# Patient Record
Sex: MALE | Race: BLACK OR AFRICAN AMERICAN | NOT HISPANIC OR LATINO | Employment: OTHER | URBAN - METROPOLITAN AREA
[De-identification: names, ages, dates, MRNs, and addresses within clinical notes are randomized per-mention and may not be internally consistent; named-entity substitution may affect disease eponyms.]

---

## 2017-03-31 ENCOUNTER — HOSPITAL ENCOUNTER (EMERGENCY)
Facility: HOSPITAL | Age: 32
Discharge: HOME | End: 2017-03-31
Payer: MEDICARE

## 2017-03-31 VITALS
RESPIRATION RATE: 18 BRPM | TEMPERATURE: 98 F | SYSTOLIC BLOOD PRESSURE: 126 MMHG | BODY MASS INDEX: 23.74 KG/M2 | DIASTOLIC BLOOD PRESSURE: 71 MMHG | OXYGEN SATURATION: 99 % | WEIGHT: 185 LBS | HEIGHT: 74 IN | HEART RATE: 82 BPM

## 2017-03-31 DIAGNOSIS — R07.89 CHEST WALL PAIN: Primary | ICD-10-CM

## 2017-03-31 DIAGNOSIS — R07.9 CHEST PAIN: ICD-10-CM

## 2017-03-31 PROCEDURE — 96372 THER/PROPH/DIAG INJ SC/IM: CPT

## 2017-03-31 PROCEDURE — 99284 EMERGENCY DEPT VISIT MOD MDM: CPT | Mod: 25

## 2017-03-31 PROCEDURE — 63600175 PHARM REV CODE 636 W HCPCS: Performed by: PHYSICIAN ASSISTANT

## 2017-03-31 PROCEDURE — 93005 ELECTROCARDIOGRAM TRACING: CPT

## 2017-03-31 RX ORDER — IBUPROFEN 800 MG/1
800 TABLET ORAL EVERY 6 HOURS PRN
Qty: 20 TABLET | Refills: 0 | Status: ON HOLD | OUTPATIENT
Start: 2017-03-31 | End: 2021-03-02 | Stop reason: HOSPADM

## 2017-03-31 RX ORDER — KETOROLAC TROMETHAMINE 30 MG/ML
10 INJECTION, SOLUTION INTRAMUSCULAR; INTRAVENOUS
Status: COMPLETED | OUTPATIENT
Start: 2017-03-31 | End: 2017-03-31

## 2017-03-31 RX ADMIN — KETOROLAC TROMETHAMINE 10 MG: 30 INJECTION, SOLUTION INTRAMUSCULAR at 02:03

## 2017-03-31 NOTE — ED PROVIDER NOTES
Encounter Date: 3/31/2017       History     Chief Complaint   Patient presents with    Pectoral Muscle Pain     Pt reports hurting left pec muscle about 1 month ago, tried tylenol, not helping pain worse now     Review of patient's allergies indicates:  No Known Allergies  HPI Comments: Pt is a 31 yr old male with hx of type 1 diabetes who presents to the Ed with chest pain.  Pt states about 3 weeks ago, he was lifting weights when he felt some pain on the left side of his chest.  Pt states the pain lasted for a day or so and then improved.  Pt states since then, he feels like every time he lifts something heavy at work or while working out, the pain begins.  He states the pain is currently 5/10.  He states the pain is worse with movement and lifting, but he is painfree while resting.  He denies any associated shortness of breath.  He denies lower extremity edema, recent travel, recent surgery, or previous blood clot.  He denies smoking history.  He states he has been taking tylenol with no relief of his symptoms.    The history is provided by the patient.     Past Medical History:   Diagnosis Date    Diabetes mellitus      History reviewed. No pertinent surgical history.  History reviewed. No pertinent family history.  Social History   Substance Use Topics    Smoking status: Current Some Day Smoker    Smokeless tobacco: None    Alcohol use No     Review of Systems   Constitutional: Negative for activity change, appetite change, chills, fatigue and fever.   HENT: Negative for congestion, ear discharge, ear pain, hearing loss, mouth sores, postnasal drip, rhinorrhea, sore throat and trouble swallowing.    Eyes: Negative for photophobia and visual disturbance.   Respiratory: Negative for cough and shortness of breath.    Cardiovascular: Positive for chest pain (chest wall pain). Negative for palpitations and leg swelling.   Gastrointestinal: Negative for abdominal pain, blood in stool, constipation, diarrhea,  nausea and vomiting.   Genitourinary: Negative for dysuria, flank pain and hematuria.   Musculoskeletal: Negative for back pain, neck pain and neck stiffness.   Skin: Negative for rash and wound.   Neurological: Negative for dizziness, syncope, speech difficulty, weakness, light-headedness, numbness and headaches.       Physical Exam   Initial Vitals   BP Pulse Resp Temp SpO2   03/31/17 1327 03/31/17 1327 03/31/17 1327 03/31/17 1327 03/31/17 1327   128/69 86 16 98.1 °F (36.7 °C) 100 %     Physical Exam    Nursing note and vitals reviewed.  Constitutional: He appears well-developed and well-nourished. He is not diaphoretic.  Non-toxic appearance. No distress.   HENT:   Head: Normocephalic.   Right Ear: Hearing and external ear normal.   Left Ear: Hearing and external ear normal.   Nose: Nose normal.   Mouth/Throat: Uvula is midline and oropharynx is clear and moist. No trismus in the jaw. No uvula swelling. No oropharyngeal exudate.   Eyes: Conjunctivae are normal. Pupils are equal, round, and reactive to light.   Neck: Normal range of motion.   Cardiovascular: Normal rate, regular rhythm and normal heart sounds. Exam reveals no gallop and no friction rub.    No murmur heard.  No lower extremity edema.   Pulmonary/Chest: Breath sounds normal. No respiratory distress. He has no wheezes. He has no rhonchi. He has no rales. He exhibits tenderness (over the left pectoral muscle).   Abdominal: Soft. Bowel sounds are normal. He exhibits no distension and no mass. There is no tenderness. There is no rebound and no guarding.   Lymphadenopathy:     He has no cervical adenopathy.   Neurological: He is alert and oriented to person, place, and time.   Skin: Skin is warm and dry.   Psychiatric: He has a normal mood and affect.         ED Course   Procedures  Labs Reviewed - No data to display  EKG Readings: (Independently Interpreted)   Initial Reading: No STEMI. Rhythm: Normal Sinus Rhythm. Heart Rate: 76.     Imaging Results          X-Ray Chest PA And Lateral (Final result) Result time:  03/31/17 15:42:57    Final result by Alfa Lynn MD (03/31/17 15:42:57)    Impression:     As above.      Electronically signed by: ALFA LYNN MD  Date:     03/31/17  Time:    15:42     Narrative:    Chest PA lateral.    Findings: 2 views.  The lungs are clear.  There is no pneumothorax or pleural fluid.  The cardiac silhouette is not enlarged.  The osseous structures are unremarkable.                 Medical Decision Making:   Initial Assessment:   Urgent evaluation of a 31 yr old male with chest wall pain.  Pt is afebrile, nontoxic appearing, and hemodynamically stable.  On exam, there is tenderness over the left pectoral muscle.  Lungs are clear to auscultation.  No lower extremity edema.  Pt is PERC negative.  I doubt ACS.  EKG and chest xray will be obtained.  I suspect muscle strain.  Pt will be given toradol.  ED Management:  3:48 PM   EKG shows no acute ischemia.  Chest xray shows no acute cardiopulmonary process.  I suspect muscular strain.  He is requesting work note.  He is advised to follow up with PCP in 48 hours for reevaluation or return to ED with any worsening symptoms or concerns.  Other:   I have discussed this case with another health care provider.       <> Summary of the Discussion: Dr. Montes                   ED Course     Clinical Impression:   The primary encounter diagnosis was Chest wall pain. A diagnosis of Chest pain was also pertinent to this visit.          Neida Arriaza PA-C  03/31/17 1667

## 2017-03-31 NOTE — ED AVS SNAPSHOT
[facility]  [address] [place] [address]               Andrzej Sinclair   3/31/2017  2:46 PM   ED    Description:  Male : 1985   Department:  [facility]           Your Care was Coordinated By:     Provider Role From To    Neida Arriaza PA-C Physician Assistant [phone] --      Reason for Visit     Pectoral Muscle Pain           Diagnoses this Visit        Comments    Chest wall pain    -  Primary     Chest pain           ED Disposition     None           To Do List           Follow-up Information     Follow up with Minda Zaragoza MD In 2 days.    Specialty:  Family Medicine    Contact information:    [address]  [place]  [facility] [address]  [phone]         These Medications        Disp Refills Start End    ibuprofen (ADVIL,MOTRIN) 800 MG tablet 20 tablet 0 3/31/2017     Take 1 tablet (800 mg total) by mouth every 6 (six) hours as needed for Pain. - Oral      [facility]     [facility] On Call Nurse Care Line -  Assistance  Unless otherwise directed by your provider, please contact [facility], our nurse care line that is available for  assistance.     Registered nurses in the [facility] provide: appointment scheduling, clinical advisement, health education, and other advisory services.  Call: [phone] (toll free)               Medications           Message regarding Medications     Verify the changes and/or additions to your medication regime listed below are the same as discussed with your clinician today.  If any of these changes or additions are incorrect, please notify your healthcare provider.        START taking these NEW medications        Refills    ibuprofen (ADVIL,MOTRIN) 800 MG tablet 0    Sig: Take 1 tablet (800 mg total) by mouth every 6 (six) hours as needed for Pain.    Class: Print    Route: Oral      These medications were administered today        Dose  "Freq    ketorolac injection 10 mg 10 mg ED 1 Time    Sig: Inject 10 mg into the muscle ED 1 Time.    Class: Normal    Route: Intramuscular           Verify that the below list of medications is an accurate representation of the medications you are currently taking.  If none reported, the list may be blank. If incorrect, please contact your healthcare provider. Carry this list with you in case of emergency.           Current Medications     insulin aspart (NOVOLOG) 100 unit/mL injection Inject into the skin 3 (three) times daily before meals.    ondansetron (ZOFRAN) 4 MG tablet Take 1 tablet (4 mg total) by mouth every 6 (six) hours.    ibuprofen (ADVIL,MOTRIN) 800 MG tablet Take 1 tablet (800 mg total) by mouth every 6 (six) hours as needed for Pain.           Clinical Reference Information           Your Vitals Were     BP Pulse Temp Resp Height Weight    128/69 (BP Location: Left arm, Patient Position: Sitting) 86 98.1 °F (36.7 °C) (Oral) 16 6' 2" (1.88 m) 83.9 kg (185 lb)    SpO2 BMI             100% 23.75 kg/m2         Allergies as of 3/31/2017     No Known Allergies      Immunizations Administered on Date of Encounter - 3/31/2017     None      ED Micro, Lab, POCT     None      ED Imaging Orders     Start Ordered       Status Ordering Provider    03/31/17 1449 03/31/17 1449  X-Ray Chest PA And Lateral  1 time imaging      Final result       Discharge References/Attachments     CHEST WALL STRAIN (ENGLISH)      [facility] Sign-Up     Activating your [facility] account is as easy as 1-2-3!     1) Visit [email or web address], select Sign Up Now, enter this activation code and your date of birth, then select Next.  [email or web address]  Expires: 5/15/2017  3:50 PM      2) Create a username and password to use when you visit [facility] in the future and select a security question in case you lose your password and select Next.    3) Enter your e-mail address and click Sign Up!    Additional Information  If you have questions, " please e-mail [email or web address] or call [phone] to talk to our [facility] staff. Remember, [facility] is NOT to be used for urgent needs. For medical emergencies, dial 911.         Smoking Cessation     If you would like to quit smoking:   You may be eligible for free services if you are a [place] resident and started smoking cigarettes before September 1, 1988.  Call the Smoking Cessation Trust ([facility]) toll free at [phone] [phone] or ([phone] [phone].   Call 9-444-QUIT-NOW if you do not meet the above criteria.   Contact us via email: [email or web address]   View our website for more information: [email or web address]         [facility] complies with applicable Federal civil rights laws and does not discriminate on the basis of race, color, national origin, age, disability, or sex.        Language Assistance Services     ATTENTION: Language assistance services are available, free of charge. Please call [phone].      ATENCIÓN: Si habla español, tiene a boucher disposición servicios gratuitos de asistencia lingüística. Llame al [phone].     CHÚ Ý: N?u b?n nói Ti?ng Vi?t, có các d?ch v? h? tr? ngôn ng? mi?n phí dành cho b?n. G?i s? [phone].

## 2017-03-31 NOTE — ED TRIAGE NOTES
Pt arrives to ED via personal transportation with c/o left pectoral muscle pain that worsens upon movement and palpation x one month. Reports heavy lifting at work. Denies recent injury or any other symptoms at this time.

## 2020-04-21 DIAGNOSIS — Z01.84 ANTIBODY RESPONSE EXAMINATION: ICD-10-CM

## 2020-05-21 DIAGNOSIS — Z01.84 ANTIBODY RESPONSE EXAMINATION: ICD-10-CM

## 2020-06-20 DIAGNOSIS — Z01.84 ANTIBODY RESPONSE EXAMINATION: ICD-10-CM

## 2020-07-20 DIAGNOSIS — Z01.84 ANTIBODY RESPONSE EXAMINATION: ICD-10-CM

## 2020-08-19 DIAGNOSIS — Z01.84 ANTIBODY RESPONSE EXAMINATION: ICD-10-CM

## 2020-09-18 DIAGNOSIS — Z01.84 ANTIBODY RESPONSE EXAMINATION: ICD-10-CM

## 2020-10-18 DIAGNOSIS — Z01.84 ANTIBODY RESPONSE EXAMINATION: ICD-10-CM

## 2020-11-17 DIAGNOSIS — Z01.84 ANTIBODY RESPONSE EXAMINATION: ICD-10-CM

## 2020-11-18 ENCOUNTER — CLINICAL SUPPORT (OUTPATIENT)
Dept: URGENT CARE | Facility: CLINIC | Age: 35
End: 2020-11-18
Payer: MEDICARE

## 2020-11-18 ENCOUNTER — TELEPHONE (OUTPATIENT)
Dept: PRIMARY CARE CLINIC | Facility: CLINIC | Age: 35
End: 2020-11-18

## 2020-11-18 DIAGNOSIS — R53.83 FATIGUE, UNSPECIFIED TYPE: ICD-10-CM

## 2020-11-18 DIAGNOSIS — R11.10 VOMITING, INTRACTABILITY OF VOMITING NOT SPECIFIED, PRESENCE OF NAUSEA NOT SPECIFIED, UNSPECIFIED VOMITING TYPE: Primary | ICD-10-CM

## 2020-11-18 DIAGNOSIS — R19.7 DIARRHEA, UNSPECIFIED TYPE: ICD-10-CM

## 2020-11-18 DIAGNOSIS — R09.81 NASAL CONGESTION: ICD-10-CM

## 2020-11-18 DIAGNOSIS — R51.9 INTRACTABLE HEADACHE, UNSPECIFIED CHRONICITY PATTERN, UNSPECIFIED HEADACHE TYPE: ICD-10-CM

## 2020-11-18 DIAGNOSIS — R11.10 VOMITING, INTRACTABILITY OF VOMITING NOT SPECIFIED, PRESENCE OF NAUSEA NOT SPECIFIED, UNSPECIFIED VOMITING TYPE: ICD-10-CM

## 2020-11-18 LAB
CTP QC/QA: YES
SARS-COV-2 RDRP RESP QL NAA+PROBE: NEGATIVE

## 2020-11-18 PROCEDURE — U0002 COVID-19 LAB TEST NON-CDC: HCPCS | Mod: QW,S$GLB,, | Performed by: INTERNAL MEDICINE

## 2020-11-18 PROCEDURE — U0002: ICD-10-PCS | Mod: QW,S$GLB,, | Performed by: INTERNAL MEDICINE

## 2020-11-18 NOTE — TELEPHONE ENCOUNTER
Patient has been notified of COVID test result on behalf of Employee Health and provided clinical guidance on such. Cleared to RTW 11/19.

## 2020-11-18 NOTE — LETTER
[address], [address] ? [place] [address] ? Phone [phone] ? Fax [phone]           Return to Work/School    Patient: Andrzej Sinclair  YOB: 1985   Date: 11/18/2020      To Whom It May Concern:     Andrzej Sinclair was in contact with/seen in my office on 11/18/2020. COVID-19 is present in our communities across the [facility]. Not all patients are eligible or appropriate to be tested. In this situation, your employee meets the following criteria:     Andrzej Sinclair has met the criteria for COVID-19 testing and has a NEGATIVE result. The employee can return to work once they are asymptomatic for 24 hours without the use of fever reducing medications (Tylenol, Motrin, etc).     If you have any questions or concerns, or if I can be of further assistance, please do not hesitate to contact me.     Sincerely,    NURSE URGENT CARE, [facility]

## 2020-11-30 ENCOUNTER — HOSPITAL ENCOUNTER (EMERGENCY)
Facility: HOSPITAL | Age: 35
Discharge: LEFT BEFORE DISCHARGE | End: 2020-11-30
Attending: EMERGENCY MEDICINE
Payer: MEDICARE

## 2020-11-30 VITALS
DIASTOLIC BLOOD PRESSURE: 78 MMHG | OXYGEN SATURATION: 99 % | HEART RATE: 79 BPM | WEIGHT: 185 LBS | RESPIRATION RATE: 14 BRPM | BODY MASS INDEX: 23.74 KG/M2 | HEIGHT: 74 IN | SYSTOLIC BLOOD PRESSURE: 143 MMHG | TEMPERATURE: 98 F

## 2020-11-30 DIAGNOSIS — R73.9 HYPERGLYCEMIA: ICD-10-CM

## 2020-11-30 DIAGNOSIS — R06.02 SHORTNESS OF BREATH: ICD-10-CM

## 2020-11-30 DIAGNOSIS — R07.9 CHEST PAIN, UNSPECIFIED TYPE: Primary | ICD-10-CM

## 2020-11-30 LAB
ALBUMIN SERPL BCP-MCNC: 4.5 G/DL (ref 3.5–5.2)
ALLENS TEST: ABNORMAL
ALP SERPL-CCNC: 121 U/L (ref 55–135)
ALT SERPL W/O P-5'-P-CCNC: 25 U/L (ref 10–44)
ANION GAP SERPL CALC-SCNC: 13 MMOL/L (ref 8–16)
AST SERPL-CCNC: 22 U/L (ref 10–40)
B-OH-BUTYR BLD STRIP-SCNC: 0.9 MMOL/L (ref 0–0.5)
BACTERIA #/AREA URNS HPF: NORMAL /HPF
BASOPHILS # BLD AUTO: 0.02 K/UL (ref 0–0.2)
BASOPHILS NFR BLD: 0.6 % (ref 0–1.9)
BILIRUB SERPL-MCNC: 0.6 MG/DL (ref 0.1–1)
BILIRUB UR QL STRIP: NEGATIVE
BNP SERPL-MCNC: <10 PG/ML (ref 0–99)
BUN SERPL-MCNC: 30 MG/DL (ref 6–20)
CALCIUM SERPL-MCNC: 10.5 MG/DL (ref 8.7–10.5)
CHLORIDE SERPL-SCNC: 94 MMOL/L (ref 95–110)
CLARITY UR: CLEAR
CO2 SERPL-SCNC: 25 MMOL/L (ref 23–29)
COLOR UR: COLORLESS
CREAT SERPL-MCNC: 1.9 MG/DL (ref 0.5–1.4)
CTP QC/QA: YES
D DIMER PPP IA.FEU-MCNC: <0.19 MG/L FEU
DIFFERENTIAL METHOD: ABNORMAL
EOSINOPHIL # BLD AUTO: 0 K/UL (ref 0–0.5)
EOSINOPHIL NFR BLD: 0.3 % (ref 0–8)
ERYTHROCYTE [DISTWIDTH] IN BLOOD BY AUTOMATED COUNT: 11.7 % (ref 11.5–14.5)
EST. GFR  (AFRICAN AMERICAN): 52 ML/MIN/1.73 M^2
EST. GFR  (NON AFRICAN AMERICAN): 45 ML/MIN/1.73 M^2
GLUCOSE SERPL-MCNC: 677 MG/DL (ref 70–110)
GLUCOSE UR QL STRIP: ABNORMAL
HCO3 UR-SCNC: 9.7 MMOL/L (ref 24–28)
HCT VFR BLD AUTO: 39.4 % (ref 40–54)
HGB BLD-MCNC: 14.5 G/DL (ref 14–18)
HGB UR QL STRIP: ABNORMAL
IMM GRANULOCYTES # BLD AUTO: 0 K/UL (ref 0–0.04)
IMM GRANULOCYTES NFR BLD AUTO: 0 % (ref 0–0.5)
KETONES UR QL STRIP: ABNORMAL
LEUKOCYTE ESTERASE UR QL STRIP: NEGATIVE
LIPASE SERPL-CCNC: 5 U/L (ref 4–60)
LYMPHOCYTES # BLD AUTO: 0.7 K/UL (ref 1–4.8)
LYMPHOCYTES NFR BLD: 17.9 % (ref 18–48)
MCH RBC QN AUTO: 31.1 PG (ref 27–31)
MCHC RBC AUTO-ENTMCNC: 36.8 G/DL (ref 32–36)
MCV RBC AUTO: 85 FL (ref 82–98)
MICROSCOPIC COMMENT: NORMAL
MONOCYTES # BLD AUTO: 0.3 K/UL (ref 0.3–1)
MONOCYTES NFR BLD: 7.4 % (ref 4–15)
NEUTROPHILS # BLD AUTO: 2.7 K/UL (ref 1.8–7.7)
NEUTROPHILS NFR BLD: 73.8 % (ref 38–73)
NITRITE UR QL STRIP: NEGATIVE
NRBC BLD-RTO: 0 /100 WBC
PCO2 BLDA: 19.6 MMHG (ref 35–45)
PH SMN: 7.3 [PH] (ref 7.35–7.45)
PH UR STRIP: 5 [PH] (ref 5–8)
PLATELET # BLD AUTO: 191 K/UL (ref 150–350)
PMV BLD AUTO: 11.3 FL (ref 9.2–12.9)
PO2 BLDA: 40 MMHG (ref 40–60)
POC BE: -15 MMOL/L
POC SATURATED O2: 72 % (ref 95–100)
POC TCO2: 10 MMOL/L (ref 24–29)
POCT GLUCOSE: 360 MG/DL (ref 70–110)
POCT GLUCOSE: 379 MG/DL (ref 70–110)
POCT GLUCOSE: >500 MG/DL (ref 70–110)
POTASSIUM SERPL-SCNC: 5 MMOL/L (ref 3.5–5.1)
PROT SERPL-MCNC: 8.4 G/DL (ref 6–8.4)
PROT UR QL STRIP: NEGATIVE
RBC # BLD AUTO: 4.66 M/UL (ref 4.6–6.2)
RBC #/AREA URNS HPF: 3 /HPF (ref 0–4)
SAMPLE: ABNORMAL
SARS-COV-2 RDRP RESP QL NAA+PROBE: NEGATIVE
SITE: ABNORMAL
SODIUM SERPL-SCNC: 132 MMOL/L (ref 136–145)
SP GR UR STRIP: 1.02 (ref 1–1.03)
SQUAMOUS #/AREA URNS HPF: 1 /HPF
TROPONIN I SERPL DL<=0.01 NG/ML-MCNC: <0.006 NG/ML (ref 0–0.03)
URN SPEC COLLECT METH UR: ABNORMAL
UROBILINOGEN UR STRIP-ACNC: NEGATIVE EU/DL
WBC # BLD AUTO: 3.63 K/UL (ref 3.9–12.7)
WBC #/AREA URNS HPF: 3 /HPF (ref 0–5)
YEAST URNS QL MICRO: NORMAL

## 2020-11-30 PROCEDURE — 63600175 PHARM REV CODE 636 W HCPCS: Performed by: PHYSICIAN ASSISTANT

## 2020-11-30 PROCEDURE — 93010 ELECTROCARDIOGRAM REPORT: CPT | Mod: ,,, | Performed by: INTERNAL MEDICINE

## 2020-11-30 PROCEDURE — 83690 ASSAY OF LIPASE: CPT

## 2020-11-30 PROCEDURE — 93005 ELECTROCARDIOGRAM TRACING: CPT

## 2020-11-30 PROCEDURE — 82803 BLOOD GASES ANY COMBINATION: CPT

## 2020-11-30 PROCEDURE — 83880 ASSAY OF NATRIURETIC PEPTIDE: CPT

## 2020-11-30 PROCEDURE — 99900035 HC TECH TIME PER 15 MIN (STAT)

## 2020-11-30 PROCEDURE — 85379 FIBRIN DEGRADATION QUANT: CPT

## 2020-11-30 PROCEDURE — 84484 ASSAY OF TROPONIN QUANT: CPT

## 2020-11-30 PROCEDURE — 93010 EKG 12-LEAD: ICD-10-PCS | Mod: ,,, | Performed by: INTERNAL MEDICINE

## 2020-11-30 PROCEDURE — 96375 TX/PRO/DX INJ NEW DRUG ADDON: CPT

## 2020-11-30 PROCEDURE — 82962 GLUCOSE BLOOD TEST: CPT | Mod: 91

## 2020-11-30 PROCEDURE — 96374 THER/PROPH/DIAG INJ IV PUSH: CPT

## 2020-11-30 PROCEDURE — 96361 HYDRATE IV INFUSION ADD-ON: CPT

## 2020-11-30 PROCEDURE — 25000003 PHARM REV CODE 250: Performed by: PHYSICIAN ASSISTANT

## 2020-11-30 PROCEDURE — 99285 EMERGENCY DEPT VISIT HI MDM: CPT | Mod: 25

## 2020-11-30 PROCEDURE — 85025 COMPLETE CBC W/AUTO DIFF WBC: CPT

## 2020-11-30 PROCEDURE — 81000 URINALYSIS NONAUTO W/SCOPE: CPT

## 2020-11-30 PROCEDURE — 63600175 PHARM REV CODE 636 W HCPCS: Performed by: EMERGENCY MEDICINE

## 2020-11-30 PROCEDURE — 82010 KETONE BODYS QUAN: CPT

## 2020-11-30 PROCEDURE — 80053 COMPREHEN METABOLIC PANEL: CPT

## 2020-11-30 PROCEDURE — U0002 COVID-19 LAB TEST NON-CDC: HCPCS | Performed by: PHYSICIAN ASSISTANT

## 2020-11-30 RX ORDER — MORPHINE SULFATE 4 MG/ML
4 INJECTION, SOLUTION INTRAMUSCULAR; INTRAVENOUS
Status: COMPLETED | OUTPATIENT
Start: 2020-11-30 | End: 2020-11-30

## 2020-11-30 RX ORDER — ASPIRIN 325 MG
325 TABLET, DELAYED RELEASE (ENTERIC COATED) ORAL
Status: COMPLETED | OUTPATIENT
Start: 2020-11-30 | End: 2020-11-30

## 2020-11-30 RX ORDER — ONDANSETRON 4 MG/1
4 TABLET, ORALLY DISINTEGRATING ORAL EVERY 6 HOURS PRN
Qty: 20 TABLET | Refills: 0 | Status: SHIPPED | OUTPATIENT
Start: 2020-11-30 | End: 2020-12-05

## 2020-11-30 RX ORDER — BENZONATATE 100 MG/1
100 CAPSULE ORAL 3 TIMES DAILY PRN
Qty: 20 CAPSULE | Refills: 0 | Status: SHIPPED | OUTPATIENT
Start: 2020-11-30 | End: 2020-12-07

## 2020-11-30 RX ADMIN — MORPHINE SULFATE 4 MG: 4 INJECTION, SOLUTION INTRAMUSCULAR; INTRAVENOUS at 12:11

## 2020-11-30 RX ADMIN — ASPIRIN 325 MG: 325 TABLET, COATED ORAL at 12:11

## 2020-11-30 RX ADMIN — INSULIN HUMAN 6 UNITS: 100 INJECTION, SOLUTION PARENTERAL at 02:11

## 2020-11-30 RX ADMIN — SODIUM CHLORIDE 1000 ML: 0.9 INJECTION, SOLUTION INTRAVENOUS at 01:11

## 2020-11-30 RX ADMIN — SODIUM CHLORIDE 1000 ML: 0.9 INJECTION, SOLUTION INTRAVENOUS at 12:11

## 2020-11-30 NOTE — DISCHARGE INSTRUCTIONS
Take medications as prescribed. You left against medical advice today. Please return for worsening symptoms or as needed

## 2020-11-30 NOTE — FIRST PROVIDER EVALUATION
Emergency Department TeleTriage Encounter Note      CHIEF COMPLAINT    Chief Complaint   Patient presents with    Shortness of Breath     woke up this am with sob.  states he is struggling to breathe. + nausea.  denies vomiting, diarrhea or fever.  states feels like an asthma attack (never had asthma).   also complaints of midsternal chest pains.  non-radiating.       VITAL SIGNS   Initial Vitals [11/30/20 1104]   BP Pulse Resp Temp SpO2   139/88 106 18 99.2 °F (37.3 °C) 97 %      MAP       --            ALLERGIES    Review of patient's allergies indicates:  No Known Allergies    PROVIDER TRIAGE NOTE    34 year old male presents to the ED for evaluation of shortness of breath.  Patient reports symptoms started this morning.  Also complains of chest tightness and nausea.  No prior cardiac history.  No history of asthma.    Initial orders will be placed and care will be transferred to an alternate provider when patient is roomed for a full evaluation. Any additional orders and the final disposition will be determined by that provider.      ORDERS  Labs Reviewed   CBC W/ AUTO DIFFERENTIAL   COMPREHENSIVE METABOLIC PANEL   TROPONIN I   SARS-COV-2 RDRP GENE       ED Orders (720h ago, onward)    Start Ordered     Status Ordering Provider    11/30/20 1119 11/30/20 1119  Airborne and Contact and Droplet Isolation Status  Continuous      Ordered ANAUTTY, ABIDA    11/30/20 1119 11/30/20 1119  X-Ray Chest AP Portable  1 time imaging      Ordered BRADYTY, ABIDA    11/30/20 1119 11/30/20 1119  EKG 12-lead  Once      Ordered BRADYTY, ABIDA    11/30/20 1119 11/30/20 1119  CBC auto differential  STAT  Collect    Ordered BRADYTY ABIDA    11/30/20 1119 11/30/20 1119  Comprehensive metabolic panel  STAT  Collect    Ordered JOHNYKUTTY ABIDA    11/30/20 1119 11/30/20 1119  Troponin I  STAT  Collect    Ordered MADI ABIDA    11/30/20 1119 11/30/20 1119  Insert Saline lock IV  Once      Ordered ABIDA BARRAZA     11/30/20 1119 11/30/20 1119  Cardiac Monitoring - Adult  Continuous     Comments: Notify Physician If:    Ordered ABIDA BARRAZA    11/30/20 1118 11/30/20 1119  POCT COVID-19 Rapid Screening  Once      Ordered ABIDA BARRAZA            Virtual Visit Note: The provider triage portion of this emergency department evaluation and documentation was performed via [employer], a HIPAA-compliant telemedicine application, in concert with a tele-presenter in the room. A face to face patient evaluation with one of my colleagues will occur once the patient is placed in an emergency department room.      DISCLAIMER: This note was prepared with [employer] voice recognition transcription software. Garbled syntax, mangled pronouns, and other bizarre constructions may be attributed to that software system.

## 2020-11-30 NOTE — Clinical Note
"Andrzej Zambranory" Yahir was seen and treated in our emergency department on 11/30/2020.  He may return to work on 12/02/2020.       If you have any questions or concerns, please don't hesitate to call.      ADALGISA Lopez RN    "

## 2020-11-30 NOTE — ED PROVIDER NOTES
"Encounter Date: 11/30/2020       History     Chief Complaint   Patient presents with    Shortness of Breath     woke up this am with sob.  states he is struggling to breathe. + nausea.  denies vomiting, diarrhea or fever.  states feels like an asthma attack (never had asthma).   also complaints of midsternal chest pains.  non-radiating.     CC: SOB    HPI:   33 y/o M with history of T1DM presenting for evaluation of SOB constantly since 0645.  He reports he is having constant squeezing 10/10 chest pain to both sides of chest radiating into substernal region. The pain is worse with standing up. Improved with laying down. Has had similar pain with DKA in the past; he has been admitted 4 times previously for this. He has had  1 day history of myalgias. States he developed dry cough and nausea this morning.   Pt c/o urinary frequency, polyuria, polydipsia since thanksgiving. States he had "half a beer" 4 days ago but denies alcohol use otherwise. He states he is compliant with diabetic diet and meds: novolog 7 units/ sliding scale 4-5 times per day with meals and Toujeo. Sees Dr. Marcial Tijerina PCP, 1.5 months ago. No recent med changes.  Denies fever, chills, vomiting, diarrhea dizziness, lightheadedness, dysuria, hematuria, urinary urgency or frequency        Review of patient's allergies indicates:  No Known Allergies  Past Medical History:   Diagnosis Date    Diabetes mellitus      History reviewed. No pertinent surgical history.  History reviewed. No pertinent family history.  Social History     Tobacco Use    Smoking status: Current Some Day Smoker   Substance Use Topics    Alcohol use: No    Drug use: No     Review of Systems   Constitutional: Negative for chills and fever.   HENT: Negative for congestion, ear pain, rhinorrhea, sore throat and trouble swallowing.    Eyes: Negative for redness.   Respiratory: Positive for cough and shortness of breath.    Cardiovascular: Positive for chest pain. Negative for leg " swelling.   Gastrointestinal: Positive for nausea. Negative for abdominal pain, constipation, diarrhea and vomiting.   Endocrine: Positive for polydipsia and polyuria.   Genitourinary: Positive for frequency. Negative for dysuria, hematuria and urgency.   Musculoskeletal: Positive for myalgias. Negative for back pain, gait problem and neck pain.   Neurological: Negative for dizziness, speech difficulty and light-headedness.   Psychiatric/Behavioral: Negative for confusion.       Physical Exam     Initial Vitals [11/30/20 1104]   BP Pulse Resp Temp SpO2   139/88 106 18 99.2 °F (37.3 °C) 97 %      MAP       --         Physical Exam    Nursing note and vitals reviewed.  Constitutional: He appears well-developed and well-nourished. No distress.   HENT:   Head: Normocephalic.   Right Ear: Hearing, tympanic membrane, external ear and ear canal normal.   Left Ear: Hearing, tympanic membrane, external ear and ear canal normal.   Nose: Nose normal.   Mouth/Throat: No posterior oropharyngeal edema or posterior oropharyngeal erythema.   Eyes: Conjunctivae are normal.   Neck: Neck supple.   Cardiovascular: Regular rhythm. Tachycardia present.  Exam reveals no gallop and no friction rub.    No murmur heard.  Pulses:       Radial pulses are 2+ on the right side and 2+ on the left side.        Dorsalis pedis pulses are 2+ on the right side and 2+ on the left side.   Pulmonary/Chest: Breath sounds normal. No respiratory distress. He has no wheezes. He has no rhonchi. He has no rales.   Abdominal: Soft. Bowel sounds are normal. He exhibits no distension. There is abdominal tenderness in the epigastric area. There is no rigidity, no rebound, no guarding, no CVA tenderness, no tenderness at McBurney's point and negative Menendez's sign.   Lymphadenopathy:     He has no cervical adenopathy.   Neurological: He is alert.   Skin: Skin is warm and dry. No rash noted.   Psychiatric: He has a normal mood and affect.         ED Course    Procedures  Labs Reviewed   CBC W/ AUTO DIFFERENTIAL - Abnormal; Notable for the following components:       Result Value    WBC 3.63 (*)     Hematocrit 39.4 (*)     MCH 31.1 (*)     MCHC 36.8 (*)     Lymph # 0.7 (*)     Gran % 73.8 (*)     Lymph % 17.9 (*)     All other components within normal limits   COMPREHENSIVE METABOLIC PANEL - Abnormal; Notable for the following components:    Sodium 132 (*)     Chloride 94 (*)     Glucose 677 (*)     BUN 30 (*)     Creatinine 1.9 (*)     eGFR if  52 (*)     eGFR if non  45 (*)     All other components within normal limits    Narrative:     Glucose critical result(s) called and verbal readback obtained from   Juanita Lopez by BG 11/30/2020 13:19   URINALYSIS, REFLEX TO URINE CULTURE - Abnormal; Notable for the following components:    Color, UA Colorless (*)     Glucose, UA 3+ (*)     Ketones, UA 1+ (*)     Occult Blood UA 2+ (*)     All other components within normal limits    Narrative:     Specimen Source->Urine   BETA - HYDROXYBUTYRATE, SERUM - Abnormal; Notable for the following components:    Beta-Hydroxybutyrate 0.9 (*)     All other components within normal limits   POCT GLUCOSE - Abnormal; Notable for the following components:    POCT Glucose >500 (*)     All other components within normal limits   ISTAT PROCEDURE - Abnormal; Notable for the following components:    POC PH 7.300 (*)     POC PCO2 19.6 (*)     POC HCO3 9.7 (*)     POC SATURATED O2 72 (*)     POC TCO2 10 (*)     All other components within normal limits   POCT GLUCOSE - Abnormal; Notable for the following components:    POCT Glucose 379 (*)     All other components within normal limits   POCT GLUCOSE - Abnormal; Notable for the following components:    POCT Glucose 360 (*)     All other components within normal limits   TROPONIN I   D DIMER, QUANTITATIVE   B-TYPE NATRIURETIC PEPTIDE   LIPASE   URINALYSIS MICROSCOPIC    Narrative:     Specimen Source->Urine    SARS-COV-2 RDRP GENE     EKG Readings: (Independently Interpreted)   This patient is in a sinus tachycardia with a heart rate of 112.  There are no significant ST segment or T-wave changes.  There is no evidence of acute myocardial infarction or malignant arrhythmia.  This patient may have an incomplete right bundle branch block.     ECG Results          EKG 12-lead (Final result)  Result time 11/30/20 19:25:37    Final result by Interface, Lab In [facility] (11/30/20 19:25:37)                 Narrative:    Test Reason : R06.02,    Vent. Rate : 112 BPM     Atrial Rate : 112 BPM     P-R Int : 132 ms          QRS Dur : 098 ms      QT Int : 314 ms       P-R-T Axes : 084 083 076 degrees     QTc Int : 428 ms    Sinus tachycardia  Biatrial enlargement  Incomplete right bundle branch block  Abnormal ECG  When compared with ECG of 31-MAR-2017 14:55,  No significant change was found  Confirmed by James Dugan MD ([id number]) on 11/30/2020 7:25:27 PM    Referred By: [email or web address]   SELF           Confirmed By:James Dugan MD                            Imaging Results          X-Ray Chest AP Portable (Final result)  Result time 11/30/20 12:23:58    Final result by Alec Hunt MD (11/30/20 12:23:58)                 Impression:      1. No acute cardiopulmonary process.  Clinical correlation is needed to exclude viral process.      Electronically signed by: Alec Hunt MD  Date:    11/30/2020  Time:    12:23             Narrative:    EXAMINATION:  XR CHEST AP PORTABLE    CLINICAL HISTORY:  Suspected Covid-19 Virus Infection;    TECHNIQUE:  Single frontal view of the chest was performed.    COMPARISON:  03/31/2017    FINDINGS:  The cardiomediastinal silhouette is not enlarged.  There is no pleural effusion.  The trachea is midline.  The lungs are symmetrically expanded bilaterally without evidence of acute parenchymal process. No large focal consolidation seen.  There is no pneumothorax.  The osseous structures are  unremarkable.                                 Medical Decision Making:   Initial Assessment:   34-year-old male with history of diabetes presenting for evaluation of chest pain and shortness breath that began prior to arrival.  He reports this feels similar to when he has had DKA previously.  Glucose greater than 500 in triage.  Patient is afebrile, mildly tachycardic, not hypoxic in no distress.  Exam above.  He isCOVID negative. CXR negative.  Initial troponin is negative.  EKG without STEMI or malignant arrhythmia. Lipase normal. Glucose improved after insulin and fluids in ED. He is tolerating PO. Discussed pt with Dr. Mcdonough as recommended by Dr. Clemens. She states the pt is not in DKA. Still feel that patient warrants admission for ACS rule out due to persisting chest pain despite morphine and hyperglycemia with BETH. Pt not agreeable to admission. Left AMA and agreed to return to ER for worsening symptoms or as needed. Discussed with Dr. Clemens who agrees with assessment and plan                              Clinical Impression:       ICD-10-CM ICD-9-CM   1. Chest pain, unspecified type  R07.9 786.50   2. Shortness of breath  R06.02 786.05   3. Hyperglycemia  R73.9 790.29                      Disposition:   Disposition: AMA  Condition: Fair     ED Disposition Condition    AMA                             Carri Ng PA-C  11/30/20 2057       Carri Ng PA-C  11/30/20 2057

## 2020-11-30 NOTE — ED NOTES
Provider explained risks and complications of leaving AMA, patient verbalized understanding. AMA forms signed by patient.

## 2020-11-30 NOTE — ED NOTES
Patient placed on continuous cardiac monitor, automatic blood pressure cuff and continuous pulse oximeter. Call light within reach.

## 2021-02-28 ENCOUNTER — HOSPITAL ENCOUNTER (INPATIENT)
Facility: HOSPITAL | Age: 36
LOS: 2 days | Discharge: HOME | DRG: 639 | End: 2021-03-02
Attending: EMERGENCY MEDICINE | Admitting: INTERNAL MEDICINE
Payer: MEDICARE

## 2021-02-28 DIAGNOSIS — Z76.89 ENCOUNTER TO ESTABLISH CARE: ICD-10-CM

## 2021-02-28 DIAGNOSIS — R07.9 CHEST PAIN: ICD-10-CM

## 2021-02-28 DIAGNOSIS — E10.10 DIABETIC KETOACIDOSIS WITHOUT COMA ASSOCIATED WITH TYPE 1 DIABETES MELLITUS: Primary | ICD-10-CM

## 2021-02-28 LAB
ALBUMIN SERPL BCP-MCNC: 4.4 G/DL (ref 3.5–5.2)
ALLENS TEST: ABNORMAL
ALP SERPL-CCNC: 83 U/L (ref 55–135)
ALT SERPL W/O P-5'-P-CCNC: 34 U/L (ref 10–44)
ANION GAP SERPL CALC-SCNC: 18 MMOL/L (ref 8–16)
AST SERPL-CCNC: 34 U/L (ref 10–40)
B-OH-BUTYR BLD STRIP-SCNC: 4.7 MMOL/L (ref 0–0.5)
BACTERIA #/AREA URNS AUTO: NORMAL /HPF
BASOPHILS # BLD AUTO: 0.02 K/UL (ref 0–0.2)
BASOPHILS NFR BLD: 0.3 % (ref 0–1.9)
BILIRUB SERPL-MCNC: 0.7 MG/DL (ref 0.1–1)
BILIRUB UR QL STRIP: NEGATIVE
BUN SERPL-MCNC: 27 MG/DL (ref 6–20)
CALCIUM SERPL-MCNC: 9.7 MG/DL (ref 8.7–10.5)
CHLORIDE SERPL-SCNC: 97 MMOL/L (ref 95–110)
CLARITY UR REFRACT.AUTO: CLEAR
CO2 SERPL-SCNC: 24 MMOL/L (ref 23–29)
COLOR UR AUTO: ABNORMAL
CREAT SERPL-MCNC: 1.8 MG/DL (ref 0.5–1.4)
CTP QC/QA: YES
DIFFERENTIAL METHOD: ABNORMAL
EOSINOPHIL # BLD AUTO: 0 K/UL (ref 0–0.5)
EOSINOPHIL NFR BLD: 0 % (ref 0–8)
ERYTHROCYTE [DISTWIDTH] IN BLOOD BY AUTOMATED COUNT: 12.9 % (ref 11.5–14.5)
EST. GFR  (AFRICAN AMERICAN): 55.5 ML/MIN/1.73 M^2
EST. GFR  (NON AFRICAN AMERICAN): 48 ML/MIN/1.73 M^2
GLUCOSE SERPL-MCNC: 398 MG/DL (ref 70–110)
GLUCOSE SERPL-MCNC: 431 MG/DL (ref 70–110)
GLUCOSE UR QL STRIP: ABNORMAL
HCO3 UR-SCNC: 26.7 MMOL/L (ref 24–28)
HCT VFR BLD AUTO: 46.4 % (ref 40–54)
HGB BLD-MCNC: 15.9 G/DL (ref 14–18)
HGB UR QL STRIP: ABNORMAL
HYALINE CASTS UR QL AUTO: 0 /LPF
IMM GRANULOCYTES # BLD AUTO: 0.03 K/UL (ref 0–0.04)
IMM GRANULOCYTES NFR BLD AUTO: 0.4 % (ref 0–0.5)
INR PPP: 1 (ref 0.8–1.2)
KETONES UR QL STRIP: ABNORMAL
LEUKOCYTE ESTERASE UR QL STRIP: NEGATIVE
LIPASE SERPL-CCNC: 4 U/L (ref 4–60)
LYMPHOCYTES # BLD AUTO: 0.7 K/UL (ref 1–4.8)
LYMPHOCYTES NFR BLD: 9.7 % (ref 18–48)
MCH RBC QN AUTO: 31.3 PG (ref 27–31)
MCHC RBC AUTO-ENTMCNC: 34.3 G/DL (ref 32–36)
MCV RBC AUTO: 91 FL (ref 82–98)
MICROSCOPIC COMMENT: NORMAL
MONOCYTES # BLD AUTO: 0.7 K/UL (ref 0.3–1)
MONOCYTES NFR BLD: 9.8 % (ref 4–15)
NEUTROPHILS # BLD AUTO: 5.8 K/UL (ref 1.8–7.7)
NEUTROPHILS NFR BLD: 79.8 % (ref 38–73)
NITRITE UR QL STRIP: NEGATIVE
NRBC BLD-RTO: 0 /100 WBC
PCO2 BLDA: 51.5 MMHG (ref 35–45)
PH SMN: 7.32 [PH] (ref 7.35–7.45)
PH UR STRIP: 5 [PH] (ref 5–8)
PLATELET # BLD AUTO: 168 K/UL (ref 150–350)
PMV BLD AUTO: 11.5 FL (ref 9.2–12.9)
PO2 BLDA: 28 MMHG (ref 40–60)
POC BE: 1 MMOL/L
POC SATURATED O2: 48 % (ref 95–100)
POC TCO2: 28 MMOL/L (ref 24–29)
POCT GLUCOSE: 417 MG/DL (ref 70–110)
POTASSIUM SERPL-SCNC: 5.2 MMOL/L (ref 3.5–5.1)
PROT SERPL-MCNC: 8.3 G/DL (ref 6–8.4)
PROT UR QL STRIP: ABNORMAL
PROTHROMBIN TIME: 10.9 SEC (ref 9–12.5)
RBC # BLD AUTO: 5.08 M/UL (ref 4.6–6.2)
RBC #/AREA URNS AUTO: 1 /HPF (ref 0–4)
SAMPLE: ABNORMAL
SARS-COV-2 RDRP RESP QL NAA+PROBE: NEGATIVE
SITE: ABNORMAL
SODIUM SERPL-SCNC: 139 MMOL/L (ref 136–145)
SP GR UR STRIP: 1.02 (ref 1–1.03)
SQUAMOUS #/AREA URNS AUTO: 0 /HPF
URN SPEC COLLECT METH UR: ABNORMAL
WBC # BLD AUTO: 7.23 K/UL (ref 3.9–12.7)
WBC #/AREA URNS AUTO: 1 /HPF (ref 0–5)
YEAST UR QL AUTO: NORMAL

## 2021-02-28 PROCEDURE — 25000003 PHARM REV CODE 250: Performed by: PHYSICIAN ASSISTANT

## 2021-02-28 PROCEDURE — 83690 ASSAY OF LIPASE: CPT

## 2021-02-28 PROCEDURE — 80053 COMPREHEN METABOLIC PANEL: CPT

## 2021-02-28 PROCEDURE — 93010 ELECTROCARDIOGRAM REPORT: CPT | Mod: ,,, | Performed by: INTERNAL MEDICINE

## 2021-02-28 PROCEDURE — 96361 HYDRATE IV INFUSION ADD-ON: CPT

## 2021-02-28 PROCEDURE — 63600175 PHARM REV CODE 636 W HCPCS: Performed by: PHYSICIAN ASSISTANT

## 2021-02-28 PROCEDURE — 86803 HEPATITIS C AB TEST: CPT

## 2021-02-28 PROCEDURE — 99900035 HC TECH TIME PER 15 MIN (STAT)

## 2021-02-28 PROCEDURE — U0002 COVID-19 LAB TEST NON-CDC: HCPCS | Performed by: PHYSICIAN ASSISTANT

## 2021-02-28 PROCEDURE — 96374 THER/PROPH/DIAG INJ IV PUSH: CPT

## 2021-02-28 PROCEDURE — 12000002 HC ACUTE/MED SURGE SEMI-PRIVATE ROOM

## 2021-02-28 PROCEDURE — 99285 PR EMERGENCY DEPT VISIT,LEVEL V: ICD-10-PCS | Mod: ,,, | Performed by: PHYSICIAN ASSISTANT

## 2021-02-28 PROCEDURE — 81001 URINALYSIS AUTO W/SCOPE: CPT

## 2021-02-28 PROCEDURE — 82803 BLOOD GASES ANY COMBINATION: CPT

## 2021-02-28 PROCEDURE — 99285 EMERGENCY DEPT VISIT HI MDM: CPT | Mod: 25

## 2021-02-28 PROCEDURE — C9113 INJ PANTOPRAZOLE SODIUM, VIA: HCPCS | Performed by: PHYSICIAN ASSISTANT

## 2021-02-28 PROCEDURE — 86703 HIV-1/HIV-2 1 RESULT ANTBDY: CPT

## 2021-02-28 PROCEDURE — 85025 COMPLETE CBC W/AUTO DIFF WBC: CPT

## 2021-02-28 PROCEDURE — 93005 ELECTROCARDIOGRAM TRACING: CPT

## 2021-02-28 PROCEDURE — 96375 TX/PRO/DX INJ NEW DRUG ADDON: CPT

## 2021-02-28 PROCEDURE — 93010 EKG 12-LEAD: ICD-10-PCS | Mod: ,,, | Performed by: INTERNAL MEDICINE

## 2021-02-28 PROCEDURE — 99285 EMERGENCY DEPT VISIT HI MDM: CPT | Mod: ,,, | Performed by: PHYSICIAN ASSISTANT

## 2021-02-28 PROCEDURE — 85610 PROTHROMBIN TIME: CPT

## 2021-02-28 PROCEDURE — 82010 KETONE BODYS QUAN: CPT

## 2021-02-28 RX ORDER — ONDANSETRON 2 MG/ML
4 INJECTION INTRAMUSCULAR; INTRAVENOUS
Status: COMPLETED | OUTPATIENT
Start: 2021-02-28 | End: 2021-02-28

## 2021-02-28 RX ORDER — PANTOPRAZOLE SODIUM 40 MG/10ML
80 INJECTION, POWDER, LYOPHILIZED, FOR SOLUTION INTRAVENOUS
Status: COMPLETED | OUTPATIENT
Start: 2021-02-28 | End: 2021-02-28

## 2021-02-28 RX ORDER — DEXTROSE MONOHYDRATE 100 MG/ML
INJECTION, SOLUTION INTRAVENOUS
Status: DISCONTINUED | OUTPATIENT
Start: 2021-02-28 | End: 2021-03-01

## 2021-02-28 RX ORDER — SODIUM CHLORIDE 0.9 % (FLUSH) 0.9 %
10 SYRINGE (ML) INJECTION
Status: DISCONTINUED | OUTPATIENT
Start: 2021-02-28 | End: 2021-03-01

## 2021-02-28 RX ADMIN — PANTOPRAZOLE SODIUM 80 MG: 40 INJECTION, POWDER, FOR SOLUTION INTRAVENOUS at 10:02

## 2021-02-28 RX ADMIN — ONDANSETRON 4 MG: 2 INJECTION INTRAMUSCULAR; INTRAVENOUS at 10:02

## 2021-02-28 RX ADMIN — SODIUM CHLORIDE 1000 ML: 0.9 INJECTION, SOLUTION INTRAVENOUS at 10:02

## 2021-03-01 PROBLEM — N18.30 CKD (CHRONIC KIDNEY DISEASE) STAGE 3, GFR 30-59 ML/MIN: Status: ACTIVE | Noted: 2021-03-01

## 2021-03-01 PROBLEM — E86.0 DEHYDRATION: Status: ACTIVE | Noted: 2021-03-01

## 2021-03-01 LAB
ALBUMIN SERPL BCP-MCNC: 3.5 G/DL (ref 3.5–5.2)
ALP SERPL-CCNC: 66 U/L (ref 55–135)
ALT SERPL W/O P-5'-P-CCNC: 25 U/L (ref 10–44)
ANION GAP SERPL CALC-SCNC: 13 MMOL/L (ref 8–16)
ANION GAP SERPL CALC-SCNC: 13 MMOL/L (ref 8–16)
ANION GAP SERPL CALC-SCNC: 5 MMOL/L (ref 8–16)
ANION GAP SERPL CALC-SCNC: 8 MMOL/L (ref 8–16)
ANION GAP SERPL CALC-SCNC: 8 MMOL/L (ref 8–16)
ANION GAP SERPL CALC-SCNC: 9 MMOL/L (ref 8–16)
AST SERPL-CCNC: 22 U/L (ref 10–40)
BASOPHILS # BLD AUTO: 0.02 K/UL (ref 0–0.2)
BASOPHILS NFR BLD: 0.3 % (ref 0–1.9)
BILIRUB SERPL-MCNC: 0.6 MG/DL (ref 0.1–1)
BUN SERPL-MCNC: 16 MG/DL (ref 6–20)
BUN SERPL-MCNC: 17 MG/DL (ref 6–20)
BUN SERPL-MCNC: 20 MG/DL (ref 6–20)
BUN SERPL-MCNC: 23 MG/DL (ref 6–20)
BUN SERPL-MCNC: 26 MG/DL (ref 6–20)
BUN SERPL-MCNC: 26 MG/DL (ref 6–20)
CALCIUM SERPL-MCNC: 8.3 MG/DL (ref 8.7–10.5)
CALCIUM SERPL-MCNC: 8.3 MG/DL (ref 8.7–10.5)
CALCIUM SERPL-MCNC: 8.7 MG/DL (ref 8.7–10.5)
CALCIUM SERPL-MCNC: 8.7 MG/DL (ref 8.7–10.5)
CALCIUM SERPL-MCNC: 8.8 MG/DL (ref 8.7–10.5)
CALCIUM SERPL-MCNC: 9 MG/DL (ref 8.7–10.5)
CHLORIDE SERPL-SCNC: 105 MMOL/L (ref 95–110)
CHLORIDE SERPL-SCNC: 105 MMOL/L (ref 95–110)
CHLORIDE SERPL-SCNC: 106 MMOL/L (ref 95–110)
CHLORIDE SERPL-SCNC: 107 MMOL/L (ref 95–110)
CHLORIDE SERPL-SCNC: 108 MMOL/L (ref 95–110)
CHLORIDE SERPL-SCNC: 108 MMOL/L (ref 95–110)
CO2 SERPL-SCNC: 21 MMOL/L (ref 23–29)
CO2 SERPL-SCNC: 21 MMOL/L (ref 23–29)
CO2 SERPL-SCNC: 26 MMOL/L (ref 23–29)
CO2 SERPL-SCNC: 27 MMOL/L (ref 23–29)
CO2 SERPL-SCNC: 29 MMOL/L (ref 23–29)
CO2 SERPL-SCNC: 30 MMOL/L (ref 23–29)
CREAT SERPL-MCNC: 1.3 MG/DL (ref 0.5–1.4)
CREAT SERPL-MCNC: 1.4 MG/DL (ref 0.5–1.4)
CREAT SERPL-MCNC: 1.5 MG/DL (ref 0.5–1.4)
CREAT SERPL-MCNC: 1.5 MG/DL (ref 0.5–1.4)
DIFFERENTIAL METHOD: ABNORMAL
EOSINOPHIL # BLD AUTO: 0 K/UL (ref 0–0.5)
EOSINOPHIL NFR BLD: 0 % (ref 0–8)
ERYTHROCYTE [DISTWIDTH] IN BLOOD BY AUTOMATED COUNT: 13 % (ref 11.5–14.5)
EST. GFR  (AFRICAN AMERICAN): >60 ML/MIN/1.73 M^2
EST. GFR  (NON AFRICAN AMERICAN): 59.9 ML/MIN/1.73 M^2
EST. GFR  (NON AFRICAN AMERICAN): 59.9 ML/MIN/1.73 M^2
EST. GFR  (NON AFRICAN AMERICAN): >60 ML/MIN/1.73 M^2
ESTIMATED AVG GLUCOSE: 229 MG/DL (ref 68–131)
GLUCOSE SERPL-MCNC: 209 MG/DL (ref 70–110)
GLUCOSE SERPL-MCNC: 216 MG/DL (ref 70–110)
GLUCOSE SERPL-MCNC: 225 MG/DL (ref 70–110)
GLUCOSE SERPL-MCNC: 227 MG/DL (ref 70–110)
GLUCOSE SERPL-MCNC: 285 MG/DL (ref 70–110)
GLUCOSE SERPL-MCNC: 285 MG/DL (ref 70–110)
HBA1C MFR BLD: 9.6 % (ref 4–5.6)
HCT VFR BLD AUTO: 38.5 % (ref 40–54)
HCV AB SERPL QL IA: NEGATIVE
HGB BLD-MCNC: 13.8 G/DL (ref 14–18)
IMM GRANULOCYTES # BLD AUTO: 0.04 K/UL (ref 0–0.04)
IMM GRANULOCYTES NFR BLD AUTO: 0.5 % (ref 0–0.5)
LIPASE SERPL-CCNC: 6 U/L (ref 4–60)
LYMPHOCYTES # BLD AUTO: 1.1 K/UL (ref 1–4.8)
LYMPHOCYTES NFR BLD: 14.1 % (ref 18–48)
MAGNESIUM SERPL-MCNC: 2.4 MG/DL (ref 1.6–2.6)
MCH RBC QN AUTO: 31.2 PG (ref 27–31)
MCHC RBC AUTO-ENTMCNC: 35.8 G/DL (ref 32–36)
MCV RBC AUTO: 87 FL (ref 82–98)
MONOCYTES # BLD AUTO: 1.1 K/UL (ref 0.3–1)
MONOCYTES NFR BLD: 14.7 % (ref 4–15)
NEUTROPHILS # BLD AUTO: 5.4 K/UL (ref 1.8–7.7)
NEUTROPHILS NFR BLD: 70.4 % (ref 38–73)
NRBC BLD-RTO: 0 /100 WBC
PHOSPHATE SERPL-MCNC: 2.1 MG/DL (ref 2.7–4.5)
PLATELET # BLD AUTO: 152 K/UL (ref 150–350)
PMV BLD AUTO: 12 FL (ref 9.2–12.9)
POCT GLUCOSE: 146 MG/DL (ref 70–110)
POCT GLUCOSE: 164 MG/DL (ref 70–110)
POCT GLUCOSE: 173 MG/DL (ref 70–110)
POCT GLUCOSE: 192 MG/DL (ref 70–110)
POCT GLUCOSE: 198 MG/DL (ref 70–110)
POCT GLUCOSE: 201 MG/DL (ref 70–110)
POCT GLUCOSE: 210 MG/DL (ref 70–110)
POCT GLUCOSE: 212 MG/DL (ref 70–110)
POCT GLUCOSE: 219 MG/DL (ref 70–110)
POCT GLUCOSE: 223 MG/DL (ref 70–110)
POCT GLUCOSE: 235 MG/DL (ref 70–110)
POCT GLUCOSE: 259 MG/DL (ref 70–110)
POCT GLUCOSE: 269 MG/DL (ref 70–110)
POCT GLUCOSE: 272 MG/DL (ref 70–110)
POCT GLUCOSE: 275 MG/DL (ref 70–110)
POCT GLUCOSE: 278 MG/DL (ref 70–110)
POCT GLUCOSE: 279 MG/DL (ref 70–110)
POCT GLUCOSE: 308 MG/DL (ref 70–110)
POCT GLUCOSE: 385 MG/DL (ref 70–110)
POTASSIUM SERPL-SCNC: 3.9 MMOL/L (ref 3.5–5.1)
POTASSIUM SERPL-SCNC: 4.4 MMOL/L (ref 3.5–5.1)
POTASSIUM SERPL-SCNC: 4.5 MMOL/L (ref 3.5–5.1)
POTASSIUM SERPL-SCNC: 4.6 MMOL/L (ref 3.5–5.1)
PROT SERPL-MCNC: 6.6 G/DL (ref 6–8.4)
RBC # BLD AUTO: 4.42 M/UL (ref 4.6–6.2)
SODIUM SERPL-SCNC: 139 MMOL/L (ref 136–145)
SODIUM SERPL-SCNC: 139 MMOL/L (ref 136–145)
SODIUM SERPL-SCNC: 142 MMOL/L (ref 136–145)
SODIUM SERPL-SCNC: 143 MMOL/L (ref 136–145)
WBC # BLD AUTO: 7.6 K/UL (ref 3.9–12.7)

## 2021-03-01 PROCEDURE — 80048 BASIC METABOLIC PNL TOTAL CA: CPT

## 2021-03-01 PROCEDURE — 63600175 PHARM REV CODE 636 W HCPCS: Performed by: STUDENT IN AN ORGANIZED HEALTH CARE EDUCATION/TRAINING PROGRAM

## 2021-03-01 PROCEDURE — 25000003 PHARM REV CODE 250: Performed by: STUDENT IN AN ORGANIZED HEALTH CARE EDUCATION/TRAINING PROGRAM

## 2021-03-01 PROCEDURE — 83036 HEMOGLOBIN GLYCOSYLATED A1C: CPT

## 2021-03-01 PROCEDURE — 84100 ASSAY OF PHOSPHORUS: CPT

## 2021-03-01 PROCEDURE — 20600001 HC STEP DOWN PRIVATE ROOM

## 2021-03-01 PROCEDURE — 83735 ASSAY OF MAGNESIUM: CPT

## 2021-03-01 PROCEDURE — 63600175 PHARM REV CODE 636 W HCPCS: Performed by: PHYSICIAN ASSISTANT

## 2021-03-01 PROCEDURE — 93010 ELECTROCARDIOGRAM REPORT: CPT | Mod: ,,, | Performed by: INTERNAL MEDICINE

## 2021-03-01 PROCEDURE — 25000003 PHARM REV CODE 250: Performed by: GENERAL ACUTE CARE HOSPITAL

## 2021-03-01 PROCEDURE — 80053 COMPREHEN METABOLIC PANEL: CPT

## 2021-03-01 PROCEDURE — 93005 ELECTROCARDIOGRAM TRACING: CPT

## 2021-03-01 PROCEDURE — 99223 PR INITIAL HOSPITAL CARE,LEVL III: ICD-10-PCS | Mod: ,,, | Performed by: INTERNAL MEDICINE

## 2021-03-01 PROCEDURE — 99223 PR INITIAL HOSPITAL CARE,LEVL III: ICD-10-PCS | Mod: GC,,, | Performed by: INTERNAL MEDICINE

## 2021-03-01 PROCEDURE — 93010 EKG 12-LEAD: ICD-10-PCS | Mod: ,,, | Performed by: INTERNAL MEDICINE

## 2021-03-01 PROCEDURE — 99223 1ST HOSP IP/OBS HIGH 75: CPT | Mod: ,,, | Performed by: INTERNAL MEDICINE

## 2021-03-01 PROCEDURE — 83690 ASSAY OF LIPASE: CPT

## 2021-03-01 PROCEDURE — 25000003 PHARM REV CODE 250: Performed by: PHYSICIAN ASSISTANT

## 2021-03-01 PROCEDURE — 36415 COLL VENOUS BLD VENIPUNCTURE: CPT

## 2021-03-01 PROCEDURE — 63600175 PHARM REV CODE 636 W HCPCS: Performed by: GENERAL ACUTE CARE HOSPITAL

## 2021-03-01 PROCEDURE — 85025 COMPLETE CBC W/AUTO DIFF WBC: CPT

## 2021-03-01 PROCEDURE — 99223 1ST HOSP IP/OBS HIGH 75: CPT | Mod: GC,,, | Performed by: INTERNAL MEDICINE

## 2021-03-01 RX ORDER — MORPHINE SULFATE 2 MG/ML
1 INJECTION, SOLUTION INTRAMUSCULAR; INTRAVENOUS ONCE
Status: COMPLETED | OUTPATIENT
Start: 2021-03-01 | End: 2021-03-01

## 2021-03-01 RX ORDER — SODIUM CHLORIDE AND POTASSIUM CHLORIDE 150; 450 MG/100ML; MG/100ML
INJECTION, SOLUTION INTRAVENOUS CONTINUOUS
Status: DISCONTINUED | OUTPATIENT
Start: 2021-03-01 | End: 2021-03-01

## 2021-03-01 RX ORDER — IBUPROFEN 200 MG
16 TABLET ORAL
Status: DISCONTINUED | OUTPATIENT
Start: 2021-03-01 | End: 2021-03-02 | Stop reason: HOSPADM

## 2021-03-01 RX ORDER — ACETAMINOPHEN 325 MG/1
650 TABLET ORAL EVERY 4 HOURS PRN
Status: DISCONTINUED | OUTPATIENT
Start: 2021-03-01 | End: 2021-03-02 | Stop reason: HOSPADM

## 2021-03-01 RX ORDER — HYDROMORPHONE HYDROCHLORIDE 1 MG/ML
0.5 INJECTION, SOLUTION INTRAMUSCULAR; INTRAVENOUS; SUBCUTANEOUS EVERY 6 HOURS PRN
Status: DISCONTINUED | OUTPATIENT
Start: 2021-03-01 | End: 2021-03-01

## 2021-03-01 RX ORDER — HYDROMORPHONE HYDROCHLORIDE 1 MG/ML
0.5 INJECTION, SOLUTION INTRAMUSCULAR; INTRAVENOUS; SUBCUTANEOUS
Status: DISCONTINUED | OUTPATIENT
Start: 2021-03-01 | End: 2021-03-01

## 2021-03-01 RX ORDER — INSULIN ASPART 100 [IU]/ML
8 INJECTION, SOLUTION INTRAVENOUS; SUBCUTANEOUS
Status: DISCONTINUED | OUTPATIENT
Start: 2021-03-01 | End: 2021-03-02

## 2021-03-01 RX ORDER — INSULIN ASPART 100 [IU]/ML
8 INJECTION, SOLUTION INTRAVENOUS; SUBCUTANEOUS
Status: DISCONTINUED | OUTPATIENT
Start: 2021-03-01 | End: 2021-03-01

## 2021-03-01 RX ORDER — GLUCAGON 1 MG
1 KIT INJECTION
Status: DISCONTINUED | OUTPATIENT
Start: 2021-03-01 | End: 2021-03-02 | Stop reason: HOSPADM

## 2021-03-01 RX ORDER — INSULIN ASPART 100 [IU]/ML
0-4 INJECTION, SOLUTION INTRAVENOUS; SUBCUTANEOUS
Status: DISCONTINUED | OUTPATIENT
Start: 2021-03-01 | End: 2021-03-02 | Stop reason: HOSPADM

## 2021-03-01 RX ORDER — DEXTROSE MONOHYDRATE, SODIUM CHLORIDE, AND POTASSIUM CHLORIDE 50; 1.49; 4.5 G/1000ML; G/1000ML; G/1000ML
INJECTION, SOLUTION INTRAVENOUS CONTINUOUS
Status: DISCONTINUED | OUTPATIENT
Start: 2021-03-01 | End: 2021-03-01

## 2021-03-01 RX ORDER — IBUPROFEN 200 MG
24 TABLET ORAL
Status: DISCONTINUED | OUTPATIENT
Start: 2021-03-01 | End: 2021-03-02 | Stop reason: HOSPADM

## 2021-03-01 RX ORDER — HYDROMORPHONE HYDROCHLORIDE 1 MG/ML
1 INJECTION, SOLUTION INTRAMUSCULAR; INTRAVENOUS; SUBCUTANEOUS EVERY 4 HOURS PRN
Status: DISCONTINUED | OUTPATIENT
Start: 2021-03-01 | End: 2021-03-02

## 2021-03-01 RX ORDER — ONDANSETRON 2 MG/ML
4 INJECTION INTRAMUSCULAR; INTRAVENOUS EVERY 8 HOURS PRN
Status: DISCONTINUED | OUTPATIENT
Start: 2021-03-01 | End: 2021-03-02 | Stop reason: HOSPADM

## 2021-03-01 RX ORDER — PROMETHAZINE HYDROCHLORIDE 12.5 MG/1
12.5 TABLET ORAL EVERY 6 HOURS PRN
Status: DISCONTINUED | OUTPATIENT
Start: 2021-03-01 | End: 2021-03-01

## 2021-03-01 RX ADMIN — POTASSIUM CHLORIDE AND SODIUM CHLORIDE: 450; 150 INJECTION, SOLUTION INTRAVENOUS at 07:03

## 2021-03-01 RX ADMIN — HYDROMORPHONE HYDROCHLORIDE 0.5 MG: 1 INJECTION, SOLUTION INTRAMUSCULAR; INTRAVENOUS; SUBCUTANEOUS at 11:03

## 2021-03-01 RX ADMIN — SODIUM CHLORIDE 1000 ML: 0.9 INJECTION, SOLUTION INTRAVENOUS at 12:03

## 2021-03-01 RX ADMIN — PROMETHAZINE HYDROCHLORIDE 12.5 MG: 25 INJECTION INTRAMUSCULAR; INTRAVENOUS at 02:03

## 2021-03-01 RX ADMIN — ONDANSETRON 4 MG: 2 INJECTION INTRAMUSCULAR; INTRAVENOUS at 06:03

## 2021-03-01 RX ADMIN — ACETAMINOPHEN 650 MG: 325 TABLET ORAL at 08:03

## 2021-03-01 RX ADMIN — PROMETHAZINE HYDROCHLORIDE 12.5 MG: 25 INJECTION INTRAMUSCULAR; INTRAVENOUS at 08:03

## 2021-03-01 RX ADMIN — DEXTROSE, SODIUM CHLORIDE, AND POTASSIUM CHLORIDE: 5; .45; .15 INJECTION INTRAVENOUS at 10:03

## 2021-03-01 RX ADMIN — ONDANSETRON 4 MG: 2 INJECTION INTRAMUSCULAR; INTRAVENOUS at 08:03

## 2021-03-01 RX ADMIN — HYDROMORPHONE HYDROCHLORIDE 1 MG: 1 INJECTION, SOLUTION INTRAMUSCULAR; INTRAVENOUS; SUBCUTANEOUS at 11:03

## 2021-03-01 RX ADMIN — MORPHINE SULFATE 1 MG: 2 INJECTION, SOLUTION INTRAMUSCULAR; INTRAVENOUS at 10:03

## 2021-03-01 RX ADMIN — SODIUM CHLORIDE 1.2 UNITS/HR: 9 INJECTION, SOLUTION INTRAVENOUS at 04:03

## 2021-03-01 RX ADMIN — INSULIN ASPART 1 UNITS: 100 INJECTION, SOLUTION INTRAVENOUS; SUBCUTANEOUS at 08:03

## 2021-03-01 RX ADMIN — HYDROMORPHONE HYDROCHLORIDE 1 MG: 1 INJECTION, SOLUTION INTRAMUSCULAR; INTRAVENOUS; SUBCUTANEOUS at 12:03

## 2021-03-01 RX ADMIN — HYDROMORPHONE HYDROCHLORIDE 1 MG: 1 INJECTION, SOLUTION INTRAMUSCULAR; INTRAVENOUS; SUBCUTANEOUS at 06:03

## 2021-03-01 RX ADMIN — POTASSIUM CHLORIDE AND SODIUM CHLORIDE 200 ML/HR: 450; 150 INJECTION, SOLUTION INTRAVENOUS at 02:03

## 2021-03-01 RX ADMIN — SODIUM CHLORIDE 4 UNITS/HR: 9 INJECTION, SOLUTION INTRAVENOUS at 12:03

## 2021-03-02 VITALS
DIASTOLIC BLOOD PRESSURE: 85 MMHG | SYSTOLIC BLOOD PRESSURE: 146 MMHG | HEIGHT: 72 IN | HEART RATE: 70 BPM | WEIGHT: 214 LBS | BODY MASS INDEX: 28.99 KG/M2 | TEMPERATURE: 98 F | RESPIRATION RATE: 20 BRPM | OXYGEN SATURATION: 99 %

## 2021-03-02 LAB
ALBUMIN SERPL BCP-MCNC: 3.5 G/DL (ref 3.5–5.2)
ALP SERPL-CCNC: 61 U/L (ref 55–135)
ALT SERPL W/O P-5'-P-CCNC: 24 U/L (ref 10–44)
ANION GAP SERPL CALC-SCNC: 11 MMOL/L (ref 8–16)
ANION GAP SERPL CALC-SCNC: 11 MMOL/L (ref 8–16)
ANION GAP SERPL CALC-SCNC: 8 MMOL/L (ref 8–16)
ANION GAP SERPL CALC-SCNC: 9 MMOL/L (ref 8–16)
AST SERPL-CCNC: 26 U/L (ref 10–40)
BASOPHILS # BLD AUTO: 0.01 K/UL (ref 0–0.2)
BASOPHILS NFR BLD: 0.2 % (ref 0–1.9)
BILIRUB SERPL-MCNC: 0.7 MG/DL (ref 0.1–1)
BUN SERPL-MCNC: 13 MG/DL (ref 6–20)
BUN SERPL-MCNC: 14 MG/DL (ref 6–20)
CALCIUM SERPL-MCNC: 8.5 MG/DL (ref 8.7–10.5)
CALCIUM SERPL-MCNC: 8.5 MG/DL (ref 8.7–10.5)
CALCIUM SERPL-MCNC: 8.9 MG/DL (ref 8.7–10.5)
CALCIUM SERPL-MCNC: 9.1 MG/DL (ref 8.7–10.5)
CHLORIDE SERPL-SCNC: 108 MMOL/L (ref 95–110)
CHLORIDE SERPL-SCNC: 108 MMOL/L (ref 95–110)
CHLORIDE SERPL-SCNC: 109 MMOL/L (ref 95–110)
CHLORIDE SERPL-SCNC: 109 MMOL/L (ref 95–110)
CO2 SERPL-SCNC: 26 MMOL/L (ref 23–29)
CO2 SERPL-SCNC: 26 MMOL/L (ref 23–29)
CO2 SERPL-SCNC: 28 MMOL/L (ref 23–29)
CO2 SERPL-SCNC: 29 MMOL/L (ref 23–29)
CREAT SERPL-MCNC: 1.2 MG/DL (ref 0.5–1.4)
CREAT SERPL-MCNC: 1.3 MG/DL (ref 0.5–1.4)
DIFFERENTIAL METHOD: ABNORMAL
EOSINOPHIL # BLD AUTO: 0 K/UL (ref 0–0.5)
EOSINOPHIL NFR BLD: 0 % (ref 0–8)
ERYTHROCYTE [DISTWIDTH] IN BLOOD BY AUTOMATED COUNT: 12.7 % (ref 11.5–14.5)
EST. GFR  (AFRICAN AMERICAN): >60 ML/MIN/1.73 M^2
EST. GFR  (NON AFRICAN AMERICAN): >60 ML/MIN/1.73 M^2
GLUCOSE SERPL-MCNC: 114 MG/DL (ref 70–110)
GLUCOSE SERPL-MCNC: 116 MG/DL (ref 70–110)
GLUCOSE SERPL-MCNC: 116 MG/DL (ref 70–110)
GLUCOSE SERPL-MCNC: 117 MG/DL (ref 70–110)
HCT VFR BLD AUTO: 39.2 % (ref 40–54)
HGB BLD-MCNC: 13.5 G/DL (ref 14–18)
IMM GRANULOCYTES # BLD AUTO: 0.01 K/UL (ref 0–0.04)
IMM GRANULOCYTES NFR BLD AUTO: 0.2 % (ref 0–0.5)
LYMPHOCYTES # BLD AUTO: 1 K/UL (ref 1–4.8)
LYMPHOCYTES NFR BLD: 20 % (ref 18–48)
MAGNESIUM SERPL-MCNC: 2 MG/DL (ref 1.6–2.6)
MCH RBC QN AUTO: 31.2 PG (ref 27–31)
MCHC RBC AUTO-ENTMCNC: 34.4 G/DL (ref 32–36)
MCV RBC AUTO: 91 FL (ref 82–98)
MONOCYTES # BLD AUTO: 0.6 K/UL (ref 0.3–1)
MONOCYTES NFR BLD: 11.6 % (ref 4–15)
NEUTROPHILS # BLD AUTO: 3.4 K/UL (ref 1.8–7.7)
NEUTROPHILS NFR BLD: 68 % (ref 38–73)
NRBC BLD-RTO: 0 /100 WBC
PHOSPHATE SERPL-MCNC: 2.4 MG/DL (ref 2.7–4.5)
PLATELET # BLD AUTO: 150 K/UL (ref 150–350)
PMV BLD AUTO: 11.3 FL (ref 9.2–12.9)
POCT GLUCOSE: 112 MG/DL (ref 70–110)
POCT GLUCOSE: 137 MG/DL (ref 70–110)
POCT GLUCOSE: 99 MG/DL (ref 70–110)
POTASSIUM SERPL-SCNC: 4.1 MMOL/L (ref 3.5–5.1)
POTASSIUM SERPL-SCNC: 4.3 MMOL/L (ref 3.5–5.1)
PROT SERPL-MCNC: 6.5 G/DL (ref 6–8.4)
RBC # BLD AUTO: 4.33 M/UL (ref 4.6–6.2)
SODIUM SERPL-SCNC: 145 MMOL/L (ref 136–145)
SODIUM SERPL-SCNC: 145 MMOL/L (ref 136–145)
SODIUM SERPL-SCNC: 146 MMOL/L (ref 136–145)
SODIUM SERPL-SCNC: 146 MMOL/L (ref 136–145)
WBC # BLD AUTO: 4.99 K/UL (ref 3.9–12.7)

## 2021-03-02 PROCEDURE — C9399 UNCLASSIFIED DRUGS OR BIOLOG: HCPCS | Performed by: STUDENT IN AN ORGANIZED HEALTH CARE EDUCATION/TRAINING PROGRAM

## 2021-03-02 PROCEDURE — 25000003 PHARM REV CODE 250: Performed by: STUDENT IN AN ORGANIZED HEALTH CARE EDUCATION/TRAINING PROGRAM

## 2021-03-02 PROCEDURE — 80048 BASIC METABOLIC PNL TOTAL CA: CPT

## 2021-03-02 PROCEDURE — 80048 BASIC METABOLIC PNL TOTAL CA: CPT | Mod: 91

## 2021-03-02 PROCEDURE — 83735 ASSAY OF MAGNESIUM: CPT

## 2021-03-02 PROCEDURE — 99232 PR SUBSEQUENT HOSPITAL CARE,LEVL II: ICD-10-PCS | Mod: GC,,, | Performed by: INTERNAL MEDICINE

## 2021-03-02 PROCEDURE — 80053 COMPREHEN METABOLIC PANEL: CPT

## 2021-03-02 PROCEDURE — 99238 HOSP IP/OBS DSCHRG MGMT 30/<: CPT | Mod: ,,, | Performed by: INTERNAL MEDICINE

## 2021-03-02 PROCEDURE — 85025 COMPLETE CBC W/AUTO DIFF WBC: CPT

## 2021-03-02 PROCEDURE — 63600175 PHARM REV CODE 636 W HCPCS: Performed by: GENERAL ACUTE CARE HOSPITAL

## 2021-03-02 PROCEDURE — 36415 COLL VENOUS BLD VENIPUNCTURE: CPT

## 2021-03-02 PROCEDURE — 99238 PR HOSPITAL DISCHARGE DAY,<30 MIN: ICD-10-PCS | Mod: ,,, | Performed by: INTERNAL MEDICINE

## 2021-03-02 PROCEDURE — 84100 ASSAY OF PHOSPHORUS: CPT

## 2021-03-02 PROCEDURE — 99232 SBSQ HOSP IP/OBS MODERATE 35: CPT | Mod: GC,,, | Performed by: INTERNAL MEDICINE

## 2021-03-02 RX ORDER — AMOXICILLIN 250 MG
1 CAPSULE ORAL ONCE
Status: COMPLETED | OUTPATIENT
Start: 2021-03-02 | End: 2021-03-02

## 2021-03-02 RX ORDER — PEN NEEDLE, DIABETIC 30 GX3/16"
1 NEEDLE, DISPOSABLE MISCELLANEOUS
Qty: 100 EACH | Refills: 11 | Status: ON HOLD | OUTPATIENT
Start: 2021-03-02 | End: 2021-03-05 | Stop reason: SDUPTHER

## 2021-03-02 RX ORDER — INSULIN ASPART 100 [IU]/ML
0-4 INJECTION, SOLUTION INTRAVENOUS; SUBCUTANEOUS
Qty: 1 BOX | Refills: 0 | Status: SHIPPED | OUTPATIENT
Start: 2021-03-02 | End: 2021-03-02 | Stop reason: HOSPADM

## 2021-03-02 RX ORDER — LANCING DEVICE
1 EACH MISCELLANEOUS 2 TIMES DAILY WITH MEALS
Qty: 1 EACH | Refills: 0 | Status: SHIPPED | OUTPATIENT
Start: 2021-03-02 | End: 2021-03-02 | Stop reason: SDUPTHER

## 2021-03-02 RX ORDER — DEXTROSE 4 G
TABLET,CHEWABLE ORAL
Qty: 1 EACH | Refills: 0 | Status: SHIPPED | OUTPATIENT
Start: 2021-03-02 | End: 2021-04-07 | Stop reason: SDUPTHER

## 2021-03-02 RX ORDER — LANCING DEVICE
1 EACH MISCELLANEOUS 2 TIMES DAILY WITH MEALS
Qty: 1 EACH | Refills: 0 | Status: SHIPPED | OUTPATIENT
Start: 2021-03-02 | End: 2023-01-09 | Stop reason: SDUPTHER

## 2021-03-02 RX ORDER — PROMETHAZINE HYDROCHLORIDE 12.5 MG/1
12.5 TABLET ORAL EVERY 6 HOURS PRN
Status: DISCONTINUED | OUTPATIENT
Start: 2021-03-02 | End: 2021-03-02 | Stop reason: HOSPADM

## 2021-03-02 RX ORDER — DEXTROSE 4 G
TABLET,CHEWABLE ORAL
Qty: 1 EACH | Refills: 0 | Status: ON HOLD | OUTPATIENT
Start: 2021-03-02 | End: 2021-03-05 | Stop reason: SDUPTHER

## 2021-03-02 RX ORDER — HYDROMORPHONE HYDROCHLORIDE 1 MG/ML
0.5 INJECTION, SOLUTION INTRAMUSCULAR; INTRAVENOUS; SUBCUTANEOUS EVERY 6 HOURS PRN
Status: DISCONTINUED | OUTPATIENT
Start: 2021-03-02 | End: 2021-03-02 | Stop reason: HOSPADM

## 2021-03-02 RX ORDER — PEN NEEDLE, DIABETIC 30 GX3/16"
1 NEEDLE, DISPOSABLE MISCELLANEOUS 2 TIMES DAILY WITH MEALS
Qty: 100 EACH | Refills: 11 | Status: SHIPPED | OUTPATIENT
Start: 2021-03-02 | End: 2021-09-03 | Stop reason: SDUPTHER

## 2021-03-02 RX ORDER — INSULIN LISPRO 100 [IU]/ML
7 INJECTION, SOLUTION INTRAVENOUS; SUBCUTANEOUS
Qty: 15 ML | Refills: 11 | Status: SHIPPED | OUTPATIENT
Start: 2021-03-02 | End: 2021-06-14

## 2021-03-02 RX ORDER — LANCETS
1 EACH MISCELLANEOUS 2 TIMES DAILY WITH MEALS
Qty: 100 EACH | Refills: 11 | Status: SHIPPED | OUTPATIENT
Start: 2021-03-02 | End: 2021-11-22 | Stop reason: SDUPTHER

## 2021-03-02 RX ORDER — POLYETHYLENE GLYCOL 3350 17 G/17G
17 POWDER, FOR SOLUTION ORAL ONCE
Status: COMPLETED | OUTPATIENT
Start: 2021-03-02 | End: 2021-03-02

## 2021-03-02 RX ORDER — LANCETS
1 EACH MISCELLANEOUS
Qty: 100 EACH | Refills: 11 | Status: ON HOLD | OUTPATIENT
Start: 2021-03-02 | End: 2021-03-05 | Stop reason: SDUPTHER

## 2021-03-02 RX ORDER — INSULIN ASPART 100 [IU]/ML
5-7 INJECTION, SOLUTION INTRAVENOUS; SUBCUTANEOUS
Status: DISCONTINUED | OUTPATIENT
Start: 2021-03-02 | End: 2021-03-02 | Stop reason: HOSPADM

## 2021-03-02 RX ORDER — INSULIN ASPART 100 [IU]/ML
0-4 INJECTION, SOLUTION INTRAVENOUS; SUBCUTANEOUS
Qty: 1 BOX | Refills: 11 | Status: CANCELLED | OUTPATIENT
Start: 2021-03-02 | End: 2022-03-02

## 2021-03-02 RX ORDER — ONDANSETRON 4 MG/1
4 TABLET, FILM COATED ORAL EVERY 6 HOURS PRN
Qty: 20 TABLET | Refills: 0 | Status: ON HOLD | OUTPATIENT
Start: 2021-03-02 | End: 2021-03-05 | Stop reason: SDUPTHER

## 2021-03-02 RX ADMIN — INSULIN DETEMIR 15 UNITS: 100 INJECTION, SOLUTION SUBCUTANEOUS at 10:03

## 2021-03-02 RX ADMIN — DOCUSATE SODIUM 50MG AND SENNOSIDES 8.6MG 1 TABLET: 8.6; 5 TABLET, FILM COATED ORAL at 02:03

## 2021-03-02 RX ADMIN — INSULIN ASPART 5 UNITS: 100 INJECTION, SOLUTION INTRAVENOUS; SUBCUTANEOUS at 01:03

## 2021-03-02 RX ADMIN — POLYETHYLENE GLYCOL 3350 17 G: 17 POWDER, FOR SOLUTION ORAL at 02:03

## 2021-03-02 RX ADMIN — PROMETHAZINE HYDROCHLORIDE 12.5 MG: 12.5 TABLET ORAL at 10:03

## 2021-03-03 ENCOUNTER — NURSE TRIAGE (OUTPATIENT)
Dept: ADMINISTRATIVE | Facility: CLINIC | Age: 36
End: 2021-03-03

## 2021-03-03 ENCOUNTER — TELEPHONE (OUTPATIENT)
Dept: HEPATOLOGY | Facility: HOSPITAL | Age: 36
End: 2021-03-03

## 2021-03-03 ENCOUNTER — HOSPITAL ENCOUNTER (OUTPATIENT)
Facility: HOSPITAL | Age: 36
Discharge: HOME | End: 2021-03-05
Attending: EMERGENCY MEDICINE | Admitting: INTERNAL MEDICINE
Payer: MEDICARE

## 2021-03-03 DIAGNOSIS — R11.2 INTRACTABLE VOMITING WITH NAUSEA: Primary | ICD-10-CM

## 2021-03-03 DIAGNOSIS — E10.10 DIABETIC KETOACIDOSIS WITHOUT COMA ASSOCIATED WITH TYPE 1 DIABETES MELLITUS: ICD-10-CM

## 2021-03-03 DIAGNOSIS — R73.9 HYPERGLYCEMIA: ICD-10-CM

## 2021-03-03 DIAGNOSIS — R11.2 INTRACTABLE VOMITING WITH NAUSEA, UNSPECIFIED VOMITING TYPE: ICD-10-CM

## 2021-03-03 DIAGNOSIS — R07.9 CHEST PAIN: ICD-10-CM

## 2021-03-03 PROBLEM — E88.89 KETOSIS: Status: ACTIVE | Noted: 2021-03-03

## 2021-03-03 LAB
ALBUMIN SERPL BCP-MCNC: 4 G/DL (ref 3.5–5.2)
ALLENS TEST: ABNORMAL
ALP SERPL-CCNC: 66 U/L (ref 55–135)
ALT SERPL W/O P-5'-P-CCNC: 29 U/L (ref 10–44)
ANION GAP SERPL CALC-SCNC: 15 MMOL/L (ref 8–16)
AST SERPL-CCNC: 30 U/L (ref 10–40)
B-OH-BUTYR BLD STRIP-SCNC: 2.6 MMOL/L (ref 0–0.5)
B-OH-BUTYR BLD STRIP-SCNC: 3.6 MMOL/L (ref 0–0.5)
BACTERIA #/AREA URNS AUTO: NORMAL /HPF
BASOPHILS # BLD AUTO: 0.02 K/UL (ref 0–0.2)
BASOPHILS NFR BLD: 0.5 % (ref 0–1.9)
BILIRUB SERPL-MCNC: 1.2 MG/DL (ref 0.1–1)
BILIRUB UR QL STRIP: NEGATIVE
BNP SERPL-MCNC: <10 PG/ML (ref 0–99)
BUN SERPL-MCNC: 16 MG/DL (ref 6–20)
CALCIUM SERPL-MCNC: 9.4 MG/DL (ref 8.7–10.5)
CHLORIDE SERPL-SCNC: 104 MMOL/L (ref 95–110)
CLARITY UR REFRACT.AUTO: ABNORMAL
CO2 SERPL-SCNC: 24 MMOL/L (ref 23–29)
COLOR UR AUTO: YELLOW
CREAT SERPL-MCNC: 1.4 MG/DL (ref 0.5–1.4)
CTP QC/QA: YES
DIFFERENTIAL METHOD: ABNORMAL
EOSINOPHIL # BLD AUTO: 0 K/UL (ref 0–0.5)
EOSINOPHIL NFR BLD: 0.2 % (ref 0–8)
ERYTHROCYTE [DISTWIDTH] IN BLOOD BY AUTOMATED COUNT: 12.7 % (ref 11.5–14.5)
EST. GFR  (AFRICAN AMERICAN): >60 ML/MIN/1.73 M^2
EST. GFR  (NON AFRICAN AMERICAN): >60 ML/MIN/1.73 M^2
GLUCOSE SERPL-MCNC: 192 MG/DL (ref 70–110)
GLUCOSE SERPL-MCNC: 272 MG/DL (ref 70–110)
GLUCOSE UR QL STRIP: ABNORMAL
HCO3 UR-SCNC: 13.6 MMOL/L (ref 24–28)
HCT VFR BLD AUTO: 43.9 % (ref 40–54)
HGB BLD-MCNC: 14.9 G/DL (ref 14–18)
HGB UR QL STRIP: ABNORMAL
HIV 1+2 AB+HIV1 P24 AG SERPL QL IA: NEGATIVE
HYALINE CASTS UR QL AUTO: 0 /LPF
IMM GRANULOCYTES # BLD AUTO: 0.01 K/UL (ref 0–0.04)
IMM GRANULOCYTES NFR BLD AUTO: 0.2 % (ref 0–0.5)
KETONES UR QL STRIP: ABNORMAL
LACTATE SERPL-SCNC: 1.5 MMOL/L (ref 0.5–2.2)
LACTATE SERPL-SCNC: 3.1 MMOL/L (ref 0.5–2.2)
LEUKOCYTE ESTERASE UR QL STRIP: NEGATIVE
LYMPHOCYTES # BLD AUTO: 0.7 K/UL (ref 1–4.8)
LYMPHOCYTES NFR BLD: 16.1 % (ref 18–48)
MCH RBC QN AUTO: 31.4 PG (ref 27–31)
MCHC RBC AUTO-ENTMCNC: 33.9 G/DL (ref 32–36)
MCV RBC AUTO: 92 FL (ref 82–98)
MICROSCOPIC COMMENT: NORMAL
MONOCYTES # BLD AUTO: 0.4 K/UL (ref 0.3–1)
MONOCYTES NFR BLD: 8.1 % (ref 4–15)
NEUTROPHILS # BLD AUTO: 3.3 K/UL (ref 1.8–7.7)
NEUTROPHILS NFR BLD: 74.9 % (ref 38–73)
NITRITE UR QL STRIP: NEGATIVE
NRBC BLD-RTO: 0 /100 WBC
PCO2 BLDA: 27.3 MMHG (ref 35–45)
PH SMN: 7.3 [PH] (ref 7.35–7.45)
PH UR STRIP: 5 [PH] (ref 5–8)
PLATELET # BLD AUTO: 147 K/UL (ref 150–350)
PMV BLD AUTO: 11 FL (ref 9.2–12.9)
PO2 BLDA: 28 MMHG (ref 40–60)
POC BE: -13 MMOL/L
POC SATURATED O2: 48 % (ref 95–100)
POC TCO2: 14 MMOL/L (ref 24–29)
POCT GLUCOSE: 276 MG/DL (ref 70–110)
POTASSIUM SERPL-SCNC: 4 MMOL/L (ref 3.5–5.1)
PROT SERPL-MCNC: 7.3 G/DL (ref 6–8.4)
PROT UR QL STRIP: ABNORMAL
RBC # BLD AUTO: 4.75 M/UL (ref 4.6–6.2)
RBC #/AREA URNS AUTO: 1 /HPF (ref 0–4)
SAMPLE: ABNORMAL
SARS-COV-2 RDRP RESP QL NAA+PROBE: NEGATIVE
SITE: ABNORMAL
SODIUM SERPL-SCNC: 143 MMOL/L (ref 136–145)
SP GR UR STRIP: 1.02 (ref 1–1.03)
SQUAMOUS #/AREA URNS AUTO: 0 /HPF
TROPONIN I SERPL DL<=0.01 NG/ML-MCNC: 0.03 NG/ML (ref 0–0.03)
URN SPEC COLLECT METH UR: ABNORMAL
WBC # BLD AUTO: 4.42 K/UL (ref 3.9–12.7)
WBC #/AREA URNS AUTO: 2 /HPF (ref 0–5)

## 2021-03-03 PROCEDURE — 93010 EKG 12-LEAD: ICD-10-PCS | Mod: ,,, | Performed by: INTERNAL MEDICINE

## 2021-03-03 PROCEDURE — 63600175 PHARM REV CODE 636 W HCPCS: Performed by: PHYSICIAN ASSISTANT

## 2021-03-03 PROCEDURE — 83880 ASSAY OF NATRIURETIC PEPTIDE: CPT | Performed by: PHYSICIAN ASSISTANT

## 2021-03-03 PROCEDURE — 99285 EMERGENCY DEPT VISIT HI MDM: CPT | Mod: CS,,, | Performed by: PHYSICIAN ASSISTANT

## 2021-03-03 PROCEDURE — 80053 COMPREHEN METABOLIC PANEL: CPT | Performed by: PHYSICIAN ASSISTANT

## 2021-03-03 PROCEDURE — 99285 EMERGENCY DEPT VISIT HI MDM: CPT | Mod: 25

## 2021-03-03 PROCEDURE — 25000003 PHARM REV CODE 250: Performed by: PHYSICIAN ASSISTANT

## 2021-03-03 PROCEDURE — G0378 HOSPITAL OBSERVATION PER HR: HCPCS

## 2021-03-03 PROCEDURE — 96361 HYDRATE IV INFUSION ADD-ON: CPT

## 2021-03-03 PROCEDURE — 99285 PR EMERGENCY DEPT VISIT,LEVEL V: ICD-10-PCS | Mod: CS,,, | Performed by: PHYSICIAN ASSISTANT

## 2021-03-03 PROCEDURE — 93010 ELECTROCARDIOGRAM REPORT: CPT | Mod: ,,, | Performed by: INTERNAL MEDICINE

## 2021-03-03 PROCEDURE — 96365 THER/PROPH/DIAG IV INF INIT: CPT | Mod: 59 | Performed by: EMERGENCY MEDICINE

## 2021-03-03 PROCEDURE — 96375 TX/PRO/DX INJ NEW DRUG ADDON: CPT

## 2021-03-03 PROCEDURE — 82010 KETONE BODYS QUAN: CPT | Performed by: PHYSICIAN ASSISTANT

## 2021-03-03 PROCEDURE — 93005 ELECTROCARDIOGRAM TRACING: CPT

## 2021-03-03 PROCEDURE — U0002 COVID-19 LAB TEST NON-CDC: HCPCS | Performed by: PHYSICIAN ASSISTANT

## 2021-03-03 PROCEDURE — 84484 ASSAY OF TROPONIN QUANT: CPT | Performed by: PHYSICIAN ASSISTANT

## 2021-03-03 PROCEDURE — 82803 BLOOD GASES ANY COMBINATION: CPT

## 2021-03-03 PROCEDURE — 96361 HYDRATE IV INFUSION ADD-ON: CPT | Performed by: EMERGENCY MEDICINE

## 2021-03-03 PROCEDURE — 99900035 HC TECH TIME PER 15 MIN (STAT)

## 2021-03-03 PROCEDURE — 85025 COMPLETE CBC W/AUTO DIFF WBC: CPT | Performed by: PHYSICIAN ASSISTANT

## 2021-03-03 PROCEDURE — 25500020 PHARM REV CODE 255: Performed by: EMERGENCY MEDICINE

## 2021-03-03 PROCEDURE — 83605 ASSAY OF LACTIC ACID: CPT | Performed by: PHYSICIAN ASSISTANT

## 2021-03-03 PROCEDURE — 63600175 PHARM REV CODE 636 W HCPCS: Performed by: STUDENT IN AN ORGANIZED HEALTH CARE EDUCATION/TRAINING PROGRAM

## 2021-03-03 PROCEDURE — 81001 URINALYSIS AUTO W/SCOPE: CPT | Performed by: PHYSICIAN ASSISTANT

## 2021-03-03 PROCEDURE — 96374 THER/PROPH/DIAG INJ IV PUSH: CPT

## 2021-03-03 RX ORDER — GLUCAGON 1 MG
1 KIT INJECTION
Status: DISCONTINUED | OUTPATIENT
Start: 2021-03-03 | End: 2021-03-05 | Stop reason: HOSPADM

## 2021-03-03 RX ORDER — HYDROMORPHONE HYDROCHLORIDE 1 MG/ML
1 INJECTION, SOLUTION INTRAMUSCULAR; INTRAVENOUS; SUBCUTANEOUS
Status: COMPLETED | OUTPATIENT
Start: 2021-03-03 | End: 2021-03-03

## 2021-03-03 RX ORDER — IBUPROFEN 200 MG
24 TABLET ORAL
Status: DISCONTINUED | OUTPATIENT
Start: 2021-03-03 | End: 2021-03-05 | Stop reason: HOSPADM

## 2021-03-03 RX ORDER — SODIUM CHLORIDE 0.9 % (FLUSH) 0.9 %
10 SYRINGE (ML) INJECTION
Status: DISCONTINUED | OUTPATIENT
Start: 2021-03-03 | End: 2021-03-05 | Stop reason: HOSPADM

## 2021-03-03 RX ORDER — HEPARIN SODIUM 5000 [USP'U]/ML
5000 INJECTION, SOLUTION INTRAVENOUS; SUBCUTANEOUS EVERY 8 HOURS
Status: DISCONTINUED | OUTPATIENT
Start: 2021-03-03 | End: 2021-03-05 | Stop reason: HOSPADM

## 2021-03-03 RX ORDER — PROCHLORPERAZINE EDISYLATE 5 MG/ML
5 INJECTION INTRAMUSCULAR; INTRAVENOUS EVERY 6 HOURS PRN
Status: DISCONTINUED | OUTPATIENT
Start: 2021-03-03 | End: 2021-03-04

## 2021-03-03 RX ORDER — INSULIN ASPART 100 [IU]/ML
0-5 INJECTION, SOLUTION INTRAVENOUS; SUBCUTANEOUS EVERY 6 HOURS PRN
Status: DISCONTINUED | OUTPATIENT
Start: 2021-03-04 | End: 2021-03-05 | Stop reason: HOSPADM

## 2021-03-03 RX ORDER — HALOPERIDOL 5 MG/ML
5 INJECTION INTRAMUSCULAR
Status: DISCONTINUED | OUTPATIENT
Start: 2021-03-03 | End: 2021-03-03

## 2021-03-03 RX ORDER — HALOPERIDOL 5 MG/ML
2 INJECTION INTRAMUSCULAR
Status: COMPLETED | OUTPATIENT
Start: 2021-03-03 | End: 2021-03-03

## 2021-03-03 RX ORDER — IBUPROFEN 200 MG
16 TABLET ORAL
Status: DISCONTINUED | OUTPATIENT
Start: 2021-03-03 | End: 2021-03-05 | Stop reason: HOSPADM

## 2021-03-03 RX ORDER — SODIUM CHLORIDE, SODIUM LACTATE, POTASSIUM CHLORIDE, CALCIUM CHLORIDE 600; 310; 30; 20 MG/100ML; MG/100ML; MG/100ML; MG/100ML
INJECTION, SOLUTION INTRAVENOUS CONTINUOUS
Status: DISCONTINUED | OUTPATIENT
Start: 2021-03-03 | End: 2021-03-05

## 2021-03-03 RX ADMIN — HALOPERIDOL LACTATE 2 MG: 5 INJECTION, SOLUTION INTRAMUSCULAR at 01:03

## 2021-03-03 RX ADMIN — HYDROMORPHONE HYDROCHLORIDE 1 MG: 1 INJECTION, SOLUTION INTRAMUSCULAR; INTRAVENOUS; SUBCUTANEOUS at 05:03

## 2021-03-03 RX ADMIN — SODIUM CHLORIDE, SODIUM LACTATE, POTASSIUM CHLORIDE, AND CALCIUM CHLORIDE: .6; .31; .03; .02 INJECTION, SOLUTION INTRAVENOUS at 11:03

## 2021-03-03 RX ADMIN — SODIUM CHLORIDE 1000 ML: 0.9 INJECTION, SOLUTION INTRAVENOUS at 01:03

## 2021-03-03 RX ADMIN — SODIUM CHLORIDE, SODIUM LACTATE, POTASSIUM CHLORIDE, AND CALCIUM CHLORIDE 1000 ML: .6; .31; .03; .02 INJECTION, SOLUTION INTRAVENOUS at 02:03

## 2021-03-03 RX ADMIN — PROMETHAZINE HYDROCHLORIDE 25 MG: 25 INJECTION INTRAMUSCULAR; INTRAVENOUS at 06:03

## 2021-03-03 RX ADMIN — IOHEXOL 100 ML: 350 INJECTION, SOLUTION INTRAVENOUS at 03:03

## 2021-03-04 PROBLEM — E86.0 DEHYDRATION: Status: RESOLVED | Noted: 2021-03-01 | Resolved: 2021-03-04

## 2021-03-04 PROBLEM — R03.0 ELEVATED BLOOD PRESSURE READING: Status: ACTIVE | Noted: 2021-03-04

## 2021-03-04 PROBLEM — K76.0 HEPATIC STEATOSIS: Status: ACTIVE | Noted: 2021-03-04

## 2021-03-04 LAB
ALBUMIN SERPL BCP-MCNC: 3.5 G/DL (ref 3.5–5.2)
ALP SERPL-CCNC: 60 U/L (ref 55–135)
ALT SERPL W/O P-5'-P-CCNC: 21 U/L (ref 10–44)
ANION GAP SERPL CALC-SCNC: 12 MMOL/L (ref 8–16)
AST SERPL-CCNC: 21 U/L (ref 10–40)
BASOPHILS # BLD AUTO: 0.02 K/UL (ref 0–0.2)
BASOPHILS NFR BLD: 0.5 % (ref 0–1.9)
BILIRUB SERPL-MCNC: 1.1 MG/DL (ref 0.1–1)
BUN SERPL-MCNC: 17 MG/DL (ref 6–20)
CALCIUM SERPL-MCNC: 8.7 MG/DL (ref 8.7–10.5)
CHLORIDE SERPL-SCNC: 106 MMOL/L (ref 95–110)
CO2 SERPL-SCNC: 26 MMOL/L (ref 23–29)
CREAT SERPL-MCNC: 1.4 MG/DL (ref 0.5–1.4)
DIFFERENTIAL METHOD: ABNORMAL
EOSINOPHIL # BLD AUTO: 0 K/UL (ref 0–0.5)
EOSINOPHIL NFR BLD: 0.5 % (ref 0–8)
ERYTHROCYTE [DISTWIDTH] IN BLOOD BY AUTOMATED COUNT: 12.6 % (ref 11.5–14.5)
EST. GFR  (AFRICAN AMERICAN): >60 ML/MIN/1.73 M^2
EST. GFR  (NON AFRICAN AMERICAN): >60 ML/MIN/1.73 M^2
GLUCOSE SERPL-MCNC: 272 MG/DL (ref 70–110)
HCT VFR BLD AUTO: 38.7 % (ref 40–54)
HGB BLD-MCNC: 13.1 G/DL (ref 14–18)
IMM GRANULOCYTES # BLD AUTO: 0.01 K/UL (ref 0–0.04)
IMM GRANULOCYTES NFR BLD AUTO: 0.3 % (ref 0–0.5)
LYMPHOCYTES # BLD AUTO: 1.3 K/UL (ref 1–4.8)
LYMPHOCYTES NFR BLD: 31.9 % (ref 18–48)
MCH RBC QN AUTO: 30.4 PG (ref 27–31)
MCHC RBC AUTO-ENTMCNC: 33.9 G/DL (ref 32–36)
MCV RBC AUTO: 90 FL (ref 82–98)
MONOCYTES # BLD AUTO: 0.6 K/UL (ref 0.3–1)
MONOCYTES NFR BLD: 15.8 % (ref 4–15)
NEUTROPHILS # BLD AUTO: 2 K/UL (ref 1.8–7.7)
NEUTROPHILS NFR BLD: 51 % (ref 38–73)
NRBC BLD-RTO: 0 /100 WBC
PLATELET # BLD AUTO: 142 K/UL (ref 150–350)
PMV BLD AUTO: 11.9 FL (ref 9.2–12.9)
POCT GLUCOSE: 229 MG/DL (ref 70–110)
POCT GLUCOSE: 250 MG/DL (ref 70–110)
POCT GLUCOSE: 256 MG/DL (ref 70–110)
POCT GLUCOSE: 263 MG/DL (ref 70–110)
POCT GLUCOSE: 269 MG/DL (ref 70–110)
POCT GLUCOSE: 371 MG/DL (ref 70–110)
POTASSIUM SERPL-SCNC: 4.3 MMOL/L (ref 3.5–5.1)
PROT SERPL-MCNC: 6.4 G/DL (ref 6–8.4)
RBC # BLD AUTO: 4.31 M/UL (ref 4.6–6.2)
SODIUM SERPL-SCNC: 144 MMOL/L (ref 136–145)
WBC # BLD AUTO: 3.98 K/UL (ref 3.9–12.7)

## 2021-03-04 PROCEDURE — 63600175 PHARM REV CODE 636 W HCPCS: Performed by: STUDENT IN AN ORGANIZED HEALTH CARE EDUCATION/TRAINING PROGRAM

## 2021-03-04 PROCEDURE — 82962 GLUCOSE BLOOD TEST: CPT

## 2021-03-04 PROCEDURE — G0378 HOSPITAL OBSERVATION PER HR: HCPCS

## 2021-03-04 PROCEDURE — C9399 UNCLASSIFIED DRUGS OR BIOLOG: HCPCS | Performed by: STUDENT IN AN ORGANIZED HEALTH CARE EDUCATION/TRAINING PROGRAM

## 2021-03-04 PROCEDURE — 63600175 PHARM REV CODE 636 W HCPCS: Performed by: INTERNAL MEDICINE

## 2021-03-04 PROCEDURE — 80053 COMPREHEN METABOLIC PANEL: CPT | Performed by: STUDENT IN AN ORGANIZED HEALTH CARE EDUCATION/TRAINING PROGRAM

## 2021-03-04 PROCEDURE — 25000003 PHARM REV CODE 250: Performed by: STUDENT IN AN ORGANIZED HEALTH CARE EDUCATION/TRAINING PROGRAM

## 2021-03-04 PROCEDURE — 99220 PR INITIAL OBSERVATION CARE,LEVL III: CPT | Mod: GC,,, | Performed by: HOSPITALIST

## 2021-03-04 PROCEDURE — 96375 TX/PRO/DX INJ NEW DRUG ADDON: CPT | Mod: 59 | Performed by: EMERGENCY MEDICINE

## 2021-03-04 PROCEDURE — 99220 PR INITIAL OBSERVATION CARE,LEVL III: ICD-10-PCS | Mod: GC,,, | Performed by: HOSPITALIST

## 2021-03-04 PROCEDURE — 85025 COMPLETE CBC W/AUTO DIFF WBC: CPT | Performed by: STUDENT IN AN ORGANIZED HEALTH CARE EDUCATION/TRAINING PROGRAM

## 2021-03-04 PROCEDURE — 96361 HYDRATE IV INFUSION ADD-ON: CPT | Performed by: EMERGENCY MEDICINE

## 2021-03-04 PROCEDURE — 36415 COLL VENOUS BLD VENIPUNCTURE: CPT | Performed by: STUDENT IN AN ORGANIZED HEALTH CARE EDUCATION/TRAINING PROGRAM

## 2021-03-04 PROCEDURE — 96372 THER/PROPH/DIAG INJ SC/IM: CPT | Mod: 59 | Performed by: EMERGENCY MEDICINE

## 2021-03-04 RX ORDER — INSULIN ASPART 100 [IU]/ML
8 INJECTION, SOLUTION INTRAVENOUS; SUBCUTANEOUS
Status: DISCONTINUED | OUTPATIENT
Start: 2021-03-04 | End: 2021-03-04

## 2021-03-04 RX ORDER — ONDANSETRON 2 MG/ML
4 INJECTION INTRAMUSCULAR; INTRAVENOUS EVERY 6 HOURS PRN
Status: DISCONTINUED | OUTPATIENT
Start: 2021-03-04 | End: 2021-03-05 | Stop reason: HOSPADM

## 2021-03-04 RX ORDER — INSULIN ASPART 100 [IU]/ML
7 INJECTION, SOLUTION INTRAVENOUS; SUBCUTANEOUS
Status: DISCONTINUED | OUTPATIENT
Start: 2021-03-04 | End: 2021-03-05 | Stop reason: HOSPADM

## 2021-03-04 RX ORDER — ONDANSETRON 4 MG/1
4 TABLET, FILM COATED ORAL EVERY 6 HOURS PRN
Status: DISCONTINUED | OUTPATIENT
Start: 2021-03-04 | End: 2021-03-05 | Stop reason: HOSPADM

## 2021-03-04 RX ORDER — DICYCLOMINE HYDROCHLORIDE 20 MG/1
20 TABLET ORAL 4 TIMES DAILY PRN
Status: DISCONTINUED | OUTPATIENT
Start: 2021-03-04 | End: 2021-03-05 | Stop reason: HOSPADM

## 2021-03-04 RX ADMIN — HEPARIN SODIUM 5000 UNITS: 5000 INJECTION INTRAVENOUS; SUBCUTANEOUS at 06:03

## 2021-03-04 RX ADMIN — SODIUM CHLORIDE, SODIUM LACTATE, POTASSIUM CHLORIDE, AND CALCIUM CHLORIDE: .6; .31; .03; .02 INJECTION, SOLUTION INTRAVENOUS at 09:03

## 2021-03-04 RX ADMIN — INSULIN DETEMIR 6 UNITS: 100 INJECTION, SOLUTION SUBCUTANEOUS at 12:03

## 2021-03-04 RX ADMIN — INSULIN DETEMIR 10 UNITS: 100 INJECTION, SOLUTION SUBCUTANEOUS at 09:03

## 2021-03-04 RX ADMIN — INSULIN ASPART 3 UNITS: 100 INJECTION, SOLUTION INTRAVENOUS; SUBCUTANEOUS at 11:03

## 2021-03-04 RX ADMIN — HEPARIN SODIUM 5000 UNITS: 5000 INJECTION INTRAVENOUS; SUBCUTANEOUS at 09:03

## 2021-03-04 RX ADMIN — INSULIN ASPART 1 UNITS: 100 INJECTION, SOLUTION INTRAVENOUS; SUBCUTANEOUS at 12:03

## 2021-03-04 RX ADMIN — INSULIN ASPART 7 UNITS: 100 INJECTION, SOLUTION INTRAVENOUS; SUBCUTANEOUS at 04:03

## 2021-03-04 RX ADMIN — INSULIN ASPART 3 UNITS: 100 INJECTION, SOLUTION INTRAVENOUS; SUBCUTANEOUS at 06:03

## 2021-03-04 RX ADMIN — DICYCLOMINE HYDROCHLORIDE 20 MG: 20 TABLET ORAL at 10:03

## 2021-03-04 RX ADMIN — INSULIN DETEMIR 6 UNITS: 100 INJECTION, SOLUTION SUBCUTANEOUS at 11:03

## 2021-03-04 RX ADMIN — HEPARIN SODIUM 5000 UNITS: 5000 INJECTION INTRAVENOUS; SUBCUTANEOUS at 12:03

## 2021-03-04 RX ADMIN — INSULIN ASPART 7 UNITS: 100 INJECTION, SOLUTION INTRAVENOUS; SUBCUTANEOUS at 01:03

## 2021-03-04 RX ADMIN — PROCHLORPERAZINE EDISYLATE 5 MG: 5 INJECTION INTRAMUSCULAR; INTRAVENOUS at 11:03

## 2021-03-04 RX ADMIN — INSULIN ASPART 5 UNITS: 100 INJECTION, SOLUTION INTRAVENOUS; SUBCUTANEOUS at 05:03

## 2021-03-05 VITALS
HEART RATE: 66 BPM | RESPIRATION RATE: 18 BRPM | WEIGHT: 207.25 LBS | OXYGEN SATURATION: 100 % | DIASTOLIC BLOOD PRESSURE: 88 MMHG | HEIGHT: 72 IN | TEMPERATURE: 99 F | SYSTOLIC BLOOD PRESSURE: 166 MMHG | BODY MASS INDEX: 28.07 KG/M2

## 2021-03-05 PROBLEM — N40.0 ENLARGED PROSTATE: Status: ACTIVE | Noted: 2021-03-05

## 2021-03-05 LAB
ALBUMIN SERPL BCP-MCNC: 3.4 G/DL (ref 3.5–5.2)
ALP SERPL-CCNC: 56 U/L (ref 55–135)
ALT SERPL W/O P-5'-P-CCNC: 20 U/L (ref 10–44)
ANION GAP SERPL CALC-SCNC: 10 MMOL/L (ref 8–16)
AST SERPL-CCNC: 21 U/L (ref 10–40)
BASOPHILS # BLD AUTO: 0.03 K/UL (ref 0–0.2)
BASOPHILS NFR BLD: 0.9 % (ref 0–1.9)
BILIRUB SERPL-MCNC: 0.9 MG/DL (ref 0.1–1)
BUN SERPL-MCNC: 12 MG/DL (ref 6–20)
CALCIUM SERPL-MCNC: 8.6 MG/DL (ref 8.7–10.5)
CHLORIDE SERPL-SCNC: 107 MMOL/L (ref 95–110)
CO2 SERPL-SCNC: 26 MMOL/L (ref 23–29)
COMPLEXED PSA SERPL-MCNC: 0.15 NG/ML (ref 0–4)
CREAT SERPL-MCNC: 1.2 MG/DL (ref 0.5–1.4)
DIFFERENTIAL METHOD: ABNORMAL
EOSINOPHIL # BLD AUTO: 0.1 K/UL (ref 0–0.5)
EOSINOPHIL NFR BLD: 1.7 % (ref 0–8)
ERYTHROCYTE [DISTWIDTH] IN BLOOD BY AUTOMATED COUNT: 11.9 % (ref 11.5–14.5)
EST. GFR  (AFRICAN AMERICAN): >60 ML/MIN/1.73 M^2
EST. GFR  (NON AFRICAN AMERICAN): >60 ML/MIN/1.73 M^2
GLUCOSE SERPL-MCNC: 139 MG/DL (ref 70–110)
HCT VFR BLD AUTO: 37.3 % (ref 40–54)
HGB BLD-MCNC: 12.5 G/DL (ref 14–18)
IMM GRANULOCYTES # BLD AUTO: 0 K/UL (ref 0–0.04)
IMM GRANULOCYTES NFR BLD AUTO: 0 % (ref 0–0.5)
LYMPHOCYTES # BLD AUTO: 1.5 K/UL (ref 1–4.8)
LYMPHOCYTES NFR BLD: 43 % (ref 18–48)
MCH RBC QN AUTO: 30.8 PG (ref 27–31)
MCHC RBC AUTO-ENTMCNC: 33.5 G/DL (ref 32–36)
MCV RBC AUTO: 92 FL (ref 82–98)
MONOCYTES # BLD AUTO: 0.5 K/UL (ref 0.3–1)
MONOCYTES NFR BLD: 15.2 % (ref 4–15)
NEUTROPHILS # BLD AUTO: 1.4 K/UL (ref 1.8–7.7)
NEUTROPHILS NFR BLD: 39.2 % (ref 38–73)
NRBC BLD-RTO: 0 /100 WBC
PLATELET # BLD AUTO: 132 K/UL (ref 150–350)
PMV BLD AUTO: 11.7 FL (ref 9.2–12.9)
POCT GLUCOSE: 157 MG/DL (ref 70–110)
POCT GLUCOSE: 311 MG/DL (ref 70–110)
POTASSIUM SERPL-SCNC: 3.6 MMOL/L (ref 3.5–5.1)
PROT SERPL-MCNC: 6 G/DL (ref 6–8.4)
RBC # BLD AUTO: 4.06 M/UL (ref 4.6–6.2)
SODIUM SERPL-SCNC: 143 MMOL/L (ref 136–145)
WBC # BLD AUTO: 3.49 K/UL (ref 3.9–12.7)

## 2021-03-05 PROCEDURE — 84153 ASSAY OF PSA TOTAL: CPT | Performed by: INTERNAL MEDICINE

## 2021-03-05 PROCEDURE — 99217 PR OBSERVATION CARE DISCHARGE: CPT | Mod: GC,,, | Performed by: INTERNAL MEDICINE

## 2021-03-05 PROCEDURE — 36415 COLL VENOUS BLD VENIPUNCTURE: CPT | Performed by: INTERNAL MEDICINE

## 2021-03-05 PROCEDURE — 36415 COLL VENOUS BLD VENIPUNCTURE: CPT | Performed by: STUDENT IN AN ORGANIZED HEALTH CARE EDUCATION/TRAINING PROGRAM

## 2021-03-05 PROCEDURE — G0378 HOSPITAL OBSERVATION PER HR: HCPCS

## 2021-03-05 PROCEDURE — 85025 COMPLETE CBC W/AUTO DIFF WBC: CPT | Performed by: STUDENT IN AN ORGANIZED HEALTH CARE EDUCATION/TRAINING PROGRAM

## 2021-03-05 PROCEDURE — 80321 ALCOHOLS BIOMARKERS 1OR 2: CPT | Performed by: STUDENT IN AN ORGANIZED HEALTH CARE EDUCATION/TRAINING PROGRAM

## 2021-03-05 PROCEDURE — 99217 PR OBSERVATION CARE DISCHARGE: ICD-10-PCS | Mod: GC,,, | Performed by: INTERNAL MEDICINE

## 2021-03-05 PROCEDURE — 96372 THER/PROPH/DIAG INJ SC/IM: CPT | Mod: 59 | Performed by: EMERGENCY MEDICINE

## 2021-03-05 PROCEDURE — 63600175 PHARM REV CODE 636 W HCPCS: Performed by: STUDENT IN AN ORGANIZED HEALTH CARE EDUCATION/TRAINING PROGRAM

## 2021-03-05 PROCEDURE — 80053 COMPREHEN METABOLIC PANEL: CPT | Performed by: STUDENT IN AN ORGANIZED HEALTH CARE EDUCATION/TRAINING PROGRAM

## 2021-03-05 RX ORDER — ONDANSETRON 4 MG/1
4 TABLET, FILM COATED ORAL EVERY 6 HOURS PRN
Qty: 20 TABLET | Refills: 1 | Status: SHIPPED | OUTPATIENT
Start: 2021-03-05 | End: 2021-03-19

## 2021-03-05 RX ADMIN — INSULIN ASPART 7 UNITS: 100 INJECTION, SOLUTION INTRAVENOUS; SUBCUTANEOUS at 07:03

## 2021-03-05 RX ADMIN — HEPARIN SODIUM 5000 UNITS: 5000 INJECTION INTRAVENOUS; SUBCUTANEOUS at 05:03

## 2021-03-05 RX ADMIN — INSULIN ASPART 7 UNITS: 100 INJECTION, SOLUTION INTRAVENOUS; SUBCUTANEOUS at 11:03

## 2021-03-05 RX ADMIN — INSULIN ASPART 4 UNITS: 100 INJECTION, SOLUTION INTRAVENOUS; SUBCUTANEOUS at 12:03

## 2021-03-10 ENCOUNTER — OFFICE VISIT (OUTPATIENT)
Dept: ENDOCRINOLOGY | Facility: CLINIC | Age: 36
End: 2021-03-10
Payer: MEDICARE

## 2021-03-10 VITALS
HEIGHT: 72 IN | WEIGHT: 214.5 LBS | BODY MASS INDEX: 29.05 KG/M2 | DIASTOLIC BLOOD PRESSURE: 91 MMHG | SYSTOLIC BLOOD PRESSURE: 145 MMHG

## 2021-03-10 DIAGNOSIS — E10.36 TYPE 1 DIABETES MELLITUS WITH DIABETIC CATARACT: Primary | ICD-10-CM

## 2021-03-10 DIAGNOSIS — E10.10 DIABETIC KETOACIDOSIS WITHOUT COMA ASSOCIATED WITH TYPE 1 DIABETES MELLITUS: ICD-10-CM

## 2021-03-10 PROCEDURE — 3008F BODY MASS INDEX DOCD: CPT | Mod: CPTII,S$GLB,, | Performed by: NURSE PRACTITIONER

## 2021-03-10 PROCEDURE — 99214 OFFICE O/P EST MOD 30 MIN: CPT | Mod: S$GLB,,, | Performed by: NURSE PRACTITIONER

## 2021-03-10 PROCEDURE — 99999 PR PBB SHADOW E&M-EST. PATIENT-LVL IV: ICD-10-PCS | Mod: PBBFAC,,, | Performed by: NURSE PRACTITIONER

## 2021-03-10 PROCEDURE — 1126F AMNT PAIN NOTED NONE PRSNT: CPT | Mod: S$GLB,,, | Performed by: NURSE PRACTITIONER

## 2021-03-10 PROCEDURE — 99999 PR PBB SHADOW E&M-EST. PATIENT-LVL IV: CPT | Mod: PBBFAC,,, | Performed by: NURSE PRACTITIONER

## 2021-03-10 PROCEDURE — 3046F HEMOGLOBIN A1C LEVEL >9.0%: CPT | Mod: CPTII,S$GLB,, | Performed by: NURSE PRACTITIONER

## 2021-03-10 PROCEDURE — 99214 PR OFFICE/OUTPT VISIT, EST, LEVL IV, 30-39 MIN: ICD-10-PCS | Mod: S$GLB,,, | Performed by: NURSE PRACTITIONER

## 2021-03-10 PROCEDURE — 3008F PR BODY MASS INDEX (BMI) DOCUMENTED: ICD-10-PCS | Mod: CPTII,S$GLB,, | Performed by: NURSE PRACTITIONER

## 2021-03-10 PROCEDURE — 1126F PR PAIN SEVERITY QUANTIFIED, NO PAIN PRESENT: ICD-10-PCS | Mod: S$GLB,,, | Performed by: NURSE PRACTITIONER

## 2021-03-10 PROCEDURE — 3046F PR MOST RECENT HEMOGLOBIN A1C LEVEL > 9.0%: ICD-10-PCS | Mod: CPTII,S$GLB,, | Performed by: NURSE PRACTITIONER

## 2021-03-10 RX ORDER — GLUCAGON INJECTION, SOLUTION 1 MG/.2ML
1 INJECTION, SOLUTION SUBCUTANEOUS
Qty: 2 SYRINGE | Refills: 0 | Status: SHIPPED | OUTPATIENT
Start: 2021-03-10 | End: 2023-01-09 | Stop reason: SDUPTHER

## 2021-03-10 RX ORDER — INSULIN DEGLUDEC 100 U/ML
15 INJECTION, SOLUTION SUBCUTANEOUS DAILY
Qty: 5 SYRINGE | Refills: 3 | Status: SHIPPED | OUTPATIENT
Start: 2021-03-10 | End: 2021-04-09

## 2021-03-10 RX ORDER — BLOOD-GLUCOSE SENSOR
3 EACH MISCELLANEOUS CONTINUOUS
Qty: 3 EACH | Status: SHIPPED | OUTPATIENT
Start: 2021-03-10 | End: 2021-04-07 | Stop reason: SDUPTHER

## 2021-03-10 RX ORDER — BLOOD-GLUCOSE TRANSMITTER
1 EACH MISCELLANEOUS CONTINUOUS
Qty: 1 EACH | Status: SHIPPED | OUTPATIENT
Start: 2021-03-10 | End: 2021-04-07 | Stop reason: SDUPTHER

## 2021-03-10 RX ORDER — CHLORPHENIR/PHENYLEPH/ASPIRIN 2-7.8-325
30 TABLET, EFFERVESCENT ORAL
COMMUNITY
Start: 2021-03-10 | End: 2023-01-09 | Stop reason: SDUPTHER

## 2021-03-11 LAB — PHOSPHATIDYLETHANOL (PETH): NEGATIVE NG/ML

## 2021-03-14 ENCOUNTER — PATIENT MESSAGE (OUTPATIENT)
Dept: ENDOCRINOLOGY | Facility: CLINIC | Age: 36
End: 2021-03-14

## 2021-03-17 ENCOUNTER — CLINICAL SUPPORT (OUTPATIENT)
Dept: DIABETES | Facility: CLINIC | Age: 36
End: 2021-03-17
Payer: MEDICARE

## 2021-03-17 ENCOUNTER — PATIENT MESSAGE (OUTPATIENT)
Dept: ENDOCRINOLOGY | Facility: CLINIC | Age: 36
End: 2021-03-17

## 2021-03-17 DIAGNOSIS — E10.36 TYPE 1 DIABETES MELLITUS WITH DIABETIC CATARACT: ICD-10-CM

## 2021-03-17 PROCEDURE — 99999 PR PBB SHADOW E&M-EST. PATIENT-LVL I: CPT | Mod: PBBFAC,,,

## 2021-03-17 PROCEDURE — G0108 DIAB MANAGE TRN  PER INDIV: HCPCS | Mod: S$GLB,,, | Performed by: INTERNAL MEDICINE

## 2021-03-17 PROCEDURE — G0108 PR DIAB MANAGE TRN  PER INDIV: ICD-10-PCS | Mod: S$GLB,,, | Performed by: INTERNAL MEDICINE

## 2021-03-17 PROCEDURE — 99999 PR PBB SHADOW E&M-EST. PATIENT-LVL I: ICD-10-PCS | Mod: PBBFAC,,,

## 2021-03-19 DIAGNOSIS — E10.36 TYPE 1 DIABETES MELLITUS WITH DIABETIC CATARACT: Primary | ICD-10-CM

## 2021-03-22 DIAGNOSIS — E10.36 TYPE 1 DIABETES MELLITUS WITH DIABETIC CATARACT: Primary | ICD-10-CM

## 2021-03-23 ENCOUNTER — CLINICAL SUPPORT (OUTPATIENT)
Dept: DIABETES | Facility: CLINIC | Age: 36
End: 2021-03-23
Payer: MEDICARE

## 2021-03-23 ENCOUNTER — PATIENT MESSAGE (OUTPATIENT)
Dept: DIABETES | Facility: CLINIC | Age: 36
End: 2021-03-23

## 2021-03-23 ENCOUNTER — PATIENT MESSAGE (OUTPATIENT)
Dept: ENDOCRINOLOGY | Facility: CLINIC | Age: 36
End: 2021-03-23

## 2021-03-23 DIAGNOSIS — E10.36 TYPE 1 DIABETES MELLITUS WITH DIABETIC CATARACT: ICD-10-CM

## 2021-03-28 ENCOUNTER — HOSPITAL ENCOUNTER (EMERGENCY)
Facility: HOSPITAL | Age: 36
Discharge: HOME | End: 2021-03-28
Attending: EMERGENCY MEDICINE
Payer: MEDICARE

## 2021-03-28 ENCOUNTER — PATIENT MESSAGE (OUTPATIENT)
Dept: ENDOCRINOLOGY | Facility: CLINIC | Age: 36
End: 2021-03-28

## 2021-03-28 VITALS
RESPIRATION RATE: 16 BRPM | SYSTOLIC BLOOD PRESSURE: 139 MMHG | WEIGHT: 214 LBS | TEMPERATURE: 98 F | BODY MASS INDEX: 28.36 KG/M2 | HEIGHT: 73 IN | OXYGEN SATURATION: 100 % | HEART RATE: 74 BPM | DIASTOLIC BLOOD PRESSURE: 82 MMHG

## 2021-03-28 DIAGNOSIS — R10.9 ABDOMINAL PAIN, UNSPECIFIED ABDOMINAL LOCATION: Primary | ICD-10-CM

## 2021-03-28 LAB
ALBUMIN SERPL BCP-MCNC: 4.1 G/DL (ref 3.5–5.2)
ALLENS TEST: NORMAL
ALP SERPL-CCNC: 71 U/L (ref 55–135)
ALT SERPL W/O P-5'-P-CCNC: 30 U/L (ref 10–44)
ANION GAP SERPL CALC-SCNC: 10 MMOL/L (ref 8–16)
AST SERPL-CCNC: 41 U/L (ref 10–40)
B-OH-BUTYR BLD STRIP-SCNC: 0.3 MMOL/L (ref 0–0.5)
BACTERIA #/AREA URNS AUTO: NORMAL /HPF
BASOPHILS # BLD AUTO: 0.01 K/UL (ref 0–0.2)
BASOPHILS NFR BLD: 0.4 % (ref 0–1.9)
BILIRUB SERPL-MCNC: 0.5 MG/DL (ref 0.1–1)
BILIRUB UR QL STRIP: NEGATIVE
BUN SERPL-MCNC: 16 MG/DL (ref 6–20)
CALCIUM SERPL-MCNC: 9.4 MG/DL (ref 8.7–10.5)
CHLORIDE SERPL-SCNC: 101 MMOL/L (ref 95–110)
CLARITY UR REFRACT.AUTO: CLEAR
CO2 SERPL-SCNC: 29 MMOL/L (ref 23–29)
COLOR UR AUTO: ABNORMAL
CREAT SERPL-MCNC: 1.1 MG/DL (ref 0.5–1.4)
DIFFERENTIAL METHOD: ABNORMAL
EOSINOPHIL # BLD AUTO: 0 K/UL (ref 0–0.5)
EOSINOPHIL NFR BLD: 0.4 % (ref 0–8)
ERYTHROCYTE [DISTWIDTH] IN BLOOD BY AUTOMATED COUNT: 12.1 % (ref 11.5–14.5)
EST. GFR  (AFRICAN AMERICAN): >60 ML/MIN/1.73 M^2
EST. GFR  (NON AFRICAN AMERICAN): >60 ML/MIN/1.73 M^2
GLUCOSE SERPL-MCNC: 149 MG/DL (ref 70–110)
GLUCOSE UR QL STRIP: NEGATIVE
HCT VFR BLD AUTO: 39.4 % (ref 40–54)
HGB BLD-MCNC: 13.9 G/DL (ref 14–18)
HGB UR QL STRIP: ABNORMAL
HYALINE CASTS UR QL AUTO: 0 /LPF
IMM GRANULOCYTES # BLD AUTO: 0.01 K/UL (ref 0–0.04)
IMM GRANULOCYTES NFR BLD AUTO: 0.4 % (ref 0–0.5)
KETONES UR QL STRIP: NEGATIVE
LDH SERPL L TO P-CCNC: 0.67 MMOL/L (ref 0.5–2.2)
LEUKOCYTE ESTERASE UR QL STRIP: NEGATIVE
LIPASE SERPL-CCNC: 26 U/L (ref 4–60)
LYMPHOCYTES # BLD AUTO: 1 K/UL (ref 1–4.8)
LYMPHOCYTES NFR BLD: 33.8 % (ref 18–48)
MCH RBC QN AUTO: 31.7 PG (ref 27–31)
MCHC RBC AUTO-ENTMCNC: 35.3 G/DL (ref 32–36)
MCV RBC AUTO: 90 FL (ref 82–98)
MICROSCOPIC COMMENT: NORMAL
MONOCYTES # BLD AUTO: 0.4 K/UL (ref 0.3–1)
MONOCYTES NFR BLD: 14.4 % (ref 4–15)
NEUTROPHILS # BLD AUTO: 1.4 K/UL (ref 1.8–7.7)
NEUTROPHILS NFR BLD: 50.6 % (ref 38–73)
NITRITE UR QL STRIP: NEGATIVE
NRBC BLD-RTO: 0 /100 WBC
PH UR STRIP: 7 [PH] (ref 5–8)
PLATELET # BLD AUTO: 132 K/UL (ref 150–350)
PMV BLD AUTO: 11.8 FL (ref 9.2–12.9)
POTASSIUM SERPL-SCNC: 3.8 MMOL/L (ref 3.5–5.1)
PROT SERPL-MCNC: 7.4 G/DL (ref 6–8.4)
PROT UR QL STRIP: ABNORMAL
RBC # BLD AUTO: 4.39 M/UL (ref 4.6–6.2)
RBC #/AREA URNS AUTO: 1 /HPF (ref 0–4)
SAMPLE: NORMAL
SITE: NORMAL
SODIUM SERPL-SCNC: 140 MMOL/L (ref 136–145)
SP GR UR STRIP: 1.01 (ref 1–1.03)
URN SPEC COLLECT METH UR: ABNORMAL
WBC # BLD AUTO: 2.84 K/UL (ref 3.9–12.7)
WBC #/AREA URNS AUTO: 0 /HPF (ref 0–5)

## 2021-03-28 PROCEDURE — 81001 URINALYSIS AUTO W/SCOPE: CPT | Performed by: EMERGENCY MEDICINE

## 2021-03-28 PROCEDURE — 63600175 PHARM REV CODE 636 W HCPCS: Performed by: EMERGENCY MEDICINE

## 2021-03-28 PROCEDURE — 99284 EMERGENCY DEPT VISIT MOD MDM: CPT | Mod: 25

## 2021-03-28 PROCEDURE — 83690 ASSAY OF LIPASE: CPT | Performed by: EMERGENCY MEDICINE

## 2021-03-28 PROCEDURE — 99284 PR EMERGENCY DEPT VISIT,LEVEL IV: ICD-10-PCS | Mod: ,,, | Performed by: EMERGENCY MEDICINE

## 2021-03-28 PROCEDURE — 80053 COMPREHEN METABOLIC PANEL: CPT | Performed by: EMERGENCY MEDICINE

## 2021-03-28 PROCEDURE — 85025 COMPLETE CBC W/AUTO DIFF WBC: CPT | Performed by: EMERGENCY MEDICINE

## 2021-03-28 PROCEDURE — 96374 THER/PROPH/DIAG INJ IV PUSH: CPT

## 2021-03-28 PROCEDURE — 86803 HEPATITIS C AB TEST: CPT | Performed by: EMERGENCY MEDICINE

## 2021-03-28 PROCEDURE — 86703 HIV-1/HIV-2 1 RESULT ANTBDY: CPT | Performed by: EMERGENCY MEDICINE

## 2021-03-28 PROCEDURE — 83605 ASSAY OF LACTIC ACID: CPT

## 2021-03-28 PROCEDURE — 99284 EMERGENCY DEPT VISIT MOD MDM: CPT | Mod: ,,, | Performed by: EMERGENCY MEDICINE

## 2021-03-28 PROCEDURE — 82010 KETONE BODYS QUAN: CPT | Performed by: EMERGENCY MEDICINE

## 2021-03-28 PROCEDURE — 99900035 HC TECH TIME PER 15 MIN (STAT)

## 2021-03-28 RX ORDER — METOCLOPRAMIDE 10 MG/1
10 TABLET ORAL EVERY 6 HOURS
Qty: 10 TABLET | Refills: 0 | Status: SHIPPED | OUTPATIENT
Start: 2021-03-28 | End: 2023-03-02

## 2021-03-28 RX ORDER — DROPERIDOL 2.5 MG/ML
1.25 INJECTION, SOLUTION INTRAMUSCULAR; INTRAVENOUS
Status: COMPLETED | OUTPATIENT
Start: 2021-03-28 | End: 2021-03-28

## 2021-03-28 RX ADMIN — DROPERIDOL 1.25 MG: 2.5 INJECTION, SOLUTION INTRAMUSCULAR; INTRAVENOUS at 06:03

## 2021-03-29 ENCOUNTER — TELEPHONE (OUTPATIENT)
Dept: EMERGENCY MEDICINE | Facility: HOSPITAL | Age: 36
End: 2021-03-29

## 2021-03-29 LAB
HCV AB SERPL QL IA: NEGATIVE
HIV 1+2 AB+HIV1 P24 AG SERPL QL IA: NEGATIVE

## 2021-03-31 ENCOUNTER — PATIENT MESSAGE (OUTPATIENT)
Dept: ENDOCRINOLOGY | Facility: CLINIC | Age: 36
End: 2021-03-31

## 2021-03-31 DIAGNOSIS — E10.36 TYPE 1 DIABETES MELLITUS WITH DIABETIC CATARACT: Primary | ICD-10-CM

## 2021-03-31 DIAGNOSIS — R10.9 ABDOMINAL PAIN, UNSPECIFIED ABDOMINAL LOCATION: ICD-10-CM

## 2021-04-02 ENCOUNTER — PATIENT MESSAGE (OUTPATIENT)
Dept: ENDOCRINOLOGY | Facility: CLINIC | Age: 36
End: 2021-04-02

## 2021-04-07 ENCOUNTER — TELEPHONE (OUTPATIENT)
Dept: ENDOCRINOLOGY | Facility: CLINIC | Age: 36
End: 2021-04-07

## 2021-04-07 DIAGNOSIS — E10.36 TYPE 1 DIABETES MELLITUS WITH DIABETIC CATARACT: ICD-10-CM

## 2021-04-07 RX ORDER — BLOOD-GLUCOSE SENSOR
3 EACH MISCELLANEOUS CONTINUOUS
Qty: 3 EACH | Status: SHIPPED | OUTPATIENT
Start: 2021-04-07 | End: 2022-04-07 | Stop reason: SDUPTHER

## 2021-04-07 RX ORDER — DEXTROSE 4 G
TABLET,CHEWABLE ORAL
Qty: 1 EACH | Refills: 0 | Status: SHIPPED | OUTPATIENT
Start: 2021-04-07 | End: 2023-04-08 | Stop reason: SDUPTHER

## 2021-04-07 RX ORDER — BLOOD-GLUCOSE TRANSMITTER
1 EACH MISCELLANEOUS CONTINUOUS
Qty: 1 EACH | Status: SHIPPED | OUTPATIENT
Start: 2021-04-07 | End: 2022-04-07 | Stop reason: SDUPTHER

## 2021-04-12 ENCOUNTER — PATIENT MESSAGE (OUTPATIENT)
Dept: ENDOCRINOLOGY | Facility: CLINIC | Age: 36
End: 2021-04-12

## 2021-04-15 ENCOUNTER — PATIENT MESSAGE (OUTPATIENT)
Dept: ENDOCRINOLOGY | Facility: CLINIC | Age: 36
End: 2021-04-15

## 2021-04-15 ENCOUNTER — PATIENT MESSAGE (OUTPATIENT)
Dept: RESEARCH | Facility: HOSPITAL | Age: 36
End: 2021-04-15

## 2021-04-22 ENCOUNTER — PATIENT MESSAGE (OUTPATIENT)
Dept: ENDOCRINOLOGY | Facility: CLINIC | Age: 36
End: 2021-04-22

## 2021-04-22 ENCOUNTER — HOSPITAL ENCOUNTER (OUTPATIENT)
Facility: HOSPITAL | Age: 36
Discharge: HOME | End: 2021-04-25
Attending: EMERGENCY MEDICINE | Admitting: EMERGENCY MEDICINE
Payer: MEDICARE

## 2021-04-22 ENCOUNTER — PATIENT OUTREACH (OUTPATIENT)
Dept: EMERGENCY MEDICINE | Facility: HOSPITAL | Age: 36
End: 2021-04-22

## 2021-04-22 DIAGNOSIS — R07.9 CHEST PAIN: ICD-10-CM

## 2021-04-22 DIAGNOSIS — R11.2 NAUSEA AND VOMITING, INTRACTABILITY OF VOMITING NOT SPECIFIED, UNSPECIFIED VOMITING TYPE: Primary | ICD-10-CM

## 2021-04-22 LAB
ALBUMIN SERPL BCP-MCNC: 4.4 G/DL (ref 3.5–5.2)
ALP SERPL-CCNC: 80 U/L (ref 55–135)
ALT SERPL W/O P-5'-P-CCNC: 27 U/L (ref 10–44)
ANION GAP SERPL CALC-SCNC: 9 MMOL/L (ref 8–16)
AST SERPL-CCNC: 36 U/L (ref 10–40)
B-OH-BUTYR BLD STRIP-SCNC: 0.1 MMOL/L (ref 0–0.5)
BACTERIA #/AREA URNS AUTO: ABNORMAL /HPF
BASOPHILS # BLD AUTO: 0.02 K/UL (ref 0–0.2)
BASOPHILS NFR BLD: 0.6 % (ref 0–1.9)
BILIRUB SERPL-MCNC: 0.4 MG/DL (ref 0.1–1)
BILIRUB UR QL STRIP: NEGATIVE
BUN SERPL-MCNC: 11 MG/DL (ref 6–20)
CALCIUM SERPL-MCNC: 9.5 MG/DL (ref 8.7–10.5)
CHLORIDE SERPL-SCNC: 104 MMOL/L (ref 95–110)
CLARITY UR REFRACT.AUTO: CLEAR
CO2 SERPL-SCNC: 26 MMOL/L (ref 23–29)
COLOR UR AUTO: YELLOW
CREAT SERPL-MCNC: 1.3 MG/DL (ref 0.5–1.4)
CTP QC/QA: YES
DIFFERENTIAL METHOD: ABNORMAL
EOSINOPHIL # BLD AUTO: 0 K/UL (ref 0–0.5)
EOSINOPHIL NFR BLD: 0.6 % (ref 0–8)
ERYTHROCYTE [DISTWIDTH] IN BLOOD BY AUTOMATED COUNT: 12 % (ref 11.5–14.5)
EST. GFR  (AFRICAN AMERICAN): >60 ML/MIN/1.73 M^2
EST. GFR  (NON AFRICAN AMERICAN): >60 ML/MIN/1.73 M^2
GLUCOSE SERPL-MCNC: 93 MG/DL (ref 70–110)
GLUCOSE UR QL STRIP: NEGATIVE
HCT VFR BLD AUTO: 44.7 % (ref 40–54)
HGB BLD-MCNC: 15.1 G/DL (ref 14–18)
HGB UR QL STRIP: ABNORMAL
HYALINE CASTS UR QL AUTO: 0 /LPF
IMM GRANULOCYTES # BLD AUTO: 0 K/UL (ref 0–0.04)
IMM GRANULOCYTES NFR BLD AUTO: 0 % (ref 0–0.5)
KETONES UR QL STRIP: ABNORMAL
LEUKOCYTE ESTERASE UR QL STRIP: NEGATIVE
LIPASE SERPL-CCNC: 12 U/L (ref 4–60)
LYMPHOCYTES # BLD AUTO: 1 K/UL (ref 1–4.8)
LYMPHOCYTES NFR BLD: 29.2 % (ref 18–48)
MAGNESIUM SERPL-MCNC: 1.6 MG/DL (ref 1.6–2.6)
MCH RBC QN AUTO: 30.7 PG (ref 27–31)
MCHC RBC AUTO-ENTMCNC: 33.8 G/DL (ref 32–36)
MCV RBC AUTO: 91 FL (ref 82–98)
MICROSCOPIC COMMENT: ABNORMAL
MONOCYTES # BLD AUTO: 0.4 K/UL (ref 0.3–1)
MONOCYTES NFR BLD: 10.5 % (ref 4–15)
NEUTROPHILS # BLD AUTO: 2 K/UL (ref 1.8–7.7)
NEUTROPHILS NFR BLD: 59.1 % (ref 38–73)
NITRITE UR QL STRIP: NEGATIVE
NRBC BLD-RTO: 0 /100 WBC
PH UR STRIP: 8 [PH] (ref 5–8)
PLATELET # BLD AUTO: 170 K/UL (ref 150–450)
PMV BLD AUTO: 11.6 FL (ref 9.2–12.9)
POCT GLUCOSE: 187 MG/DL (ref 70–110)
POCT GLUCOSE: 307 MG/DL (ref 70–110)
POCT GLUCOSE: 82 MG/DL (ref 70–110)
POTASSIUM SERPL-SCNC: 4.2 MMOL/L (ref 3.5–5.1)
PROT SERPL-MCNC: 8.1 G/DL (ref 6–8.4)
PROT UR QL STRIP: ABNORMAL
RBC # BLD AUTO: 4.92 M/UL (ref 4.6–6.2)
RBC #/AREA URNS AUTO: 7 /HPF (ref 0–4)
SARS-COV-2 RDRP RESP QL NAA+PROBE: NEGATIVE
SODIUM SERPL-SCNC: 139 MMOL/L (ref 136–145)
SP GR UR STRIP: 1.01 (ref 1–1.03)
SQUAMOUS #/AREA URNS AUTO: 0 /HPF
URN SPEC COLLECT METH UR: ABNORMAL
WBC # BLD AUTO: 3.32 K/UL (ref 3.9–12.7)
WBC #/AREA URNS AUTO: 1 /HPF (ref 0–5)

## 2021-04-22 PROCEDURE — 85025 COMPLETE CBC W/AUTO DIFF WBC: CPT | Performed by: EMERGENCY MEDICINE

## 2021-04-22 PROCEDURE — 63600175 PHARM REV CODE 636 W HCPCS: Performed by: PHYSICIAN ASSISTANT

## 2021-04-22 PROCEDURE — 80053 COMPREHEN METABOLIC PANEL: CPT | Performed by: EMERGENCY MEDICINE

## 2021-04-22 PROCEDURE — 96376 TX/PRO/DX INJ SAME DRUG ADON: CPT

## 2021-04-22 PROCEDURE — 82010 KETONE BODYS QUAN: CPT | Performed by: EMERGENCY MEDICINE

## 2021-04-22 PROCEDURE — 63600175 PHARM REV CODE 636 W HCPCS: Performed by: EMERGENCY MEDICINE

## 2021-04-22 PROCEDURE — 96375 TX/PRO/DX INJ NEW DRUG ADDON: CPT

## 2021-04-22 PROCEDURE — 93010 ELECTROCARDIOGRAM REPORT: CPT | Mod: ,,, | Performed by: INTERNAL MEDICINE

## 2021-04-22 PROCEDURE — 81001 URINALYSIS AUTO W/SCOPE: CPT | Performed by: EMERGENCY MEDICINE

## 2021-04-22 PROCEDURE — 99220 PR INITIAL OBSERVATION CARE,LEVL III: ICD-10-PCS | Mod: ,,, | Performed by: PHYSICIAN ASSISTANT

## 2021-04-22 PROCEDURE — 82962 GLUCOSE BLOOD TEST: CPT

## 2021-04-22 PROCEDURE — U0002 COVID-19 LAB TEST NON-CDC: HCPCS | Performed by: EMERGENCY MEDICINE

## 2021-04-22 PROCEDURE — 96374 THER/PROPH/DIAG INJ IV PUSH: CPT

## 2021-04-22 PROCEDURE — 83735 ASSAY OF MAGNESIUM: CPT | Performed by: EMERGENCY MEDICINE

## 2021-04-22 PROCEDURE — 99220 PR INITIAL OBSERVATION CARE,LEVL III: CPT | Mod: ,,, | Performed by: PHYSICIAN ASSISTANT

## 2021-04-22 PROCEDURE — 99285 PR EMERGENCY DEPT VISIT,LEVEL V: ICD-10-PCS | Mod: CS,,, | Performed by: EMERGENCY MEDICINE

## 2021-04-22 PROCEDURE — 93010 EKG 12-LEAD: ICD-10-PCS | Mod: ,,, | Performed by: INTERNAL MEDICINE

## 2021-04-22 PROCEDURE — 96361 HYDRATE IV INFUSION ADD-ON: CPT

## 2021-04-22 PROCEDURE — 99285 EMERGENCY DEPT VISIT HI MDM: CPT | Mod: 25

## 2021-04-22 PROCEDURE — 96372 THER/PROPH/DIAG INJ SC/IM: CPT | Mod: 59

## 2021-04-22 PROCEDURE — 83690 ASSAY OF LIPASE: CPT | Performed by: EMERGENCY MEDICINE

## 2021-04-22 PROCEDURE — 93005 ELECTROCARDIOGRAM TRACING: CPT

## 2021-04-22 PROCEDURE — G0378 HOSPITAL OBSERVATION PER HR: HCPCS

## 2021-04-22 PROCEDURE — 99285 EMERGENCY DEPT VISIT HI MDM: CPT | Mod: CS,,, | Performed by: EMERGENCY MEDICINE

## 2021-04-22 RX ORDER — DROPERIDOL 2.5 MG/ML
1.25 INJECTION, SOLUTION INTRAMUSCULAR; INTRAVENOUS
Status: COMPLETED | OUTPATIENT
Start: 2021-04-22 | End: 2021-04-22

## 2021-04-22 RX ORDER — IBUPROFEN 200 MG
16 TABLET ORAL
Status: DISCONTINUED | OUTPATIENT
Start: 2021-04-22 | End: 2021-04-25 | Stop reason: HOSPADM

## 2021-04-22 RX ORDER — POLYETHYLENE GLYCOL 3350 17 G/17G
17 POWDER, FOR SOLUTION ORAL 2 TIMES DAILY PRN
Status: DISCONTINUED | OUTPATIENT
Start: 2021-04-22 | End: 2021-04-25 | Stop reason: HOSPADM

## 2021-04-22 RX ORDER — TALC
6 POWDER (GRAM) TOPICAL NIGHTLY PRN
Status: DISCONTINUED | OUTPATIENT
Start: 2021-04-22 | End: 2021-04-25 | Stop reason: HOSPADM

## 2021-04-22 RX ORDER — INSULIN ASPART 100 [IU]/ML
0-5 INJECTION, SOLUTION INTRAVENOUS; SUBCUTANEOUS
Status: DISCONTINUED | OUTPATIENT
Start: 2021-04-22 | End: 2021-04-23

## 2021-04-22 RX ORDER — ONDANSETRON 2 MG/ML
4 INJECTION INTRAMUSCULAR; INTRAVENOUS
Status: COMPLETED | OUTPATIENT
Start: 2021-04-22 | End: 2021-04-22

## 2021-04-22 RX ORDER — ACETAMINOPHEN 325 MG/1
650 TABLET ORAL EVERY 4 HOURS PRN
Status: DISCONTINUED | OUTPATIENT
Start: 2021-04-22 | End: 2021-04-25 | Stop reason: HOSPADM

## 2021-04-22 RX ORDER — IPRATROPIUM BROMIDE AND ALBUTEROL SULFATE 2.5; .5 MG/3ML; MG/3ML
3 SOLUTION RESPIRATORY (INHALATION) EVERY 6 HOURS PRN
Status: DISCONTINUED | OUTPATIENT
Start: 2021-04-22 | End: 2021-04-25 | Stop reason: HOSPADM

## 2021-04-22 RX ORDER — IBUPROFEN 200 MG
24 TABLET ORAL
Status: DISCONTINUED | OUTPATIENT
Start: 2021-04-22 | End: 2021-04-25 | Stop reason: HOSPADM

## 2021-04-22 RX ORDER — SODIUM CHLORIDE 0.9 % (FLUSH) 0.9 %
10 SYRINGE (ML) INJECTION
Status: DISCONTINUED | OUTPATIENT
Start: 2021-04-22 | End: 2021-04-25 | Stop reason: HOSPADM

## 2021-04-22 RX ORDER — METOCLOPRAMIDE HYDROCHLORIDE 5 MG/ML
10 INJECTION INTRAMUSCULAR; INTRAVENOUS
Status: COMPLETED | OUTPATIENT
Start: 2021-04-22 | End: 2021-04-22

## 2021-04-22 RX ORDER — ONDANSETRON 8 MG/1
8 TABLET, ORALLY DISINTEGRATING ORAL EVERY 8 HOURS PRN
Status: DISCONTINUED | OUTPATIENT
Start: 2021-04-22 | End: 2021-04-24

## 2021-04-22 RX ORDER — GLUCAGON 1 MG
1 KIT INJECTION
Status: DISCONTINUED | OUTPATIENT
Start: 2021-04-22 | End: 2021-04-25 | Stop reason: HOSPADM

## 2021-04-22 RX ADMIN — ONDANSETRON 4 MG: 2 INJECTION INTRAMUSCULAR; INTRAVENOUS at 01:04

## 2021-04-22 RX ADMIN — DROPERIDOL 1.25 MG: 2.5 INJECTION, SOLUTION INTRAMUSCULAR; INTRAVENOUS at 12:04

## 2021-04-22 RX ADMIN — INSULIN ASPART 2 UNITS: 100 INJECTION, SOLUTION INTRAVENOUS; SUBCUTANEOUS at 11:04

## 2021-04-22 RX ADMIN — SODIUM CHLORIDE, SODIUM LACTATE, POTASSIUM CHLORIDE, AND CALCIUM CHLORIDE 1000 ML: .6; .31; .03; .02 INJECTION, SOLUTION INTRAVENOUS at 03:04

## 2021-04-22 RX ADMIN — DROPERIDOL 1.25 MG: 2.5 INJECTION, SOLUTION INTRAMUSCULAR; INTRAVENOUS at 04:04

## 2021-04-22 RX ADMIN — METOCLOPRAMIDE 10 MG: 5 INJECTION, SOLUTION INTRAMUSCULAR; INTRAVENOUS at 04:04

## 2021-04-22 RX ADMIN — PROMETHAZINE HYDROCHLORIDE 12.5 MG: 25 INJECTION INTRAMUSCULAR; INTRAVENOUS at 08:04

## 2021-04-23 LAB
ALBUMIN SERPL BCP-MCNC: 4 G/DL (ref 3.5–5.2)
ALP SERPL-CCNC: 67 U/L (ref 55–135)
ALT SERPL W/O P-5'-P-CCNC: 23 U/L (ref 10–44)
ANION GAP SERPL CALC-SCNC: 18 MMOL/L (ref 8–16)
ANION GAP SERPL CALC-SCNC: 9 MMOL/L (ref 8–16)
AST SERPL-CCNC: 22 U/L (ref 10–40)
BASOPHILS # BLD AUTO: 0 K/UL (ref 0–0.2)
BASOPHILS NFR BLD: 0 % (ref 0–1.9)
BILIRUB SERPL-MCNC: 0.6 MG/DL (ref 0.1–1)
BUN SERPL-MCNC: 15 MG/DL (ref 6–20)
BUN SERPL-MCNC: 18 MG/DL (ref 6–20)
CALCIUM SERPL-MCNC: 9.5 MG/DL (ref 8.7–10.5)
CALCIUM SERPL-MCNC: 9.7 MG/DL (ref 8.7–10.5)
CHLORIDE SERPL-SCNC: 101 MMOL/L (ref 95–110)
CHLORIDE SERPL-SCNC: 105 MMOL/L (ref 95–110)
CO2 SERPL-SCNC: 20 MMOL/L (ref 23–29)
CO2 SERPL-SCNC: 29 MMOL/L (ref 23–29)
CREAT SERPL-MCNC: 1.3 MG/DL (ref 0.5–1.4)
CREAT SERPL-MCNC: 1.4 MG/DL (ref 0.5–1.4)
DIFFERENTIAL METHOD: ABNORMAL
EOSINOPHIL # BLD AUTO: 0 K/UL (ref 0–0.5)
EOSINOPHIL NFR BLD: 0 % (ref 0–8)
ERYTHROCYTE [DISTWIDTH] IN BLOOD BY AUTOMATED COUNT: 12.2 % (ref 11.5–14.5)
EST. GFR  (AFRICAN AMERICAN): >60 ML/MIN/1.73 M^2
EST. GFR  (AFRICAN AMERICAN): >60 ML/MIN/1.73 M^2
EST. GFR  (NON AFRICAN AMERICAN): >60 ML/MIN/1.73 M^2
EST. GFR  (NON AFRICAN AMERICAN): >60 ML/MIN/1.73 M^2
GLUCOSE SERPL-MCNC: 214 MG/DL (ref 70–110)
GLUCOSE SERPL-MCNC: 367 MG/DL (ref 70–110)
HCT VFR BLD AUTO: 43.1 % (ref 40–54)
HGB BLD-MCNC: 15 G/DL (ref 14–18)
IMM GRANULOCYTES # BLD AUTO: 0.02 K/UL (ref 0–0.04)
IMM GRANULOCYTES NFR BLD AUTO: 0.3 % (ref 0–0.5)
LYMPHOCYTES # BLD AUTO: 0.7 K/UL (ref 1–4.8)
LYMPHOCYTES NFR BLD: 9.7 % (ref 18–48)
MAGNESIUM SERPL-MCNC: 1.6 MG/DL (ref 1.6–2.6)
MCH RBC QN AUTO: 31.2 PG (ref 27–31)
MCHC RBC AUTO-ENTMCNC: 34.8 G/DL (ref 32–36)
MCV RBC AUTO: 90 FL (ref 82–98)
MONOCYTES # BLD AUTO: 0.3 K/UL (ref 0.3–1)
MONOCYTES NFR BLD: 4.7 % (ref 4–15)
NEUTROPHILS # BLD AUTO: 6 K/UL (ref 1.8–7.7)
NEUTROPHILS NFR BLD: 85.3 % (ref 38–73)
NRBC BLD-RTO: 0 /100 WBC
PHOSPHATE SERPL-MCNC: 4.3 MG/DL (ref 2.7–4.5)
PLATELET # BLD AUTO: 141 K/UL (ref 150–450)
PMV BLD AUTO: 12.7 FL (ref 9.2–12.9)
POCT GLUCOSE: 199 MG/DL (ref 70–110)
POCT GLUCOSE: 236 MG/DL (ref 70–110)
POCT GLUCOSE: 357 MG/DL (ref 70–110)
POCT GLUCOSE: 395 MG/DL (ref 70–110)
POTASSIUM SERPL-SCNC: 4.2 MMOL/L (ref 3.5–5.1)
POTASSIUM SERPL-SCNC: 4.9 MMOL/L (ref 3.5–5.1)
PROT SERPL-MCNC: 7.1 G/DL (ref 6–8.4)
RBC # BLD AUTO: 4.81 M/UL (ref 4.6–6.2)
SODIUM SERPL-SCNC: 139 MMOL/L (ref 136–145)
SODIUM SERPL-SCNC: 143 MMOL/L (ref 136–145)
WBC # BLD AUTO: 7.04 K/UL (ref 3.9–12.7)

## 2021-04-23 PROCEDURE — 25000003 PHARM REV CODE 250: Performed by: PHYSICIAN ASSISTANT

## 2021-04-23 PROCEDURE — G0378 HOSPITAL OBSERVATION PER HR: HCPCS

## 2021-04-23 PROCEDURE — 36415 COLL VENOUS BLD VENIPUNCTURE: CPT | Performed by: PHYSICIAN ASSISTANT

## 2021-04-23 PROCEDURE — 99226 PR SUBSEQUENT OBSERVATION CARE,LEVEL III: CPT | Mod: ,,, | Performed by: PHYSICIAN ASSISTANT

## 2021-04-23 PROCEDURE — 96372 THER/PROPH/DIAG INJ SC/IM: CPT | Mod: 59

## 2021-04-23 PROCEDURE — 84100 ASSAY OF PHOSPHORUS: CPT | Performed by: PHYSICIAN ASSISTANT

## 2021-04-23 PROCEDURE — C9399 UNCLASSIFIED DRUGS OR BIOLOG: HCPCS | Performed by: PHYSICIAN ASSISTANT

## 2021-04-23 PROCEDURE — 83735 ASSAY OF MAGNESIUM: CPT | Performed by: PHYSICIAN ASSISTANT

## 2021-04-23 PROCEDURE — 63600175 PHARM REV CODE 636 W HCPCS: Performed by: PHYSICIAN ASSISTANT

## 2021-04-23 PROCEDURE — 85025 COMPLETE CBC W/AUTO DIFF WBC: CPT | Performed by: PHYSICIAN ASSISTANT

## 2021-04-23 PROCEDURE — 80048 BASIC METABOLIC PNL TOTAL CA: CPT | Performed by: PHYSICIAN ASSISTANT

## 2021-04-23 PROCEDURE — 80053 COMPREHEN METABOLIC PANEL: CPT | Performed by: PHYSICIAN ASSISTANT

## 2021-04-23 PROCEDURE — 25500020 PHARM REV CODE 255: Performed by: HOSPITALIST

## 2021-04-23 PROCEDURE — 99226 PR SUBSEQUENT OBSERVATION CARE,LEVEL III: ICD-10-PCS | Mod: ,,, | Performed by: PHYSICIAN ASSISTANT

## 2021-04-23 PROCEDURE — 25500020 PHARM REV CODE 255: Performed by: SURGERY

## 2021-04-23 RX ORDER — DICYCLOMINE HYDROCHLORIDE 10 MG/1
10 CAPSULE ORAL 4 TIMES DAILY
Status: DISCONTINUED | OUTPATIENT
Start: 2021-04-23 | End: 2021-04-25 | Stop reason: HOSPADM

## 2021-04-23 RX ORDER — INSULIN ASPART 100 [IU]/ML
7 INJECTION, SOLUTION INTRAVENOUS; SUBCUTANEOUS
Status: DISCONTINUED | OUTPATIENT
Start: 2021-04-23 | End: 2021-04-25

## 2021-04-23 RX ORDER — INSULIN ASPART 100 [IU]/ML
1-10 INJECTION, SOLUTION INTRAVENOUS; SUBCUTANEOUS
Status: DISCONTINUED | OUTPATIENT
Start: 2021-04-23 | End: 2021-04-25 | Stop reason: HOSPADM

## 2021-04-23 RX ORDER — INSULIN ASPART 100 [IU]/ML
1-10 INJECTION, SOLUTION INTRAVENOUS; SUBCUTANEOUS
Status: DISCONTINUED | OUTPATIENT
Start: 2021-04-23 | End: 2021-04-23

## 2021-04-23 RX ORDER — KETOROLAC TROMETHAMINE 30 MG/ML
15 INJECTION, SOLUTION INTRAMUSCULAR; INTRAVENOUS EVERY 6 HOURS PRN
Status: DISCONTINUED | OUTPATIENT
Start: 2021-04-23 | End: 2021-04-24

## 2021-04-23 RX ADMIN — INSULIN DETEMIR 6 UNITS: 100 INJECTION, SOLUTION SUBCUTANEOUS at 09:04

## 2021-04-23 RX ADMIN — INSULIN ASPART 5 UNITS: 100 INJECTION, SOLUTION INTRAVENOUS; SUBCUTANEOUS at 12:04

## 2021-04-23 RX ADMIN — DICYCLOMINE HYDROCHLORIDE 10 MG: 10 CAPSULE ORAL at 12:04

## 2021-04-23 RX ADMIN — INSULIN ASPART 5 UNITS: 100 INJECTION, SOLUTION INTRAVENOUS; SUBCUTANEOUS at 08:04

## 2021-04-23 RX ADMIN — IOHEXOL 15 ML: 350 INJECTION, SOLUTION INTRAVENOUS at 12:04

## 2021-04-23 RX ADMIN — IOHEXOL 100 ML: 350 INJECTION, SOLUTION INTRAVENOUS at 01:04

## 2021-04-23 RX ADMIN — DICYCLOMINE HYDROCHLORIDE 10 MG: 10 CAPSULE ORAL at 09:04

## 2021-04-23 RX ADMIN — ACETAMINOPHEN 650 MG: 325 TABLET ORAL at 12:04

## 2021-04-23 RX ADMIN — INSULIN ASPART 7 UNITS: 100 INJECTION, SOLUTION INTRAVENOUS; SUBCUTANEOUS at 12:04

## 2021-04-23 RX ADMIN — ONDANSETRON 8 MG: 8 TABLET, ORALLY DISINTEGRATING ORAL at 12:04

## 2021-04-23 RX ADMIN — DICYCLOMINE HYDROCHLORIDE 10 MG: 10 CAPSULE ORAL at 05:04

## 2021-04-24 PROBLEM — N17.9 AKI (ACUTE KIDNEY INJURY): Status: ACTIVE | Noted: 2021-04-24

## 2021-04-24 LAB
ALBUMIN SERPL BCP-MCNC: 4.3 G/DL (ref 3.5–5.2)
ALBUMIN SERPL BCP-MCNC: 4.4 G/DL (ref 3.5–5.2)
ALP SERPL-CCNC: 69 U/L (ref 55–135)
ALP SERPL-CCNC: 73 U/L (ref 55–135)
ALT SERPL W/O P-5'-P-CCNC: 24 U/L (ref 10–44)
ALT SERPL W/O P-5'-P-CCNC: 29 U/L (ref 10–44)
ANION GAP SERPL CALC-SCNC: 12 MMOL/L (ref 8–16)
ANION GAP SERPL CALC-SCNC: 14 MMOL/L (ref 8–16)
ANION GAP SERPL CALC-SCNC: 14 MMOL/L (ref 8–16)
AST SERPL-CCNC: 24 U/L (ref 10–40)
AST SERPL-CCNC: 24 U/L (ref 10–40)
BACTERIA #/AREA URNS AUTO: NORMAL /HPF
BASOPHILS # BLD AUTO: 0.01 K/UL (ref 0–0.2)
BASOPHILS NFR BLD: 0.2 % (ref 0–1.9)
BILIRUB SERPL-MCNC: 0.8 MG/DL (ref 0.1–1)
BILIRUB SERPL-MCNC: 0.8 MG/DL (ref 0.1–1)
BILIRUB UR QL STRIP: NEGATIVE
BUN SERPL-MCNC: 18 MG/DL (ref 6–20)
BUN SERPL-MCNC: 19 MG/DL (ref 6–20)
BUN SERPL-MCNC: 21 MG/DL (ref 6–20)
CALCIUM SERPL-MCNC: 9.4 MG/DL (ref 8.7–10.5)
CALCIUM SERPL-MCNC: 9.6 MG/DL (ref 8.7–10.5)
CALCIUM SERPL-MCNC: 9.6 MG/DL (ref 8.7–10.5)
CHLORIDE SERPL-SCNC: 102 MMOL/L (ref 95–110)
CHLORIDE SERPL-SCNC: 103 MMOL/L (ref 95–110)
CHLORIDE SERPL-SCNC: 106 MMOL/L (ref 95–110)
CLARITY UR REFRACT.AUTO: CLEAR
CO2 SERPL-SCNC: 24 MMOL/L (ref 23–29)
CO2 SERPL-SCNC: 24 MMOL/L (ref 23–29)
CO2 SERPL-SCNC: 26 MMOL/L (ref 23–29)
COLOR UR AUTO: YELLOW
CREAT SERPL-MCNC: 1.4 MG/DL (ref 0.5–1.4)
CREAT SERPL-MCNC: 1.5 MG/DL (ref 0.5–1.4)
CREAT SERPL-MCNC: 1.5 MG/DL (ref 0.5–1.4)
DIFFERENTIAL METHOD: ABNORMAL
EOSINOPHIL # BLD AUTO: 0 K/UL (ref 0–0.5)
EOSINOPHIL NFR BLD: 0 % (ref 0–8)
ERYTHROCYTE [DISTWIDTH] IN BLOOD BY AUTOMATED COUNT: 12.4 % (ref 11.5–14.5)
EST. GFR  (AFRICAN AMERICAN): >60 ML/MIN/1.73 M^2
EST. GFR  (NON AFRICAN AMERICAN): 59.9 ML/MIN/1.73 M^2
EST. GFR  (NON AFRICAN AMERICAN): 59.9 ML/MIN/1.73 M^2
EST. GFR  (NON AFRICAN AMERICAN): >60 ML/MIN/1.73 M^2
GLUCOSE SERPL-MCNC: 308 MG/DL (ref 70–110)
GLUCOSE SERPL-MCNC: 369 MG/DL (ref 70–110)
GLUCOSE SERPL-MCNC: 384 MG/DL (ref 70–110)
GLUCOSE UR QL STRIP: ABNORMAL
HCT VFR BLD AUTO: 44.8 % (ref 40–54)
HGB BLD-MCNC: 15.8 G/DL (ref 14–18)
HGB UR QL STRIP: ABNORMAL
HYALINE CASTS UR QL AUTO: 0 /LPF
IMM GRANULOCYTES # BLD AUTO: 0.02 K/UL (ref 0–0.04)
IMM GRANULOCYTES NFR BLD AUTO: 0.3 % (ref 0–0.5)
KETONES UR QL STRIP: ABNORMAL
LACTATE SERPL-SCNC: 2.3 MMOL/L (ref 0.5–2.2)
LEUKOCYTE ESTERASE UR QL STRIP: NEGATIVE
LIPASE SERPL-CCNC: 6 U/L (ref 4–60)
LYMPHOCYTES # BLD AUTO: 0.4 K/UL (ref 1–4.8)
LYMPHOCYTES NFR BLD: 7 % (ref 18–48)
MAGNESIUM SERPL-MCNC: 2 MG/DL (ref 1.6–2.6)
MCH RBC QN AUTO: 31.2 PG (ref 27–31)
MCHC RBC AUTO-ENTMCNC: 35.3 G/DL (ref 32–36)
MCV RBC AUTO: 89 FL (ref 82–98)
MICROSCOPIC COMMENT: NORMAL
MONOCYTES # BLD AUTO: 0.2 K/UL (ref 0.3–1)
MONOCYTES NFR BLD: 3.5 % (ref 4–15)
NEUTROPHILS # BLD AUTO: 5.6 K/UL (ref 1.8–7.7)
NEUTROPHILS NFR BLD: 89 % (ref 38–73)
NITRITE UR QL STRIP: NEGATIVE
NRBC BLD-RTO: 0 /100 WBC
PH UR STRIP: 5 [PH] (ref 5–8)
PHOSPHATE SERPL-MCNC: 3.6 MG/DL (ref 2.7–4.5)
PLATELET # BLD AUTO: 158 K/UL (ref 150–450)
PMV BLD AUTO: 11.8 FL (ref 9.2–12.9)
POCT GLUCOSE: 155 MG/DL (ref 70–110)
POCT GLUCOSE: 205 MG/DL (ref 70–110)
POCT GLUCOSE: 285 MG/DL (ref 70–110)
POCT GLUCOSE: 350 MG/DL (ref 70–110)
POTASSIUM SERPL-SCNC: 4.5 MMOL/L (ref 3.5–5.1)
POTASSIUM SERPL-SCNC: 4.6 MMOL/L (ref 3.5–5.1)
POTASSIUM SERPL-SCNC: 4.6 MMOL/L (ref 3.5–5.1)
PROT SERPL-MCNC: 7.8 G/DL (ref 6–8.4)
PROT SERPL-MCNC: 7.9 G/DL (ref 6–8.4)
PROT UR QL STRIP: ABNORMAL
RBC # BLD AUTO: 5.06 M/UL (ref 4.6–6.2)
RBC #/AREA URNS AUTO: 1 /HPF (ref 0–4)
SODIUM SERPL-SCNC: 140 MMOL/L (ref 136–145)
SODIUM SERPL-SCNC: 141 MMOL/L (ref 136–145)
SODIUM SERPL-SCNC: 144 MMOL/L (ref 136–145)
SP GR UR STRIP: 1.02 (ref 1–1.03)
SQUAMOUS #/AREA URNS AUTO: 0 /HPF
URN SPEC COLLECT METH UR: ABNORMAL
WBC # BLD AUTO: 6.3 K/UL (ref 3.9–12.7)
WBC #/AREA URNS AUTO: 1 /HPF (ref 0–5)
YEAST UR QL AUTO: NORMAL

## 2021-04-24 PROCEDURE — 25000003 PHARM REV CODE 250: Performed by: PHYSICIAN ASSISTANT

## 2021-04-24 PROCEDURE — C9113 INJ PANTOPRAZOLE SODIUM, VIA: HCPCS | Performed by: PHYSICIAN ASSISTANT

## 2021-04-24 PROCEDURE — 84100 ASSAY OF PHOSPHORUS: CPT | Performed by: PHYSICIAN ASSISTANT

## 2021-04-24 PROCEDURE — 81001 URINALYSIS AUTO W/SCOPE: CPT | Performed by: HOSPITALIST

## 2021-04-24 PROCEDURE — 99226 PR SUBSEQUENT OBSERVATION CARE,LEVEL III: CPT | Mod: ,,, | Performed by: PHYSICIAN ASSISTANT

## 2021-04-24 PROCEDURE — 80048 BASIC METABOLIC PNL TOTAL CA: CPT | Performed by: PHYSICIAN ASSISTANT

## 2021-04-24 PROCEDURE — 99226 PR SUBSEQUENT OBSERVATION CARE,LEVEL III: ICD-10-PCS | Mod: ,,, | Performed by: PHYSICIAN ASSISTANT

## 2021-04-24 PROCEDURE — G0378 HOSPITAL OBSERVATION PER HR: HCPCS

## 2021-04-24 PROCEDURE — 96376 TX/PRO/DX INJ SAME DRUG ADON: CPT

## 2021-04-24 PROCEDURE — 96372 THER/PROPH/DIAG INJ SC/IM: CPT | Mod: 59

## 2021-04-24 PROCEDURE — 99204 OFFICE O/P NEW MOD 45 MIN: CPT | Mod: ,,, | Performed by: INTERNAL MEDICINE

## 2021-04-24 PROCEDURE — 80053 COMPREHEN METABOLIC PANEL: CPT | Performed by: PHYSICIAN ASSISTANT

## 2021-04-24 PROCEDURE — 85025 COMPLETE CBC W/AUTO DIFF WBC: CPT | Performed by: PHYSICIAN ASSISTANT

## 2021-04-24 PROCEDURE — 96361 HYDRATE IV INFUSION ADD-ON: CPT

## 2021-04-24 PROCEDURE — 86677 HELICOBACTER PYLORI ANTIBODY: CPT | Performed by: PHYSICIAN ASSISTANT

## 2021-04-24 PROCEDURE — 63600175 PHARM REV CODE 636 W HCPCS: Performed by: PHYSICIAN ASSISTANT

## 2021-04-24 PROCEDURE — 36415 COLL VENOUS BLD VENIPUNCTURE: CPT | Performed by: PHYSICIAN ASSISTANT

## 2021-04-24 PROCEDURE — 83605 ASSAY OF LACTIC ACID: CPT | Performed by: PHYSICIAN ASSISTANT

## 2021-04-24 PROCEDURE — 99204 PR OFFICE/OUTPT VISIT, NEW, LEVL IV, 45-59 MIN: ICD-10-PCS | Mod: ,,, | Performed by: INTERNAL MEDICINE

## 2021-04-24 PROCEDURE — 83735 ASSAY OF MAGNESIUM: CPT | Performed by: PHYSICIAN ASSISTANT

## 2021-04-24 PROCEDURE — 83690 ASSAY OF LIPASE: CPT | Performed by: PHYSICIAN ASSISTANT

## 2021-04-24 PROCEDURE — 96375 TX/PRO/DX INJ NEW DRUG ADDON: CPT

## 2021-04-24 PROCEDURE — 80053 COMPREHEN METABOLIC PANEL: CPT | Mod: 91 | Performed by: PHYSICIAN ASSISTANT

## 2021-04-24 RX ORDER — DIPHENHYDRAMINE HYDROCHLORIDE 50 MG/ML
12.5 INJECTION INTRAMUSCULAR; INTRAVENOUS ONCE
Status: COMPLETED | OUTPATIENT
Start: 2021-04-24 | End: 2021-04-24

## 2021-04-24 RX ORDER — SUCRALFATE 1 G/1
1 TABLET ORAL
Status: DISCONTINUED | OUTPATIENT
Start: 2021-04-24 | End: 2021-04-25 | Stop reason: HOSPADM

## 2021-04-24 RX ORDER — PANTOPRAZOLE SODIUM 40 MG/10ML
40 INJECTION, POWDER, LYOPHILIZED, FOR SOLUTION INTRAVENOUS 2 TIMES DAILY
Status: DISCONTINUED | OUTPATIENT
Start: 2021-04-24 | End: 2021-04-25 | Stop reason: HOSPADM

## 2021-04-24 RX ORDER — SODIUM CHLORIDE 9 MG/ML
INJECTION, SOLUTION INTRAVENOUS CONTINUOUS
Status: DISCONTINUED | OUTPATIENT
Start: 2021-04-24 | End: 2021-04-25 | Stop reason: HOSPADM

## 2021-04-24 RX ORDER — PANTOPRAZOLE SODIUM 40 MG/10ML
80 INJECTION, POWDER, LYOPHILIZED, FOR SOLUTION INTRAVENOUS ONCE
Status: COMPLETED | OUTPATIENT
Start: 2021-04-24 | End: 2021-04-24

## 2021-04-24 RX ORDER — PROCHLORPERAZINE EDISYLATE 5 MG/ML
5 INJECTION INTRAMUSCULAR; INTRAVENOUS ONCE
Status: COMPLETED | OUTPATIENT
Start: 2021-04-24 | End: 2021-04-24

## 2021-04-24 RX ORDER — ONDANSETRON 8 MG/1
8 TABLET, ORALLY DISINTEGRATING ORAL EVERY 6 HOURS
Status: DISCONTINUED | OUTPATIENT
Start: 2021-04-24 | End: 2021-04-25 | Stop reason: HOSPADM

## 2021-04-24 RX ORDER — OXYCODONE HYDROCHLORIDE 5 MG/1
5 TABLET ORAL ONCE AS NEEDED
Status: DISCONTINUED | OUTPATIENT
Start: 2021-04-24 | End: 2021-04-24

## 2021-04-24 RX ORDER — ALUMINUM HYDROXIDE, MAGNESIUM HYDROXIDE, AND SIMETHICONE 2400; 240; 2400 MG/30ML; MG/30ML; MG/30ML
30 SUSPENSION ORAL EVERY 6 HOURS PRN
Status: DISCONTINUED | OUTPATIENT
Start: 2021-04-24 | End: 2021-04-25 | Stop reason: HOSPADM

## 2021-04-24 RX ORDER — OXYCODONE HYDROCHLORIDE 5 MG/1
5 TABLET ORAL EVERY 4 HOURS PRN
Status: DISCONTINUED | OUTPATIENT
Start: 2021-04-24 | End: 2021-04-25 | Stop reason: HOSPADM

## 2021-04-24 RX ORDER — MORPHINE SULFATE 2 MG/ML
2 INJECTION, SOLUTION INTRAMUSCULAR; INTRAVENOUS ONCE AS NEEDED
Status: COMPLETED | OUTPATIENT
Start: 2021-04-24 | End: 2021-04-24

## 2021-04-24 RX ADMIN — MORPHINE SULFATE 2 MG: 2 INJECTION, SOLUTION INTRAMUSCULAR; INTRAVENOUS at 02:04

## 2021-04-24 RX ADMIN — PANTOPRAZOLE SODIUM 40 MG: 40 INJECTION, POWDER, FOR SOLUTION INTRAVENOUS at 08:04

## 2021-04-24 RX ADMIN — OXYCODONE HYDROCHLORIDE 5 MG: 5 TABLET ORAL at 06:04

## 2021-04-24 RX ADMIN — PROMETHAZINE HYDROCHLORIDE 12.5 MG: 25 INJECTION INTRAMUSCULAR; INTRAVENOUS at 01:04

## 2021-04-24 RX ADMIN — PROMETHAZINE HYDROCHLORIDE 12.5 MG: 25 INJECTION INTRAMUSCULAR; INTRAVENOUS at 10:04

## 2021-04-24 RX ADMIN — INSULIN ASPART 6 UNITS: 100 INJECTION, SOLUTION INTRAVENOUS; SUBCUTANEOUS at 12:04

## 2021-04-24 RX ADMIN — SUCRALFATE 1 G: 1 TABLET ORAL at 10:04

## 2021-04-24 RX ADMIN — INSULIN ASPART 8 UNITS: 100 INJECTION, SOLUTION INTRAVENOUS; SUBCUTANEOUS at 08:04

## 2021-04-24 RX ADMIN — ONDANSETRON 8 MG: 8 TABLET, ORALLY DISINTEGRATING ORAL at 12:04

## 2021-04-24 RX ADMIN — PANTOPRAZOLE SODIUM 80 MG: 40 INJECTION, POWDER, FOR SOLUTION INTRAVENOUS at 02:04

## 2021-04-24 RX ADMIN — PANTOPRAZOLE SODIUM 40 MG: 40 INJECTION, POWDER, FOR SOLUTION INTRAVENOUS at 01:04

## 2021-04-24 RX ADMIN — SODIUM CHLORIDE 500 ML: 0.9 INJECTION, SOLUTION INTRAVENOUS at 08:04

## 2021-04-24 RX ADMIN — DICYCLOMINE HYDROCHLORIDE 10 MG: 10 CAPSULE ORAL at 08:04

## 2021-04-24 RX ADMIN — INSULIN ASPART 7 UNITS: 100 INJECTION, SOLUTION INTRAVENOUS; SUBCUTANEOUS at 08:04

## 2021-04-24 RX ADMIN — ALUMINUM HYDROXIDE, MAGNESIUM HYDROXIDE, AND DIMETHICONE 30 ML: 400; 400; 40 SUSPENSION ORAL at 01:04

## 2021-04-24 RX ADMIN — DICYCLOMINE HYDROCHLORIDE 10 MG: 10 CAPSULE ORAL at 12:04

## 2021-04-24 RX ADMIN — PROCHLORPERAZINE EDISYLATE 5 MG: 5 INJECTION INTRAMUSCULAR; INTRAVENOUS at 01:04

## 2021-04-24 RX ADMIN — DIPHENHYDRAMINE HYDROCHLORIDE 12.5 MG: 50 INJECTION, SOLUTION INTRAMUSCULAR; INTRAVENOUS at 01:04

## 2021-04-25 VITALS
SYSTOLIC BLOOD PRESSURE: 136 MMHG | RESPIRATION RATE: 20 BRPM | HEIGHT: 73 IN | TEMPERATURE: 98 F | BODY MASS INDEX: 28.9 KG/M2 | WEIGHT: 218.06 LBS | OXYGEN SATURATION: 99 % | DIASTOLIC BLOOD PRESSURE: 85 MMHG | HEART RATE: 73 BPM

## 2021-04-25 LAB
ALBUMIN SERPL BCP-MCNC: 3.6 G/DL (ref 3.5–5.2)
ALP SERPL-CCNC: 65 U/L (ref 55–135)
ALT SERPL W/O P-5'-P-CCNC: 30 U/L (ref 10–44)
ANION GAP SERPL CALC-SCNC: 10 MMOL/L (ref 8–16)
AST SERPL-CCNC: 35 U/L (ref 10–40)
BASOPHILS # BLD AUTO: 0.02 K/UL (ref 0–0.2)
BASOPHILS NFR BLD: 0.3 % (ref 0–1.9)
BILIRUB SERPL-MCNC: 1 MG/DL (ref 0.1–1)
BUN SERPL-MCNC: 20 MG/DL (ref 6–20)
CALCIUM SERPL-MCNC: 9.3 MG/DL (ref 8.7–10.5)
CHLORIDE SERPL-SCNC: 107 MMOL/L (ref 95–110)
CO2 SERPL-SCNC: 28 MMOL/L (ref 23–29)
CREAT SERPL-MCNC: 1.4 MG/DL (ref 0.5–1.4)
CRP SERPL-MCNC: 1.5 MG/L (ref 0–8.2)
DIFFERENTIAL METHOD: NORMAL
EOSINOPHIL # BLD AUTO: 0 K/UL (ref 0–0.5)
EOSINOPHIL NFR BLD: 0.3 % (ref 0–8)
ERYTHROCYTE [DISTWIDTH] IN BLOOD BY AUTOMATED COUNT: 12.5 % (ref 11.5–14.5)
ERYTHROCYTE [SEDIMENTATION RATE] IN BLOOD BY WESTERGREN METHOD: 17 MM/HR (ref 0–23)
EST. GFR  (AFRICAN AMERICAN): >60 ML/MIN/1.73 M^2
EST. GFR  (NON AFRICAN AMERICAN): >60 ML/MIN/1.73 M^2
GLUCOSE SERPL-MCNC: 334 MG/DL (ref 70–110)
HCT VFR BLD AUTO: 43.5 % (ref 40–54)
HGB BLD-MCNC: 14.8 G/DL (ref 14–18)
IMM GRANULOCYTES # BLD AUTO: 0.02 K/UL (ref 0–0.04)
IMM GRANULOCYTES NFR BLD AUTO: 0.3 % (ref 0–0.5)
LYMPHOCYTES # BLD AUTO: 1.2 K/UL (ref 1–4.8)
LYMPHOCYTES NFR BLD: 20.3 % (ref 18–48)
MAGNESIUM SERPL-MCNC: 2 MG/DL (ref 1.6–2.6)
MCH RBC QN AUTO: 30 PG (ref 27–31)
MCHC RBC AUTO-ENTMCNC: 34 G/DL (ref 32–36)
MCV RBC AUTO: 88 FL (ref 82–98)
MONOCYTES # BLD AUTO: 0.7 K/UL (ref 0.3–1)
MONOCYTES NFR BLD: 12 % (ref 4–15)
NEUTROPHILS # BLD AUTO: 3.9 K/UL (ref 1.8–7.7)
NEUTROPHILS NFR BLD: 66.8 % (ref 38–73)
NRBC BLD-RTO: 0 /100 WBC
PHOSPHATE SERPL-MCNC: 3.4 MG/DL (ref 2.7–4.5)
PLATELET # BLD AUTO: 155 K/UL (ref 150–450)
PMV BLD AUTO: 12.4 FL (ref 9.2–12.9)
POCT GLUCOSE: 289 MG/DL (ref 70–110)
POCT GLUCOSE: 340 MG/DL (ref 70–110)
POTASSIUM SERPL-SCNC: 4.1 MMOL/L (ref 3.5–5.1)
PROT SERPL-MCNC: 6.7 G/DL (ref 6–8.4)
RBC # BLD AUTO: 4.93 M/UL (ref 4.6–6.2)
SODIUM SERPL-SCNC: 145 MMOL/L (ref 136–145)
WBC # BLD AUTO: 5.85 K/UL (ref 3.9–12.7)

## 2021-04-25 PROCEDURE — 99217 PR OBSERVATION CARE DISCHARGE: CPT | Mod: ,,, | Performed by: PHYSICIAN ASSISTANT

## 2021-04-25 PROCEDURE — 36415 COLL VENOUS BLD VENIPUNCTURE: CPT | Performed by: PHYSICIAN ASSISTANT

## 2021-04-25 PROCEDURE — 80053 COMPREHEN METABOLIC PANEL: CPT | Performed by: PHYSICIAN ASSISTANT

## 2021-04-25 PROCEDURE — 86140 C-REACTIVE PROTEIN: CPT | Performed by: PHYSICIAN ASSISTANT

## 2021-04-25 PROCEDURE — 25000003 PHARM REV CODE 250: Performed by: PHYSICIAN ASSISTANT

## 2021-04-25 PROCEDURE — 84100 ASSAY OF PHOSPHORUS: CPT | Performed by: PHYSICIAN ASSISTANT

## 2021-04-25 PROCEDURE — 99217 PR OBSERVATION CARE DISCHARGE: ICD-10-PCS | Mod: ,,, | Performed by: PHYSICIAN ASSISTANT

## 2021-04-25 PROCEDURE — 96372 THER/PROPH/DIAG INJ SC/IM: CPT | Mod: 59

## 2021-04-25 PROCEDURE — 85025 COMPLETE CBC W/AUTO DIFF WBC: CPT | Performed by: PHYSICIAN ASSISTANT

## 2021-04-25 PROCEDURE — G0378 HOSPITAL OBSERVATION PER HR: HCPCS

## 2021-04-25 PROCEDURE — C9113 INJ PANTOPRAZOLE SODIUM, VIA: HCPCS | Performed by: PHYSICIAN ASSISTANT

## 2021-04-25 PROCEDURE — 96376 TX/PRO/DX INJ SAME DRUG ADON: CPT

## 2021-04-25 PROCEDURE — 96361 HYDRATE IV INFUSION ADD-ON: CPT

## 2021-04-25 PROCEDURE — 63600175 PHARM REV CODE 636 W HCPCS: Performed by: PHYSICIAN ASSISTANT

## 2021-04-25 PROCEDURE — 85652 RBC SED RATE AUTOMATED: CPT | Performed by: PHYSICIAN ASSISTANT

## 2021-04-25 PROCEDURE — 83735 ASSAY OF MAGNESIUM: CPT | Performed by: PHYSICIAN ASSISTANT

## 2021-04-25 RX ORDER — PANTOPRAZOLE SODIUM 40 MG/1
40 TABLET, DELAYED RELEASE ORAL DAILY
Qty: 30 TABLET | Refills: 3 | Status: SHIPPED | OUTPATIENT
Start: 2021-04-25 | End: 2023-03-02

## 2021-04-25 RX ORDER — INSULIN ASPART 100 [IU]/ML
10 INJECTION, SOLUTION INTRAVENOUS; SUBCUTANEOUS
Status: DISCONTINUED | OUTPATIENT
Start: 2021-04-25 | End: 2021-04-25 | Stop reason: HOSPADM

## 2021-04-25 RX ORDER — DICYCLOMINE HYDROCHLORIDE 10 MG/1
10 CAPSULE ORAL 3 TIMES DAILY PRN
Qty: 30 CAPSULE | Refills: 2 | Status: SHIPPED | OUTPATIENT
Start: 2021-04-25 | End: 2021-11-22 | Stop reason: SDUPTHER

## 2021-04-25 RX ORDER — ONDANSETRON 8 MG/1
8 TABLET, ORALLY DISINTEGRATING ORAL EVERY 6 HOURS PRN
Qty: 30 TABLET | Refills: 3 | Status: SHIPPED | OUTPATIENT
Start: 2021-04-25 | End: 2023-02-22 | Stop reason: SDUPTHER

## 2021-04-25 RX ADMIN — INSULIN ASPART 6 UNITS: 100 INJECTION, SOLUTION INTRAVENOUS; SUBCUTANEOUS at 11:04

## 2021-04-25 RX ADMIN — INSULIN ASPART 8 UNITS: 100 INJECTION, SOLUTION INTRAVENOUS; SUBCUTANEOUS at 08:04

## 2021-04-25 RX ADMIN — ONDANSETRON 8 MG: 8 TABLET, ORALLY DISINTEGRATING ORAL at 12:04

## 2021-04-25 RX ADMIN — SUCRALFATE 1 G: 1 TABLET ORAL at 11:04

## 2021-04-25 RX ADMIN — ONDANSETRON 8 MG: 8 TABLET, ORALLY DISINTEGRATING ORAL at 11:04

## 2021-04-25 RX ADMIN — SODIUM CHLORIDE 75 ML/HR: 0.9 INJECTION, SOLUTION INTRAVENOUS at 04:04

## 2021-04-25 RX ADMIN — DICYCLOMINE HYDROCHLORIDE 10 MG: 10 CAPSULE ORAL at 08:04

## 2021-04-25 RX ADMIN — PANTOPRAZOLE SODIUM 40 MG: 40 INJECTION, POWDER, FOR SOLUTION INTRAVENOUS at 08:04

## 2021-04-25 RX ADMIN — INSULIN ASPART 10 UNITS: 100 INJECTION, SOLUTION INTRAVENOUS; SUBCUTANEOUS at 11:04

## 2021-04-25 RX ADMIN — ONDANSETRON 8 MG: 8 TABLET, ORALLY DISINTEGRATING ORAL at 06:04

## 2021-04-26 LAB — H PYLORI IGG SERPL QL IA: NEGATIVE

## 2021-05-04 ENCOUNTER — NURSE TRIAGE (OUTPATIENT)
Dept: ADMINISTRATIVE | Facility: CLINIC | Age: 36
End: 2021-05-04

## 2021-05-04 ENCOUNTER — PATIENT MESSAGE (OUTPATIENT)
Dept: ENDOCRINOLOGY | Facility: CLINIC | Age: 36
End: 2021-05-04

## 2021-05-04 ENCOUNTER — TELEPHONE (OUTPATIENT)
Dept: GASTROENTEROLOGY | Facility: CLINIC | Age: 36
End: 2021-05-04

## 2021-06-14 DIAGNOSIS — E10.36 TYPE 1 DIABETES MELLITUS WITH DIABETIC CATARACT: Primary | ICD-10-CM

## 2021-06-14 RX ORDER — INSULIN LISPRO-AABC 200 [IU]/ML
7 INJECTION, SOLUTION SUBCUTANEOUS
Qty: 4 ML | Refills: 3 | Status: SHIPPED | OUTPATIENT
Start: 2021-06-14 | End: 2021-07-14

## 2021-06-17 ENCOUNTER — HOSPITAL ENCOUNTER (INPATIENT)
Facility: HOSPITAL | Age: 36
LOS: 2 days | Discharge: HOME | DRG: 639 | End: 2021-06-19
Attending: EMERGENCY MEDICINE | Admitting: INTERNAL MEDICINE
Payer: MEDICARE

## 2021-06-17 ENCOUNTER — PATIENT OUTREACH (OUTPATIENT)
Dept: EMERGENCY MEDICINE | Facility: HOSPITAL | Age: 36
End: 2021-06-17

## 2021-06-17 ENCOUNTER — PATIENT MESSAGE (OUTPATIENT)
Dept: ENDOCRINOLOGY | Facility: CLINIC | Age: 36
End: 2021-06-17

## 2021-06-17 DIAGNOSIS — R10.9 ABDOMINAL PAIN: ICD-10-CM

## 2021-06-17 DIAGNOSIS — E87.5 HYPERKALEMIA: ICD-10-CM

## 2021-06-17 DIAGNOSIS — E10.8 TYPE 1 DIABETES MELLITUS WITH COMPLICATIONS: ICD-10-CM

## 2021-06-17 DIAGNOSIS — E10.10 DIABETIC KETOACIDOSIS WITHOUT COMA ASSOCIATED WITH TYPE 1 DIABETES MELLITUS: Primary | ICD-10-CM

## 2021-06-17 DIAGNOSIS — R11.2 INTRACTABLE VOMITING WITH NAUSEA: ICD-10-CM

## 2021-06-17 DIAGNOSIS — R11.0 NAUSEA: ICD-10-CM

## 2021-06-17 DIAGNOSIS — R11.10 VOMITING: ICD-10-CM

## 2021-06-17 LAB
ALBUMIN SERPL BCP-MCNC: 4.3 G/DL (ref 3.5–5.2)
ALLENS TEST: ABNORMAL
ALP SERPL-CCNC: 76 U/L (ref 55–135)
ALT SERPL W/O P-5'-P-CCNC: 30 U/L (ref 10–44)
ANION GAP SERPL CALC-SCNC: 15 MMOL/L (ref 8–16)
ANION GAP SERPL CALC-SCNC: 16 MMOL/L (ref 8–16)
ANION GAP SERPL CALC-SCNC: 16 MMOL/L (ref 8–16)
AST SERPL-CCNC: 36 U/L (ref 10–40)
B-OH-BUTYR BLD STRIP-SCNC: 2.9 MMOL/L (ref 0–0.5)
BACTERIA #/AREA URNS AUTO: NORMAL /HPF
BASOPHILS # BLD AUTO: 0.02 K/UL (ref 0–0.2)
BASOPHILS NFR BLD: 0.3 % (ref 0–1.9)
BILIRUB SERPL-MCNC: 0.7 MG/DL (ref 0.1–1)
BILIRUB UR QL STRIP: NEGATIVE
BUN SERPL-MCNC: 17 MG/DL (ref 6–20)
BUN SERPL-MCNC: 18 MG/DL (ref 6–20)
BUN SERPL-MCNC: 18 MG/DL (ref 6–20)
BUN SERPL-MCNC: 19 MG/DL (ref 6–30)
CALCIUM SERPL-MCNC: 9.5 MG/DL (ref 8.7–10.5)
CALCIUM SERPL-MCNC: 9.6 MG/DL (ref 8.7–10.5)
CALCIUM SERPL-MCNC: 9.9 MG/DL (ref 8.7–10.5)
CHLORIDE SERPL-SCNC: 100 MMOL/L (ref 95–110)
CHLORIDE SERPL-SCNC: 101 MMOL/L (ref 95–110)
CHLORIDE SERPL-SCNC: 104 MMOL/L (ref 95–110)
CHLORIDE SERPL-SCNC: 109 MMOL/L (ref 95–110)
CLARITY UR REFRACT.AUTO: CLEAR
CO2 SERPL-SCNC: 17 MMOL/L (ref 23–29)
CO2 SERPL-SCNC: 19 MMOL/L (ref 23–29)
CO2 SERPL-SCNC: 19 MMOL/L (ref 23–29)
COLOR UR AUTO: YELLOW
CREAT SERPL-MCNC: 1.2 MG/DL (ref 0.5–1.4)
CREAT SERPL-MCNC: 1.3 MG/DL (ref 0.5–1.4)
CREAT SERPL-MCNC: 1.4 MG/DL (ref 0.5–1.4)
CREAT SERPL-MCNC: 1.5 MG/DL (ref 0.5–1.4)
CTP QC/QA: YES
DIFFERENTIAL METHOD: ABNORMAL
EOSINOPHIL # BLD AUTO: 0 K/UL (ref 0–0.5)
EOSINOPHIL NFR BLD: 0 % (ref 0–8)
ERYTHROCYTE [DISTWIDTH] IN BLOOD BY AUTOMATED COUNT: 12.3 % (ref 11.5–14.5)
EST. GFR  (AFRICAN AMERICAN): >60 ML/MIN/1.73 M^2
EST. GFR  (NON AFRICAN AMERICAN): 59.9 ML/MIN/1.73 M^2
EST. GFR  (NON AFRICAN AMERICAN): >60 ML/MIN/1.73 M^2
EST. GFR  (NON AFRICAN AMERICAN): >60 ML/MIN/1.73 M^2
GLUCOSE SERPL-MCNC: 289 MG/DL (ref 70–110)
GLUCOSE SERPL-MCNC: 370 MG/DL (ref 70–110)
GLUCOSE SERPL-MCNC: 423 MG/DL (ref 70–110)
GLUCOSE SERPL-MCNC: 445 MG/DL (ref 70–110)
GLUCOSE UR QL STRIP: ABNORMAL
HCO3 UR-SCNC: 19.6 MMOL/L (ref 24–28)
HCT VFR BLD AUTO: 41.3 % (ref 40–54)
HCT VFR BLD CALC: 46 %PCV (ref 36–54)
HGB BLD-MCNC: 14.4 G/DL (ref 14–18)
HGB UR QL STRIP: ABNORMAL
HYALINE CASTS UR QL AUTO: 0 /LPF
IMM GRANULOCYTES # BLD AUTO: 0.03 K/UL (ref 0–0.04)
IMM GRANULOCYTES NFR BLD AUTO: 0.4 % (ref 0–0.5)
KETONES UR QL STRIP: ABNORMAL
LACTATE SERPL-SCNC: 1.7 MMOL/L (ref 0.5–2.2)
LEUKOCYTE ESTERASE UR QL STRIP: NEGATIVE
LIPASE SERPL-CCNC: 7 U/L (ref 4–60)
LYMPHOCYTES # BLD AUTO: 0.5 K/UL (ref 1–4.8)
LYMPHOCYTES NFR BLD: 7.1 % (ref 18–48)
MCH RBC QN AUTO: 31 PG (ref 27–31)
MCHC RBC AUTO-ENTMCNC: 34.9 G/DL (ref 32–36)
MCV RBC AUTO: 89 FL (ref 82–98)
MICROSCOPIC COMMENT: NORMAL
MONOCYTES # BLD AUTO: 0.2 K/UL (ref 0.3–1)
MONOCYTES NFR BLD: 2.1 % (ref 4–15)
NEUTROPHILS # BLD AUTO: 6.3 K/UL (ref 1.8–7.7)
NEUTROPHILS NFR BLD: 90.1 % (ref 38–73)
NITRITE UR QL STRIP: NEGATIVE
NRBC BLD-RTO: 0 /100 WBC
PCO2 BLDA: 37.1 MMHG (ref 35–45)
PH SMN: 7.33 [PH] (ref 7.35–7.45)
PH UR STRIP: 5 [PH] (ref 5–8)
PLATELET # BLD AUTO: 160 K/UL (ref 150–450)
PMV BLD AUTO: 11.4 FL (ref 9.2–12.9)
PO2 BLDA: 47 MMHG (ref 40–60)
POC BE: -6 MMOL/L
POC IONIZED CALCIUM: 1.14 MMOL/L (ref 1.06–1.42)
POC SATURATED O2: 80 % (ref 95–100)
POC TCO2 (MEASURED): 22 MMOL/L (ref 23–29)
POC TCO2: 21 MMOL/L (ref 24–29)
POCT GLUCOSE: 283 MG/DL (ref 70–110)
POCT GLUCOSE: 284 MG/DL (ref 70–110)
POCT GLUCOSE: 310 MG/DL (ref 70–110)
POCT GLUCOSE: 353 MG/DL (ref 70–110)
POCT GLUCOSE: 422 MG/DL (ref 70–110)
POCT GLUCOSE: 424 MG/DL (ref 70–110)
POTASSIUM BLD-SCNC: 6.4 MMOL/L (ref 3.5–5.1)
POTASSIUM SERPL-SCNC: 4 MMOL/L (ref 3.5–5.1)
POTASSIUM SERPL-SCNC: 5.1 MMOL/L (ref 3.5–5.1)
POTASSIUM SERPL-SCNC: 6.3 MMOL/L (ref 3.5–5.1)
PROT SERPL-MCNC: 7.7 G/DL (ref 6–8.4)
PROT UR QL STRIP: ABNORMAL
RBC # BLD AUTO: 4.64 M/UL (ref 4.6–6.2)
RBC #/AREA URNS AUTO: 1 /HPF (ref 0–4)
SAMPLE: ABNORMAL
SAMPLE: ABNORMAL
SARS-COV-2 RDRP RESP QL NAA+PROBE: NEGATIVE
SITE: ABNORMAL
SODIUM BLD-SCNC: 135 MMOL/L (ref 136–145)
SODIUM SERPL-SCNC: 134 MMOL/L (ref 136–145)
SODIUM SERPL-SCNC: 139 MMOL/L (ref 136–145)
SODIUM SERPL-SCNC: 142 MMOL/L (ref 136–145)
SP GR UR STRIP: 1.02 (ref 1–1.03)
SQUAMOUS #/AREA URNS AUTO: 0 /HPF
TSH SERPL DL<=0.005 MIU/L-ACNC: 0.48 UIU/ML (ref 0.4–4)
URN SPEC COLLECT METH UR: ABNORMAL
WBC # BLD AUTO: 7.01 K/UL (ref 3.9–12.7)
WBC #/AREA URNS AUTO: 0 /HPF (ref 0–5)
YEAST UR QL AUTO: NORMAL

## 2021-06-17 PROCEDURE — 82962 GLUCOSE BLOOD TEST: CPT

## 2021-06-17 PROCEDURE — 82803 BLOOD GASES ANY COMBINATION: CPT

## 2021-06-17 PROCEDURE — 63600175 PHARM REV CODE 636 W HCPCS: Performed by: EMERGENCY MEDICINE

## 2021-06-17 PROCEDURE — 81001 URINALYSIS AUTO W/SCOPE: CPT | Performed by: STUDENT IN AN ORGANIZED HEALTH CARE EDUCATION/TRAINING PROGRAM

## 2021-06-17 PROCEDURE — 25000003 PHARM REV CODE 250: Performed by: EMERGENCY MEDICINE

## 2021-06-17 PROCEDURE — 99292 CRITICAL CARE ADDL 30 MIN: CPT | Mod: GC,CS,, | Performed by: EMERGENCY MEDICINE

## 2021-06-17 PROCEDURE — 99291 PR CRITICAL CARE, E/M 30-74 MINUTES: ICD-10-PCS | Mod: GC,CS,, | Performed by: EMERGENCY MEDICINE

## 2021-06-17 PROCEDURE — 83690 ASSAY OF LIPASE: CPT | Performed by: STUDENT IN AN ORGANIZED HEALTH CARE EDUCATION/TRAINING PROGRAM

## 2021-06-17 PROCEDURE — 80048 BASIC METABOLIC PNL TOTAL CA: CPT | Performed by: INTERNAL MEDICINE

## 2021-06-17 PROCEDURE — 99292 PR CRITICAL CARE, ADDL 30 MIN: ICD-10-PCS | Mod: GC,CS,, | Performed by: EMERGENCY MEDICINE

## 2021-06-17 PROCEDURE — 80053 COMPREHEN METABOLIC PANEL: CPT | Performed by: STUDENT IN AN ORGANIZED HEALTH CARE EDUCATION/TRAINING PROGRAM

## 2021-06-17 PROCEDURE — 25000003 PHARM REV CODE 250: Performed by: STUDENT IN AN ORGANIZED HEALTH CARE EDUCATION/TRAINING PROGRAM

## 2021-06-17 PROCEDURE — C9113 INJ PANTOPRAZOLE SODIUM, VIA: HCPCS | Performed by: INTERNAL MEDICINE

## 2021-06-17 PROCEDURE — 85025 COMPLETE CBC W/AUTO DIFF WBC: CPT | Performed by: STUDENT IN AN ORGANIZED HEALTH CARE EDUCATION/TRAINING PROGRAM

## 2021-06-17 PROCEDURE — 83605 ASSAY OF LACTIC ACID: CPT | Performed by: EMERGENCY MEDICINE

## 2021-06-17 PROCEDURE — 25000003 PHARM REV CODE 250: Performed by: INTERNAL MEDICINE

## 2021-06-17 PROCEDURE — 99223 PR INITIAL HOSPITAL CARE,LEVL III: ICD-10-PCS | Mod: ,,, | Performed by: INTERNAL MEDICINE

## 2021-06-17 PROCEDURE — 36415 COLL VENOUS BLD VENIPUNCTURE: CPT | Performed by: INTERNAL MEDICINE

## 2021-06-17 PROCEDURE — 99291 CRITICAL CARE FIRST HOUR: CPT | Mod: GC,CS,, | Performed by: EMERGENCY MEDICINE

## 2021-06-17 PROCEDURE — 99291 CRITICAL CARE FIRST HOUR: CPT | Mod: 25

## 2021-06-17 PROCEDURE — U0002 COVID-19 LAB TEST NON-CDC: HCPCS | Performed by: EMERGENCY MEDICINE

## 2021-06-17 PROCEDURE — 84443 ASSAY THYROID STIM HORMONE: CPT | Performed by: STUDENT IN AN ORGANIZED HEALTH CARE EDUCATION/TRAINING PROGRAM

## 2021-06-17 PROCEDURE — 99292 CRITICAL CARE ADDL 30 MIN: CPT | Mod: 25

## 2021-06-17 PROCEDURE — 20600001 HC STEP DOWN PRIVATE ROOM

## 2021-06-17 PROCEDURE — 99900035 HC TECH TIME PER 15 MIN (STAT)

## 2021-06-17 PROCEDURE — 93005 ELECTROCARDIOGRAM TRACING: CPT

## 2021-06-17 PROCEDURE — 82010 KETONE BODYS QUAN: CPT | Performed by: EMERGENCY MEDICINE

## 2021-06-17 PROCEDURE — 99223 1ST HOSP IP/OBS HIGH 75: CPT | Mod: ,,, | Performed by: INTERNAL MEDICINE

## 2021-06-17 PROCEDURE — 96374 THER/PROPH/DIAG INJ IV PUSH: CPT

## 2021-06-17 PROCEDURE — 93010 EKG 12-LEAD: ICD-10-PCS | Mod: ,,, | Performed by: INTERNAL MEDICINE

## 2021-06-17 PROCEDURE — 63600175 PHARM REV CODE 636 W HCPCS: Performed by: INTERNAL MEDICINE

## 2021-06-17 PROCEDURE — 93010 ELECTROCARDIOGRAM REPORT: CPT | Mod: ,,, | Performed by: INTERNAL MEDICINE

## 2021-06-17 PROCEDURE — 80047 BASIC METABLC PNL IONIZED CA: CPT

## 2021-06-17 RX ORDER — POTASSIUM CHLORIDE 7.45 MG/ML
10 INJECTION INTRAVENOUS
Status: DISPENSED | OUTPATIENT
Start: 2021-06-17 | End: 2021-06-18

## 2021-06-17 RX ORDER — GLUCAGON 1 MG
1 KIT INJECTION
Status: DISCONTINUED | OUTPATIENT
Start: 2021-06-17 | End: 2021-06-19 | Stop reason: HOSPADM

## 2021-06-17 RX ORDER — METOCLOPRAMIDE HYDROCHLORIDE 5 MG/ML
10 INJECTION INTRAMUSCULAR; INTRAVENOUS EVERY 6 HOURS
Status: DISCONTINUED | OUTPATIENT
Start: 2021-06-17 | End: 2021-06-19 | Stop reason: HOSPADM

## 2021-06-17 RX ORDER — POLYETHYLENE GLYCOL 3350 17 G/17G
17 POWDER, FOR SOLUTION ORAL 2 TIMES DAILY PRN
Status: DISCONTINUED | OUTPATIENT
Start: 2021-06-17 | End: 2021-06-19 | Stop reason: HOSPADM

## 2021-06-17 RX ORDER — METOCLOPRAMIDE 10 MG/1
10 TABLET ORAL EVERY 6 HOURS
Status: DISCONTINUED | OUTPATIENT
Start: 2021-06-17 | End: 2021-06-17

## 2021-06-17 RX ORDER — HYDROMORPHONE HYDROCHLORIDE 1 MG/ML
1.5 INJECTION, SOLUTION INTRAMUSCULAR; INTRAVENOUS; SUBCUTANEOUS
Status: DISCONTINUED | OUTPATIENT
Start: 2021-06-17 | End: 2021-06-19 | Stop reason: HOSPADM

## 2021-06-17 RX ORDER — DROPERIDOL 2.5 MG/ML
1.25 INJECTION, SOLUTION INTRAMUSCULAR; INTRAVENOUS
Status: COMPLETED | OUTPATIENT
Start: 2021-06-17 | End: 2021-06-17

## 2021-06-17 RX ORDER — ONDANSETRON 2 MG/ML
4 INJECTION INTRAMUSCULAR; INTRAVENOUS EVERY 6 HOURS PRN
Status: DISCONTINUED | OUTPATIENT
Start: 2021-06-17 | End: 2021-06-19 | Stop reason: HOSPADM

## 2021-06-17 RX ORDER — IBUPROFEN 200 MG
24 TABLET ORAL
Status: DISCONTINUED | OUTPATIENT
Start: 2021-06-17 | End: 2021-06-19 | Stop reason: HOSPADM

## 2021-06-17 RX ORDER — SODIUM CHLORIDE 0.9 % (FLUSH) 0.9 %
10 SYRINGE (ML) INJECTION
Status: DISCONTINUED | OUTPATIENT
Start: 2021-06-17 | End: 2021-06-19 | Stop reason: HOSPADM

## 2021-06-17 RX ORDER — SODIUM CHLORIDE 0.9 % (FLUSH) 0.9 %
10 SYRINGE (ML) INJECTION
Status: CANCELLED | OUTPATIENT
Start: 2021-06-17

## 2021-06-17 RX ORDER — TALC
6 POWDER (GRAM) TOPICAL NIGHTLY PRN
Status: CANCELLED | OUTPATIENT
Start: 2021-06-17

## 2021-06-17 RX ORDER — IBUPROFEN 200 MG
16 TABLET ORAL
Status: DISCONTINUED | OUTPATIENT
Start: 2021-06-17 | End: 2021-06-19 | Stop reason: HOSPADM

## 2021-06-17 RX ORDER — DEXTROSE MONOHYDRATE 100 MG/ML
INJECTION, SOLUTION INTRAVENOUS
Status: DISCONTINUED | OUTPATIENT
Start: 2021-06-17 | End: 2021-06-19 | Stop reason: HOSPADM

## 2021-06-17 RX ORDER — SODIUM CHLORIDE, SODIUM LACTATE, POTASSIUM CHLORIDE, CALCIUM CHLORIDE 600; 310; 30; 20 MG/100ML; MG/100ML; MG/100ML; MG/100ML
INJECTION, SOLUTION INTRAVENOUS CONTINUOUS
Status: DISCONTINUED | OUTPATIENT
Start: 2021-06-17 | End: 2021-06-19

## 2021-06-17 RX ORDER — PROCHLORPERAZINE EDISYLATE 5 MG/ML
10 INJECTION INTRAMUSCULAR; INTRAVENOUS EVERY 6 HOURS PRN
Status: DISCONTINUED | OUTPATIENT
Start: 2021-06-17 | End: 2021-06-19 | Stop reason: HOSPADM

## 2021-06-17 RX ORDER — PANTOPRAZOLE SODIUM 40 MG/10ML
40 INJECTION, POWDER, LYOPHILIZED, FOR SOLUTION INTRAVENOUS EVERY 12 HOURS
Status: DISCONTINUED | OUTPATIENT
Start: 2021-06-17 | End: 2021-06-19 | Stop reason: HOSPADM

## 2021-06-17 RX ORDER — DICYCLOMINE HYDROCHLORIDE 10 MG/1
10 CAPSULE ORAL 3 TIMES DAILY PRN
Status: DISCONTINUED | OUTPATIENT
Start: 2021-06-17 | End: 2021-06-19 | Stop reason: HOSPADM

## 2021-06-17 RX ORDER — PANTOPRAZOLE SODIUM 40 MG/1
40 TABLET, DELAYED RELEASE ORAL DAILY
Status: DISCONTINUED | OUTPATIENT
Start: 2021-06-18 | End: 2021-06-17

## 2021-06-17 RX ORDER — OXYCODONE HYDROCHLORIDE 5 MG/1
5 TABLET ORAL EVERY 4 HOURS PRN
Status: DISCONTINUED | OUTPATIENT
Start: 2021-06-17 | End: 2021-06-19 | Stop reason: HOSPADM

## 2021-06-17 RX ORDER — ACETAMINOPHEN 325 MG/1
650 TABLET ORAL EVERY 4 HOURS PRN
Status: DISCONTINUED | OUTPATIENT
Start: 2021-06-17 | End: 2021-06-19 | Stop reason: HOSPADM

## 2021-06-17 RX ADMIN — ONDANSETRON 4 MG: 2 INJECTION INTRAMUSCULAR; INTRAVENOUS at 11:06

## 2021-06-17 RX ADMIN — SODIUM CHLORIDE 2.5 UNITS/HR: 9 INJECTION, SOLUTION INTRAVENOUS at 05:06

## 2021-06-17 RX ADMIN — SODIUM CHLORIDE, SODIUM LACTATE, POTASSIUM CHLORIDE, AND CALCIUM CHLORIDE: .6; .31; .03; .02 INJECTION, SOLUTION INTRAVENOUS at 10:06

## 2021-06-17 RX ADMIN — METOCLOPRAMIDE HYDROCHLORIDE 10 MG: 10 INJECTION, SOLUTION INTRAMUSCULAR; INTRAVENOUS at 10:06

## 2021-06-17 RX ADMIN — ACETAMINOPHEN 650 MG: 325 TABLET ORAL at 09:06

## 2021-06-17 RX ADMIN — DROPERIDOL 1.25 MG: 2.5 INJECTION, SOLUTION INTRAMUSCULAR; INTRAVENOUS at 02:06

## 2021-06-17 RX ADMIN — PANTOPRAZOLE SODIUM 40 MG: 40 INJECTION, POWDER, FOR SOLUTION INTRAVENOUS at 10:06

## 2021-06-17 RX ADMIN — POTASSIUM CHLORIDE 10 MEQ: 7.46 INJECTION, SOLUTION INTRAVENOUS at 08:06

## 2021-06-17 RX ADMIN — POTASSIUM CHLORIDE 10 MEQ: 7.46 INJECTION, SOLUTION INTRAVENOUS at 10:06

## 2021-06-17 RX ADMIN — HYDROMORPHONE HYDROCHLORIDE 1.5 MG: 1 INJECTION, SOLUTION INTRAMUSCULAR; INTRAVENOUS; SUBCUTANEOUS at 09:06

## 2021-06-17 RX ADMIN — ONDANSETRON 4 MG: 2 INJECTION INTRAMUSCULAR; INTRAVENOUS at 07:06

## 2021-06-17 RX ADMIN — SODIUM CHLORIDE 0.85 UNITS/HR: 9 INJECTION, SOLUTION INTRAVENOUS at 10:06

## 2021-06-17 RX ADMIN — POTASSIUM CHLORIDE 10 MEQ: 7.46 INJECTION, SOLUTION INTRAVENOUS at 09:06

## 2021-06-17 RX ADMIN — SODIUM CHLORIDE, SODIUM LACTATE, POTASSIUM CHLORIDE, AND CALCIUM CHLORIDE 2000 ML: .6; .31; .03; .02 INJECTION, SOLUTION INTRAVENOUS at 08:06

## 2021-06-17 RX ADMIN — SODIUM CHLORIDE 1000 ML: 0.9 INJECTION, SOLUTION INTRAVENOUS at 04:06

## 2021-06-18 LAB
ANION GAP SERPL CALC-SCNC: 12 MMOL/L (ref 8–16)
ANION GAP SERPL CALC-SCNC: 12 MMOL/L (ref 8–16)
ANION GAP SERPL CALC-SCNC: 15 MMOL/L (ref 8–16)
ANION GAP SERPL CALC-SCNC: 16 MMOL/L (ref 8–16)
ANION GAP SERPL CALC-SCNC: 16 MMOL/L (ref 8–16)
BASOPHILS # BLD AUTO: 0.02 K/UL (ref 0–0.2)
BASOPHILS NFR BLD: 0.2 % (ref 0–1.9)
BUN SERPL-MCNC: 16 MG/DL (ref 6–20)
BUN SERPL-MCNC: 16 MG/DL (ref 6–20)
BUN SERPL-MCNC: 18 MG/DL (ref 6–20)
BUN SERPL-MCNC: 19 MG/DL (ref 6–20)
BUN SERPL-MCNC: 19 MG/DL (ref 6–20)
CALCIUM SERPL-MCNC: 9.5 MG/DL (ref 8.7–10.5)
CALCIUM SERPL-MCNC: 9.9 MG/DL (ref 8.7–10.5)
CHLORIDE SERPL-SCNC: 103 MMOL/L (ref 95–110)
CHLORIDE SERPL-SCNC: 106 MMOL/L (ref 95–110)
CHLORIDE SERPL-SCNC: 107 MMOL/L (ref 95–110)
CHLORIDE SERPL-SCNC: 107 MMOL/L (ref 95–110)
CHLORIDE SERPL-SCNC: 108 MMOL/L (ref 95–110)
CO2 SERPL-SCNC: 20 MMOL/L (ref 23–29)
CO2 SERPL-SCNC: 20 MMOL/L (ref 23–29)
CO2 SERPL-SCNC: 22 MMOL/L (ref 23–29)
CO2 SERPL-SCNC: 24 MMOL/L (ref 23–29)
CO2 SERPL-SCNC: 26 MMOL/L (ref 23–29)
CREAT SERPL-MCNC: 1.2 MG/DL (ref 0.5–1.4)
CREAT SERPL-MCNC: 1.3 MG/DL (ref 0.5–1.4)
CREAT SERPL-MCNC: 1.4 MG/DL (ref 0.5–1.4)
DIFFERENTIAL METHOD: ABNORMAL
EOSINOPHIL # BLD AUTO: 0 K/UL (ref 0–0.5)
EOSINOPHIL NFR BLD: 0 % (ref 0–8)
ERYTHROCYTE [DISTWIDTH] IN BLOOD BY AUTOMATED COUNT: 12.3 % (ref 11.5–14.5)
EST. GFR  (AFRICAN AMERICAN): >60 ML/MIN/1.73 M^2
EST. GFR  (NON AFRICAN AMERICAN): >60 ML/MIN/1.73 M^2
GLUCOSE SERPL-MCNC: 206 MG/DL (ref 70–110)
GLUCOSE SERPL-MCNC: 275 MG/DL (ref 70–110)
GLUCOSE SERPL-MCNC: 336 MG/DL (ref 70–110)
GLUCOSE SERPL-MCNC: 359 MG/DL (ref 70–110)
GLUCOSE SERPL-MCNC: 359 MG/DL (ref 70–110)
HCT VFR BLD AUTO: 37.9 % (ref 40–54)
HGB BLD-MCNC: 13.3 G/DL (ref 14–18)
IMM GRANULOCYTES # BLD AUTO: 0.05 K/UL (ref 0–0.04)
IMM GRANULOCYTES NFR BLD AUTO: 0.6 % (ref 0–0.5)
LYMPHOCYTES # BLD AUTO: 0.6 K/UL (ref 1–4.8)
LYMPHOCYTES NFR BLD: 7.2 % (ref 18–48)
MAGNESIUM SERPL-MCNC: 1.7 MG/DL (ref 1.6–2.6)
MCH RBC QN AUTO: 30.2 PG (ref 27–31)
MCHC RBC AUTO-ENTMCNC: 35.1 G/DL (ref 32–36)
MCV RBC AUTO: 86 FL (ref 82–98)
MONOCYTES # BLD AUTO: 0.7 K/UL (ref 0.3–1)
MONOCYTES NFR BLD: 8.1 % (ref 4–15)
NEUTROPHILS # BLD AUTO: 7.5 K/UL (ref 1.8–7.7)
NEUTROPHILS NFR BLD: 83.9 % (ref 38–73)
NRBC BLD-RTO: 0 /100 WBC
PHOSPHATE SERPL-MCNC: 3.3 MG/DL (ref 2.7–4.5)
PLATELET # BLD AUTO: 151 K/UL (ref 150–450)
PMV BLD AUTO: 11.6 FL (ref 9.2–12.9)
POCT GLUCOSE: 183 MG/DL (ref 70–110)
POCT GLUCOSE: 185 MG/DL (ref 70–110)
POCT GLUCOSE: 189 MG/DL (ref 70–110)
POCT GLUCOSE: 192 MG/DL (ref 70–110)
POCT GLUCOSE: 196 MG/DL (ref 70–110)
POCT GLUCOSE: 210 MG/DL (ref 70–110)
POCT GLUCOSE: 223 MG/DL (ref 70–110)
POCT GLUCOSE: 224 MG/DL (ref 70–110)
POCT GLUCOSE: 225 MG/DL (ref 70–110)
POCT GLUCOSE: 275 MG/DL (ref 70–110)
POCT GLUCOSE: 310 MG/DL (ref 70–110)
POCT GLUCOSE: 311 MG/DL (ref 70–110)
POCT GLUCOSE: 320 MG/DL (ref 70–110)
POCT GLUCOSE: 322 MG/DL (ref 70–110)
POCT GLUCOSE: 329 MG/DL (ref 70–110)
POCT GLUCOSE: 332 MG/DL (ref 70–110)
POCT GLUCOSE: 339 MG/DL (ref 70–110)
POCT GLUCOSE: 359 MG/DL (ref 70–110)
POCT GLUCOSE: 367 MG/DL (ref 70–110)
POCT GLUCOSE: 388 MG/DL (ref 70–110)
POTASSIUM SERPL-SCNC: 4.3 MMOL/L (ref 3.5–5.1)
POTASSIUM SERPL-SCNC: 4.3 MMOL/L (ref 3.5–5.1)
POTASSIUM SERPL-SCNC: 4.6 MMOL/L (ref 3.5–5.1)
POTASSIUM SERPL-SCNC: 4.6 MMOL/L (ref 3.5–5.1)
POTASSIUM SERPL-SCNC: 5.2 MMOL/L (ref 3.5–5.1)
RBC # BLD AUTO: 4.41 M/UL (ref 4.6–6.2)
SODIUM SERPL-SCNC: 140 MMOL/L (ref 136–145)
SODIUM SERPL-SCNC: 142 MMOL/L (ref 136–145)
SODIUM SERPL-SCNC: 143 MMOL/L (ref 136–145)
SODIUM SERPL-SCNC: 143 MMOL/L (ref 136–145)
SODIUM SERPL-SCNC: 146 MMOL/L (ref 136–145)
WBC # BLD AUTO: 8.9 K/UL (ref 3.9–12.7)

## 2021-06-18 PROCEDURE — C9113 INJ PANTOPRAZOLE SODIUM, VIA: HCPCS | Performed by: INTERNAL MEDICINE

## 2021-06-18 PROCEDURE — 83735 ASSAY OF MAGNESIUM: CPT | Performed by: INTERNAL MEDICINE

## 2021-06-18 PROCEDURE — C9399 UNCLASSIFIED DRUGS OR BIOLOG: HCPCS | Performed by: HOSPITALIST

## 2021-06-18 PROCEDURE — 20600001 HC STEP DOWN PRIVATE ROOM

## 2021-06-18 PROCEDURE — C1751 CATH, INF, PER/CENT/MIDLINE: HCPCS

## 2021-06-18 PROCEDURE — 25000003 PHARM REV CODE 250: Performed by: INTERNAL MEDICINE

## 2021-06-18 PROCEDURE — 25000003 PHARM REV CODE 250: Performed by: HOSPITALIST

## 2021-06-18 PROCEDURE — 84100 ASSAY OF PHOSPHORUS: CPT | Performed by: INTERNAL MEDICINE

## 2021-06-18 PROCEDURE — 63600175 PHARM REV CODE 636 W HCPCS: Performed by: INTERNAL MEDICINE

## 2021-06-18 PROCEDURE — 99233 PR SUBSEQUENT HOSPITAL CARE,LEVL III: ICD-10-PCS | Mod: ,,, | Performed by: INTERNAL MEDICINE

## 2021-06-18 PROCEDURE — 25000003 PHARM REV CODE 250: Performed by: EMERGENCY MEDICINE

## 2021-06-18 PROCEDURE — 99233 SBSQ HOSP IP/OBS HIGH 50: CPT | Mod: ,,, | Performed by: INTERNAL MEDICINE

## 2021-06-18 PROCEDURE — 80048 BASIC METABOLIC PNL TOTAL CA: CPT | Mod: 91 | Performed by: INTERNAL MEDICINE

## 2021-06-18 PROCEDURE — 36415 COLL VENOUS BLD VENIPUNCTURE: CPT | Performed by: INTERNAL MEDICINE

## 2021-06-18 PROCEDURE — 85025 COMPLETE CBC W/AUTO DIFF WBC: CPT | Performed by: INTERNAL MEDICINE

## 2021-06-18 PROCEDURE — 63600175 PHARM REV CODE 636 W HCPCS: Performed by: EMERGENCY MEDICINE

## 2021-06-18 PROCEDURE — 36410 VNPNXR 3YR/> PHY/QHP DX/THER: CPT

## 2021-06-18 PROCEDURE — 76937 US GUIDE VASCULAR ACCESS: CPT

## 2021-06-18 RX ORDER — GLUCAGON 1 MG
1 KIT INJECTION
Status: DISCONTINUED | OUTPATIENT
Start: 2021-06-18 | End: 2021-06-19 | Stop reason: HOSPADM

## 2021-06-18 RX ORDER — INSULIN ASPART 100 [IU]/ML
1-10 INJECTION, SOLUTION INTRAVENOUS; SUBCUTANEOUS
Status: DISCONTINUED | OUTPATIENT
Start: 2021-06-18 | End: 2021-06-19

## 2021-06-18 RX ORDER — IBUPROFEN 200 MG
24 TABLET ORAL
Status: DISCONTINUED | OUTPATIENT
Start: 2021-06-18 | End: 2021-06-19 | Stop reason: HOSPADM

## 2021-06-18 RX ORDER — IBUPROFEN 200 MG
16 TABLET ORAL
Status: DISCONTINUED | OUTPATIENT
Start: 2021-06-18 | End: 2021-06-19 | Stop reason: HOSPADM

## 2021-06-18 RX ADMIN — SODIUM CHLORIDE 1.3 UNITS/HR: 9 INJECTION, SOLUTION INTRAVENOUS at 01:06

## 2021-06-18 RX ADMIN — POTASSIUM CHLORIDE 10 MEQ: 7.46 INJECTION, SOLUTION INTRAVENOUS at 12:06

## 2021-06-18 RX ADMIN — HYDROMORPHONE HYDROCHLORIDE 1.5 MG: 1 INJECTION, SOLUTION INTRAMUSCULAR; INTRAVENOUS; SUBCUTANEOUS at 11:06

## 2021-06-18 RX ADMIN — TRAZODONE HYDROCHLORIDE 25 MG: 50 TABLET ORAL at 08:06

## 2021-06-18 RX ADMIN — ACETAMINOPHEN 650 MG: 325 TABLET ORAL at 02:06

## 2021-06-18 RX ADMIN — METOCLOPRAMIDE HYDROCHLORIDE 10 MG: 10 INJECTION, SOLUTION INTRAMUSCULAR; INTRAVENOUS at 11:06

## 2021-06-18 RX ADMIN — PANTOPRAZOLE SODIUM 40 MG: 40 INJECTION, POWDER, FOR SOLUTION INTRAVENOUS at 08:06

## 2021-06-18 RX ADMIN — SODIUM CHLORIDE, SODIUM LACTATE, POTASSIUM CHLORIDE, AND CALCIUM CHLORIDE: .6; .31; .03; .02 INJECTION, SOLUTION INTRAVENOUS at 11:06

## 2021-06-18 RX ADMIN — INSULIN DETEMIR 12 UNITS: 100 INJECTION, SOLUTION SUBCUTANEOUS at 09:06

## 2021-06-18 RX ADMIN — ONDANSETRON 4 MG: 2 INJECTION INTRAMUSCULAR; INTRAVENOUS at 08:06

## 2021-06-18 RX ADMIN — METOCLOPRAMIDE HYDROCHLORIDE 10 MG: 10 INJECTION, SOLUTION INTRAMUSCULAR; INTRAVENOUS at 05:06

## 2021-06-18 RX ADMIN — POTASSIUM CHLORIDE 10 MEQ: 7.46 INJECTION, SOLUTION INTRAVENOUS at 02:06

## 2021-06-18 RX ADMIN — METOCLOPRAMIDE HYDROCHLORIDE 10 MG: 10 INJECTION, SOLUTION INTRAMUSCULAR; INTRAVENOUS at 06:06

## 2021-06-18 RX ADMIN — METOCLOPRAMIDE HYDROCHLORIDE 10 MG: 10 INJECTION, SOLUTION INTRAMUSCULAR; INTRAVENOUS at 12:06

## 2021-06-19 ENCOUNTER — TELEPHONE (OUTPATIENT)
Dept: ENDOCRINOLOGY | Facility: HOSPITAL | Age: 36
End: 2021-06-19

## 2021-06-19 VITALS
HEART RATE: 81 BPM | TEMPERATURE: 99 F | BODY MASS INDEX: 27.17 KG/M2 | DIASTOLIC BLOOD PRESSURE: 85 MMHG | RESPIRATION RATE: 18 BRPM | WEIGHT: 205 LBS | HEIGHT: 73 IN | OXYGEN SATURATION: 98 % | SYSTOLIC BLOOD PRESSURE: 134 MMHG

## 2021-06-19 PROBLEM — E10.8 TYPE 1 DIABETES MELLITUS WITH COMPLICATIONS: Status: ACTIVE | Noted: 2021-03-10

## 2021-06-19 PROBLEM — E10.9 TYPE 1 DIABETES MELLITUS WITHOUT COMPLICATIONS: Status: ACTIVE | Noted: 2021-03-10

## 2021-06-19 LAB
ANION GAP SERPL CALC-SCNC: 9 MMOL/L (ref 8–16)
BASOPHILS # BLD AUTO: 0.03 K/UL (ref 0–0.2)
BASOPHILS NFR BLD: 0.5 % (ref 0–1.9)
BUN SERPL-MCNC: 15 MG/DL (ref 6–20)
CALCIUM SERPL-MCNC: 9.2 MG/DL (ref 8.7–10.5)
CHLORIDE SERPL-SCNC: 107 MMOL/L (ref 95–110)
CO2 SERPL-SCNC: 26 MMOL/L (ref 23–29)
CREAT SERPL-MCNC: 1.3 MG/DL (ref 0.5–1.4)
DIFFERENTIAL METHOD: ABNORMAL
EOSINOPHIL # BLD AUTO: 0 K/UL (ref 0–0.5)
EOSINOPHIL NFR BLD: 0.7 % (ref 0–8)
ERYTHROCYTE [DISTWIDTH] IN BLOOD BY AUTOMATED COUNT: 12.4 % (ref 11.5–14.5)
EST. GFR  (AFRICAN AMERICAN): >60 ML/MIN/1.73 M^2
EST. GFR  (NON AFRICAN AMERICAN): >60 ML/MIN/1.73 M^2
GLUCOSE SERPL-MCNC: 161 MG/DL (ref 70–110)
HCT VFR BLD AUTO: 37.9 % (ref 40–54)
HGB BLD-MCNC: 12.9 G/DL (ref 14–18)
IMM GRANULOCYTES # BLD AUTO: 0.02 K/UL (ref 0–0.04)
IMM GRANULOCYTES NFR BLD AUTO: 0.3 % (ref 0–0.5)
LYMPHOCYTES # BLD AUTO: 1.4 K/UL (ref 1–4.8)
LYMPHOCYTES NFR BLD: 24.1 % (ref 18–48)
MAGNESIUM SERPL-MCNC: 1.7 MG/DL (ref 1.6–2.6)
MCH RBC QN AUTO: 30.1 PG (ref 27–31)
MCHC RBC AUTO-ENTMCNC: 34 G/DL (ref 32–36)
MCV RBC AUTO: 89 FL (ref 82–98)
MONOCYTES # BLD AUTO: 0.7 K/UL (ref 0.3–1)
MONOCYTES NFR BLD: 12.5 % (ref 4–15)
NEUTROPHILS # BLD AUTO: 3.6 K/UL (ref 1.8–7.7)
NEUTROPHILS NFR BLD: 61.9 % (ref 38–73)
NRBC BLD-RTO: 0 /100 WBC
PHOSPHATE SERPL-MCNC: 2.1 MG/DL (ref 2.7–4.5)
PLATELET # BLD AUTO: 156 K/UL (ref 150–450)
PMV BLD AUTO: 10.7 FL (ref 9.2–12.9)
POCT GLUCOSE: 148 MG/DL (ref 70–110)
POCT GLUCOSE: 172 MG/DL (ref 70–110)
POCT GLUCOSE: 226 MG/DL (ref 70–110)
POCT GLUCOSE: 262 MG/DL (ref 70–110)
POCT GLUCOSE: 279 MG/DL (ref 70–110)
POTASSIUM SERPL-SCNC: 4 MMOL/L (ref 3.5–5.1)
RBC # BLD AUTO: 4.28 M/UL (ref 4.6–6.2)
SODIUM SERPL-SCNC: 142 MMOL/L (ref 136–145)
WBC # BLD AUTO: 5.76 K/UL (ref 3.9–12.7)

## 2021-06-19 PROCEDURE — 63600175 PHARM REV CODE 636 W HCPCS: Performed by: NURSE PRACTITIONER

## 2021-06-19 PROCEDURE — 99222 PR INITIAL HOSPITAL CARE,LEVL II: ICD-10-PCS | Mod: ,,, | Performed by: NURSE PRACTITIONER

## 2021-06-19 PROCEDURE — 99238 PR HOSPITAL DISCHARGE DAY,<30 MIN: ICD-10-PCS | Mod: ,,, | Performed by: INTERNAL MEDICINE

## 2021-06-19 PROCEDURE — 99238 HOSP IP/OBS DSCHRG MGMT 30/<: CPT | Mod: ,,, | Performed by: INTERNAL MEDICINE

## 2021-06-19 PROCEDURE — 84100 ASSAY OF PHOSPHORUS: CPT | Performed by: INTERNAL MEDICINE

## 2021-06-19 PROCEDURE — 63600175 PHARM REV CODE 636 W HCPCS: Performed by: INTERNAL MEDICINE

## 2021-06-19 PROCEDURE — 80048 BASIC METABOLIC PNL TOTAL CA: CPT | Performed by: INTERNAL MEDICINE

## 2021-06-19 PROCEDURE — 63600175 PHARM REV CODE 636 W HCPCS: Performed by: HOSPITALIST

## 2021-06-19 PROCEDURE — 99222 1ST HOSP IP/OBS MODERATE 55: CPT | Mod: ,,, | Performed by: NURSE PRACTITIONER

## 2021-06-19 PROCEDURE — 83735 ASSAY OF MAGNESIUM: CPT | Performed by: INTERNAL MEDICINE

## 2021-06-19 PROCEDURE — 36415 COLL VENOUS BLD VENIPUNCTURE: CPT | Performed by: INTERNAL MEDICINE

## 2021-06-19 PROCEDURE — 85025 COMPLETE CBC W/AUTO DIFF WBC: CPT | Performed by: INTERNAL MEDICINE

## 2021-06-19 PROCEDURE — C9113 INJ PANTOPRAZOLE SODIUM, VIA: HCPCS | Performed by: INTERNAL MEDICINE

## 2021-06-19 PROCEDURE — 25000003 PHARM REV CODE 250: Performed by: INTERNAL MEDICINE

## 2021-06-19 RX ORDER — INSULIN ASPART 100 [IU]/ML
0-5 INJECTION, SOLUTION INTRAVENOUS; SUBCUTANEOUS
Status: DISCONTINUED | OUTPATIENT
Start: 2021-06-19 | End: 2021-06-19 | Stop reason: HOSPADM

## 2021-06-19 RX ORDER — INSULIN ASPART 100 [IU]/ML
5 INJECTION, SOLUTION INTRAVENOUS; SUBCUTANEOUS
Status: DISCONTINUED | OUTPATIENT
Start: 2021-06-19 | End: 2021-06-19 | Stop reason: HOSPADM

## 2021-06-19 RX ORDER — ONDANSETRON 4 MG/1
4 TABLET, FILM COATED ORAL EVERY 6 HOURS PRN
Status: DISCONTINUED | OUTPATIENT
Start: 2021-06-19 | End: 2021-06-19 | Stop reason: HOSPADM

## 2021-06-19 RX ADMIN — INSULIN ASPART 5 UNITS: 100 INJECTION, SOLUTION INTRAVENOUS; SUBCUTANEOUS at 11:06

## 2021-06-19 RX ADMIN — INSULIN ASPART 3 UNITS: 100 INJECTION, SOLUTION INTRAVENOUS; SUBCUTANEOUS at 11:06

## 2021-06-19 RX ADMIN — METOCLOPRAMIDE HYDROCHLORIDE 10 MG: 10 INJECTION, SOLUTION INTRAMUSCULAR; INTRAVENOUS at 06:06

## 2021-06-19 RX ADMIN — INSULIN ASPART 5 UNITS: 100 INJECTION, SOLUTION INTRAVENOUS; SUBCUTANEOUS at 05:06

## 2021-06-19 RX ADMIN — INSULIN ASPART 2 UNITS: 100 INJECTION, SOLUTION INTRAVENOUS; SUBCUTANEOUS at 12:06

## 2021-06-19 RX ADMIN — PANTOPRAZOLE SODIUM 40 MG: 40 INJECTION, POWDER, FOR SOLUTION INTRAVENOUS at 08:06

## 2021-06-19 RX ADMIN — ACETAMINOPHEN 650 MG: 325 TABLET ORAL at 03:06

## 2021-06-19 RX ADMIN — METOCLOPRAMIDE HYDROCHLORIDE 10 MG: 10 INJECTION, SOLUTION INTRAMUSCULAR; INTRAVENOUS at 11:06

## 2021-06-19 RX ADMIN — ONDANSETRON 4 MG: 2 INJECTION INTRAMUSCULAR; INTRAVENOUS at 08:06

## 2021-06-21 ENCOUNTER — PATIENT MESSAGE (OUTPATIENT)
Dept: ENDOCRINOLOGY | Facility: CLINIC | Age: 36
End: 2021-06-21

## 2021-07-12 ENCOUNTER — TELEPHONE (OUTPATIENT)
Dept: GASTROENTEROLOGY | Facility: CLINIC | Age: 36
End: 2021-07-12

## 2021-08-04 ENCOUNTER — HOSPITAL ENCOUNTER (EMERGENCY)
Facility: HOSPITAL | Age: 36
Discharge: HOME | End: 2021-08-05
Attending: EMERGENCY MEDICINE
Payer: MEDICARE

## 2021-08-04 DIAGNOSIS — R07.9 CHEST PAIN: ICD-10-CM

## 2021-08-04 DIAGNOSIS — R10.9 ABDOMINAL PAIN, UNSPECIFIED ABDOMINAL LOCATION: ICD-10-CM

## 2021-08-04 DIAGNOSIS — R11.2 NAUSEA AND VOMITING, INTRACTABILITY OF VOMITING NOT SPECIFIED, UNSPECIFIED VOMITING TYPE: Primary | ICD-10-CM

## 2021-08-04 DIAGNOSIS — R07.9 CHEST PAIN, UNSPECIFIED TYPE: ICD-10-CM

## 2021-08-04 LAB
ALBUMIN SERPL BCP-MCNC: 3.9 G/DL (ref 3.5–5.2)
ALP SERPL-CCNC: 89 U/L (ref 55–135)
ALT SERPL W/O P-5'-P-CCNC: 27 U/L (ref 10–44)
ANION GAP SERPL CALC-SCNC: 9 MMOL/L (ref 8–16)
AST SERPL-CCNC: 34 U/L (ref 10–40)
B-OH-BUTYR BLD STRIP-SCNC: 0.2 MMOL/L (ref 0–0.5)
BACTERIA #/AREA URNS AUTO: NORMAL /HPF
BASOPHILS # BLD AUTO: 0.01 K/UL (ref 0–0.2)
BASOPHILS NFR BLD: 0.2 % (ref 0–1.9)
BILIRUB SERPL-MCNC: 0.5 MG/DL (ref 0.1–1)
BILIRUB UR QL STRIP: NEGATIVE
BUN SERPL-MCNC: 13 MG/DL (ref 6–20)
CALCIUM SERPL-MCNC: 9.1 MG/DL (ref 8.7–10.5)
CHLORIDE SERPL-SCNC: 103 MMOL/L (ref 95–110)
CLARITY UR REFRACT.AUTO: CLEAR
CO2 SERPL-SCNC: 25 MMOL/L (ref 23–29)
COLOR UR AUTO: ABNORMAL
CREAT SERPL-MCNC: 1.3 MG/DL (ref 0.5–1.4)
DIFFERENTIAL METHOD: ABNORMAL
EOSINOPHIL # BLD AUTO: 0 K/UL (ref 0–0.5)
EOSINOPHIL NFR BLD: 0 % (ref 0–8)
ERYTHROCYTE [DISTWIDTH] IN BLOOD BY AUTOMATED COUNT: 12.4 % (ref 11.5–14.5)
EST. GFR  (AFRICAN AMERICAN): >60 ML/MIN/1.73 M^2
EST. GFR  (NON AFRICAN AMERICAN): >60 ML/MIN/1.73 M^2
GLUCOSE SERPL-MCNC: 296 MG/DL (ref 70–110)
GLUCOSE UR QL STRIP: ABNORMAL
HCT VFR BLD AUTO: 37.2 % (ref 40–54)
HGB BLD-MCNC: 12.7 G/DL (ref 14–18)
HGB UR QL STRIP: ABNORMAL
HYALINE CASTS UR QL AUTO: 0 /LPF
IMM GRANULOCYTES # BLD AUTO: 0.01 K/UL (ref 0–0.04)
IMM GRANULOCYTES NFR BLD AUTO: 0.2 % (ref 0–0.5)
KETONES UR QL STRIP: ABNORMAL
LEUKOCYTE ESTERASE UR QL STRIP: NEGATIVE
LYMPHOCYTES # BLD AUTO: 0.4 K/UL (ref 1–4.8)
LYMPHOCYTES NFR BLD: 5.8 % (ref 18–48)
MCH RBC QN AUTO: 30.5 PG (ref 27–31)
MCHC RBC AUTO-ENTMCNC: 34.1 G/DL (ref 32–36)
MCV RBC AUTO: 89 FL (ref 82–98)
MICROSCOPIC COMMENT: NORMAL
MONOCYTES # BLD AUTO: 0.2 K/UL (ref 0.3–1)
MONOCYTES NFR BLD: 3.8 % (ref 4–15)
NEUTROPHILS # BLD AUTO: 5.7 K/UL (ref 1.8–7.7)
NEUTROPHILS NFR BLD: 90 % (ref 38–73)
NITRITE UR QL STRIP: NEGATIVE
NRBC BLD-RTO: 0 /100 WBC
PH UR STRIP: 6 [PH] (ref 5–8)
PLATELET # BLD AUTO: 162 K/UL (ref 150–450)
PMV BLD AUTO: 11.3 FL (ref 9.2–12.9)
POTASSIUM SERPL-SCNC: 4 MMOL/L (ref 3.5–5.1)
PROT SERPL-MCNC: 7 G/DL (ref 6–8.4)
PROT UR QL STRIP: ABNORMAL
RBC # BLD AUTO: 4.16 M/UL (ref 4.6–6.2)
RBC #/AREA URNS AUTO: 2 /HPF (ref 0–4)
SODIUM SERPL-SCNC: 137 MMOL/L (ref 136–145)
SP GR UR STRIP: 1.02 (ref 1–1.03)
SQUAMOUS #/AREA URNS AUTO: 0 /HPF
TROPONIN I SERPL DL<=0.01 NG/ML-MCNC: <0.006 NG/ML (ref 0–0.03)
URN SPEC COLLECT METH UR: ABNORMAL
WBC # BLD AUTO: 6.36 K/UL (ref 3.9–12.7)
WBC #/AREA URNS AUTO: 0 /HPF (ref 0–5)
YEAST UR QL AUTO: NORMAL

## 2021-08-04 PROCEDURE — 99284 EMERGENCY DEPT VISIT MOD MDM: CPT | Mod: ,,, | Performed by: EMERGENCY MEDICINE

## 2021-08-04 PROCEDURE — 99284 PR EMERGENCY DEPT VISIT,LEVEL IV: ICD-10-PCS | Mod: ,,, | Performed by: EMERGENCY MEDICINE

## 2021-08-04 PROCEDURE — 96375 TX/PRO/DX INJ NEW DRUG ADDON: CPT

## 2021-08-04 PROCEDURE — 63600175 PHARM REV CODE 636 W HCPCS

## 2021-08-04 PROCEDURE — 93010 ELECTROCARDIOGRAM REPORT: CPT | Mod: ,,, | Performed by: INTERNAL MEDICINE

## 2021-08-04 PROCEDURE — 82010 KETONE BODYS QUAN: CPT

## 2021-08-04 PROCEDURE — 99285 EMERGENCY DEPT VISIT HI MDM: CPT | Mod: 25

## 2021-08-04 PROCEDURE — 85025 COMPLETE CBC W/AUTO DIFF WBC: CPT

## 2021-08-04 PROCEDURE — 84484 ASSAY OF TROPONIN QUANT: CPT

## 2021-08-04 PROCEDURE — 96374 THER/PROPH/DIAG INJ IV PUSH: CPT

## 2021-08-04 PROCEDURE — 93010 EKG 12-LEAD: ICD-10-PCS | Mod: ,,, | Performed by: INTERNAL MEDICINE

## 2021-08-04 PROCEDURE — 93005 ELECTROCARDIOGRAM TRACING: CPT

## 2021-08-04 PROCEDURE — 96361 HYDRATE IV INFUSION ADD-ON: CPT

## 2021-08-04 PROCEDURE — 25000003 PHARM REV CODE 250

## 2021-08-04 PROCEDURE — 81001 URINALYSIS AUTO W/SCOPE: CPT

## 2021-08-04 PROCEDURE — 80053 COMPREHEN METABOLIC PANEL: CPT

## 2021-08-04 RX ORDER — DROPERIDOL 2.5 MG/ML
0.62 INJECTION, SOLUTION INTRAMUSCULAR; INTRAVENOUS ONCE
Status: COMPLETED | OUTPATIENT
Start: 2021-08-04 | End: 2021-08-04

## 2021-08-04 RX ORDER — LORAZEPAM 2 MG/ML
1 INJECTION INTRAMUSCULAR
Status: COMPLETED | OUTPATIENT
Start: 2021-08-05 | End: 2021-08-05

## 2021-08-04 RX ORDER — DROPERIDOL 2.5 MG/ML
1.25 INJECTION, SOLUTION INTRAMUSCULAR; INTRAVENOUS ONCE
Status: COMPLETED | OUTPATIENT
Start: 2021-08-05 | End: 2021-08-05

## 2021-08-04 RX ORDER — METOCLOPRAMIDE HYDROCHLORIDE 5 MG/ML
10 INJECTION INTRAMUSCULAR; INTRAVENOUS
Status: COMPLETED | OUTPATIENT
Start: 2021-08-04 | End: 2021-08-04

## 2021-08-04 RX ADMIN — DROPERIDOL 0.62 MG: 2.5 INJECTION, SOLUTION INTRAMUSCULAR; INTRAVENOUS at 10:08

## 2021-08-04 RX ADMIN — METOCLOPRAMIDE 10 MG: 5 INJECTION, SOLUTION INTRAMUSCULAR; INTRAVENOUS at 11:08

## 2021-08-04 RX ADMIN — SODIUM CHLORIDE 1000 ML: 0.9 INJECTION, SOLUTION INTRAVENOUS at 10:08

## 2021-08-05 VITALS
RESPIRATION RATE: 20 BRPM | SYSTOLIC BLOOD PRESSURE: 175 MMHG | OXYGEN SATURATION: 98 % | HEART RATE: 75 BPM | DIASTOLIC BLOOD PRESSURE: 87 MMHG | TEMPERATURE: 99 F

## 2021-08-05 PROCEDURE — 25000003 PHARM REV CODE 250

## 2021-08-05 PROCEDURE — 63600175 PHARM REV CODE 636 W HCPCS

## 2021-08-05 RX ORDER — METOCLOPRAMIDE 10 MG/1
10 TABLET ORAL EVERY 6 HOURS
Qty: 30 TABLET | Refills: 0 | Status: SHIPPED | OUTPATIENT
Start: 2021-08-05 | End: 2023-03-02

## 2021-08-05 RX ORDER — ONDANSETRON 4 MG/1
8 TABLET, FILM COATED ORAL EVERY 6 HOURS PRN
Qty: 12 TABLET | Refills: 0 | Status: SHIPPED | OUTPATIENT
Start: 2021-08-05 | End: 2023-02-22

## 2021-08-05 RX ORDER — PROCHLORPERAZINE EDISYLATE 5 MG/ML
5 INJECTION INTRAMUSCULAR; INTRAVENOUS
Status: COMPLETED | OUTPATIENT
Start: 2021-08-05 | End: 2021-08-05

## 2021-08-05 RX ORDER — DIPHENHYDRAMINE HYDROCHLORIDE 50 MG/ML
25 INJECTION INTRAMUSCULAR; INTRAVENOUS
Status: COMPLETED | OUTPATIENT
Start: 2021-08-05 | End: 2021-08-05

## 2021-08-05 RX ADMIN — PROCHLORPERAZINE EDISYLATE 5 MG: 5 INJECTION INTRAMUSCULAR; INTRAVENOUS at 02:08

## 2021-08-05 RX ADMIN — LORAZEPAM 1 MG: 2 INJECTION INTRAMUSCULAR; INTRAVENOUS at 12:08

## 2021-08-05 RX ADMIN — SODIUM CHLORIDE 1000 ML: 0.9 INJECTION, SOLUTION INTRAVENOUS at 12:08

## 2021-08-05 RX ADMIN — DROPERIDOL 1.25 MG: 2.5 INJECTION, SOLUTION INTRAMUSCULAR; INTRAVENOUS at 12:08

## 2021-08-05 RX ADMIN — DIPHENHYDRAMINE HYDROCHLORIDE 25 MG: 50 INJECTION INTRAMUSCULAR; INTRAVENOUS at 02:08

## 2021-09-01 ENCOUNTER — PATIENT MESSAGE (OUTPATIENT)
Dept: ENDOCRINOLOGY | Facility: CLINIC | Age: 36
End: 2021-09-01

## 2021-09-02 ENCOUNTER — PATIENT MESSAGE (OUTPATIENT)
Dept: ENDOCRINOLOGY | Facility: CLINIC | Age: 36
End: 2021-09-02

## 2021-09-02 RX ORDER — INSULIN LISPRO-AABC 100 [IU]/ML
7 INJECTION, SOLUTION SUBCUTANEOUS
Qty: 5 PEN | Refills: 4 | Status: SHIPPED | OUTPATIENT
Start: 2021-09-02 | End: 2021-10-13 | Stop reason: SDUPTHER

## 2021-09-03 DIAGNOSIS — E10.36 TYPE 1 DIABETES MELLITUS WITH DIABETIC CATARACT: Primary | ICD-10-CM

## 2021-09-03 RX ORDER — PEN NEEDLE, DIABETIC 30 GX3/16"
1 NEEDLE, DISPOSABLE MISCELLANEOUS 2 TIMES DAILY WITH MEALS
Qty: 100 EACH | Refills: 11 | Status: SHIPPED | OUTPATIENT
Start: 2021-09-03 | End: 2021-11-22 | Stop reason: SDUPTHER

## 2021-09-14 ENCOUNTER — PATIENT MESSAGE (OUTPATIENT)
Dept: ENDOCRINOLOGY | Facility: CLINIC | Age: 36
End: 2021-09-14

## 2021-09-15 ENCOUNTER — IMMUNIZATION (OUTPATIENT)
Dept: INTERNAL MEDICINE | Facility: CLINIC | Age: 36
End: 2021-09-15
Payer: MEDICARE

## 2021-09-15 DIAGNOSIS — Z23 NEED FOR VACCINATION: Primary | ICD-10-CM

## 2021-09-15 PROCEDURE — 0001A COVID-19, MRNA, LNP-S, PF, 30 MCG/0.3 ML DOSE VACCINE: CPT | Mod: CV19,,, | Performed by: INTERNAL MEDICINE

## 2021-09-15 PROCEDURE — 0001A COVID-19, MRNA, LNP-S, PF, 30 MCG/0.3 ML DOSE VACCINE: ICD-10-PCS | Mod: CV19,,, | Performed by: INTERNAL MEDICINE

## 2021-09-15 PROCEDURE — 91300 COVID-19, MRNA, LNP-S, PF, 30 MCG/0.3 ML DOSE VACCINE: ICD-10-PCS | Mod: ,,, | Performed by: INTERNAL MEDICINE

## 2021-09-15 PROCEDURE — 91300 COVID-19, MRNA, LNP-S, PF, 30 MCG/0.3 ML DOSE VACCINE: CPT | Mod: ,,, | Performed by: INTERNAL MEDICINE

## 2021-10-13 DIAGNOSIS — E10.36 TYPE 1 DIABETES MELLITUS WITH DIABETIC CATARACT: Primary | ICD-10-CM

## 2021-10-13 RX ORDER — INSULIN LISPRO-AABC 100 [IU]/ML
7 INJECTION, SOLUTION SUBCUTANEOUS
Qty: 5 PEN | Refills: 0 | Status: SHIPPED | OUTPATIENT
Start: 2021-10-13 | End: 2021-11-17

## 2021-10-18 ENCOUNTER — IMMUNIZATION (OUTPATIENT)
Dept: INTERNAL MEDICINE | Facility: CLINIC | Age: 36
End: 2021-10-18
Payer: MEDICARE

## 2021-10-18 DIAGNOSIS — Z23 NEED FOR VACCINATION: Primary | ICD-10-CM

## 2021-10-18 PROCEDURE — 0002A COVID-19, MRNA, LNP-S, PF, 30 MCG/0.3 ML DOSE VACCINE: CPT | Mod: CV19,PBBFAC | Performed by: INTERNAL MEDICINE

## 2021-10-18 PROCEDURE — 91300 COVID-19, MRNA, LNP-S, PF, 30 MCG/0.3 ML DOSE VACCINE: CPT | Mod: PBBFAC | Performed by: INTERNAL MEDICINE

## 2021-11-13 ENCOUNTER — NURSE TRIAGE (OUTPATIENT)
Dept: ADMINISTRATIVE | Facility: CLINIC | Age: 36
End: 2021-11-13
Payer: MEDICARE

## 2021-11-15 ENCOUNTER — HOSPITAL ENCOUNTER (INPATIENT)
Facility: HOSPITAL | Age: 36
LOS: 1 days | Discharge: LEFT BEFORE DISCHARGE | DRG: 638 | End: 2021-11-16
Attending: EMERGENCY MEDICINE | Admitting: EMERGENCY MEDICINE
Payer: MEDICARE

## 2021-11-15 ENCOUNTER — PATIENT MESSAGE (OUTPATIENT)
Dept: ENDOCRINOLOGY | Facility: CLINIC | Age: 36
End: 2021-11-15
Payer: MEDICARE

## 2021-11-15 DIAGNOSIS — E13.10 DIABETIC KETOACIDOSIS WITHOUT COMA ASSOCIATED WITH OTHER SPECIFIED DIABETES MELLITUS: Primary | ICD-10-CM

## 2021-11-15 DIAGNOSIS — R00.0 TACHYCARDIA: ICD-10-CM

## 2021-11-15 LAB
ALBUMIN SERPL BCP-MCNC: 3.7 G/DL (ref 3.5–5.2)
ALLENS TEST: ABNORMAL
ALP SERPL-CCNC: 106 U/L (ref 55–135)
ALT SERPL W/O P-5'-P-CCNC: 27 U/L (ref 10–44)
ANION GAP SERPL CALC-SCNC: 23 MMOL/L (ref 8–16)
ANISOCYTOSIS BLD QL SMEAR: SLIGHT
AST SERPL-CCNC: 32 U/L (ref 10–40)
B-OH-BUTYR BLD STRIP-SCNC: 6.2 MMOL/L (ref 0–0.5)
BACTERIA #/AREA URNS AUTO: ABNORMAL /HPF
BASOPHILS # BLD AUTO: 0.01 K/UL (ref 0–0.2)
BASOPHILS NFR BLD: 0.1 % (ref 0–1.9)
BILIRUB SERPL-MCNC: 1.2 MG/DL (ref 0.1–1)
BILIRUB UR QL STRIP: NEGATIVE
BUN SERPL-MCNC: 25 MG/DL (ref 6–30)
BUN SERPL-MCNC: 26 MG/DL (ref 6–20)
BUN SERPL-MCNC: 39 MG/DL (ref 6–30)
CALCIUM SERPL-MCNC: 9.6 MG/DL (ref 8.7–10.5)
CHLORIDE SERPL-SCNC: 94 MMOL/L (ref 95–110)
CHLORIDE SERPL-SCNC: 94 MMOL/L (ref 95–110)
CHLORIDE SERPL-SCNC: 97 MMOL/L (ref 95–110)
CLARITY UR REFRACT.AUTO: CLEAR
CO2 SERPL-SCNC: 15 MMOL/L (ref 23–29)
COLOR UR AUTO: YELLOW
CREAT SERPL-MCNC: 1.2 MG/DL (ref 0.5–1.4)
CREAT SERPL-MCNC: 1.3 MG/DL (ref 0.5–1.4)
CREAT SERPL-MCNC: 1.8 MG/DL (ref 0.5–1.4)
CTP QC/QA: YES
DACRYOCYTES BLD QL SMEAR: ABNORMAL
DELSYS: ABNORMAL
DIFFERENTIAL METHOD: ABNORMAL
EOSINOPHIL # BLD AUTO: 0 K/UL (ref 0–0.5)
EOSINOPHIL NFR BLD: 0.1 % (ref 0–8)
ERYTHROCYTE [DISTWIDTH] IN BLOOD BY AUTOMATED COUNT: 11.9 % (ref 11.5–14.5)
ERYTHROCYTE [SEDIMENTATION RATE] IN BLOOD BY WESTERGREN METHOD: 13 MM/H
EST. GFR  (AFRICAN AMERICAN): 55.5 ML/MIN/1.73 M^2
EST. GFR  (NON AFRICAN AMERICAN): 48 ML/MIN/1.73 M^2
ESTIMATED AVG GLUCOSE: 252 MG/DL (ref 68–131)
GLUCOSE SERPL-MCNC: 454 MG/DL (ref 70–110)
GLUCOSE SERPL-MCNC: 455 MG/DL (ref 70–110)
GLUCOSE SERPL-MCNC: 459 MG/DL (ref 70–110)
GLUCOSE SERPL-MCNC: 468 MG/DL (ref 70–110)
GLUCOSE UR QL STRIP: ABNORMAL
HBA1C MFR BLD: 10.4 % (ref 4–5.6)
HCO3 UR-SCNC: 22.5 MMOL/L (ref 24–28)
HCT VFR BLD AUTO: 43.4 % (ref 40–54)
HCT VFR BLD CALC: 39 %PCV (ref 36–54)
HCT VFR BLD CALC: 41 %PCV (ref 36–54)
HGB BLD-MCNC: 14.9 G/DL (ref 14–18)
HGB UR QL STRIP: ABNORMAL
HYALINE CASTS UR QL AUTO: 3 /LPF
HYPOCHROMIA BLD QL SMEAR: ABNORMAL
IMM GRANULOCYTES # BLD AUTO: 0.04 K/UL (ref 0–0.04)
IMM GRANULOCYTES NFR BLD AUTO: 0.6 % (ref 0–0.5)
KETONES UR QL STRIP: ABNORMAL
LEUKOCYTE ESTERASE UR QL STRIP: NEGATIVE
LYMPHOCYTES # BLD AUTO: 0.7 K/UL (ref 1–4.8)
LYMPHOCYTES NFR BLD: 9.5 % (ref 18–48)
MAGNESIUM SERPL-MCNC: 2 MG/DL (ref 1.6–2.6)
MCH RBC QN AUTO: 32.2 PG (ref 27–31)
MCHC RBC AUTO-ENTMCNC: 34.3 G/DL (ref 32–36)
MCV RBC AUTO: 94 FL (ref 82–98)
MICROSCOPIC COMMENT: ABNORMAL
MODE: ABNORMAL
MONOCYTES # BLD AUTO: 0.7 K/UL (ref 0.3–1)
MONOCYTES NFR BLD: 9.8 % (ref 4–15)
NEUTROPHILS # BLD AUTO: 5.6 K/UL (ref 1.8–7.7)
NEUTROPHILS NFR BLD: 79.9 % (ref 38–73)
NITRITE UR QL STRIP: NEGATIVE
NRBC BLD-RTO: 0 /100 WBC
OVALOCYTES BLD QL SMEAR: ABNORMAL
PCO2 BLDA: 37.1 MMHG (ref 35–45)
PH SMN: 7.39 [PH] (ref 7.35–7.45)
PH UR STRIP: 5 [PH] (ref 5–8)
PHOSPHATE SERPL-MCNC: 2.9 MG/DL (ref 2.7–4.5)
PLATELET # BLD AUTO: 97 K/UL (ref 150–450)
PLATELET BLD QL SMEAR: ABNORMAL
PMV BLD AUTO: ABNORMAL FL (ref 9.2–12.9)
PO2 BLDA: 45 MMHG (ref 40–60)
POC BE: -2 MMOL/L
POC IONIZED CALCIUM: 1.16 MMOL/L (ref 1.06–1.42)
POC IONIZED CALCIUM: 1.19 MMOL/L (ref 1.06–1.42)
POC SATURATED O2: 81 % (ref 95–100)
POC TCO2 (MEASURED): 20 MMOL/L (ref 23–29)
POC TCO2 (MEASURED): 28 MMOL/L (ref 23–29)
POC TCO2: 24 MMOL/L (ref 24–29)
POCT GLUCOSE: 459 MG/DL (ref 70–110)
POCT GLUCOSE: 492 MG/DL (ref 70–110)
POCT GLUCOSE: >500 MG/DL (ref 70–110)
POIKILOCYTOSIS BLD QL SMEAR: SLIGHT
POLYCHROMASIA BLD QL SMEAR: ABNORMAL
POTASSIUM BLD-SCNC: 5 MMOL/L (ref 3.5–5.1)
POTASSIUM BLD-SCNC: 6.3 MMOL/L (ref 3.5–5.1)
POTASSIUM SERPL-SCNC: 5.4 MMOL/L (ref 3.5–5.1)
PROT SERPL-MCNC: 7.2 G/DL (ref 6–8.4)
PROT UR QL STRIP: NEGATIVE
RBC # BLD AUTO: 4.63 M/UL (ref 4.6–6.2)
RBC #/AREA URNS AUTO: 2 /HPF (ref 0–4)
SAMPLE: ABNORMAL
SARS-COV-2 RDRP RESP QL NAA+PROBE: NEGATIVE
SITE: ABNORMAL
SODIUM BLD-SCNC: 130 MMOL/L (ref 136–145)
SODIUM BLD-SCNC: 131 MMOL/L (ref 136–145)
SODIUM SERPL-SCNC: 132 MMOL/L (ref 136–145)
SP GR UR STRIP: 1.02 (ref 1–1.03)
URN SPEC COLLECT METH UR: ABNORMAL
WBC # BLD AUTO: 6.95 K/UL (ref 3.9–12.7)
WBC #/AREA URNS AUTO: 1 /HPF (ref 0–5)
YEAST UR QL AUTO: ABNORMAL

## 2021-11-15 PROCEDURE — U0002 COVID-19 LAB TEST NON-CDC: HCPCS | Performed by: STUDENT IN AN ORGANIZED HEALTH CARE EDUCATION/TRAINING PROGRAM

## 2021-11-15 PROCEDURE — 25000003 PHARM REV CODE 250: Performed by: FAMILY MEDICINE

## 2021-11-15 PROCEDURE — 80048 BASIC METABOLIC PNL TOTAL CA: CPT | Performed by: EMERGENCY MEDICINE

## 2021-11-15 PROCEDURE — 81001 URINALYSIS AUTO W/SCOPE: CPT | Performed by: FAMILY MEDICINE

## 2021-11-15 PROCEDURE — 84100 ASSAY OF PHOSPHORUS: CPT | Mod: 91 | Performed by: EMERGENCY MEDICINE

## 2021-11-15 PROCEDURE — 83735 ASSAY OF MAGNESIUM: CPT | Performed by: EMERGENCY MEDICINE

## 2021-11-15 PROCEDURE — 93005 ELECTROCARDIOGRAM TRACING: CPT

## 2021-11-15 PROCEDURE — 83036 HEMOGLOBIN GLYCOSYLATED A1C: CPT | Performed by: EMERGENCY MEDICINE

## 2021-11-15 PROCEDURE — 63600175 PHARM REV CODE 636 W HCPCS: Performed by: EMERGENCY MEDICINE

## 2021-11-15 PROCEDURE — 80047 BASIC METABLC PNL IONIZED CA: CPT | Mod: 91

## 2021-11-15 PROCEDURE — 99285 EMERGENCY DEPT VISIT HI MDM: CPT | Mod: 25

## 2021-11-15 PROCEDURE — 63600175 PHARM REV CODE 636 W HCPCS: Performed by: STUDENT IN AN ORGANIZED HEALTH CARE EDUCATION/TRAINING PROGRAM

## 2021-11-15 PROCEDURE — 99900035 HC TECH TIME PER 15 MIN (STAT)

## 2021-11-15 PROCEDURE — 12000002 HC ACUTE/MED SURGE SEMI-PRIVATE ROOM

## 2021-11-15 PROCEDURE — 99291 CRITICAL CARE FIRST HOUR: CPT | Mod: 25

## 2021-11-15 PROCEDURE — 83605 ASSAY OF LACTIC ACID: CPT | Performed by: FAMILY MEDICINE

## 2021-11-15 PROCEDURE — 96361 HYDRATE IV INFUSION ADD-ON: CPT

## 2021-11-15 PROCEDURE — 25000003 PHARM REV CODE 250: Performed by: EMERGENCY MEDICINE

## 2021-11-15 PROCEDURE — 80053 COMPREHEN METABOLIC PANEL: CPT | Performed by: STUDENT IN AN ORGANIZED HEALTH CARE EDUCATION/TRAINING PROGRAM

## 2021-11-15 PROCEDURE — 93010 EKG 12-LEAD: ICD-10-PCS | Mod: ,,, | Performed by: INTERNAL MEDICINE

## 2021-11-15 PROCEDURE — 82803 BLOOD GASES ANY COMBINATION: CPT

## 2021-11-15 PROCEDURE — 96374 THER/PROPH/DIAG INJ IV PUSH: CPT

## 2021-11-15 PROCEDURE — 93010 ELECTROCARDIOGRAM REPORT: CPT | Mod: ,,, | Performed by: INTERNAL MEDICINE

## 2021-11-15 PROCEDURE — 82010 KETONE BODYS QUAN: CPT | Performed by: STUDENT IN AN ORGANIZED HEALTH CARE EDUCATION/TRAINING PROGRAM

## 2021-11-15 PROCEDURE — 85025 COMPLETE CBC W/AUTO DIFF WBC: CPT | Performed by: STUDENT IN AN ORGANIZED HEALTH CARE EDUCATION/TRAINING PROGRAM

## 2021-11-15 RX ORDER — TALC
6 POWDER (GRAM) TOPICAL NIGHTLY PRN
Status: CANCELLED | OUTPATIENT
Start: 2021-11-15

## 2021-11-15 RX ORDER — METOCLOPRAMIDE 5 MG/1
10 TABLET ORAL EVERY 6 HOURS
Status: DISCONTINUED | OUTPATIENT
Start: 2021-11-16 | End: 2021-11-16 | Stop reason: HOSPADM

## 2021-11-15 RX ORDER — ONDANSETRON 2 MG/ML
4 INJECTION INTRAMUSCULAR; INTRAVENOUS EVERY 6 HOURS PRN
Status: DISCONTINUED | OUTPATIENT
Start: 2021-11-15 | End: 2021-11-16 | Stop reason: HOSPADM

## 2021-11-15 RX ORDER — SODIUM CHLORIDE 0.9 % (FLUSH) 0.9 %
10 SYRINGE (ML) INJECTION
Status: DISCONTINUED | OUTPATIENT
Start: 2021-11-15 | End: 2021-11-16 | Stop reason: HOSPADM

## 2021-11-15 RX ORDER — ACETAMINOPHEN 325 MG/1
650 TABLET ORAL EVERY 4 HOURS PRN
Status: DISCONTINUED | OUTPATIENT
Start: 2021-11-15 | End: 2021-11-16 | Stop reason: HOSPADM

## 2021-11-15 RX ORDER — AMOXICILLIN 250 MG
1 CAPSULE ORAL 2 TIMES DAILY
Status: DISCONTINUED | OUTPATIENT
Start: 2021-11-15 | End: 2021-11-16 | Stop reason: HOSPADM

## 2021-11-15 RX ORDER — METOCLOPRAMIDE HYDROCHLORIDE 5 MG/ML
10 INJECTION INTRAMUSCULAR; INTRAVENOUS
Status: COMPLETED | OUTPATIENT
Start: 2021-11-15 | End: 2021-11-15

## 2021-11-15 RX ORDER — TALC
6 POWDER (GRAM) TOPICAL NIGHTLY PRN
Status: DISCONTINUED | OUTPATIENT
Start: 2021-11-15 | End: 2021-11-16 | Stop reason: HOSPADM

## 2021-11-15 RX ORDER — SODIUM CHLORIDE 0.9 % (FLUSH) 0.9 %
10 SYRINGE (ML) INJECTION
Status: CANCELLED | OUTPATIENT
Start: 2021-11-15

## 2021-11-15 RX ORDER — OXYCODONE HYDROCHLORIDE 5 MG/1
5 TABLET ORAL EVERY 8 HOURS PRN
Status: DISCONTINUED | OUTPATIENT
Start: 2021-11-15 | End: 2021-11-16 | Stop reason: HOSPADM

## 2021-11-15 RX ORDER — DEXTROSE MONOHYDRATE AND SODIUM CHLORIDE 5; .45 G/100ML; G/100ML
125 INJECTION, SOLUTION INTRAVENOUS CONTINUOUS PRN
Status: DISCONTINUED | OUTPATIENT
Start: 2021-11-15 | End: 2021-11-16

## 2021-11-15 RX ORDER — SODIUM CHLORIDE 9 MG/ML
125 INJECTION, SOLUTION INTRAVENOUS CONTINUOUS
Status: DISCONTINUED | OUTPATIENT
Start: 2021-11-15 | End: 2021-11-16

## 2021-11-15 RX ADMIN — SODIUM CHLORIDE, SODIUM LACTATE, POTASSIUM CHLORIDE, AND CALCIUM CHLORIDE 1000 ML: .6; .31; .03; .02 INJECTION, SOLUTION INTRAVENOUS at 08:11

## 2021-11-15 RX ADMIN — SODIUM CHLORIDE 1000 ML: 0.9 INJECTION, SOLUTION INTRAVENOUS at 11:11

## 2021-11-15 RX ADMIN — INSULIN HUMAN 3 UNITS/HR: 1 INJECTION, SOLUTION INTRAVENOUS at 09:11

## 2021-11-15 RX ADMIN — METOCLOPRAMIDE 10 MG: 5 INJECTION, SOLUTION INTRAMUSCULAR; INTRAVENOUS at 08:11

## 2021-11-15 RX ADMIN — SODIUM CHLORIDE 1000 ML: 0.9 INJECTION, SOLUTION INTRAVENOUS at 10:11

## 2021-11-16 VITALS
DIASTOLIC BLOOD PRESSURE: 82 MMHG | RESPIRATION RATE: 18 BRPM | SYSTOLIC BLOOD PRESSURE: 131 MMHG | WEIGHT: 185.19 LBS | OXYGEN SATURATION: 99 % | HEART RATE: 98 BPM | BODY MASS INDEX: 24.54 KG/M2 | HEIGHT: 73 IN | TEMPERATURE: 97 F

## 2021-11-16 PROBLEM — M94.0 COSTOCHONDRITIS: Status: ACTIVE | Noted: 2021-11-16

## 2021-11-16 PROBLEM — E10.42 POLYNEUROPATHY DUE TO TYPE 1 DIABETES MELLITUS: Status: ACTIVE | Noted: 2018-07-31

## 2021-11-16 PROBLEM — E44.0 MODERATE MALNUTRITION: Status: ACTIVE | Noted: 2021-11-16

## 2021-11-16 PROBLEM — F43.22 ADJUSTMENT DISORDER WITH ANXIETY: Status: ACTIVE | Noted: 2021-11-16

## 2021-11-16 LAB
ANION GAP SERPL CALC-SCNC: 12 MMOL/L (ref 8–16)
ANION GAP SERPL CALC-SCNC: 20 MMOL/L (ref 8–16)
ANION GAP SERPL CALC-SCNC: 7 MMOL/L (ref 8–16)
BASOPHILS # BLD AUTO: 0.01 K/UL (ref 0–0.2)
BASOPHILS NFR BLD: 0.2 % (ref 0–1.9)
BUN SERPL-MCNC: 19 MG/DL (ref 6–20)
BUN SERPL-MCNC: 22 MG/DL (ref 6–20)
BUN SERPL-MCNC: 26 MG/DL (ref 6–20)
CALCIUM SERPL-MCNC: 8.8 MG/DL (ref 8.7–10.5)
CALCIUM SERPL-MCNC: 9 MG/DL (ref 8.7–10.5)
CALCIUM SERPL-MCNC: 9.4 MG/DL (ref 8.7–10.5)
CHLORIDE SERPL-SCNC: 100 MMOL/L (ref 95–110)
CHLORIDE SERPL-SCNC: 103 MMOL/L (ref 95–110)
CHLORIDE SERPL-SCNC: 95 MMOL/L (ref 95–110)
CO2 SERPL-SCNC: 19 MMOL/L (ref 23–29)
CO2 SERPL-SCNC: 25 MMOL/L (ref 23–29)
CO2 SERPL-SCNC: 29 MMOL/L (ref 23–29)
CREAT SERPL-MCNC: 1.3 MG/DL (ref 0.5–1.4)
CREAT SERPL-MCNC: 1.4 MG/DL (ref 0.5–1.4)
CREAT SERPL-MCNC: 1.5 MG/DL (ref 0.5–1.4)
DIFFERENTIAL METHOD: ABNORMAL
EOSINOPHIL # BLD AUTO: 0 K/UL (ref 0–0.5)
EOSINOPHIL NFR BLD: 0.3 % (ref 0–8)
ERYTHROCYTE [DISTWIDTH] IN BLOOD BY AUTOMATED COUNT: 11.3 % (ref 11.5–14.5)
EST. GFR  (AFRICAN AMERICAN): >60 ML/MIN/1.73 M^2
EST. GFR  (NON AFRICAN AMERICAN): 59.9 ML/MIN/1.73 M^2
EST. GFR  (NON AFRICAN AMERICAN): >60 ML/MIN/1.73 M^2
EST. GFR  (NON AFRICAN AMERICAN): >60 ML/MIN/1.73 M^2
GLUCOSE SERPL-MCNC: 191 MG/DL (ref 70–110)
GLUCOSE SERPL-MCNC: 224 MG/DL (ref 70–110)
GLUCOSE SERPL-MCNC: 400 MG/DL (ref 70–110)
HCT VFR BLD AUTO: 36.8 % (ref 40–54)
HGB BLD-MCNC: 12.9 G/DL (ref 14–18)
IMM GRANULOCYTES # BLD AUTO: 0.03 K/UL (ref 0–0.04)
IMM GRANULOCYTES NFR BLD AUTO: 0.5 % (ref 0–0.5)
LACTATE SERPL-SCNC: 1.5 MMOL/L (ref 0.5–2.2)
LYMPHOCYTES # BLD AUTO: 1.5 K/UL (ref 1–4.8)
LYMPHOCYTES NFR BLD: 22.5 % (ref 18–48)
MAGNESIUM SERPL-MCNC: 2.1 MG/DL (ref 1.6–2.6)
MCH RBC QN AUTO: 30.9 PG (ref 27–31)
MCHC RBC AUTO-ENTMCNC: 35.1 G/DL (ref 32–36)
MCV RBC AUTO: 88 FL (ref 82–98)
MONOCYTES # BLD AUTO: 0.8 K/UL (ref 0.3–1)
MONOCYTES NFR BLD: 11.9 % (ref 4–15)
NEUTROPHILS # BLD AUTO: 4.3 K/UL (ref 1.8–7.7)
NEUTROPHILS NFR BLD: 64.6 % (ref 38–73)
NRBC BLD-RTO: 0 /100 WBC
PHOSPHATE SERPL-MCNC: 2 MG/DL (ref 2.7–4.5)
PHOSPHATE SERPL-MCNC: 2.5 MG/DL (ref 2.7–4.5)
PHOSPHATE SERPL-MCNC: 2.8 MG/DL (ref 2.7–4.5)
PHOSPHATE SERPL-MCNC: 2.8 MG/DL (ref 2.7–4.5)
PLATELET # BLD AUTO: 148 K/UL (ref 150–450)
PMV BLD AUTO: 11.7 FL (ref 9.2–12.9)
POCT GLUCOSE: 209 MG/DL (ref 70–110)
POCT GLUCOSE: 214 MG/DL (ref 70–110)
POCT GLUCOSE: 216 MG/DL (ref 70–110)
POCT GLUCOSE: 237 MG/DL (ref 70–110)
POCT GLUCOSE: 239 MG/DL (ref 70–110)
POCT GLUCOSE: 246 MG/DL (ref 70–110)
POCT GLUCOSE: 247 MG/DL (ref 70–110)
POCT GLUCOSE: 249 MG/DL (ref 70–110)
POCT GLUCOSE: 256 MG/DL (ref 70–110)
POCT GLUCOSE: 265 MG/DL (ref 70–110)
POCT GLUCOSE: 276 MG/DL (ref 70–110)
POCT GLUCOSE: 315 MG/DL (ref 70–110)
POCT GLUCOSE: 339 MG/DL (ref 70–110)
POTASSIUM SERPL-SCNC: 3.8 MMOL/L (ref 3.5–5.1)
POTASSIUM SERPL-SCNC: 4 MMOL/L (ref 3.5–5.1)
POTASSIUM SERPL-SCNC: 4.6 MMOL/L (ref 3.5–5.1)
RBC # BLD AUTO: 4.18 M/UL (ref 4.6–6.2)
SODIUM SERPL-SCNC: 134 MMOL/L (ref 136–145)
SODIUM SERPL-SCNC: 137 MMOL/L (ref 136–145)
SODIUM SERPL-SCNC: 139 MMOL/L (ref 136–145)
WBC # BLD AUTO: 6.58 K/UL (ref 3.9–12.7)

## 2021-11-16 PROCEDURE — 63600175 PHARM REV CODE 636 W HCPCS: Performed by: GENERAL ACUTE CARE HOSPITAL

## 2021-11-16 PROCEDURE — S5010 5% DEXTROSE AND 0.45% SALINE: HCPCS | Performed by: FAMILY MEDICINE

## 2021-11-16 PROCEDURE — 99223 1ST HOSP IP/OBS HIGH 75: CPT | Mod: ,,, | Performed by: INTERNAL MEDICINE

## 2021-11-16 PROCEDURE — 84100 ASSAY OF PHOSPHORUS: CPT | Performed by: EMERGENCY MEDICINE

## 2021-11-16 PROCEDURE — 83735 ASSAY OF MAGNESIUM: CPT | Performed by: FAMILY MEDICINE

## 2021-11-16 PROCEDURE — 36415 COLL VENOUS BLD VENIPUNCTURE: CPT | Performed by: EMERGENCY MEDICINE

## 2021-11-16 PROCEDURE — 85025 COMPLETE CBC W/AUTO DIFF WBC: CPT | Performed by: FAMILY MEDICINE

## 2021-11-16 PROCEDURE — C9399 UNCLASSIFIED DRUGS OR BIOLOG: HCPCS | Performed by: GENERAL ACUTE CARE HOSPITAL

## 2021-11-16 PROCEDURE — 25000003 PHARM REV CODE 250: Performed by: FAMILY MEDICINE

## 2021-11-16 PROCEDURE — 80048 BASIC METABOLIC PNL TOTAL CA: CPT | Mod: 91 | Performed by: EMERGENCY MEDICINE

## 2021-11-16 PROCEDURE — 99223 PR INITIAL HOSPITAL CARE,LEVL III: ICD-10-PCS | Mod: ,,, | Performed by: FAMILY MEDICINE

## 2021-11-16 PROCEDURE — 25000003 PHARM REV CODE 250: Performed by: GENERAL ACUTE CARE HOSPITAL

## 2021-11-16 PROCEDURE — 99223 1ST HOSP IP/OBS HIGH 75: CPT | Mod: ,,, | Performed by: FAMILY MEDICINE

## 2021-11-16 PROCEDURE — 99223 PR INITIAL HOSPITAL CARE,LEVL III: ICD-10-PCS | Mod: ,,, | Performed by: INTERNAL MEDICINE

## 2021-11-16 RX ORDER — INSULIN ASPART 100 [IU]/ML
0-5 INJECTION, SOLUTION INTRAVENOUS; SUBCUTANEOUS
Status: DISCONTINUED | OUTPATIENT
Start: 2021-11-16 | End: 2021-11-16 | Stop reason: HOSPADM

## 2021-11-16 RX ORDER — IBUPROFEN 200 MG
16 TABLET ORAL
Status: DISCONTINUED | OUTPATIENT
Start: 2021-11-16 | End: 2021-11-16 | Stop reason: HOSPADM

## 2021-11-16 RX ORDER — IBUPROFEN 200 MG
24 TABLET ORAL
Status: DISCONTINUED | OUTPATIENT
Start: 2021-11-16 | End: 2021-11-16 | Stop reason: HOSPADM

## 2021-11-16 RX ORDER — INSULIN DEGLUDEC 100 U/ML
15 INJECTION, SOLUTION SUBCUTANEOUS DAILY
COMMUNITY
Start: 2021-10-04 | End: 2021-11-17 | Stop reason: SDUPTHER

## 2021-11-16 RX ORDER — GLUCAGON 1 MG
1 KIT INJECTION
Status: DISCONTINUED | OUTPATIENT
Start: 2021-11-16 | End: 2021-11-16 | Stop reason: HOSPADM

## 2021-11-16 RX ORDER — INSULIN ASPART 100 [IU]/ML
5 INJECTION, SOLUTION INTRAVENOUS; SUBCUTANEOUS
Status: DISCONTINUED | OUTPATIENT
Start: 2021-11-16 | End: 2021-11-16 | Stop reason: HOSPADM

## 2021-11-16 RX ORDER — HYOSCYAMINE SULFATE 0.12 MG/1
0.12 TABLET, ORALLY DISINTEGRATING ORAL 3 TIMES DAILY PRN
COMMUNITY
Start: 2021-07-23 | End: 2023-03-02

## 2021-11-16 RX ADMIN — INSULIN ASPART 4 UNITS: 100 INJECTION, SOLUTION INTRAVENOUS; SUBCUTANEOUS at 03:11

## 2021-11-16 RX ADMIN — DOCUSATE SODIUM AND SENNOSIDES 1 TABLET: 8.6; 5 TABLET, FILM COATED ORAL at 12:11

## 2021-11-16 RX ADMIN — INSULIN ASPART 5 UNITS: 100 INJECTION, SOLUTION INTRAVENOUS; SUBCUTANEOUS at 05:11

## 2021-11-16 RX ADMIN — METOCLOPRAMIDE 10 MG: 5 TABLET ORAL at 12:11

## 2021-11-16 RX ADMIN — INSULIN DETEMIR 15 UNITS: 100 INJECTION, SOLUTION SUBCUTANEOUS at 12:11

## 2021-11-16 RX ADMIN — INSULIN ASPART 2 UNITS: 100 INJECTION, SOLUTION INTRAVENOUS; SUBCUTANEOUS at 01:11

## 2021-11-16 RX ADMIN — DEXTROSE AND SODIUM CHLORIDE 125 ML/HR: 5; .45 INJECTION, SOLUTION INTRAVENOUS at 02:11

## 2021-11-16 RX ADMIN — SODIUM CHLORIDE 125 ML/HR: 0.9 INJECTION, SOLUTION INTRAVENOUS at 12:11

## 2021-11-16 RX ADMIN — METOCLOPRAMIDE 10 MG: 5 TABLET ORAL at 06:11

## 2021-11-17 DIAGNOSIS — E10.36 TYPE 1 DIABETES MELLITUS WITH DIABETIC CATARACT: ICD-10-CM

## 2021-11-17 RX ORDER — INSULIN DEGLUDEC 100 U/ML
15 INJECTION, SOLUTION SUBCUTANEOUS DAILY
Qty: 2 PEN | Refills: 0 | Status: SHIPPED | OUTPATIENT
Start: 2021-11-17 | End: 2021-11-22 | Stop reason: SDUPTHER

## 2021-11-17 RX ORDER — INSULIN LISPRO-AABC 100 [IU]/ML
5 INJECTION, SOLUTION SUBCUTANEOUS
Qty: 5 PEN | Refills: 0 | Status: SHIPPED | OUTPATIENT
Start: 2021-11-17 | End: 2021-11-22 | Stop reason: SDUPTHER

## 2021-11-18 ENCOUNTER — PATIENT MESSAGE (OUTPATIENT)
Dept: ENDOCRINOLOGY | Facility: CLINIC | Age: 36
End: 2021-11-18
Payer: MEDICARE

## 2021-11-22 ENCOUNTER — OFFICE VISIT (OUTPATIENT)
Dept: ENDOCRINOLOGY | Facility: CLINIC | Age: 36
End: 2021-11-22
Payer: MEDICARE

## 2021-11-22 VITALS
BODY MASS INDEX: 26.11 KG/M2 | WEIGHT: 197 LBS | DIASTOLIC BLOOD PRESSURE: 90 MMHG | SYSTOLIC BLOOD PRESSURE: 132 MMHG | HEIGHT: 73 IN

## 2021-11-22 DIAGNOSIS — E10.36 TYPE 1 DIABETES MELLITUS WITH DIABETIC CATARACT: ICD-10-CM

## 2021-11-22 DIAGNOSIS — E10.10 DIABETIC KETOACIDOSIS WITHOUT COMA ASSOCIATED WITH TYPE 1 DIABETES MELLITUS: ICD-10-CM

## 2021-11-22 DIAGNOSIS — E10.8 TYPE 1 DIABETES MELLITUS WITH COMPLICATIONS: ICD-10-CM

## 2021-11-22 PROCEDURE — 99999 PR PBB SHADOW E&M-EST. PATIENT-LVL IV: ICD-10-PCS | Mod: PBBFAC,,, | Performed by: NURSE PRACTITIONER

## 2021-11-22 PROCEDURE — 99999 PR PBB SHADOW E&M-EST. PATIENT-LVL IV: CPT | Mod: PBBFAC,,, | Performed by: NURSE PRACTITIONER

## 2021-11-22 PROCEDURE — 99214 PR OFFICE/OUTPT VISIT, EST, LEVL IV, 30-39 MIN: ICD-10-PCS | Mod: S$GLB,,, | Performed by: NURSE PRACTITIONER

## 2021-11-22 PROCEDURE — 99214 OFFICE O/P EST MOD 30 MIN: CPT | Mod: S$GLB,,, | Performed by: NURSE PRACTITIONER

## 2021-11-22 RX ORDER — DICYCLOMINE HYDROCHLORIDE 10 MG/1
10 CAPSULE ORAL 3 TIMES DAILY PRN
Qty: 30 CAPSULE | Refills: 2 | Status: SHIPPED | OUTPATIENT
Start: 2021-11-22 | End: 2022-07-20

## 2021-11-22 RX ORDER — INSULIN DEGLUDEC 100 U/ML
15 INJECTION, SOLUTION SUBCUTANEOUS DAILY
Qty: 2 PEN | Refills: 8 | Status: SHIPPED | OUTPATIENT
Start: 2021-11-22 | End: 2021-12-22

## 2021-11-22 RX ORDER — INSULIN DEGLUDEC 100 U/ML
15 INJECTION, SOLUTION SUBCUTANEOUS DAILY
Qty: 3 ML | Refills: 0 | Status: SHIPPED | OUTPATIENT
Start: 2021-11-22 | End: 2021-11-22 | Stop reason: SDUPTHER

## 2021-11-22 RX ORDER — INSULIN DEGLUDEC 100 U/ML
15 INJECTION, SOLUTION SUBCUTANEOUS DAILY
Qty: 2 PEN | Refills: 0 | Status: SHIPPED | OUTPATIENT
Start: 2021-11-22 | End: 2021-11-22 | Stop reason: SDUPTHER

## 2021-11-22 RX ORDER — INSULIN LISPRO-AABC 100 [IU]/ML
5 INJECTION, SOLUTION SUBCUTANEOUS
Qty: 5 PEN | Refills: 8 | Status: SHIPPED | OUTPATIENT
Start: 2021-11-22 | End: 2022-11-02

## 2021-11-22 RX ORDER — PEN NEEDLE, DIABETIC 30 GX3/16"
1 NEEDLE, DISPOSABLE MISCELLANEOUS 2 TIMES DAILY WITH MEALS
Qty: 100 EACH | Refills: 11 | Status: SHIPPED | OUTPATIENT
Start: 2021-11-22 | End: 2021-12-28

## 2021-11-22 RX ORDER — LANCETS
1 EACH MISCELLANEOUS 2 TIMES DAILY WITH MEALS
Qty: 100 EACH | Refills: 11 | Status: SHIPPED | OUTPATIENT
Start: 2021-11-22 | End: 2021-11-29 | Stop reason: SDUPTHER

## 2021-11-29 ENCOUNTER — PATIENT MESSAGE (OUTPATIENT)
Dept: ENDOCRINOLOGY | Facility: CLINIC | Age: 36
End: 2021-11-29
Payer: MEDICARE

## 2021-11-29 DIAGNOSIS — E10.36 TYPE 1 DIABETES MELLITUS WITH DIABETIC CATARACT: Primary | ICD-10-CM

## 2021-11-29 DIAGNOSIS — E10.8 TYPE 1 DIABETES MELLITUS WITH COMPLICATIONS: ICD-10-CM

## 2021-11-29 RX ORDER — LISINOPRIL 5 MG/1
5 TABLET ORAL DAILY
Qty: 90 TABLET | Refills: 3 | Status: SHIPPED | OUTPATIENT
Start: 2021-11-29 | End: 2021-12-28

## 2021-11-30 ENCOUNTER — PATIENT MESSAGE (OUTPATIENT)
Dept: ENDOCRINOLOGY | Facility: CLINIC | Age: 36
End: 2021-11-30
Payer: MEDICARE

## 2021-12-09 ENCOUNTER — TELEPHONE (OUTPATIENT)
Dept: ADMINISTRATIVE | Facility: HOSPITAL | Age: 36
End: 2021-12-09
Payer: MEDICARE

## 2021-12-12 ENCOUNTER — PATIENT MESSAGE (OUTPATIENT)
Dept: ENDOCRINOLOGY | Facility: CLINIC | Age: 36
End: 2021-12-12
Payer: MEDICARE

## 2021-12-20 DIAGNOSIS — E10.36 TYPE 1 DIABETES MELLITUS WITH DIABETIC CATARACT: ICD-10-CM

## 2021-12-28 ENCOUNTER — OFFICE VISIT (OUTPATIENT)
Dept: ENDOCRINOLOGY | Facility: CLINIC | Age: 36
End: 2021-12-28
Payer: MEDICARE

## 2021-12-28 VITALS
OXYGEN SATURATION: 98 % | DIASTOLIC BLOOD PRESSURE: 78 MMHG | BODY MASS INDEX: 26.99 KG/M2 | WEIGHT: 203.63 LBS | SYSTOLIC BLOOD PRESSURE: 150 MMHG | HEIGHT: 73 IN | HEART RATE: 101 BPM

## 2021-12-28 DIAGNOSIS — E10.36 TYPE 1 DIABETES MELLITUS WITH DIABETIC CATARACT: ICD-10-CM

## 2021-12-28 DIAGNOSIS — E10.8 TYPE 1 DIABETES MELLITUS WITH COMPLICATIONS: ICD-10-CM

## 2021-12-28 DIAGNOSIS — E10.10 DIABETIC KETOACIDOSIS WITHOUT COMA ASSOCIATED WITH TYPE 1 DIABETES MELLITUS: ICD-10-CM

## 2021-12-28 PROCEDURE — 99214 PR OFFICE/OUTPT VISIT, EST, LEVL IV, 30-39 MIN: ICD-10-PCS | Mod: S$GLB,,, | Performed by: NURSE PRACTITIONER

## 2021-12-28 PROCEDURE — 99214 OFFICE O/P EST MOD 30 MIN: CPT | Mod: S$GLB,,, | Performed by: NURSE PRACTITIONER

## 2021-12-28 PROCEDURE — 99999 PR PBB SHADOW E&M-EST. PATIENT-LVL IV: ICD-10-PCS | Mod: PBBFAC,,, | Performed by: NURSE PRACTITIONER

## 2021-12-28 PROCEDURE — 3078F PR MOST RECENT DIASTOLIC BLOOD PRESSURE < 80 MM HG: ICD-10-PCS | Mod: CPTII,S$GLB,, | Performed by: NURSE PRACTITIONER

## 2021-12-28 PROCEDURE — 1159F MED LIST DOCD IN RCRD: CPT | Mod: CPTII,S$GLB,, | Performed by: NURSE PRACTITIONER

## 2021-12-28 PROCEDURE — 3077F SYST BP >= 140 MM HG: CPT | Mod: CPTII,S$GLB,, | Performed by: NURSE PRACTITIONER

## 2021-12-28 PROCEDURE — 1159F PR MEDICATION LIST DOCUMENTED IN MEDICAL RECORD: ICD-10-PCS | Mod: CPTII,S$GLB,, | Performed by: NURSE PRACTITIONER

## 2021-12-28 PROCEDURE — 1160F PR REVIEW ALL MEDS BY PRESCRIBER/CLIN PHARMACIST DOCUMENTED: ICD-10-PCS | Mod: CPTII,S$GLB,, | Performed by: NURSE PRACTITIONER

## 2021-12-28 PROCEDURE — 3077F PR MOST RECENT SYSTOLIC BLOOD PRESSURE >= 140 MM HG: ICD-10-PCS | Mod: CPTII,S$GLB,, | Performed by: NURSE PRACTITIONER

## 2021-12-28 PROCEDURE — 1160F RVW MEDS BY RX/DR IN RCRD: CPT | Mod: CPTII,S$GLB,, | Performed by: NURSE PRACTITIONER

## 2021-12-28 PROCEDURE — 3046F PR MOST RECENT HEMOGLOBIN A1C LEVEL > 9.0%: ICD-10-PCS | Mod: CPTII,S$GLB,, | Performed by: NURSE PRACTITIONER

## 2021-12-28 PROCEDURE — 4010F ACE/ARB THERAPY RXD/TAKEN: CPT | Mod: CPTII,S$GLB,, | Performed by: NURSE PRACTITIONER

## 2021-12-28 PROCEDURE — 3008F BODY MASS INDEX DOCD: CPT | Mod: CPTII,S$GLB,, | Performed by: NURSE PRACTITIONER

## 2021-12-28 PROCEDURE — 4010F PR ACE/ARB THEARPY RXD/TAKEN: ICD-10-PCS | Mod: CPTII,S$GLB,, | Performed by: NURSE PRACTITIONER

## 2021-12-28 PROCEDURE — 99999 PR PBB SHADOW E&M-EST. PATIENT-LVL IV: CPT | Mod: PBBFAC,,, | Performed by: NURSE PRACTITIONER

## 2021-12-28 PROCEDURE — 3008F PR BODY MASS INDEX (BMI) DOCUMENTED: ICD-10-PCS | Mod: CPTII,S$GLB,, | Performed by: NURSE PRACTITIONER

## 2021-12-28 PROCEDURE — 3046F HEMOGLOBIN A1C LEVEL >9.0%: CPT | Mod: CPTII,S$GLB,, | Performed by: NURSE PRACTITIONER

## 2021-12-28 PROCEDURE — 3078F DIAST BP <80 MM HG: CPT | Mod: CPTII,S$GLB,, | Performed by: NURSE PRACTITIONER

## 2021-12-28 RX ORDER — SYRINGE,SAFETY WITH NEEDLE,1ML 25GX1"
SYRINGE (EA) MISCELLANEOUS
Qty: 200 EACH | Refills: 4 | Status: SHIPPED | OUTPATIENT
Start: 2021-12-28 | End: 2021-12-28 | Stop reason: SDUPTHER

## 2021-12-28 RX ORDER — INSULIN DEGLUDEC 100 U/ML
13 INJECTION, SOLUTION SUBCUTANEOUS DAILY
Qty: 3 ML | Refills: 7 | Status: SHIPPED | OUTPATIENT
Start: 2021-12-28 | End: 2023-01-09 | Stop reason: SDUPTHER

## 2021-12-28 RX ORDER — LISINOPRIL 10 MG/1
10 TABLET ORAL DAILY
Qty: 90 TABLET | Refills: 3 | Status: SHIPPED | OUTPATIENT
Start: 2021-12-28 | End: 2021-12-28 | Stop reason: SDUPTHER

## 2022-01-03 ENCOUNTER — LAB VISIT (OUTPATIENT)
Dept: LAB | Facility: HOSPITAL | Age: 37
End: 2022-01-03
Payer: MEDICARE

## 2022-01-03 DIAGNOSIS — E10.36 TYPE 1 DIABETES MELLITUS WITH DIABETIC CATARACT: ICD-10-CM

## 2022-01-03 LAB
ALBUMIN SERPL BCP-MCNC: 3.8 G/DL (ref 3.5–5.2)
ALP SERPL-CCNC: 95 U/L (ref 55–135)
ALT SERPL W/O P-5'-P-CCNC: 17 U/L (ref 10–44)
ANION GAP SERPL CALC-SCNC: 6 MMOL/L (ref 8–16)
AST SERPL-CCNC: 23 U/L (ref 10–40)
BILIRUB SERPL-MCNC: 0.4 MG/DL (ref 0.1–1)
BUN SERPL-MCNC: 17 MG/DL (ref 6–20)
CALCIUM SERPL-MCNC: 9.4 MG/DL (ref 8.7–10.5)
CHLORIDE SERPL-SCNC: 100 MMOL/L (ref 95–110)
CO2 SERPL-SCNC: 30 MMOL/L (ref 23–29)
CREAT SERPL-MCNC: 1.2 MG/DL (ref 0.5–1.4)
EST. GFR  (AFRICAN AMERICAN): >60 ML/MIN/1.73 M^2
EST. GFR  (NON AFRICAN AMERICAN): >60 ML/MIN/1.73 M^2
ESTIMATED AVG GLUCOSE: 220 MG/DL (ref 68–131)
GLUCOSE SERPL-MCNC: 249 MG/DL (ref 70–110)
HBA1C MFR BLD: 9.3 % (ref 4–5.6)
POTASSIUM SERPL-SCNC: 5.1 MMOL/L (ref 3.5–5.1)
PROT SERPL-MCNC: 6.6 G/DL (ref 6–8.4)
SODIUM SERPL-SCNC: 136 MMOL/L (ref 136–145)
TSH SERPL DL<=0.005 MIU/L-ACNC: 1.02 UIU/ML (ref 0.4–4)

## 2022-01-03 PROCEDURE — 80053 COMPREHEN METABOLIC PANEL: CPT | Performed by: NURSE PRACTITIONER

## 2022-01-03 PROCEDURE — 83036 HEMOGLOBIN GLYCOSYLATED A1C: CPT | Performed by: NURSE PRACTITIONER

## 2022-01-03 PROCEDURE — 83516 IMMUNOASSAY NONANTIBODY: CPT | Performed by: NURSE PRACTITIONER

## 2022-01-03 PROCEDURE — 84443 ASSAY THYROID STIM HORMONE: CPT | Performed by: NURSE PRACTITIONER

## 2022-01-04 ENCOUNTER — PATIENT MESSAGE (OUTPATIENT)
Dept: ENDOCRINOLOGY | Facility: CLINIC | Age: 37
End: 2022-01-04
Payer: MEDICARE

## 2022-01-04 NOTE — TELEPHONE ENCOUNTER
Results for DINORAH ROTH (MRN [id number]) as of 1/4/2022 07:00   Ref. Range 1/3/2022 13:59   Sodium Latest Ref Range: 136 - 145 mmol/L 136   Potassium Latest Ref Range: 3.5 - 5.1 mmol/L 5.1   Chloride Latest Ref Range: 95 - 110 mmol/L 100   CO2 Latest Ref Range: 23 - 29 mmol/L 30 (H)   Anion Gap Latest Ref Range: 8 - 16 mmol/L 6 (L)   BUN Latest Ref Range: 6 - 20 mg/dL 17   Creatinine Latest Ref Range: 0.5 - 1.4 mg/dL 1.2   eGFR if non African American Latest Ref Range: >60 mL/min/1.73 m^2 >60.0   eGFR if African American Latest Ref Range: >60 mL/min/1.73 m^2 >60.0   Glucose Latest Ref Range: 70 - 110 mg/dL 249 (H)   Calcium Latest Ref Range: 8.7 - 10.5 mg/dL 9.4   Alkaline Phosphatase Latest Ref Range: 55 - 135 U/L 95   PROTEIN TOTAL Latest Ref Range: 6.0 - 8.4 g/dL 6.6   Albumin Latest Ref Range: 3.5 - 5.2 g/dL 3.8   BILIRUBIN TOTAL Latest Ref Range: 0.1 - 1.0 mg/dL 0.4   AST Latest Ref Range: 10 - 40 U/L 23   ALT Latest Ref Range: 10 - 44 U/L 17   Hemoglobin A1C External Latest Ref Range: 4.0 - 5.6 % 9.3 (H)   Estimated Avg Glucose Latest Ref Range: 68 - 131 mg/dL 220 (H)   TSH Latest Ref Range: 0.400 - 4.000 uIU/mL 1.016        - Your kidney and liver function were normal   - Your electrolytes were normal  - Your thyroid level was normal   - Your glucose on this day was 249  - Your A1c remains elevated at 9.3% but improved from 10.4%  - Your CO2 was a little high- we will watch this.

## 2022-01-07 ENCOUNTER — PATIENT MESSAGE (OUTPATIENT)
Dept: ENDOCRINOLOGY | Facility: CLINIC | Age: 37
End: 2022-01-07
Payer: MEDICARE

## 2022-01-07 LAB — TTG IGA SER-ACNC: 7 UNITS

## 2022-01-22 ENCOUNTER — PATIENT MESSAGE (OUTPATIENT)
Dept: ENDOCRINOLOGY | Facility: CLINIC | Age: 37
End: 2022-01-22
Payer: MEDICARE

## 2022-03-18 ENCOUNTER — PATIENT MESSAGE (OUTPATIENT)
Dept: DIABETES | Facility: CLINIC | Age: 37
End: 2022-03-18
Payer: MEDICARE

## 2022-03-18 ENCOUNTER — PATIENT MESSAGE (OUTPATIENT)
Dept: ENDOCRINOLOGY | Facility: CLINIC | Age: 37
End: 2022-03-18
Payer: MEDICARE

## 2022-03-25 ENCOUNTER — TELEPHONE (OUTPATIENT)
Dept: INTERNAL MEDICINE | Facility: CLINIC | Age: 37
End: 2022-03-25
Payer: MEDICARE

## 2022-03-25 NOTE — TELEPHONE ENCOUNTER
Pt was scheduled with Dr. Collazo on 3/28/22 for an est care visit.    Spoke with pt to advise that Dr. Collazo will not be able to continue his care after April as he is retiring.    Pt verbalized understanding and advised that he needed to cancel the appt due to his work schedule.    But requested that we locate an appt on the [facility] near his job at 4 pm.    Advised that the latest appt is 3:20 pm.    Verbalized understanding and advised that he will call again to schedule once he changes around his work schedule.    appt with Zay canceled.

## 2022-04-07 DIAGNOSIS — E10.36 TYPE 1 DIABETES MELLITUS WITH DIABETIC CATARACT: ICD-10-CM

## 2022-04-07 RX ORDER — BLOOD-GLUCOSE SENSOR
3 EACH MISCELLANEOUS CONTINUOUS
Qty: 3 EACH | Status: SHIPPED | OUTPATIENT
Start: 2022-04-07 | End: 2023-01-09 | Stop reason: SDUPTHER

## 2022-04-07 RX ORDER — BLOOD-GLUCOSE TRANSMITTER
1 EACH MISCELLANEOUS CONTINUOUS
Qty: 1 EACH | Status: SHIPPED | OUTPATIENT
Start: 2022-04-07 | End: 2023-01-09 | Stop reason: SDUPTHER

## 2022-09-02 ENCOUNTER — PATIENT MESSAGE (OUTPATIENT)
Dept: ENDOCRINOLOGY | Facility: CLINIC | Age: 37
End: 2022-09-02
Payer: MEDICARE

## 2022-12-30 DIAGNOSIS — E10.36 TYPE 1 DIABETES MELLITUS WITH DIABETIC CATARACT: ICD-10-CM

## 2023-01-05 ENCOUNTER — TELEPHONE (OUTPATIENT)
Dept: ENDOCRINOLOGY | Facility: CLINIC | Age: 38
End: 2023-01-05
Payer: MEDICARE

## 2023-01-11 NOTE — PROGRESS NOTES
Subjective:      Patient ID: Andrzej Sinclair is a 37 y.o. male.    Chief Complaint:  No chief complaint on file.    History of Present Illness  Andrzej Sinclair presents today for follow up of type 1 diabetes. Previous patient of mine. Last visit in Dec 2021.    This is a MyChart video visit.    The patient location is: at home  The chief complaint leading to consultation is: diabetes   Visit type: Virtual visit with synchronous audio and video  Total time spent with patient: 28 minutes  Each patient to whom he or she provides medical services by telemedicine is:  (1) informed of the relationship between the physician and patient and the respective role of any other health care provider with respect to management of the patient; and (2) notified that he or she may decline to receive medical services by telemedicine and may withdraw from such care at any time.    He has been lost to follow up in the past contributing to his DKA admissions.    He was seeing seeing Dr. Tijerina (PCP) for medication refills. Was seeing endo in the past but not in a while -- Dr. Eugene.     DKA in   April 2021  June 2021  Aug 2021  Nov 2021-- AMA-- due to missed doses of insulin when his stomach is hurting    Denies recent admissions for DKA  States he is still having GI issues- now on omeprazole. Unable to find GI that takes insurance.     Lost to follow up.  States he has lost 30 pounds over the last 3 months.    With regards to the diabetes:    Diagnosed with Type 1 DM at age 20. 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  He is changing needle every time and rotating sites.   He is using syringes with pens as he feels that insulin does not absorb as well with needles.     Missed doses-denies    Other medications tried:  Levemir   Humalog     He is back on Dexcom  We do not have share code  States blood sugars average 170  States highest 320 in the middle of the night    He is aware of carb counting    States he feels poorly with BG around 100    Hypoglycemic event- overnight below 55  Yes, did not need assistance   Knows how to correct with 15 grams of carbs- juice, coke, or a peppermint.     Dietary recall:  Eats 3 meals a day.   1st meal 7:30  2nd meal 11AM  3rd meal 4-5 PM  Snacks: sometimes snacks at bedtime; snacks on fruit and gives insulin  ETOH: none     Exercise -  He is walking for exercise with light weights.     Education - last visit: March 2021-insurance does not cover    Has a Medic alert tag- given information today  Glucagon/Baqsimi-- +  He is working at [employer].    Diabetes Management Status  Statin: Not taking  ACE/ARB: taking    He is interested in pump therapy with a CGM. He does have ketone strips at home.     Lab Results   Component Value Date    HGBA1C 9.3 (H) 01/03/2022    HGBA1C 10.4 (H) 11/15/2021    HGBA1C 9.6 (H) 03/01/2021     Screening or Prevention Patient's value Goal Complete/Controlled?   HgA1C Testing and Control   Lab Results   Component Value Date    HGBA1C 9.3 (H) 01/03/2022      Annually/Less than 8% No   Lipid profile Most Recent Lipid Panel Health Maintenance Topic Completion: Not Found Annually No   LDL control No results found for: LDLCALC Annually/Less than 100 mg/dl  No   Nephropathy screening No results found for: LABMICR  Lab Results   Component Value Date    PROTEINUA Negative 11/15/2021    Annually Yes   Blood pressure BP Readings from Last 1 Encounters:   12/28/21 (!) 150/78    Less than 140/90 No   Dilated retinal exam Most Recent Eye Exam Date: Not Found Annually No    Foot exam   : 03/10/2021 Annually No     Review of Systems   Constitutional:  Negative for fatigue and unexpected weight change.   Eyes:  Negative for visual disturbance.   Gastrointestinal:  Negative for nausea and vomiting.   Endocrine: Negative for polydipsia, polyphagia and polyuria.   Objective:   Physical Exam  Neurological:      Mental Status: He is alert.     There were no vitals taken for this visit.     Lab Review:   Lab Results   Component Value Date    HGBA1C 9.3 (H) 01/03/2022    HGBA1C 10.4 (H) 11/15/2021    HGBA1C 9.6 (H) 03/01/2021      No results found for: CHOL, HDL, LDLCALC, TRIG, CHOLHDL  Lab Results   Component Value Date     01/03/2022    K 5.1 01/03/2022     01/03/2022    CO2 30 (H) 01/03/2022     (H) 01/03/2022    BUN 17 01/03/2022    CREATININE 1.2 01/03/2022    CALCIUM 9.4 01/03/2022    PROT 6.6 01/03/2022    ALBUMIN 3.8 01/03/2022    BILITOT 0.4 01/03/2022    ALKPHOS 95 01/03/2022    AST 23 01/03/2022    ALT 17 01/03/2022    ANIONGAP 6 (L) 01/03/2022    ESTGFRAFRICA >60.0 01/03/2022    EGFRNONAA >60.0 01/03/2022    TSH 1.016 01/03/2022     No results found for: [id number]  Assessment and Plan     1. Type 1 diabetes mellitus with complications  Comprehensive Metabolic Panel    Microalbumin/Creatinine Ratio, Urine    Hemoglobin A1C    Lipid Panel    insulin pump cart,automated,BT (OMNIPOD 5 G6 PODS, GEN 5,) Crtg    insulin pump cart,auto,BT-cntr (OMNIPOD 5 G6 INTRO KIT, GEN 5,) Crtg    DISCONTINUED: insulin pump cart,cont BT-cntr (OMNIPOD DASH INTRO KIT, GEN 4,) Crtg      2. Diabetic ketoacidosis without coma associated with type 1 diabetes mellitus        3. 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 Not thyroid related.  Likely due to diabetes. Encouraged to follow up with PCP  Increase Tresiba 17 units daily  Change Lyumjev to 7 units before each meal + SSI   With using correction scale:  MDD of:   180-230: +1 unit  231-280: +2 units  281-330: +3 units  331-380: +4 units  >380: +5 units    Continue giving Lyumjev with snacks.   He is interested in pump therapy but insurance does not cover diabetes education. We discussed different types of pump therapy and he would prefer omnipod 5. Will call in and have him trained.     Pump settings will be:  Isf 50   Insulin to carb ratio 1:15  Basal 0.72  He agrees to send dexcom share code    Has glucagon  Has ketone strips    Hold novolog if blood sugar is less than 70 and recheck BG 10 minutes after eating and give novolog as above      Check labs on RTC  BP slightly elevated today-- increase lisinopril to 10 mg daily for renal protection     -- Reviewed patient's current insulin regimen. Clarified proper insulin dose and timing in relation to meals, etc. Insulin injection sites and proper rotation instructed.    -- Advised frequent self blood glucose monitoring.  Patient encouraged to document glucose results and bring them to every clinic visit    -- Hypoglycemia precautions discussed. Instructed on precautions before driving.    -- Call for Bg repeatedly < 90 or > 180.   -- Close adherence to lifestyle changes recommended.   -- Periodic follow ups for eye evaluations, foot care and dental care suggested.      Diabetic ketoacidosis without coma associated with type 1 diabetes mellitus  As above    Follow up in about 3 months (around 4/13/2023).    I spent 28 minutes face-to-face with the patient, over half of the visit was spent on counseling and/or coordinating the care of the patient.    Counseling includes:  Diagnostic results, impressions, recommendations   Prognosis   Risk and benefits of management/treatment options   Instructions for management treatment and or follow-up    Importance of compliance with management   Risk factor reduction   Patient education

## 2023-01-12 NOTE — ASSESSMENT & PLAN NOTE
A1c above goal at 10%  A1c goal <7% without hypoglycemia    Medication changes:   A1c above goal   He is losing weight. Not thyroid related. Likely due to diabetes. Encouraged to follow up with PCP  Increase Tresiba 17 units daily  Change Lyumjev to 7 units before each meal + SSI   With using correction scale:  MDD of:   180-230: +1 unit  231-280: +2 units  281-330: +3 units  331-380: +4 units  >380: +5 units    Continue giving Lyumjev with snacks.   He is interested in pump therapy but insurance does not cover diabetes education. We discussed different types of pump therapy and he would prefer omnipod 5. Will call in and have him trained.     Pump settings will be:  Isf 50   Insulin to carb ratio 1:15  Basal 0.72  He agrees to send dexcom share code    Has glucagon  Has ketone strips    Hold novolog if blood sugar is less than 70 and recheck BG 10 minutes after eating and give novolog as above      Check labs on RTC  BP slightly elevated today-- increase lisinopril to 10 mg daily for renal protection     -- Reviewed patient's current insulin regimen. Clarified proper insulin dose and timing in relation to meals, etc. Insulin injection sites and proper rotation instructed.    -- Advised frequent self blood glucose monitoring.  Patient encouraged to document glucose results and bring them to every clinic visit    -- Hypoglycemia precautions discussed. Instructed on precautions before driving.    -- Call for Bg repeatedly < 90 or > 180.   -- Close adherence to lifestyle changes recommended.   -- Periodic follow ups for eye evaluations, foot care and dental care suggested.

## 2023-01-13 ENCOUNTER — OFFICE VISIT (OUTPATIENT)
Dept: ENDOCRINOLOGY | Facility: CLINIC | Age: 38
End: 2023-01-13
Payer: MEDICARE

## 2023-01-13 ENCOUNTER — PATIENT MESSAGE (OUTPATIENT)
Dept: ENDOCRINOLOGY | Facility: CLINIC | Age: 38
End: 2023-01-13

## 2023-01-13 DIAGNOSIS — E10.8 TYPE 1 DIABETES MELLITUS WITH COMPLICATIONS: ICD-10-CM

## 2023-01-13 DIAGNOSIS — E10.36 TYPE 1 DIABETES MELLITUS WITH DIABETIC CATARACT: ICD-10-CM

## 2023-01-13 DIAGNOSIS — E10.10 DIABETIC KETOACIDOSIS WITHOUT COMA ASSOCIATED WITH TYPE 1 DIABETES MELLITUS: ICD-10-CM

## 2023-01-13 PROCEDURE — 4010F ACE/ARB THERAPY RXD/TAKEN: CPT | Mod: CPTII,95,, | Performed by: NURSE PRACTITIONER

## 2023-01-13 PROCEDURE — 4010F PR ACE/ARB THEARPY RXD/TAKEN: ICD-10-PCS | Mod: CPTII,95,, | Performed by: NURSE PRACTITIONER

## 2023-01-13 PROCEDURE — 99214 OFFICE O/P EST MOD 30 MIN: CPT | Mod: 95,,, | Performed by: NURSE PRACTITIONER

## 2023-01-13 PROCEDURE — 99214 PR OFFICE/OUTPT VISIT, EST, LEVL IV, 30-39 MIN: ICD-10-PCS | Mod: 95,,, | Performed by: NURSE PRACTITIONER

## 2023-01-13 PROCEDURE — 1160F RVW MEDS BY RX/DR IN RCRD: CPT | Mod: CPTII,95,, | Performed by: NURSE PRACTITIONER

## 2023-01-13 PROCEDURE — 1160F PR REVIEW ALL MEDS BY PRESCRIBER/CLIN PHARMACIST DOCUMENTED: ICD-10-PCS | Mod: CPTII,95,, | Performed by: NURSE PRACTITIONER

## 2023-01-13 PROCEDURE — 1159F PR MEDICATION LIST DOCUMENTED IN MEDICAL RECORD: ICD-10-PCS | Mod: CPTII,95,, | Performed by: NURSE PRACTITIONER

## 2023-01-13 PROCEDURE — 1159F MED LIST DOCD IN RCRD: CPT | Mod: CPTII,95,, | Performed by: NURSE PRACTITIONER

## 2023-01-13 RX ORDER — INSULIN PMP CART,AUT,G6/7,CNTR
1 EACH SUBCUTANEOUS ONCE
Qty: 1 EACH | Refills: 0 | Status: SHIPPED | OUTPATIENT
Start: 2023-01-13 | End: 2024-01-02

## 2023-01-13 RX ORDER — INSULIN PMP CART,AUT,G6/7,CNTR
10 EACH SUBCUTANEOUS
Qty: 10 EACH | Refills: 3 | Status: SHIPPED | OUTPATIENT
Start: 2023-01-13 | End: 2023-02-12

## 2023-01-13 RX ORDER — INSULIN PUMP CONTROLLER
1 EACH MISCELLANEOUS ONCE
Qty: 1 EACH | Refills: 0 | Status: SHIPPED | OUTPATIENT
Start: 2023-01-13 | End: 2023-01-13

## 2023-01-13 RX ORDER — INSULIN DEGLUDEC 100 U/ML
17 INJECTION, SOLUTION SUBCUTANEOUS DAILY
Qty: 2 PEN | Refills: 4 | Status: SHIPPED | OUTPATIENT
Start: 2023-01-13 | End: 2023-03-02 | Stop reason: SDUPTHER

## 2023-01-13 NOTE — PATIENT INSTRUCTIONS
Diabetes    A1c above goal   He is losing weight. Not thyroid related. Likely due to diabetes. Encouraged to follow up with PCP  Increase Tresiba 17 units daily  Change Lyumjev to 7 units before each meal + SSI   With using correction scale:  MDD of:   180-230: +1 unit  231-280: +2 units  281-330: +3 units  331-380: +4 units  >380: +5 units    Continue giving Lyumjev with snacks.   He is interested in pump therapy but insurance does not cover diabetes education. We discussed different types of pump therapy and he would prefer omnipod 5. Will call in and have him trained.     Pump settings will be:  Isf 50   Insulin to carb ratio 1:15  Basal 0.72    You will use your cell phone to monitor your blood sugars. Please download the dexcom G6 juan manuel to monitor your blood sugars. You will create a profile with a username and password.  Please also download the dexcom clarity juan manuel. You will use the profile information to log in to the clarity juan manuel. The dexcom clarity juan manuel will offer a share code so I can download your data as necessary. Send me the clarity code by going to profile, authorize sharing, generate code. You will use the profile information to log in to the clarity juan manuel. The dexcom clarity juan manuel will offer a share code so I can download your data as necessary. Your loved ones may also download the dexcom follow juan manuel to monitor your blood sugars from afar. Please let us know if you have any questions.

## 2023-01-29 ENCOUNTER — HOSPITAL ENCOUNTER (EMERGENCY)
Facility: HOSPITAL | Age: 38
Discharge: HOME | End: 2023-01-30
Attending: EMERGENCY MEDICINE
Payer: MEDICARE

## 2023-01-29 DIAGNOSIS — R10.9 FLANK PAIN: Primary | ICD-10-CM

## 2023-01-29 LAB
B-OH-BUTYR BLD STRIP-SCNC: 0.2 MMOL/L (ref 0–0.5)
BASOPHILS # BLD AUTO: 0.02 K/UL (ref 0–0.2)
BASOPHILS NFR BLD: 0.6 % (ref 0–1.9)
DIFFERENTIAL METHOD: ABNORMAL
EOSINOPHIL # BLD AUTO: 0.1 K/UL (ref 0–0.5)
EOSINOPHIL NFR BLD: 1.5 % (ref 0–8)
ERYTHROCYTE [DISTWIDTH] IN BLOOD BY AUTOMATED COUNT: 12.3 % (ref 11.5–14.5)
HCT VFR BLD AUTO: 38.4 % (ref 40–54)
HGB BLD-MCNC: 13 G/DL (ref 14–18)
IMM GRANULOCYTES # BLD AUTO: 0 K/UL (ref 0–0.04)
IMM GRANULOCYTES NFR BLD AUTO: 0 % (ref 0–0.5)
LYMPHOCYTES # BLD AUTO: 1.5 K/UL (ref 1–4.8)
LYMPHOCYTES NFR BLD: 44.2 % (ref 18–48)
MCH RBC QN AUTO: 31 PG (ref 27–31)
MCHC RBC AUTO-ENTMCNC: 33.9 G/DL (ref 32–36)
MCV RBC AUTO: 91 FL (ref 82–98)
MONOCYTES # BLD AUTO: 0.4 K/UL (ref 0.3–1)
MONOCYTES NFR BLD: 12 % (ref 4–15)
NEUTROPHILS # BLD AUTO: 1.4 K/UL (ref 1.8–7.7)
NEUTROPHILS NFR BLD: 41.7 % (ref 38–73)
NRBC BLD-RTO: 0 /100 WBC
PLATELET # BLD AUTO: 175 K/UL (ref 150–450)
PMV BLD AUTO: 11.3 FL (ref 9.2–12.9)
POCT GLUCOSE: 344 MG/DL (ref 70–110)
RBC # BLD AUTO: 4.2 M/UL (ref 4.6–6.2)
WBC # BLD AUTO: 3.42 K/UL (ref 3.9–12.7)

## 2023-01-29 PROCEDURE — 83930 ASSAY OF BLOOD OSMOLALITY: CPT | Performed by: STUDENT IN AN ORGANIZED HEALTH CARE EDUCATION/TRAINING PROGRAM

## 2023-01-29 PROCEDURE — 87389 HIV-1 AG W/HIV-1&-2 AB AG IA: CPT | Performed by: PHYSICIAN ASSISTANT

## 2023-01-29 PROCEDURE — 99285 EMERGENCY DEPT VISIT HI MDM: CPT | Mod: 25

## 2023-01-29 PROCEDURE — 83690 ASSAY OF LIPASE: CPT | Performed by: STUDENT IN AN ORGANIZED HEALTH CARE EDUCATION/TRAINING PROGRAM

## 2023-01-29 PROCEDURE — 80053 COMPREHEN METABOLIC PANEL: CPT | Performed by: STUDENT IN AN ORGANIZED HEALTH CARE EDUCATION/TRAINING PROGRAM

## 2023-01-29 PROCEDURE — 63600175 PHARM REV CODE 636 W HCPCS: Performed by: STUDENT IN AN ORGANIZED HEALTH CARE EDUCATION/TRAINING PROGRAM

## 2023-01-29 PROCEDURE — 99284 PR EMERGENCY DEPT VISIT,LEVEL IV: ICD-10-PCS | Mod: ,,, | Performed by: EMERGENCY MEDICINE

## 2023-01-29 PROCEDURE — 85025 COMPLETE CBC W/AUTO DIFF WBC: CPT | Performed by: STUDENT IN AN ORGANIZED HEALTH CARE EDUCATION/TRAINING PROGRAM

## 2023-01-29 PROCEDURE — 82010 KETONE BODYS QUAN: CPT | Performed by: STUDENT IN AN ORGANIZED HEALTH CARE EDUCATION/TRAINING PROGRAM

## 2023-01-29 PROCEDURE — 99284 EMERGENCY DEPT VISIT MOD MDM: CPT | Mod: ,,, | Performed by: EMERGENCY MEDICINE

## 2023-01-29 PROCEDURE — 86803 HEPATITIS C AB TEST: CPT | Performed by: PHYSICIAN ASSISTANT

## 2023-01-29 PROCEDURE — 96374 THER/PROPH/DIAG INJ IV PUSH: CPT

## 2023-01-29 PROCEDURE — 96361 HYDRATE IV INFUSION ADD-ON: CPT

## 2023-01-29 PROCEDURE — 81001 URINALYSIS AUTO W/SCOPE: CPT | Performed by: STUDENT IN AN ORGANIZED HEALTH CARE EDUCATION/TRAINING PROGRAM

## 2023-01-29 PROCEDURE — 94761 N-INVAS EAR/PLS OXIMETRY MLT: CPT

## 2023-01-29 RX ORDER — MORPHINE SULFATE 4 MG/ML
4 INJECTION, SOLUTION INTRAMUSCULAR; INTRAVENOUS
Status: COMPLETED | OUTPATIENT
Start: 2023-01-29 | End: 2023-01-29

## 2023-01-29 RX ADMIN — MORPHINE SULFATE 4 MG: 4 INJECTION INTRAVENOUS at 11:01

## 2023-01-29 RX ADMIN — IOHEXOL 100 ML: 350 INJECTION, SOLUTION INTRAVENOUS at 11:01

## 2023-01-29 RX ADMIN — SODIUM CHLORIDE, POTASSIUM CHLORIDE, SODIUM LACTATE AND CALCIUM CHLORIDE 1000 ML: 600; 310; 30; 20 INJECTION, SOLUTION INTRAVENOUS at 11:01

## 2023-01-29 NOTE — Clinical Note
"Andrzej"Nhan Sinclair was seen and treated in our emergency department on 1/29/2023.  He may return to work on 01/30/2023.       If you have any questions or concerns, please don't hesitate to call.      Jonathan Sandhu MD"

## 2023-01-30 VITALS
DIASTOLIC BLOOD PRESSURE: 87 MMHG | HEART RATE: 66 BPM | OXYGEN SATURATION: 100 % | TEMPERATURE: 98 F | SYSTOLIC BLOOD PRESSURE: 157 MMHG | RESPIRATION RATE: 13 BRPM

## 2023-01-30 LAB
ALBUMIN SERPL BCP-MCNC: 3.9 G/DL (ref 3.5–5.2)
ALP SERPL-CCNC: 87 U/L (ref 55–135)
ALT SERPL W/O P-5'-P-CCNC: 22 U/L (ref 10–44)
ANION GAP SERPL CALC-SCNC: 9 MMOL/L (ref 8–16)
AST SERPL-CCNC: 31 U/L (ref 10–40)
BACTERIA #/AREA URNS AUTO: NORMAL /HPF
BILIRUB SERPL-MCNC: 0.4 MG/DL (ref 0.1–1)
BILIRUB UR QL STRIP: NEGATIVE
BUN SERPL-MCNC: 12 MG/DL (ref 6–20)
CALCIUM SERPL-MCNC: 9.5 MG/DL (ref 8.7–10.5)
CHLORIDE SERPL-SCNC: 100 MMOL/L (ref 95–110)
CLARITY UR REFRACT.AUTO: CLEAR
CO2 SERPL-SCNC: 25 MMOL/L (ref 23–29)
COLOR UR AUTO: ABNORMAL
CREAT SERPL-MCNC: 1.6 MG/DL (ref 0.5–1.4)
EST. GFR  (NO RACE VARIABLE): 56.9 ML/MIN/1.73 M^2
GLUCOSE SERPL-MCNC: 344 MG/DL (ref 70–110)
GLUCOSE UR QL STRIP: ABNORMAL
HCV AB SERPL QL IA: NORMAL
HGB UR QL STRIP: ABNORMAL
HIV 1+2 AB+HIV1 P24 AG SERPL QL IA: NORMAL
KETONES UR QL STRIP: NEGATIVE
LEUKOCYTE ESTERASE UR QL STRIP: NEGATIVE
LIPASE SERPL-CCNC: 10 U/L (ref 4–60)
MICROSCOPIC COMMENT: NORMAL
NITRITE UR QL STRIP: NEGATIVE
OSMOLALITY SERPL: 303 MOSM/KG (ref 280–300)
PH UR STRIP: 6 [PH] (ref 5–8)
POTASSIUM SERPL-SCNC: 4.2 MMOL/L (ref 3.5–5.1)
PROT SERPL-MCNC: 7.2 G/DL (ref 6–8.4)
PROT UR QL STRIP: ABNORMAL
RBC #/AREA URNS AUTO: 4 /HPF (ref 0–4)
SODIUM SERPL-SCNC: 134 MMOL/L (ref 136–145)
SP GR UR STRIP: 1.02 (ref 1–1.03)
SQUAMOUS #/AREA URNS AUTO: 1 /HPF
URN SPEC COLLECT METH UR: ABNORMAL
WBC #/AREA URNS AUTO: 2 /HPF (ref 0–5)
YEAST UR QL AUTO: NORMAL

## 2023-01-30 PROCEDURE — 25500020 PHARM REV CODE 255: Performed by: EMERGENCY MEDICINE

## 2023-01-30 NOTE — ED NOTES
Assumed care of pt at this time. VSS, RR even and unlabored. Resting in bed comfortably. No voiced compaints of pain or discomfort at this time. Safety protocols remain. Verified lines/leads attatched to patient at this time.

## 2023-01-30 NOTE — DISCHARGE INSTRUCTIONS
Below are the results of your CT scan:   Mild wall thickening along the distal stomach, this can be seen with gastritis or gastric ulcer disease, or infiltrating process.  Clinical and historical correlation is needed, if indicated upper GI or endoscopy may be helpful for further evaluation.     There is no additional evidence for acute inflammatory or obstructive process of the abdomen or pelvis.     There is no evidence for ureteral calculus or obstructive uropathy bilaterally.

## 2023-01-30 NOTE — ED PROVIDER NOTES
Encounter Date: 1/29/2023       History     Chief Complaint   Patient presents with    Flank Pain     Arrived by ems c/o bilateral flank pain x3 days.  with ems     Patient is a 37-year-old male with a past medical history of type 1 diabetes presenting to the ED via EMS for bilateral flank pain x3 days. Who was brought to come to the ED as his abdomen began to hurt him today as well, rating it an 8/10. There have been no associated fevers/chills, urinary complaints, nausea/vomiting/diarrhea or any trauma. No history of kidney stones. En route, EMS recorded a blood glucose of 348.    Review of patient's allergies indicates:  No Known Allergies  Past Medical History:   Diagnosis Date    Diabetes mellitus     Diabetes mellitus type 1     Diagnosed at age 20     History reviewed. No pertinent surgical history.  Family History   Problem Relation Age of Onset    Other Mother         prediabetes    Diabetes Mother     Other Father         patient does not know his fathers history     Social History     Tobacco Use    Smoking status: Former    Smokeless tobacco: Current   Substance Use Topics    Alcohol use: Yes     Comment: socially    Drug use: No     Review of Systems   Constitutional:  Negative for activity change, chills and fever.   HENT:  Negative for congestion, ear pain and sore throat.    Eyes:  Negative for visual disturbance.   Respiratory:  Negative for shortness of breath and stridor.    Cardiovascular:  Negative for chest pain and palpitations.   Gastrointestinal:  Positive for abdominal pain. Negative for nausea and vomiting.   Genitourinary:  Positive for flank pain. Negative for dysuria and urgency.   Musculoskeletal:  Negative for back pain.   Skin:  Negative for rash.   Neurological:  Negative for dizziness, syncope, weakness and headaches.   Hematological:  Does not bruise/bleed easily.     Physical Exam     Initial Vitals [01/29/23 2313]   BP Pulse Resp Temp SpO2   (!) 159/72 82 18 98.9 °F (37.2  °C) 100 %      MAP       --         Physical Exam    Nursing note and vitals reviewed.  Constitutional: He appears well-developed and well-nourished. He is not diaphoretic. No distress.   Well-appearing.  Speaking full sentences.  No acute distress.   HENT:   Head: Normocephalic and atraumatic.   Right Ear: External ear normal.   Left Ear: External ear normal.   Neck: Neck supple.   Cardiovascular:  Normal rate, regular rhythm, normal heart sounds and intact distal pulses.           Pulmonary/Chest: Breath sounds normal. No respiratory distress. He has no wheezes. He has no rhonchi. He has no rales.   Abdominal: Abdomen is soft. He exhibits no distension. There is generalized abdominal tenderness and tenderness in the right upper quadrant.   Mild, generalized abdominal tenderness to palpation, worse in the right upper quadrant.  Bilateral CVA tenderness.  There is obvious left-sided lumbar paraspinal muscle spasm.   There is right CVA tenderness.  There is left CVA tenderness. There is no rebound and no guarding.   Musculoskeletal:      Cervical back: Neck supple.     Neurological: He is alert and oriented to person, place, and time. GCS score is 15. GCS eye subscore is 4. GCS verbal subscore is 5. GCS motor subscore is 6.   Skin: Skin is warm. Capillary refill takes less than 2 seconds. No rash noted.   Psychiatric: He has a normal mood and affect.       ED Course   Procedures  Labs Reviewed   CBC W/ AUTO DIFFERENTIAL - Abnormal; Notable for the following components:       Result Value    WBC 3.42 (*)     RBC 4.20 (*)     Hemoglobin 13.0 (*)     Hematocrit 38.4 (*)     Gran # (ANC) 1.4 (*)     All other components within normal limits    Narrative:     Release to patient->Immediate   COMPREHENSIVE METABOLIC PANEL - Abnormal; Notable for the following components:    Sodium 134 (*)     Glucose 344 (*)     Creatinine 1.6 (*)     eGFR 56.9 (*)     All other components within normal limits    Narrative:     Release to  patient->Immediate   URINALYSIS, REFLEX TO URINE CULTURE - Abnormal; Notable for the following components:    Protein, UA Trace (*)     Glucose, UA 4+ (*)     Occult Blood UA 1+ (*)     All other components within normal limits    Narrative:     Specimen Source->Urine   OSMOLALITY, SERUM - Abnormal; Notable for the following components:    Osmolality 303 (*)     All other components within normal limits    Narrative:     Release to patient->Immediate   POCT GLUCOSE - Abnormal; Notable for the following components:    POCT Glucose 344 (*)     All other components within normal limits   HIV 1 / 2 ANTIBODY    Narrative:     Release to patient->Immediate   HEPATITIS C ANTIBODY    Narrative:     Release to patient->Immediate   LIPASE    Narrative:     Release to patient->Immediate   BETA - HYDROXYBUTYRATE, SERUM    Narrative:     Release to patient->Immediate   URINALYSIS MICROSCOPIC    Narrative:     Specimen Source->Urine          Imaging Results              CT Abdomen Pelvis With Contrast (Final result)  Result time 01/30/23 00:42:58      Final result by Jordan Castillo MD (01/30/23 00:42:58)                   Impression:      Mild wall thickening along the distal stomach, this can be seen with gastritis or gastric ulcer disease, or infiltrating process.  Clinical and historical correlation is needed, if indicated upper GI or endoscopy may be helpful for further evaluation.    There is no additional evidence for acute inflammatory or obstructive process of the abdomen or pelvis.    There is no evidence for ureteral calculus or obstructive uropathy bilaterally.      Electronically signed by: Jordan Castillo  Date:    01/30/2023  Time:    00:42               Narrative:    EXAMINATION:  CT ABDOMEN PELVIS WITH CONTRAST    CLINICAL HISTORY:  Flank pain, kidney stone suspected;    TECHNIQUE:  Low dose axial images, sagittal and coronal reformations were obtained from the lung bases to the pubic symphysis following the IV  administration of 100 mL of Omnipaque 350 oral contrast was not utilized.  Single phase postcontrast CT examination of the abdomen and pelvis is submitted.    COMPARISON:  CT examination of the abdomen and pelvis with contrast April 23, 2021    FINDINGS:  The visualized lung bases demonstrate minimal motion artifact and atelectatic change.  The stomach demonstrates mild-to-moderate distention with ingested material and air.  There is suggestion of minimal wall thickening along the distal stomach, clinical correlation is needed to determine need for additional follow-up, if indicated upper GI or endoscopy.    There is no evidence for acute process of the liver, gallbladder, pancreas, spleen, or adrenal glands.    There is no evidence for hydronephrosis or obstructive uropathy or perinephric inflammatory change bilaterally.  There is no evidence for ureteral calculus.  The urinary bladder appears unremarkable for degree of distention.  The abdominal aorta appears normal in caliber, demonstrates appropriate opacification.    There is no evidence for small bowel obstructive process.  The appendix is identified, it does not appear inflamed.  There is mild prominence of the colon with air and stool, there is no evidence for inflammatory or obstructive process of the colon.  There is no evidence for free intraperitoneal air.  The visualized osseous structures demonstrate chronic change.                                       Medications   lactated ringers bolus 1,000 mL (0 mLs Intravenous Stopped 1/30/23 0041)   morphine injection 4 mg (4 mg Intravenous Given 1/29/23 2341)   iohexoL (OMNIPAQUE 350) injection 100 mL (100 mLs Intravenous Given 1/29/23 2359)     Medical Decision Making:   History:   I obtained history from: someone other than patient.  Old Medical Records: I decided to obtain old medical records.  Initial Assessment:   Emergent evaluation of bilateral flank pain and abdominal pain. He is afebrile and  hemodynamically stable.  Differential Diagnosis:   Cystitis, pyelonephritis, unlikely urolithiasis, musculoskeletal back pain, DKA, unlikely hepatobiliary pathology, electrolyte abnormality  Clinical Tests:   Lab Tests: Reviewed and Ordered  Radiological Study: Ordered and Reviewed  ED Management:  Apart from glucose, labs largely unremarkable.  Inconsistent with DKA.  UA inconsistent with infection.  CTA/P without evidence of acute abnormalities, though noting that you may have evidence of GERD/gastritis/PUD, which do not explain his symptoms today.  Appears improved on reassessment.  He was given strict ED return precautions, and he expressed understanding.                        Clinical Impression:   Final diagnoses:  [R10.9] Flank pain (Primary)        ED Disposition Condition    Discharge Stable          ED Prescriptions    None       Follow-up Information    None          Jnoathan Sandhu MD  Resident  01/30/23 2396     never used

## 2023-01-31 ENCOUNTER — PATIENT MESSAGE (OUTPATIENT)
Dept: ENDOCRINOLOGY | Facility: CLINIC | Age: 38
End: 2023-01-31
Payer: MEDICARE

## 2023-01-31 DIAGNOSIS — E10.8 TYPE 1 DIABETES MELLITUS WITH COMPLICATIONS: Primary | ICD-10-CM

## 2023-01-31 RX ORDER — INSULIN LISPRO 100 [IU]/ML
INJECTION, SOLUTION INTRAVENOUS; SUBCUTANEOUS
Qty: 30 ML | Refills: 3 | Status: ON HOLD | OUTPATIENT
Start: 2023-01-31 | End: 2023-03-07 | Stop reason: HOSPADM

## 2023-02-10 DIAGNOSIS — E10.36 TYPE 1 DIABETES MELLITUS WITH DIABETIC CATARACT: ICD-10-CM

## 2023-02-10 RX ORDER — NAPROXEN SODIUM 220 MG
TABLET ORAL
Qty: 200 EACH | Refills: 11 | Status: SHIPPED | OUTPATIENT
Start: 2023-02-10 | End: 2023-04-04

## 2023-02-21 ENCOUNTER — HOSPITAL ENCOUNTER (EMERGENCY)
Facility: HOSPITAL | Age: 38
Discharge: HOME | End: 2023-02-22
Attending: EMERGENCY MEDICINE
Payer: MEDICARE

## 2023-02-21 DIAGNOSIS — R11.2 NAUSEA AND VOMITING, UNSPECIFIED VOMITING TYPE: ICD-10-CM

## 2023-02-21 DIAGNOSIS — R10.9 ABDOMINAL PAIN, UNSPECIFIED ABDOMINAL LOCATION: Primary | ICD-10-CM

## 2023-02-21 DIAGNOSIS — Z92.89 HISTORY OF INSERTION OF INSULIN PUMP: ICD-10-CM

## 2023-02-21 LAB
ALBUMIN SERPL BCP-MCNC: 4.3 G/DL (ref 3.5–5.2)
ALP SERPL-CCNC: 75 U/L (ref 55–135)
ALT SERPL W/O P-5'-P-CCNC: 31 U/L (ref 10–44)
ANION GAP SERPL CALC-SCNC: 14 MMOL/L (ref 8–16)
AST SERPL-CCNC: 36 U/L (ref 10–40)
BACTERIA #/AREA URNS AUTO: ABNORMAL /HPF
BASOPHILS # BLD AUTO: 0.02 K/UL (ref 0–0.2)
BASOPHILS NFR BLD: 0.4 % (ref 0–1.9)
BILIRUB SERPL-MCNC: 0.5 MG/DL (ref 0.1–1)
BILIRUB UR QL STRIP: NEGATIVE
BUN SERPL-MCNC: 15 MG/DL (ref 6–20)
CALCIUM SERPL-MCNC: 10 MG/DL (ref 8.7–10.5)
CHLORIDE SERPL-SCNC: 104 MMOL/L (ref 95–110)
CLARITY UR REFRACT.AUTO: CLEAR
CO2 SERPL-SCNC: 24 MMOL/L (ref 23–29)
COLOR UR AUTO: YELLOW
CREAT SERPL-MCNC: 1.2 MG/DL (ref 0.5–1.4)
DIFFERENTIAL METHOD: ABNORMAL
EOSINOPHIL # BLD AUTO: 0 K/UL (ref 0–0.5)
EOSINOPHIL NFR BLD: 0 % (ref 0–8)
ERYTHROCYTE [DISTWIDTH] IN BLOOD BY AUTOMATED COUNT: 11.9 % (ref 11.5–14.5)
EST. GFR  (NO RACE VARIABLE): >60 ML/MIN/1.73 M^2
GLUCOSE SERPL-MCNC: 69 MG/DL (ref 70–110)
GLUCOSE UR QL STRIP: ABNORMAL
HCT VFR BLD AUTO: 37.3 % (ref 40–54)
HGB BLD-MCNC: 12.8 G/DL (ref 14–18)
HGB UR QL STRIP: ABNORMAL
HYALINE CASTS UR QL AUTO: 0 /LPF
IMM GRANULOCYTES # BLD AUTO: 0.01 K/UL (ref 0–0.04)
IMM GRANULOCYTES NFR BLD AUTO: 0.2 % (ref 0–0.5)
INFLUENZA A, MOLECULAR: NOT DETECTED
INFLUENZA B, MOLECULAR: NOT DETECTED
KETONES UR QL STRIP: ABNORMAL
LEUKOCYTE ESTERASE UR QL STRIP: NEGATIVE
LIPASE SERPL-CCNC: 10 U/L (ref 4–60)
LYMPHOCYTES # BLD AUTO: 0.8 K/UL (ref 1–4.8)
LYMPHOCYTES NFR BLD: 17.3 % (ref 18–48)
MCH RBC QN AUTO: 30.8 PG (ref 27–31)
MCHC RBC AUTO-ENTMCNC: 34.3 G/DL (ref 32–36)
MCV RBC AUTO: 90 FL (ref 82–98)
MICROSCOPIC COMMENT: ABNORMAL
MONOCYTES # BLD AUTO: 0.4 K/UL (ref 0.3–1)
MONOCYTES NFR BLD: 8.6 % (ref 4–15)
NEUTROPHILS # BLD AUTO: 3.4 K/UL (ref 1.8–7.7)
NEUTROPHILS NFR BLD: 73.5 % (ref 38–73)
NITRITE UR QL STRIP: NEGATIVE
NRBC BLD-RTO: 0 /100 WBC
PH UR STRIP: 7 [PH] (ref 5–8)
PLATELET # BLD AUTO: 152 K/UL (ref 150–450)
PMV BLD AUTO: 11.1 FL (ref 9.2–12.9)
POCT GLUCOSE: 68 MG/DL (ref 70–110)
POTASSIUM SERPL-SCNC: 3.4 MMOL/L (ref 3.5–5.1)
PROT SERPL-MCNC: 7.5 G/DL (ref 6–8.4)
PROT UR QL STRIP: ABNORMAL
RBC # BLD AUTO: 4.16 M/UL (ref 4.6–6.2)
RBC #/AREA URNS AUTO: 11 /HPF (ref 0–4)
RSV AG BY MOLECULAR METHOD: NOT DETECTED
SARS-COV-2 RNA RESP QL NAA+PROBE: NOT DETECTED
SODIUM SERPL-SCNC: 142 MMOL/L (ref 136–145)
SP GR UR STRIP: 1.02 (ref 1–1.03)
URN SPEC COLLECT METH UR: ABNORMAL
WBC # BLD AUTO: 4.63 K/UL (ref 3.9–12.7)
WBC #/AREA URNS AUTO: 2 /HPF (ref 0–5)
YEAST UR QL AUTO: ABNORMAL

## 2023-02-21 PROCEDURE — 83690 ASSAY OF LIPASE: CPT | Performed by: PHYSICIAN ASSISTANT

## 2023-02-21 PROCEDURE — 80053 COMPREHEN METABOLIC PANEL: CPT | Performed by: PHYSICIAN ASSISTANT

## 2023-02-21 PROCEDURE — 82962 GLUCOSE BLOOD TEST: CPT

## 2023-02-21 PROCEDURE — 85025 COMPLETE CBC W/AUTO DIFF WBC: CPT | Performed by: PHYSICIAN ASSISTANT

## 2023-02-21 PROCEDURE — 99284 EMERGENCY DEPT VISIT MOD MDM: CPT | Mod: 25

## 2023-02-21 PROCEDURE — 96374 THER/PROPH/DIAG INJ IV PUSH: CPT

## 2023-02-21 PROCEDURE — 99284 EMERGENCY DEPT VISIT MOD MDM: CPT | Mod: CS,,, | Performed by: PHYSICIAN ASSISTANT

## 2023-02-21 PROCEDURE — 99284 PR EMERGENCY DEPT VISIT,LEVEL IV: ICD-10-PCS | Mod: CS,,, | Performed by: PHYSICIAN ASSISTANT

## 2023-02-21 PROCEDURE — 81001 URINALYSIS AUTO W/SCOPE: CPT | Performed by: PHYSICIAN ASSISTANT

## 2023-02-21 PROCEDURE — 63600175 PHARM REV CODE 636 W HCPCS: Performed by: PHYSICIAN ASSISTANT

## 2023-02-21 PROCEDURE — 0241U SARS-COV2 (COVID) WITH FLU/RSV BY PCR: CPT | Performed by: PHYSICIAN ASSISTANT

## 2023-02-21 RX ORDER — DROPERIDOL 2.5 MG/ML
1.25 INJECTION, SOLUTION INTRAMUSCULAR; INTRAVENOUS ONCE
Status: COMPLETED | OUTPATIENT
Start: 2023-02-21 | End: 2023-02-21

## 2023-02-21 RX ADMIN — DROPERIDOL 1.25 MG: 2.5 INJECTION, SOLUTION INTRAMUSCULAR; INTRAVENOUS at 11:02

## 2023-02-22 VITALS
DIASTOLIC BLOOD PRESSURE: 73 MMHG | HEART RATE: 88 BPM | RESPIRATION RATE: 14 BRPM | TEMPERATURE: 98 F | OXYGEN SATURATION: 98 % | SYSTOLIC BLOOD PRESSURE: 136 MMHG

## 2023-02-22 LAB — POCT GLUCOSE: 147 MG/DL (ref 70–110)

## 2023-02-22 RX ORDER — ONDANSETRON 8 MG/1
8 TABLET, ORALLY DISINTEGRATING ORAL EVERY 6 HOURS PRN
Qty: 12 TABLET | Refills: 0 | Status: ON HOLD | OUTPATIENT
Start: 2023-02-22 | End: 2023-03-07 | Stop reason: HOSPADM

## 2023-02-22 NOTE — ED PROVIDER NOTES
"Encounter Date: 2/21/2023       History     Chief Complaint   Patient presents with    Emesis     Vomiting off and on since 11 this AM. Hx DM, has insulin pump. .      10:08 PM  Patient is a 37-year-old male with a history of diabetes type 1, with the insulin pump for the past month, DKA, marijuana use, frequent ED admissions for nausea and vomiting and abdominal pain present stones the ED with nausea, vomiting, diarrhea, and abdominal pain.    Patient states that his symptoms started yesterday evening.  Today he continued to have nausea and vomiting.  States that "everything came up".  He received a new insulin pump about 1 month ago.  He states that he has been giving him 5 units of insulin boluses because he is unsure how to use his pump.  His last bolus was at 14:00 after seeing his glucose was in the 400s.  He also notes daily diarrhea for the past 3 days but does go once a day.  States watery without blood.  He has had subjective fever and chills.  He rarely drinks and had half a beer because his family was in town and denies chronic NSAID use.    He has never had an EGD.    During 1 of his admissions in 2021, GI recommended:  -Marijuana cessation.  -Recommend scheduling zofran every 6 hours with PRN phenergan available for breakthrough symptoms.  - Recommend starting a PPI daily.  -Continue diet as tolerated.  -Recommend EGD for further evaluation-this can be performed as an inpatient or an outpatient if the patient's symptoms improve.    Gastric Emptying test in 3/2021 was normal.    His CT abdomen pelvis last month showed signs of gastritis or ulcer disease.    Review of patient's allergies indicates:  No Known Allergies  Past Medical History:   Diagnosis Date    Diabetes mellitus     Diabetes mellitus type 1     Diagnosed at age 20     No past surgical history on file.  Family History   Problem Relation Age of Onset    Other Mother         prediabetes    Diabetes Mother     Other Father         " patient does not know his fathers history     Social History     Tobacco Use    Smoking status: Former    Smokeless tobacco: Current   Substance Use Topics    Alcohol use: Yes     Comment: socially    Drug use: No     Review of Systems   Constitutional:  Positive for activity change, appetite change, chills and fever.   HENT:  Negative for sore throat.    Respiratory:  Negative for shortness of breath.    Cardiovascular:  Negative for chest pain.   Gastrointestinal:  Positive for abdominal pain, diarrhea, nausea and vomiting. Negative for blood in stool and constipation.   Genitourinary:  Negative for dysuria, frequency, hematuria and penile pain.   Musculoskeletal:  Negative for back pain.   Skin:  Negative for rash.   Neurological:  Negative for weakness.   Hematological:  Does not bruise/bleed easily.     Physical Exam     Initial Vitals [02/21/23 2143]   BP Pulse Resp Temp SpO2   138/88 82 20 99.1 °F (37.3 °C) 98 %      MAP       --         Physical Exam    Vitals reviewed.  Constitutional: He appears well-developed and well-nourished. He is not diaphoretic. He is cooperative.  Non-toxic appearance. He does not have a sickly appearance. He does not appear ill. No distress.   HENT:   Head: Normocephalic and atraumatic.   Nose: Nose normal.   Mouth/Throat: No trismus in the jaw.   Eyes: Conjunctivae and EOM are normal.   Neck:   Normal range of motion.  Pulmonary/Chest: No accessory muscle usage. No tachypnea. No respiratory distress.   Abdominal: Abdomen is soft. He exhibits no distension. There is generalized abdominal tenderness. There is guarding. There is no rebound.   Musculoskeletal:         General: Normal range of motion.      Cervical back: Normal range of motion.     Neurological: He is alert. He has normal strength.   Skin: Skin is dry. No pallor.       ED Course   Procedures  Labs Reviewed   COMPREHENSIVE METABOLIC PANEL - Abnormal; Notable for the following components:       Result Value    Potassium  3.4 (*)     Glucose 69 (*)     All other components within normal limits   CBC W/ AUTO DIFFERENTIAL - Abnormal; Notable for the following components:    RBC 4.16 (*)     Hemoglobin 12.8 (*)     Hematocrit 37.3 (*)     Lymph # 0.8 (*)     Gran % 73.5 (*)     Lymph % 17.3 (*)     All other components within normal limits   URINALYSIS, REFLEX TO URINE CULTURE - Abnormal; Notable for the following components:    Protein, UA 2+ (*)     Glucose, UA 4+ (*)     Ketones, UA Trace (*)     Occult Blood UA 2+ (*)     All other components within normal limits    Narrative:     Specimen Source->Urine   URINALYSIS MICROSCOPIC - Abnormal; Notable for the following components:    RBC, UA 11 (*)     All other components within normal limits    Narrative:     Specimen Source->Urine   POCT GLUCOSE - Abnormal; Notable for the following components:    POCT Glucose 68 (*)     All other components within normal limits   POCT GLUCOSE - Abnormal; Notable for the following components:    POCT Glucose 147 (*)     All other components within normal limits   SARS-COV2 (COVID) WITH FLU/RSV BY PCR   LIPASE          Imaging Results    None          Medications   droperidoL injection 1.25 mg (1.25 mg Intravenous Given 2/21/23 4553)     Medical Decision Making:   History:   Old Medical Records: I decided to obtain old medical records.  Old Records Summarized: records from clinic visits and records from previous admission(s).  Initial Assessment:   Patient is a 37-year-old male with a history of diabetes type 1, with the insulin pump for the past month, DKA, marijuana use, frequent ED admissions for nausea and vomiting and abdominal pain present stones the ED with nausea, vomiting, diarrhea, and abdominal pain.  Differential Diagnosis:   Includes but isn't limited to gastroparesis, gastroenteritis, cyclical vomiting syndrome, hyperemesis cannabis syndrome, gastritis, peptic ulcer disease, GERD, pancreatitis, electrolyte abnormalities, dehydration,  BETH.   Clinical Tests:   Lab Tests: Reviewed and Ordered  Radiological Study: Reviewed  ED Management:  Will initiate workup give IV droperidol for acute abdominal pain and nausea, and continue monitor.          Attending Attestation:     Physician Attestation Statement for NP/PA:   I discussed this assessment and plan of this patient with the NP/PA, but I did not personally examine the patient. The face to face encounter was performed by the NP/PA.    Other NP/PA Attestation Additions:      Medical Decision Making: I completed the substantive portion of the MDM.           ED Course as of 02/22/23 1840 Tue Feb 21, 2023 2209 BP: 138/88 [CL]   2209 Temp: 99.1 °F (37.3 °C) [CL]   2209 Pulse: 82 [CL]   2209 Resp: 20 [CL]   2209 SpO2: 98 % [CL]   2232 POCT Glucose(!): 68  Pump removed from RUE. He was given orange and apple juice to drink.  [CL]   2318 WBC: 4.63 [CL]   2318 Hemoglobin(!): 12.8 [CL]   2318 Platelets: 152 [CL]   2318 Sodium: 142 [CL]   2318 Potassium(!): 3.4 [CL]   2318 Glucose(!): 69 [CL]   2318 Creatinine: 1.2 [CL]   2318 BILIRUBIN TOTAL: 0.5 [CL]   2318 ALT: 31 [CL]   2318 AST: 36 [CL]   2318 Lipase: 10 [CL]   2334 RBC, UA(!): 11 [CL]   2334 WBC, UA: 2 [CL]   2334 Bacteria, UA: None [CL]   Wed Feb 22, 2023   0010 SARS-CoV2 (COVID-19) Qualitative PCR: Not Detected [CL]   0010 Influenza A, Molecular: Not Detected [CL]   0010 Influenza B, Molecular: Not Detected [CL]   0010 RSV Ag by Molecular Method: Not Detected [CL]   0020 Repeat glucose 147 after eating sandwich and drinking juice.  [CL]   0020 Patient p.o. challenged successfully.  He reports improvement after droperidol IV.  His symptoms are likely due to diabetic gastroparesis although he did have a normal gastric emptying test in 2021 versus viral gastroenteritis since he has new onset diarrhea versus hyperemesis cannabis syndrome.  He does not feel comfortable using his insulin pump which was started 1 month ago.  Therefore I recommend that he  return to his previous regimen.  He states that he has medication and supplies still.  Will send Zofran to pharmacy of choice.  Rest.  Hydrate. Soft/fluid diet. Follow up closely with Endocrinology.  I will message his provider to notify them of today's ED visit.  Return to ED precautions were given.  Patient is comfortable with plan and stable for discharge. [CL]      ED Course User Index  [CL] uLcero Olvera PA-C                 I have reviewed patient's chart and discussed this case with my supervising MD.     Lucero Olvera PA-C  Emergent Department  [facility]  Spectralink #[phone] or #[phone]    Clinical Impression:   Final diagnoses:  [R10.9] Abdominal pain, unspecified abdominal location (Primary)  [R11.2] Nausea and vomiting, unspecified vomiting type  [Z92.89] History of insertion of insulin pump        ED Disposition Condition    Discharge Stable          ED Prescriptions       Medication Sig Dispense Start Date End Date Auth. Provider    ondansetron (ZOFRAN-ODT) 8 MG TbDL Take 1 tablet (8 mg total) by mouth every 6 (six) hours as needed (nausea). 12 tablet 2/22/2023 -- Lucero Olvera PA-C          Follow-up Information       Follow up With Specialties Details Why Contact Info    Viktoriya Jaeger NP Endocrinology Call   [address] [facility] [id number] [phone]      [facility] - Emergency Dept Emergency Medicine  If symptoms worsen [address] [facility] [id number] [phone]          Future Appointments   Date Time Provider Department Center   4/10/2023  8:35 AM LAB, APPOINTMENT [facility]   4/10/2023  8:45 AM LAB, APPOINTMENT [facility]   4/14/2023  8:00 AM Viktoriya Jaeger NP [facility]          Lucero Olvera PA-C  02/22/23 1841       Yoli Smyth MD  02/23/23 0036

## 2023-02-22 NOTE — DISCHARGE INSTRUCTIONS
You do not have any signs of hyperglycemia, DKA, electrolyte abnormalities, UTI.  Remove your insulin pump.  Please returned back to your previous regimen 1 month ago.  You can take acetaminophen/tylenol 650 mg every 6 hours or 1000 mg every 8 hours for added relief.  Continue your home antiemetics.  Continue pantoprazole for gastritis/GERD.  Soft/fluid diet. Advance to solids as tolerated.  Apply ice to the area for 10-20 minutes every 4 hours. You can apply heat 2 days after for the same duration and frequency.  Follow up with Endocrinologist.  Return to the ER for new or worsening symptoms.  Future Appointments   Date Time Provider Department Center   4/10/2023  8:35 AM LAB, APPOINTMENT [facility] LAB [facility]   4/10/2023  8:45 AM LAB, APPOINTMENT [facility] LAB [facility]   4/14/2023  8:00 AM Viktoriya Jaeger NP [facility] GEOFF Chanel

## 2023-02-22 NOTE — ED TRIAGE NOTES
Pt to the ed from home via ems with a CC of n/v/d x 12 hours. Pt relays vomiting started thi morning and he has not been able to keep anything down all day. Pt relays he is a diabetic and has recently got a new insulin pump taht he does not know how to use. Pt relays his glucose has been high and he has only been able to give him self 5 unit boluses. Pt relays chills/fever. Denies cp,sob, loc.

## 2023-02-24 ENCOUNTER — PATIENT MESSAGE (OUTPATIENT)
Dept: ENDOCRINOLOGY | Facility: CLINIC | Age: 38
End: 2023-02-24
Payer: MEDICARE

## 2023-03-01 ENCOUNTER — HOSPITAL ENCOUNTER (OUTPATIENT)
Facility: HOSPITAL | Age: 38
Discharge: HOME | End: 2023-03-02
Attending: EMERGENCY MEDICINE | Admitting: EMERGENCY MEDICINE
Payer: MEDICARE

## 2023-03-01 DIAGNOSIS — Z91.89 AT RISK FOR LONG QT SYNDROME: ICD-10-CM

## 2023-03-01 DIAGNOSIS — N18.30 STAGE 3 CHRONIC KIDNEY DISEASE, UNSPECIFIED WHETHER STAGE 3A OR 3B CKD: ICD-10-CM

## 2023-03-01 DIAGNOSIS — K31.84 GASTROPARESIS: ICD-10-CM

## 2023-03-01 DIAGNOSIS — R07.9 CHEST PAIN: ICD-10-CM

## 2023-03-01 DIAGNOSIS — R10.84 GENERALIZED ABDOMINAL PAIN: ICD-10-CM

## 2023-03-01 DIAGNOSIS — K21.9 GASTROESOPHAGEAL REFLUX DISEASE, UNSPECIFIED WHETHER ESOPHAGITIS PRESENT: ICD-10-CM

## 2023-03-01 DIAGNOSIS — E10.8 TYPE 1 DIABETES MELLITUS WITH COMPLICATIONS: ICD-10-CM

## 2023-03-01 DIAGNOSIS — E10.36 TYPE 1 DIABETES MELLITUS WITH DIABETIC CATARACT: ICD-10-CM

## 2023-03-01 DIAGNOSIS — R11.2 CANNABINOID HYPEREMESIS SYNDROME: ICD-10-CM

## 2023-03-01 DIAGNOSIS — R10.13 EPIGASTRIC ABDOMINAL PAIN: Primary | ICD-10-CM

## 2023-03-01 DIAGNOSIS — F12.90 CANNABINOID HYPEREMESIS SYNDROME: ICD-10-CM

## 2023-03-01 LAB
ALBUMIN SERPL BCP-MCNC: 4.4 G/DL (ref 3.5–5.2)
ALP SERPL-CCNC: 76 U/L (ref 55–135)
ALT SERPL W/O P-5'-P-CCNC: 26 U/L (ref 10–44)
ANION GAP SERPL CALC-SCNC: 10 MMOL/L (ref 8–16)
AST SERPL-CCNC: 35 U/L (ref 10–40)
BACTERIA #/AREA URNS AUTO: ABNORMAL /HPF
BASOPHILS # BLD AUTO: 0.02 K/UL (ref 0–0.2)
BASOPHILS NFR BLD: 0.4 % (ref 0–1.9)
BILIRUB SERPL-MCNC: 0.4 MG/DL (ref 0.1–1)
BILIRUB UR QL STRIP: NEGATIVE
BUN SERPL-MCNC: 15 MG/DL (ref 6–20)
CALCIUM SERPL-MCNC: 10.2 MG/DL (ref 8.7–10.5)
CHLORIDE SERPL-SCNC: 103 MMOL/L (ref 95–110)
CLARITY UR REFRACT.AUTO: CLEAR
CO2 SERPL-SCNC: 25 MMOL/L (ref 23–29)
COLOR UR AUTO: YELLOW
CREAT SERPL-MCNC: 1.4 MG/DL (ref 0.5–1.4)
DIFFERENTIAL METHOD: ABNORMAL
EOSINOPHIL # BLD AUTO: 0 K/UL (ref 0–0.5)
EOSINOPHIL NFR BLD: 0.2 % (ref 0–8)
ERYTHROCYTE [DISTWIDTH] IN BLOOD BY AUTOMATED COUNT: 12.4 % (ref 11.5–14.5)
EST. GFR  (NO RACE VARIABLE): >60 ML/MIN/1.73 M^2
GLUCOSE SERPL-MCNC: 122 MG/DL (ref 70–110)
GLUCOSE UR QL STRIP: ABNORMAL
HCT VFR BLD AUTO: 42.5 % (ref 40–54)
HCV AB SERPL QL IA: NORMAL
HGB BLD-MCNC: 14.5 G/DL (ref 14–18)
HGB UR QL STRIP: ABNORMAL
HYALINE CASTS UR QL AUTO: 0 /LPF
IMM GRANULOCYTES # BLD AUTO: 0.01 K/UL (ref 0–0.04)
IMM GRANULOCYTES NFR BLD AUTO: 0.2 % (ref 0–0.5)
KETONES UR QL STRIP: ABNORMAL
LEUKOCYTE ESTERASE UR QL STRIP: NEGATIVE
LIPASE SERPL-CCNC: 12 U/L (ref 4–60)
LYMPHOCYTES # BLD AUTO: 0.9 K/UL (ref 1–4.8)
LYMPHOCYTES NFR BLD: 16.3 % (ref 18–48)
MCH RBC QN AUTO: 30.3 PG (ref 27–31)
MCHC RBC AUTO-ENTMCNC: 34.1 G/DL (ref 32–36)
MCV RBC AUTO: 89 FL (ref 82–98)
MICROSCOPIC COMMENT: ABNORMAL
MONOCYTES # BLD AUTO: 0.4 K/UL (ref 0.3–1)
MONOCYTES NFR BLD: 6.8 % (ref 4–15)
NEUTROPHILS # BLD AUTO: 4.2 K/UL (ref 1.8–7.7)
NEUTROPHILS NFR BLD: 76.1 % (ref 38–73)
NITRITE UR QL STRIP: NEGATIVE
NRBC BLD-RTO: 0 /100 WBC
PH UR STRIP: 8 [PH] (ref 5–8)
PLATELET # BLD AUTO: 213 K/UL (ref 150–450)
PMV BLD AUTO: 11.6 FL (ref 9.2–12.9)
POCT GLUCOSE: 356 MG/DL (ref 70–110)
POTASSIUM SERPL-SCNC: 4.5 MMOL/L (ref 3.5–5.1)
PROT SERPL-MCNC: 8.3 G/DL (ref 6–8.4)
PROT UR QL STRIP: ABNORMAL
RBC # BLD AUTO: 4.79 M/UL (ref 4.6–6.2)
RBC #/AREA URNS AUTO: 14 /HPF (ref 0–4)
SODIUM SERPL-SCNC: 138 MMOL/L (ref 136–145)
SP GR UR STRIP: 1.02 (ref 1–1.03)
SQUAMOUS #/AREA URNS AUTO: 0 /HPF
URN SPEC COLLECT METH UR: ABNORMAL
WBC # BLD AUTO: 5.45 K/UL (ref 3.9–12.7)
WBC #/AREA URNS AUTO: 0 /HPF (ref 0–5)
YEAST UR QL AUTO: ABNORMAL

## 2023-03-01 PROCEDURE — 86803 HEPATITIS C AB TEST: CPT | Performed by: PHYSICIAN ASSISTANT

## 2023-03-01 PROCEDURE — 96375 TX/PRO/DX INJ NEW DRUG ADDON: CPT

## 2023-03-01 PROCEDURE — 93010 EKG 12-LEAD: ICD-10-PCS | Mod: ,,, | Performed by: INTERNAL MEDICINE

## 2023-03-01 PROCEDURE — 83036 HEMOGLOBIN GLYCOSYLATED A1C: CPT | Performed by: NEUROLOGICAL SURGERY

## 2023-03-01 PROCEDURE — 99285 EMERGENCY DEPT VISIT HI MDM: CPT | Mod: ,,, | Performed by: EMERGENCY MEDICINE

## 2023-03-01 PROCEDURE — 99285 EMERGENCY DEPT VISIT HI MDM: CPT | Mod: 25

## 2023-03-01 PROCEDURE — 80053 COMPREHEN METABOLIC PANEL: CPT | Performed by: NEUROLOGICAL SURGERY

## 2023-03-01 PROCEDURE — 80307 DRUG TEST PRSMV CHEM ANLYZR: CPT | Performed by: EMERGENCY MEDICINE

## 2023-03-01 PROCEDURE — 25000003 PHARM REV CODE 250: Performed by: NEUROLOGICAL SURGERY

## 2023-03-01 PROCEDURE — 99285 PR EMERGENCY DEPT VISIT,LEVEL V: ICD-10-PCS | Mod: ,,, | Performed by: EMERGENCY MEDICINE

## 2023-03-01 PROCEDURE — 81001 URINALYSIS AUTO W/SCOPE: CPT | Performed by: NEUROLOGICAL SURGERY

## 2023-03-01 PROCEDURE — 82962 GLUCOSE BLOOD TEST: CPT

## 2023-03-01 PROCEDURE — 83690 ASSAY OF LIPASE: CPT | Performed by: NEUROLOGICAL SURGERY

## 2023-03-01 PROCEDURE — 63600175 PHARM REV CODE 636 W HCPCS: Performed by: EMERGENCY MEDICINE

## 2023-03-01 PROCEDURE — 96374 THER/PROPH/DIAG INJ IV PUSH: CPT

## 2023-03-01 PROCEDURE — 93010 ELECTROCARDIOGRAM REPORT: CPT | Mod: ,,, | Performed by: INTERNAL MEDICINE

## 2023-03-01 PROCEDURE — 96361 HYDRATE IV INFUSION ADD-ON: CPT

## 2023-03-01 PROCEDURE — 93005 ELECTROCARDIOGRAM TRACING: CPT

## 2023-03-01 PROCEDURE — 63600175 PHARM REV CODE 636 W HCPCS: Performed by: NEUROLOGICAL SURGERY

## 2023-03-01 PROCEDURE — 85025 COMPLETE CBC W/AUTO DIFF WBC: CPT | Performed by: NEUROLOGICAL SURGERY

## 2023-03-01 RX ORDER — INSULIN ASPART 100 [IU]/ML
5 INJECTION, SOLUTION INTRAVENOUS; SUBCUTANEOUS ONCE
Status: DISCONTINUED | OUTPATIENT
Start: 2023-03-02 | End: 2023-03-01

## 2023-03-01 RX ORDER — DROPERIDOL 2.5 MG/ML
1.25 INJECTION, SOLUTION INTRAMUSCULAR; INTRAVENOUS ONCE
Status: COMPLETED | OUTPATIENT
Start: 2023-03-01 | End: 2023-03-01

## 2023-03-01 RX ORDER — OMEPRAZOLE 20 MG/1
20 CAPSULE, DELAYED RELEASE ORAL DAILY
Qty: 30 CAPSULE | Refills: 0 | Status: ON HOLD | OUTPATIENT
Start: 2023-03-01 | End: 2023-03-07 | Stop reason: HOSPADM

## 2023-03-01 RX ORDER — ONDANSETRON 2 MG/ML
4 INJECTION INTRAMUSCULAR; INTRAVENOUS ONCE AS NEEDED
Status: DISCONTINUED | OUTPATIENT
Start: 2023-03-01 | End: 2023-03-01

## 2023-03-01 RX ORDER — ONDANSETRON 2 MG/ML
4 INJECTION INTRAMUSCULAR; INTRAVENOUS
Status: DISCONTINUED | OUTPATIENT
Start: 2023-03-01 | End: 2023-03-01

## 2023-03-01 RX ORDER — MORPHINE SULFATE 4 MG/ML
4 INJECTION, SOLUTION INTRAMUSCULAR; INTRAVENOUS
Status: COMPLETED | OUTPATIENT
Start: 2023-03-01 | End: 2023-03-01

## 2023-03-01 RX ORDER — INSULIN ASPART 100 [IU]/ML
5 INJECTION, SOLUTION INTRAVENOUS; SUBCUTANEOUS ONCE
Status: COMPLETED | OUTPATIENT
Start: 2023-03-01 | End: 2023-03-02

## 2023-03-01 RX ORDER — ONDANSETRON 4 MG/1
4 TABLET, FILM COATED ORAL EVERY 6 HOURS
Qty: 12 TABLET | Refills: 0 | Status: ON HOLD | OUTPATIENT
Start: 2023-03-01 | End: 2023-03-07 | Stop reason: SDUPTHER

## 2023-03-01 RX ORDER — ONDANSETRON 2 MG/ML
4 INJECTION INTRAMUSCULAR; INTRAVENOUS
Status: COMPLETED | OUTPATIENT
Start: 2023-03-01 | End: 2023-03-01

## 2023-03-01 RX ORDER — DROPERIDOL 2.5 MG/ML
0.62 INJECTION, SOLUTION INTRAMUSCULAR; INTRAVENOUS ONCE
Status: DISCONTINUED | OUTPATIENT
Start: 2023-03-01 | End: 2023-03-01

## 2023-03-01 RX ADMIN — IOHEXOL 100 ML: 350 INJECTION, SOLUTION INTRAVENOUS at 11:03

## 2023-03-01 RX ADMIN — ONDANSETRON 4 MG: 2 INJECTION INTRAMUSCULAR; INTRAVENOUS at 11:03

## 2023-03-01 RX ADMIN — DROPERIDOL 1.25 MG: 2.5 INJECTION, SOLUTION INTRAMUSCULAR; INTRAVENOUS at 06:03

## 2023-03-01 RX ADMIN — SODIUM CHLORIDE 1000 ML: 9 INJECTION, SOLUTION INTRAVENOUS at 06:03

## 2023-03-01 RX ADMIN — MORPHINE SULFATE 4 MG: 4 INJECTION INTRAVENOUS at 11:03

## 2023-03-01 NOTE — ED PROVIDER NOTES
"Encounter Date: 3/1/2023       History     Chief Complaint   Patient presents with    Abdominal Pain     Abd pain/n/v x10 days.    Chest Pain     36yo male with hx of type 1 diabetes with insulin pump for the past month, hx of DKA, marijuana use (last use 2 weeks ago), frequent ED presentation for nausea, vomiting, abdominal pain presents with nausea, vomiting, diarrhea and diffuse abdominal pain "and its going into my chest" that has progressively worsened over the last week. States his symptoms re started about 2 days after previous ED visit 2/21/23 for which he presented with similar symptoms. He reports nausea, non bloody emesis and diffuse abdominal pain and tenderness. He has been unable to keep anything down. He has been compliant with home PPI and checking his blood glucose regularly, states that his BG have been controlled apart from 1 reading to 300 yesterday. He also reports non bloody diarrhea 2-3x/day. States last marijuana use was 2 weeks ago. He denies etoh use or chronic NSAID use.   CT A/P 1/30/23 with signs of gastritis or ulcer disease.     During previous admission in 2021, GI recommended cessation of marijuana, schedueld anti emetics, daily PPI use and EGD, he has not had an EGD. Gastric Emptying test in 3/2021 was normal.         The history is provided by the patient.   Review of patient's allergies indicates:  No Known Allergies  Past Medical History:   Diagnosis Date    Diabetes mellitus     Diabetes mellitus type 1     Diagnosed at age 20     No past surgical history on file.  Family History   Problem Relation Age of Onset    Other Mother         prediabetes    Diabetes Mother     Other Father         patient does not know his fathers history     Social History     Tobacco Use    Smoking status: Former    Smokeless tobacco: Current   Substance Use Topics    Alcohol use: Yes     Comment: socially    Drug use: No     Review of Systems   Constitutional:  Negative for chills and fever.   HENT:  " Negative for congestion.    Eyes:  Negative for photophobia.   Respiratory:  Negative for cough and shortness of breath.    Cardiovascular:  Positive for chest pain.   Gastrointestinal:  Positive for abdominal pain (diffuse), diarrhea (loose, non bloody), nausea and vomiting (non bloody). Negative for blood in stool.   Genitourinary:  Negative for dysuria.   Musculoskeletal:  Negative for back pain.   Skin:  Negative for rash.   Neurological:  Negative for headaches.   Psychiatric/Behavioral:  Negative for confusion.      Physical Exam     Initial Vitals [03/01/23 1655]   BP Pulse Resp Temp SpO2   (!) 140/88 100 16 98.4 °F (36.9 °C) 98 %      MAP       --         Physical Exam    Nursing note and vitals reviewed.  Constitutional: He appears well-developed and well-nourished.   HENT:   Head: Normocephalic and atraumatic.   Eyes: Conjunctivae and EOM are normal. Pupils are equal, round, and reactive to light.   Neck:   Normal range of motion.  Cardiovascular:  Normal rate, regular rhythm and normal heart sounds.           Pulmonary/Chest: Breath sounds normal. No respiratory distress. He has no wheezes. He has no rales.   Abdominal: Abdomen is soft. Bowel sounds are normal. He exhibits no distension. There is abdominal tenderness. There is guarding.   Musculoskeletal:      Cervical back: Normal range of motion.     Neurological: He is alert and oriented to person, place, and time. GCS score is 15. GCS eye subscore is 4. GCS verbal subscore is 5. GCS motor subscore is 6.   Skin: Skin is warm and dry. Capillary refill takes less than 2 seconds. No rash noted. No erythema. No pallor.       ED Course   Procedures  Labs Reviewed   CBC W/ AUTO DIFFERENTIAL - Abnormal; Notable for the following components:       Result Value    Lymph # 0.9 (*)     Gran % 76.1 (*)     Lymph % 16.3 (*)     All other components within normal limits   COMPREHENSIVE METABOLIC PANEL - Abnormal; Notable for the following components:    Glucose 122  (*)     All other components within normal limits   URINALYSIS, REFLEX TO URINE CULTURE - Abnormal; Notable for the following components:    Protein, UA 1+ (*)     Glucose, UA 4+ (*)     Ketones, UA 2+ (*)     Occult Blood UA 1+ (*)     All other components within normal limits    Narrative:     Specimen Source->Urine   DRUG SCREEN PANEL, URINE EMERGENCY - Abnormal; Notable for the following components:    THC Presumptive Positive (*)     All other components within normal limits    Narrative:     Specimen Source->Urine   URINALYSIS MICROSCOPIC - Abnormal; Notable for the following components:    RBC, UA 14 (*)     All other components within normal limits    Narrative:     Specimen Source->Urine   POCT GLUCOSE - Abnormal; Notable for the following components:    POCT Glucose 356 (*)     All other components within normal limits   HEPATITIS C ANTIBODY    Narrative:     Release to patient->Immediate   LIPASE   HIV 1 / 2 ANTIBODY   ISTAT CHEM8   POCT GLUCOSE MONITORING CONTINUOUS     EKG Readings: (Independently Interpreted)   Rhythm: Normal Sinus Rhythm.     Imaging Results              CT Abdomen Pelvis With Contrast (Final result)  Result time 03/01/23 23:39:36      Final result by Severo Landeros MD (03/01/23 23:39:36)                   Impression:      Mild gastric wall thickening which could be related to underlying gastritis, similar to prior study.    No pneumoperitoneum or other acute abnormality in the abdomen or pelvis.    Incidental findings discussed in the body of the report.      Electronically signed by: Severo Landeros MD  Date:    03/01/2023  Time:    23:39               Narrative:    EXAMINATION:  CT ABDOMEN PELVIS WITH CONTRAST    CLINICAL HISTORY:  Abdominal abscess/infection suspected;Bowel obstruction suspected;    TECHNIQUE:  Low dose axial images, sagittal and coronal reformations were obtained from the lung bases to the pubic symphysis following the IV administration of 100 mL of Omnipaque  350 .  Oral contrast was not given.    COMPARISON:  01/30/2023.    FINDINGS:  Lower Chest:    Lung bases are clear.  Heart size is normal.    Abdomen:    Liver is normal in size and contour.  No focal hepatic mass identified.  Gallbladder is unremarkable. No intrahepatic biliary ductal dilatation.    Spleen is not enlarged.  Adrenal glands and pancreas are unremarkable.    The kidneys are symmetric.  No hydronephrosis. No asymmetric perinephric inflammatory fat stranding.    Mild inflammatory changes and wall thickening involving the stomach.  No small bowel obstruction.  No significant inflammatory changes identified in small or large bowel.  Appendix is unremarkable.  Mild stool burden in the rectum.    No pneumoperitoneum or organized fluid collection.    No bulky lymphadenopathy.    Abdominal aorta is normal in caliber without significant atherosclerosis.    Portal, splenic, and superior mesenteric veins are patent.    Pelvis:    Urinary bladder is relatively decompressed and otherwise unremarkable.  Mild stool burden in the rectum.  Prostate is unremarkable.  No significant pelvic free fluid.    Bones and soft tissues:    No aggressive osseous lesions.  Mild body wall edema.                                       Medications   sodium chloride 0.9% bolus 1,000 mL 1,000 mL (0 mLs Intravenous Stopped 3/1/23 2015)   droperidoL injection 1.25 mg (1.25 mg Intravenous Given 3/1/23 1841)   morphine injection 4 mg (4 mg Intravenous Given 3/1/23 2325)   ondansetron injection 4 mg (4 mg Intravenous Given 3/1/23 2325)   iohexoL (OMNIPAQUE 350) injection 100 mL (100 mLs Intravenous Given 3/1/23 2309)   insulin aspart U-100 pen 5 Units (5 Units Subcutaneous Given 3/2/23 0005)   droperidoL injection 1.25 mg (1.25 mg Intravenous Given 3/2/23 0044)     Medical Decision Making:   History:   Old Medical Records: I decided to obtain old medical records.  Old Records Summarized: records from clinic visits and records from previous  admission(s).  Initial Assessment:   36yo male with hx diabetes type 1 with insulin pump, hx of DKA, marijuana use, frequency admissions for nausea, vomiting and abdominal pain presents with n/v/d/diffuse abdominal pain       Differential Diagnosis:   Gastroenteritis, diabetic gastroparesis, hyperemesis cannabis syndrome, gastritis, GERD, PUD, pancreatitis, dehydration     Clinical Tests:   Lab Tests: Ordered and Reviewed       <> Summary of Lab: CBC wnl  Radiological Study: Ordered and Reviewed  Medical Tests: Ordered and Reviewed  ED Management:  CT A/P with mild gastric wall thickening possibly related to underlying gastritis. Given 1L IVF and 1.25mg IV droperidol x2, morphine x1 without improvement. Unable to complete PO challenge without increased abdominal pain or vomiting. Patient to be admitted to observation under hospital medicine     Other:   I discussed test(s) with the performing physician.                        Clinical Impression:   Final diagnoses:  [R07.9] Chest pain  [Z91.89] At risk for long QT syndrome  [R10.84] Generalized abdominal pain (Primary)  [K21.9] Gastroesophageal reflux disease, unspecified whether esophagitis present  [K31.84] Gastroparesis               Lore Boyer MD  Resident  03/02/23 0051

## 2023-03-02 VITALS
RESPIRATION RATE: 16 BRPM | TEMPERATURE: 98 F | HEIGHT: 69 IN | OXYGEN SATURATION: 97 % | WEIGHT: 200 LBS | HEART RATE: 100 BPM | DIASTOLIC BLOOD PRESSURE: 74 MMHG | BODY MASS INDEX: 29.62 KG/M2 | SYSTOLIC BLOOD PRESSURE: 145 MMHG

## 2023-03-02 PROBLEM — F12.90 CANNABINOID HYPEREMESIS SYNDROME: Status: ACTIVE | Noted: 2021-04-22

## 2023-03-02 PROBLEM — I10 PRIMARY HYPERTENSION: Status: ACTIVE | Noted: 2021-03-04

## 2023-03-02 PROBLEM — K21.9 GERD (GASTROESOPHAGEAL REFLUX DISEASE): Status: ACTIVE | Noted: 2023-03-02

## 2023-03-02 LAB
ALBUMIN SERPL BCP-MCNC: 4 G/DL (ref 3.5–5.2)
ALP SERPL-CCNC: 66 U/L (ref 55–135)
ALT SERPL W/O P-5'-P-CCNC: 30 U/L (ref 10–44)
AMPHET+METHAMPHET UR QL: NEGATIVE
ANION GAP SERPL CALC-SCNC: 13 MMOL/L (ref 8–16)
ANION GAP SERPL CALC-SCNC: 17 MMOL/L (ref 8–16)
AST SERPL-CCNC: 31 U/L (ref 10–40)
B-OH-BUTYR BLD STRIP-SCNC: 1.6 MMOL/L (ref 0–0.5)
BARBITURATES UR QL SCN>200 NG/ML: NEGATIVE
BASOPHILS # BLD AUTO: 0.01 K/UL (ref 0–0.2)
BASOPHILS NFR BLD: 0.1 % (ref 0–1.9)
BENZODIAZ UR QL SCN>200 NG/ML: NEGATIVE
BILIRUB SERPL-MCNC: 0.7 MG/DL (ref 0.1–1)
BUN SERPL-MCNC: 19 MG/DL (ref 6–20)
BUN SERPL-MCNC: 20 MG/DL (ref 6–20)
BZE UR QL SCN: NEGATIVE
CALCIUM SERPL-MCNC: 10 MG/DL (ref 8.7–10.5)
CALCIUM SERPL-MCNC: 9.8 MG/DL (ref 8.7–10.5)
CANNABINOIDS UR QL SCN: ABNORMAL
CHLORIDE SERPL-SCNC: 103 MMOL/L (ref 95–110)
CHLORIDE SERPL-SCNC: 107 MMOL/L (ref 95–110)
CO2 SERPL-SCNC: 21 MMOL/L (ref 23–29)
CO2 SERPL-SCNC: 21 MMOL/L (ref 23–29)
CREAT SERPL-MCNC: 1.5 MG/DL (ref 0.5–1.4)
CREAT SERPL-MCNC: 1.6 MG/DL (ref 0.5–1.4)
CREAT UR-MCNC: 114 MG/DL (ref 23–375)
DIFFERENTIAL METHOD: ABNORMAL
EOSINOPHIL # BLD AUTO: 0 K/UL (ref 0–0.5)
EOSINOPHIL NFR BLD: 0 % (ref 0–8)
ERYTHROCYTE [DISTWIDTH] IN BLOOD BY AUTOMATED COUNT: 12.4 % (ref 11.5–14.5)
EST. GFR  (NO RACE VARIABLE): 56.9 ML/MIN/1.73 M^2
EST. GFR  (NO RACE VARIABLE): >60 ML/MIN/1.73 M^2
ESTIMATED AVG GLUCOSE: 246 MG/DL (ref 68–131)
GLUCOSE SERPL-MCNC: 242 MG/DL (ref 70–110)
GLUCOSE SERPL-MCNC: 387 MG/DL (ref 70–110)
GLUCOSE SERPL-MCNC: 423 MG/DL (ref 70–110)
HBA1C MFR BLD: 10.2 % (ref 4–5.6)
HCT VFR BLD AUTO: 39.4 % (ref 40–54)
HGB BLD-MCNC: 13.1 G/DL (ref 14–18)
IMM GRANULOCYTES # BLD AUTO: 0.02 K/UL (ref 0–0.04)
IMM GRANULOCYTES NFR BLD AUTO: 0.2 % (ref 0–0.5)
LYMPHOCYTES # BLD AUTO: 0.4 K/UL (ref 1–4.8)
LYMPHOCYTES NFR BLD: 4.2 % (ref 18–48)
MAGNESIUM SERPL-MCNC: 1.7 MG/DL (ref 1.6–2.6)
MCH RBC QN AUTO: 30 PG (ref 27–31)
MCHC RBC AUTO-ENTMCNC: 33.2 G/DL (ref 32–36)
MCV RBC AUTO: 90 FL (ref 82–98)
METHADONE UR QL SCN>300 NG/ML: NEGATIVE
MONOCYTES # BLD AUTO: 0.2 K/UL (ref 0.3–1)
MONOCYTES NFR BLD: 2 % (ref 4–15)
NEUTROPHILS # BLD AUTO: 8.4 K/UL (ref 1.8–7.7)
NEUTROPHILS NFR BLD: 93.5 % (ref 38–73)
NRBC BLD-RTO: 0 /100 WBC
OPIATES UR QL SCN: NEGATIVE
PCP UR QL SCN>25 NG/ML: NEGATIVE
PHOSPHATE SERPL-MCNC: 4.1 MG/DL (ref 2.7–4.5)
PLATELET # BLD AUTO: 179 K/UL (ref 150–450)
PMV BLD AUTO: 11.4 FL (ref 9.2–12.9)
POCT GLUCOSE: 201 MG/DL (ref 70–110)
POCT GLUCOSE: 217 MG/DL (ref 70–110)
POCT GLUCOSE: 306 MG/DL (ref 70–110)
POCT GLUCOSE: 387 MG/DL (ref 70–110)
POCT GLUCOSE: 418 MG/DL (ref 70–110)
POCT GLUCOSE: 454 MG/DL (ref 70–110)
POTASSIUM SERPL-SCNC: 4.7 MMOL/L (ref 3.5–5.1)
POTASSIUM SERPL-SCNC: 5.2 MMOL/L (ref 3.5–5.1)
PROT SERPL-MCNC: 7.5 G/DL (ref 6–8.4)
RBC # BLD AUTO: 4.37 M/UL (ref 4.6–6.2)
SODIUM SERPL-SCNC: 141 MMOL/L (ref 136–145)
SODIUM SERPL-SCNC: 141 MMOL/L (ref 136–145)
TOXICOLOGY INFORMATION: ABNORMAL
WBC # BLD AUTO: 8.97 K/UL (ref 3.9–12.7)

## 2023-03-02 PROCEDURE — 93010 ELECTROCARDIOGRAM REPORT: CPT | Mod: ,,, | Performed by: INTERNAL MEDICINE

## 2023-03-02 PROCEDURE — 96372 THER/PROPH/DIAG INJ SC/IM: CPT | Mod: 59

## 2023-03-02 PROCEDURE — G0378 HOSPITAL OBSERVATION PER HR: HCPCS

## 2023-03-02 PROCEDURE — 85025 COMPLETE CBC W/AUTO DIFF WBC: CPT

## 2023-03-02 PROCEDURE — 63600175 PHARM REV CODE 636 W HCPCS: Performed by: NURSE PRACTITIONER

## 2023-03-02 PROCEDURE — 83735 ASSAY OF MAGNESIUM: CPT

## 2023-03-02 PROCEDURE — 99214 PR OFFICE/OUTPT VISIT, EST, LEVL IV, 30-39 MIN: ICD-10-PCS | Mod: ,,, | Performed by: NURSE PRACTITIONER

## 2023-03-02 PROCEDURE — 93005 ELECTROCARDIOGRAM TRACING: CPT

## 2023-03-02 PROCEDURE — 99223 PR INITIAL HOSPITAL CARE,LEVL III: ICD-10-PCS | Mod: ,,,

## 2023-03-02 PROCEDURE — 25500020 PHARM REV CODE 255: Performed by: EMERGENCY MEDICINE

## 2023-03-02 PROCEDURE — 80048 BASIC METABOLIC PNL TOTAL CA: CPT | Mod: XB

## 2023-03-02 PROCEDURE — 84100 ASSAY OF PHOSPHORUS: CPT

## 2023-03-02 PROCEDURE — 93010 EKG 12-LEAD: ICD-10-PCS | Mod: ,,, | Performed by: INTERNAL MEDICINE

## 2023-03-02 PROCEDURE — 80053 COMPREHEN METABOLIC PANEL: CPT | Performed by: NURSE PRACTITIONER

## 2023-03-02 PROCEDURE — 96372 THER/PROPH/DIAG INJ SC/IM: CPT | Mod: 59 | Performed by: NEUROLOGICAL SURGERY

## 2023-03-02 PROCEDURE — 82962 GLUCOSE BLOOD TEST: CPT | Mod: 91

## 2023-03-02 PROCEDURE — 96374 THER/PROPH/DIAG INJ IV PUSH: CPT | Mod: 59

## 2023-03-02 PROCEDURE — 63600175 PHARM REV CODE 636 W HCPCS: Performed by: EMERGENCY MEDICINE

## 2023-03-02 PROCEDURE — 96361 HYDRATE IV INFUSION ADD-ON: CPT | Mod: 59

## 2023-03-02 PROCEDURE — 63600175 PHARM REV CODE 636 W HCPCS

## 2023-03-02 PROCEDURE — 99223 1ST HOSP IP/OBS HIGH 75: CPT | Mod: ,,,

## 2023-03-02 PROCEDURE — 25000003 PHARM REV CODE 250: Performed by: HOSPITALIST

## 2023-03-02 PROCEDURE — 96372 THER/PROPH/DIAG INJ SC/IM: CPT | Performed by: NURSE PRACTITIONER

## 2023-03-02 PROCEDURE — 99214 OFFICE O/P EST MOD 30 MIN: CPT | Mod: ,,, | Performed by: NURSE PRACTITIONER

## 2023-03-02 PROCEDURE — 63600175 PHARM REV CODE 636 W HCPCS: Performed by: NEUROLOGICAL SURGERY

## 2023-03-02 PROCEDURE — 96376 TX/PRO/DX INJ SAME DRUG ADON: CPT

## 2023-03-02 PROCEDURE — C9113 INJ PANTOPRAZOLE SODIUM, VIA: HCPCS

## 2023-03-02 PROCEDURE — 82010 KETONE BODYS QUAN: CPT | Performed by: NURSE PRACTITIONER

## 2023-03-02 PROCEDURE — 96375 TX/PRO/DX INJ NEW DRUG ADDON: CPT

## 2023-03-02 PROCEDURE — 25000003 PHARM REV CODE 250

## 2023-03-02 RX ORDER — INSULIN LISPRO-AABC 100 [IU]/ML
INJECTION, SOLUTION SUBCUTANEOUS
Qty: 5 PEN | Refills: 6 | Status: ON HOLD | OUTPATIENT
Start: 2023-03-02 | End: 2023-03-07 | Stop reason: SDUPTHER

## 2023-03-02 RX ORDER — INSULIN ASPART 100 [IU]/ML
5 INJECTION, SOLUTION INTRAVENOUS; SUBCUTANEOUS ONCE
Status: COMPLETED | OUTPATIENT
Start: 2023-03-02 | End: 2023-03-02

## 2023-03-02 RX ORDER — NALOXONE HCL 0.4 MG/ML
0.02 VIAL (ML) INJECTION
Status: DISCONTINUED | OUTPATIENT
Start: 2023-03-02 | End: 2023-03-02 | Stop reason: HOSPADM

## 2023-03-02 RX ORDER — PANTOPRAZOLE SODIUM 40 MG/1
40 TABLET, DELAYED RELEASE ORAL 2 TIMES DAILY
Status: DISCONTINUED | OUTPATIENT
Start: 2023-03-02 | End: 2023-03-02 | Stop reason: HOSPADM

## 2023-03-02 RX ORDER — IBUPROFEN 200 MG
24 TABLET ORAL
Status: DISCONTINUED | OUTPATIENT
Start: 2023-03-02 | End: 2023-03-02

## 2023-03-02 RX ORDER — DEXTROSE 40 %
15 GEL (GRAM) ORAL
Status: DISCONTINUED | OUTPATIENT
Start: 2023-03-02 | End: 2023-03-02 | Stop reason: HOSPADM

## 2023-03-02 RX ORDER — SODIUM CHLORIDE 0.9 % (FLUSH) 0.9 %
10 SYRINGE (ML) INJECTION EVERY 12 HOURS PRN
Status: DISCONTINUED | OUTPATIENT
Start: 2023-03-02 | End: 2023-03-02 | Stop reason: HOSPADM

## 2023-03-02 RX ORDER — ACETAMINOPHEN 325 MG/1
325 TABLET ORAL DAILY PRN
Status: ON HOLD | COMMUNITY
End: 2023-11-13

## 2023-03-02 RX ORDER — ONDANSETRON 2 MG/ML
4 INJECTION INTRAMUSCULAR; INTRAVENOUS EVERY 8 HOURS PRN
Status: DISCONTINUED | OUTPATIENT
Start: 2023-03-02 | End: 2023-03-02 | Stop reason: HOSPADM

## 2023-03-02 RX ORDER — INSULIN ASPART 100 [IU]/ML
1-10 INJECTION, SOLUTION INTRAVENOUS; SUBCUTANEOUS
Status: DISCONTINUED | OUTPATIENT
Start: 2023-03-02 | End: 2023-03-02

## 2023-03-02 RX ORDER — BISACODYL 10 MG
10 SUPPOSITORY, RECTAL RECTAL DAILY PRN
Status: DISCONTINUED | OUTPATIENT
Start: 2023-03-02 | End: 2023-03-02 | Stop reason: HOSPADM

## 2023-03-02 RX ORDER — MAG HYDROX/ALUMINUM HYD/SIMETH 200-200-20
30 SUSPENSION, ORAL (FINAL DOSE FORM) ORAL 4 TIMES DAILY PRN
Status: DISCONTINUED | OUTPATIENT
Start: 2023-03-02 | End: 2023-03-02

## 2023-03-02 RX ORDER — MAG HYDROX/ALUMINUM HYD/SIMETH 200-200-20
30 SUSPENSION, ORAL (FINAL DOSE FORM) ORAL
Status: DISCONTINUED | OUTPATIENT
Start: 2023-03-02 | End: 2023-03-02

## 2023-03-02 RX ORDER — PROCHLORPERAZINE EDISYLATE 5 MG/ML
5 INJECTION INTRAMUSCULAR; INTRAVENOUS EVERY 6 HOURS PRN
Status: DISCONTINUED | OUTPATIENT
Start: 2023-03-02 | End: 2023-03-02 | Stop reason: HOSPADM

## 2023-03-02 RX ORDER — SODIUM CHLORIDE, SODIUM LACTATE, POTASSIUM CHLORIDE, CALCIUM CHLORIDE 600; 310; 30; 20 MG/100ML; MG/100ML; MG/100ML; MG/100ML
INJECTION, SOLUTION INTRAVENOUS CONTINUOUS
Status: ACTIVE | OUTPATIENT
Start: 2023-03-02 | End: 2023-03-02

## 2023-03-02 RX ORDER — INSULIN ASPART 100 [IU]/ML
4-8 INJECTION, SOLUTION INTRAVENOUS; SUBCUTANEOUS
Status: DISCONTINUED | OUTPATIENT
Start: 2023-03-03 | End: 2023-03-02

## 2023-03-02 RX ORDER — INSULIN DEGLUDEC 100 U/ML
14 INJECTION, SOLUTION SUBCUTANEOUS NIGHTLY
Qty: 2 PEN | Refills: 8 | Status: ON HOLD | OUTPATIENT
Start: 2023-03-02 | End: 2023-03-07 | Stop reason: SDUPTHER

## 2023-03-02 RX ORDER — DICYCLOMINE HYDROCHLORIDE 10 MG/1
10 CAPSULE ORAL 3 TIMES DAILY PRN
Status: DISCONTINUED | OUTPATIENT
Start: 2023-03-02 | End: 2023-03-02 | Stop reason: HOSPADM

## 2023-03-02 RX ORDER — INSULIN ASPART 100 [IU]/ML
1-10 INJECTION, SOLUTION INTRAVENOUS; SUBCUTANEOUS EVERY 4 HOURS PRN
Status: DISCONTINUED | OUTPATIENT
Start: 2023-03-02 | End: 2023-03-02

## 2023-03-02 RX ORDER — GLUCAGON 1 MG
1 KIT INJECTION
Status: DISCONTINUED | OUTPATIENT
Start: 2023-03-02 | End: 2023-03-02

## 2023-03-02 RX ORDER — SUCRALFATE 1 G/10ML
1 SUSPENSION ORAL EVERY 6 HOURS
Status: DISCONTINUED | OUTPATIENT
Start: 2023-03-02 | End: 2023-03-02 | Stop reason: HOSPADM

## 2023-03-02 RX ORDER — INSULIN ASPART 100 [IU]/ML
0-5 INJECTION, SOLUTION INTRAVENOUS; SUBCUTANEOUS
Status: DISCONTINUED | OUTPATIENT
Start: 2023-03-02 | End: 2023-03-02

## 2023-03-02 RX ORDER — MAG HYDROX/ALUMINUM HYD/SIMETH 200-200-20
30 SUSPENSION, ORAL (FINAL DOSE FORM) ORAL
Status: DISCONTINUED | OUTPATIENT
Start: 2023-03-02 | End: 2023-03-02 | Stop reason: HOSPADM

## 2023-03-02 RX ORDER — HYDROXYZINE HYDROCHLORIDE 25 MG/1
25 TABLET, FILM COATED ORAL 3 TIMES DAILY PRN
Status: DISCONTINUED | OUTPATIENT
Start: 2023-03-02 | End: 2023-03-02 | Stop reason: HOSPADM

## 2023-03-02 RX ORDER — POLYETHYLENE GLYCOL 3350 17 G/17G
17 POWDER, FOR SOLUTION ORAL 2 TIMES DAILY PRN
Status: DISCONTINUED | OUTPATIENT
Start: 2023-03-02 | End: 2023-03-02 | Stop reason: HOSPADM

## 2023-03-02 RX ORDER — ACETAMINOPHEN 325 MG/1
650 TABLET ORAL EVERY 4 HOURS PRN
Status: DISCONTINUED | OUTPATIENT
Start: 2023-03-02 | End: 2023-03-02 | Stop reason: HOSPADM

## 2023-03-02 RX ORDER — TALC
6 POWDER (GRAM) TOPICAL NIGHTLY PRN
Status: DISCONTINUED | OUTPATIENT
Start: 2023-03-02 | End: 2023-03-02 | Stop reason: HOSPADM

## 2023-03-02 RX ORDER — INSULIN ASPART 100 [IU]/ML
0-5 INJECTION, SOLUTION INTRAVENOUS; SUBCUTANEOUS
Status: DISCONTINUED | OUTPATIENT
Start: 2023-03-02 | End: 2023-03-02 | Stop reason: HOSPADM

## 2023-03-02 RX ORDER — ONDANSETRON 8 MG/1
8 TABLET, ORALLY DISINTEGRATING ORAL EVERY 8 HOURS PRN
Status: DISCONTINUED | OUTPATIENT
Start: 2023-03-02 | End: 2023-03-02

## 2023-03-02 RX ORDER — PANTOPRAZOLE SODIUM 40 MG/10ML
80 INJECTION, POWDER, LYOPHILIZED, FOR SOLUTION INTRAVENOUS ONCE
Status: COMPLETED | OUTPATIENT
Start: 2023-03-02 | End: 2023-03-02

## 2023-03-02 RX ORDER — DEXTROSE 40 %
30 GEL (GRAM) ORAL
Status: DISCONTINUED | OUTPATIENT
Start: 2023-03-02 | End: 2023-03-02 | Stop reason: HOSPADM

## 2023-03-02 RX ORDER — SIMETHICONE 80 MG
1 TABLET,CHEWABLE ORAL 4 TIMES DAILY PRN
Status: DISCONTINUED | OUTPATIENT
Start: 2023-03-02 | End: 2023-03-02 | Stop reason: HOSPADM

## 2023-03-02 RX ORDER — INSULIN LISPRO-AABC 100 [IU]/ML
5-7 INJECTION, SOLUTION SUBCUTANEOUS
COMMUNITY
Start: 2023-01-31 | End: 2023-03-02 | Stop reason: SDUPTHER

## 2023-03-02 RX ORDER — GLUCAGON 1 MG
1 KIT INJECTION
Status: DISCONTINUED | OUTPATIENT
Start: 2023-03-02 | End: 2023-03-02 | Stop reason: HOSPADM

## 2023-03-02 RX ORDER — PANTOPRAZOLE SODIUM 40 MG/1
40 TABLET, DELAYED RELEASE ORAL DAILY
Status: DISCONTINUED | OUTPATIENT
Start: 2023-03-02 | End: 2023-03-02

## 2023-03-02 RX ORDER — METOCLOPRAMIDE 10 MG/1
10 TABLET ORAL EVERY 6 HOURS
Status: DISCONTINUED | OUTPATIENT
Start: 2023-03-02 | End: 2023-03-02 | Stop reason: HOSPADM

## 2023-03-02 RX ORDER — DROPERIDOL 2.5 MG/ML
1.25 INJECTION, SOLUTION INTRAMUSCULAR; INTRAVENOUS
Status: COMPLETED | OUTPATIENT
Start: 2023-03-02 | End: 2023-03-02

## 2023-03-02 RX ORDER — IBUPROFEN 200 MG
16 TABLET ORAL
Status: DISCONTINUED | OUTPATIENT
Start: 2023-03-02 | End: 2023-03-02

## 2023-03-02 RX ORDER — INSULIN ASPART 100 [IU]/ML
4-8 INJECTION, SOLUTION INTRAVENOUS; SUBCUTANEOUS
Status: DISCONTINUED | OUTPATIENT
Start: 2023-03-02 | End: 2023-03-02 | Stop reason: HOSPADM

## 2023-03-02 RX ORDER — INSULIN ASPART 100 [IU]/ML
0-10 INJECTION, SOLUTION INTRAVENOUS; SUBCUTANEOUS
Status: DISCONTINUED | OUTPATIENT
Start: 2023-03-02 | End: 2023-03-02

## 2023-03-02 RX ORDER — LISINOPRIL 10 MG/1
10 TABLET ORAL DAILY
Status: DISCONTINUED | OUTPATIENT
Start: 2023-03-02 | End: 2023-03-02 | Stop reason: HOSPADM

## 2023-03-02 RX ORDER — SUCRALFATE 1 G/10ML
1 SUSPENSION ORAL EVERY 6 HOURS
Status: DISCONTINUED | OUTPATIENT
Start: 2023-03-02 | End: 2023-03-02

## 2023-03-02 RX ADMIN — DROPERIDOL 1.25 MG: 2.5 INJECTION, SOLUTION INTRAMUSCULAR; INTRAVENOUS at 12:03

## 2023-03-02 RX ADMIN — SODIUM CHLORIDE, POTASSIUM CHLORIDE, SODIUM LACTATE AND CALCIUM CHLORIDE 1000 ML: 600; 310; 30; 20 INJECTION, SOLUTION INTRAVENOUS at 05:03

## 2023-03-02 RX ADMIN — SUCRALFATE 1 G: 1 SUSPENSION ORAL at 01:03

## 2023-03-02 RX ADMIN — METOCLOPRAMIDE 10 MG: 10 TABLET ORAL at 08:03

## 2023-03-02 RX ADMIN — LISINOPRIL 10 MG: 10 TABLET ORAL at 08:03

## 2023-03-02 RX ADMIN — ALUMINUM HYDROXIDE, MAGNESIUM HYDROXIDE, AND SIMETHICONE 30 ML: 200; 200; 20 SUSPENSION ORAL at 01:03

## 2023-03-02 RX ADMIN — PANTOPRAZOLE SODIUM 80 MG: 40 INJECTION, POWDER, FOR SOLUTION INTRAVENOUS at 01:03

## 2023-03-02 RX ADMIN — METOCLOPRAMIDE 10 MG: 10 TABLET ORAL at 01:03

## 2023-03-02 RX ADMIN — SODIUM CHLORIDE 1000 ML: 9 INJECTION, SOLUTION INTRAVENOUS at 01:03

## 2023-03-02 RX ADMIN — ONDANSETRON 4 MG: 2 INJECTION INTRAMUSCULAR; INTRAVENOUS at 04:03

## 2023-03-02 RX ADMIN — ALUMINUM HYDROXIDE, MAGNESIUM HYDROXIDE, AND SIMETHICONE 30 ML: 200; 200; 20 SUSPENSION ORAL at 06:03

## 2023-03-02 RX ADMIN — PROCHLORPERAZINE EDISYLATE 5 MG: 5 INJECTION INTRAMUSCULAR; INTRAVENOUS at 04:03

## 2023-03-02 RX ADMIN — SUCRALFATE 1 G: 1 SUSPENSION ORAL at 06:03

## 2023-03-02 RX ADMIN — PANTOPRAZOLE SODIUM 40 MG: 40 TABLET, DELAYED RELEASE ORAL at 08:03

## 2023-03-02 RX ADMIN — SODIUM CHLORIDE, POTASSIUM CHLORIDE, SODIUM LACTATE AND CALCIUM CHLORIDE: 600; 310; 30; 20 INJECTION, SOLUTION INTRAVENOUS at 08:03

## 2023-03-02 RX ADMIN — METOCLOPRAMIDE 10 MG: 10 TABLET ORAL at 06:03

## 2023-03-02 RX ADMIN — ALUMINUM HYDROXIDE, MAGNESIUM HYDROXIDE, AND SIMETHICONE 30 ML: 200; 200; 20 SUSPENSION ORAL at 08:03

## 2023-03-02 RX ADMIN — INSULIN ASPART 4 UNITS: 100 INJECTION, SOLUTION INTRAVENOUS; SUBCUTANEOUS at 05:03

## 2023-03-02 RX ADMIN — SUCRALFATE 1 G: 1 SUSPENSION ORAL at 08:03

## 2023-03-02 RX ADMIN — INSULIN ASPART 5 UNITS: 100 INJECTION, SOLUTION INTRAVENOUS; SUBCUTANEOUS at 04:03

## 2023-03-02 RX ADMIN — INSULIN ASPART 5 UNITS: 100 INJECTION, SOLUTION INTRAVENOUS; SUBCUTANEOUS at 12:03

## 2023-03-02 RX ADMIN — INSULIN DETEMIR 12 UNITS: 100 INJECTION, SOLUTION SUBCUTANEOUS at 01:03

## 2023-03-02 NOTE — ASSESSMENT & PLAN NOTE
Endocrinology consulted for BG management.   BG goal 140-180    WBD 0.3 units/kg/day  - Levemir (Insulin Detemir) 12 units nightly   - Novolog (Insulin Aspart) prn for BG excursions [facility] SSI (150/25)  - BG checks q4hr  - Hypoglycemia protocol in place  - S/F clears  - Patient all of sudden became extremely anxious, tachycardic, with tachypnea. Assessing metabolic statis since patient is prone to DKA. Checking CMP and beta hydroxybutyrate to evaluate. If labs look okay, can likely advance diet if okay with primary team. Recommend utilizing ICR 1:15 of Novolog with meals. Patient has Omnipod 5 and will require training on how to use the device. Will schedule diabetes education. Dexcom at bedside      ** Please notify Endocrine for any change and/or advance in diet**  ** Please call Endocrine for any BG related issues **    Discharge Planning:   TBD. Please notify endocrinology prior to discharge.

## 2023-03-02 NOTE — SUBJECTIVE & OBJECTIVE
Interval HPI:   Admitted yesterday.  Cr 1.5.  N/V.  BG above goal  Eating:    Liquid. N/V  Nausea: Yes  Hypoglycemia and intervention: No  Fever: No  TPN and/or TF: No    PMH, PSH, FH, SH updated and reviewed     ROS:    Review of Systems   Constitutional:  Negative for unexpected weight change.   Eyes:  Negative for visual disturbance.   Respiratory:  Negative for cough.    Cardiovascular:  Negative for chest pain.   Gastrointestinal:  Positive for nausea and vomiting.   Endocrine: Positive for polydipsia and polyuria.   Musculoskeletal:  Negative for back pain.   Skin:  Negative for rash.   Neurological:  Negative for syncope.   Psychiatric/Behavioral:  Positive for agitation. Negative for dysphoric mood.      Current Medications and/or Treatments Impacting Glycemic Control  Immunotherapy:    Immunosuppressants       None          Steroids:   Hormones (From admission, onward)      Start     Stop Route Frequency Ordered    03/02/23 0235  melatonin tablet 6 mg         -- Oral Nightly PRN 03/02/23 0140          Pressors:    Autonomic Drugs (From admission, onward)      None          Hyperglycemia/Diabetes Medications:   Antihyperglycemics (From admission, onward)      Start     Stop Route Frequency Ordered    03/02/23 0845  insulin aspart U-100 pen 1-10 Units         -- SubQ Every 4 hours PRN 03/02/23 0745    03/02/23 0145  insulin detemir U-100 pen 12 Units         -- SubQ Nightly 03/02/23 0140             PHYSICAL EXAMINATION:  Vitals:    03/02/23 0730   BP: 138/77   Pulse: (!) 113   Resp: 16   Temp: 98.2 °F (36.8 °C)     Body mass index is 29.53 kg/m².    Physical Exam  Constitutional:       General: He is not in acute distress.     Appearance: Normal appearance. He is not ill-appearing.   HENT:      Head: Normocephalic and atraumatic.      Right Ear: External ear normal.      Left Ear: External ear normal.      Nose: Nose normal.   Cardiovascular:      Rate and Rhythm: Normal rate and regular rhythm.      Heart  sounds: No murmur heard.  Pulmonary:      Effort: Pulmonary effort is normal. No respiratory distress.   Abdominal:      General: Bowel sounds are normal. There is no distension.      Tenderness: There is no abdominal tenderness.   Musculoskeletal:         General: No swelling.      Right lower leg: No edema.      Left lower leg: No edema.   Skin:     Findings: No erythema.   Neurological:      General: No focal deficit present.      Mental Status: He is alert and oriented to person, place, and time.   Psychiatric:         Mood and Affect: Mood normal.         Behavior: Behavior normal.

## 2023-03-02 NOTE — ASSESSMENT & PLAN NOTE
- Creatinine today Estimated Creatinine Clearance: 81.2 mL/min (based on SCr of 1.4 mg/dL). (baseline 1.3)  - Recent Labs   Lab 03/01/23  1831   CO2 25   BUN 15   CREATININE 1.4   - S/p IVFs in the ED   - Monitor BMP daily, replete electrolytes if necessary  - Renally adjust drugs to CrCl; avoid nephrotoxic drugs if possible  - Monitor I/Os

## 2023-03-02 NOTE — ED NOTES
Pt feeling weak and anxious. Having a hard time explaining his symptoms. Appears to be anxious and agrees to feeling like he is going to pass out. Denies chest pain and SOB.  Breath is acetone. Tachycardic 117. EKG performed. Hospital Team E paged and returned call. Aware that EKG was performed and need review. HOLD insulin at this time due to pt anxiety and possible hypoglycemia per Redd VILLEDA

## 2023-03-02 NOTE — ASSESSMENT & PLAN NOTE
- Latest BP and vitals reviewed  - Continue home meds for HTN:   Hypertension Medications             lisinopriL 10 MG tablet Take 1 tablet (10 mg total) by mouth once daily.   - Goal SBP 120s-140s. Utilize p.r.n. antihypertensives only if patient's BP >180/110 & develop symptoms of worsening CP or SOB.

## 2023-03-02 NOTE — PLAN OF CARE
[facility] - Emergency Dept  Initial Discharge Assessment       Primary Care Provider: Viktoriya Jaeger NP    Admission Diagnosis: Generalized abdominal pain [R10.84]    Admission Date: 3/1/2023  Expected Discharge Date: 3/4/2023    Pt was sleepy when SW attempted assessment.  SW asked if she could contact pt spouse and pt was agreeable    SW contacted spouse Marzena [phone] and confirmed information on facesheet.  Spouse stated she was the one that contacted 911 for pt.  As per spouse pt is independent with ambulation and ADL's and only uses medical equipment for his diabetes and does not require assistance.    Discharge Barriers Identified: (P) None    Payor: [facility] MCARE / Plan: [facility] DUAL COMPLETE HMO SNP / Product Type: Medicare Advantage /     Extended Emergency Contact Information  Primary Emergency Contact: MARZENA OLSON   [facility]  Mobile Phone: [phone]  Relation: Spouse    Discharge Plan A: (P) Home  Discharge Plan B: (P) Home      [id number] - [place] - [address] [address]  Phone: [phone] Fax: [phone]    [id number] [place] - [address] [address]  Phone: [phone] Fax: [phone]      Initial Assessment (most recent)       Adult Discharge Assessment - 03/02/23 0910          Discharge Assessment    Assessment Type Discharge Planning Assessment (P)      Confirmed/corrected address, phone number and insurance Yes (P)      Confirmed Demographics Correct on Facesheet (P)      Source of Information family (P)      If unable to respond/provide information was family/caregiver contacted? Yes (P)      Contact Name/Number spouse Marzena [phone] (P)      Reason For Admission Cannabinoid hyperemesis syndrome (P)      People in Home  spouse (P)      Facility Arrived From: home (P)      Do you expect to return to your current living situation? Yes (P)      Do you have help at home or someone to help you manage your care at home? No (P)      Prior to hospitilization cognitive status: Alert/Oriented;No Deficits (P)      Current cognitive status: Alert/Oriented;No Deficits (P)      Home Accessibility wheelchair accessible (P)      Home Layout Able to live on 1st floor (P)      Equipment Currently Used at Home medication pump;other (see comments) (P)    insulin pump and CGM monitor    Patient currently being followed by outpatient case management? No (P)      Do you currently have service(s) that help you manage your care at home? No (P)      Do you take prescription medications? Yes (P)      Do you have any problems affording any of your prescribed medications? No (P)      Is the patient taking medications as prescribed? yes (P)      Who is going to help you get home at discharge? spouse Marzena (P)      How do you get to doctors appointments? family or friend will provide;car, drives self (P)      Are you on dialysis? No (P)      Do you take coumadin? No (P)      Discharge Plan A Home (P)      Discharge Plan B Home (P)      DME Needed Upon Discharge  none (P)      Discharge Plan discussed with: Patient (P)      Discharge Barriers Identified None (P)         OTHER    Name(s) of People in Home spouse Chillita (P)                       Nicol Kuo CD, MSW, LMSW, RSW   Case Management  [facility]  Email: [email or web address]

## 2023-03-02 NOTE — ED NOTES
Pt asleep on stretcher, resp even and unlabored. Responds to nurse voice. On cont tele monitoring, tachycardic. Discussing plan of care with med team at this time. Awaiting Endocrinology Consult. Pt is a Type I Diabetic, hyperglycemic, UA Ketones 2+, Anion Gap 17. Pt is NPO. Providers aware of tachycardia and ordered LR 125mL/hr. Received report from night shift that pt received LR bolus, however was not infusing upon entering room this MA. R AC IV flushed and bolus resumed. Pt is lethargic. Bed low and locked. Side rails raised x 2. Call light within reach. WCTM.

## 2023-03-02 NOTE — ASSESSMENT & PLAN NOTE
Intractable vomiting with nausea  GERD  - Pt with frequent presentations for intractable N/V and abdominal pain 2/2 marijuana use admitted for intractable N/V  - 1/4 SIRS on admission for HR >90   - Labs grossly unremarkable, THC +   - CTAP consistent with gastritis   - H. pylori pending   - Pt was given droperidol, protonix, zofran, morphine without improvement   - IV protonix x1 given pt not tolerating PO, continue BID   - Continue prn antiemetics & IVFs   - Keep NPO for now and ADAT to diabetic CLD   - Daily EKG given increased risk for QT prolongation   - Discussed the importance of marijuana cessation to prevent recurrence of symptoms

## 2023-03-02 NOTE — HPI
Reason for Consult: Management of T1DM, Hyperglycemia     Diabetes diagnosis year: 20    Home Diabetes Medications:   Leunjev ICR 1:15 and 1:50 ISF/ Tresiba 7 units h.s.    Sees NP Scobel  Omnipod 5  Isf 50   Insulin to carb ratio 1:15  Basal 0.72    How often checking glucose at home? Dexcom   BG readings on regimen: 250's  Hypoglycemia on the regimen?  No  Missed doses on regimen?  No    Diabetes Complications include:     Hyperglycemia, Hypoglycemia , and Diabetic cataract , Frequent DKA    Complicating diabetes co morbidities:   CKD      HPI:   Patient is a 37 y.o. male with type 1 diabetes with insulin pump for the past month, hx of DKA, and cyclical vomiting syndrome 2/2 marijuana use presented to the ED for evaluation of intractable nausea, vomiting, and burning epigastric abdominal pain, which have progressively worsened over the past week.  Endocrine consulted for bg management.    Lab Results   Component Value Date    HGBA1C 10.2 (H) 03/01/2023

## 2023-03-02 NOTE — CARE UPDATE
Andrzej Sinclair was admitted to Hospital Medicine for cannabinoid hyperemesis syndrome and intractable vomiting with nausea. Evaluated patient at bedside this am. Denied any complaints at the time of my eval. Stated he's unsure if he feels nauseous until he tries to eat. Tolerated CLD, advanced to diabetic. Ok by Endocrine. Endocrine dosing insulin. Nurse reported patient is endorsing anxiety. Added Hydroxyzine prn. Will continue IVF given patient is likely volume depleted due to polyuria and vomiting. Will continue to monitor for improvement of nausea and tachycardia with anti-emetics and IVF.

## 2023-03-02 NOTE — ED NOTES
CBG noted to be elevated after insulin administration. Notified hospital medicine of glucose. Concerned for DKA. Pt reports this feels similar to previous DKA episodes. Transportation cancelled. JENNIFER Musa notified pt is staying in ED

## 2023-03-02 NOTE — PLAN OF CARE
03/02/23 0915   Post-Acute Status   Post-Acute Authorization Other   Other Status No Post-Acute Service Needs   Discharge Delays None known at this time   Discharge Plan   Discharge Plan A Home     No post acute services required at this time      Nicol Kuo CD, MSW, LMSW, RSW   Case Management  [facility]  Email: [email or web address]

## 2023-03-02 NOTE — SUBJECTIVE & OBJECTIVE
"Past Medical History:   Diagnosis Date    Diabetes mellitus     Diabetes mellitus type 1     Diagnosed at age 20       No past surgical history on file.    Review of patient's allergies indicates:  No Known Allergies    No current facility-administered medications on file prior to encounter.     Current Outpatient Medications on File Prior to Encounter   Medication Sig    acetone, urine, test (KETONE URINE TEST) Strp 30 strips by Misc.(Non-Drug; Combo Route) route as needed (hyperglycemia).    blood sugar diagnostic Strp To check BG up to 5 times daily    blood-glucose meter Misc Use to test blood glucose 2 times a day with meals.    blood-glucose sensor (DEXCOM G6 SENSOR) Martha 3 each by Misc.(Non-Drug; Combo Route) route continuous.    blood-glucose transmitter (DEXCOM G6 TRANSMITTER) Martha 1 each by Misc.(Non-Drug; Combo Route) route continuous.    dicyclomine (BENTYL) 10 MG capsule Take 1 capsule (10 mg total) by mouth 3 (three) times daily as needed (abdominal pain).    glucagon (GVOKE HYPOPEN 2-PACK) 1 mg/0.2 mL AtIn Inject 1 Package into the skin as needed (hypoglycemia).    insulin degludec (TRESIBA FLEXTOUCH U-100) 100 unit/mL (3 mL) insulin pen Inject 17 Units into the skin once daily.    insulin lispro (HUMALOG U-100 INSULIN) 100 unit/mL injection To use in Omnipod pump; TDD 90 units    insulin syringe-needle U-100 (BD INSULIN SYRINGE ULTRA-FINE) 0.5 mL 31 gauge x 5/16" Syrg 4 times daily    insulin syringe-needle U-100 (BD INSULIN SYRINGE ULTRA-FINE) 0.5 mL 31 gauge x 5/16" Syrg 4 times daily    insulin syringe-needle U-100 (BD INSULIN SYRINGE ULTRA-FINE) 0.5 mL 31 gauge x 5/16" Syrg 4 times daily    insulin syringe-needle U-100 0.5 mL 30 gauge x 5/16" Syrg To use 5 times daily    lancets Misc Use to test blood glucose 2 (two) times daily with meals.    lancing device Misc 1 Device by Misc.(Non-Drug; Combo Route) route 2 (two) times daily with meals.    LEVSIN/SL 0.125 mg Subl Take 0.125 mg by mouth 3 " (three) times daily as needed.    lisinopriL 10 MG tablet Take 1 tablet (10 mg total) by mouth once daily.    metoclopramide HCl (REGLAN) 10 MG tablet Take 1 tablet (10 mg total) by mouth every 6 (six) hours.    metoclopramide HCl (REGLAN) 10 MG tablet Take 1 tablet (10 mg total) by mouth every 6 (six) hours.    ondansetron (ZOFRAN-ODT) 8 MG TbDL Take 1 tablet (8 mg total) by mouth every 6 (six) hours as needed (nausea).    pantoprazole (PROTONIX) 40 MG tablet Take 1 tablet (40 mg total) by mouth once daily.     Family History       Problem Relation (Age of Onset)    Diabetes Mother    Other Mother, Father          Tobacco Use    Smoking status: Former    Smokeless tobacco: Current   Substance and Sexual Activity    Alcohol use: Yes     Comment: socially    Drug use: No    Sexual activity: Not on file     Review of Systems   Constitutional:  Positive for appetite change. Negative for activity change, chills and fever.   HENT:  Negative for trouble swallowing.    Eyes:  Negative for photophobia and visual disturbance.   Respiratory:  Negative for chest tightness, shortness of breath and wheezing.    Cardiovascular:  Positive for chest pain. Negative for palpitations and leg swelling.   Gastrointestinal:  Positive for abdominal pain, diarrhea, nausea and vomiting. Negative for blood in stool and constipation.   Genitourinary:  Negative for dysuria, frequency, hematuria and urgency.   Musculoskeletal:  Negative for arthralgias, back pain and gait problem.   Skin:  Negative for color change and rash.   Neurological:  Negative for dizziness, syncope, weakness, light-headedness, numbness and headaches.   Psychiatric/Behavioral:  Negative for agitation and confusion. The patient is not nervous/anxious.    Objective:     Vital Signs (Most Recent):  Temp: 98.3 °F (36.8 °C) (03/02/23 0150)  Pulse: 92 (03/02/23 0150)  Resp: 17 (03/02/23 0150)  BP: 138/78 (03/02/23 0150)  SpO2: 98 % (03/02/23 0150) Vital Signs (24h  Range):  Temp:  [98.3 °F (36.8 °C)-98.4 °F (36.9 °C)] 98.3 °F (36.8 °C)  Pulse:  [] 92  Resp:  [12-22] 17  SpO2:  [97 %-100 %] 98 %  BP: (126-166)/(61-88) 138/78     Weight: 90.7 kg (200 lb)  Body mass index is 29.53 kg/m².    Physical Exam  Vitals and nursing note reviewed.   Constitutional:       General: He is not in acute distress.     Appearance: He is well-developed. He is ill-appearing.      Comments: Actively vomiting on my entry   HENT:      Head: Normocephalic and atraumatic.      Mouth/Throat:      Mouth: Mucous membranes are moist.   Eyes:      Conjunctiva/sclera: Conjunctivae normal.      Pupils: Pupils are equal, round, and reactive to light.   Cardiovascular:      Rate and Rhythm: Normal rate and regular rhythm.      Heart sounds: Normal heart sounds.   Pulmonary:      Effort: Pulmonary effort is normal. No respiratory distress.      Breath sounds: Normal breath sounds. No wheezing.   Abdominal:      General: Bowel sounds are normal. There is no distension.      Palpations: Abdomen is soft.      Tenderness: There is abdominal tenderness. There is guarding.   Musculoskeletal:         General: No tenderness. Normal range of motion.      Cervical back: Normal range of motion and neck supple.      Right lower leg: No edema.      Left lower leg: No edema.   Skin:     General: Skin is warm and dry.      Capillary Refill: Capillary refill takes less than 2 seconds.      Findings: No rash.   Neurological:      Mental Status: He is alert and oriented to person, place, and time.      Cranial Nerves: No cranial nerve deficit.      Sensory: No sensory deficit.      Coordination: Coordination normal.   Psychiatric:         Behavior: Behavior normal.         Thought Content: Thought content normal.         Judgment: Judgment normal.         CRANIAL NERVES     CN III, IV, VI   Pupils are equal, round, and reactive to light.     Significant Labs: All pertinent labs within the past 24 hours have been  reviewed.  CBC:   Recent Labs   Lab 03/01/23  1831   WBC 5.45   HGB 14.5   HCT 42.5        CMP:   Recent Labs   Lab 03/01/23  1831      K 4.5      CO2 25   *   BUN 15   CREATININE 1.4   CALCIUM 10.2   PROT 8.3   ALBUMIN 4.4   BILITOT 0.4   ALKPHOS 76   AST 35   ALT 26   ANIONGAP 10       Significant Imaging: I have reviewed all pertinent imaging results/findings within the past 24 hours.    CT Abdomen Pelvis With Contrast  Narrative: EXAMINATION:  CT ABDOMEN PELVIS WITH CONTRAST    CLINICAL HISTORY:  Abdominal abscess/infection suspected;Bowel obstruction suspected;    TECHNIQUE:  Low dose axial images, sagittal and coronal reformations were obtained from the lung bases to the pubic symphysis following the IV administration of 100 mL of Omnipaque 350 .  Oral contrast was not given.    COMPARISON:  01/30/2023.    FINDINGS:  Lower Chest:    Lung bases are clear.  Heart size is normal.    Abdomen:    Liver is normal in size and contour.  No focal hepatic mass identified.  Gallbladder is unremarkable. No intrahepatic biliary ductal dilatation.    Spleen is not enlarged.  Adrenal glands and pancreas are unremarkable.    The kidneys are symmetric.  No hydronephrosis. No asymmetric perinephric inflammatory fat stranding.    Mild inflammatory changes and wall thickening involving the stomach.  No small bowel obstruction.  No significant inflammatory changes identified in small or large bowel.  Appendix is unremarkable.  Mild stool burden in the rectum.    No pneumoperitoneum or organized fluid collection.    No bulky lymphadenopathy.    Abdominal aorta is normal in caliber without significant atherosclerosis.    Portal, splenic, and superior mesenteric veins are patent.    Pelvis:    Urinary bladder is relatively decompressed and otherwise unremarkable.  Mild stool burden in the rectum.  Prostate is unremarkable.  No significant pelvic free fluid.    Bones and soft tissues:    No aggressive osseous  lesions.  Mild body wall edema.  Impression: Mild gastric wall thickening which could be related to underlying gastritis, similar to prior study.    No pneumoperitoneum or other acute abnormality in the abdomen or pelvis.    Incidental findings discussed in the body of the report.    Electronically signed by: Severo Landeros MD  Date:    03/01/2023  Time:    23:39

## 2023-03-02 NOTE — CONSULTS
[facility] - Emergency Dept  Endocrinology  Diabetes Consult Note    Consult Requested by: Sophy Redmond MD   Reason for admit: Cannabinoid hyperemesis syndrome    HISTORY OF PRESENT ILLNESS:  Reason for Consult: Management of T1DM, Hyperglycemia     Diabetes diagnosis year: 20    Home Diabetes Medications:   Leunjev ICR 1:15 and 1:50 ISF/ Tresiba 7 units h.s.    Sees NP Scobel  Omnipod 5  Isf 50   Insulin to carb ratio 1:15  Basal 0.72    How often checking glucose at home? Dexcom   BG readings on regimen: 250's  Hypoglycemia on the regimen?  No  Missed doses on regimen?  No    Diabetes Complications include:     Hyperglycemia, Hypoglycemia , and Diabetic cataract , Frequent DKA    Complicating diabetes co morbidities:   CKD      HPI:   Patient is a 37 y.o. male with type 1 diabetes with insulin pump for the past month, hx of DKA, and cyclical vomiting syndrome 2/2 marijuana use presented to the ED for evaluation of intractable nausea, vomiting, and burning epigastric abdominal pain, which have progressively worsened over the past week.  Endocrine consulted for bg management.    Lab Results   Component Value Date    HGBA1C 10.2 (H) 03/01/2023           Interval HPI:   Admitted yesterday.  Cr 1.5.  N/V.  BG above goal  Eating:    Liquid. N/V  Nausea: Yes  Hypoglycemia and intervention: No  Fever: No  TPN and/or TF: No    PMH, PSH, FH, SH updated and reviewed     ROS:    Review of Systems   Constitutional:  Negative for unexpected weight change.   Eyes:  Negative for visual disturbance.   Respiratory:  Negative for cough.    Cardiovascular:  Negative for chest pain.   Gastrointestinal:  Positive for nausea and vomiting.   Endocrine: Positive for polydipsia and polyuria.   Musculoskeletal:  Negative for back pain.   Skin:  Negative for rash.   Neurological:  Negative for syncope.   Psychiatric/Behavioral:  Positive for agitation. Negative for dysphoric mood.      Current Medications and/or Treatments Impacting Glycemic  Control  Immunotherapy:    Immunosuppressants       None          Steroids:   Hormones (From admission, onward)      Start     Stop Route Frequency Ordered    03/02/23 0235  melatonin tablet 6 mg         -- Oral Nightly PRN 03/02/23 0140          Pressors:    Autonomic Drugs (From admission, onward)      None          Hyperglycemia/Diabetes Medications:   Antihyperglycemics (From admission, onward)      Start     Stop Route Frequency Ordered    03/02/23 0845  insulin aspart U-100 pen 1-10 Units         -- SubQ Every 4 hours PRN 03/02/23 0745    03/02/23 0145  insulin detemir U-100 pen 12 Units         -- SubQ Nightly 03/02/23 0140             PHYSICAL EXAMINATION:  Vitals:    03/02/23 0730   BP: 138/77   Pulse: (!) 113   Resp: 16   Temp: 98.2 °F (36.8 °C)     Body mass index is 29.53 kg/m².    Physical Exam  Constitutional:       General: He is not in acute distress.     Appearance: Normal appearance. He is not ill-appearing.   HENT:      Head: Normocephalic and atraumatic.      Right Ear: External ear normal.      Left Ear: External ear normal.      Nose: Nose normal.   Cardiovascular:      Rate and Rhythm: Normal rate and regular rhythm.      Heart sounds: No murmur heard.  Pulmonary:      Effort: Pulmonary effort is normal. No respiratory distress.   Abdominal:      General: Bowel sounds are normal. There is no distension.      Tenderness: There is no abdominal tenderness.   Musculoskeletal:         General: No swelling.      Right lower leg: No edema.      Left lower leg: No edema.   Skin:     Findings: No erythema.   Neurological:      General: No focal deficit present.      Mental Status: He is alert and oriented to person, place, and time.   Psychiatric:         Mood and Affect: Mood normal.         Behavior: Behavior normal.         Labs Reviewed and Include   Recent Labs   Lab 03/01/23  1831 03/02/23  0345   * 423*   CALCIUM 10.2 10.0   ALBUMIN 4.4  --    PROT 8.3  --     141   K 4.5 4.7   CO2  25 21*    103   BUN 15 19   CREATININE 1.4 1.5*   ALKPHOS 76  --    ALT 26  --    AST 35  --    BILITOT 0.4  --      Lab Results   Component Value Date    WBC 8.97 03/02/2023    HGB 13.1 (L) 03/02/2023    HCT 39.4 (L) 03/02/2023    MCV 90 03/02/2023     03/02/2023     No results for input(s): TSH, FREET4 in the last 168 hours.  Lab Results   Component Value Date    HGBA1C 10.2 (H) 03/01/2023       Nutritional status:   Body mass index is 29.53 kg/m².  Lab Results   Component Value Date    ALBUMIN 4.4 03/01/2023    ALBUMIN 4.3 02/21/2023    ALBUMIN 3.9 01/29/2023     No results found for: PREALBUMIN    Estimated Creatinine Clearance: 75.8 mL/min (A) (based on SCr of 1.5 mg/dL (H)).    Accu-Checks  Recent Labs     03/01/23  2254 03/02/23  0128 03/02/23  0311 03/02/23  0512 03/02/23  1033   POCTGLUCOSE 356* 454* 418* 387* 201*        ASSESSMENT and PLAN    Renal/  CKD (chronic kidney disease) stage 3, GFR 30-59 ml/min  Titrate insulin slowly to avoid hypoglycemia as the risk of hypoglycemia increases with decreased creatinine clearance.          Endocrine  Type 1 diabetes mellitus with complications  Endocrinology consulted for BG management.   BG goal 140-180    WBD 0.3 units/kg/day  - Levemir (Insulin Detemir) 12 units nightly   - Novolog (Insulin Aspart) prn for BG excursions [facility] SSI (150/25)  - BG checks q4hr  - Hypoglycemia protocol in place  - S/F clears  - Patient all of sudden became extremely anxious, tachycardic, with tachypnea. Assessing metabolic statis since patient is prone to DKA. Checking CMP and beta hydroxybutyrate to evaluate. If labs look okay, can likely advance diet if okay with primary team. Recommend utilizing ICR 1:15 of Novolog with meals. Patient has Omnipod 5 and will require training on how to use the device. Will schedule diabetes education. Dexcom at bedside      ** Please notify Endocrine for any change and/or advance in diet**  ** Please call Endocrine for any BG related  issues **    Discharge Planning:   TBD. Please notify endocrinology prior to discharge.        GI  * Cannabinoid hyperemesis syndrome  Managed by primary team          Plan discussed with patient, family, and RN at bedside.        Manuel Dill DNP, FNP  Endocrinology  [facility] - Emergency Dept

## 2023-03-02 NOTE — CARE UPDATE
Plan of Care Note:    Patient's BG remains elevated     WBD @ 0.5 units/kg/day.  Patient does not know how to estimate CHO intake. Will likely require DM education outpatient.     Endocrinology consulted for BG management.   BG goal 140-180    - Levemir (Insulin Detemir) 20 units nightly   - Novolog (Insulin Aspart) 4-8 units TIDWM and prn for BG excursions [facility] SSI (150/50)  - BG checks AC/HS/0200  - Hypoglycemia protocol in place  - If blood glucose greater than 300, please ask patient not to eat food or drink anything other than water until correctional insulin has brought it back below 250    ** Please notify Endocrine for any change and/or advance in diet**  ** Please call Endocrine for any BG related issues **    Discharge Planning:   TBD. Please notify endocrinology prior to discharge.      Manuel Dill DNP, FNP-C  Department of Endocrinology  Inpatient Glycemic Management

## 2023-03-02 NOTE — H&P
[facility] - Emergency Dept  [facility] Medicine  History & Physical    Patient Name: Andrzej Sinclair  MRN: [id number]  Patient Class: OP- Observation  Admission Date: 3/1/2023  Attending Physician: Dakota Sagastume MD   Primary Care Provider: Viktoriya Jaeger NP         Patient information was obtained from patient, past medical records and ER records.     Subjective:     Principal Problem:Cannabinoid hyperemesis syndrome    Chief Complaint:   Chief Complaint   Patient presents with    Abdominal Pain     Abd pain/n/v x10 days.    Chest Pain        HPI: Andrzej Sinclair is a 37 y.o. M with type 1 diabetes with insulin pump for the past month, hx of DKA, and cyclical vomiting syndrome 2/2 marijuana use presented to the ED for evaluation of intractable nausea, vomiting, and burning epigastric abdominal pain, which have progressively worsened over the past week. Pt is frequently evaluated in the ED for these symptoms and reports his symptoms restarted ~2 days following his last ED visit 2/21/23. He reports significant nausea, with multiple episodes of non-bloody emesis, and is unable to tolerate PO. He also reports 9/10 burning, epigastric abdominal pain and diffuse abdominal tenderness, as well as 1 episode of non-bloody diarrhea today. He reports compliance with home PPI and states his last marijuana use was 2 weeks ago. He reports checking his blood glucose regularly and states BG have been well controlled with the exception of 1 reading of 300 yesterday. He denies recent alcohol or ilicit drug use.      During admission in 2021, GI recommended cessation of marijuana, schedueld anti emetics, daily PPI use, and EGD. He has not had an EGD. Gastric Emptying test in 3/2021 was normal.    In the ED: Hypertensive otherwise VSSAF. CBC wnl. CMP unremarkable. UDS + THC. UA with 1+ protein, 4+ glucose, 2+ ketones, 14 RBCs. EKG NSR. POCT Glucose 356 -> 454. Pt was given 5U insulin. Repeat Glu 418. CTAP from 3/1/23 with signs of  "gastritis. Pt was given droperidol, protonix, zofran, morphine, and NS bolus and was subsequently placed in observation for further management.       Past Medical History:   Diagnosis Date    Diabetes mellitus     Diabetes mellitus type 1     Diagnosed at age 20       No past surgical history on file.    Review of patient's allergies indicates:  No Known Allergies    No current facility-administered medications on file prior to encounter.     Current Outpatient Medications on File Prior to Encounter   Medication Sig    acetone, urine, test (KETONE URINE TEST) Strp 30 strips by Misc.(Non-Drug; Combo Route) route as needed (hyperglycemia).    blood sugar diagnostic Strp To check BG up to 5 times daily    blood-glucose meter Misc Use to test blood glucose 2 times a day with meals.    blood-glucose sensor (DEXCOM G6 SENSOR) Martha 3 each by Misc.(Non-Drug; Combo Route) route continuous.    blood-glucose transmitter (DEXCOM G6 TRANSMITTER) Martha 1 each by Misc.(Non-Drug; Combo Route) route continuous.    dicyclomine (BENTYL) 10 MG capsule Take 1 capsule (10 mg total) by mouth 3 (three) times daily as needed (abdominal pain).    glucagon (GVOKE HYPOPEN 2-PACK) 1 mg/0.2 mL AtIn Inject 1 Package into the skin as needed (hypoglycemia).    insulin degludec (TRESIBA FLEXTOUCH U-100) 100 unit/mL (3 mL) insulin pen Inject 17 Units into the skin once daily.    insulin lispro (HUMALOG U-100 INSULIN) 100 unit/mL injection To use in Omnipod pump; TDD 90 units    insulin syringe-needle U-100 (BD INSULIN SYRINGE ULTRA-FINE) 0.5 mL 31 gauge x 5/16" Syrg 4 times daily    insulin syringe-needle U-100 (BD INSULIN SYRINGE ULTRA-FINE) 0.5 mL 31 gauge x 5/16" Syrg 4 times daily    insulin syringe-needle U-100 (BD INSULIN SYRINGE ULTRA-FINE) 0.5 mL 31 gauge x 5/16" Syrg 4 times daily    insulin syringe-needle U-100 0.5 mL 30 gauge x 5/16" Syrg To use 5 times daily    lancets Misc Use to test blood glucose 2 (two) times daily with " meals.    lancing device Misc 1 Device by Misc.(Non-Drug; Combo Route) route 2 (two) times daily with meals.    LEVSIN/SL 0.125 mg Subl Take 0.125 mg by mouth 3 (three) times daily as needed.    lisinopriL 10 MG tablet Take 1 tablet (10 mg total) by mouth once daily.    metoclopramide HCl (REGLAN) 10 MG tablet Take 1 tablet (10 mg total) by mouth every 6 (six) hours.    metoclopramide HCl (REGLAN) 10 MG tablet Take 1 tablet (10 mg total) by mouth every 6 (six) hours.    ondansetron (ZOFRAN-ODT) 8 MG TbDL Take 1 tablet (8 mg total) by mouth every 6 (six) hours as needed (nausea).    pantoprazole (PROTONIX) 40 MG tablet Take 1 tablet (40 mg total) by mouth once daily.     Family History       Problem Relation (Age of Onset)    Diabetes Mother    Other Mother, Father          Tobacco Use    Smoking status: Former    Smokeless tobacco: Current   Substance and Sexual Activity    Alcohol use: Yes     Comment: socially    Drug use: No    Sexual activity: Not on file     Review of Systems   Constitutional:  Positive for appetite change. Negative for activity change, chills and fever.   HENT:  Negative for trouble swallowing.    Eyes:  Negative for photophobia and visual disturbance.   Respiratory:  Negative for chest tightness, shortness of breath and wheezing.    Cardiovascular:  Positive for chest pain. Negative for palpitations and leg swelling.   Gastrointestinal:  Positive for abdominal pain, diarrhea, nausea and vomiting. Negative for blood in stool and constipation.   Genitourinary:  Negative for dysuria, frequency, hematuria and urgency.   Musculoskeletal:  Negative for arthralgias, back pain and gait problem.   Skin:  Negative for color change and rash.   Neurological:  Negative for dizziness, syncope, weakness, light-headedness, numbness and headaches.   Psychiatric/Behavioral:  Negative for agitation and confusion. The patient is not nervous/anxious.    Objective:     Vital Signs (Most Recent):  Temp:  98.3 °F (36.8 °C) (03/02/23 0150)  Pulse: 92 (03/02/23 0150)  Resp: 17 (03/02/23 0150)  BP: 138/78 (03/02/23 0150)  SpO2: 98 % (03/02/23 0150) Vital Signs (24h Range):  Temp:  [98.3 °F (36.8 °C)-98.4 °F (36.9 °C)] 98.3 °F (36.8 °C)  Pulse:  [] 92  Resp:  [12-22] 17  SpO2:  [97 %-100 %] 98 %  BP: (126-166)/(61-88) 138/78     Weight: 90.7 kg (200 lb)  Body mass index is 29.53 kg/m².    Physical Exam  Vitals and nursing note reviewed.   Constitutional:       General: He is not in acute distress.     Appearance: He is well-developed. He is ill-appearing.      Comments: Actively vomiting on my entry   HENT:      Head: Normocephalic and atraumatic.      Mouth/Throat:      Mouth: Mucous membranes are moist.   Eyes:      Conjunctiva/sclera: Conjunctivae normal.      Pupils: Pupils are equal, round, and reactive to light.   Cardiovascular:      Rate and Rhythm: Normal rate and regular rhythm.      Heart sounds: Normal heart sounds.   Pulmonary:      Effort: Pulmonary effort is normal. No respiratory distress.      Breath sounds: Normal breath sounds. No wheezing.   Abdominal:      General: Bowel sounds are normal. There is no distension.      Palpations: Abdomen is soft.      Tenderness: There is abdominal tenderness. There is guarding.   Musculoskeletal:         General: No tenderness. Normal range of motion.      Cervical back: Normal range of motion and neck supple.      Right lower leg: No edema.      Left lower leg: No edema.   Skin:     General: Skin is warm and dry.      Capillary Refill: Capillary refill takes less than 2 seconds.      Findings: No rash.   Neurological:      Mental Status: He is alert and oriented to person, place, and time.      Cranial Nerves: No cranial nerve deficit.      Sensory: No sensory deficit.      Coordination: Coordination normal.   Psychiatric:         Behavior: Behavior normal.         Thought Content: Thought content normal.         Judgment: Judgment normal.         CRANIAL  NERVES     CN III, IV, VI   Pupils are equal, round, and reactive to light.     Significant Labs: All pertinent labs within the past 24 hours have been reviewed.  CBC:   Recent Labs   Lab 03/01/23  1831   WBC 5.45   HGB 14.5   HCT 42.5        CMP:   Recent Labs   Lab 03/01/23  1831      K 4.5      CO2 25   *   BUN 15   CREATININE 1.4   CALCIUM 10.2   PROT 8.3   ALBUMIN 4.4   BILITOT 0.4   ALKPHOS 76   AST 35   ALT 26   ANIONGAP 10       Significant Imaging: I have reviewed all pertinent imaging results/findings within the past 24 hours.    CT Abdomen Pelvis With Contrast  Narrative: EXAMINATION:  CT ABDOMEN PELVIS WITH CONTRAST    CLINICAL HISTORY:  Abdominal abscess/infection suspected;Bowel obstruction suspected;    TECHNIQUE:  Low dose axial images, sagittal and coronal reformations were obtained from the lung bases to the pubic symphysis following the IV administration of 100 mL of Omnipaque 350 .  Oral contrast was not given.    COMPARISON:  01/30/2023.    FINDINGS:  Lower Chest:    Lung bases are clear.  Heart size is normal.    Abdomen:    Liver is normal in size and contour.  No focal hepatic mass identified.  Gallbladder is unremarkable. No intrahepatic biliary ductal dilatation.    Spleen is not enlarged.  Adrenal glands and pancreas are unremarkable.    The kidneys are symmetric.  No hydronephrosis. No asymmetric perinephric inflammatory fat stranding.    Mild inflammatory changes and wall thickening involving the stomach.  No small bowel obstruction.  No significant inflammatory changes identified in small or large bowel.  Appendix is unremarkable.  Mild stool burden in the rectum.    No pneumoperitoneum or organized fluid collection.    No bulky lymphadenopathy.    Abdominal aorta is normal in caliber without significant atherosclerosis.    Portal, splenic, and superior mesenteric veins are patent.    Pelvis:    Urinary bladder is relatively decompressed and otherwise  unremarkable.  Mild stool burden in the rectum.  Prostate is unremarkable.  No significant pelvic free fluid.    Bones and soft tissues:    No aggressive osseous lesions.  Mild body wall edema.  Impression: Mild gastric wall thickening which could be related to underlying gastritis, similar to prior study.    No pneumoperitoneum or other acute abnormality in the abdomen or pelvis.    Incidental findings discussed in the body of the report.    Electronically signed by: Severo Landeros MD  Date:    03/01/2023  Time:    23:39       Assessment/Plan:     * Cannabinoid hyperemesis syndrome  Intractable vomiting with nausea  GERD  - Pt with frequent presentations for intractable N/V and abdominal pain 2/2 marijuana use admitted for intractable N/V  - 1/4 SIRS on admission for HR >90   - Labs grossly unremarkable, THC +   - CTAP consistent with gastritis   - H. pylori pending   - Pt was given droperidol, protonix, zofran, morphine without improvement   - IV protonix x1 given pt not tolerating PO, continue BID   - Continue prn antiemetics & IVFs   - Keep NPO for now and ADAT to diabetic CLD   - Daily EKG given increased risk for QT prolongation   - Discussed the importance of marijuana cessation to prevent recurrence of symptoms    Type 1 diabetes mellitus with complications  History of DKA  DM with polyneuropathy   - Pt reports symptoms feel similar to previous DKA admission but no evidence of acidosis or anion gap on labs at time of admission  Patient's FSGs are uncontrolled due to hyperglycemia on current medication regimen.  Last A1c reviewed-   Lab Results   Component Value Date    HGBA1C 10.2 (H) 03/01/2023     Most recent fingerstick glucose reviewed-   Recent Labs   Lab 03/01/23  2254 03/02/23  0128   POCTGLUCOSE 356* 454*     Current correctional scale  Medium  Maintain anti-hyperglycemic dose as follows-   Antihyperglycemics (From admission, onward)    Start     Stop Route Frequency Ordered    03/02/23 3535   insulin aspart U-100 pen 5 Units         -- SubQ Once 03/02/23 0351    03/02/23 0239  insulin aspart U-100 pen 1-10 Units         -- SubQ Before meals & nightly PRN 03/02/23 0140 03/02/23 0145  insulin detemir U-100 pen 12 Units         -- SubQ Nightly 03/02/23 0140      - Given 5U aspart in the ED and 5U aspart now since Glu 418  - Check glu q4 for now with transition to ACHS once controlled   - Keep NPO  - Endocrinology consulted  - Hold oral hypoglycemics while patient is in the hospital.  - Diabetic diet once ready to advance     Primary hypertension  - Latest BP and vitals reviewed  - Continue home meds for HTN:   Hypertension Medications             lisinopriL 10 MG tablet Take 1 tablet (10 mg total) by mouth once daily.   - Goal SBP 120s-140s. Utilize p.r.n. antihypertensives only if patient's BP >180/110 & develop symptoms of worsening CP or SOB.      CKD (chronic kidney disease) stage 3, GFR 30-59 ml/min  - Creatinine today Estimated Creatinine Clearance: 81.2 mL/min (based on SCr of 1.4 mg/dL). (baseline 1.3)  - Recent Labs   Lab 03/01/23  1831   CO2 25   BUN 15   CREATININE 1.4   - S/p IVFs in the ED   - Monitor BMP daily, replete electrolytes if necessary  - Renally adjust drugs to CrCl; avoid nephrotoxic drugs if possible  - Monitor I/Os     VTE Risk Mitigation (From admission, onward)         Ordered     IP VTE LOW RISK PATIENT  Once         03/02/23 0140     Place sequential compression device  Until discontinued         03/02/23 0140                   NELSON RobersonC  Department of Hospital Medicine   [facility] - Emergency Dept

## 2023-03-02 NOTE — HPI
Andrzej Sinclair is a 37 y.o. M with type 1 diabetes with insulin pump for the past month, hx of DKA, and cyclical vomiting syndrome 2/2 marijuana use presented to the ED for evaluation of intractable nausea, vomiting, and burning epigastric abdominal pain, which have progressively worsened over the past week. Pt is frequently evaluated in the ED for these symptoms and reports his symptoms restarted ~2 days following his last ED visit 2/21/23. He reports significant nausea, with multiple episodes of non-bloody emesis, and is unable to tolerate PO. He also reports 9/10 burning, epigastric abdominal pain and diffuse abdominal tenderness, as well as 1 episode of non-bloody diarrhea today. He reports compliance with home PPI and states his last marijuana use was 2 weeks ago. He reports checking his blood glucose regularly and states BG have been well controlled with the exception of 1 reading of 300 yesterday. He denies recent alcohol or ilicit drug use.      During admission in 2021, GI recommended cessation of marijuana, schedueld anti emetics, daily PPI use, and EGD. He has not had an EGD. Gastric Emptying test in 3/2021 was normal.    In the ED: Hypertensive otherwise VSSAF. CBC wnl. CMP unremarkable. UDS + THC. UA with 1+ protein, 4+ glucose, 2+ ketones, 14 RBCs. EKG NSR. POCT Glucose 356 -> 454. Pt was given 5U insulin. Repeat Glu 418. CTAP from 3/1/23 with signs of gastritis. Pt was given droperidol, protonix, zofran, morphine, and NS bolus and was subsequently placed in observation for further management.

## 2023-03-02 NOTE — PHARMACY MED REC
"  Admission Medication History     The home medication history was taken by David Tijerina.    You may go to "Admission" then "Reconcile Home Medications" tabs to review and/or act upon these items.     The home medication list has been updated by the Pharmacy department.   Please read ALL comments highlighted in yellow.   Please address this information as you see fit.    Feel free to contact us if you have any questions or require assistance.      The medications listed below were removed from the home medication list. Please reorder if appropriate:  Patient reports no longer taking the following medication(s):  LEVSIN/SL 0.125 MG SUBL  METOCLOPRAMIDE HCL 10 MG    Medications listed below were obtained from: Patient  Current Outpatient Medications on File Prior to Encounter   Medication Sig    acetaminophen (TYLENOL) 325 MG tablet   Take 325 mg by mouth daily as needed for Pain.    dicyclomine (BENTYL) 10 MG capsule Take 10 mg by mouth daily as needed (abdominal pain).      insulin degludec (TRESIBA FLEXTOUCH U-100) 100 unit/mL (3 mL) insulin pen   Inject 6-7 Units into the skin once daily.    lisinopriL 10 MG tablet Take 1 tablet (10 mg total) by mouth once daily.      LYUMJEV KWIKPEN U-100 INSULIN 100 unit/mL pen Inject 5-7 Units into the skin 3 (three) times daily with meals. Plus correction up to 40 units daily.      ondansetron (ZOFRAN-ODT) 8 MG TbDL   Take 1 tablet (8 mg total) by mouth every 6 (six) hours as needed (nausea).    pantoprazole (PROTONIX) 40 MG tablet   Take 1 tablet (40 mg total) by mouth once daily.    acetone, urine, test (KETONE URINE TEST) Strp   30 strips by Misc.(Non-Drug; Combo Route) route as needed (hyperglycemia).    blood sugar diagnostic Strp   To check BG up to 5 times daily    blood-glucose meter Misc Use to test blood glucose 2 times a day with meals.      blood-glucose sensor (DEXCOM G6 SENSOR) Martha 3 each by Misc.(Non-Drug; Combo Route) route continuous.      blood-glucose " "transmitter (DEXCOM G6 TRANSMITTER) Martha   1 each by Misc.(Non-Drug; Combo Route) route continuous.    glucagon (GVOKE HYPOPEN 2-PACK) 1 mg/0.2 mL AtIn   Inject 1 Package into the skin as needed (hypoglycemia).    insulin syringe-needle U-100 (BD INSULIN SYRINGE ULTRA-FINE) 0.5 mL 31 gauge x 5/16" Syrg   4 times daily    insulin syringe-needle U-100 (BD INSULIN SYRINGE ULTRA-FINE) 0.5 mL 31 gauge x 5/16" Syrg   4 times daily    insulin syringe-needle U-100 (BD INSULIN SYRINGE ULTRA-FINE) 0.5 mL 31 gauge x 5/16" Syrg   4 times daily    insulin syringe-needle U-100 0.5 mL 30 gauge x 5/16" Syrg   To use 5 times daily    lancets Misc Use to test blood glucose 2 (two) times daily with meals.      lancing device Misc 1 Device by Misc.(Non-Drug; Combo Route) route 2 (two) times daily with meals.       Potential issues to be addressed PRIOR TO DISCHARGE  Patient reported not taking the following medications: (HUMALOG). These medications remain on the home medication list. Please address accordingly.     Please discuss with the patient barriers to adherence with medication treatment plans: LINDY Tijerina  EXT [phone]                .        "

## 2023-03-02 NOTE — ASSESSMENT & PLAN NOTE
History of DKA  DM with polyneuropathy   - Pt reports symptoms feel similar to previous DKA admission but no evidence of acidosis or anion gap on labs at time of admission  Patient's FSGs are uncontrolled due to hyperglycemia on current medication regimen.  Last A1c reviewed-   Lab Results   Component Value Date    HGBA1C 10.2 (H) 03/01/2023     Most recent fingerstick glucose reviewed-   Recent Labs   Lab 03/01/23  2254 03/02/23  0128   POCTGLUCOSE 356* 454*     Current correctional scale  Medium  Maintain anti-hyperglycemic dose as follows-   Antihyperglycemics (From admission, onward)    Start     Stop Route Frequency Ordered    03/02/23 0351  insulin aspart U-100 pen 5 Units         -- SubQ Once 03/02/23 0351    03/02/23 0239  insulin aspart U-100 pen 1-10 Units         -- SubQ Before meals & nightly PRN 03/02/23 0140    03/02/23 0145  insulin detemir U-100 pen 12 Units         -- SubQ Nightly 03/02/23 0140      - Given 5U aspart in the ED and 5U aspart now since Glu 418  - Check glu q4 for now with transition to ACHS once controlled   - Keep NPO  - Endocrinology consulted  - Hold oral hypoglycemics while patient is in the hospital.  - Diabetic diet once ready to advance

## 2023-03-03 ENCOUNTER — TELEPHONE (OUTPATIENT)
Dept: ENDOCRINOLOGY | Facility: HOSPITAL | Age: 38
End: 2023-03-03

## 2023-03-03 ENCOUNTER — PATIENT MESSAGE (OUTPATIENT)
Dept: ENDOCRINOLOGY | Facility: HOSPITAL | Age: 38
End: 2023-03-03
Payer: MEDICARE

## 2023-03-03 ENCOUNTER — HOSPITAL ENCOUNTER (EMERGENCY)
Facility: HOSPITAL | Age: 38
Discharge: HOME | End: 2023-03-03
Attending: EMERGENCY MEDICINE
Payer: MEDICARE

## 2023-03-03 VITALS
DIASTOLIC BLOOD PRESSURE: 80 MMHG | HEIGHT: 69 IN | BODY MASS INDEX: 29.62 KG/M2 | OXYGEN SATURATION: 99 % | SYSTOLIC BLOOD PRESSURE: 146 MMHG | HEART RATE: 86 BPM | TEMPERATURE: 99 F | RESPIRATION RATE: 18 BRPM | WEIGHT: 200 LBS

## 2023-03-03 DIAGNOSIS — E10.8 TYPE 1 DIABETES MELLITUS WITH COMPLICATIONS: Primary | ICD-10-CM

## 2023-03-03 DIAGNOSIS — R11.2 NAUSEA AND VOMITING, UNSPECIFIED VOMITING TYPE: Primary | ICD-10-CM

## 2023-03-03 LAB
ALBUMIN SERPL BCP-MCNC: 4.4 G/DL (ref 3.5–5.2)
ALLENS TEST: ABNORMAL
ALP SERPL-CCNC: 68 U/L (ref 55–135)
ALT SERPL W/O P-5'-P-CCNC: 42 U/L (ref 10–44)
ANION GAP SERPL CALC-SCNC: 12 MMOL/L (ref 8–16)
AST SERPL-CCNC: 48 U/L (ref 10–40)
B-OH-BUTYR BLD STRIP-SCNC: 1.1 MMOL/L (ref 0–0.5)
BACTERIA #/AREA URNS AUTO: NORMAL /HPF
BASOPHILS # BLD AUTO: 0 K/UL (ref 0–0.2)
BASOPHILS NFR BLD: 0 % (ref 0–1.9)
BILIRUB SERPL-MCNC: 0.5 MG/DL (ref 0.1–1)
BILIRUB UR QL STRIP: NEGATIVE
BUN SERPL-MCNC: 20 MG/DL (ref 6–20)
CALCIUM SERPL-MCNC: 9.8 MG/DL (ref 8.7–10.5)
CHLORIDE SERPL-SCNC: 106 MMOL/L (ref 95–110)
CLARITY UR REFRACT.AUTO: CLEAR
CO2 SERPL-SCNC: 26 MMOL/L (ref 23–29)
COLOR UR AUTO: YELLOW
CREAT SERPL-MCNC: 1.7 MG/DL (ref 0.5–1.4)
DELSYS: ABNORMAL
DIFFERENTIAL METHOD: ABNORMAL
EOSINOPHIL # BLD AUTO: 0 K/UL (ref 0–0.5)
EOSINOPHIL NFR BLD: 0 % (ref 0–8)
ERYTHROCYTE [DISTWIDTH] IN BLOOD BY AUTOMATED COUNT: 13.2 % (ref 11.5–14.5)
EST. GFR  (NO RACE VARIABLE): 52.9 ML/MIN/1.73 M^2
FIO2: 21
GLUCOSE SERPL-MCNC: 318 MG/DL (ref 70–110)
GLUCOSE UR QL STRIP: ABNORMAL
HCO3 UR-SCNC: 29.4 MMOL/L (ref 24–28)
HCT VFR BLD AUTO: 37.1 % (ref 40–54)
HCT VFR BLD CALC: 38 %PCV (ref 36–54)
HGB BLD-MCNC: 12.6 G/DL (ref 14–18)
HGB UR QL STRIP: ABNORMAL
HYALINE CASTS UR QL AUTO: 0 /LPF
IMM GRANULOCYTES # BLD AUTO: 0.02 K/UL (ref 0–0.04)
IMM GRANULOCYTES NFR BLD AUTO: 0.2 % (ref 0–0.5)
KETONES UR QL STRIP: ABNORMAL
LEUKOCYTE ESTERASE UR QL STRIP: NEGATIVE
LIPASE SERPL-CCNC: 6 U/L (ref 4–60)
LYMPHOCYTES # BLD AUTO: 0.4 K/UL (ref 1–4.8)
LYMPHOCYTES NFR BLD: 5.2 % (ref 18–48)
MAGNESIUM SERPL-MCNC: 2.2 MG/DL (ref 1.6–2.6)
MCH RBC QN AUTO: 31.3 PG (ref 27–31)
MCHC RBC AUTO-ENTMCNC: 34 G/DL (ref 32–36)
MCV RBC AUTO: 92 FL (ref 82–98)
MICROSCOPIC COMMENT: NORMAL
MODE: ABNORMAL
MONOCYTES # BLD AUTO: 0.4 K/UL (ref 0.3–1)
MONOCYTES NFR BLD: 5 % (ref 4–15)
NEUTROPHILS # BLD AUTO: 7.4 K/UL (ref 1.8–7.7)
NEUTROPHILS NFR BLD: 89.6 % (ref 38–73)
NITRITE UR QL STRIP: NEGATIVE
NRBC BLD-RTO: 0 /100 WBC
PCO2 BLDA: 49.4 MMHG (ref 35–45)
PH SMN: 7.38 [PH] (ref 7.35–7.45)
PH UR STRIP: 5 [PH] (ref 5–8)
PLATELET # BLD AUTO: 186 K/UL (ref 150–450)
PMV BLD AUTO: 11.4 FL (ref 9.2–12.9)
PO2 BLDA: 31 MMHG (ref 40–60)
POC BE: 4 MMOL/L
POC IONIZED CALCIUM: 1.22 MMOL/L (ref 1.06–1.42)
POC SATURATED O2: 57 % (ref 95–100)
POC TCO2: 31 MMOL/L (ref 24–29)
POCT GLUCOSE: 289 MG/DL (ref 70–110)
POTASSIUM BLD-SCNC: 4.2 MMOL/L (ref 3.5–5.1)
POTASSIUM SERPL-SCNC: 4.7 MMOL/L (ref 3.5–5.1)
PROT SERPL-MCNC: 7.8 G/DL (ref 6–8.4)
PROT UR QL STRIP: ABNORMAL
RBC # BLD AUTO: 4.03 M/UL (ref 4.6–6.2)
RBC #/AREA URNS AUTO: 4 /HPF (ref 0–4)
SAMPLE: ABNORMAL
SITE: ABNORMAL
SODIUM BLD-SCNC: 143 MMOL/L (ref 136–145)
SODIUM SERPL-SCNC: 144 MMOL/L (ref 136–145)
SP GR UR STRIP: 1.02 (ref 1–1.03)
URN SPEC COLLECT METH UR: ABNORMAL
WBC # BLD AUTO: 8.28 K/UL (ref 3.9–12.7)
WBC #/AREA URNS AUTO: 2 /HPF (ref 0–5)
YEAST UR QL AUTO: NORMAL

## 2023-03-03 PROCEDURE — 96374 THER/PROPH/DIAG INJ IV PUSH: CPT

## 2023-03-03 PROCEDURE — 99900035 HC TECH TIME PER 15 MIN (STAT)

## 2023-03-03 PROCEDURE — 99284 EMERGENCY DEPT VISIT MOD MDM: CPT | Mod: 25

## 2023-03-03 PROCEDURE — 96375 TX/PRO/DX INJ NEW DRUG ADDON: CPT

## 2023-03-03 PROCEDURE — 63600175 PHARM REV CODE 636 W HCPCS: Performed by: PHYSICIAN ASSISTANT

## 2023-03-03 PROCEDURE — 99284 EMERGENCY DEPT VISIT MOD MDM: CPT | Mod: ,,, | Performed by: PHYSICIAN ASSISTANT

## 2023-03-03 PROCEDURE — 96372 THER/PROPH/DIAG INJ SC/IM: CPT | Performed by: PHYSICIAN ASSISTANT

## 2023-03-03 PROCEDURE — 82010 KETONE BODYS QUAN: CPT | Performed by: PHYSICIAN ASSISTANT

## 2023-03-03 PROCEDURE — 82962 GLUCOSE BLOOD TEST: CPT

## 2023-03-03 PROCEDURE — 85025 COMPLETE CBC W/AUTO DIFF WBC: CPT | Performed by: PHYSICIAN ASSISTANT

## 2023-03-03 PROCEDURE — 82800 BLOOD PH: CPT

## 2023-03-03 PROCEDURE — 99284 PR EMERGENCY DEPT VISIT,LEVEL IV: ICD-10-PCS | Mod: ,,, | Performed by: PHYSICIAN ASSISTANT

## 2023-03-03 PROCEDURE — 80053 COMPREHEN METABOLIC PANEL: CPT | Performed by: PHYSICIAN ASSISTANT

## 2023-03-03 PROCEDURE — 83735 ASSAY OF MAGNESIUM: CPT | Performed by: PHYSICIAN ASSISTANT

## 2023-03-03 PROCEDURE — 82803 BLOOD GASES ANY COMBINATION: CPT

## 2023-03-03 PROCEDURE — 81001 URINALYSIS AUTO W/SCOPE: CPT | Performed by: PHYSICIAN ASSISTANT

## 2023-03-03 PROCEDURE — 83690 ASSAY OF LIPASE: CPT | Performed by: PHYSICIAN ASSISTANT

## 2023-03-03 PROCEDURE — 96361 HYDRATE IV INFUSION ADD-ON: CPT

## 2023-03-03 PROCEDURE — 80047 BASIC METABLC PNL IONIZED CA: CPT

## 2023-03-03 PROCEDURE — 82330 ASSAY OF CALCIUM: CPT

## 2023-03-03 RX ORDER — DICYCLOMINE HYDROCHLORIDE 10 MG/ML
20 INJECTION INTRAMUSCULAR
Status: COMPLETED | OUTPATIENT
Start: 2023-03-03 | End: 2023-03-03

## 2023-03-03 RX ORDER — ONDANSETRON 2 MG/ML
4 INJECTION INTRAMUSCULAR; INTRAVENOUS
Status: COMPLETED | OUTPATIENT
Start: 2023-03-03 | End: 2023-03-03

## 2023-03-03 RX ORDER — DROPERIDOL 2.5 MG/ML
1.25 INJECTION, SOLUTION INTRAMUSCULAR; INTRAVENOUS ONCE
Status: COMPLETED | OUTPATIENT
Start: 2023-03-03 | End: 2023-03-03

## 2023-03-03 RX ORDER — METOCLOPRAMIDE HYDROCHLORIDE 5 MG/ML
10 INJECTION INTRAMUSCULAR; INTRAVENOUS
Status: DISCONTINUED | OUTPATIENT
Start: 2023-03-03 | End: 2023-03-03

## 2023-03-03 RX ADMIN — DROPERIDOL 1.25 MG: 2.5 INJECTION, SOLUTION INTRAMUSCULAR; INTRAVENOUS at 09:03

## 2023-03-03 RX ADMIN — ONDANSETRON 4 MG: 2 INJECTION INTRAMUSCULAR; INTRAVENOUS at 09:03

## 2023-03-03 RX ADMIN — SODIUM CHLORIDE, POTASSIUM CHLORIDE, SODIUM LACTATE AND CALCIUM CHLORIDE 1000 ML: 600; 310; 30; 20 INJECTION, SOLUTION INTRAVENOUS at 09:03

## 2023-03-03 RX ADMIN — DICYCLOMINE HYDROCHLORIDE 20 MG: 20 INJECTION INTRAMUSCULAR at 09:03

## 2023-03-03 NOTE — TELEPHONE ENCOUNTER
Patient needs a follow-up appointment with diabetes education to review insulin pump training. May require an appointment in pump clinic?    Lab Results   Component Value Date    HGBA1C 10.2 (H) 03/01/2023       POCT Glucose   Date Value Ref Range Status   03/02/2023 306 (H) 70 - 110 mg/dL Final   03/02/2023 217 (H) 70 - 110 mg/dL Final   03/02/2023 201 (H) 70 - 110 mg/dL Final   03/02/2023 387 (H) 70 - 110 mg/dL Final   03/02/2023 418 (H) 70 - 110 mg/dL Final   03/02/2023 454 (HH) 70 - 110 mg/dL Final   03/01/2023 356 (H) 70 - 110 mg/dL Final       Diabetes Medications               glucagon (GVOKE HYPOPEN 2-PACK) 1 mg/0.2 mL AtIn Inject 1 Package into the skin as needed (hypoglycemia).    insulin degludec (TRESIBA FLEXTOUCH U-100) 100 unit/mL (3 mL) insulin pen Inject 14 Units into the skin every evening.    insulin lispro (HUMALOG U-100 INSULIN) 100 unit/mL injection To use in Omnipod pump; TDD 90 units    LYUMJEV KWIKPEN U-100 INSULIN 100 unit/mL pen Inject 0-10 Units into the skin 3 (three) times daily with meals. May also inject 0-10 Units as needed (Hyperglycemia). ICR 1:15 for meals; and ISF 1:50. Max TDD 60 units..          Hospital Medications             Thanks,    Manuel Dill DNP, FNP-C  Department of Endocrinology  Inpatient Glycemic Management

## 2023-03-04 NOTE — ED TRIAGE NOTES
"Andrzej Sinclair, a 37 y.o. male presents to the ED w/ complaint of fatigue. Coming home via EMS w/ dx of gastritis. C/o malaise and reports going to different hospitals "where they cant figure out whats wrong".     Adult Physical Assessment  LOC: Andrzej Sinclair, 37 y.o. male verified via two identifiers.  The patient is awake, alert, oriented and speaking appropriately at this time.  APPEARANCE: Patient resting comfortably and appears to be in no acute distress at this time. Patient is clean and well groomed, patient's clothing is properly fastened.  SKIN:The skin is warm and dry, color consistent with ethnicity, patient has normal skin turgor and moist mucus membranes, skin intact, no breakdown or brusing noted.  MUSCULOSKELETAL: Patient moving all extremities well, no obvious swelling or deformities noted. Patient reports extreme fatigue and 10/10 body aches.   RESPIRATORY: Airway is open and patent, respirations are spontaneous, patient has a normal effort and rate, no accessory muscle use noted.  CARDIAC: Patient has a normal rate and rhythm, no periphreal edema noted in any extremity, capillary refill < 3 seconds in all extremities  ABDOMEN: Soft and non tender to palpation, no abdominal distention noted. Bowel sounds present in all four quadrants.  NEUROLOGIC: Eyes open spontaneously, behavior appropriate to situation, follows commands, facial expression symmetrical, bilateral hand grasp equal and even, purposeful motor response noted, normal sensation in all extremities when touched with a finger.      Triage note:  Chief Complaint   Patient presents with    Fatigue     Coming from home via EMS with diagnosis of gastritis, complaining of malaise, has not filled medications from last visit      Review of patient's allergies indicates:  No Known Allergies  Past Medical History:   Diagnosis Date    Diabetes mellitus     Diabetes mellitus type 1     Diagnosed at age 20       "

## 2023-03-04 NOTE — ED PROVIDER NOTES
Encounter Date: 3/3/2023       History     Chief Complaint   Patient presents with    Fatigue     Coming from home via EMS with diagnosis of gastritis, complaining of malaise, has not filled medications from last visit      37-year-old male with history of type 1 diabetes who presents emergency department for generalized abdominal pain and nausea vomiting.  Reports 3 days of nausea vomiting and constant generalized abdominal pain.  States he was admitted on 03/01 for the same symptoms and discharge last night however continued to have intractable nausea vomiting and presented to [facility] ED where he was then discharge.  Has not had a chance to fill his p.o. Phenergan.  States he still unable tolerate anything p.o..  Denies any fevers/chills, dysuria, chest pain, shortness breath.  Currently has 8/10 generalized abdominal pain.    The history is provided by the patient.   Review of patient's allergies indicates:  No Known Allergies  Past Medical History:   Diagnosis Date    Diabetes mellitus     Diabetes mellitus type 1     Diagnosed at age 20     History reviewed. No pertinent surgical history.  Family History   Problem Relation Age of Onset    Other Mother         prediabetes    Diabetes Mother     Other Father         patient does not know his fathers history     Social History     Tobacco Use    Smoking status: Former    Smokeless tobacco: Current   Substance Use Topics    Alcohol use: Yes     Comment: socially    Drug use: No     Review of Systems   Constitutional:  Positive for fatigue. Negative for chills and fever.   HENT:  Negative for sore throat.    Respiratory:  Negative for cough and shortness of breath.    Cardiovascular:  Negative for chest pain.   Gastrointestinal:  Positive for abdominal pain, nausea and vomiting.   Genitourinary:  Negative for difficulty urinating and dysuria.   Musculoskeletal: Negative.    Skin: Negative.    Neurological:  Negative for weakness.   Psychiatric/Behavioral:   Negative for confusion.      Physical Exam     Initial Vitals [03/03/23 1822]   BP Pulse Resp Temp SpO2   (!) 186/90 98 18 98.1 °F (36.7 °C) 100 %      MAP       --         Physical Exam    Nursing note and vitals reviewed.  Constitutional: He appears well-developed and well-nourished. He is not diaphoretic. No distress.   HENT:   Head: Normocephalic and atraumatic.   Eyes: Conjunctivae are normal. Pupils are equal, round, and reactive to light.   Neck: Neck supple.   Normal range of motion.  Cardiovascular:  Normal rate, regular rhythm, normal heart sounds and intact distal pulses.     Exam reveals no gallop and no friction rub.       No murmur heard.  Pulmonary/Chest: Breath sounds normal.   Abdominal: Abdomen is soft. Bowel sounds are normal. There is no abdominal tenderness.   Musculoskeletal:         General: Normal range of motion.      Cervical back: Normal range of motion and neck supple.     Neurological: He is alert and oriented to person, place, and time. GCS score is 15. GCS eye subscore is 4. GCS verbal subscore is 5. GCS motor subscore is 6.   Skin: Skin is warm and dry. Capillary refill takes less than 2 seconds.   Psychiatric: He has a normal mood and affect.       ED Course   Procedures  Labs Reviewed   CBC W/ AUTO DIFFERENTIAL - Abnormal; Notable for the following components:       Result Value    RBC 4.03 (*)     Hemoglobin 12.6 (*)     Hematocrit 37.1 (*)     MCH 31.3 (*)     Lymph # 0.4 (*)     Gran % 89.6 (*)     Lymph % 5.2 (*)     All other components within normal limits   COMPREHENSIVE METABOLIC PANEL - Abnormal; Notable for the following components:    Glucose 318 (*)     Creatinine 1.7 (*)     AST 48 (*)     eGFR 52.9 (*)     All other components within normal limits   URINALYSIS, REFLEX TO URINE CULTURE - Abnormal; Notable for the following components:    Protein, UA 1+ (*)     Glucose, UA 4+ (*)     Ketones, UA 2+ (*)     Occult Blood UA 2+ (*)     All other components within normal  limits    Narrative:     Specimen Source->Urine   BETA - HYDROXYBUTYRATE, SERUM - Abnormal; Notable for the following components:    Beta-Hydroxybutyrate 1.1 (*)     All other components within normal limits   POCT GLUCOSE - Abnormal; Notable for the following components:    POCT Glucose 289 (*)     All other components within normal limits   ISTAT PROCEDURE - Abnormal; Notable for the following components:    POC PCO2 49.4 (*)     POC PO2 31 (*)     POC HCO3 29.4 (*)     POC SATURATED O2 57 (*)     POC TCO2 31 (*)     All other components within normal limits   MAGNESIUM   LIPASE   URINALYSIS MICROSCOPIC    Narrative:     Specimen Source->Urine   POCT GLUCOSE MONITORING CONTINUOUS          Imaging Results    None          Medications   lactated ringers bolus 1,000 mL (0 mLs Intravenous Stopped 3/3/23 2218)   ondansetron injection 4 mg (4 mg Intravenous Given 3/3/23 2107)   dicyclomine injection 20 mg (20 mg Intramuscular Given 3/3/23 2149)   droperidoL injection 1.25 mg (1.25 mg Intravenous Given 3/3/23 2147)     Medical Decision Making:   History:   Old Medical Records: I decided to obtain old medical records.  Initial Assessment:   37-year-old male presents emergency department for nausea vomiting abdominal pain.  Differential Diagnosis:   Cyclical vomiting syndrome, cannabis hyperemesis, electrolyte derangement, pancreatitis, gastroparesis, dehydration  Clinical Tests:   Lab Tests: Ordered and Reviewed  Radiological Study: Reviewed  ED Management:  Recently admitted for intractable nausea vomiting.  Was also seen at  this morning and discharged.  Has mild diffuse abdominal tenderness.  Reviewed CT scan from 2 days ago which showed potential gastritis but no other acute pathology.  No relief from Zofran however had significant improvement after droperidol.  Abdominal pain has resolved and he is tolerating p.o..  Beta hydroxybutyrate slightly elevated however improved from 2 days ago.  No acidosis and not in  DKA.  Discussed importance of filling his outpatient Phenergan script and advise follow-up with GI.  Discussed return to ED precautions for any new or worsening symptoms.           ED Course as of 03/03/23 2327   Fri Mar 03, 2023   2113 Creatinine(!): 1.7 [HJ]   2118 CO2: 26 [HJ]      ED Course User Index  [HJ] Lyn Agosto PA-C                 Clinical Impression:   Final diagnoses:  [R11.2] Nausea and vomiting, unspecified vomiting type (Primary)        ED Disposition Condition    Discharge Stable          ED Prescriptions    None       Follow-up Information       Follow up With Specialties Details Why Contact Info Additional Information    [facility] [address] Gastroenterology Schedule an appointment as soon as possible for a visit   [address] [facility] [address] [phone] [facility] - Main Building, 4th Floor Please park in [facility] and take [facility] elevator    [facility] - Emergency Dept Emergency Medicine Go to  If symptoms worsen [address] [facility] [id number] [phone]              Lyn Agosto PA-C  03/03/23 2327

## 2023-03-04 NOTE — DISCHARGE INSTRUCTIONS
Please fill the prescriptions you were previously prescribed earlier today.  Please call Gastroenterology for a follow-up appointment.  You may return to ED sooner if you develop any new or worsening symptoms.

## 2023-03-07 ENCOUNTER — TELEPHONE (OUTPATIENT)
Dept: GASTROENTEROLOGY | Facility: CLINIC | Age: 38
End: 2023-03-07
Payer: MEDICARE

## 2023-03-07 ENCOUNTER — TELEPHONE (OUTPATIENT)
Dept: ENDOCRINOLOGY | Facility: CLINIC | Age: 38
End: 2023-03-07
Payer: MEDICARE

## 2023-03-07 NOTE — TELEPHONE ENCOUNTER
Reached out to patient several times no answer left message and call back number. Can not get in touch with patient. Tried several attempts.

## 2023-03-07 NOTE — TELEPHONE ENCOUNTER
----- Message from Stephany Delatorre sent at 3/7/2023  1:14 PM CST -----  DINORAH ROTH calling regarding Appointment Access for Gastroparesis; Nausea/Vomiting from a referral from [facility] F/U, call back [phone]

## 2023-03-08 ENCOUNTER — NURSE TRIAGE (OUTPATIENT)
Dept: ADMINISTRATIVE | Facility: CLINIC | Age: 38
End: 2023-03-08
Payer: MEDICARE

## 2023-03-08 ENCOUNTER — HOSPITAL ENCOUNTER (INPATIENT)
Facility: HOSPITAL | Age: 38
LOS: 2 days | Discharge: HOME | DRG: 392 | End: 2023-03-12
Attending: EMERGENCY MEDICINE | Admitting: EMERGENCY MEDICINE
Payer: MEDICARE

## 2023-03-08 DIAGNOSIS — F12.90 CANNABINOID HYPEREMESIS SYNDROME: ICD-10-CM

## 2023-03-08 DIAGNOSIS — R11.2 CANNABINOID HYPEREMESIS SYNDROME: ICD-10-CM

## 2023-03-08 DIAGNOSIS — E11.43 GASTROPARESIS DIABETICORUM: Primary | ICD-10-CM

## 2023-03-08 DIAGNOSIS — R11.2 INTRACTABLE VOMITING WITH NAUSEA: ICD-10-CM

## 2023-03-08 DIAGNOSIS — Z91.89 AT RISK FOR PROLONGED QT INTERVAL SYNDROME: ICD-10-CM

## 2023-03-08 DIAGNOSIS — K31.84 GASTROPARESIS DIABETICORUM: Primary | ICD-10-CM

## 2023-03-08 LAB
ALBUMIN SERPL BCP-MCNC: 3.8 G/DL (ref 3.5–5.2)
ALP SERPL-CCNC: 55 U/L (ref 55–135)
ALT SERPL W/O P-5'-P-CCNC: 27 U/L (ref 10–44)
ANION GAP SERPL CALC-SCNC: 16 MMOL/L (ref 8–16)
AST SERPL-CCNC: 25 U/L (ref 10–40)
B-OH-BUTYR BLD STRIP-SCNC: 1.9 MMOL/L (ref 0–0.5)
BACTERIA #/AREA URNS AUTO: ABNORMAL /HPF
BASOPHILS # BLD AUTO: 0.01 K/UL (ref 0–0.2)
BASOPHILS NFR BLD: 0.3 % (ref 0–1.9)
BILIRUB SERPL-MCNC: 0.8 MG/DL (ref 0.1–1)
BILIRUB UR QL STRIP: NEGATIVE
BUN SERPL-MCNC: 12 MG/DL (ref 6–20)
BUN SERPL-MCNC: 13 MG/DL (ref 6–30)
CALCIUM SERPL-MCNC: 9 MG/DL (ref 8.7–10.5)
CHLORIDE SERPL-SCNC: 104 MMOL/L (ref 95–110)
CHLORIDE SERPL-SCNC: 106 MMOL/L (ref 95–110)
CLARITY UR REFRACT.AUTO: CLEAR
CO2 SERPL-SCNC: 21 MMOL/L (ref 23–29)
COLOR UR AUTO: YELLOW
CREAT SERPL-MCNC: 1.2 MG/DL (ref 0.5–1.4)
CREAT SERPL-MCNC: 1.2 MG/DL (ref 0.5–1.4)
DIFFERENTIAL METHOD: ABNORMAL
EOSINOPHIL # BLD AUTO: 0 K/UL (ref 0–0.5)
EOSINOPHIL NFR BLD: 0.3 % (ref 0–8)
ERYTHROCYTE [DISTWIDTH] IN BLOOD BY AUTOMATED COUNT: 12 % (ref 11.5–14.5)
EST. GFR  (NO RACE VARIABLE): >60 ML/MIN/1.73 M^2
GLUCOSE SERPL-MCNC: 216 MG/DL (ref 70–110)
GLUCOSE SERPL-MCNC: 218 MG/DL (ref 70–110)
GLUCOSE UR QL STRIP: ABNORMAL
HCT VFR BLD AUTO: 35.9 % (ref 40–54)
HCT VFR BLD CALC: 36 %PCV (ref 36–54)
HCV AB SERPL QL IA: NORMAL
HGB BLD-MCNC: 11.8 G/DL (ref 14–18)
HGB UR QL STRIP: ABNORMAL
HIV 1+2 AB+HIV1 P24 AG SERPL QL IA: NORMAL
HYALINE CASTS UR QL AUTO: 2 /LPF
IMM GRANULOCYTES # BLD AUTO: 0.01 K/UL (ref 0–0.04)
IMM GRANULOCYTES NFR BLD AUTO: 0.3 % (ref 0–0.5)
KETONES UR QL STRIP: ABNORMAL
LEUKOCYTE ESTERASE UR QL STRIP: NEGATIVE
LIPASE SERPL-CCNC: 15 U/L (ref 4–60)
LYMPHOCYTES # BLD AUTO: 0.5 K/UL (ref 1–4.8)
LYMPHOCYTES NFR BLD: 13.1 % (ref 18–48)
MCH RBC QN AUTO: 30 PG (ref 27–31)
MCHC RBC AUTO-ENTMCNC: 32.9 G/DL (ref 32–36)
MCV RBC AUTO: 91 FL (ref 82–98)
MICROSCOPIC COMMENT: ABNORMAL
MONOCYTES # BLD AUTO: 0.3 K/UL (ref 0.3–1)
MONOCYTES NFR BLD: 9.2 % (ref 4–15)
NEUTROPHILS # BLD AUTO: 2.8 K/UL (ref 1.8–7.7)
NEUTROPHILS NFR BLD: 76.8 % (ref 38–73)
NITRITE UR QL STRIP: NEGATIVE
NRBC BLD-RTO: 0 /100 WBC
PH UR STRIP: 6 [PH] (ref 5–8)
PLATELET # BLD AUTO: 142 K/UL (ref 150–450)
PMV BLD AUTO: 10.7 FL (ref 9.2–12.9)
POC IONIZED CALCIUM: 1.14 MMOL/L (ref 1.06–1.42)
POC TCO2 (MEASURED): 26 MMOL/L (ref 23–29)
POCT GLUCOSE: 323 MG/DL (ref 70–110)
POTASSIUM BLD-SCNC: 3.6 MMOL/L (ref 3.5–5.1)
POTASSIUM SERPL-SCNC: 3.7 MMOL/L (ref 3.5–5.1)
PROT SERPL-MCNC: 6.7 G/DL (ref 6–8.4)
PROT UR QL STRIP: ABNORMAL
RBC # BLD AUTO: 3.93 M/UL (ref 4.6–6.2)
RBC #/AREA URNS AUTO: 5 /HPF (ref 0–4)
SAMPLE: ABNORMAL
SODIUM BLD-SCNC: 142 MMOL/L (ref 136–145)
SODIUM SERPL-SCNC: 143 MMOL/L (ref 136–145)
SP GR UR STRIP: 1.02 (ref 1–1.03)
SQUAMOUS #/AREA URNS AUTO: 0 /HPF
URN SPEC COLLECT METH UR: ABNORMAL
WBC # BLD AUTO: 3.59 K/UL (ref 3.9–12.7)
WBC #/AREA URNS AUTO: 11 /HPF (ref 0–5)
YEAST UR QL AUTO: ABNORMAL

## 2023-03-08 PROCEDURE — 99222 PR INITIAL HOSPITAL CARE,LEVL II: ICD-10-PCS | Mod: GC,,, | Performed by: HOSPITALIST

## 2023-03-08 PROCEDURE — 99285 EMERGENCY DEPT VISIT HI MDM: CPT | Mod: ,,, | Performed by: EMERGENCY MEDICINE

## 2023-03-08 PROCEDURE — 99222 1ST HOSP IP/OBS MODERATE 55: CPT | Mod: GC,,, | Performed by: HOSPITALIST

## 2023-03-08 PROCEDURE — 99285 EMERGENCY DEPT VISIT HI MDM: CPT

## 2023-03-08 PROCEDURE — G0378 HOSPITAL OBSERVATION PER HR: HCPCS

## 2023-03-08 PROCEDURE — 25000003 PHARM REV CODE 250

## 2023-03-08 PROCEDURE — 86803 HEPATITIS C AB TEST: CPT | Performed by: PHYSICIAN ASSISTANT

## 2023-03-08 PROCEDURE — 93010 EKG 12-LEAD: ICD-10-PCS | Mod: ,,, | Performed by: INTERNAL MEDICINE

## 2023-03-08 PROCEDURE — 63600175 PHARM REV CODE 636 W HCPCS: Performed by: EMERGENCY MEDICINE

## 2023-03-08 PROCEDURE — 82962 GLUCOSE BLOOD TEST: CPT

## 2023-03-08 PROCEDURE — 85025 COMPLETE CBC W/AUTO DIFF WBC: CPT | Performed by: EMERGENCY MEDICINE

## 2023-03-08 PROCEDURE — 96361 HYDRATE IV INFUSION ADD-ON: CPT

## 2023-03-08 PROCEDURE — 82565 ASSAY OF CREATININE: CPT

## 2023-03-08 PROCEDURE — 82010 KETONE BODYS QUAN: CPT | Performed by: EMERGENCY MEDICINE

## 2023-03-08 PROCEDURE — 83690 ASSAY OF LIPASE: CPT | Performed by: EMERGENCY MEDICINE

## 2023-03-08 PROCEDURE — 80047 BASIC METABLC PNL IONIZED CA: CPT

## 2023-03-08 PROCEDURE — 63600175 PHARM REV CODE 636 W HCPCS

## 2023-03-08 PROCEDURE — 99285 PR EMERGENCY DEPT VISIT,LEVEL V: ICD-10-PCS | Mod: ,,, | Performed by: EMERGENCY MEDICINE

## 2023-03-08 PROCEDURE — 96375 TX/PRO/DX INJ NEW DRUG ADDON: CPT

## 2023-03-08 PROCEDURE — 93005 ELECTROCARDIOGRAM TRACING: CPT

## 2023-03-08 PROCEDURE — 80053 COMPREHEN METABOLIC PANEL: CPT | Performed by: EMERGENCY MEDICINE

## 2023-03-08 PROCEDURE — 87389 HIV-1 AG W/HIV-1&-2 AB AG IA: CPT | Performed by: PHYSICIAN ASSISTANT

## 2023-03-08 PROCEDURE — 96372 THER/PROPH/DIAG INJ SC/IM: CPT

## 2023-03-08 PROCEDURE — 81001 URINALYSIS AUTO W/SCOPE: CPT | Performed by: EMERGENCY MEDICINE

## 2023-03-08 PROCEDURE — 93010 ELECTROCARDIOGRAM REPORT: CPT | Mod: ,,, | Performed by: INTERNAL MEDICINE

## 2023-03-08 PROCEDURE — 87086 URINE CULTURE/COLONY COUNT: CPT | Performed by: EMERGENCY MEDICINE

## 2023-03-08 PROCEDURE — 25000003 PHARM REV CODE 250: Performed by: EMERGENCY MEDICINE

## 2023-03-08 RX ORDER — PROCHLORPERAZINE EDISYLATE 5 MG/ML
2.5 INJECTION INTRAMUSCULAR; INTRAVENOUS EVERY 6 HOURS PRN
Status: DISCONTINUED | OUTPATIENT
Start: 2023-03-08 | End: 2023-03-08

## 2023-03-08 RX ORDER — SODIUM CHLORIDE 0.9 % (FLUSH) 0.9 %
10 SYRINGE (ML) INJECTION
Status: DISCONTINUED | OUTPATIENT
Start: 2023-03-08 | End: 2023-03-12 | Stop reason: HOSPADM

## 2023-03-08 RX ORDER — SODIUM CHLORIDE 0.9 % (FLUSH) 0.9 %
10 SYRINGE (ML) INJECTION EVERY 12 HOURS PRN
Status: DISCONTINUED | OUTPATIENT
Start: 2023-03-08 | End: 2023-03-12 | Stop reason: HOSPADM

## 2023-03-08 RX ORDER — DEXTROSE 40 %
15 GEL (GRAM) ORAL
Status: DISCONTINUED | OUTPATIENT
Start: 2023-03-08 | End: 2023-03-10

## 2023-03-08 RX ORDER — INSULIN ASPART 100 [IU]/ML
1-10 INJECTION, SOLUTION INTRAVENOUS; SUBCUTANEOUS EVERY 4 HOURS PRN
Status: DISCONTINUED | OUTPATIENT
Start: 2023-03-08 | End: 2023-03-10

## 2023-03-08 RX ORDER — IBUPROFEN 200 MG
24 TABLET ORAL
Status: DISCONTINUED | OUTPATIENT
Start: 2023-03-08 | End: 2023-03-08

## 2023-03-08 RX ORDER — PANTOPRAZOLE SODIUM 40 MG/1
40 TABLET, DELAYED RELEASE ORAL DAILY
Status: DISCONTINUED | OUTPATIENT
Start: 2023-03-09 | End: 2023-03-12 | Stop reason: HOSPADM

## 2023-03-08 RX ORDER — IBUPROFEN 200 MG
16 TABLET ORAL
Status: DISCONTINUED | OUTPATIENT
Start: 2023-03-08 | End: 2023-03-08

## 2023-03-08 RX ORDER — GLUCAGON 1 MG
1 KIT INJECTION
Status: DISCONTINUED | OUTPATIENT
Start: 2023-03-08 | End: 2023-03-10

## 2023-03-08 RX ORDER — ONDANSETRON 2 MG/ML
4 INJECTION INTRAMUSCULAR; INTRAVENOUS EVERY 6 HOURS PRN
Status: DISCONTINUED | OUTPATIENT
Start: 2023-03-08 | End: 2023-03-08

## 2023-03-08 RX ORDER — DROPERIDOL 2.5 MG/ML
2.5 INJECTION, SOLUTION INTRAMUSCULAR; INTRAVENOUS
Status: COMPLETED | OUTPATIENT
Start: 2023-03-08 | End: 2023-03-08

## 2023-03-08 RX ORDER — SODIUM CHLORIDE 9 MG/ML
INJECTION, SOLUTION INTRAVENOUS CONTINUOUS
Status: DISCONTINUED | OUTPATIENT
Start: 2023-03-08 | End: 2023-03-12 | Stop reason: HOSPADM

## 2023-03-08 RX ORDER — PROCHLORPERAZINE EDISYLATE 5 MG/ML
10 INJECTION INTRAMUSCULAR; INTRAVENOUS
Status: COMPLETED | OUTPATIENT
Start: 2023-03-08 | End: 2023-03-08

## 2023-03-08 RX ORDER — PROCHLORPERAZINE EDISYLATE 5 MG/ML
2.5 INJECTION INTRAMUSCULAR; INTRAVENOUS EVERY 6 HOURS PRN
Status: DISCONTINUED | OUTPATIENT
Start: 2023-03-08 | End: 2023-03-12 | Stop reason: HOSPADM

## 2023-03-08 RX ORDER — LISINOPRIL 10 MG/1
10 TABLET ORAL DAILY
Status: DISCONTINUED | OUTPATIENT
Start: 2023-03-09 | End: 2023-03-12 | Stop reason: HOSPADM

## 2023-03-08 RX ORDER — NALOXONE HCL 0.4 MG/ML
0.02 VIAL (ML) INJECTION
Status: DISCONTINUED | OUTPATIENT
Start: 2023-03-08 | End: 2023-03-12 | Stop reason: HOSPADM

## 2023-03-08 RX ORDER — ONDANSETRON 2 MG/ML
8 INJECTION INTRAMUSCULAR; INTRAVENOUS EVERY 6 HOURS PRN
Status: DISCONTINUED | OUTPATIENT
Start: 2023-03-08 | End: 2023-03-12 | Stop reason: HOSPADM

## 2023-03-08 RX ORDER — ACETAMINOPHEN 325 MG/1
650 TABLET ORAL EVERY 4 HOURS PRN
Status: DISCONTINUED | OUTPATIENT
Start: 2023-03-08 | End: 2023-03-12 | Stop reason: HOSPADM

## 2023-03-08 RX ADMIN — SODIUM CHLORIDE 1000 ML: 9 INJECTION, SOLUTION INTRAVENOUS at 01:03

## 2023-03-08 RX ADMIN — INSULIN ASPART 8 UNITS: 100 INJECTION, SOLUTION INTRAVENOUS; SUBCUTANEOUS at 09:03

## 2023-03-08 RX ADMIN — DROPERIDOL 2.5 MG: 2.5 INJECTION, SOLUTION INTRAMUSCULAR; INTRAVENOUS at 12:03

## 2023-03-08 RX ADMIN — SODIUM CHLORIDE: 9 INJECTION, SOLUTION INTRAVENOUS at 10:03

## 2023-03-08 RX ADMIN — PROCHLORPERAZINE EDISYLATE 10 MG: 5 INJECTION INTRAMUSCULAR; INTRAVENOUS at 03:03

## 2023-03-08 RX ADMIN — INSULIN DETEMIR 8 UNITS: 100 INJECTION, SOLUTION SUBCUTANEOUS at 09:03

## 2023-03-08 NOTE — TELEPHONE ENCOUNTER
Andrzej Ivan's wife calling states Andrzej dc'd home yesterday from [facility]. Per MarzenaAndrzej is type 1 diabetic. Currently c/o of abdominal pain rated as 10/10 and vomited 5 times since this morning and describes emesis as black/brown color and textured. Advised per triage protocol to go to nearest ED now for physician eval. V/u.  Reason for Disposition   Vomiting red blood or black (coffee ground) material    Additional Information   Negative: Shock suspected (e.g., cold/pale/clammy skin, too weak to stand, low BP, rapid pulse)   Negative: Difficult to awaken or acting confused (e.g., disoriented, slurred speech)   Negative: Sounds like a life-threatening emergency to the triager    Protocols used: Vomiting-A-OH

## 2023-03-08 NOTE — ED TRIAGE NOTES
Pt. Presents  to the ED with complaints of nausea and vomiting. Pt. Was recently discharged prior to arrival on yesterday 03/07/23. Pt. States that unable to hold any solids or liquids down since hospital admission. Pt also complains of abdominal pain along with headache and dizziness. Pt denies shortness of breath or weakness.   Patient identifiers verified and correct for Andrzej Sinclair  LOC: The patient is awake, alert and aware of environment with an appropriate affect, the patient is oriented X4 and speaking appropriately.   APPEARANCE: Patient appears comfortable and in no acute distress, patient is clean and well groomed.  SKIN: The skin is warm and dry, color consistent with ethnicity, patient has normal skin turgor and moist mucus membranes, skin intact, no breakdown or bruising noted.   MUSCULOSKELETAL: Patient moving all extremities spontaneously, no swelling noted.  RESPIRATORY: Airway is open and patent, respirations are spontaneous, patient has a normal effort and rate, no accessory muscle use noted, pt placed on continuous pulse ox with O2 sats noted at 97% on room air.  CARDIAC: Pt placed on cardiac monitor. Patient has a normal rate and regular rhythm, no edema noted, capillary refill < 3 seconds.   GASTRO: Soft and non tender to palpation, no distention noted, normoactive bowel sounds present in all four quadrants. Pt states bowel movements have been regular.  : Pt denies any pain or frequency with urination.  NEURO: Pt opens eyes spontaneously, behavior appropriate to situation, follows commands, facial expression symmetrical, bilateral hand grasp equal and even, purposeful motor response noted, normal sensation in all extremities when touched with a finger.

## 2023-03-08 NOTE — ED NOTES
I-STAT Chem-8+ Results:   Value Reference Range   Sodium 142 136-145 mmol/L   Potassium  3.6 3.5-5.1 mmol/L   Chloride 104  mmol/L   Ionized Calcium 1.14 1.06-1.42 mmol/L   CO2 (measured) 26 23-29 mmol/L   Glucose 218  mg/dL   BUN 13 6-30 mg/dL   Creatinine 1.2 0.5-1.4 mg/dL   Hematocrit 36 36-54%

## 2023-03-08 NOTE — ED PROVIDER NOTES
Chief Complaint   Abdominal Pain (Abd pain/N/V for the past week.)      History Of Present Illness   Andrzej Sinclair is a 37 y.o. male with history of insulin-dependent diabetes (last A1c 10%), gastroparesis, frequent episodes of nausea and vomiting, discharged yesterday from the hospital for nausea and vomiting who presents now with acute onset of returning symptoms.  Symptoms were reportedly controlled with Compazine, and he was told to continue a liquid diet for the following few days.  He reports that as soon as he went home, he had acute return of nausea and vomiting, was unable to tolerate water.  He is not sure if there has been blood in the vomit.    History obtained from:  Patient    Review of patient's allergies indicates:  No Known Allergies    No current facility-administered medications on file prior to encounter.     Current Outpatient Medications on File Prior to Encounter   Medication Sig Dispense Refill    acetaminophen (TYLENOL) 325 MG tablet Take 325 mg by mouth daily as needed for Pain.      acetone, urine, test (KETONE URINE TEST) Strp 30 strips by Misc.(Non-Drug; Combo Route) route as needed (hyperglycemia).      aluminum & magnesium hydroxide-simethicone (MYLANTA MAX STRENGTH) 400-400-40 mg/5 mL suspension Take 10 mLs by mouth every 6 (six) hours as needed for Indigestion. 100 mL 0    blood sugar diagnostic Strp To check BG up to 5 times daily 200 strip 0    blood-glucose meter Misc Use to test blood glucose 2 times a day with meals. 1 each 0    blood-glucose sensor (DEXCOM G6 SENSOR) Martha 3 each by Misc.(Non-Drug; Combo Route) route continuous. 3 each prn    blood-glucose transmitter (DEXCOM G6 TRANSMITTER) Martha 1 each by Misc.(Non-Drug; Combo Route) route continuous. 1 each prn    glucagon (GVOKE HYPOPEN 2-PACK) 1 mg/0.2 mL AtIn Inject 1 Package into the skin as needed (hypoglycemia). 2 each 0    insulin degludec (TRESIBA FLEXTOUCH U-100) 100 unit/mL (3 mL) insulin pen Inject 16 Units into  "the skin every evening. 2 pen 9    insulin syringe-needle U-100 (BD INSULIN SYRINGE ULTRA-FINE) 0.5 mL 31 gauge x 5/16" Syrg 4 times daily 200 each 0    insulin syringe-needle U-100 (BD INSULIN SYRINGE ULTRA-FINE) 0.5 mL 31 gauge x 5/16" Syrg 4 times daily 200 each 0    insulin syringe-needle U-100 (BD INSULIN SYRINGE ULTRA-FINE) 0.5 mL 31 gauge x 5/16" Syrg 4 times daily 200 each 11    insulin syringe-needle U-100 0.5 mL 30 gauge x 5/16" Syrg To use 5 times daily 200 each 4    lancets Misc Use to test blood glucose 2 (two) times daily with meals. 100 each 11    lancing device Misc 1 Device by Misc.(Non-Drug; Combo Route) route 2 (two) times daily with meals. 1 each 0    lisinopriL 10 MG tablet Take 1 tablet (10 mg total) by mouth once daily. 90 tablet 3    LYUMJEV KWIKPEN U-100 INSULIN 100 unit/mL pen Inject 0-10 Units into the skin 3 (three) times daily with meals. May also inject 0-10 Units as needed (Hyperglycemia). 5 units with meals + sliding scale insulin   Blood sugar 180 to 230 add 1 units   Blood sugar 231 to 280 add 2 units   Blood sugar 281 to 330 add 3 units   Blood sugar 331 to 380 add 4 units   Blood sugar greater than 380 add 5 units. 5 pen 6    ondansetron (ZOFRAN) 4 MG tablet Take 1 tablet (4 mg total) by mouth every 6 (six) hours as needed for Nausea (3rd line). 12 tablet 0    pantoprazole (PROTONIX) 40 MG tablet Take 1 tablet (40 mg total) by mouth once daily. 30 tablet 11    pen needle, diabetic (BD ULTRA-FINE ROVERTO PEN NEEDLE) 32 gauge x 5/32" Ndle USE AS DIRECTED WITH INSULIN 100 each 0    prochlorperazine (COMPAZINE) 5 MG tablet Take 1 tablet (5 mg total) by mouth 4 (four) times daily as needed for Nausea (2nd line). 20 tablet 0    promethazine (PHENERGAN) 25 MG suppository Place 1 suppository (25 mg total) rectally every 6 (six) hours as needed for Nausea. 10 suppository 0    promethazine (PHENERGAN) 25 MG tablet Take 1 tablet (25 mg total) by mouth every 6 (six) hours as needed for Nausea " (1st line). 20 tablet 0       Past History   As per HPI and below:  Past Medical History:   Diagnosis Date    Diabetes mellitus     Diabetes mellitus type 1     Diagnosed at age 20     History reviewed. No pertinent surgical history.    Social History     Socioeconomic History    Marital status:    Tobacco Use    Smoking status: Former    Smokeless tobacco: Current   Substance and Sexual Activity    Alcohol use: Yes     Comment: socially    Drug use: No       Family History   Problem Relation Age of Onset    Other Mother         prediabetes    Diabetes Mother     Other Father         patient does not know his fathers history       Physical Exam     Vitals:    03/08/23 1530 03/08/23 1700 03/08/23 1800 03/08/23 1931   BP: (!) 156/74 (!) 174/82 (!) 161/77 124/72   BP Location: Left arm Left arm Left arm Right arm   Patient Position: Lying Lying Lying Lying   Pulse: 72 78 71    Resp: 14 15 18    Temp:       TempSrc:       SpO2: 100% 100% 99%    Weight:       Height:         Gen: AxOx4, appears uncomfortable, sitting at the foot of the bed, grasping the side rails, holding his breath and grimacing, dry heaving but not producing any emesis, appears stated age, well appearing  Eye: EOMI, no scleral icterus, no periorbital edema or ecchymosis  Head: Normocephalic, atraumatic, no lesions, scalp grossly normal  ENT: neck supple, no stridor, no masses, no abnormal phonation or drooling  CVS: warm and well perfused, cap refill <2 seconds, no extremity pallor  PULM: normal work of breathing, no stridor, equal chest rise, no peripheral cyanosis  ABD:  Diffuse tenderness to palpation to light touch, no focal worsening,, nondistended  Ext: no rash, no deformities, moving all joints with normal ROM  Neuro: SIMS, gait intact, face grossly symmetric  Psych: well groomed, mood and affect congruent, makes good eye contact, cooperative      EKG - independently interpreted       Initial Impression and MDM   This is a 37-year-old man  with a history of gastroparesis/cannabinoid hyperemesis syndrome/poorly-controlled insulin-dependent diabetes who presents with acute onset nausea and vomiting that has been persistent since he was discharged yesterday for the same.  Given that Compazine is not working, we will try droperidol here, hydration and obtain labs to assess for electrolyte abnormality, DKA, or leukocytosis.    Additional Considerations:   Additional testing  was not considered during the course of this workup.  Comorbidities taken into consideration during the patient's evaluation and treatment include:   IDDM, cannabinoid hyperemesis  Social determinants of health taken into consideration during development of our treatment plan include: NA    Medications Given     Medications   insulin detemir U-100 pen 8 Units (has no administration in time range)   lisinopriL tablet 10 mg (has no administration in time range)   pantoprazole EC tablet 40 mg (has no administration in time range)   acetaminophen tablet 650 mg (has no administration in time range)   dextrose 10% bolus 125 mL 125 mL (has no administration in time range)   dextrose 10% bolus 250 mL 250 mL (has no administration in time range)   glucagon (human recombinant) injection 1 mg (has no administration in time range)   dextrose 40 % gel 15,000 mg (has no administration in time range)   dextrose 40 % gel 15,000 mg (has no administration in time range)   dextrose 40 % gel 15,000 mg (has no administration in time range)   insulin aspart U-100 pen 1-10 Units (has no administration in time range)   naloxone 0.4 mg/mL injection 0.02 mg (has no administration in time range)   ondansetron injection 4 mg (has no administration in time range)   prochlorperazine injection Soln 2.5 mg (has no administration in time range)   sodium chloride 0.9% flush 10 mL (has no administration in time range)   sodium chloride 0.9% flush 10 mL (has no administration in time range)   droperidoL injection 2.5 mg  (2.5 mg Intravenous Given 3/8/23 1230)   sodium chloride 0.9% bolus 1,000 mL 1,000 mL (0 mLs Intravenous Stopped 3/8/23 1506)   prochlorperazine injection Soln 10 mg (10 mg Intravenous Given 3/8/23 1510)       Results and Course     Labs Reviewed   CBC W/ AUTO DIFFERENTIAL - Abnormal; Notable for the following components:       Result Value    WBC 3.59 (*)     RBC 3.93 (*)     Hemoglobin 11.8 (*)     Hematocrit 35.9 (*)     Platelets 142 (*)     Lymph # 0.5 (*)     Gran % 76.8 (*)     Lymph % 13.1 (*)     All other components within normal limits    Narrative:     Release to patient->Immediate   COMPREHENSIVE METABOLIC PANEL - Abnormal; Notable for the following components:    CO2 21 (*)     Glucose 216 (*)     All other components within normal limits    Narrative:     Release to patient->Immediate   URINALYSIS, REFLEX TO URINE CULTURE - Abnormal; Notable for the following components:    Protein, UA 1+ (*)     Glucose, UA 4+ (*)     Ketones, UA 2+ (*)     Occult Blood UA 2+ (*)     All other components within normal limits    Narrative:     Specimen Source->Urine   BETA - HYDROXYBUTYRATE, SERUM - Abnormal; Notable for the following components:    Beta-Hydroxybutyrate 1.9 (*)     All other components within normal limits   URINALYSIS MICROSCOPIC - Abnormal; Notable for the following components:    RBC, UA 5 (*)     WBC, UA 11 (*)     Hyaline Casts, UA 2 (*)     All other components within normal limits    Narrative:     Specimen Source->Urine   ISTAT PROCEDURE - Abnormal; Notable for the following components:    POC Glucose 218 (*)     All other components within normal limits   CULTURE, URINE   HIV 1 / 2 ANTIBODY    Narrative:     Release to patient->Immediate   HEPATITIS C ANTIBODY    Narrative:     Release to patient->Immediate   LIPASE    Narrative:     Release to patient->Immediate   ISTAT CHEM8   POCT GLUCOSE MONITORING CONTINUOUS       Imaging Results    None                  Additional MDM   37 y.o. male  with recent hospitalization for acute nausea and vomiting, thought to be due to gastroparesis versus cannabinoid hyperemesis syndrome, here unimproved after droperidol and Compazine, able to tolerate small sips of water but has significant pain.  Plan for observation in the hospital for further care.         Final diagnoses:  [E11.43, K31.84] Gastroparesis diabeticorum (Primary)        ED Disposition Condition    Observation Stable                  Yoli Smyht MD  03/08/23 2010

## 2023-03-09 LAB
ALBUMIN SERPL BCP-MCNC: 3.2 G/DL (ref 3.5–5.2)
ALP SERPL-CCNC: 51 U/L (ref 55–135)
ALT SERPL W/O P-5'-P-CCNC: 20 U/L (ref 10–44)
ANION GAP SERPL CALC-SCNC: 6 MMOL/L (ref 8–16)
AST SERPL-CCNC: 15 U/L (ref 10–40)
BACTERIA UR CULT: NO GROWTH
BASOPHILS # BLD AUTO: 0.01 K/UL (ref 0–0.2)
BASOPHILS NFR BLD: 0.3 % (ref 0–1.9)
BILIRUB SERPL-MCNC: 0.7 MG/DL (ref 0.1–1)
BUN SERPL-MCNC: 15 MG/DL (ref 6–20)
CALCIUM SERPL-MCNC: 8.9 MG/DL (ref 8.7–10.5)
CHLORIDE SERPL-SCNC: 109 MMOL/L (ref 95–110)
CO2 SERPL-SCNC: 28 MMOL/L (ref 23–29)
CREAT SERPL-MCNC: 1.3 MG/DL (ref 0.5–1.4)
DIFFERENTIAL METHOD: ABNORMAL
EOSINOPHIL # BLD AUTO: 0 K/UL (ref 0–0.5)
EOSINOPHIL NFR BLD: 0.3 % (ref 0–8)
ERYTHROCYTE [DISTWIDTH] IN BLOOD BY AUTOMATED COUNT: 11.9 % (ref 11.5–14.5)
EST. GFR  (NO RACE VARIABLE): >60 ML/MIN/1.73 M^2
GLUCOSE SERPL-MCNC: 251 MG/DL (ref 70–110)
HCT VFR BLD AUTO: 32.5 % (ref 40–54)
HGB BLD-MCNC: 11 G/DL (ref 14–18)
IMM GRANULOCYTES # BLD AUTO: 0 K/UL (ref 0–0.04)
IMM GRANULOCYTES NFR BLD AUTO: 0 % (ref 0–0.5)
LYMPHOCYTES # BLD AUTO: 0.8 K/UL (ref 1–4.8)
LYMPHOCYTES NFR BLD: 26.1 % (ref 18–48)
MAGNESIUM SERPL-MCNC: 1.8 MG/DL (ref 1.6–2.6)
MCH RBC QN AUTO: 30.6 PG (ref 27–31)
MCHC RBC AUTO-ENTMCNC: 33.8 G/DL (ref 32–36)
MCV RBC AUTO: 91 FL (ref 82–98)
MONOCYTES # BLD AUTO: 0.5 K/UL (ref 0.3–1)
MONOCYTES NFR BLD: 15.5 % (ref 4–15)
NEUTROPHILS # BLD AUTO: 1.9 K/UL (ref 1.8–7.7)
NEUTROPHILS NFR BLD: 57.8 % (ref 38–73)
NRBC BLD-RTO: 0 /100 WBC
PHOSPHATE SERPL-MCNC: 2.1 MG/DL (ref 2.7–4.5)
PLATELET # BLD AUTO: 136 K/UL (ref 150–450)
PMV BLD AUTO: 11.1 FL (ref 9.2–12.9)
POCT GLUCOSE: 139 MG/DL (ref 70–110)
POCT GLUCOSE: 182 MG/DL (ref 70–110)
POCT GLUCOSE: 250 MG/DL (ref 70–110)
POCT GLUCOSE: 253 MG/DL (ref 70–110)
POCT GLUCOSE: 264 MG/DL (ref 70–110)
POCT GLUCOSE: 293 MG/DL (ref 70–110)
POTASSIUM SERPL-SCNC: 4 MMOL/L (ref 3.5–5.1)
PROT SERPL-MCNC: 5.7 G/DL (ref 6–8.4)
RBC # BLD AUTO: 3.59 M/UL (ref 4.6–6.2)
SODIUM SERPL-SCNC: 143 MMOL/L (ref 136–145)
WBC # BLD AUTO: 3.22 K/UL (ref 3.9–12.7)

## 2023-03-09 PROCEDURE — 99233 PR SUBSEQUENT HOSPITAL CARE,LEVL III: ICD-10-PCS | Mod: ,,, | Performed by: INTERNAL MEDICINE

## 2023-03-09 PROCEDURE — 96375 TX/PRO/DX INJ NEW DRUG ADDON: CPT

## 2023-03-09 PROCEDURE — 80053 COMPREHEN METABOLIC PANEL: CPT

## 2023-03-09 PROCEDURE — 85025 COMPLETE CBC W/AUTO DIFF WBC: CPT

## 2023-03-09 PROCEDURE — 96365 THER/PROPH/DIAG IV INF INIT: CPT

## 2023-03-09 PROCEDURE — 99233 SBSQ HOSP IP/OBS HIGH 50: CPT | Mod: ,,, | Performed by: INTERNAL MEDICINE

## 2023-03-09 PROCEDURE — 83735 ASSAY OF MAGNESIUM: CPT

## 2023-03-09 PROCEDURE — 25000003 PHARM REV CODE 250: Performed by: STUDENT IN AN ORGANIZED HEALTH CARE EDUCATION/TRAINING PROGRAM

## 2023-03-09 PROCEDURE — 84100 ASSAY OF PHOSPHORUS: CPT

## 2023-03-09 PROCEDURE — 96376 TX/PRO/DX INJ SAME DRUG ADON: CPT

## 2023-03-09 PROCEDURE — 63600175 PHARM REV CODE 636 W HCPCS

## 2023-03-09 PROCEDURE — 25000003 PHARM REV CODE 250

## 2023-03-09 PROCEDURE — 96366 THER/PROPH/DIAG IV INF ADDON: CPT

## 2023-03-09 PROCEDURE — 36415 COLL VENOUS BLD VENIPUNCTURE: CPT

## 2023-03-09 PROCEDURE — G0378 HOSPITAL OBSERVATION PER HR: HCPCS

## 2023-03-09 PROCEDURE — 94761 N-INVAS EAR/PLS OXIMETRY MLT: CPT

## 2023-03-09 RX ORDER — MAG HYDROX/ALUMINUM HYD/SIMETH 200-200-20
30 SUSPENSION, ORAL (FINAL DOSE FORM) ORAL EVERY 6 HOURS PRN
Status: DISCONTINUED | OUTPATIENT
Start: 2023-03-09 | End: 2023-03-12 | Stop reason: HOSPADM

## 2023-03-09 RX ORDER — LIDOCAINE HYDROCHLORIDE 20 MG/ML
15 SOLUTION OROPHARYNGEAL EVERY 6 HOURS PRN
Status: DISCONTINUED | OUTPATIENT
Start: 2023-03-09 | End: 2023-03-12 | Stop reason: HOSPADM

## 2023-03-09 RX ORDER — MAGNESIUM SULFATE HEPTAHYDRATE 40 MG/ML
2 INJECTION, SOLUTION INTRAVENOUS ONCE
Status: COMPLETED | OUTPATIENT
Start: 2023-03-09 | End: 2023-03-09

## 2023-03-09 RX ORDER — PROMETHAZINE HYDROCHLORIDE 12.5 MG/1
12.5 TABLET ORAL EVERY 6 HOURS
Status: DISCONTINUED | OUTPATIENT
Start: 2023-03-09 | End: 2023-03-12 | Stop reason: HOSPADM

## 2023-03-09 RX ADMIN — ONDANSETRON 8 MG: 2 INJECTION INTRAMUSCULAR; INTRAVENOUS at 08:03

## 2023-03-09 RX ADMIN — INSULIN ASPART 2 UNITS: 100 INJECTION, SOLUTION INTRAVENOUS; SUBCUTANEOUS at 12:03

## 2023-03-09 RX ADMIN — LISINOPRIL 10 MG: 10 TABLET ORAL at 09:03

## 2023-03-09 RX ADMIN — PROCHLORPERAZINE EDISYLATE 2.5 MG: 5 INJECTION INTRAMUSCULAR; INTRAVENOUS at 09:03

## 2023-03-09 RX ADMIN — PROMETHAZINE HYDROCHLORIDE 12.5 MG: 12.5 TABLET ORAL at 12:03

## 2023-03-09 RX ADMIN — PROMETHAZINE HYDROCHLORIDE 12.5 MG: 12.5 TABLET ORAL at 11:03

## 2023-03-09 RX ADMIN — MAGNESIUM SULFATE 2 G: 2 INJECTION INTRAVENOUS at 09:03

## 2023-03-09 RX ADMIN — PANTOPRAZOLE SODIUM 40 MG: 40 TABLET, DELAYED RELEASE ORAL at 09:03

## 2023-03-09 RX ADMIN — INSULIN ASPART 6 UNITS: 100 INJECTION, SOLUTION INTRAVENOUS; SUBCUTANEOUS at 08:03

## 2023-03-09 RX ADMIN — INSULIN ASPART 6 UNITS: 100 INJECTION, SOLUTION INTRAVENOUS; SUBCUTANEOUS at 05:03

## 2023-03-09 RX ADMIN — PROMETHAZINE HYDROCHLORIDE 12.5 MG: 12.5 TABLET ORAL at 05:03

## 2023-03-09 RX ADMIN — INSULIN ASPART 4 UNITS: 100 INJECTION, SOLUTION INTRAVENOUS; SUBCUTANEOUS at 11:03

## 2023-03-09 NOTE — PLAN OF CARE
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]  At risk for prolonged QT interval syndrome [Z91.89]    Admission Date: 3/8/2023  Expected Discharge Date:     Discharge Barriers Identified: None    Payor: [facility] MCARE / Plan: [facility] DUAL COMPLETE HMO SNP / Product Type: Medicare Advantage /     Extended Emergency Contact Information  Primary Emergency Contact: TORIBIO OLSON   [facility]  Mobile Phone: [phone]  Relation: Spouse    Discharge Plan A: Home with family         [id number] - [place], [place] - [address] [facility] [address]  [facility] [address]  Phone: [phone] Fax: [phone]      Initial Assessment (most recent)       Adult Discharge Assessment - 03/09/23 0841          Discharge Assessment    Assessment Type Discharge Planning Assessment     Confirmed/corrected address, phone number and insurance Yes     Confirmed Demographics Correct on Facesheet     Source of Information patient     Does patient/caregiver understand observation status Yes     Communicated PRECIOUS with patient/caregiver Yes     People in Home spouse     Do you expect to return to your current living situation? Yes     Do you have help at home or someone to help you manage your care at home? Yes     Prior to hospitilization cognitive status: Alert/Oriented     Current cognitive status: Alert/Oriented     Equipment Currently Used at Home glucometer     Do you currently have service(s) that help you manage your care at home? No     Do you take prescription medications? Yes     Do you have prescription coverage? Yes     Do you have any problems affording any of your prescribed medications? No     Is the patient taking medications as prescribed? yes     Who is going to help you  get home at discharge? friend or [employer]     How do you get to doctors appointments? car, drives self     Are you on dialysis? No     Discharge Plan A Home with family     DME Needed Upon Discharge  none     Discharge Plan discussed with: Patient     Discharge Barriers Identified None        Physical Activity    On average, how many days per week do you engage in moderate to strenuous exercise (like a brisk walk)? 0 days     On average, how many minutes do you engage in exercise at this level? 0 min        Financial Resource Strain    How hard is it for you to pay for the very basics like food, housing, medical care, and heating? Not hard at all        Housing Stability    In the last 12 months, was there a time when you were not able to pay the mortgage or rent on time? No     In the last 12 months, how many places have you lived? 1     In the last 12 months, was there a time when you did not have a steady place to sleep or slept in a shelter (including now)? No        Transportation Needs    In the past 12 months, has lack of transportation kept you from medical appointments or from getting medications? No     In the past 12 months, has lack of transportation kept you from meetings, work, or from getting things needed for daily living? No        Food Insecurity    Within the past 12 months, you worried that your food would run out before you got the money to buy more. Never true     Within the past 12 months, the food you bought just didn't last and you didn't have money to get more. Never true        Stress    Do you feel stress - tense, restless, nervous, or anxious, or unable to sleep at night because your mind is troubled all the time - these days? Very much        Social Connections    In a typical week, how many times do you talk on the phone with family, friends, or neighbors? Never     How often do you get together with friends or relatives? Once a week     How often do you attend Mormonism or Cheondoism  services? Never     Do you belong to any clubs or organizations such as Nondenominational groups, unions, fraternal or athletic groups, or school groups? No     How often do you attend meetings of the clubs or organizations you belong to? Never     Are you , , , , never , or living with a partner?         Alcohol Use    Q1: How often do you have a drink containing alcohol? Monthly or less     Q2: How many drinks containing alcohol do you have on a typical day when you are drinking? 1 or 2     Q3: How often do you have six or more drinks on one occasion? Never

## 2023-03-09 NOTE — PHARMACY MED REC
"Admission Medication History     The home medication history was taken by Lesly Montgomery.    You may go to "Admission" then "Reconcile Home Medications" tabs to review and/or act upon these items.     The home medication list has been updated by the Pharmacy department.   Please read ALL comments highlighted in yellow.   Please address this information as you see fit.    Feel free to contact us if you have any questions or require assistance.          Medications listed below were obtained from: Patient/family and Medical records(see med rec tech note 3/5/23)  Current Outpatient Medications on File Prior to Encounter   Medication Sig    acetaminophen (TYLENOL) 325 MG tablet Take 325 mg by mouth daily as needed for Pain.    acetone, urine, test (KETONE URINE TEST) Strp 30 strips by Misc.(Non-Drug; Combo Route) route as needed (hyperglycemia).    aluminum & magnesium hydroxide-simethicone (MYLANTA MAX STRENGTH) 400-400-40 mg/5 mL suspension Take 10 mLs by mouth every 6 (six) hours as needed for Indigestion.    blood sugar diagnostic Strp To check BG up to 5 times daily    blood-glucose meter Misc Use to test blood glucose 2 times a day with meals.    blood-glucose sensor (DEXCOM G6 SENSOR) Martha 3 each by Misc.(Non-Drug; Combo Route) route continuous.    blood-glucose transmitter (DEXCOM G6 TRANSMITTER) Martha 1 each by Misc.(Non-Drug; Combo Route) route continuous.    glucagon (GVOKE HYPOPEN 2-PACK) 1 mg/0.2 mL AtIn Inject 1 Package into the skin as needed (hypoglycemia).    insulin degludec (TRESIBA FLEXTOUCH U-100) 100 unit/mL (3 mL) insulin pen Inject 16 Units into the skin every evening.    insulin syringe-needle U-100 (BD INSULIN SYRINGE ULTRA-FINE) 0.5 mL 31 gauge x 5/16" Syrg 4 times daily    insulin syringe-needle U-100 (BD INSULIN SYRINGE ULTRA-FINE) 0.5 mL 31 gauge x 5/16" Syrg 4 times daily    insulin syringe-needle U-100 (BD INSULIN SYRINGE ULTRA-FINE) 0.5 mL 31 gauge x 5/16" Syrg 4 times daily    insulin " "syringe-needle U-100 0.5 mL 30 gauge x 5/16" Syrg To use 5 times daily    lancets Misc Use to test blood glucose 2 (two) times daily with meals.    lancing device Misc 1 Device by Misc.(Non-Drug; Combo Route) route 2 (two) times daily with meals.    lisinopriL 10 MG tablet Take 1 tablet (10 mg total) by mouth once daily.    LYUMJEV KWIKPEN U-100 INSULIN 100 unit/mL pen Inject 0-10 Units into the skin 3 (three) times daily with meals. May also inject 0-10 Units as needed (Hyperglycemia). 5 units with meals + sliding scale insulin   Blood sugar 180 to 230 add 1 units   Blood sugar 231 to 280 add 2 units   Blood sugar 281 to 330 add 3 units   Blood sugar 331 to 380 add 4 units   Blood sugar greater than 380 add 5 units.    ondansetron (ZOFRAN) 4 MG tablet Take 1 tablet (4 mg total) by mouth every 6 (six) hours as needed for Nausea (3rd line).    pantoprazole (PROTONIX) 40 MG tablet Take 1 tablet (40 mg total) by mouth once daily.    pen needle, diabetic (BD ULTRA-FINE ROVERTO PEN NEEDLE) 32 gauge x 5/32" Ndle USE AS DIRECTED WITH INSULIN    prochlorperazine (COMPAZINE) 5 MG tablet Take 1 tablet (5 mg total) by mouth 4 (four) times daily as needed for Nausea (2nd line).    promethazine (PHENERGAN) 25 MG suppository Place 1 suppository (25 mg total) rectally every 6 (six) hours as needed for Nausea.    promethazine (PHENERGAN) 25 MG tablet Take 1 tablet (25 mg total) by mouth every 6 (six) hours as needed for Nausea (1st line).             Lesly Montgomery  EXT [id number]                  .        "

## 2023-03-09 NOTE — ASSESSMENT & PLAN NOTE
Patient's FSGs are uncontrolled due to hyperglycemia on current medication regimen.  Last A1c reviewed-   Lab Results   Component Value Date    HGBA1C 10.2 (H) 03/01/2023     Most recent fingerstick glucose reviewed-   Recent Labs   Lab 03/08/23  2135 03/09/23  0408 03/09/23  0740   POCTGLUCOSE 323* 293* 139*     Current correctional scale  Medium  Maintain anti-hyperglycemic dose as follows-   Antihyperglycemics (From admission, onward)    Start     Stop Route Frequency Ordered    03/09/23 0900  insulin detemir U-100 pen 9 Units         -- SubQ 2 times daily 03/09/23 0719    03/08/23 1959  insulin aspart U-100 pen 1-10 Units         -- SubQ Every 4 hours PRN 03/08/23 2005        Hold Oral hypoglycemics while patient is in the hospital.

## 2023-03-09 NOTE — ASSESSMENT & PLAN NOTE
Patient re-admitted d/t intolerance of PO intake at home; patient reports attempting to increase liquid intake and increase activity which triggered further nausea/vomiting. Mucous membranes appear moist.     -s/p Droperidol and compazine IV   -PRN zofran IV 8mg; QTc <500 on previous admission, STAT EKG pending for re-assessment  -advance diet as tolerated   -consider gentle IVF in setting of continued n/v   -POCT glucose checks; PRNs in case of hypoglycemia in setting of low PO intake

## 2023-03-09 NOTE — ASSESSMENT & PLAN NOTE
Patient likely experiencing intractable n/v 2/2 cannabis use; UDS on last admission positive for marijuana. Patient reports last use 3-4 weeks ago due to onset of these symptoms. He reports he had not used on discharge 1 day ago as well.    -see intractable n/v

## 2023-03-09 NOTE — PLAN OF CARE
PLAN OF CARE NOTE      POC reviewed with patient, verbalized understanding. No acute distress noted at this time. Will continue to monitor safety maintained.         RESP: Remains on room air sats %. No acute distress noted at this time.        Neuro: Pt is oriented x4. No indicators of pain noted. Remained afebrile.     CV: Remained NSR with no ectopy noted. All pulses palpable with vitals stable.                  GI: Voiding appropriately with no BM noted at this time. NS @ 125ml/hr. Last blood sugar 293, 6 units given.      Infection: Remains afebrile. No indicators of infection noted.               See flow sheets and MAR for further details.       3/9/23  Jennifer Gutierrez RN

## 2023-03-09 NOTE — H&P
[facility] Dept  [facility] Medicine  History & Physical    Patient Name: Andrzej Sinclair  MRN: [id number]  Patient Class: OP- Observation  Admission Date: 3/8/2023  Attending Physician: Tony Kaiser MD   Primary Care Provider: Viktoriya Jaeger NP         Patient information was obtained from patient and ER records.     Subjective:     Principal Problem:Intractable vomiting with nausea    Chief Complaint:   Chief Complaint   Patient presents with    Abdominal Pain     Abd pain/N/V for the past week.        HPI: 38 y/o M with medical hx of poorly controlled DM1 and hyperemesis cannabinoid syndrome who re-presents to [facility] for intractable nausea and vomiting. Patient w/ previous admission, discharged 1 day ago for intractable n/v for the past 2 weeks. Pt stated he had vomited 10x the past 24 hours, cannot keep anything down, w/ abdominal pain usually prior to vomiting episodes. He also reports headaches, chest pain, dizziness, and SOB. He denies hemoptysis, fevers, chills, and diarrhea. Pt has been to the hospital 4 times over the past 2 weeks for similar issues. He is a poorly controlled diabetic, stating his sugars usually run around 300 when he checks them. Last A1c was 10.2 on 3/1/23. Pt has tried using PO phenergan and dicyclomine with minimal relief of symptoms. Pt was recently been on insulin pump, but claims it has not been working well for him. He is currently not wearing pump in ED. He has not been able to see GI outpatient for his persistent issues. Pt denies recent use of marijuana and states he has not ingested/smoked marijuana for at least 3-4 weeks.     On both admissions, patient AF HDS, mildly tachycardic particularly following vomiting episodes. Satting well on RA. Labs without significant electrolyte derangement. Glucose 215. BHB 1.5. Labs and presentation unconcerning for DKA. Patient to be re-admitted for intractable n/v w/ intolerance of PO intake.         Past Medical History:   Diagnosis  "Date    Diabetes mellitus     Diabetes mellitus type 1     Diagnosed at age 20       History reviewed. No pertinent surgical history.    Review of patient's allergies indicates:  No Known Allergies    No current facility-administered medications on file prior to encounter.     Current Outpatient Medications on File Prior to Encounter   Medication Sig    acetaminophen (TYLENOL) 325 MG tablet Take 325 mg by mouth daily as needed for Pain.    acetone, urine, test (KETONE URINE TEST) Strp 30 strips by Misc.(Non-Drug; Combo Route) route as needed (hyperglycemia).    aluminum & magnesium hydroxide-simethicone (MYLANTA MAX STRENGTH) 400-400-40 mg/5 mL suspension Take 10 mLs by mouth every 6 (six) hours as needed for Indigestion.    blood sugar diagnostic Strp To check BG up to 5 times daily    blood-glucose meter Misc Use to test blood glucose 2 times a day with meals.    blood-glucose sensor (DEXCOM G6 SENSOR) Martha 3 each by Misc.(Non-Drug; Combo Route) route continuous.    blood-glucose transmitter (DEXCOM G6 TRANSMITTER) Martha 1 each by Misc.(Non-Drug; Combo Route) route continuous.    glucagon (GVOKE HYPOPEN 2-PACK) 1 mg/0.2 mL AtIn Inject 1 Package into the skin as needed (hypoglycemia).    insulin degludec (TRESIBA FLEXTOUCH U-100) 100 unit/mL (3 mL) insulin pen Inject 16 Units into the skin every evening.    insulin syringe-needle U-100 (BD INSULIN SYRINGE ULTRA-FINE) 0.5 mL 31 gauge x 5/16" Syrg 4 times daily    insulin syringe-needle U-100 (BD INSULIN SYRINGE ULTRA-FINE) 0.5 mL 31 gauge x 5/16" Syrg 4 times daily    insulin syringe-needle U-100 (BD INSULIN SYRINGE ULTRA-FINE) 0.5 mL 31 gauge x 5/16" Syrg 4 times daily    insulin syringe-needle U-100 0.5 mL 30 gauge x 5/16" Syrg To use 5 times daily    lancets Misc Use to test blood glucose 2 (two) times daily with meals.    lancing device Misc 1 Device by Misc.(Non-Drug; Combo Route) route 2 (two) times daily with meals.    lisinopriL 10 MG tablet " "Take 1 tablet (10 mg total) by mouth once daily.    LYUMJEV KWIKPEN U-100 INSULIN 100 unit/mL pen Inject 0-10 Units into the skin 3 (three) times daily with meals. May also inject 0-10 Units as needed (Hyperglycemia). 5 units with meals + sliding scale insulin   Blood sugar 180 to 230 add 1 units   Blood sugar 231 to 280 add 2 units   Blood sugar 281 to 330 add 3 units   Blood sugar 331 to 380 add 4 units   Blood sugar greater than 380 add 5 units.    ondansetron (ZOFRAN) 4 MG tablet Take 1 tablet (4 mg total) by mouth every 6 (six) hours as needed for Nausea (3rd line).    pantoprazole (PROTONIX) 40 MG tablet Take 1 tablet (40 mg total) by mouth once daily.    pen needle, diabetic (BD ULTRA-FINE ROVERTO PEN NEEDLE) 32 gauge x 5/32" Ndle USE AS DIRECTED WITH INSULIN    prochlorperazine (COMPAZINE) 5 MG tablet Take 1 tablet (5 mg total) by mouth 4 (four) times daily as needed for Nausea (2nd line).    promethazine (PHENERGAN) 25 MG suppository Place 1 suppository (25 mg total) rectally every 6 (six) hours as needed for Nausea.    promethazine (PHENERGAN) 25 MG tablet Take 1 tablet (25 mg total) by mouth every 6 (six) hours as needed for Nausea (1st line).     Family History       Problem Relation (Age of Onset)    Diabetes Mother    Other Mother, Father          Tobacco Use    Smoking status: Former    Smokeless tobacco: Current   Substance and Sexual Activity    Alcohol use: Yes     Comment: socially    Drug use: No    Sexual activity: Not on file     Review of Systems   Constitutional:  Negative for chills and fever.   HENT:  Negative for congestion.    Eyes:  Negative for visual disturbance.   Respiratory:  Negative for cough, shortness of breath and wheezing.    Cardiovascular:  Positive for chest pain (burning; w/ vomiting episodes).   Gastrointestinal:  Positive for abdominal pain, nausea and vomiting. Negative for constipation and diarrhea.   Genitourinary:  Negative for difficulty urinating. "   Musculoskeletal:  Negative for arthralgias and back pain.   Skin:  Negative for color change and pallor.   Neurological:  Positive for dizziness, light-headedness and headaches. Negative for tremors, syncope and weakness.   Psychiatric/Behavioral:  Negative for agitation and behavioral problems.    Objective:     Vital Signs (Most Recent):  Temp: 98.1 °F (36.7 °C) (03/08/23 1154)  Pulse: 71 (03/08/23 1800)  Resp: 18 (03/08/23 1800)  BP: 124/72 (03/08/23 1931)  SpO2: 99 % (03/08/23 1800) Vital Signs (24h Range):  Temp:  [98.1 °F (36.7 °C)] 98.1 °F (36.7 °C)  Pulse:  [71-88] 71  Resp:  [14-18] 18  SpO2:  [98 %-100 %] 99 %  BP: (124-190)/(72-98) 124/72     Weight: 90.7 kg (200 lb)  Body mass index is 25.68 kg/m².    Physical Exam  Constitutional:       Appearance: Normal appearance. He is normal weight.      Comments: Uncomfortable; ill-appearing   HENT:      Head: Normocephalic and atraumatic.      Right Ear: External ear normal.      Left Ear: External ear normal.      Nose: Nose normal.      Mouth/Throat:      Mouth: Mucous membranes are moist.      Pharynx: Oropharynx is clear.   Eyes:      Conjunctiva/sclera: Conjunctivae normal.   Cardiovascular:      Rate and Rhythm: Normal rate and regular rhythm.      Pulses: Normal pulses.      Heart sounds: Normal heart sounds.   Pulmonary:      Effort: Pulmonary effort is normal.      Breath sounds: Normal breath sounds.   Abdominal:      General: Abdomen is flat. Bowel sounds are normal.      Palpations: Abdomen is soft.      Tenderness: There is abdominal tenderness (diffuse w/ palpation).   Musculoskeletal:      Cervical back: Neck supple.      Right lower leg: No edema.      Left lower leg: No edema.   Skin:     General: Skin is warm and dry.      Capillary Refill: Capillary refill takes less than 2 seconds.   Neurological:      General: No focal deficit present.      Mental Status: He is alert and oriented to person, place, and time. Mental status is at baseline.    Psychiatric:         Mood and Affect: Mood normal.         Behavior: Behavior normal.           Significant Labs: All pertinent labs within the past 24 hours have been reviewed.    Significant Imaging: I have reviewed all pertinent imaging results/findings within the past 24 hours.    Assessment/Plan:     * Intractable vomiting with nausea  Patient re-admitted d/t intolerance of PO intake at home; patient reports attempting to increase liquid intake and increase activity which triggered further nausea/vomiting. Mucous membranes appear moist.     -s/p Droperidol and compazine IV   -PRN zofran  -advance diet as tolerated   -consider gentle IVF in setting of continued n/v   -POCT glucose checks; PRNs in case of hypoglycemia in setting of low PO intake       GERD (gastroesophageal reflux disease)  Home protonix QD      Cannabinoid hyperemesis syndrome  Patient likely experiencing intractable n/v 2/2 cannabis use; UDS on last admission positive for marijuana. Patient reports last use 3-4 weeks ago due to onset of these symptoms. He reports he had not used on discharge 1 day ago as well.    -see intractable n/v    Type 1 diabetes mellitus with complications  Patient's FSGs are uncontrolled due to hyperglycemia on current medication regimen.  Last A1c reviewed-   Lab Results   Component Value Date    HGBA1C 10.2 (H) 03/01/2023     Most recent fingerstick glucose reviewed- No results for input(s): POCTGLUCOSE in the last 24 hours.  Current correctional scale  Medium  Maintain anti-hyperglycemic dose as follows-   Antihyperglycemics (From admission, onward)    Start     Stop Route Frequency Ordered    03/08/23 2100  insulin detemir U-100 pen 8 Units         -- SubQ 2 times daily 03/08/23 2005 03/08/23 1959  insulin aspart U-100 pen 1-10 Units         -- SubQ Every 4 hours PRN 03/08/23 2005        Hold Oral hypoglycemics while patient is in the hospital.    Primary hypertension    Home losartan    Ketosis  Patient w/  small elevation in ketones on admission; much lower than previous admission w/ low concern for DKA.     -CTM clinically  -daily CMPs      CKD (chronic kidney disease) stage 3, GFR 30-59 ml/min  Cr 1.2 on admission (at baseline)     -CTM w/ daily RFP      VTE Risk Mitigation (From admission, onward)         Ordered     Place sequential compression device  Until discontinued         03/08/23 2005     Place sequential compression device  Until discontinued         03/08/23 2005                   Alexus Quinones MD  Department of Hospital Medicine   [facility]

## 2023-03-09 NOTE — ASSESSMENT & PLAN NOTE
Patient's FSGs are uncontrolled due to hyperglycemia on current medication regimen.  Last A1c reviewed-   Lab Results   Component Value Date    HGBA1C 10.2 (H) 03/01/2023     Most recent fingerstick glucose reviewed- No results for input(s): POCTGLUCOSE in the last 24 hours.  Current correctional scale  Medium  Maintain anti-hyperglycemic dose as follows-   Antihyperglycemics (From admission, onward)    Start     Stop Route Frequency Ordered    03/08/23 2100  insulin detemir U-100 pen 8 Units         -- SubQ 2 times daily 03/08/23 2005 03/08/23 1959  insulin aspart U-100 pen 1-10 Units         -- SubQ Every 4 hours PRN 03/08/23 2005        Hold Oral hypoglycemics while patient is in the hospital.

## 2023-03-09 NOTE — ASSESSMENT & PLAN NOTE
Patient re-admitted d/t intolerance of PO intake at home; patient reports attempting to increase liquid intake and increase activity which triggered further nausea/vomiting. Mucous membranes appear moist.     -s/p Droperidol and compazine IV   -scheduled phenegran Q6H, PRN zofran IV 8mg and phenegran  -advance diet as tolerated   - gentle IVF in setting of continued n/v   -POCT glucose checks; PRNs in case of hypoglycemia in setting of low PO intake

## 2023-03-09 NOTE — ASSESSMENT & PLAN NOTE
Patient w/ small elevation in ketones on admission; much lower than previous admission w/ low concern for DKA.     -CTM clinically  -daily CMPs

## 2023-03-09 NOTE — HOSPITAL COURSE
Admitted for intractable nausea and vomiting in setting of slightly high BHB. Will advance diet as clinically appropriate. Currently on full liquid diet. Course c/b hyperglycemia and worsening abdominal pain. Continues on NS infusion. Adjusted insulin as needed. KUB w non-obstructive bowel gas pattern. Will have follow up with GI clinic and outpatient EGD.

## 2023-03-09 NOTE — SUBJECTIVE & OBJECTIVE
"Past Medical History:   Diagnosis Date    Diabetes mellitus     Diabetes mellitus type 1     Diagnosed at age 20       History reviewed. No pertinent surgical history.    Review of patient's allergies indicates:  No Known Allergies    No current facility-administered medications on file prior to encounter.     Current Outpatient Medications on File Prior to Encounter   Medication Sig    acetaminophen (TYLENOL) 325 MG tablet Take 325 mg by mouth daily as needed for Pain.    acetone, urine, test (KETONE URINE TEST) Strp 30 strips by Misc.(Non-Drug; Combo Route) route as needed (hyperglycemia).    aluminum & magnesium hydroxide-simethicone (MYLANTA MAX STRENGTH) 400-400-40 mg/5 mL suspension Take 10 mLs by mouth every 6 (six) hours as needed for Indigestion.    blood sugar diagnostic Strp To check BG up to 5 times daily    blood-glucose meter Misc Use to test blood glucose 2 times a day with meals.    blood-glucose sensor (DEXCOM G6 SENSOR) Martha 3 each by Misc.(Non-Drug; Combo Route) route continuous.    blood-glucose transmitter (DEXCOM G6 TRANSMITTER) Martha 1 each by Misc.(Non-Drug; Combo Route) route continuous.    glucagon (GVOKE HYPOPEN 2-PACK) 1 mg/0.2 mL AtIn Inject 1 Package into the skin as needed (hypoglycemia).    insulin degludec (TRESIBA FLEXTOUCH U-100) 100 unit/mL (3 mL) insulin pen Inject 16 Units into the skin every evening.    insulin syringe-needle U-100 (BD INSULIN SYRINGE ULTRA-FINE) 0.5 mL 31 gauge x 5/16" Syrg 4 times daily    insulin syringe-needle U-100 (BD INSULIN SYRINGE ULTRA-FINE) 0.5 mL 31 gauge x 5/16" Syrg 4 times daily    insulin syringe-needle U-100 (BD INSULIN SYRINGE ULTRA-FINE) 0.5 mL 31 gauge x 5/16" Syrg 4 times daily    insulin syringe-needle U-100 0.5 mL 30 gauge x 5/16" Syrg To use 5 times daily    lancets Misc Use to test blood glucose 2 (two) times daily with meals.    lancing device Misc 1 Device by Misc.(Non-Drug; Combo Route) route 2 (two) times daily with meals.    " "lisinopriL 10 MG tablet Take 1 tablet (10 mg total) by mouth once daily.    LYUMJEABNER PAZPEN U-100 INSULIN 100 unit/mL pen Inject 0-10 Units into the skin 3 (three) times daily with meals. May also inject 0-10 Units as needed (Hyperglycemia). 5 units with meals + sliding scale insulin   Blood sugar 180 to 230 add 1 units   Blood sugar 231 to 280 add 2 units   Blood sugar 281 to 330 add 3 units   Blood sugar 331 to 380 add 4 units   Blood sugar greater than 380 add 5 units.    ondansetron (ZOFRAN) 4 MG tablet Take 1 tablet (4 mg total) by mouth every 6 (six) hours as needed for Nausea (3rd line).    pantoprazole (PROTONIX) 40 MG tablet Take 1 tablet (40 mg total) by mouth once daily.    pen needle, diabetic (BD ULTRA-FINE ROVERTO PEN NEEDLE) 32 gauge x 5/32" Ndle USE AS DIRECTED WITH INSULIN    prochlorperazine (COMPAZINE) 5 MG tablet Take 1 tablet (5 mg total) by mouth 4 (four) times daily as needed for Nausea (2nd line).    promethazine (PHENERGAN) 25 MG suppository Place 1 suppository (25 mg total) rectally every 6 (six) hours as needed for Nausea.    promethazine (PHENERGAN) 25 MG tablet Take 1 tablet (25 mg total) by mouth every 6 (six) hours as needed for Nausea (1st line).     Family History       Problem Relation (Age of Onset)    Diabetes Mother    Other Mother, Father          Tobacco Use    Smoking status: Former    Smokeless tobacco: Current   Substance and Sexual Activity    Alcohol use: Yes     Comment: socially    Drug use: No    Sexual activity: Not on file     Review of Systems   Constitutional:  Negative for chills and fever.   HENT:  Negative for congestion.    Eyes:  Negative for visual disturbance.   Respiratory:  Negative for cough, shortness of breath and wheezing.    Cardiovascular:  Positive for chest pain (burning; w/ vomiting episodes).   Gastrointestinal:  Positive for abdominal pain, nausea and vomiting. Negative for constipation and diarrhea.   Genitourinary:  Negative for difficulty " urinating.   Musculoskeletal:  Negative for arthralgias and back pain.   Skin:  Negative for color change and pallor.   Neurological:  Positive for dizziness, light-headedness and headaches. Negative for tremors, syncope and weakness.   Psychiatric/Behavioral:  Negative for agitation and behavioral problems.    Objective:     Vital Signs (Most Recent):  Temp: 98.1 °F (36.7 °C) (03/08/23 1154)  Pulse: 71 (03/08/23 1800)  Resp: 18 (03/08/23 1800)  BP: 124/72 (03/08/23 1931)  SpO2: 99 % (03/08/23 1800) Vital Signs (24h Range):  Temp:  [98.1 °F (36.7 °C)] 98.1 °F (36.7 °C)  Pulse:  [71-88] 71  Resp:  [14-18] 18  SpO2:  [98 %-100 %] 99 %  BP: (124-190)/(72-98) 124/72     Weight: 90.7 kg (200 lb)  Body mass index is 25.68 kg/m².    Physical Exam  Constitutional:       Appearance: Normal appearance. He is normal weight.      Comments: Uncomfortable; ill-appearing   HENT:      Head: Normocephalic and atraumatic.      Right Ear: External ear normal.      Left Ear: External ear normal.      Nose: Nose normal.      Mouth/Throat:      Mouth: Mucous membranes are moist.      Pharynx: Oropharynx is clear.   Eyes:      Conjunctiva/sclera: Conjunctivae normal.   Cardiovascular:      Rate and Rhythm: Normal rate and regular rhythm.      Pulses: Normal pulses.      Heart sounds: Normal heart sounds.   Pulmonary:      Effort: Pulmonary effort is normal.      Breath sounds: Normal breath sounds.   Abdominal:      General: Abdomen is flat. Bowel sounds are normal.      Palpations: Abdomen is soft.      Tenderness: There is abdominal tenderness (diffuse w/ palpation).   Musculoskeletal:      Cervical back: Neck supple.      Right lower leg: No edema.      Left lower leg: No edema.   Skin:     General: Skin is warm and dry.      Capillary Refill: Capillary refill takes less than 2 seconds.   Neurological:      General: No focal deficit present.      Mental Status: He is alert and oriented to person, place, and time. Mental status is at  baseline.   Psychiatric:         Mood and Affect: Mood normal.         Behavior: Behavior normal.           Significant Labs: All pertinent labs within the past 24 hours have been reviewed.    Significant Imaging: I have reviewed all pertinent imaging results/findings within the past 24 hours.

## 2023-03-09 NOTE — PROGRESS NOTES
[facility] ([address] Medicine  Progress Note    Patient Name: Andrzej Sinclair  MRN: [id number]  Patient Class: OP- Observation   Admission Date: 3/8/2023  Length of Stay: 0 days  Attending Physician: Kay Irwin MD  Primary Care Provider: Viktoriya Jaeger NP        Subjective:     Principal Problem:Intractable vomiting with nausea        HPI:  36 y/o M with medical hx of poorly controlled DM1 and hyperemesis cannabinoid syndrome who re-presents to [facility] for intractable nausea and vomiting. Patient w/ previous admission, discharged 1 day ago for intractable n/v for the past 2 weeks. Pt stated he had vomited 10x the past 24 hours, cannot keep anything down, w/ abdominal pain usually prior to vomiting episodes. He also reports headaches, chest pain, dizziness, and SOB. He denies hemoptysis, fevers, chills, and diarrhea. Pt has been to the hospital 4 times over the past 2 weeks for similar issues. He is a poorly controlled diabetic, stating his sugars usually run around 300 when he checks them. Last A1c was 10.2 on 3/1/23. Pt has tried using PO phenergan and dicyclomine with minimal relief of symptoms. Pt was recently been on insulin pump, but claims it has not been working well for him. He is currently not wearing pump in ED. He has not been able to see GI outpatient for his persistent issues. Pt denies recent use of marijuana and states he has not ingested/smoked marijuana for at least 3-4 weeks.     On both admissions, patient AF HDS, mildly tachycardic particularly following vomiting episodes. Satting well on RA. Labs without significant electrolyte derangement. Glucose 215. BHB 1.5. Labs and presentation unconcerning for DKA. Patient to be re-admitted for intractable n/v w/ intolerance of PO intake.         Overview/Hospital Course:  Admitted for intractable nausea and vomiting in setting of slightly high BHB. Will advance diet as clinically appropriate. Currently on clear liquid        Interval History: NAOE. Patient states abdominal pain has improved since prior admission. However, he continues to have nausea intermittently.   He remains afebrile and HDS     Review of Systems   Constitutional:  Negative for chills and fever.   HENT:  Negative for congestion.    Eyes:  Negative for visual disturbance.   Respiratory:  Negative for cough, shortness of breath and wheezing.    Cardiovascular:  Positive for chest pain (burning; w/ vomiting episodes).   Gastrointestinal:  Positive for nausea and vomiting. Negative for abdominal pain, constipation and diarrhea.   Genitourinary:  Negative for difficulty urinating.   Musculoskeletal:  Negative for arthralgias and back pain.   Skin:  Negative for color change and pallor.   Neurological:  Positive for dizziness, light-headedness and headaches. Negative for tremors, syncope and weakness.   Psychiatric/Behavioral:  Negative for agitation and behavioral problems.    Objective:     Vital Signs (Most Recent):  Temp: 97.5 °F (36.4 °C) (03/09/23 0741)  Pulse: 60 (03/09/23 0741)  Resp: 18 (03/09/23 0741)  BP: (!) 158/88 (03/09/23 0741)  SpO2: 97 % (03/09/23 0741)   Vital Signs (24h Range):  Temp:  [97.5 °F (36.4 °C)-98.1 °F (36.7 °C)] 97.5 °F (36.4 °C)  Pulse:  [] 60  Resp:  [14-20] 18  SpO2:  [97 %-100 %] 97 %  BP: (124-190)/(72-98) 158/88     Weight: 90.7 kg (200 lb)  Body mass index is 25.68 kg/m².    Intake/Output Summary (Last 24 hours) at 3/9/2023 0958  Last data filed at 3/8/2023 1702  Gross per 24 hour   Intake 999 ml   Output 200 ml   Net 799 ml      Physical Exam  Constitutional:       Appearance: Normal appearance. He is normal weight.      Comments: Uncomfortable; ill-appearing   HENT:      Head: Normocephalic and atraumatic.      Right Ear: External ear normal.      Left Ear: External ear normal.      Nose: Nose normal.      Mouth/Throat:      Mouth: Mucous membranes are moist.      Pharynx: Oropharynx is clear.   Eyes:       Conjunctiva/sclera: Conjunctivae normal.   Cardiovascular:      Rate and Rhythm: Normal rate and regular rhythm.      Pulses: Normal pulses.      Heart sounds: Normal heart sounds.   Pulmonary:      Effort: Pulmonary effort is normal.      Breath sounds: Normal breath sounds.   Abdominal:      General: Abdomen is flat. Bowel sounds are normal.      Palpations: Abdomen is soft.      Tenderness: There is no abdominal tenderness (diffuse w/ palpation).   Musculoskeletal:      Cervical back: Neck supple.      Right lower leg: No edema.      Left lower leg: No edema.   Skin:     General: Skin is warm and dry.      Capillary Refill: Capillary refill takes less than 2 seconds.   Neurological:      General: No focal deficit present.      Mental Status: He is alert and oriented to person, place, and time. Mental status is at baseline.   Psychiatric:         Mood and Affect: Mood normal.         Behavior: Behavior normal.       Significant Labs: All pertinent labs within the past 24 hours have been reviewed.    Significant Imaging: I have reviewed all pertinent imaging results/findings within the past 24 hours.      Assessment/Plan:      * Intractable vomiting with nausea  Patient re-admitted d/t intolerance of PO intake at home; patient reports attempting to increase liquid intake and increase activity which triggered further nausea/vomiting. Mucous membranes appear moist.     -s/p Droperidol and compazine IV   -scheduled phenegran Q6H, PRN zofran IV 8mg and phenegran  -advance diet as tolerated   - gentle IVF in setting of continued n/v   -POCT glucose checks; PRNs in case of hypoglycemia in setting of low PO intake       GERD (gastroesophageal reflux disease)  Home protonix QD      Cannabinoid hyperemesis syndrome  Patient likely experiencing intractable n/v 2/2 cannabis use; UDS on last admission positive for marijuana. Patient reports last use 3-4 weeks ago due to onset of these symptoms. He reports he had not used on  discharge 1 day ago as well.    -see intractable n/v    Type 1 diabetes mellitus with complications  Patient's FSGs are uncontrolled due to hyperglycemia on current medication regimen.  Last A1c reviewed-   Lab Results   Component Value Date    HGBA1C 10.2 (H) 03/01/2023     Most recent fingerstick glucose reviewed-   Recent Labs   Lab 03/08/23  2135 03/09/23  0408 03/09/23  0740   POCTGLUCOSE 323* 293* 139*     Current correctional scale  Medium  Maintain anti-hyperglycemic dose as follows-   Antihyperglycemics (From admission, onward)    Start     Stop Route Frequency Ordered    03/09/23 0900  insulin detemir U-100 pen 9 Units         -- SubQ 2 times daily 03/09/23 0719    03/08/23 1959  insulin aspart U-100 pen 1-10 Units         -- SubQ Every 4 hours PRN 03/08/23 2005        Hold Oral hypoglycemics while patient is in the hospital.    Primary hypertension    Home losartan    Ketosis  Patient w/ small elevation in ketones on admission; much lower than previous admission w/ low concern for DKA.     -CTM clinically  -daily CMPs      CKD (chronic kidney disease) stage 3, GFR 30-59 ml/min  Cr 1.2 on admission (at baseline)     -CTM w/ daily RFP        VTE Risk Mitigation (From admission, onward)         Ordered     Place sequential compression device  Until discontinued         03/08/23 2005     Place sequential compression device  Until discontinued         03/08/23 2005                Discharge Planning   PRECIOUS: 3/10/2023     Code Status: Full Code   Is the patient medically ready for discharge?: No    Reason for patient still in hospital (select all that apply): Patient trending condition  Discharge Plan A: Home with family                  Missy Hall MD  Department of Hospital Medicine   [facility] - Intensive Care ([place]-16)

## 2023-03-09 NOTE — SUBJECTIVE & OBJECTIVE
Interval History: NAOE. Patient states abdominal pain has improved since prior admission. However, he continues to have nausea intermittently.   He remains afebrile and HDS     Review of Systems   Constitutional:  Negative for chills and fever.   HENT:  Negative for congestion.    Eyes:  Negative for visual disturbance.   Respiratory:  Negative for cough, shortness of breath and wheezing.    Cardiovascular:  Positive for chest pain (burning; w/ vomiting episodes).   Gastrointestinal:  Positive for nausea and vomiting. Negative for abdominal pain, constipation and diarrhea.   Genitourinary:  Negative for difficulty urinating.   Musculoskeletal:  Negative for arthralgias and back pain.   Skin:  Negative for color change and pallor.   Neurological:  Positive for dizziness, light-headedness and headaches. Negative for tremors, syncope and weakness.   Psychiatric/Behavioral:  Negative for agitation and behavioral problems.    Objective:     Vital Signs (Most Recent):  Temp: 97.5 °F (36.4 °C) (03/09/23 0741)  Pulse: 60 (03/09/23 0741)  Resp: 18 (03/09/23 0741)  BP: (!) 158/88 (03/09/23 0741)  SpO2: 97 % (03/09/23 0741)   Vital Signs (24h Range):  Temp:  [97.5 °F (36.4 °C)-98.1 °F (36.7 °C)] 97.5 °F (36.4 °C)  Pulse:  [] 60  Resp:  [14-20] 18  SpO2:  [97 %-100 %] 97 %  BP: (124-190)/(72-98) 158/88     Weight: 90.7 kg (200 lb)  Body mass index is 25.68 kg/m².    Intake/Output Summary (Last 24 hours) at 3/9/2023 0958  Last data filed at 3/8/2023 1702  Gross per 24 hour   Intake 999 ml   Output 200 ml   Net 799 ml      Physical Exam  Constitutional:       Appearance: Normal appearance. He is normal weight.      Comments: Uncomfortable; ill-appearing   HENT:      Head: Normocephalic and atraumatic.      Right Ear: External ear normal.      Left Ear: External ear normal.      Nose: Nose normal.      Mouth/Throat:      Mouth: Mucous membranes are moist.      Pharynx: Oropharynx is clear.   Eyes:      Conjunctiva/sclera:  Conjunctivae normal.   Cardiovascular:      Rate and Rhythm: Normal rate and regular rhythm.      Pulses: Normal pulses.      Heart sounds: Normal heart sounds.   Pulmonary:      Effort: Pulmonary effort is normal.      Breath sounds: Normal breath sounds.   Abdominal:      General: Abdomen is flat. Bowel sounds are normal.      Palpations: Abdomen is soft.      Tenderness: There is no abdominal tenderness (diffuse w/ palpation).   Musculoskeletal:      Cervical back: Neck supple.      Right lower leg: No edema.      Left lower leg: No edema.   Skin:     General: Skin is warm and dry.      Capillary Refill: Capillary refill takes less than 2 seconds.   Neurological:      General: No focal deficit present.      Mental Status: He is alert and oriented to person, place, and time. Mental status is at baseline.   Psychiatric:         Mood and Affect: Mood normal.         Behavior: Behavior normal.       Significant Labs: All pertinent labs within the past 24 hours have been reviewed.    Significant Imaging: I have reviewed all pertinent imaging results/findings within the past 24 hours.

## 2023-03-09 NOTE — HPI
38 y/o M with medical hx of poorly controlled DM1 and hyperemesis cannabinoid syndrome who re-presents to [facility] for intractable nausea and vomiting. Patient w/ previous admission, discharged 1 day ago for intractable n/v for the past 2 weeks. Pt stated he had vomited 10x the past 24 hours, cannot keep anything down, w/ abdominal pain usually prior to vomiting episodes. He also reports headaches, chest pain, dizziness, and SOB. He denies hemoptysis, fevers, chills, and diarrhea. Pt has been to the hospital 4 times over the past 2 weeks for similar issues. He is a poorly controlled diabetic, stating his sugars usually run around 300 when he checks them. Last A1c was 10.2 on 3/1/23. Pt has tried using PO phenergan and dicyclomine with minimal relief of symptoms. Pt was recently been on insulin pump, but claims it has not been working well for him. He is currently not wearing pump in ED. He has not been able to see GI outpatient for his persistent issues. Pt denies recent use of marijuana and states he has not ingested/smoked marijuana for at least 3-4 weeks.     On both admissions, patient AF HDS, mildly tachycardic particularly following vomiting episodes. Satting well on RA. Labs without significant electrolyte derangement. Glucose 215. BHB 1.5. Labs and presentation unconcerning for DKA. Patient to be re-admitted for intractable n/v w/ intolerance of PO intake.

## 2023-03-10 LAB
ALBUMIN SERPL BCP-MCNC: 3.1 G/DL (ref 3.5–5.2)
ALP SERPL-CCNC: 52 U/L (ref 55–135)
ALT SERPL W/O P-5'-P-CCNC: 17 U/L (ref 10–44)
ANION GAP SERPL CALC-SCNC: 8 MMOL/L (ref 8–16)
AST SERPL-CCNC: 15 U/L (ref 10–40)
BASOPHILS # BLD AUTO: 0.01 K/UL (ref 0–0.2)
BASOPHILS NFR BLD: 0.3 % (ref 0–1.9)
BILIRUB SERPL-MCNC: 0.7 MG/DL (ref 0.1–1)
BUN SERPL-MCNC: 12 MG/DL (ref 6–20)
CALCIUM SERPL-MCNC: 8.3 MG/DL (ref 8.7–10.5)
CHLORIDE SERPL-SCNC: 108 MMOL/L (ref 95–110)
CO2 SERPL-SCNC: 23 MMOL/L (ref 23–29)
CREAT SERPL-MCNC: 1.1 MG/DL (ref 0.5–1.4)
DIFFERENTIAL METHOD: ABNORMAL
EOSINOPHIL # BLD AUTO: 0.1 K/UL (ref 0–0.5)
EOSINOPHIL NFR BLD: 1.9 % (ref 0–8)
ERYTHROCYTE [DISTWIDTH] IN BLOOD BY AUTOMATED COUNT: 11.8 % (ref 11.5–14.5)
EST. GFR  (NO RACE VARIABLE): >60 ML/MIN/1.73 M^2
GLUCOSE SERPL-MCNC: 264 MG/DL (ref 70–110)
HCT VFR BLD AUTO: 30.7 % (ref 40–54)
HGB BLD-MCNC: 10.9 G/DL (ref 14–18)
IMM GRANULOCYTES # BLD AUTO: 0.02 K/UL (ref 0–0.04)
IMM GRANULOCYTES NFR BLD AUTO: 0.6 % (ref 0–0.5)
LYMPHOCYTES # BLD AUTO: 1.3 K/UL (ref 1–4.8)
LYMPHOCYTES NFR BLD: 43.2 % (ref 18–48)
MAGNESIUM SERPL-MCNC: 1.6 MG/DL (ref 1.6–2.6)
MCH RBC QN AUTO: 31 PG (ref 27–31)
MCHC RBC AUTO-ENTMCNC: 35.5 G/DL (ref 32–36)
MCV RBC AUTO: 87 FL (ref 82–98)
MONOCYTES # BLD AUTO: 0.4 K/UL (ref 0.3–1)
MONOCYTES NFR BLD: 14.2 % (ref 4–15)
NEUTROPHILS # BLD AUTO: 1.2 K/UL (ref 1.8–7.7)
NEUTROPHILS NFR BLD: 39.8 % (ref 38–73)
NRBC BLD-RTO: 0 /100 WBC
PHOSPHATE SERPL-MCNC: 2.3 MG/DL (ref 2.7–4.5)
PLATELET # BLD AUTO: 125 K/UL (ref 150–450)
PMV BLD AUTO: 11.4 FL (ref 9.2–12.9)
POCT GLUCOSE: 204 MG/DL (ref 70–110)
POCT GLUCOSE: 221 MG/DL (ref 70–110)
POCT GLUCOSE: 254 MG/DL (ref 70–110)
POCT GLUCOSE: 264 MG/DL (ref 70–110)
POCT GLUCOSE: 340 MG/DL (ref 70–110)
POTASSIUM SERPL-SCNC: 3.9 MMOL/L (ref 3.5–5.1)
PROT SERPL-MCNC: 5.4 G/DL (ref 6–8.4)
RBC # BLD AUTO: 3.52 M/UL (ref 4.6–6.2)
SODIUM SERPL-SCNC: 139 MMOL/L (ref 136–145)
WBC # BLD AUTO: 3.1 K/UL (ref 3.9–12.7)

## 2023-03-10 PROCEDURE — 63600175 PHARM REV CODE 636 W HCPCS: Performed by: STUDENT IN AN ORGANIZED HEALTH CARE EDUCATION/TRAINING PROGRAM

## 2023-03-10 PROCEDURE — 12000002 HC ACUTE/MED SURGE SEMI-PRIVATE ROOM

## 2023-03-10 PROCEDURE — 25000003 PHARM REV CODE 250: Performed by: STUDENT IN AN ORGANIZED HEALTH CARE EDUCATION/TRAINING PROGRAM

## 2023-03-10 PROCEDURE — 99233 SBSQ HOSP IP/OBS HIGH 50: CPT | Mod: ,,, | Performed by: INTERNAL MEDICINE

## 2023-03-10 PROCEDURE — 84100 ASSAY OF PHOSPHORUS: CPT

## 2023-03-10 PROCEDURE — 25000003 PHARM REV CODE 250

## 2023-03-10 PROCEDURE — 99233 PR SUBSEQUENT HOSPITAL CARE,LEVL III: ICD-10-PCS | Mod: ,,, | Performed by: INTERNAL MEDICINE

## 2023-03-10 PROCEDURE — 63600175 PHARM REV CODE 636 W HCPCS

## 2023-03-10 PROCEDURE — 83735 ASSAY OF MAGNESIUM: CPT

## 2023-03-10 PROCEDURE — 80053 COMPREHEN METABOLIC PANEL: CPT

## 2023-03-10 PROCEDURE — 85025 COMPLETE CBC W/AUTO DIFF WBC: CPT

## 2023-03-10 PROCEDURE — 36415 COLL VENOUS BLD VENIPUNCTURE: CPT

## 2023-03-10 RX ORDER — INSULIN ASPART 100 [IU]/ML
0-5 INJECTION, SOLUTION INTRAVENOUS; SUBCUTANEOUS
Status: DISCONTINUED | OUTPATIENT
Start: 2023-03-10 | End: 2023-03-10

## 2023-03-10 RX ORDER — PROMETHAZINE HYDROCHLORIDE 25 MG/1
25 SUPPOSITORY RECTAL EVERY 6 HOURS PRN
Status: DISCONTINUED | OUTPATIENT
Start: 2023-03-10 | End: 2023-03-10

## 2023-03-10 RX ORDER — IBUPROFEN 200 MG
24 TABLET ORAL
Status: DISCONTINUED | OUTPATIENT
Start: 2023-03-10 | End: 2023-03-10

## 2023-03-10 RX ORDER — MAGNESIUM SULFATE HEPTAHYDRATE 40 MG/ML
2 INJECTION, SOLUTION INTRAVENOUS ONCE
Status: COMPLETED | OUTPATIENT
Start: 2023-03-10 | End: 2023-03-10

## 2023-03-10 RX ORDER — AMOXICILLIN 250 MG
1 CAPSULE ORAL DAILY
Status: DISCONTINUED | OUTPATIENT
Start: 2023-03-10 | End: 2023-03-12 | Stop reason: HOSPADM

## 2023-03-10 RX ORDER — PROMETHAZINE HYDROCHLORIDE 25 MG/1
25 SUPPOSITORY RECTAL EVERY 6 HOURS PRN
Status: DISCONTINUED | OUTPATIENT
Start: 2023-03-10 | End: 2023-03-12 | Stop reason: HOSPADM

## 2023-03-10 RX ORDER — DEXTROSE 40 %
15 GEL (GRAM) ORAL
Status: DISCONTINUED | OUTPATIENT
Start: 2023-03-10 | End: 2023-03-12 | Stop reason: HOSPADM

## 2023-03-10 RX ORDER — DEXTROSE 40 %
30 GEL (GRAM) ORAL
Status: DISCONTINUED | OUTPATIENT
Start: 2023-03-10 | End: 2023-03-12 | Stop reason: HOSPADM

## 2023-03-10 RX ORDER — PROCHLORPERAZINE EDISYLATE 5 MG/ML
2.5 INJECTION INTRAMUSCULAR; INTRAVENOUS EVERY 8 HOURS PRN
Status: DISCONTINUED | OUTPATIENT
Start: 2023-03-10 | End: 2023-03-12 | Stop reason: HOSPADM

## 2023-03-10 RX ORDER — INSULIN ASPART 100 [IU]/ML
1-10 INJECTION, SOLUTION INTRAVENOUS; SUBCUTANEOUS
Status: DISCONTINUED | OUTPATIENT
Start: 2023-03-10 | End: 2023-03-12 | Stop reason: HOSPADM

## 2023-03-10 RX ORDER — INSULIN ASPART 100 [IU]/ML
2 INJECTION, SOLUTION INTRAVENOUS; SUBCUTANEOUS
Status: DISCONTINUED | OUTPATIENT
Start: 2023-03-10 | End: 2023-03-12 | Stop reason: HOSPADM

## 2023-03-10 RX ORDER — POLYETHYLENE GLYCOL 3350 17 G/17G
17 POWDER, FOR SOLUTION ORAL 2 TIMES DAILY PRN
Status: DISCONTINUED | OUTPATIENT
Start: 2023-03-10 | End: 2023-03-12 | Stop reason: HOSPADM

## 2023-03-10 RX ORDER — SUCRALFATE 1 G/10ML
1 SUSPENSION ORAL ONCE
Status: COMPLETED | OUTPATIENT
Start: 2023-03-10 | End: 2023-03-10

## 2023-03-10 RX ORDER — GLUCAGON 1 MG
1 KIT INJECTION
Status: DISCONTINUED | OUTPATIENT
Start: 2023-03-10 | End: 2023-03-12 | Stop reason: HOSPADM

## 2023-03-10 RX ORDER — IBUPROFEN 200 MG
16 TABLET ORAL
Status: DISCONTINUED | OUTPATIENT
Start: 2023-03-10 | End: 2023-03-10

## 2023-03-10 RX ORDER — SODIUM,POTASSIUM PHOSPHATES 280-250MG
1 POWDER IN PACKET (EA) ORAL EVERY 4 HOURS
Status: DISCONTINUED | OUTPATIENT
Start: 2023-03-10 | End: 2023-03-10

## 2023-03-10 RX ADMIN — ONDANSETRON 8 MG: 2 INJECTION INTRAMUSCULAR; INTRAVENOUS at 03:03

## 2023-03-10 RX ADMIN — SODIUM CHLORIDE: 9 INJECTION, SOLUTION INTRAVENOUS at 07:03

## 2023-03-10 RX ADMIN — MAGNESIUM SULFATE 2 G: 2 INJECTION INTRAVENOUS at 10:03

## 2023-03-10 RX ADMIN — LIDOCAINE HYDROCHLORIDE 15 ML: 20 SOLUTION ORAL; TOPICAL at 05:03

## 2023-03-10 RX ADMIN — ONDANSETRON 8 MG: 2 INJECTION INTRAMUSCULAR; INTRAVENOUS at 05:03

## 2023-03-10 RX ADMIN — PROMETHAZINE HYDROCHLORIDE 12.5 MG: 12.5 TABLET ORAL at 11:03

## 2023-03-10 RX ADMIN — INSULIN ASPART 2 UNITS: 100 INJECTION, SOLUTION INTRAVENOUS; SUBCUTANEOUS at 08:03

## 2023-03-10 RX ADMIN — INSULIN ASPART 6 UNITS: 100 INJECTION, SOLUTION INTRAVENOUS; SUBCUTANEOUS at 05:03

## 2023-03-10 RX ADMIN — PROCHLORPERAZINE EDISYLATE 2.5 MG: 5 INJECTION INTRAMUSCULAR; INTRAVENOUS at 05:03

## 2023-03-10 RX ADMIN — PROMETHAZINE HYDROCHLORIDE 25 MG: 25 SUPPOSITORY RECTAL at 02:03

## 2023-03-10 RX ADMIN — ALUMINUM HYDROXIDE, MAGNESIUM HYDROXIDE, AND SIMETHICONE 30 ML: 200; 200; 20 SUSPENSION ORAL at 05:03

## 2023-03-10 RX ADMIN — PROMETHAZINE HYDROCHLORIDE 12.5 MG: 12.5 TABLET ORAL at 06:03

## 2023-03-10 RX ADMIN — MAGNESIUM SULFATE 2 G: 2 INJECTION INTRAVENOUS at 06:03

## 2023-03-10 RX ADMIN — PROMETHAZINE HYDROCHLORIDE 12.5 MG: 12.5 TABLET ORAL at 05:03

## 2023-03-10 RX ADMIN — SUCRALFATE 1 G: 1 SUSPENSION ORAL at 08:03

## 2023-03-10 RX ADMIN — SODIUM PHOSPHATE, MONOBASIC, MONOHYDRATE AND SODIUM PHOSPHATE, DIBASIC, ANHYDROUS 15 MMOL: 276; 142 INJECTION, SOLUTION INTRAVENOUS at 08:03

## 2023-03-10 NOTE — DISCHARGE SUMMARY
[facility] - Emergency Dept  Hospital Medicine  Discharge Summary      Patient Name: Andrzej Sinclair  MRN: [id number]  REKHA: [id number]  Patient Class: OP- Observation  Admission Date: 3/1/2023  Hospital Length of Stay: 0 days  Discharge Date and Time: 3/2/2023  7:11 PM  Attending Physician: Aubrie ferrari. providers found   Discharging Provider: Redd Yuan PA-C  Primary Care Provider: Viktoriya Jaeger NP  [place] [facility] Redd Yuan PA-C  [place] [facility] Team[place] [facility]    HPI:   Andrzej Sinclair is a 37 y.o. M with type 1 diabetes with insulin pump for the past month, hx of DKA, and cyclical vomiting syndrome 2/2 marijuana use presented to the ED for evaluation of intractable nausea, vomiting, and burning epigastric abdominal pain, which have progressively worsened over the past week. Pt is frequently evaluated in the ED for these symptoms and reports his symptoms restarted ~2 days following his last ED visit 2/21/23. He reports significant nausea, with multiple episodes of non-bloody emesis, and is unable to tolerate PO. He also reports 9/10 burning, epigastric abdominal pain and diffuse abdominal tenderness, as well as 1 episode of non-bloody diarrhea today. He reports compliance with home PPI and states his last marijuana use was 2 weeks ago. He reports checking his blood glucose regularly and states BG have been well controlled with the exception of 1 reading of 300 yesterday. He denies recent alcohol or ilicit drug use.      During admission in 2021, GI recommended cessation of marijuana, schedueld anti emetics, daily PPI use, and EGD. He has not had an EGD. Gastric Emptying test in 3/2021 was normal.    In the ED: Hypertensive otherwise VSSAF. CBC wnl. CMP unremarkable. UDS + THC. UA with 1+ protein, 4+ glucose, 2+ ketones, 14 RBCs. EKG NSR. POCT Glucose 356 -> 454. Pt was given 5U insulin. Repeat Glu 418. CTAP from 3/1/23 with signs of gastritis. Pt was given droperidol, protonix, zofran,  morphine, and NS bolus and was subsequently placed in observation for further management.       * No surgery found *      Hospital Course:   Andrzej Sinclair was admitted to Hospital Medicine for cannabinoid hyperemesis syndrome and intractable nausea and vomiting. Patient had reported improvement in nausea when evaluated in ED. Tolerated CLD and able to advance to diabetic diet. Endocrine consulted, dosing insulin. Nurse reported patient is endorsing anxiety. Added Hydroxyzine prn. Given IVF as patient likely volume depleted due to polyuria and vomiting. Patient improving, tolerating diet. Vital signs stable. Medically ready for discharge. PCP follow up appointment scheduled.       Goals of Care Treatment Preferences:  Code Status: Full Code      Consults:   Consults (From admission, onward)        Status Ordering Provider     Inpatient consult to Endocrinology  Once        Provider:  (Not yet assigned)    Completed IAN GARCIA          Cardiac/Vascular  Primary hypertension  - Latest BP and vitals reviewed  - Continue home meds for HTN:   Hypertension Medications             lisinopriL 10 MG tablet Take 1 tablet (10 mg total) by mouth once daily.   - Goal SBP 120s-140s.  - Utilize prn antihypertensives only if patient's BP >180/110 & develop symptoms of worsening CP or SOB.    Endocrine  Type 1 diabetes mellitus with complications  History of DKA  DM with polyneuropathy   - Pt reports symptoms feel similar to previous DKA admission but no evidence of acidosis or anion gap on labs at time of admission  Patient's FSGs are uncontrolled due to hyperglycemia on current medication regimen.  Last A1c reviewed-   Lab Results   Component Value Date    HGBA1C 10.2 (H) 03/01/2023   Most recent fingerstick glucose reviewed  Current correctional scale  Medium  Maintain anti-hyperglycemic dose  - Given 5U aspart in the ED and 5U aspart now since Glu 418  - Check glu q4 for now with transition to ACHS once controlled   -  Endocrinology consulted  - Hold oral hypoglycemics while patient is in the hospital.  - Diabetic diet once ready to advance     GI  * Cannabinoid hyperemesis syndrome  Intractable vomiting with nausea  GERD  - Pt with frequent presentations for intractable N/V and abdominal pain 2/2 marijuana use admitted for intractable N/V  - 1/4 SIRS on admission for HR >90   - Labs grossly unremarkable, THC +   - CT A/P consistent with gastritis   - H. pylori ordered  - Pt was given droperidol, protonix, zofran, morphine without improvement   - IV protonix x1 given pt not tolerating PO, continue BID   - Continue prn antiemetics & IVFs   - Keep NPO for now and ADAT to diabetic CLD   - Daily EKG given increased risk for QT prolongation   - Discussed the importance of marijuana cessation to prevent recurrence of symptoms      Final Active Diagnoses:    Diagnosis Date Noted POA    PRINCIPAL PROBLEM:  Cannabinoid hyperemesis syndrome [R11.2, F12.90] 04/22/2021 Yes    GERD (gastroesophageal reflux disease) [K21.9] 03/02/2023 Yes    Type 1 diabetes mellitus with complications [E10.8] 03/10/2021 Yes    Primary hypertension [I10] 03/04/2021 Yes    Intractable vomiting with nausea [R11.2] 03/03/2021 Yes    CKD (chronic kidney disease) stage 3, GFR 30-59 ml/min [N18.30] 03/01/2021 Yes    Polyneuropathy due to type 1 diabetes mellitus [E10.42] 07/31/2018 Yes      Problems Resolved During this Admission:       Discharged Condition: fair    Disposition: Home or Self Care    Follow Up:   Follow-up Information     Viktoriya Jaeger NP In 1 week.    Specialty: Endocrinology  Why: follow up with your primary care doctor  Contact information:  [id number]  [facility] [id number] [phone]             Viktoriya Jaegre NP In 1 week.    Specialty: Endocrinology  Why: follow up with your endocrinologist  Contact information:  [id number]  [facility] [id number] [phone]                       Patient Instructions:     "  Ambulatory referral/consult to Gastroenterology   Standing Status: Future   Referral Priority: Routine Referral Type: Consultation   Referral Reason: Specialty Services Required   Requested Specialty: Gastroenterology   Number of Visits Requested: 1     Diet diabetic     Notify your health care provider if you experience any of the following:  persistent dizziness, light-headedness, or visual disturbances     Notify your health care provider if you experience any of the following:  increased confusion or weakness     Activity as tolerated       Pending Diagnostic Studies:     Procedure Component Value Units Date/Time    HIV 1/2 Ag/Ab (4th Gen) [[id number]] Collected: 03/01/23 1831    Order Status: Sent Lab Status: In process Updated: 03/01/23 1833    Specimen: Blood          Medications:  Reconciled Home Medications:      Medication List      CONTINUE taking these medications    acetaminophen 325 MG tablet  Commonly known as: TYLENOL  Take 325 mg by mouth daily as needed for Pain.     blood sugar diagnostic Strp  To check BG up to 5 times daily     blood-glucose meter Misc  Use to test blood glucose 2 times a day with meals.     DEXCOM G6 SENSOR Martha  Generic drug: blood-glucose sensor  3 each by Misc.(Non-Drug; Combo Route) route continuous.     DEXCOM G6 TRANSMITTER Martha  Generic drug: blood-glucose transmitter  1 each by Misc.(Non-Drug; Combo Route) route continuous.     GVOKE HYPOPEN 2-PACK 1 mg/0.2 mL Atin  Generic drug: glucagon  Inject 1 Package into the skin as needed (hypoglycemia).     * insulin syringe-needle U-100 0.5 mL 30 gauge x 5/16" Syrg  To use 5 times daily     * insulin syringe-needle U-100 0.5 mL 31 gauge x 5/16" Syrg  Commonly known as: BD INSULIN SYRINGE ULTRA-FINE  4 times daily     * insulin syringe-needle U-100 0.5 mL 31 gauge x 5/16" Syrg  Commonly known as: BD INSULIN SYRINGE ULTRA-FINE  4 times daily     * insulin syringe-needle U-100 0.5 mL 31 gauge x 5/16" Syrg  Commonly known as: BD " INSULIN SYRINGE ULTRA-FINE  4 times daily     KETONE URINE TEST Strp  Generic drug: acetone (urine) test  30 strips by Misc.(Non-Drug; Combo Route) route as needed (hyperglycemia).     lancets [facility]  Use to test blood glucose 2 (two) times daily with meals.     lancing device Misc  1 Device by Misc.(Non-Drug; Combo Route) route 2 (two) times daily with meals.     lisinopriL 10 MG tablet  Take 1 tablet (10 mg total) by mouth once daily.         * This list has 4 medication(s) that are the same as other medications prescribed for you. Read the directions carefully, and ask your doctor or other care provider to review them with you.            STOP taking these medications    insulin degludec 100 unit/mL (3 mL) insulin pen  Commonly known as: TRESIBA FLEXTOUCH U-100     LYUMJEV KWIKPEN U-100 INSULIN 100 unit/mL pen  Generic drug: insulin lispro-aabc     pantoprazole 40 MG tablet  Commonly known as: PROTONIX            Indwelling Lines/Drains at time of discharge:   Lines/Drains/Airways     None                 Time spent on the discharge of patient: 36 minutes         Redd Yuan PA-C  Department of Hospital Medicine  [facility] Dept

## 2023-03-10 NOTE — HOSPITAL COURSE
Andrzej Sinclair was admitted to Hospital Medicine for cannabinoid hyperemesis syndrome and intractable nausea and vomiting. Patient had reported improvement in nausea when evaluated in ED. Tolerated CLD and able to advance to diabetic diet. Endocrine consulted, dosing insulin. Nurse reported patient is endorsing anxiety. Added Hydroxyzine prn. Given IVF as patient likely volume depleted due to polyuria and vomiting. Patient improving, tolerating diet. Vital signs stable. Medically ready for discharge. PCP follow up appointment scheduled.

## 2023-03-10 NOTE — ASSESSMENT & PLAN NOTE
Intractable vomiting with nausea  GERD  - Pt with frequent presentations for intractable N/V and abdominal pain 2/2 marijuana use admitted for intractable N/V  - 1/4 SIRS on admission for HR >90   - Labs grossly unremarkable, THC +   - CT A/P consistent with gastritis   - H. pylori ordered  - Pt was given droperidol, protonix, zofran, morphine without improvement   - IV protonix x1 given pt not tolerating PO, continue BID   - Continue prn antiemetics & IVFs   - Keep NPO for now and ADAT to diabetic CLD   - Daily EKG given increased risk for QT prolongation   - Discussed the importance of marijuana cessation to prevent recurrence of symptoms

## 2023-03-10 NOTE — ASSESSMENT & PLAN NOTE
- Latest BP and vitals reviewed  - Continue home meds for HTN:   Hypertension Medications             lisinopriL 10 MG tablet Take 1 tablet (10 mg total) by mouth once daily.   - Goal SBP 120s-140s.  - Utilize prn antihypertensives only if patient's BP >180/110 & develop symptoms of worsening CP or SOB.

## 2023-03-10 NOTE — ASSESSMENT & PLAN NOTE
Patient re-admitted d/t intolerance of PO intake at home; patient reports attempting to increase liquid intake and increase activity which triggered further nausea/vomiting. Mucous membranes appear moist.     -s/p Droperidol and compazine IV   -scheduled PO phenegran Q6H, PRN zofran IV 8mg and phenegran  -advance diet as tolerated   - gentle IVF in setting of continued n/v   -POCT glucose checks; PRNs in case of hypoglycemia in setting of low PO intake

## 2023-03-10 NOTE — SUBJECTIVE & OBJECTIVE
Interval History: No acute events overnight. This AM, patient states nausea causing severe burning epigastric pain, not improved with GI cocktail.     Review of Systems   Constitutional:  Negative for chills and fever.   HENT:  Negative for congestion.    Eyes:  Negative for visual disturbance.   Respiratory:  Negative for cough, shortness of breath and wheezing.    Cardiovascular:  Positive for chest pain (burning; w/ vomiting episodes).   Gastrointestinal:  Positive for nausea and vomiting. Negative for abdominal pain, constipation and diarrhea.   Genitourinary:  Negative for difficulty urinating.   Musculoskeletal:  Negative for arthralgias and back pain.   Skin:  Negative for color change and pallor.   Neurological:  Positive for dizziness, light-headedness and headaches. Negative for tremors, syncope and weakness.   Psychiatric/Behavioral:  Negative for agitation and behavioral problems.    Objective:     Vital Signs (Most Recent):  Temp: 98 °F (36.7 °C) (03/10/23 0733)  Pulse: 87 (03/10/23 0733)  Resp: 20 (03/10/23 0733)  BP: (!) 177/89 (03/10/23 0733)  SpO2: 98 % (03/10/23 0733)   Vital Signs (24h Range):  Temp:  [97.5 °F (36.4 °C)-98.5 °F (36.9 °C)] 98 °F (36.7 °C)  Pulse:  [64-87] 87  Resp:  [17-20] 20  SpO2:  [96 %-98 %] 98 %  BP: (136-177)/(75-89) 177/89     Weight: 90.7 kg (200 lb)  Body mass index is 25.68 kg/m².    Intake/Output Summary (Last 24 hours) at 3/10/2023 0959  Last data filed at 3/10/2023 0525  Gross per 24 hour   Intake 960 ml   Output 200 ml   Net 760 ml      Physical Exam  Constitutional:       Appearance: Normal appearance. He is normal weight.      Comments: Uncomfortable; ill-appearing   HENT:      Head: Normocephalic and atraumatic.      Right Ear: External ear normal.      Left Ear: External ear normal.      Nose: Nose normal.      Mouth/Throat:      Mouth: Mucous membranes are moist.      Pharynx: Oropharynx is clear.   Eyes:      Conjunctiva/sclera: Conjunctivae normal.    Cardiovascular:      Rate and Rhythm: Normal rate and regular rhythm.      Pulses: Normal pulses.      Heart sounds: Normal heart sounds.   Pulmonary:      Effort: Pulmonary effort is normal.      Breath sounds: Normal breath sounds.   Abdominal:      General: Abdomen is flat. Bowel sounds are normal.      Palpations: Abdomen is soft.      Tenderness: There is no abdominal tenderness (diffuse w/ palpation).   Musculoskeletal:      Cervical back: Neck supple.      Right lower leg: No edema.      Left lower leg: No edema.   Skin:     General: Skin is warm and dry.      Capillary Refill: Capillary refill takes less than 2 seconds.   Neurological:      General: No focal deficit present.      Mental Status: He is alert and oriented to person, place, and time. Mental status is at baseline.   Psychiatric:         Mood and Affect: Mood normal.         Behavior: Behavior normal.       Significant Labs: All pertinent labs within the past 24 hours have been reviewed.  CBC:   Recent Labs   Lab 03/08/23  1223 03/08/23  1229 03/09/23  0600 03/10/23  0446   WBC 3.59*  --  3.22* 3.10*   HGB 11.8*  --  11.0* 10.9*   HCT 35.9* 36 32.5* 30.7*   *  --  136* 125*     CMP:   Recent Labs   Lab 03/08/23  1223 03/09/23  0600 03/10/23  0446    143 139   K 3.7 4.0 3.9    109 108   CO2 21* 28 23   * 251* 264*   BUN 12 15 12   CREATININE 1.2 1.3 1.1   CALCIUM 9.0 8.9 8.3*   PROT 6.7 5.7* 5.4*   ALBUMIN 3.8 3.2* 3.1*   BILITOT 0.8 0.7 0.7   ALKPHOS 55 51* 52*   AST 25 15 15   ALT 27 20 17   ANIONGAP 16 6* 8       Significant Imaging: I have reviewed all pertinent imaging results/findings within the past 24 hours.

## 2023-03-10 NOTE — ASSESSMENT & PLAN NOTE
Patient's FSGs are uncontrolled due to hyperglycemia on current medication regimen.  Last A1c reviewed-   Lab Results   Component Value Date    HGBA1C 10.2 (H) 03/01/2023     Most recent fingerstick glucose reviewed-   Recent Labs   Lab 03/09/23 2024 03/09/23  2348 03/10/23  0510 03/10/23  0732   POCTGLUCOSE 253* 250* 254* 264*     Current correctional scale  Medium  Maintain anti-hyperglycemic dose as follows-   Antihyperglycemics (From admission, onward)    Start     Stop Route Frequency Ordered    03/10/23 1130  insulin aspart U-100 pen 2 Units         -- SubQ 3 times daily with meals 03/10/23 1013    03/10/23 0900  insulin detemir U-100 pen 10 Units         -- SubQ 2 times daily 03/10/23 0638    03/08/23 1959  insulin aspart U-100 pen 1-10 Units         -- SubQ Every 4 hours PRN 03/08/23 2005        Hold Oral hypoglycemics while patient is in the hospital.    - Prandial insulin 2U TID WM  - Basal insulin 10U BID

## 2023-03-10 NOTE — PLAN OF CARE
Problem: Fluid and Electrolyte Imbalance (Acute Kidney Injury/Impairment)  Goal: Fluid and Electrolyte Balance  Outcome: Ongoing, Progressing     Problem: Oral Intake Inadequate (Acute Kidney Injury/Impairment)  Goal: Optimal Nutrition Intake  Outcome: Ongoing, Progressing     Problem: Renal Function Impairment (Acute Kidney Injury/Impairment)  Goal: Effective Renal Function  Outcome: Ongoing, Progressing    Pt AAO X 4; able to express needs. Refusing IVF's.   C/o N/V this am.  Not tolerating regular diet.  Burning pain in stomach this am.   Meds given as ordered.  NO relief.  On call notified & on floor to see patient now.  Safety maintained.  Bed in low position,  call  light in reach.

## 2023-03-10 NOTE — ASSESSMENT & PLAN NOTE
History of DKA  DM with polyneuropathy   - Pt reports symptoms feel similar to previous DKA admission but no evidence of acidosis or anion gap on labs at time of admission  Patient's FSGs are uncontrolled due to hyperglycemia on current medication regimen.  Last A1c reviewed-   Lab Results   Component Value Date    HGBA1C 10.2 (H) 03/01/2023   Most recent fingerstick glucose reviewed  Current correctional scale  Medium  Maintain anti-hyperglycemic dose  - Given 5U aspart in the ED and 5U aspart now since Glu 418  - Check glu q4 for now with transition to ACHS once controlled   - Endocrinology consulted  - Hold oral hypoglycemics while patient is in the hospital.  - Diabetic diet once ready to advance

## 2023-03-10 NOTE — PROGRESS NOTES
[facility] - Int[facility] ([address] Medicine  Progress Note    Patient Name: Andrzej Sinclair  MRN: [id number]  Patient Class: OP- Observation   Admission Date: 3/8/2023  Length of Stay: 0 days  Attending Physician: Kay Irwin MD  Primary Care Provider: Viktoriya Jaeger NP        Subjective:     Principal Problem:Intractable vomiting with nausea        HPI:  38 y/o M with medical hx of poorly controlled DM1 and hyperemesis cannabinoid syndrome who re-presents to [facility] for intractable nausea and vomiting. Patient w/ previous admission, discharged 1 day ago for intractable n/v for the past 2 weeks. Pt stated he had vomited 10x the past 24 hours, cannot keep anything down, w/ abdominal pain usually prior to vomiting episodes. He also reports headaches, chest pain, dizziness, and SOB. He denies hemoptysis, fevers, chills, and diarrhea. Pt has been to the hospital 4 times over the past 2 weeks for similar issues. He is a poorly controlled diabetic, stating his sugars usually run around 300 when he checks them. Last A1c was 10.2 on 3/1/23. Pt has tried using PO phenergan and dicyclomine with minimal relief of symptoms. Pt was recently been on insulin pump, but claims it has not been working well for him. He is currently not wearing pump in ED. He has not been able to see GI outpatient for his persistent issues. Pt denies recent use of marijuana and states he has not ingested/smoked marijuana for at least 3-4 weeks.     On both admissions, patient AF HDS, mildly tachycardic particularly following vomiting episodes. Satting well on RA. Labs without significant electrolyte derangement. Glucose 215. BHB 1.5. Labs and presentation unconcerning for DKA. Patient to be re-admitted for intractable n/v w/ intolerance of PO intake.         Overview/Hospital Course:  Admitted for intractable nausea and vomiting in setting of slightly high BHB. Will advance diet as clinically appropriate. Currently on full liquid  diet. Course c/b hyperglycemia and worsening abdominal pain. Adjusted insulin as needed.       Interval History: No acute events overnight. This AM, patient states nausea causing severe burning epigastric pain, not improved with GI cocktail.     Review of Systems   Constitutional:  Negative for chills and fever.   HENT:  Negative for congestion.    Eyes:  Negative for visual disturbance.   Respiratory:  Negative for cough, shortness of breath and wheezing.    Cardiovascular:  Positive for chest pain (burning; w/ vomiting episodes).   Gastrointestinal:  Positive for nausea and vomiting. Negative for abdominal pain, constipation and diarrhea.   Genitourinary:  Negative for difficulty urinating.   Musculoskeletal:  Negative for arthralgias and back pain.   Skin:  Negative for color change and pallor.   Neurological:  Positive for dizziness, light-headedness and headaches. Negative for tremors, syncope and weakness.   Psychiatric/Behavioral:  Negative for agitation and behavioral problems.    Objective:     Vital Signs (Most Recent):  Temp: 98 °F (36.7 °C) (03/10/23 0733)  Pulse: 87 (03/10/23 0733)  Resp: 20 (03/10/23 0733)  BP: (!) 177/89 (03/10/23 0733)  SpO2: 98 % (03/10/23 0733)   Vital Signs (24h Range):  Temp:  [97.5 °F (36.4 °C)-98.5 °F (36.9 °C)] 98 °F (36.7 °C)  Pulse:  [64-87] 87  Resp:  [17-20] 20  SpO2:  [96 %-98 %] 98 %  BP: (136-177)/(75-89) 177/89     Weight: 90.7 kg (200 lb)  Body mass index is 25.68 kg/m².    Intake/Output Summary (Last 24 hours) at 3/10/2023 0936  Last data filed at 3/10/2023 0525  Gross per 24 hour   Intake 960 ml   Output 200 ml   Net 760 ml      Physical Exam  Constitutional:       Appearance: Normal appearance. He is normal weight.      Comments: Uncomfortable; ill-appearing   HENT:      Head: Normocephalic and atraumatic.      Right Ear: External ear normal.      Left Ear: External ear normal.      Nose: Nose normal.      Mouth/Throat:      Mouth: Mucous membranes are moist.       Pharynx: Oropharynx is clear.   Eyes:      Conjunctiva/sclera: Conjunctivae normal.   Cardiovascular:      Rate and Rhythm: Normal rate and regular rhythm.      Pulses: Normal pulses.      Heart sounds: Normal heart sounds.   Pulmonary:      Effort: Pulmonary effort is normal.      Breath sounds: Normal breath sounds.   Abdominal:      General: Abdomen is flat. Bowel sounds are normal.      Palpations: Abdomen is soft.      Tenderness: There is no abdominal tenderness (diffuse w/ palpation).   Musculoskeletal:      Cervical back: Neck supple.      Right lower leg: No edema.      Left lower leg: No edema.   Skin:     General: Skin is warm and dry.      Capillary Refill: Capillary refill takes less than 2 seconds.   Neurological:      General: No focal deficit present.      Mental Status: He is alert and oriented to person, place, and time. Mental status is at baseline.   Psychiatric:         Mood and Affect: Mood normal.         Behavior: Behavior normal.       Significant Labs: All pertinent labs within the past 24 hours have been reviewed.  CBC:   Recent Labs   Lab 03/08/23  1223 03/08/23  1229 03/09/23  0600 03/10/23  0446   WBC 3.59*  --  3.22* 3.10*   HGB 11.8*  --  11.0* 10.9*   HCT 35.9* 36 32.5* 30.7*   *  --  136* 125*     CMP:   Recent Labs   Lab 03/08/23  1223 03/09/23  0600 03/10/23  0446    143 139   K 3.7 4.0 3.9    109 108   CO2 21* 28 23   * 251* 264*   BUN 12 15 12   CREATININE 1.2 1.3 1.1   CALCIUM 9.0 8.9 8.3*   PROT 6.7 5.7* 5.4*   ALBUMIN 3.8 3.2* 3.1*   BILITOT 0.8 0.7 0.7   ALKPHOS 55 51* 52*   AST 25 15 15   ALT 27 20 17   ANIONGAP 16 6* 8       Significant Imaging: I have reviewed all pertinent imaging results/findings within the past 24 hours.      Assessment/Plan:      * Intractable vomiting with nausea  Patient re-admitted d/t intolerance of PO intake at home; patient reports attempting to increase liquid intake and increase activity which triggered further  nausea/vomiting. Mucous membranes appear moist.     -s/p Droperidol and compazine IV   -scheduled PO phenegran Q6H, PRN zofran IV 8mg and phenegran  -advance diet as tolerated   - gentle IVF in setting of continued n/v   -POCT glucose checks; PRNs in case of hypoglycemia in setting of low PO intake       GERD (gastroesophageal reflux disease)  Complaining of worsening abdominal pain, refractory to GI cocktail and sucrasulfate.     - Home protonix QD  - if patient unable to tolerate full liquid diet, will consult GI.     Cannabinoid hyperemesis syndrome  Patient likely experiencing intractable n/v 2/2 cannabis use; UDS on last admission positive for marijuana. Patient reports last use 3-4 weeks ago due to onset of these symptoms. He reports he had not used on discharge 1 day ago as well.    -see intractable n/v    Type 1 diabetes mellitus with complications  Patient's FSGs are uncontrolled due to hyperglycemia on current medication regimen.  Last A1c reviewed-   Lab Results   Component Value Date    HGBA1C 10.2 (H) 03/01/2023     Most recent fingerstick glucose reviewed-   Recent Labs   Lab 03/09/23 2024 03/09/23  2348 03/10/23  0510 03/10/23  0732   POCTGLUCOSE 253* 250* 254* 264*     Current correctional scale  Medium  Maintain anti-hyperglycemic dose as follows-   Antihyperglycemics (From admission, onward)    Start     Stop Route Frequency Ordered    03/10/23 1130  insulin aspart U-100 pen 2 Units         -- SubQ 3 times daily with meals 03/10/23 1013    03/10/23 0900  insulin detemir U-100 pen 10 Units         -- SubQ 2 times daily 03/10/23 0638    03/08/23 1959  insulin aspart U-100 pen 1-10 Units         -- SubQ Every 4 hours PRN 03/08/23 2005        Hold Oral hypoglycemics while patient is in the hospital.    - Prandial insulin 2U TID WM  - Basal insulin 10U BID     Primary hypertension  Home losartan 10mg QD    Ketosis  Patient w/ small elevation in ketones on admission; much lower than previous admission  w/ low concern for DKA.     -CTM clinically  -daily CMPs      CKD (chronic kidney disease) stage 3, GFR 30-59 ml/min  Cr 1.2 on admission (at baseline)     -CTM w/ daily RFP        VTE Risk Mitigation (From admission, onward)         Ordered     Place sequential compression device  Until discontinued         03/08/23 2005     Place sequential compression device  Until discontinued         03/08/23 2005                Discharge Planning   PRECIOUS: 3/10/2023     Code Status: Full Code   Is the patient medically ready for discharge?: No    Reason for patient still in hospital (select all that apply): Patient trending condition  Discharge Plan A: Home with family                  Missy Hall MD  Department of Hospital Medicine   [facility] - Int[facility] ([address] [place])

## 2023-03-10 NOTE — ASSESSMENT & PLAN NOTE
Complaining of worsening abdominal pain, refractory to GI cocktail and sucrasulfate.     - Home protonix QD  - if patient unable to tolerate full liquid diet, will consult GI.

## 2023-03-11 LAB
ALBUMIN SERPL BCP-MCNC: 3.3 G/DL (ref 3.5–5.2)
ALP SERPL-CCNC: 51 U/L (ref 55–135)
ALT SERPL W/O P-5'-P-CCNC: 16 U/L (ref 10–44)
ANION GAP SERPL CALC-SCNC: 11 MMOL/L (ref 8–16)
AST SERPL-CCNC: 14 U/L (ref 10–40)
BASOPHILS # BLD AUTO: 0.01 K/UL (ref 0–0.2)
BASOPHILS NFR BLD: 0.3 % (ref 0–1.9)
BILIRUB SERPL-MCNC: 0.8 MG/DL (ref 0.1–1)
BUN SERPL-MCNC: 10 MG/DL (ref 6–20)
CALCIUM SERPL-MCNC: 8.9 MG/DL (ref 8.7–10.5)
CHLORIDE SERPL-SCNC: 108 MMOL/L (ref 95–110)
CO2 SERPL-SCNC: 23 MMOL/L (ref 23–29)
CREAT SERPL-MCNC: 1.2 MG/DL (ref 0.5–1.4)
DIFFERENTIAL METHOD: ABNORMAL
EOSINOPHIL # BLD AUTO: 0 K/UL (ref 0–0.5)
EOSINOPHIL NFR BLD: 0.3 % (ref 0–8)
ERYTHROCYTE [DISTWIDTH] IN BLOOD BY AUTOMATED COUNT: 11.7 % (ref 11.5–14.5)
EST. GFR  (NO RACE VARIABLE): >60 ML/MIN/1.73 M^2
GLUCOSE SERPL-MCNC: 140 MG/DL (ref 70–110)
HCT VFR BLD AUTO: 31.4 % (ref 40–54)
HGB BLD-MCNC: 11.3 G/DL (ref 14–18)
IMM GRANULOCYTES # BLD AUTO: 0.03 K/UL (ref 0–0.04)
IMM GRANULOCYTES NFR BLD AUTO: 0.9 % (ref 0–0.5)
LYMPHOCYTES # BLD AUTO: 0.9 K/UL (ref 1–4.8)
LYMPHOCYTES NFR BLD: 25.6 % (ref 18–48)
MAGNESIUM SERPL-MCNC: 1.9 MG/DL (ref 1.6–2.6)
MCH RBC QN AUTO: 31.2 PG (ref 27–31)
MCHC RBC AUTO-ENTMCNC: 36 G/DL (ref 32–36)
MCV RBC AUTO: 87 FL (ref 82–98)
MONOCYTES # BLD AUTO: 0.5 K/UL (ref 0.3–1)
MONOCYTES NFR BLD: 14.2 % (ref 4–15)
NEUTROPHILS # BLD AUTO: 2 K/UL (ref 1.8–7.7)
NEUTROPHILS NFR BLD: 58.7 % (ref 38–73)
NRBC BLD-RTO: 0 /100 WBC
PHOSPHATE SERPL-MCNC: 2.6 MG/DL (ref 2.7–4.5)
PLATELET # BLD AUTO: 139 K/UL (ref 150–450)
PMV BLD AUTO: 11.7 FL (ref 9.2–12.9)
POCT GLUCOSE: 134 MG/DL (ref 70–110)
POCT GLUCOSE: 182 MG/DL (ref 70–110)
POCT GLUCOSE: 182 MG/DL (ref 70–110)
POCT GLUCOSE: 183 MG/DL (ref 70–110)
POTASSIUM SERPL-SCNC: 4 MMOL/L (ref 3.5–5.1)
PROT SERPL-MCNC: 5.7 G/DL (ref 6–8.4)
RBC # BLD AUTO: 3.62 M/UL (ref 4.6–6.2)
SODIUM SERPL-SCNC: 142 MMOL/L (ref 136–145)
WBC # BLD AUTO: 3.32 K/UL (ref 3.9–12.7)

## 2023-03-11 PROCEDURE — 25000003 PHARM REV CODE 250

## 2023-03-11 PROCEDURE — 80053 COMPREHEN METABOLIC PANEL: CPT

## 2023-03-11 PROCEDURE — 25000003 PHARM REV CODE 250: Performed by: STUDENT IN AN ORGANIZED HEALTH CARE EDUCATION/TRAINING PROGRAM

## 2023-03-11 PROCEDURE — 36415 COLL VENOUS BLD VENIPUNCTURE: CPT

## 2023-03-11 PROCEDURE — 99233 PR SUBSEQUENT HOSPITAL CARE,LEVL III: ICD-10-PCS | Mod: ,,, | Performed by: INTERNAL MEDICINE

## 2023-03-11 PROCEDURE — 63600175 PHARM REV CODE 636 W HCPCS: Performed by: STUDENT IN AN ORGANIZED HEALTH CARE EDUCATION/TRAINING PROGRAM

## 2023-03-11 PROCEDURE — 63600175 PHARM REV CODE 636 W HCPCS

## 2023-03-11 PROCEDURE — 12000002 HC ACUTE/MED SURGE SEMI-PRIVATE ROOM

## 2023-03-11 PROCEDURE — 85025 COMPLETE CBC W/AUTO DIFF WBC: CPT

## 2023-03-11 PROCEDURE — 84100 ASSAY OF PHOSPHORUS: CPT

## 2023-03-11 PROCEDURE — 99233 SBSQ HOSP IP/OBS HIGH 50: CPT | Mod: ,,, | Performed by: INTERNAL MEDICINE

## 2023-03-11 PROCEDURE — 83735 ASSAY OF MAGNESIUM: CPT

## 2023-03-11 RX ORDER — HYDROMORPHONE HYDROCHLORIDE 1 MG/ML
0.5 INJECTION, SOLUTION INTRAMUSCULAR; INTRAVENOUS; SUBCUTANEOUS ONCE
Status: COMPLETED | OUTPATIENT
Start: 2023-03-11 | End: 2023-03-11

## 2023-03-11 RX ORDER — SUCRALFATE 1 G/10ML
1 SUSPENSION ORAL EVERY 6 HOURS PRN
Status: DISCONTINUED | OUTPATIENT
Start: 2023-03-11 | End: 2023-03-12 | Stop reason: HOSPADM

## 2023-03-11 RX ADMIN — SENNOSIDES AND DOCUSATE SODIUM 1 TABLET: 50; 8.6 TABLET ORAL at 09:03

## 2023-03-11 RX ADMIN — ALUMINUM HYDROXIDE, MAGNESIUM HYDROXIDE, AND SIMETHICONE 30 ML: 200; 200; 20 SUSPENSION ORAL at 11:03

## 2023-03-11 RX ADMIN — PANTOPRAZOLE SODIUM 40 MG: 40 TABLET, DELAYED RELEASE ORAL at 09:03

## 2023-03-11 RX ADMIN — ONDANSETRON 8 MG: 2 INJECTION INTRAMUSCULAR; INTRAVENOUS at 11:03

## 2023-03-11 RX ADMIN — PROMETHAZINE HYDROCHLORIDE 12.5 MG: 12.5 TABLET ORAL at 06:03

## 2023-03-11 RX ADMIN — POLYETHYLENE GLYCOL 3350 17 G: 17 POWDER, FOR SOLUTION ORAL at 01:03

## 2023-03-11 RX ADMIN — ACETAMINOPHEN 650 MG: 325 TABLET ORAL at 01:03

## 2023-03-11 RX ADMIN — SODIUM CHLORIDE: 9 INJECTION, SOLUTION INTRAVENOUS at 10:03

## 2023-03-11 RX ADMIN — INSULIN ASPART 2 UNITS: 100 INJECTION, SOLUTION INTRAVENOUS; SUBCUTANEOUS at 09:03

## 2023-03-11 RX ADMIN — POLYETHYLENE GLYCOL 3350 17 G: 17 POWDER, FOR SOLUTION ORAL at 08:03

## 2023-03-11 RX ADMIN — SODIUM PHOSPHATE, MONOBASIC, MONOHYDRATE AND SODIUM PHOSPHATE, DIBASIC, ANHYDROUS 15 MMOL: 276; 142 INJECTION, SOLUTION INTRAVENOUS at 10:03

## 2023-03-11 RX ADMIN — PROMETHAZINE HYDROCHLORIDE 12.5 MG: 12.5 TABLET ORAL at 01:03

## 2023-03-11 RX ADMIN — SODIUM CHLORIDE: 9 INJECTION, SOLUTION INTRAVENOUS at 02:03

## 2023-03-11 RX ADMIN — LIDOCAINE HYDROCHLORIDE 15 ML: 20 SOLUTION ORAL; TOPICAL at 11:03

## 2023-03-11 RX ADMIN — ONDANSETRON 8 MG: 2 INJECTION INTRAMUSCULAR; INTRAVENOUS at 09:03

## 2023-03-11 RX ADMIN — HYDROMORPHONE HYDROCHLORIDE 0.5 MG: 1 INJECTION, SOLUTION INTRAMUSCULAR; INTRAVENOUS; SUBCUTANEOUS at 01:03

## 2023-03-11 RX ADMIN — LISINOPRIL 10 MG: 10 TABLET ORAL at 09:03

## 2023-03-11 RX ADMIN — SODIUM CHLORIDE: 9 INJECTION, SOLUTION INTRAVENOUS at 06:03

## 2023-03-11 RX ADMIN — LIDOCAINE HYDROCHLORIDE 15 ML: 20 SOLUTION ORAL; TOPICAL at 06:03

## 2023-03-11 RX ADMIN — ALUMINUM HYDROXIDE, MAGNESIUM HYDROXIDE, AND SIMETHICONE 30 ML: 200; 200; 20 SUSPENSION ORAL at 06:03

## 2023-03-11 NOTE — PLAN OF CARE
Problem: Adult Inpatient Plan of Care  Goal: Plan of Care Review  Outcome: Ongoing, Progressing     Problem: Fluid and Electrolyte Imbalance (Acute Kidney Injury/Impairment)  Goal: Fluid and Electrolyte Balance  Outcome: Ongoing, Progressing     Problem: Diabetes Comorbidity  Goal: Blood Glucose Level Within Targeted Range  Outcome: Ongoing, Progressing

## 2023-03-11 NOTE — ASSESSMENT & PLAN NOTE
Patient's FSGs are uncontrolled due to hyperglycemia on current medication regimen.  Last A1c reviewed-   Lab Results   Component Value Date    HGBA1C 10.2 (H) 03/01/2023     Most recent fingerstick glucose reviewed-   Recent Labs   Lab 03/10/23  1653 03/10/23  2029 03/11/23  0717 03/11/23  1120   POCTGLUCOSE 221* 204* 134* 182*     Current correctional scale  Medium  Maintain anti-hyperglycemic dose as follows-   Antihyperglycemics (From admission, onward)    Start     Stop Route Frequency Ordered    03/10/23 1534  insulin aspart U-100 pen 1-10 Units         -- SubQ Before meals & nightly PRN 03/10/23 1435    03/10/23 1130  insulin aspart U-100 pen 2 Units         -- SubQ 3 times daily with meals 03/10/23 1013    03/10/23 0900  insulin detemir U-100 pen 10 Units         -- SubQ 2 times daily 03/10/23 0638        Hold Oral hypoglycemics while patient is in the hospital.    - Prandial insulin 2U TID WM  - Basal insulin 10U BID   - bg inpatient goal 140-180

## 2023-03-11 NOTE — NURSING
Pt just called me to the room and was doubled over in the bed with abdominal pain. He ate some vanilla pudding for breakfast and stated that he forgot it has lactose in it and he is lactose intolerant. Pt reports still being unable to have a BM and not able to eat adequately. I gave him prn MOM and Lidocaine as well as zofran. Pt does not believe he is ready to DC home as nothing has been resolved. He was planning to speak with med team about this when they round as well. RN notified Med team via secure chat.

## 2023-03-11 NOTE — SUBJECTIVE & OBJECTIVE
Interval History:     NAEON. On CLD and nausea controlled this AM and patient agreeable to discharge however, early afternoon started having significant nausea post eating pudding.     Review of Systems   Constitutional:  Negative for chills and fever.   HENT:  Negative for congestion.    Eyes:  Negative for visual disturbance.   Respiratory:  Negative for cough, shortness of breath and wheezing.    Cardiovascular:  Negative for chest pain.   Gastrointestinal:  Positive for nausea and vomiting. Negative for abdominal pain, constipation and diarrhea.   Genitourinary:  Negative for difficulty urinating.   Musculoskeletal:  Negative for arthralgias and back pain.   Skin:  Negative for color change and pallor.   Neurological:  Negative for dizziness, tremors, syncope, weakness, light-headedness and headaches.   Psychiatric/Behavioral:  Negative for agitation and behavioral problems.    Objective:     Vital Signs (Most Recent):  Temp: 98 °F (36.7 °C) (03/11/23 1121)  Pulse: (!) 59 (03/11/23 1121)  Resp: 18 (03/11/23 1121)  BP: (!) 195/96 (03/11/23 1121)  SpO2: 100 % (03/11/23 1121)   Vital Signs (24h Range):  Temp:  [98 °F (36.7 °C)-99.4 °F (37.4 °C)] 98 °F (36.7 °C)  Pulse:  [59-83] 59  Resp:  [18] 18  SpO2:  [96 %-100 %] 100 %  BP: (135-195)/(67-96) 195/96     Weight: 90.7 kg (200 lb)  Body mass index is 25.68 kg/m².    Intake/Output Summary (Last 24 hours) at 3/11/2023 1328  Last data filed at 3/11/2023 0824  Gross per 24 hour   Intake 3482.17 ml   Output 200 ml   Net 3282.17 ml        Physical Exam  Constitutional:       Appearance: Normal appearance. He is normal weight.      Comments: Uncomfortable; ill-appearing   HENT:      Head: Normocephalic and atraumatic.      Right Ear: External ear normal.      Left Ear: External ear normal.      Nose: Nose normal.      Mouth/Throat:      Mouth: Mucous membranes are moist.      Pharynx: Oropharynx is clear.   Eyes:      Conjunctiva/sclera: Conjunctivae normal.    Cardiovascular:      Rate and Rhythm: Normal rate and regular rhythm.      Pulses: Normal pulses.      Heart sounds: Normal heart sounds.   Pulmonary:      Effort: Pulmonary effort is normal.      Breath sounds: Normal breath sounds.   Abdominal:      General: Abdomen is flat. Bowel sounds are normal.      Palpations: Abdomen is soft.      Tenderness: There is no abdominal tenderness (diffuse w/ palpation).   Musculoskeletal:      Cervical back: Neck supple.      Right lower leg: No edema.      Left lower leg: No edema.   Skin:     General: Skin is warm and dry.      Capillary Refill: Capillary refill takes less than 2 seconds.   Neurological:      General: No focal deficit present.      Mental Status: He is alert and oriented to person, place, and time. Mental status is at baseline.   Psychiatric:         Mood and Affect: Mood normal.         Behavior: Behavior normal.       Significant Labs: All pertinent labs within the past 24 hours have been reviewed.    Significant Imaging: I have reviewed all pertinent imaging results/findings within the past 24 hours.

## 2023-03-11 NOTE — ASSESSMENT & PLAN NOTE
Patient re-admitted d/t intolerance of PO intake at home; patient reports attempting to increase liquid intake and increase activity which triggered further nausea/vomiting. Mucous membranes appear moist.     -s/p Droperidol and compazine IV   -scheduled PO phenegran Q6H, PRN zofran IV 8mg and compazine  -advance diet as tolerated   - gentle IVF in setting of continued n/v   -POCT glucose checks; PRNs in case of hypoglycemia  - has PCP apt 3/13 and GI apt in 2 weeks

## 2023-03-11 NOTE — ASSESSMENT & PLAN NOTE
Complaining of worsening abdominal pain, refractory to GI cocktail and sucrasulfate.     - Home protonix QD  - if patient unable to tolerate full liquid diet, will consult GI.   - prn GI cocktail and sucralfate

## 2023-03-12 ENCOUNTER — PATIENT MESSAGE (OUTPATIENT)
Dept: ENDOCRINOLOGY | Facility: CLINIC | Age: 38
End: 2023-03-12
Payer: MEDICARE

## 2023-03-12 VITALS
DIASTOLIC BLOOD PRESSURE: 91 MMHG | HEART RATE: 68 BPM | TEMPERATURE: 98 F | WEIGHT: 200 LBS | HEIGHT: 74 IN | SYSTOLIC BLOOD PRESSURE: 161 MMHG | RESPIRATION RATE: 18 BRPM | BODY MASS INDEX: 25.67 KG/M2 | OXYGEN SATURATION: 96 %

## 2023-03-12 DIAGNOSIS — R11.2 NAUSEA AND VOMITING, UNSPECIFIED VOMITING TYPE: Primary | ICD-10-CM

## 2023-03-12 LAB
ALBUMIN SERPL BCP-MCNC: 3.4 G/DL (ref 3.5–5.2)
ALP SERPL-CCNC: 50 U/L (ref 55–135)
ALT SERPL W/O P-5'-P-CCNC: 16 U/L (ref 10–44)
ANION GAP SERPL CALC-SCNC: 10 MMOL/L (ref 8–16)
AST SERPL-CCNC: 14 U/L (ref 10–40)
BASOPHILS # BLD AUTO: 0.01 K/UL (ref 0–0.2)
BASOPHILS NFR BLD: 0.3 % (ref 0–1.9)
BILIRUB SERPL-MCNC: 0.7 MG/DL (ref 0.1–1)
BUN SERPL-MCNC: 7 MG/DL (ref 6–20)
CALCIUM SERPL-MCNC: 8.8 MG/DL (ref 8.7–10.5)
CHLORIDE SERPL-SCNC: 109 MMOL/L (ref 95–110)
CO2 SERPL-SCNC: 22 MMOL/L (ref 23–29)
CREAT SERPL-MCNC: 1.1 MG/DL (ref 0.5–1.4)
DIFFERENTIAL METHOD: ABNORMAL
EOSINOPHIL # BLD AUTO: 0 K/UL (ref 0–0.5)
EOSINOPHIL NFR BLD: 0.7 % (ref 0–8)
ERYTHROCYTE [DISTWIDTH] IN BLOOD BY AUTOMATED COUNT: 11.8 % (ref 11.5–14.5)
EST. GFR  (NO RACE VARIABLE): >60 ML/MIN/1.73 M^2
GLUCOSE SERPL-MCNC: 120 MG/DL (ref 70–110)
HCT VFR BLD AUTO: 32.3 % (ref 40–54)
HGB BLD-MCNC: 10.5 G/DL (ref 14–18)
IMM GRANULOCYTES # BLD AUTO: 0.01 K/UL (ref 0–0.04)
IMM GRANULOCYTES NFR BLD AUTO: 0.3 % (ref 0–0.5)
LYMPHOCYTES # BLD AUTO: 0.9 K/UL (ref 1–4.8)
LYMPHOCYTES NFR BLD: 28.4 % (ref 18–48)
MAGNESIUM SERPL-MCNC: 1.7 MG/DL (ref 1.6–2.6)
MCH RBC QN AUTO: 30 PG (ref 27–31)
MCHC RBC AUTO-ENTMCNC: 32.5 G/DL (ref 32–36)
MCV RBC AUTO: 92 FL (ref 82–98)
MONOCYTES # BLD AUTO: 0.5 K/UL (ref 0.3–1)
MONOCYTES NFR BLD: 15.4 % (ref 4–15)
NEUTROPHILS # BLD AUTO: 1.7 K/UL (ref 1.8–7.7)
NEUTROPHILS NFR BLD: 54.9 % (ref 38–73)
NRBC BLD-RTO: 0 /100 WBC
PHOSPHATE SERPL-MCNC: 2.5 MG/DL (ref 2.7–4.5)
PLATELET # BLD AUTO: 132 K/UL (ref 150–450)
PMV BLD AUTO: 11.2 FL (ref 9.2–12.9)
POCT GLUCOSE: 110 MG/DL (ref 70–110)
POCT GLUCOSE: 142 MG/DL (ref 70–110)
POCT GLUCOSE: 203 MG/DL (ref 70–110)
POTASSIUM SERPL-SCNC: 3.6 MMOL/L (ref 3.5–5.1)
PROT SERPL-MCNC: 5.8 G/DL (ref 6–8.4)
RBC # BLD AUTO: 3.5 M/UL (ref 4.6–6.2)
SODIUM SERPL-SCNC: 141 MMOL/L (ref 136–145)
WBC # BLD AUTO: 3.06 K/UL (ref 3.9–12.7)

## 2023-03-12 PROCEDURE — 99222 PR INITIAL HOSPITAL CARE,LEVL II: ICD-10-PCS | Mod: GC,,, | Performed by: INTERNAL MEDICINE

## 2023-03-12 PROCEDURE — 83735 ASSAY OF MAGNESIUM: CPT

## 2023-03-12 PROCEDURE — 36415 COLL VENOUS BLD VENIPUNCTURE: CPT

## 2023-03-12 PROCEDURE — 63600175 PHARM REV CODE 636 W HCPCS

## 2023-03-12 PROCEDURE — 99239 HOSP IP/OBS DSCHRG MGMT >30: CPT | Mod: ,,, | Performed by: INTERNAL MEDICINE

## 2023-03-12 PROCEDURE — 36415 COLL VENOUS BLD VENIPUNCTURE: CPT | Performed by: INTERNAL MEDICINE

## 2023-03-12 PROCEDURE — 25000003 PHARM REV CODE 250

## 2023-03-12 PROCEDURE — 25000003 PHARM REV CODE 250: Performed by: STUDENT IN AN ORGANIZED HEALTH CARE EDUCATION/TRAINING PROGRAM

## 2023-03-12 PROCEDURE — 99239 PR HOSPITAL DISCHARGE DAY,>30 MIN: ICD-10-PCS | Mod: ,,, | Performed by: INTERNAL MEDICINE

## 2023-03-12 PROCEDURE — 99222 1ST HOSP IP/OBS MODERATE 55: CPT | Mod: GC,,, | Performed by: INTERNAL MEDICINE

## 2023-03-12 PROCEDURE — 85025 COMPLETE CBC W/AUTO DIFF WBC: CPT | Performed by: INTERNAL MEDICINE

## 2023-03-12 PROCEDURE — 84100 ASSAY OF PHOSPHORUS: CPT

## 2023-03-12 PROCEDURE — 80053 COMPREHEN METABOLIC PANEL: CPT

## 2023-03-12 RX ORDER — SUCRALFATE 1 G/10ML
1 SUSPENSION ORAL EVERY 6 HOURS PRN
Qty: 414 ML | Refills: 0 | Status: ON HOLD | OUTPATIENT
Start: 2023-03-12 | End: 2023-11-13

## 2023-03-12 RX ORDER — MAGNESIUM SULFATE HEPTAHYDRATE 40 MG/ML
2 INJECTION, SOLUTION INTRAVENOUS ONCE
Status: COMPLETED | OUTPATIENT
Start: 2023-03-12 | End: 2023-03-12

## 2023-03-12 RX ORDER — INSULIN LISPRO-AABC 100 [IU]/ML
INJECTION, SOLUTION SUBCUTANEOUS
Qty: 5 PEN | Refills: 6 | Status: SHIPPED | OUTPATIENT
Start: 2023-03-12 | End: 2023-05-02

## 2023-03-12 RX ORDER — SODIUM,POTASSIUM PHOSPHATES 280-250MG
2 POWDER IN PACKET (EA) ORAL EVERY 4 HOURS
Status: DISCONTINUED | OUTPATIENT
Start: 2023-03-12 | End: 2023-03-12 | Stop reason: HOSPADM

## 2023-03-12 RX ORDER — OMEPRAZOLE 20 MG/1
20 CAPSULE, DELAYED RELEASE ORAL DAILY
Qty: 30 CAPSULE | Refills: 0 | Status: ON HOLD | OUTPATIENT
Start: 2023-03-12 | End: 2023-11-13

## 2023-03-12 RX ORDER — ONDANSETRON 4 MG/1
8 TABLET, FILM COATED ORAL 3 TIMES DAILY
Qty: 12 TABLET | Refills: 0 | Status: ON HOLD | OUTPATIENT
Start: 2023-03-12 | End: 2023-11-13

## 2023-03-12 RX ADMIN — PROCHLORPERAZINE EDISYLATE 2.5 MG: 5 INJECTION INTRAMUSCULAR; INTRAVENOUS at 03:03

## 2023-03-12 RX ADMIN — MAGNESIUM SULFATE 2 G: 2 INJECTION INTRAVENOUS at 08:03

## 2023-03-12 RX ADMIN — PROMETHAZINE HYDROCHLORIDE 12.5 MG: 12.5 TABLET ORAL at 12:03

## 2023-03-12 RX ADMIN — ONDANSETRON 8 MG: 2 INJECTION INTRAMUSCULAR; INTRAVENOUS at 09:03

## 2023-03-12 RX ADMIN — POTASSIUM & SODIUM PHOSPHATES POWDER PACK 280-160-250 MG 2 PACKET: 280-160-250 PACK at 12:03

## 2023-03-12 RX ADMIN — SODIUM CHLORIDE: 9 INJECTION, SOLUTION INTRAVENOUS at 03:03

## 2023-03-12 RX ADMIN — PANTOPRAZOLE SODIUM 40 MG: 40 TABLET, DELAYED RELEASE ORAL at 08:03

## 2023-03-12 RX ADMIN — SENNOSIDES AND DOCUSATE SODIUM 1 TABLET: 50; 8.6 TABLET ORAL at 08:03

## 2023-03-12 RX ADMIN — LISINOPRIL 10 MG: 10 TABLET ORAL at 08:03

## 2023-03-12 RX ADMIN — PROCHLORPERAZINE EDISYLATE 2.5 MG: 5 INJECTION INTRAMUSCULAR; INTRAVENOUS at 08:03

## 2023-03-12 RX ADMIN — INSULIN ASPART 2 UNITS: 100 INJECTION, SOLUTION INTRAVENOUS; SUBCUTANEOUS at 12:03

## 2023-03-12 NOTE — SUBJECTIVE & OBJECTIVE
Interval History: NAOE. Required dose of dilaudid yesterday, however today pain is 7/10, improved from yesterday. States he still has nausea, but managed with rectal zofran. Otherwise, without acute complaints, AF and VSS    Review of Systems   Constitutional:  Negative for chills and fever.   HENT:  Negative for congestion.    Eyes:  Negative for visual disturbance.   Respiratory:  Negative for cough, shortness of breath and wheezing.    Cardiovascular:  Negative for chest pain.   Gastrointestinal:  Positive for nausea and vomiting. Negative for abdominal pain, constipation and diarrhea.   Genitourinary:  Negative for difficulty urinating.   Musculoskeletal:  Negative for arthralgias and back pain.   Skin:  Negative for color change and pallor.   Neurological:  Negative for dizziness, tremors, syncope, weakness, light-headedness and headaches.   Psychiatric/Behavioral:  Negative for agitation and behavioral problems.    Objective:     Vital Signs (Most Recent):  Temp: 98 °F (36.7 °C) (03/12/23 0722)  Pulse: 62 (03/12/23 0722)  Resp: 19 (03/12/23 0722)  BP: (!) 159/77 (03/12/23 0722)  SpO2: 97 % (03/12/23 0722)   Vital Signs (24h Range):  Temp:  [98 °F (36.7 °C)-98.9 °F (37.2 °C)] 98 °F (36.7 °C)  Pulse:  [59-90] 62  Resp:  [8-19] 19  SpO2:  [96 %-100 %] 97 %  BP: (119-195)/(58-96) 159/77     Weight: 90.7 kg (200 lb)  Body mass index is 25.68 kg/m².    Intake/Output Summary (Last 24 hours) at 3/12/2023 0912  Last data filed at 3/12/2023 0648  Gross per 24 hour   Intake 2977.33 ml   Output 1150 ml   Net 1827.33 ml      Physical Exam  Constitutional:       Appearance: Normal appearance. He is normal weight.      Comments: Uncomfortable; ill-appearing   HENT:      Head: Normocephalic and atraumatic.      Right Ear: External ear normal.      Left Ear: External ear normal.      Nose: Nose normal.      Mouth/Throat:      Mouth: Mucous membranes are moist.      Pharynx: Oropharynx is clear.   Eyes:      Conjunctiva/sclera:  Conjunctivae normal.   Cardiovascular:      Rate and Rhythm: Normal rate and regular rhythm.      Pulses: Normal pulses.      Heart sounds: Normal heart sounds.   Pulmonary:      Effort: Pulmonary effort is normal.      Breath sounds: Normal breath sounds.   Abdominal:      General: Abdomen is flat. Bowel sounds are normal.      Palpations: Abdomen is soft.      Tenderness: There is no abdominal tenderness (diffuse w/ palpation).   Musculoskeletal:      Cervical back: Neck supple.      Right lower leg: No edema.      Left lower leg: No edema.   Skin:     General: Skin is warm and dry.      Capillary Refill: Capillary refill takes less than 2 seconds.   Neurological:      General: No focal deficit present.      Mental Status: He is alert and oriented to person, place, and time. Mental status is at baseline.   Psychiatric:         Mood and Affect: Mood normal.         Behavior: Behavior normal.       Significant Labs: All pertinent labs within the past 24 hours have been reviewed.  CBC:   Recent Labs   Lab 03/11/23  0504 03/12/23  0711   WBC 3.32* 3.06*   HGB 11.3* 10.5*   HCT 31.4* 32.3*   * 132*     CMP:   Recent Labs   Lab 03/11/23  0504 03/12/23  0409    141   K 4.0 3.6    109   CO2 23 22*   * 120*   BUN 10 7   CREATININE 1.2 1.1   CALCIUM 8.9 8.8   PROT 5.7* 5.8*   ALBUMIN 3.3* 3.4*   BILITOT 0.8 0.7   ALKPHOS 51* 50*   AST 14 14   ALT 16 16   ANIONGAP 11 10       Significant Imaging: I have reviewed all pertinent imaging results/findings within the past 24 hours.

## 2023-03-12 NOTE — PROGRESS NOTES
[facility] - Intensive Care ([address] Medicine  Progress Note    Patient Name: Andrzej Sinclair  MRN: [id number]  Patient Class: IP- Inpatient   Admission Date: 3/8/2023  Length of Stay: 2 days  Attending Physician: Kay Irwin MD  Primary Care Provider: Viktoriya Jaeger NP        Subjective:     Principal Problem:Intractable vomiting with nausea        HPI:  38 y/o M with medical hx of poorly controlled DM1 and hyperemesis cannabinoid syndrome who re-presents to [facility] [place] for intractable nausea and vomiting. Patient w/ previous admission, discharged 1 day ago for intractable n/v for the past 2 weeks. Pt stated he had vomited 10x the past 24 hours, cannot keep anything down, w/ abdominal pain usually prior to vomiting episodes. He also reports headaches, chest pain, dizziness, and SOB. He denies hemoptysis, fevers, chills, and diarrhea. Pt has been to the hospital 4 times over the past 2 weeks for similar issues. He is a poorly controlled diabetic, stating his sugars usually run around 300 when he checks them. Last A1c was 10.2 on 3/1/23. Pt has tried using PO phenergan and dicyclomine with minimal relief of symptoms. Pt was recently been on insulin pump, but claims it has not been working well for him. He is currently not wearing pump in ED. He has not been able to see GI outpatient for his persistent issues. Pt denies recent use of marijuana and states he has not ingested/smoked marijuana for at least 3-4 weeks.     On both admissions, patient AF HDS, mildly tachycardic particularly following vomiting episodes. Satting well on RA. Labs without significant electrolyte derangement. Glucose 215. BHB 1.5. Labs and presentation unconcerning for DKA. Patient to be re-admitted for intractable n/v w/ intolerance of PO intake.         Overview/Hospital Course:  Admitted for intractable nausea and vomiting in setting of slightly high BHB. Will advance diet as clinically appropriate. Currently on full liquid diet.  Course c/b hyperglycemia and worsening abdominal pain. Continues on NS infusion. Adjusted insulin as needed. KUB w non-obstructive bowel gas pattern.       Interval History: NAOE. Required dose of dilaudid yesterday, however today pain is 7/10, improved from yesterday. States he still has nausea, but managed with rectal zofran. Otherwise, without acute complaints, AF and VSS    Review of Systems   Constitutional:  Negative for chills and fever.   HENT:  Negative for congestion.    Eyes:  Negative for visual disturbance.   Respiratory:  Negative for cough, shortness of breath and wheezing.    Cardiovascular:  Negative for chest pain.   Gastrointestinal:  Positive for nausea and vomiting. Negative for abdominal pain, constipation and diarrhea.   Genitourinary:  Negative for difficulty urinating.   Musculoskeletal:  Negative for arthralgias and back pain.   Skin:  Negative for color change and pallor.   Neurological:  Negative for dizziness, tremors, syncope, weakness, light-headedness and headaches.   Psychiatric/Behavioral:  Negative for agitation and behavioral problems.    Objective:     Vital Signs (Most Recent):  Temp: 98 °F (36.7 °C) (03/12/23 0722)  Pulse: 62 (03/12/23 0722)  Resp: 19 (03/12/23 0722)  BP: (!) 159/77 (03/12/23 0722)  SpO2: 97 % (03/12/23 0722)   Vital Signs (24h Range):  Temp:  [98 °F (36.7 °C)-98.9 °F (37.2 °C)] 98 °F (36.7 °C)  Pulse:  [59-90] 62  Resp:  [8-19] 19  SpO2:  [96 %-100 %] 97 %  BP: (119-195)/(58-96) 159/77     Weight: 90.7 kg (200 lb)  Body mass index is 25.68 kg/m².    Intake/Output Summary (Last 24 hours) at 3/12/2023 0981  Last data filed at 3/12/2023 0648  Gross per 24 hour   Intake 2977.33 ml   Output 1150 ml   Net 1827.33 ml      Physical Exam  Constitutional:       Appearance: Normal appearance. He is normal weight.      Comments: Uncomfortable; ill-appearing   HENT:      Head: Normocephalic and atraumatic.      Right Ear: External ear normal.      Left Ear: External ear  normal.      Nose: Nose normal.      Mouth/Throat:      Mouth: Mucous membranes are moist.      Pharynx: Oropharynx is clear.   Eyes:      Conjunctiva/sclera: Conjunctivae normal.   Cardiovascular:      Rate and Rhythm: Normal rate and regular rhythm.      Pulses: Normal pulses.      Heart sounds: Normal heart sounds.   Pulmonary:      Effort: Pulmonary effort is normal.      Breath sounds: Normal breath sounds.   Abdominal:      General: Abdomen is flat. Bowel sounds are normal.      Palpations: Abdomen is soft.      Tenderness: There is no abdominal tenderness (diffuse w/ palpation).   Musculoskeletal:      Cervical back: Neck supple.      Right lower leg: No edema.      Left lower leg: No edema.   Skin:     General: Skin is warm and dry.      Capillary Refill: Capillary refill takes less than 2 seconds.   Neurological:      General: No focal deficit present.      Mental Status: He is alert and oriented to person, place, and time. Mental status is at baseline.   Psychiatric:         Mood and Affect: Mood normal.         Behavior: Behavior normal.       Significant Labs: All pertinent labs within the past 24 hours have been reviewed.  CBC:   Recent Labs   Lab 03/11/23  0504 03/12/23  0711   WBC 3.32* 3.06*   HGB 11.3* 10.5*   HCT 31.4* 32.3*   * 132*     CMP:   Recent Labs   Lab 03/11/23  0504 03/12/23  0409    141   K 4.0 3.6    109   CO2 23 22*   * 120*   BUN 10 7   CREATININE 1.2 1.1   CALCIUM 8.9 8.8   PROT 5.7* 5.8*   ALBUMIN 3.3* 3.4*   BILITOT 0.8 0.7   ALKPHOS 51* 50*   AST 14 14   ALT 16 16   ANIONGAP 11 10       Significant Imaging: I have reviewed all pertinent imaging results/findings within the past 24 hours.      Assessment/Plan:      * Intractable vomiting with nausea  Patient re-admitted d/t intolerance of PO intake at home; patient reports attempting to increase liquid intake and increase activity which triggered further nausea/vomiting. Mucous membranes appear moist.      -s/p Droperidol and compazine IV   -scheduled PO phenegran Q6H, PRN zofran IV 8mg and compazine  -advance diet as tolerated   - gentle IVF in setting of continued n/v   -POCT glucose checks; PRNs in case of hypoglycemia  - has PCP apt 3/13 and GI apt in 2 weeks    GERD (gastroesophageal reflux disease)  Complaining of worsening abdominal pain, refractory to GI cocktail and sucrasulfate.     - Home protonix QD  - consulted GI, appreciate recommendations.   - prn GI cocktail and sucralfate     Cannabinoid hyperemesis syndrome  Patient likely experiencing intractable n/v 2/2 cannabis use; UDS on last admission positive for marijuana. Patient reports last use 3-4 weeks ago due to onset of these symptoms. He reports he had not used on discharge 1 day ago as well.    -see intractable n/v    Type 1 diabetes mellitus with complications  Patient's FSGs are uncontrolled due to hyperglycemia on current medication regimen.  Last A1c reviewed-   Lab Results   Component Value Date    HGBA1C 10.2 (H) 03/01/2023     Most recent fingerstick glucose reviewed-   Recent Labs   Lab 03/11/23  1120 03/11/23  1621 03/11/23  1938 03/12/23  0721   POCTGLUCOSE 182* 182* 183* 110     Current correctional scale  Medium  Maintain anti-hyperglycemic dose as follows-   Antihyperglycemics (From admission, onward)    Start     Stop Route Frequency Ordered    03/10/23 1534  insulin aspart U-100 pen 1-10 Units         -- SubQ Before meals & nightly PRN 03/10/23 1435    03/10/23 1130  insulin aspart U-100 pen 2 Units         -- SubQ 3 times daily with meals 03/10/23 1013    03/10/23 0900  insulin detemir U-100 pen 10 Units         -- SubQ 2 times daily 03/10/23 0638        Hold Oral hypoglycemics while patient is in the hospital.    - Prandial insulin 2U TID WM  - Basal insulin 10U BID   - bg inpatient goal 140-180    Primary hypertension  Home lisinopril 10mg QD    Ketosis  Patient w/ small elevation in ketones on admission; much lower than  previous admission w/ low concern for DKA.     -CTM clinically  -daily CMPs    CKD (chronic kidney disease) stage 3, GFR 30-59 ml/min  Cr 1.2 on admission (at baseline)     -CTM w/ daily RFP      VTE Risk Mitigation (From admission, onward)         Ordered     Place sequential compression device  Until discontinued         03/08/23 2005     Place sequential compression device  Until discontinued         03/08/23 2005                Discharge Planning   PRECIOUS: 3/13/2023     Code Status: Full Code   Is the patient medically ready for discharge?: No    Reason for patient still in hospital (select all that apply): Patient trending condition  Discharge Plan A: Home with family                  Missy aHll MD  Department of Hospital Medicine   [facility] - Intensive Care ([place]-[address])

## 2023-03-12 NOTE — CONSULTS
[facility]  Gastroenterology  Consult Note    Patient Name: Andrzej Sinclair  MRN: [id number]  Admission Date: 3/8/2023  Hospital Length of Stay: 2 days  Code Status: Full Code   Attending Provider: Kay Irwin MD   Consulting Provider: Redd Aguayo MD  Primary Care Physician: Viktoriya Jaeger NP  Principal Problem:Intractable vomiting with nausea    Inpatient consult to Gastroenterology  Consult performed by: Redd Aguayo MD  Consult ordered by: Supriya Peck MD      Subjective:     HPI: Andrzej Sinclair is a 37 y.o. male with history of DM1 and cannabis hyperemesis who presents for intractable n/v. Recently admitted for same issue, treated with hyperglycemia requiring insulin gtt and EGD showed gastritis. Glucose on admission this time was in 300s. A1c 10.2 on 3/1. CT shows mild gastric wall thickening, otherwise unremarkable.    GES 2021 normal at 2 hours.        Past Medical History:   Diagnosis Date    Diabetes mellitus     Diabetes mellitus type 1     Diagnosed at age 20       History reviewed. No pertinent surgical history.    Family History   Problem Relation Age of Onset    Other Mother         prediabetes    Diabetes Mother     Other Father         patient does not know his fathers history       Social History     Socioeconomic History    Marital status:    Tobacco Use    Smoking status: Former    Smokeless tobacco: Current   Substance and Sexual Activity    Alcohol use: Yes     Comment: socially    Drug use: No     Social Determinants of Health     Financial Resource Strain: Low Risk     Difficulty of Paying Living Expenses: Not hard at all   Food Insecurity: No Food Insecurity    Worried About Running Out of Food in the Last Year: Never true    Ran Out of Food in the Last Year: Never true   Transportation Needs: No Transportation Needs    Lack of Transportation (Medical): No    Lack of Transportation (Non-Medical): No   Physical Activity: Inactive    Days of Exercise  "per Week: 0 days    Minutes of Exercise per Session: 0 min   Stress: Stress Concern Present    Feeling of Stress : Very much   Social Connections: Socially Isolated    Frequency of Communication with Friends and Family: Never    Frequency of Social Gatherings with Friends and Family: Once a week    Attends Islam Services: Never    Active Member of Clubs or Organizations: No    Attends Club or Organization Meetings: Never    Marital Status:    Housing Stability: Low Risk     Unable to Pay for Housing in the Last Year: No    Number of Places Lived in the Last Year: 1    Unstable Housing in the Last Year: No       No current facility-administered medications on file prior to encounter.     Current Outpatient Medications on File Prior to Encounter   Medication Sig Dispense Refill    acetaminophen (TYLENOL) 325 MG tablet Take 325 mg by mouth daily as needed for Pain.      acetone, urine, test (KETONE URINE TEST) Strp 30 strips by Misc.(Non-Drug; Combo Route) route as needed (hyperglycemia).      aluminum & magnesium hydroxide-simethicone (MYLANTA MAX STRENGTH) 400-400-40 mg/5 mL suspension Take 10 mLs by mouth every 6 (six) hours as needed for Indigestion. 100 mL 0    blood sugar diagnostic Strp To check BG up to 5 times daily 200 strip 0    blood-glucose meter Misc Use to test blood glucose 2 times a day with meals. 1 each 0    blood-glucose sensor (DEXCOM G6 SENSOR) Martha 3 each by Misc.(Non-Drug; Combo Route) route continuous. 3 each prn    blood-glucose transmitter (DEXCOM G6 TRANSMITTER) Martha 1 each by Misc.(Non-Drug; Combo Route) route continuous. 1 each prn    glucagon (GVOKE HYPOPEN 2-PACK) 1 mg/0.2 mL AtIn Inject 1 Package into the skin as needed (hypoglycemia). 2 each 0    insulin degludec (TRESIBA FLEXTOUCH U-100) 100 unit/mL (3 mL) insulin pen Inject 16 Units into the skin every evening. 2 pen 9    insulin syringe-needle U-100 (BD INSULIN SYRINGE ULTRA-FINE) 0.5 mL 31 gauge x 5/16" Syrg 4 times " "daily 200 each 0    insulin syringe-needle U-100 (BD INSULIN SYRINGE ULTRA-FINE) 0.5 mL 31 gauge x 5/16" Syrg 4 times daily 200 each 0    insulin syringe-needle U-100 (BD INSULIN SYRINGE ULTRA-FINE) 0.5 mL 31 gauge x 5/16" Syrg 4 times daily 200 each 11    insulin syringe-needle U-100 0.5 mL 30 gauge x 5/16" Syrg To use 5 times daily 200 each 4    lancets Misc Use to test blood glucose 2 (two) times daily with meals. 100 each 11    lancing device Misc 1 Device by Misc.(Non-Drug; Combo Route) route 2 (two) times daily with meals. 1 each 0    lisinopriL 10 MG tablet Take 1 tablet (10 mg total) by mouth once daily. 90 tablet 3    LYUMJEV KWIKPEN U-100 INSULIN 100 unit/mL pen Inject 0-10 Units into the skin 3 (three) times daily with meals. May also inject 0-10 Units as needed (Hyperglycemia). 5 units with meals + sliding scale insulin   Blood sugar 180 to 230 add 1 units   Blood sugar 231 to 280 add 2 units   Blood sugar 281 to 330 add 3 units   Blood sugar 331 to 380 add 4 units   Blood sugar greater than 380 add 5 units. 5 pen 6    ondansetron (ZOFRAN) 4 MG tablet Take 1 tablet (4 mg total) by mouth every 6 (six) hours as needed for Nausea (3rd line). 12 tablet 0    pantoprazole (PROTONIX) 40 MG tablet Take 1 tablet (40 mg total) by mouth once daily. 30 tablet 11    pen needle, diabetic (BD ULTRA-FINE ROVERTO PEN NEEDLE) 32 gauge x 5/32" Ndle USE AS DIRECTED WITH INSULIN 100 each 0    prochlorperazine (COMPAZINE) 5 MG tablet Take 1 tablet (5 mg total) by mouth 4 (four) times daily as needed for Nausea (2nd line). 20 tablet 0    promethazine (PHENERGAN) 25 MG suppository Place 1 suppository (25 mg total) rectally every 6 (six) hours as needed for Nausea. 10 suppository 0    promethazine (PHENERGAN) 25 MG tablet Take 1 tablet (25 mg total) by mouth every 6 (six) hours as needed for Nausea (1st line). 20 tablet 0       Review of patient's allergies indicates:   Allergen Reactions    Lactose Other (See Comments)     Gas and " moderate to sever abdominal pain       ROS     Objective:     Vitals:    03/12/23 0722   BP: (!) 159/77   Pulse: 62   Resp: 19   Temp: 98 °F (36.7 °C)         Constitutional:  not in acute distress and well developed  HENT: Head: Normal, normocephalic, atraumatic.  Eyes: conjunctiva clear and sclera nonicteric  Cardiovascular: regular rate and rhythm and no murmur  Respiratory: normal chest expansion & respiratory effort   and no accessory muscle use  GI: soft, non-tender, without masses or organomegaly  Musculoskeletal: no muscular tenderness noted  Skin: normal color  Neurological: alert, oriented x3  Psychiatric: mood and affect are within normal limits, pt is a good historian; no memory problems were noted      Significant Labs:  Recent Labs   Lab 03/09/23  0600 03/10/23  0446 03/11/23  0504   HGB 11.0* 10.9* 11.3*       Lab Results   Component Value Date    WBC 3.32 (L) 03/11/2023    HGB 11.3 (L) 03/11/2023    HCT 31.4 (L) 03/11/2023    MCV 87 03/11/2023     (L) 03/11/2023       Lab Results   Component Value Date     03/12/2023    K 3.6 03/12/2023     03/12/2023    CO2 22 (L) 03/12/2023    BUN 7 03/12/2023    CREATININE 1.1 03/12/2023    CALCIUM 8.8 03/12/2023    ANIONGAP 10 03/12/2023    ESTGFRAFRICA >60.0 01/03/2022    EGFRNONAA >60.0 01/03/2022       Lab Results   Component Value Date    ALT 16 03/12/2023    AST 14 03/12/2023    ALKPHOS 50 (L) 03/12/2023    BILITOT 0.7 03/12/2023       Lab Results   Component Value Date    INR 1.0 02/28/2021       Significant Imaging:  Reviewed pertinent radiology findings.       Assessment/Plan:     Andrzej Sinclair is a 37 y.o. male with history of DM1 and cannabis hyperemesis who presents for intractable n/v. Hyperglycemic on arrival, recent A1c shows poor glycemic control. Last THC use about a month ago per patient. Primary team planning discharge today, tolerating at least liquid diet. Has GI appt in 2 weeks. GES previously negative, will discuss if  repeat is necessary at clinic appt. Will arrange for outpatient EGD as well.    Problem List:  Nausea, vomiting  DM1    Recommendations:  - Would limit phenergan use if possible  - Zofran 8 mg q8h at discharge, scheduled if still symptomatic  - Diet as tolerated. Small, frequent meals  - Would hold off on reglan at this time  - Has GI clinic follow-up in 2 weeks  - Schedule request placed for outpatient EGD    Thank you for involving us in the care of Andrzej Sinclair. Please call with any additional questions, concerns or changes in the patient's clinical status. We will sign off.     Redd Aguayo MD  Gastroenterology Fellow PGY IV  [facility]

## 2023-03-12 NOTE — ASSESSMENT & PLAN NOTE
Complaining of worsening abdominal pain, refractory to GI cocktail and sucrasulfate.     - Home protonix QD  - consulted GI, appreciate recommendations.   - prn GI cocktail and sucralfate

## 2023-03-12 NOTE — ASSESSMENT & PLAN NOTE
Patient's FSGs are uncontrolled due to hyperglycemia on current medication regimen.  Last A1c reviewed-   Lab Results   Component Value Date    HGBA1C 10.2 (H) 03/01/2023     Most recent fingerstick glucose reviewed-   Recent Labs   Lab 03/11/23  1120 03/11/23  1621 03/11/23  1938 03/12/23  0721   POCTGLUCOSE 182* 182* 183* 110     Current correctional scale  Medium  Maintain anti-hyperglycemic dose as follows-   Antihyperglycemics (From admission, onward)    Start     Stop Route Frequency Ordered    03/10/23 1534  insulin aspart U-100 pen 1-10 Units         -- SubQ Before meals & nightly PRN 03/10/23 1435    03/10/23 1130  insulin aspart U-100 pen 2 Units         -- SubQ 3 times daily with meals 03/10/23 1013    03/10/23 0900  insulin detemir U-100 pen 10 Units         -- SubQ 2 times daily 03/10/23 0638        Hold Oral hypoglycemics while patient is in the hospital.    - Prandial insulin 2U TID WM  - Basal insulin 10U BID   - bg inpatient goal 140-180

## 2023-03-12 NOTE — DISCHARGE SUMMARY
[facility] - Intensive Care ([place] [address])  [facility] Medicine  Discharge Summary      Patient Name: Andrzej Sinclair  MRN: [id number]  REKHA: [id number]  Patient Class: IP- Inpatient  Admission Date: 3/8/2023  Hospital Length of Stay: 2 days  Discharge Date and Time:  03/12/2023 3:28 PM  Attending Physician: Kay Irwin MD   Discharging Provider: Missy Hall MD  Primary Care Provider: Viktoriya Jaeger NP  Hospital Medicine Team: [facility] [place] HOSP MED 4 Missy Hall MD  Primary Care Team: [facility] [place] HOSP MED 4    HPI:   36 y/o M with medical hx of poorly controlled DM1 and hyperemesis cannabinoid syndrome who re-presents to [facility] [place] for intractable nausea and vomiting. Patient w/ previous admission, discharged 1 day ago for intractable n/v for the past 2 weeks. Pt stated he had vomited 10x the past 24 hours, cannot keep anything down, w/ abdominal pain usually prior to vomiting episodes. He also reports headaches, chest pain, dizziness, and SOB. He denies hemoptysis, fevers, chills, and diarrhea. Pt has been to the hospital 4 times over the past 2 weeks for similar issues. He is a poorly controlled diabetic, stating his sugars usually run around 300 when he checks them. Last A1c was 10.2 on 3/1/23. Pt has tried using PO phenergan and dicyclomine with minimal relief of symptoms. Pt was recently been on insulin pump, but claims it has not been working well for him. He is currently not wearing pump in ED. He has not been able to see GI outpatient for his persistent issues. Pt denies recent use of marijuana and states he has not ingested/smoked marijuana for at least 3-4 weeks.     On both admissions, patient AF HDS, mildly tachycardic particularly following vomiting episodes. Satting well on RA. Labs without significant electrolyte derangement. Glucose 215. BHB 1.5. Labs and presentation unconcerning for DKA. Patient to be re-admitted for intractable n/v w/ intolerance of PO intake.         * No surgery found *      Hospital Course:    Admitted for intractable nausea and vomiting in setting of slightly high BHB. Will advance diet as clinically appropriate. Currently on full liquid diet. Course c/b hyperglycemia and worsening abdominal pain. Continues on NS infusion. Adjusted insulin as needed. KUB w non-obstructive bowel gas pattern. Will have follow up with GI clinic and outpatient EGD.        Goals of Care Treatment Preferences:  Code Status: Full Code      Consults:   Consults (From admission, onward)        Status Ordering Provider     Inpatient consult to Gastroenterology  Once        Provider:  (Not yet assigned)    Completed DIANA STROUD     Inpatient consult to PICC team ([facility])  Once        Provider:  (Not yet assigned)    Completed DARRIAN ROQUE          No new Assessment & Plan notes have been filed under this hospital service since the last note was generated.  Service: Hospital Medicine    Final Active Diagnoses:    Diagnosis Date Noted POA    PRINCIPAL PROBLEM:  Intractable vomiting with nausea [R11.2] 03/03/2021 Yes    GERD (gastroesophageal reflux disease) [K21.9] 03/02/2023 Yes    Cannabinoid hyperemesis syndrome [R11.2, F12.90] 04/22/2021 Yes    Type 1 diabetes mellitus with complications [E10.8] 03/10/2021 Yes    Primary hypertension [I10] 03/04/2021 Yes    Ketosis [E88.89] 03/03/2021 Yes    CKD (chronic kidney disease) stage 3, GFR 30-59 ml/min [N18.30] 03/01/2021 Yes      Problems Resolved During this Admission:       Discharged Condition: stable    Disposition: Home or Self Care    Follow Up:    Patient Instructions:      Ambulatory referral/consult to [facility]   Standing Status: Future   Referral Priority: Routine Referral Type: Consultation   Referral Reason: Specialty Services Required   Number of Visits Requested: 1       Significant Diagnostic Studies: Labs: All labs within the past 24 hours have been reviewed    Pending Diagnostic Studies:     None        "  Medications:  Reconciled Home Medications:      Medication List      START taking these medications    omeprazole 20 MG capsule  Commonly known as: PRILOSEC  Take 1 capsule (20 mg total) by mouth once daily.     sucralfate 100 mg/mL suspension  Commonly known as: CARAFATE  Take 10 mLs (1 g total) by mouth every 6 (six) hours as needed (indigestion).        CHANGE how you take these medications    LYUMJEV KWIKPEN U-100 INSULIN 100 unit/mL pen  Generic drug: insulin lispro-aabc  Inject 5 Units into the skin 3 (three) times daily with meals. May also inject 0-10 Units as needed (Hyperglycemia). 5 units with meals + sliding scale insulin   Blood sugar 180 to 230 add 1 units   Blood sugar 231 to 280 add 2 units   Blood sugar 281 to 330 add 3 units   Blood sugar 331 to 380 add 4 units   Blood sugar greater than 380 add 5 units.  What changed: See the new instructions.     ondansetron 4 MG tablet  Commonly known as: ZOFRAN  Take 2 tablets (8 mg total) by mouth 3 (three) times daily.  What changed:   · how much to take  · when to take this  · reasons to take this     promethazine 25 MG suppository  Commonly known as: PHENERGAN  Place 1 suppository (25 mg total) rectally every 6 (six) hours as needed for Nausea.  What changed: Another medication with the same name was removed. Continue taking this medication, and follow the directions you see here.        CONTINUE taking these medications    acetaminophen 325 MG tablet  Commonly known as: TYLENOL  Take 325 mg by mouth daily as needed for Pain.     aluminum & magnesium hydroxide-simethicone 400-400-40 mg/5 mL suspension  Commonly known as: MYLANTA MAX STRENGTH  Take 10 mLs by mouth every 6 (six) hours as needed for Indigestion.     BD ULTRA-FINE ROVERTO PEN NEEDLE 32 gauge x 5/32" Ndle  Generic drug: pen needle, diabetic  USE AS DIRECTED WITH INSULIN     blood sugar diagnostic Strp  To check BG up to 5 times daily     blood-glucose meter Misc  Use to test blood glucose 2 times " "a day with meals.     DEXCOM G6 SENSOR Martha  Generic drug: blood-glucose sensor  3 each by Misc.(Non-Drug; Combo Route) route continuous.     DEXCOM G6 TRANSMITTER Martha  Generic drug: blood-glucose transmitter  1 each by Misc.(Non-Drug; Combo Route) route continuous.     GVOKE HYPOPEN 2-PACK 1 mg/0.2 mL Atin  Generic drug: glucagon  Inject 1 Package into the skin as needed (hypoglycemia).     * insulin syringe-needle U-100 0.5 mL 30 gauge x 5/16" Syrg  To use 5 times daily     * insulin syringe-needle U-100 0.5 mL 31 gauge x 5/16" Syrg  Commonly known as: BD INSULIN SYRINGE ULTRA-FINE  4 times daily     * insulin syringe-needle U-100 0.5 mL 31 gauge x 5/16" Syrg  Commonly known as: BD INSULIN SYRINGE ULTRA-FINE  4 times daily     * insulin syringe-needle U-100 0.5 mL 31 gauge x 5/16" Syrg  Commonly known as: BD INSULIN SYRINGE ULTRA-FINE  4 times daily     KETONE URINE TEST Strp  Generic drug: acetone (urine) test  30 strips by Misc.(Non-Drug; Combo Route) route as needed (hyperglycemia).     lancets [facility]  Use to test blood glucose 2 (two) times daily with meals.     lancing device Misc  1 Device by Misc.(Non-Drug; Combo Route) route 2 (two) times daily with meals.     lisinopriL 10 MG tablet  Take 1 tablet (10 mg total) by mouth once daily.     pantoprazole 40 MG tablet  Commonly known as: PROTONIX  Take 1 tablet (40 mg total) by mouth once daily.     TRESIBA FLEXTOUCH U-100 100 unit/mL (3 mL) insulin pen  Generic drug: insulin degludec  Inject 16 Units into the skin every evening.         * This list has 4 medication(s) that are the same as other medications prescribed for you. Read the directions carefully, and ask your doctor or other care provider to review them with you.            STOP taking these medications    prochlorperazine 5 MG tablet  Commonly known as: COMPAZINE            Indwelling Lines/Drains at time of discharge:   Lines/Drains/Airways     None                 Time spent on the discharge of " patient: 35 minutes         Missy Hall MD  Department of Hospital Medicine  [facility] - Intensive Care ([place])

## 2023-03-12 NOTE — NURSING
Pt cont to have abd pain and nausea. Prn GI cocktail given with routine phenergan. Pt was encouraged to get up and walkl around to assist with gas pains. Pt reports being lactose intolerant and asked that I add to diet, allergies.

## 2023-03-12 NOTE — PLAN OF CARE
Problem: Fluid and Electrolyte Imbalance (Acute Kidney Injury/Impairment)  Goal: Fluid and Electrolyte Balance  Outcome: Ongoing, Not Progressing     Problem: Oral Intake Inadequate (Acute Kidney Injury/Impairment)  Goal: Optimal Nutrition Intake  Outcome: Ongoing, Not Progressing     Problem: Renal Function Impairment (Acute Kidney Injury/Impairment)  Goal: Effective Renal Function  Outcome: Ongoing, Not Progressing  Pt AAO X 4; able to express needs.  Discharge order held. No c/o pain.  Vomiting.  Unable to tolerate Full Liquid Diet.  GI consulted.  Meds and IVF's given as ordered.   Resting quietly this morning.  Safety maintained.  Bed in low position,  call  light in reach.

## 2023-03-15 ENCOUNTER — TELEPHONE (OUTPATIENT)
Dept: ADMINISTRATIVE | Facility: CLINIC | Age: 38
End: 2023-03-15
Payer: MEDICARE

## 2023-03-15 NOTE — PROGRESS NOTES
"Phoned patient in response to reply of "2" received after hours on 3/14 to post-discharge texting tracker. There was no answer but left message including OOC number for possible symptoms. Will make second attempt.  2nd attempt-no answer. Left message including OOC number and reminder to reply to texting tracker if call back required.    "

## 2023-03-16 ENCOUNTER — TELEPHONE (OUTPATIENT)
Dept: ENDOCRINOLOGY | Facility: CLINIC | Age: 38
End: 2023-03-16
Payer: MEDICARE

## 2023-03-16 NOTE — TELEPHONE ENCOUNTER
Called to schedule patient for  diabetes education  (pump training) appointment, no answer left message to return call to clinic, callback number provided.    Patient has follow up virtual appointment scheduled with ZEKE Jaeger 4/14

## 2023-03-17 ENCOUNTER — PATIENT MESSAGE (OUTPATIENT)
Dept: ENDOSCOPY | Facility: HOSPITAL | Age: 38
End: 2023-03-17
Payer: MEDICARE

## 2023-03-21 ENCOUNTER — TELEPHONE (OUTPATIENT)
Dept: ADMINISTRATIVE | Facility: CLINIC | Age: 38
End: 2023-03-21
Payer: MEDICARE

## 2023-03-21 ENCOUNTER — PATIENT OUTREACH (OUTPATIENT)
Dept: ADMINISTRATIVE | Facility: OTHER | Age: 38
End: 2023-03-21
Payer: MEDICARE

## 2023-04-03 ENCOUNTER — PATIENT MESSAGE (OUTPATIENT)
Dept: ENDOCRINOLOGY | Facility: CLINIC | Age: 38
End: 2023-04-03
Payer: MEDICARE

## 2023-04-04 DIAGNOSIS — E10.36 TYPE 1 DIABETES MELLITUS WITH DIABETIC CATARACT: ICD-10-CM

## 2023-04-05 RX ORDER — BLOOD-GLUCOSE SENSOR
1 EACH MISCELLANEOUS
Qty: 3 EACH | Refills: 11 | Status: SHIPPED | OUTPATIENT
Start: 2023-04-05 | End: 2023-04-08 | Stop reason: SDUPTHER

## 2023-04-05 RX ORDER — BLOOD-GLUCOSE TRANSMITTER
1 EACH MISCELLANEOUS
Qty: 1 EACH | Refills: 3 | Status: SHIPPED | OUTPATIENT
Start: 2023-04-05 | End: 2023-04-08 | Stop reason: SDUPTHER

## 2023-04-05 RX ORDER — SYRINGE,SAFETY WITH NEEDLE,1ML 25GX1"
SYRINGE (EA) MISCELLANEOUS
Qty: 500 EACH | Refills: 3 | Status: SHIPPED | OUTPATIENT
Start: 2023-04-05 | End: 2023-04-08 | Stop reason: SDUPTHER

## 2023-04-05 RX ORDER — LANCETS
1 EACH MISCELLANEOUS
Qty: 500 EACH | Refills: 3 | Status: SHIPPED | OUTPATIENT
Start: 2023-04-05 | End: 2023-04-08 | Stop reason: SDUPTHER

## 2023-04-05 RX ORDER — LISINOPRIL 10 MG/1
10 TABLET ORAL DAILY
Qty: 90 TABLET | Refills: 3 | Status: SHIPPED | OUTPATIENT
Start: 2023-04-05 | End: 2023-04-08 | Stop reason: SDUPTHER

## 2023-04-10 ENCOUNTER — LAB VISIT (OUTPATIENT)
Dept: LAB | Facility: HOSPITAL | Age: 38
End: 2023-04-10
Payer: MEDICARE

## 2023-04-10 DIAGNOSIS — E10.8 TYPE 1 DIABETES MELLITUS WITH COMPLICATIONS: ICD-10-CM

## 2023-04-10 LAB
ALBUMIN SERPL BCP-MCNC: 4 G/DL (ref 3.5–5.2)
ALP SERPL-CCNC: 114 U/L (ref 55–135)
ALT SERPL W/O P-5'-P-CCNC: 18 U/L (ref 10–44)
ANION GAP SERPL CALC-SCNC: 11 MMOL/L (ref 8–16)
AST SERPL-CCNC: 25 U/L (ref 10–40)
BILIRUB SERPL-MCNC: 0.3 MG/DL (ref 0.1–1)
BUN SERPL-MCNC: 23 MG/DL (ref 6–20)
CALCIUM SERPL-MCNC: 9.7 MG/DL (ref 8.7–10.5)
CHLORIDE SERPL-SCNC: 99 MMOL/L (ref 95–110)
CHOLEST SERPL-MCNC: 274 MG/DL (ref 120–199)
CHOLEST/HDLC SERPL: 3.4 {RATIO} (ref 2–5)
CO2 SERPL-SCNC: 23 MMOL/L (ref 23–29)
CREAT SERPL-MCNC: 1.4 MG/DL (ref 0.5–1.4)
EST. GFR  (NO RACE VARIABLE): >60 ML/MIN/1.73 M^2
ESTIMATED AVG GLUCOSE: 197 MG/DL (ref 68–131)
GLUCOSE SERPL-MCNC: 305 MG/DL (ref 70–110)
HBA1C MFR BLD: 8.5 % (ref 4–5.6)
HDLC SERPL-MCNC: 80 MG/DL (ref 40–75)
HDLC SERPL: 29.2 % (ref 20–50)
LDLC SERPL CALC-MCNC: 172.8 MG/DL (ref 63–159)
NONHDLC SERPL-MCNC: 194 MG/DL
POTASSIUM SERPL-SCNC: 4.7 MMOL/L (ref 3.5–5.1)
PROT SERPL-MCNC: 7.4 G/DL (ref 6–8.4)
SODIUM SERPL-SCNC: 133 MMOL/L (ref 136–145)
TRIGL SERPL-MCNC: 106 MG/DL (ref 30–150)

## 2023-04-10 PROCEDURE — 83036 HEMOGLOBIN GLYCOSYLATED A1C: CPT | Performed by: NURSE PRACTITIONER

## 2023-04-10 PROCEDURE — 80061 LIPID PANEL: CPT | Performed by: NURSE PRACTITIONER

## 2023-04-10 PROCEDURE — 80053 COMPREHEN METABOLIC PANEL: CPT | Performed by: NURSE PRACTITIONER

## 2023-04-10 PROCEDURE — 36415 COLL VENOUS BLD VENIPUNCTURE: CPT | Performed by: NURSE PRACTITIONER

## 2023-04-14 ENCOUNTER — TELEPHONE (OUTPATIENT)
Dept: ENDOCRINOLOGY | Facility: CLINIC | Age: 38
End: 2023-04-14
Payer: MEDICARE

## 2023-04-14 NOTE — TELEPHONE ENCOUNTER
Call attempted twice to try to see if patient would be logging on to do his virtual visit this morning with Viktoriya Jaeger NP. Both times phone didn't even ring - went straight to voicemail

## 2023-05-02 ENCOUNTER — OFFICE VISIT (OUTPATIENT)
Dept: ENDOCRINOLOGY | Facility: CLINIC | Age: 38
End: 2023-05-02
Payer: MEDICARE

## 2023-05-02 ENCOUNTER — PATIENT MESSAGE (OUTPATIENT)
Dept: ENDOCRINOLOGY | Facility: CLINIC | Age: 38
End: 2023-05-02
Payer: MEDICARE

## 2023-05-02 ENCOUNTER — PATIENT MESSAGE (OUTPATIENT)
Dept: ENDOCRINOLOGY | Facility: CLINIC | Age: 38
End: 2023-05-02

## 2023-05-02 DIAGNOSIS — E10.29 TYPE 1 DIABETES MELLITUS WITH MICROALBUMINURIA: Primary | ICD-10-CM

## 2023-05-02 DIAGNOSIS — E78.5 HYPERLIPIDEMIA, UNSPECIFIED HYPERLIPIDEMIA TYPE: ICD-10-CM

## 2023-05-02 DIAGNOSIS — E10.36 TYPE 1 DIABETES MELLITUS WITH DIABETIC CATARACT: ICD-10-CM

## 2023-05-02 DIAGNOSIS — R11.2 NAUSEA AND VOMITING, UNSPECIFIED VOMITING TYPE: ICD-10-CM

## 2023-05-02 DIAGNOSIS — R80.9 TYPE 1 DIABETES MELLITUS WITH MICROALBUMINURIA: Primary | ICD-10-CM

## 2023-05-02 PROCEDURE — 3060F PR POS MICROALBUMINURIA RESULT DOCUMENTED/REVIEW: ICD-10-PCS | Mod: CPTII,95,, | Performed by: NURSE PRACTITIONER

## 2023-05-02 PROCEDURE — 1160F PR REVIEW ALL MEDS BY PRESCRIBER/CLIN PHARMACIST DOCUMENTED: ICD-10-PCS | Mod: CPTII,95,, | Performed by: NURSE PRACTITIONER

## 2023-05-02 PROCEDURE — 1160F RVW MEDS BY RX/DR IN RCRD: CPT | Mod: CPTII,95,, | Performed by: NURSE PRACTITIONER

## 2023-05-02 PROCEDURE — 3052F HG A1C>EQUAL 8.0%<EQUAL 9.0%: CPT | Mod: CPTII,95,, | Performed by: NURSE PRACTITIONER

## 2023-05-02 PROCEDURE — 3066F NEPHROPATHY DOC TX: CPT | Mod: CPTII,95,, | Performed by: NURSE PRACTITIONER

## 2023-05-02 PROCEDURE — 4010F PR ACE/ARB THEARPY RXD/TAKEN: ICD-10-PCS | Mod: CPTII,95,, | Performed by: NURSE PRACTITIONER

## 2023-05-02 PROCEDURE — 3060F POS MICROALBUMINURIA REV: CPT | Mod: CPTII,95,, | Performed by: NURSE PRACTITIONER

## 2023-05-02 PROCEDURE — 1159F PR MEDICATION LIST DOCUMENTED IN MEDICAL RECORD: ICD-10-PCS | Mod: CPTII,95,, | Performed by: NURSE PRACTITIONER

## 2023-05-02 PROCEDURE — 4010F ACE/ARB THERAPY RXD/TAKEN: CPT | Mod: CPTII,95,, | Performed by: NURSE PRACTITIONER

## 2023-05-02 PROCEDURE — 1159F MED LIST DOCD IN RCRD: CPT | Mod: CPTII,95,, | Performed by: NURSE PRACTITIONER

## 2023-05-02 PROCEDURE — 99214 PR OFFICE/OUTPT VISIT, EST, LEVL IV, 30-39 MIN: ICD-10-PCS | Mod: 95,,, | Performed by: NURSE PRACTITIONER

## 2023-05-02 PROCEDURE — 3052F PR MOST RECENT HEMOGLOBIN A1C LEVEL 8.0 - < 9.0%: ICD-10-PCS | Mod: CPTII,95,, | Performed by: NURSE PRACTITIONER

## 2023-05-02 PROCEDURE — 99214 OFFICE O/P EST MOD 30 MIN: CPT | Mod: 95,,, | Performed by: NURSE PRACTITIONER

## 2023-05-02 PROCEDURE — 3066F PR DOCUMENTATION OF TREATMENT FOR NEPHROPATHY: ICD-10-PCS | Mod: CPTII,95,, | Performed by: NURSE PRACTITIONER

## 2023-05-02 RX ORDER — ROSUVASTATIN CALCIUM 10 MG/1
10 TABLET, COATED ORAL DAILY
Qty: 90 TABLET | Refills: 3 | Status: ON HOLD | OUTPATIENT
Start: 2023-05-02 | End: 2023-11-13

## 2023-05-02 RX ORDER — INSULIN LISPRO-AABC 100 [IU]/ML
7 INJECTION, SOLUTION INTRAVENOUS; SUBCUTANEOUS
Qty: 10 ML | Refills: 6 | Status: SHIPPED | OUTPATIENT
Start: 2023-05-02 | End: 2023-06-22 | Stop reason: SDUPTHER

## 2023-05-02 RX ORDER — SYRINGE AND NEEDLE,INSULIN,1ML 28GX1/2"
1 SYRINGE, EMPTY DISPOSABLE MISCELLANEOUS
Qty: 150 EACH | Refills: 6 | Status: ON HOLD | OUTPATIENT
Start: 2023-05-02 | End: 2023-11-13 | Stop reason: SDUPTHER

## 2023-05-02 RX ORDER — INSULIN DEGLUDEC 100 U/ML
14 INJECTION, SOLUTION SUBCUTANEOUS DAILY
Qty: 10 ML | Refills: 6 | Status: SHIPPED | OUTPATIENT
Start: 2023-05-02 | End: 2023-06-22 | Stop reason: SDUPTHER

## 2023-05-02 NOTE — PROGRESS NOTES
The patient location is:  Patient Home   The chief complaint leading to consultation is: Type 1 DM  Visit type: Virtual visit with synchronous audio and video  Total time spent with patient: 30 min  Each patient to whom he or she provides medical services by telemedicine is:  (1) informed of the relationship between the physician and patient and the respective role of any other health care provider with respect to management of the patient; and (2) notified that he or she may decline to receive medical services by telemedicine and may withdraw from such care at any time.       CC: Mr. Andrzej Sinclair arrives today for management of Type 1  DM and review of chronic medical conditions, as listed in the visit diagnosis section of this encounter.     HPI: Mr. Andrzej Sinclair was diagnosed with Type 1  DM at age 20.  Denies FH of DM. Multiple past hospitalizations due to DKA, last in 2021. Patient with frequent ER presentations for intractable N/V and abdominal pain related to cannabis use.     Last seen in endocrine by ZEKE Jaeger NP in 1/2023.     He is new to me today.    Most recent hospitalization in March. Multiple ER and hospital admissions for hyperemesis cannabinoid syndrome/intractable nausea and vomiting/abdominal pain. Past gastric emptying study in 2021 was normal. He has since ceased use of cannabis and symptoms are improving. He states that he has GI outpatient follow up scheduled.     Ms. Jaeger prescribed OmniPod 5 at his last visit. However, his insurance doesn't cover diabetes education so he had started on his own and this led to significant hyperglycemia. He is no longer using.     He has had several incidents when he administers insulin and it leaks from pen needle attachment. He is requesting to change to vial/syringe method.     He reduced his Tresiba dose from 16 to 10 units because he was having readings of 80s overnight. Now waking in the 200s.     His appetite has improved since last  hospitalization and has gained weight. His current weight is a reported 221 lbs.     BG monitoring per Dexcom G6. Uses reader. Got new phone but hasn't downloaded G6 juan manuel yet. Reports the following:  Fastins  Lunch: 140-150  Dinner: 100-110  Bedtime: 120    Hypoglycemia: No    Missing Insulin/PO medication doses: No  Timing prandial insulin appropriately before meals: yes    Exercise: Yes - walking often    Dietary Habits:  Eats 3 meals/day. Snacks in between meals - gives insulin with CHO containing meals. Avoids sugary beverages.    Last DM education appointment: 3/2021    Regarding hyperlipidemia, he denies using statin before.      CURRENT DIABETIC MEDS:  Tresiba 10 units QHS, Lyumjev 7 units with meals  + correction scale, target 180, ISF 50  Vial or pen: pen  Glucometer type:     Previous DM treatments:    Last Eye Exam: > 1 year  Last Podiatry Exam: n/a    REVIEW OF SYSTEMS  Constitutional: no c/o fatigue, weakness, or weight loss. + weight gain   Eyes: denies visual disturbances.  Cardiac: no palpitations or chest pain.  Respiratory: no cough or dyspnea.  GI: no c/o abdominal pain. Denies h/o pancreatitis. Continues with intermittent nausea but has improved (no more vomiting). Reports improvement with omeprazole. Uses Zofran as needed.   Skin: no lesions or rashes.  Neuro: + intermittent numbness, tingling in feet  Endocrine: denies polyphagia, polydipsia, polyuria      Personally reviewed Past Medical, Surgical, Social History.    Vital Signs  There were no vitals taken for this visit. -- video visit    Personally reviewed the below labs:    Hemoglobin A1C   Date Value Ref Range Status   04/10/2023 8.5 (H) 4.0 - 5.6 % Final     Comment:     ADA Screening Guidelines:  5.7-6.4%  Consistent with prediabetes  >or=6.5%  Consistent with diabetes    High levels of fetal hemoglobin interfere with the HbA1C  assay. Heterozygous hemoglobin variants (HbS, HgC, etc)do  not significantly interfere with this assay.    However, presence of multiple variants may affect accuracy.     03/01/2023 10.2 (H) 4.0 - 5.6 % Final     Comment:     ADA Screening Guidelines:  5.7-6.4%  Consistent with prediabetes  >or=6.5%  Consistent with diabetes    High levels of fetal hemoglobin interfere with the HbA1C  assay. Heterozygous hemoglobin variants (HbS, HgC, etc)do  not significantly interfere with this assay.   However, presence of multiple variants may affect accuracy.     01/03/2022 9.3 (H) 4.0 - 5.6 % Final     Comment:     ADA Screening Guidelines:  5.7-6.4%  Consistent with prediabetes  >or=6.5%  Consistent with diabetes    High levels of fetal hemoglobin interfere with the HbA1C  assay. Heterozygous hemoglobin variants (HbS, HgC, etc)do  not significantly interfere with this assay.   However, presence of multiple variants may affect accuracy.         Chemistry        Component Value Date/Time     (L) 04/10/2023 1058    K 4.7 04/10/2023 1058    CL 99 04/10/2023 1058    CO2 23 04/10/2023 1058    BUN 23 (H) 04/10/2023 1058    CREATININE 1.4 04/10/2023 1058     (H) 04/10/2023 1058        Component Value Date/Time    CALCIUM 9.7 04/10/2023 1058    ALKPHOS 114 04/10/2023 1058    AST 25 04/10/2023 1058    ALT 18 04/10/2023 1058    BILITOT 0.3 04/10/2023 1058    ESTGFRAFRICA >60.0 01/03/2022 1359    EGFRNONAA >60.0 01/03/2022 1359          Lab Results   Component Value Date    CHOL 274 (H) 04/10/2023     Lab Results   Component Value Date    HDL 80 (H) 04/10/2023     Lab Results   Component Value Date    LDLCALC 172.8 (H) 04/10/2023     Lab Results   Component Value Date    TRIG 106 04/10/2023     Lab Results   Component Value Date    CHOLHDL 29.2 04/10/2023       Lab Results   Component Value Date    MICALBCREAT 85.3 (H) 04/10/2023     Lab Results   Component Value Date    TSH 1.016 01/03/2022       CrCl cannot be calculated (Patient's most recent lab result is older than the maximum 7 days allowed.).    No results found for:  [id number]      PHYSICAL EXAMINATION  Deferred - video visit    Assessment/Plan  1. Type 1 diabetes mellitus with microalbuminuria -- Uncontrolled but improving. Cannot view Dexcom download today, as he is using reader. I recommended downloading G6 juan manuel to new phone. Has gained weight. Fasting glucoses are elevated but reported glycoses are reasonable between meals.   -- increase Tresiba to 14 units QHS. May increase dose by 2 units every 5 days until FBG < 150, don't exceed 24 units without notifying me.   -- continue Lyumjev at current dose  -- changed insulin to vials, per patient's request.  -- continue Dexcom G6  Ambulatory referral/consult to Optometry    -- Discussed diagnosis of DM, A1c goals, progression of disease, long term complications and tx options.   2. Hyperlipidemia, unspecified hyperlipidemia type  -- uncontrolled with   -- start rosuvastatin (CRESTOR) 10 MG tablet. Discussed medication's mechanism of action, side effects, and contraindications.   -- lipid panel, CMP ordered to recheck levels       FOLLOW UP  Follow up in about 3 months (around 8/2/2023). With ZEKE Jaeger NP  Patient instructed to bring BG logs to each follow up   Patient encouraged to call for any BG/medication issues, concerns, or questions.    Orders Placed This Encounter   Procedures    Hemoglobin A1C    Comprehensive Metabolic Panel    Lipid Panel    Ambulatory referral/consult to Optometry

## 2023-05-02 NOTE — PATIENT INSTRUCTIONS
Increase Tresiba to 14 units nightly. If fasting blood sugars remain > 150 after 5 days, increase by 2 units every 5 days until fasting blood sugars are < 150. Don't exceed 24 units without notifying me.     Continue Lyumjev at same dose.

## 2023-05-05 ENCOUNTER — PATIENT MESSAGE (OUTPATIENT)
Dept: ENDOCRINOLOGY | Facility: CLINIC | Age: 38
End: 2023-05-05
Payer: MEDICARE

## 2023-05-08 DIAGNOSIS — E10.36 TYPE 1 DIABETES MELLITUS WITH DIABETIC CATARACT: ICD-10-CM

## 2023-05-09 RX ORDER — BLOOD-GLUCOSE SENSOR
1 EACH MISCELLANEOUS
Qty: 3 EACH | Refills: 11 | Status: SHIPPED | OUTPATIENT
Start: 2023-05-09 | End: 2023-06-22 | Stop reason: SDUPTHER

## 2023-05-09 RX ORDER — ALUMINUM HYDROXIDE, MAGNESIUM HYDROXIDE, AND SIMETHICONE 2400; 240; 2400 MG/30ML; MG/30ML; MG/30ML
10 SUSPENSION ORAL EVERY 6 HOURS PRN
Qty: 100 ML | Refills: 0 | Status: SHIPPED | OUTPATIENT
Start: 2023-05-09 | End: 2023-10-18 | Stop reason: SDUPTHER

## 2023-05-09 RX ORDER — BLOOD-GLUCOSE TRANSMITTER
1 EACH MISCELLANEOUS
Qty: 1 EACH | Refills: 3 | Status: SHIPPED | OUTPATIENT
Start: 2023-05-09 | End: 2023-06-22 | Stop reason: SDUPTHER

## 2023-05-11 ENCOUNTER — PATIENT MESSAGE (OUTPATIENT)
Dept: ENDOCRINOLOGY | Facility: CLINIC | Age: 38
End: 2023-05-11
Payer: MEDICARE

## 2023-05-28 ENCOUNTER — PATIENT MESSAGE (OUTPATIENT)
Dept: ENDOCRINOLOGY | Facility: CLINIC | Age: 38
End: 2023-05-28
Payer: MEDICARE

## 2023-05-28 DIAGNOSIS — E10.36 TYPE 1 DIABETES MELLITUS WITH DIABETIC CATARACT: ICD-10-CM

## 2023-05-29 RX ORDER — BLOOD-GLUCOSE SENSOR
1 EACH MISCELLANEOUS
Qty: 3 EACH | Refills: 11 | Status: CANCELLED | OUTPATIENT
Start: 2023-05-29 | End: 2024-05-28

## 2023-06-12 PROBLEM — E10.10 DIABETIC KETOACIDOSIS WITHOUT COMA ASSOCIATED WITH TYPE 1 DIABETES MELLITUS: Status: RESOLVED | Noted: 2021-02-28 | Resolved: 2023-06-12

## 2023-06-21 ENCOUNTER — PATIENT MESSAGE (OUTPATIENT)
Dept: ENDOCRINOLOGY | Facility: CLINIC | Age: 38
End: 2023-06-21
Payer: MEDICARE

## 2023-06-22 DIAGNOSIS — E10.36 TYPE 1 DIABETES MELLITUS WITH DIABETIC CATARACT: ICD-10-CM

## 2023-06-22 DIAGNOSIS — E10.29 TYPE 1 DIABETES MELLITUS WITH MICROALBUMINURIA: ICD-10-CM

## 2023-06-22 DIAGNOSIS — R80.9 TYPE 1 DIABETES MELLITUS WITH MICROALBUMINURIA: ICD-10-CM

## 2023-06-22 RX ORDER — BLOOD-GLUCOSE SENSOR
1 EACH MISCELLANEOUS
Qty: 3 EACH | Refills: 11 | Status: SHIPPED | OUTPATIENT
Start: 2023-06-22 | End: 2023-09-21 | Stop reason: SDUPTHER

## 2023-06-22 RX ORDER — INSULIN GLARGINE 300 U/ML
INJECTION, SOLUTION SUBCUTANEOUS
OUTPATIENT
Start: 2023-06-22

## 2023-06-22 RX ORDER — BLOOD-GLUCOSE TRANSMITTER
1 EACH MISCELLANEOUS
Qty: 1 EACH | Refills: 3 | Status: SHIPPED | OUTPATIENT
Start: 2023-06-22 | End: 2023-09-21 | Stop reason: SDUPTHER

## 2023-06-22 RX ORDER — INSULIN PMP CART,AUT,G6/7,CNTR
EACH SUBCUTANEOUS
Status: ON HOLD | COMMUNITY
Start: 2023-01-16 | End: 2023-11-13

## 2023-06-22 RX ORDER — PEN NEEDLE, DIABETIC 29 G X1/2"
NEEDLE, DISPOSABLE MISCELLANEOUS
Status: ON HOLD | COMMUNITY
Start: 2023-04-05 | End: 2023-11-13 | Stop reason: SDUPTHER

## 2023-06-22 RX ORDER — INSULIN LISPRO-AABC 100 [IU]/ML
7 INJECTION, SOLUTION INTRAVENOUS; SUBCUTANEOUS
Qty: 10 ML | Refills: 6 | Status: SHIPPED | OUTPATIENT
Start: 2023-06-22 | End: 2023-10-24 | Stop reason: SDUPTHER

## 2023-06-22 RX ORDER — BLOOD-GLUCOSE SENSOR
1 EACH MISCELLANEOUS
Qty: 3 EACH | Refills: 11 | OUTPATIENT
Start: 2023-06-22 | End: 2024-06-21

## 2023-06-22 RX ORDER — INSULIN LISPRO 100 [IU]/ML
INJECTION, SOLUTION INTRAVENOUS; SUBCUTANEOUS
COMMUNITY
Start: 2023-06-04 | End: 2023-06-22

## 2023-06-22 RX ORDER — NAPROXEN SODIUM 220 MG
TABLET ORAL
Status: ON HOLD | COMMUNITY
Start: 2023-05-03 | End: 2023-11-13 | Stop reason: SDUPTHER

## 2023-06-22 RX ORDER — INSULIN DEGLUDEC 100 U/ML
14 INJECTION, SOLUTION SUBCUTANEOUS DAILY
Qty: 5 ML | Refills: 10 | Status: ON HOLD | OUTPATIENT
Start: 2023-06-22 | End: 2023-09-18 | Stop reason: SDUPTHER

## 2023-06-22 RX ORDER — INSULIN LISPRO 100 [IU]/ML
INJECTION, SOLUTION INTRAVENOUS; SUBCUTANEOUS
OUTPATIENT
Start: 2023-06-22

## 2023-09-06 ENCOUNTER — CLINICAL SUPPORT (OUTPATIENT)
Dept: ENDOSCOPY | Facility: HOSPITAL | Age: 38
End: 2023-09-06
Payer: MEDICARE

## 2023-09-06 DIAGNOSIS — R11.2 NAUSEA AND VOMITING, UNSPECIFIED VOMITING TYPE: ICD-10-CM

## 2023-09-06 NOTE — PLAN OF CARE
We attempted to reach  you   to schedule your procedure. We are sorry that we missed you. Please contact Endoscopy  Scheduling Department to reschedule another PAT or call us at [phone] for assistance. Thank you.   .

## 2023-09-07 ENCOUNTER — HOSPITAL ENCOUNTER (OUTPATIENT)
Facility: HOSPITAL | Age: 38
Discharge: LEFT BEFORE DISCHARGE | End: 2023-09-08
Attending: EMERGENCY MEDICINE | Admitting: INTERNAL MEDICINE
Payer: MEDICARE

## 2023-09-07 DIAGNOSIS — R00.0 TACHYCARDIA: ICD-10-CM

## 2023-09-07 DIAGNOSIS — N17.9 AKI (ACUTE KIDNEY INJURY): ICD-10-CM

## 2023-09-07 DIAGNOSIS — R73.9 HYPERGLYCEMIA: ICD-10-CM

## 2023-09-07 DIAGNOSIS — R94.31 QT PROLONGATION: ICD-10-CM

## 2023-09-07 DIAGNOSIS — R07.9 CHEST PAIN: ICD-10-CM

## 2023-09-07 PROBLEM — E87.0 HYPERNATREMIA: Status: ACTIVE | Noted: 2023-09-07

## 2023-09-07 PROBLEM — E10.9 TYPE 1 DIABETES MELLITUS: Status: ACTIVE | Noted: 2023-09-07

## 2023-09-07 LAB
ALBUMIN SERPL BCP-MCNC: 4.2 G/DL (ref 3.5–5.2)
ALLENS TEST: ABNORMAL
ALP SERPL-CCNC: 119 U/L (ref 55–135)
ALT SERPL W/O P-5'-P-CCNC: 30 U/L (ref 10–44)
AMPHET+METHAMPHET UR QL: NEGATIVE
ANION GAP SERPL CALC-SCNC: 17 MMOL/L (ref 8–16)
AST SERPL-CCNC: 26 U/L (ref 10–40)
B-OH-BUTYR BLD STRIP-SCNC: 0.1 MMOL/L (ref 0–0.5)
BACTERIA #/AREA URNS AUTO: ABNORMAL /HPF
BARBITURATES UR QL SCN>200 NG/ML: NEGATIVE
BASOPHILS # BLD AUTO: 0.03 K/UL (ref 0–0.2)
BASOPHILS NFR BLD: 0.2 % (ref 0–1.9)
BENZODIAZ UR QL SCN>200 NG/ML: NEGATIVE
BILIRUB SERPL-MCNC: 1.2 MG/DL (ref 0.1–1)
BILIRUB UR QL STRIP: NEGATIVE
BUN SERPL-MCNC: 55 MG/DL (ref 6–20)
BZE UR QL SCN: NEGATIVE
CALCIUM SERPL-MCNC: 10.8 MG/DL (ref 8.7–10.5)
CANNABINOIDS UR QL SCN: ABNORMAL
CHLORIDE SERPL-SCNC: 117 MMOL/L (ref 95–110)
CLARITY UR REFRACT.AUTO: ABNORMAL
CO2 SERPL-SCNC: 23 MMOL/L (ref 23–29)
COLOR UR AUTO: YELLOW
CREAT SERPL-MCNC: 2.5 MG/DL (ref 0.5–1.4)
CREAT UR-MCNC: 278 MG/DL (ref 23–375)
DIFFERENTIAL METHOD: ABNORMAL
EOSINOPHIL # BLD AUTO: 0 K/UL (ref 0–0.5)
EOSINOPHIL NFR BLD: 0 % (ref 0–8)
ERYTHROCYTE [DISTWIDTH] IN BLOOD BY AUTOMATED COUNT: 13.2 % (ref 11.5–14.5)
EST. GFR  (NO RACE VARIABLE): 33.3 ML/MIN/1.73 M^2
ETHANOL UR-MCNC: <10 MG/DL
GLUCOSE SERPL-MCNC: 328 MG/DL (ref 70–110)
GLUCOSE UR QL STRIP: ABNORMAL
HCO3 UR-SCNC: 32.1 MMOL/L (ref 24–28)
HCT VFR BLD AUTO: 51.3 % (ref 40–54)
HGB BLD-MCNC: 17.1 G/DL (ref 14–18)
HGB UR QL STRIP: ABNORMAL
HYALINE CASTS UR QL AUTO: 9 /LPF
IMM GRANULOCYTES # BLD AUTO: 0.05 K/UL (ref 0–0.04)
IMM GRANULOCYTES NFR BLD AUTO: 0.4 % (ref 0–0.5)
KETONES UR QL STRIP: NEGATIVE
LEUKOCYTE ESTERASE UR QL STRIP: NEGATIVE
LYMPHOCYTES # BLD AUTO: 0.9 K/UL (ref 1–4.8)
LYMPHOCYTES NFR BLD: 7.1 % (ref 18–48)
MCH RBC QN AUTO: 30.3 PG (ref 27–31)
MCHC RBC AUTO-ENTMCNC: 33.3 G/DL (ref 32–36)
MCV RBC AUTO: 91 FL (ref 82–98)
METHADONE UR QL SCN>300 NG/ML: NEGATIVE
MICROSCOPIC COMMENT: ABNORMAL
MONOCYTES # BLD AUTO: 1.4 K/UL (ref 0.3–1)
MONOCYTES NFR BLD: 11.3 % (ref 4–15)
NEUTROPHILS # BLD AUTO: 10.4 K/UL (ref 1.8–7.7)
NEUTROPHILS NFR BLD: 81 % (ref 38–73)
NITRITE UR QL STRIP: NEGATIVE
NRBC BLD-RTO: 0 /100 WBC
OPIATES UR QL SCN: ABNORMAL
PCO2 BLDA: 44.7 MMHG (ref 35–45)
PCP UR QL SCN>25 NG/ML: NEGATIVE
PH SMN: 7.46 [PH] (ref 7.35–7.45)
PH UR STRIP: 5 [PH] (ref 5–8)
PLATELET # BLD AUTO: 263 K/UL (ref 150–450)
PMV BLD AUTO: 11.7 FL (ref 9.2–12.9)
PO2 BLDA: 74 MMHG (ref 40–60)
POC BE: 8 MMOL/L
POC SATURATED O2: 95 % (ref 95–100)
POC TCO2: 33 MMOL/L (ref 24–29)
POCT GLUCOSE: 261 MG/DL (ref 70–110)
POCT GLUCOSE: 294 MG/DL (ref 70–110)
POCT GLUCOSE: 372 MG/DL (ref 70–110)
POTASSIUM SERPL-SCNC: 5.3 MMOL/L (ref 3.5–5.1)
PROT SERPL-MCNC: 8.9 G/DL (ref 6–8.4)
PROT UR QL STRIP: ABNORMAL
RBC # BLD AUTO: 5.64 M/UL (ref 4.6–6.2)
RBC #/AREA URNS AUTO: 16 /HPF (ref 0–4)
SAMPLE: ABNORMAL
SITE: ABNORMAL
SODIUM SERPL-SCNC: 157 MMOL/L (ref 136–145)
SP GR UR STRIP: 1.02 (ref 1–1.03)
SQUAMOUS #/AREA URNS AUTO: 1 /HPF
TOXICOLOGY INFORMATION: ABNORMAL
URN SPEC COLLECT METH UR: ABNORMAL
WBC # BLD AUTO: 12.8 K/UL (ref 3.9–12.7)
WBC #/AREA URNS AUTO: 4 /HPF (ref 0–5)
YEAST UR QL AUTO: ABNORMAL

## 2023-09-07 PROCEDURE — 63600175 PHARM REV CODE 636 W HCPCS: Performed by: INTERNAL MEDICINE

## 2023-09-07 PROCEDURE — 96361 HYDRATE IV INFUSION ADD-ON: CPT

## 2023-09-07 PROCEDURE — 80307 DRUG TEST PRSMV CHEM ANLYZR: CPT

## 2023-09-07 PROCEDURE — 25000003 PHARM REV CODE 250

## 2023-09-07 PROCEDURE — G0378 HOSPITAL OBSERVATION PER HR: HCPCS

## 2023-09-07 PROCEDURE — 87040 BLOOD CULTURE FOR BACTERIA: CPT

## 2023-09-07 PROCEDURE — 93010 PR ELECTROCARDIOGRAM REPORT: ICD-10-PCS | Mod: ,,, | Performed by: INTERNAL MEDICINE

## 2023-09-07 PROCEDURE — 96376 TX/PRO/DX INJ SAME DRUG ADON: CPT

## 2023-09-07 PROCEDURE — 93005 ELECTROCARDIOGRAM TRACING: CPT

## 2023-09-07 PROCEDURE — 99900035 HC TECH TIME PER 15 MIN (STAT)

## 2023-09-07 PROCEDURE — 96372 THER/PROPH/DIAG INJ SC/IM: CPT | Performed by: INTERNAL MEDICINE

## 2023-09-07 PROCEDURE — 82010 KETONE BODYS QUAN: CPT

## 2023-09-07 PROCEDURE — 25000003 PHARM REV CODE 250: Performed by: INTERNAL MEDICINE

## 2023-09-07 PROCEDURE — 63600175 PHARM REV CODE 636 W HCPCS

## 2023-09-07 PROCEDURE — 82803 BLOOD GASES ANY COMBINATION: CPT

## 2023-09-07 PROCEDURE — 82962 GLUCOSE BLOOD TEST: CPT

## 2023-09-07 PROCEDURE — 96372 THER/PROPH/DIAG INJ SC/IM: CPT | Mod: 59 | Performed by: INTERNAL MEDICINE

## 2023-09-07 PROCEDURE — 81001 URINALYSIS AUTO W/SCOPE: CPT | Mod: 59

## 2023-09-07 PROCEDURE — 96365 THER/PROPH/DIAG IV INF INIT: CPT

## 2023-09-07 PROCEDURE — 96375 TX/PRO/DX INJ NEW DRUG ADDON: CPT

## 2023-09-07 PROCEDURE — 94761 N-INVAS EAR/PLS OXIMETRY MLT: CPT

## 2023-09-07 PROCEDURE — 80053 COMPREHEN METABOLIC PANEL: CPT

## 2023-09-07 PROCEDURE — 99285 EMERGENCY DEPT VISIT HI MDM: CPT | Mod: 25

## 2023-09-07 PROCEDURE — 93010 ELECTROCARDIOGRAM REPORT: CPT | Mod: ,,, | Performed by: INTERNAL MEDICINE

## 2023-09-07 PROCEDURE — 85025 COMPLETE CBC W/AUTO DIFF WBC: CPT

## 2023-09-07 RX ORDER — ACETAMINOPHEN 325 MG/1
650 TABLET ORAL EVERY 4 HOURS PRN
Status: DISCONTINUED | OUTPATIENT
Start: 2023-09-07 | End: 2023-09-08 | Stop reason: HOSPADM

## 2023-09-07 RX ORDER — GLUCAGON 1 MG
1 KIT INJECTION
Status: DISCONTINUED | OUTPATIENT
Start: 2023-09-07 | End: 2023-09-08 | Stop reason: HOSPADM

## 2023-09-07 RX ORDER — ACETAMINOPHEN 500 MG
1000 TABLET ORAL
Status: COMPLETED | OUTPATIENT
Start: 2023-09-07 | End: 2023-09-07

## 2023-09-07 RX ORDER — AMLODIPINE BESYLATE 5 MG/1
5 TABLET ORAL DAILY
Status: DISCONTINUED | OUTPATIENT
Start: 2023-09-08 | End: 2023-09-08 | Stop reason: HOSPADM

## 2023-09-07 RX ORDER — IBUPROFEN 200 MG
16 TABLET ORAL
Status: DISCONTINUED | OUTPATIENT
Start: 2023-09-07 | End: 2023-09-08 | Stop reason: HOSPADM

## 2023-09-07 RX ORDER — NALOXONE HCL 0.4 MG/ML
0.02 VIAL (ML) INJECTION
Status: DISCONTINUED | OUTPATIENT
Start: 2023-09-07 | End: 2023-09-08 | Stop reason: HOSPADM

## 2023-09-07 RX ORDER — IPRATROPIUM BROMIDE AND ALBUTEROL SULFATE 2.5; .5 MG/3ML; MG/3ML
3 SOLUTION RESPIRATORY (INHALATION) EVERY 6 HOURS PRN
Status: DISCONTINUED | OUTPATIENT
Start: 2023-09-07 | End: 2023-09-08 | Stop reason: HOSPADM

## 2023-09-07 RX ORDER — SUCRALFATE 1 G/10ML
1 SUSPENSION ORAL EVERY 6 HOURS PRN
Status: DISCONTINUED | OUTPATIENT
Start: 2023-09-07 | End: 2023-09-08 | Stop reason: HOSPADM

## 2023-09-07 RX ORDER — TALC
6 POWDER (GRAM) TOPICAL NIGHTLY PRN
Status: DISCONTINUED | OUTPATIENT
Start: 2023-09-07 | End: 2023-09-08 | Stop reason: HOSPADM

## 2023-09-07 RX ORDER — ONDANSETRON 2 MG/ML
8 INJECTION INTRAMUSCULAR; INTRAVENOUS EVERY 8 HOURS
Status: DISCONTINUED | OUTPATIENT
Start: 2023-09-07 | End: 2023-09-08 | Stop reason: HOSPADM

## 2023-09-07 RX ORDER — IBUPROFEN 200 MG
24 TABLET ORAL
Status: DISCONTINUED | OUTPATIENT
Start: 2023-09-07 | End: 2023-09-08 | Stop reason: HOSPADM

## 2023-09-07 RX ORDER — LISINOPRIL 10 MG/1
10 TABLET ORAL DAILY
Status: DISCONTINUED | OUTPATIENT
Start: 2023-09-07 | End: 2023-09-07

## 2023-09-07 RX ORDER — INSULIN ASPART 100 [IU]/ML
0-10 INJECTION, SOLUTION INTRAVENOUS; SUBCUTANEOUS
Status: DISCONTINUED | OUTPATIENT
Start: 2023-09-07 | End: 2023-09-08 | Stop reason: HOSPADM

## 2023-09-07 RX ORDER — PROCHLORPERAZINE EDISYLATE 5 MG/ML
5 INJECTION INTRAMUSCULAR; INTRAVENOUS EVERY 6 HOURS PRN
Status: DISCONTINUED | OUTPATIENT
Start: 2023-09-07 | End: 2023-09-08 | Stop reason: HOSPADM

## 2023-09-07 RX ORDER — ALUMINUM HYDROXIDE, MAGNESIUM HYDROXIDE, AND SIMETHICONE 2400; 240; 2400 MG/30ML; MG/30ML; MG/30ML
10 SUSPENSION ORAL EVERY 6 HOURS PRN
Status: DISCONTINUED | OUTPATIENT
Start: 2023-09-07 | End: 2023-09-07

## 2023-09-07 RX ORDER — DROPERIDOL 2.5 MG/ML
0.62 INJECTION, SOLUTION INTRAMUSCULAR; INTRAVENOUS ONCE
Status: COMPLETED | OUTPATIENT
Start: 2023-09-07 | End: 2023-09-07

## 2023-09-07 RX ORDER — PANTOPRAZOLE SODIUM 40 MG/1
40 TABLET, DELAYED RELEASE ORAL DAILY
Status: DISCONTINUED | OUTPATIENT
Start: 2023-09-07 | End: 2023-09-08 | Stop reason: HOSPADM

## 2023-09-07 RX ORDER — METOCLOPRAMIDE HYDROCHLORIDE 5 MG/ML
10 INJECTION INTRAMUSCULAR; INTRAVENOUS
Status: DISCONTINUED | OUTPATIENT
Start: 2023-09-07 | End: 2023-09-07

## 2023-09-07 RX ORDER — INSULIN ASPART 100 [IU]/ML
7 INJECTION, SOLUTION INTRAVENOUS; SUBCUTANEOUS
Status: DISCONTINUED | OUTPATIENT
Start: 2023-09-07 | End: 2023-09-08 | Stop reason: HOSPADM

## 2023-09-07 RX ORDER — SIMETHICONE 80 MG
1 TABLET,CHEWABLE ORAL 4 TIMES DAILY PRN
Status: DISCONTINUED | OUTPATIENT
Start: 2023-09-07 | End: 2023-09-08 | Stop reason: HOSPADM

## 2023-09-07 RX ORDER — ATORVASTATIN CALCIUM 40 MG/1
40 TABLET, FILM COATED ORAL DAILY
Status: DISCONTINUED | OUTPATIENT
Start: 2023-09-07 | End: 2023-09-08 | Stop reason: HOSPADM

## 2023-09-07 RX ORDER — AMOXICILLIN 250 MG
1 CAPSULE ORAL 2 TIMES DAILY
Status: DISCONTINUED | OUTPATIENT
Start: 2023-09-07 | End: 2023-09-08 | Stop reason: HOSPADM

## 2023-09-07 RX ORDER — CLONIDINE HYDROCHLORIDE 0.1 MG/1
0.1 TABLET ORAL EVERY 6 HOURS PRN
Status: DISCONTINUED | OUTPATIENT
Start: 2023-09-07 | End: 2023-09-08 | Stop reason: HOSPADM

## 2023-09-07 RX ORDER — ONDANSETRON 2 MG/ML
4 INJECTION INTRAMUSCULAR; INTRAVENOUS EVERY 6 HOURS PRN
Status: DISCONTINUED | OUTPATIENT
Start: 2023-09-07 | End: 2023-09-07

## 2023-09-07 RX ORDER — SODIUM CHLORIDE 0.9 % (FLUSH) 0.9 %
10 SYRINGE (ML) INJECTION EVERY 12 HOURS PRN
Status: DISCONTINUED | OUTPATIENT
Start: 2023-09-07 | End: 2023-09-08 | Stop reason: HOSPADM

## 2023-09-07 RX ORDER — ALUMINUM HYDROXIDE, MAGNESIUM HYDROXIDE, AND SIMETHICONE 2400; 240; 2400 MG/30ML; MG/30ML; MG/30ML
30 SUSPENSION ORAL EVERY 6 HOURS PRN
Status: DISCONTINUED | OUTPATIENT
Start: 2023-09-07 | End: 2023-09-08 | Stop reason: HOSPADM

## 2023-09-07 RX ORDER — SODIUM CHLORIDE, SODIUM LACTATE, POTASSIUM CHLORIDE, CALCIUM CHLORIDE 600; 310; 30; 20 MG/100ML; MG/100ML; MG/100ML; MG/100ML
INJECTION, SOLUTION INTRAVENOUS CONTINUOUS
Status: DISCONTINUED | OUTPATIENT
Start: 2023-09-07 | End: 2023-09-08 | Stop reason: HOSPADM

## 2023-09-07 RX ADMIN — INSULIN ASPART 7 UNITS: 100 INJECTION, SOLUTION INTRAVENOUS; SUBCUTANEOUS at 04:09

## 2023-09-07 RX ADMIN — SODIUM CHLORIDE, POTASSIUM CHLORIDE, SODIUM LACTATE AND CALCIUM CHLORIDE: 600; 310; 30; 20 INJECTION, SOLUTION INTRAVENOUS at 03:09

## 2023-09-07 RX ADMIN — INSULIN ASPART 5 UNITS: 100 INJECTION, SOLUTION INTRAVENOUS; SUBCUTANEOUS at 10:09

## 2023-09-07 RX ADMIN — PROMETHAZINE HYDROCHLORIDE 12.5 MG: 25 INJECTION INTRAMUSCULAR; INTRAVENOUS at 06:09

## 2023-09-07 RX ADMIN — DROPERIDOL 0.62 MG: 2.5 INJECTION, SOLUTION INTRAMUSCULAR; INTRAVENOUS at 07:09

## 2023-09-07 RX ADMIN — SODIUM CHLORIDE, POTASSIUM CHLORIDE, SODIUM LACTATE AND CALCIUM CHLORIDE: 600; 310; 30; 20 INJECTION, SOLUTION INTRAVENOUS at 09:09

## 2023-09-07 RX ADMIN — ATORVASTATIN CALCIUM 40 MG: 40 TABLET, FILM COATED ORAL at 09:09

## 2023-09-07 RX ADMIN — INSULIN ASPART 7 UNITS: 100 INJECTION, SOLUTION INTRAVENOUS; SUBCUTANEOUS at 10:09

## 2023-09-07 RX ADMIN — SENNOSIDES AND DOCUSATE SODIUM 1 TABLET: 8.6; 5 TABLET ORAL at 09:09

## 2023-09-07 RX ADMIN — SODIUM CHLORIDE 1000 ML: 9 INJECTION, SOLUTION INTRAVENOUS at 05:09

## 2023-09-07 RX ADMIN — SENNOSIDES AND DOCUSATE SODIUM 1 TABLET: 8.6; 5 TABLET ORAL at 10:09

## 2023-09-07 RX ADMIN — METOCLOPRAMIDE 10 MG: 5 INJECTION, SOLUTION INTRAMUSCULAR; INTRAVENOUS at 10:09

## 2023-09-07 RX ADMIN — INSULIN DETEMIR 14 UNITS: 100 INJECTION, SOLUTION SUBCUTANEOUS at 09:09

## 2023-09-07 RX ADMIN — INSULIN HUMAN 4 UNITS: 100 INJECTION, SOLUTION PARENTERAL at 07:09

## 2023-09-07 RX ADMIN — ACETAMINOPHEN 1000 MG: 500 TABLET ORAL at 07:09

## 2023-09-07 RX ADMIN — ONDANSETRON 8 MG: 2 INJECTION INTRAMUSCULAR; INTRAVENOUS at 10:09

## 2023-09-07 RX ADMIN — ONDANSETRON 8 MG: 2 INJECTION INTRAMUSCULAR; INTRAVENOUS at 01:09

## 2023-09-07 RX ADMIN — INSULIN DETEMIR 10 UNITS: 100 INJECTION, SOLUTION SUBCUTANEOUS at 10:09

## 2023-09-07 RX ADMIN — PANTOPRAZOLE SODIUM 40 MG: 40 TABLET, DELAYED RELEASE ORAL at 09:09

## 2023-09-07 RX ADMIN — LISINOPRIL 10 MG: 10 TABLET ORAL at 09:09

## 2023-09-07 RX ADMIN — SODIUM CHLORIDE, POTASSIUM CHLORIDE, SODIUM LACTATE AND CALCIUM CHLORIDE 500 ML: 600; 310; 30; 20 INJECTION, SOLUTION INTRAVENOUS at 02:09

## 2023-09-07 NOTE — ASSESSMENT & PLAN NOTE
Likely cannabinoid hyperemesis syndrome, although can't rule out gastroparesis  Zofran 8mg Q8H with PRN promethazine and prochlorperazine  CLD, advance as tolerated  IV hydration  Utox  CT abd/pelvis due to significant abd tenderness on exam  May need to consider switching to reglan if no improvement

## 2023-09-07 NOTE — ASSESSMENT & PLAN NOTE
Patient has hypernatremia which is controlled. The hypernatremia is due to Dehydration. We will aim to correct the sodium by 8-10mEq in 24 hours. We will correct their hypernatremia with Select IV fluids: LR at a rate of 125 ml/hr. The patient's sodium results have been reviewed and are listed below.  Recent Labs   Lab 09/07/23  0600   *

## 2023-09-07 NOTE — ED NOTES
Walked in room, pt laying off end of stretcher, moaning/groaning. Removed IV, continues to remove hemodynamic monitoring/ EKG leads. Pt re-directable, but requires excessive attention to ensure compliance with care. Linen change, gown provided, pt helped back into bed, monitoring re-applied. Verbalized the importance of using call light, and keeping monitoring on. Pt did not respond. Charge nurse kimberley made aware, hospital med team messaged to re-eval pt.

## 2023-09-07 NOTE — ED PROVIDER NOTES
Encounter Date: 9/7/2023       History     Chief Complaint   Patient presents with    Hyperglycemia     Increasing cbg at home. Generalized pain and illness. 384 with EMS     Pt is a 37 year old male with hx of DM type 1 and cannabinoid hyperemesis who presents for evaluation of severe nausea nad vomiting, which began 2 days ago. Associated generalized abdominal pain. No fever, chills, chest pain, shortness of breath, numbness, or weakness. Hx of similar symptoms related to cannabinoid hyperemesis. Endorses maiquana use 2 days ago.     The history is provided by the patient.     Review of patient's allergies indicates:   Allergen Reactions    Lactose Other (See Comments)     Gas and moderate to sever abdominal pain     Past Medical History:   Diagnosis Date    Diabetes mellitus     Diabetes mellitus type 1     Diagnosed at age 20     History reviewed. No pertinent surgical history.  Family History   Problem Relation Age of Onset    Other Mother         prediabetes    Diabetes Mother     Other Father         patient does not know his fathers history     Social History     Tobacco Use    Smoking status: Former    Smokeless tobacco: Current   Substance Use Topics    Alcohol use: Yes     Comment: socially    Drug use: No     Review of Systems    Physical Exam     Initial Vitals [09/07/23 0532]   BP Pulse Resp Temp SpO2   (!) 148/111 (!) 131 (!) 22 98.7 °F (37.1 °C) 99 %      MAP       --         Physical Exam    Nursing note and vitals reviewed.  Constitutional: He appears well-developed and well-nourished.   Uncomfortable appearing but no acute distress   HENT:   Head: Normocephalic and atraumatic.   Eyes: EOM are normal. Pupils are equal, round, and reactive to light.   Neck: Neck supple. No tracheal deviation present.   Normal range of motion.  Cardiovascular:  Regular rhythm and intact distal pulses.   Tachycardia present.         No murmur heard.  Pulmonary/Chest: Breath sounds normal. No stridor. No respiratory  distress. He has no wheezes. He has no rales.   Abdominal: Abdomen is soft. He exhibits no distension. There is no abdominal tenderness.   Musculoskeletal:         General: No edema. Normal range of motion.      Cervical back: Normal range of motion and neck supple.     Neurological: He is alert and oriented to person, place, and time.   Skin: Skin is warm and dry. Capillary refill takes less than 2 seconds.         ED Course   Procedures  Labs Reviewed   CBC W/ AUTO DIFFERENTIAL - Abnormal; Notable for the following components:       Result Value    WBC 12.80 (*)     Gran # (ANC) 10.4 (*)     Immature Grans (Abs) 0.05 (*)     Lymph # 0.9 (*)     Mono # 1.4 (*)     Gran % 81.0 (*)     Lymph % 7.1 (*)     All other components within normal limits   COMPREHENSIVE METABOLIC PANEL - Abnormal; Notable for the following components:    Sodium 157 (*)     Potassium 5.3 (*)     Chloride 117 (*)     Glucose 328 (*)     BUN 55 (*)     Creatinine 2.5 (*)     Calcium 10.8 (*)     Total Protein 8.9 (*)     Total Bilirubin 1.2 (*)     eGFR 33.3 (*)     Anion Gap 17 (*)     All other components within normal limits   URINALYSIS, REFLEX TO URINE CULTURE - Abnormal; Notable for the following components:    Appearance, UA Hazy (*)     Protein, UA 2+ (*)     Glucose, UA 3+ (*)     Occult Blood UA 2+ (*)     All other components within normal limits    Narrative:     Specimen Source->Urine   URINALYSIS MICROSCOPIC - Abnormal; Notable for the following components:    RBC, UA 16 (*)     Hyaline Casts, UA 9 (*)     All other components within normal limits    Narrative:     Specimen Source->Urine   ISTAT PROCEDURE - Abnormal; Notable for the following components:    POC PH 7.465 (*)     POC PO2 74 (*)     POC HCO3 32.1 (*)     POC TCO2 33 (*)     All other components within normal limits   POCT GLUCOSE - Abnormal; Notable for the following components:    POCT Glucose 261 (*)     All other components within normal limits   POCT GLUCOSE -  Abnormal; Notable for the following components:    POCT Glucose 294 (*)     All other components within normal limits   CULTURE, BLOOD   CULTURE, BLOOD   BETA - HYDROXYBUTYRATE, SERUM   TOXICOLOGY SCREEN, URINE, RANDOM (COMPLIANCE)   POCT GLUCOSE, HAND-HELD DEVICE   POCT GLUCOSE, HAND-HELD DEVICE   POCT GLUCOSE MONITORING CONTINUOUS          Imaging Results              X-Ray Chest AP Portable (Final result)  Result time 09/07/23 06:20:51      Final result by Wally Jasso MD (09/07/23 06:20:51)                   Impression:      No significant intrathoracic abnormality.  No significant detrimental interval change in the appearance of the chest since 03/05/2023 is appreciated.      Electronically signed by: Wally Jasso MD  Date:    09/07/2023  Time:    06:20               Narrative:    EXAMINATION:  XR CHEST AP PORTABLE    CLINICAL HISTORY:  hyperglycemia;    TECHNIQUE:  One view    COMPARISON:  Comparison is made to 03/05/2023.    FINDINGS:  Heart size is normal, as is the appearance of the pulmonary vascularity.  Lung zones are clear, and are free of significant airspace consolidation or volume loss.  No pleural fluid.  No hilar or mediastinal mass lesion.  No pneumothorax.                                    X-Rays:   Independently Interpreted Readings:   Chest X-Ray: Normal heart size.  No infiltrates.  No acute abnormalities.     Medications   insulin aspart U-100 pen 7 Units (7 Units Subcutaneous Given 9/7/23 1608)   pantoprazole EC tablet 40 mg (40 mg Oral Given 9/7/23 0940)   atorvastatin tablet 40 mg (40 mg Oral Given 9/7/23 0941)   sucralfate 100 mg/mL suspension 1 g (has no administration in time range)   sodium chloride 0.9% flush 10 mL (has no administration in time range)   albuterol-ipratropium 2.5 mg-0.5 mg/3 mL nebulizer solution 3 mL (has no administration in time range)   melatonin tablet 6 mg (has no administration in time range)   senna-docusate 8.6-50 mg per tablet 1 tablet (1 tablet Oral Given  9/7/23 0941)   simethicone chewable tablet 80 mg (has no administration in time range)   acetaminophen tablet 650 mg (has no administration in time range)   naloxone 0.4 mg/mL injection 0.02 mg (has no administration in time range)   glucose chewable tablet 16 g (has no administration in time range)   glucose chewable tablet 24 g (has no administration in time range)   glucagon (human recombinant) injection 1 mg (has no administration in time range)   prochlorperazine injection Soln 5 mg (has no administration in time range)   insulin aspart U-100 pen 0-10 Units (has no administration in time range)   dextrose 10% bolus 125 mL 125 mL (has no administration in time range)   dextrose 10% bolus 250 mL 250 mL (has no administration in time range)   lactated ringers infusion ( Intravenous New Bag 9/7/23 1512)   aluminum & magnesium hydroxide-simethicone 400-400-40 mg/5 mL suspension 30 mL (has no administration in time range)   cloNIDine tablet 0.1 mg (has no administration in time range)   ondansetron injection 8 mg (8 mg Intravenous Given 9/7/23 1335)   promethazine (PHENERGAN) 12.5 mg in dextrose 5 % (D5W) 50 mL IVPB (has no administration in time range)   insulin detemir U-100 (Levemir) pen 10 Units (has no administration in time range)   amLODIPine tablet 5 mg (has no administration in time range)   sodium chloride 0.9% bolus 1,000 mL 1,000 mL (0 mLs Intravenous Stopped 9/7/23 0709)   promethazine (PHENERGAN) 12.5 mg in dextrose 5 % (D5W) 50 mL IVPB (0 mg Intravenous Stopped 9/7/23 0655)   droPERidol injection 0.625 mg (0.625 mg Intravenous Given 9/7/23 0726)   insulin regular injection 4 Units 0.04 mL (4 Units Intravenous Given 9/7/23 0724)   acetaminophen tablet 1,000 mg (1,000 mg Oral Given 9/7/23 0726)   lactated ringers bolus 500 mL (0 mLs Intravenous Stopped 9/7/23 1512)     Medical Decision Making  Pt presents for severe nausea and vomiting. Labs significant for metabolic alkalosis, hypernatremia, and BETH.  Concern symptoms are related to dehydration in the setting of intractable nausea and vomiting. Pt treated with antiemetic and fluids in the ED. Pt also noted to be hyperglycemic without ketosis. Will treat with insulin bolus. Plan to admit to hospital medicine for further management and evaluation     Amount and/or Complexity of Data Reviewed  Labs: ordered.  Radiology: ordered.    Risk  OTC drugs.  Prescription drug management.                               Clinical Impression:   Final diagnoses:  [R73.9] Hyperglycemia  [N17.9] BETH (acute kidney injury)        ED Disposition Condition    Observation                 Chano Burger Jr., MD  Resident  09/07/23 8904

## 2023-09-07 NOTE — ED NOTES
Assumed care of this pt at this time  Patient identifiers verified and correct for Andrzej Yahir   LOC: The patient is awake, alert, will only answer some questions   APPEARANCE: Patient appears uncomfortable but in no acute distress  SKIN: The skin is warm and dry, color consistent with ethnicity, patient has normal skin turgor and moist mucus membranes, skin intact  MUSCULOSKELETAL: Patient moving all extremities spontaneously, no swelling noted.  RESPIRATORY: Airway is open and patent, respirations are spontaneous, patient has a normal effort and rate, no accessory muscle use noted, O2 sats noted at 99% on room air.  CARDIAC: Pt placed on cardiac monitor. Patient has a tachy rate and regular rhythm, no edema noted, capillary refill < 3 seconds. Pt c/o mid sternal chest pain  GASTRO: Soft and non tender to palpation, no distention noted, normoactive bowel sounds present in all four quadrants.   : Pt denies any pain or frequency with urination.  NEURO: Pt opens eyes spontaneously, behavior not appropriate to situation, follows some commands, facial expression symmetrical, bilateral hand grasp equal and even, purposeful motor response noted, normal sensation in all extremities when touched with a finger.

## 2023-09-07 NOTE — HPI
37M with PMH of poorly controlled DM1 and hyperemesis cannabinoid syndrome who presents to [facility] for intractable abd pain, nausea and vomiting. Patient has multiple admissions for similar issue. Unable to obtain proper history as patient only willing to communicate by using hand gestures. He states symptoms started 2 days ago. There doesn't appear to be known inciting event and no sick contacts. Denies associated fever, chills, sob, palpitations, dysuria, diarrhea, constipation. Denies hx of gastroparesis, had workup a few years ago which was normal. Has chronic nausea/vomiting for which he takes zofran and phenergan. Last used marijuana 2-3 days ago. Workup in ED remarkable for VS: , RR 19, /84, O2 sat 100% on RA, WBC 12.80, Na 157, K 5.3, Cl 117, BUN 55, Cr 2.5, Glu 328, Bili 1.2, CXR: No acute findings. Patient received fluid bolus and anti-emetics in ED. He will be admitted to hospital medicine service for further management.

## 2023-09-07 NOTE — ED NOTES
Patient resting in stretcher and is in NAD at this time. Pt is awake alert and oriented x4, VSS. Endorse generalized abdominal pain, 7/10.Pt verbal, states he hasn't been talking because of excess mucus.  Pt updated on POC. Bed low and locked, SR up x2, call bell in patient reach. Pt remains on continuous cardiac monitor, continuous pulse ox, and auto BP cuff. Pt voices no needs at this time.      APPEARANCE: awake and alert in NAD. PAIN  7/10  SKIN: warm, dry and intact. No breakdown or bruising.  MUSCULOSKELETAL: Patient moving all extremities spontaneously, no obvious swelling or deformities noted. Ambulates independently.  RESPIRATORY: Denies shortness of breath.Respirations unlabored.   CARDIAC: Denies CP, 2+ distal pulses; no peripheral edema  ABDOMEN: Abdomen soft, non tender.   : voids spontaneously, denies difficulty  Neurologic: AAO x 4; follows commands equal strength in all extremities; denies numbness/tingling. Denies dizziness  denies weakness

## 2023-09-07 NOTE — ASSESSMENT & PLAN NOTE
Patient with acute kidney injury/acute renal failure likely due to pre-renal azotemia due to dehydration BETH is currently stable. Baseline creatinine 1.1 - Labs reviewed- Renal function/electrolytes with Estimated Creatinine Clearance: 47.5 mL/min (A) (based on SCr of 2.5 mg/dL (H)). according to latest data. Monitor urine output and serial BMP and adjust therapy as needed. Avoid nephrotoxins and renally dose meds for GFR listed above.    Likely dehydration 2/2 intractable N/V  IV hydration

## 2023-09-07 NOTE — H&P
[facility] - Emergency Dept  [facility] Medicine  History & Physical    Patient Name: Andrzej Sinclair  MRN: [id number]  Patient Class: OP- Observation  Admission Date: 9/7/2023  Attending Physician: Andrzej Desai MD   Primary Care Provider: Viktoriya Jaeger NP         Patient information was obtained from patient and ER records.     Subjective:     Principal Problem:Intractable vomiting with nausea    Chief Complaint:   Chief Complaint   Patient presents with    Hyperglycemia     Increasing cbg at home. Generalized pain and illness. 384 with EMS        HPI: 37M with PMH of poorly controlled DM1 and hyperemesis cannabinoid syndrome who presents to [facility] for intractable abd pain, nausea and vomiting. Patient has multiple admissions for similar issue. Unable to obtain proper history as patient only willing to communicate by using hand gestures. He states symptoms started 2 days ago. There doesn't appear to be known inciting event and no sick contacts. Denies associated fever, chills, sob, palpitations, dysuria, diarrhea, constipation. Denies hx of gastroparesis, had workup a few years ago which was normal. Has chronic nausea/vomiting for which he takes zofran and phenergan. Last used marijuana 2-3 days ago. Workup in ED remarkable for VS: , RR 19, /84, O2 sat 100% on RA, WBC 12.80, Na 157, K 5.3, Cl 117, BUN 55, Cr 2.5, Glu 328, Bili 1.2, CXR: No acute findings. Patient received fluid bolus and anti-emetics in ED. He will be admitted to hospital medicine service for further management.       Past Medical History:   Diagnosis Date    Diabetes mellitus     Diabetes mellitus type 1     Diagnosed at age 20       History reviewed. No pertinent surgical history.    Review of patient's allergies indicates:   Allergen Reactions    Lactose Other (See Comments)     Gas and moderate to sever abdominal pain       No current facility-administered medications on file prior to encounter.     Current Outpatient  "Medications on File Prior to Encounter   Medication Sig    acetaminophen (TYLENOL) 325 MG tablet Take 325 mg by mouth daily as needed for Pain.    acetone, urine, test (KETONE URINE TEST) Strp 30 strips by Misc.(Non-Drug; Combo Route) route as needed (hyperglycemia).    aluminum & magnesium hydroxide-simethicone (MYLANTA MAX STRENGTH) 400-400-40 mg/5 mL suspension Take 10 mLs by mouth every 6 (six) hours as needed for Indigestion.    BD INSULIN SYRINGE ULTRA-FINE 0.5 mL 31 gauge x 5/16" Syrg USE FOUR TIMES DAILY AS DIRECTED    blood sugar diagnostic Strp 1 strip by Misc.(Non-Drug; Combo Route) route 5 (five) times daily.    blood-glucose meter Misc Use to test blood glucose 2 times a day with meals.    blood-glucose sensor (DEXCOM G6 SENSOR) Martha 1 each by Misc.(Non-Drug; Combo Route) route every 10 days.    blood-glucose transmitter (DEXCOM G6 TRANSMITTER) Martha 1 each by Misc.(Non-Drug; Combo Route) route every 3 (three) months.    glucagon (GVOKE HYPOPEN 2-PACK) 1 mg/0.2 mL AtIn Inject 1 Package into the skin as needed (hypoglycemia).    insulin degludec (TRESIBA U-100 INSULIN) 100 unit/mL injection Inject 0.14 mLs (14 Units total) into the skin once daily.    insulin lispro-aabc (LYUMJEV U-100 INSULIN) 100 unit/mL Inject 7 Units into the skin 3 (three) times daily with meals. Plus correction scale, max TDD 36u    insulin syringe,safetyneedle (BD SAFETYGLIDE INSULIN SYRINGE) 0.3 mL 31 gauge x 15/64" Syrg 1 Device by Misc.(Non-Drug; Combo Route) route 5 (five) times daily.    insulin syringe-needle U-100 0.5 mL 30 gauge x 1/2" Syrg To use 5 times daily to inject insulin    insulin syringe-needle U-100 1/2 mL 30 gauge Syrg AS DIRECTED 5 TIMES DAILY    lancets Misc 1 lancet by Misc.(Non-Drug; Combo Route) route 5 (five) times daily.    lancing device Misc 1 Device by Misc.(Non-Drug; Combo Route) route 2 (two) times daily with meals.    lisinopriL 10 MG tablet Take 1 tablet (10 mg total) by mouth once " "daily.    omeprazole (PRILOSEC) 20 MG capsule Take 1 capsule (20 mg total) by mouth once daily.    OMNIPOD 5 G6 INTRO KIT, GEN 5, Crtg SMARTSI Kit(s) SUB-Q Once    ondansetron (ZOFRAN) 4 MG tablet Take 2 tablets (8 mg total) by mouth 3 (three) times daily.    pantoprazole (PROTONIX) 40 MG tablet Take 1 tablet (40 mg total) by mouth once daily.    pen needle, diabetic (BD ULTRA-FINE ROVERTO PEN NEEDLE) 32 gauge x 5/32" Ndle USE AS DIRECTED WITH INSULIN    promethazine (PHENERGAN) 25 MG suppository Place 1 suppository (25 mg total) rectally every 6 (six) hours as needed for Nausea.    rosuvastatin (CRESTOR) 10 MG tablet Take 1 tablet (10 mg total) by mouth once daily.    sucralfate (CARAFATE) 100 mg/mL suspension Take 10 mLs (1 g total) by mouth every 6 (six) hours as needed (indigestion).     Family History       Problem Relation (Age of Onset)    Diabetes Mother    Other Mother, Father          Tobacco Use    Smoking status: Former    Smokeless tobacco: Current   Substance and Sexual Activity    Alcohol use: Yes     Comment: socially    Drug use: No    Sexual activity: Not on file     Review of Systems   Constitutional:  Positive for activity change and appetite change. Negative for chills, diaphoresis, fatigue, fever and unexpected weight change.   Respiratory:  Negative for cough, chest tightness, shortness of breath, wheezing and stridor.    Cardiovascular:  Negative for chest pain, palpitations and leg swelling.   Gastrointestinal:  Positive for abdominal pain, nausea and vomiting. Negative for abdominal distention, anal bleeding, blood in stool, constipation and diarrhea.   Endocrine: Negative for cold intolerance and heat intolerance.   Genitourinary:  Negative for difficulty urinating, dysuria, flank pain and frequency.   Musculoskeletal:  Negative for arthralgias, joint swelling and myalgias.   Skin: Negative.    Neurological:  Negative for dizziness, weakness, light-headedness and headaches. "     Objective:     Vital Signs (Most Recent):  Temp: 98.7 °F (37.1 °C) (09/07/23 0732)  Pulse: (!) 126 (09/07/23 1332)  Resp: 11 (09/07/23 0722)  BP: (!) 104/57 (09/07/23 1202)  SpO2: 99 % (09/07/23 0722) Vital Signs (24h Range):  Temp:  [98.7 °F (37.1 °C)] 98.7 °F (37.1 °C)  Pulse:  [105-137] 126  Resp:  [11-22] 11  SpO2:  [97 %-100 %] 99 %  BP: (104-167)/() 104/57     Weight: 90.7 kg (200 lb)  Body mass index is 25.68 kg/m².     Physical Exam  Constitutional:       General: He is in acute distress.      Appearance: He is not ill-appearing or toxic-appearing.   HENT:      Head: Normocephalic and atraumatic.   Eyes:      Conjunctiva/sclera: Conjunctivae normal.      Pupils: Pupils are equal, round, and reactive to light.   Cardiovascular:      Rate and Rhythm: Regular rhythm. Tachycardia present.      Heart sounds: Normal heart sounds.   Pulmonary:      Effort: Pulmonary effort is normal. No respiratory distress.      Breath sounds: Normal breath sounds. No wheezing or rales.   Abdominal:      General: Bowel sounds are normal. There is no distension.      Palpations: Abdomen is soft.      Tenderness: There is abdominal tenderness (diffuse). There is guarding (voluntary).   Musculoskeletal:         General: Normal range of motion.      Cervical back: Normal range of motion and neck supple.      Right lower leg: No edema.      Left lower leg: No edema.   Skin:     General: Skin is warm and dry.   Neurological:      General: No focal deficit present.      Mental Status: He is alert and oriented to person, place, and time. Mental status is at baseline.      Cranial Nerves: No cranial nerve deficit.      Motor: No weakness.      Gait: Gait normal.              CRANIAL NERVES     CN III, IV, VI   Pupils are equal, round, and reactive to light.       Significant Labs: All pertinent labs within the past 24 hours have been reviewed.    Significant Imaging: I have reviewed all pertinent imaging results/findings within  the past 24 hours.    Assessment/Plan:     * Intractable vomiting with nausea  Likely cannabinoid hyperemesis syndrome, although can't rule out gastroparesis  Zofran 8mg Q8H with PRN promethazine and prochlorperazine  CLD, advance as tolerated  IV hydration  Utox  CT abd/pelvis due to significant abd tenderness on exam  May need to consider switching to reglan if no improvement      Type 1 diabetes mellitus  Patient's FSGs are uncontrolled due to hyperglycemia on current medication regimen.  Last A1c reviewed-   Lab Results   Component Value Date    HGBA1C 8.5 (H) 04/10/2023     Most recent fingerstick glucose reviewed-   Recent Labs   Lab 09/07/23  0558 09/07/23  0938   POCTGLUCOSE 261* 294*     Current correctional scale  Medium  Maintain anti-hyperglycemic dose as follows-   Antihyperglycemics (From admission, onward)    Start     Stop Route Frequency Ordered    09/07/23 2100  insulin detemir U-100 (Levemir) pen 10 Units         -- SubQ 2 times daily 09/07/23 1625    09/07/23 1130  insulin aspart U-100 pen 7 Units         -- SubQ 3 times daily with meals 09/07/23 0830    09/07/23 0929  insulin aspart U-100 pen 0-10 Units         -- SubQ Before meals & nightly PRN 09/07/23 0830        Hold Oral hypoglycemics while patient is in the hospital.    Beta hydroxy 0.1 and VBG 7.465. No signs of DKA  Increase detemir to 10u BID    Hypernatremia  Patient has hypernatremia which is controlled. The hypernatremia is due to Dehydration. We will aim to correct the sodium by 8-10mEq in 24 hours. We will correct their hypernatremia with Select IV fluids: LR at a rate of 125 ml/hr. The patient's sodium results have been reviewed and are listed below.  Recent Labs   Lab 09/07/23  0600   *       GERD (gastroesophageal reflux disease)  Cont PPI      BETH (acute kidney injury)  Patient with acute kidney injury/acute renal failure likely due to pre-renal azotemia due to dehydration BETH is currently stable. Baseline creatinine 1.1  - Labs reviewed- Renal function/electrolytes with Estimated Creatinine Clearance: 47.5 mL/min (A) (based on SCr of 2.5 mg/dL (H)). according to latest data. Monitor urine output and serial BMP and adjust therapy as needed. Avoid nephrotoxins and renally dose meds for GFR listed above.    Likely dehydration 2/2 intractable N/V  IV hydration      Cannabinoid hyperemesis syndrome  See intractable N/V      Type 1 diabetes mellitus with complications        Primary hypertension  Hold lisinopril due to BETH  Start amlodipine      CKD (chronic kidney disease) stage 3, GFR 30-59 ml/min          VTE Risk Mitigation (From admission, onward)         Ordered     IP VTE LOW RISK PATIENT  Once         09/07/23 0830     Place sequential compression device  Until discontinued         09/07/23 0830                   On 09/07/2023, patient should be placed in hospital observation services under my care.        Andrzej Desai MD  Department of Hospital Medicine  [facility] Emergency Dept

## 2023-09-07 NOTE — NURSING
Called telemetry to place Patient on monitor for transfer from EDOU to room 29346.  TTX [id number].  Requested Tubes for tube station for 612.

## 2023-09-07 NOTE — ED TRIAGE NOTES
Andrzej Sinclair, a 37 y.o. male presents to the ED w/ complaint of abdm pain and elevated glucose. CC and Hx obtained from chart and EMS. Pt nonverbal, follows commands, and nods correctly. +N/V. Denies CP, SOB, fever.    Triage note:  Chief Complaint   Patient presents with    Hyperglycemia     Increasing cbg at home. Generalized pain and illness. 384 with EMS     Review of patient's allergies indicates:   Allergen Reactions    Lactose Other (See Comments)     Gas and moderate to sever abdominal pain     Past Medical History:   Diagnosis Date    Diabetes mellitus     Diabetes mellitus type 1     Diagnosed at age 20

## 2023-09-07 NOTE — ASSESSMENT & PLAN NOTE
Patient's FSGs are uncontrolled due to hyperglycemia on current medication regimen.  Last A1c reviewed-   Lab Results   Component Value Date    HGBA1C 8.5 (H) 04/10/2023     Most recent fingerstick glucose reviewed-   Recent Labs   Lab 09/07/23  0558 09/07/23  0938   POCTGLUCOSE 261* 294*     Current correctional scale  Medium  Maintain anti-hyperglycemic dose as follows-   Antihyperglycemics (From admission, onward)    Start     Stop Route Frequency Ordered    09/07/23 2100  insulin detemir U-100 (Levemir) pen 10 Units         -- SubQ 2 times daily 09/07/23 1625    09/07/23 1130  insulin aspart U-100 pen 7 Units         -- SubQ 3 times daily with meals 09/07/23 0830    09/07/23 0929  insulin aspart U-100 pen 0-10 Units         -- SubQ Before meals & nightly PRN 09/07/23 0830        Hold Oral hypoglycemics while patient is in the hospital.    Beta hydroxy 0.1 and VBG 7.465. No signs of DKA  Increase detemir to 10u BID

## 2023-09-07 NOTE — ED NOTES
Telemetry Confirmation    Box#: [id number]  Rhythm: Sinus tach  Rate: 115      Awaiting escort for transport

## 2023-09-07 NOTE — SUBJECTIVE & OBJECTIVE
"Past Medical History:   Diagnosis Date    Diabetes mellitus     Diabetes mellitus type 1     Diagnosed at age 20       History reviewed. No pertinent surgical history.    Review of patient's allergies indicates:   Allergen Reactions    Lactose Other (See Comments)     Gas and moderate to sever abdominal pain       No current facility-administered medications on file prior to encounter.     Current Outpatient Medications on File Prior to Encounter   Medication Sig    acetaminophen (TYLENOL) 325 MG tablet Take 325 mg by mouth daily as needed for Pain.    acetone, urine, test (KETONE URINE TEST) Strp 30 strips by Misc.(Non-Drug; Combo Route) route as needed (hyperglycemia).    aluminum & magnesium hydroxide-simethicone (MYLANTA MAX STRENGTH) 400-400-40 mg/5 mL suspension Take 10 mLs by mouth every 6 (six) hours as needed for Indigestion.    BD INSULIN SYRINGE ULTRA-FINE 0.5 mL 31 gauge x 5/16" Syrg USE FOUR TIMES DAILY AS DIRECTED    blood sugar diagnostic Strp 1 strip by Misc.(Non-Drug; Combo Route) route 5 (five) times daily.    blood-glucose meter Misc Use to test blood glucose 2 times a day with meals.    blood-glucose sensor (DEXCOM G6 SENSOR) Martha 1 each by Misc.(Non-Drug; Combo Route) route every 10 days.    blood-glucose transmitter (DEXCOM G6 TRANSMITTER) Martha 1 each by Misc.(Non-Drug; Combo Route) route every 3 (three) months.    glucagon (GVOKE HYPOPEN 2-PACK) 1 mg/0.2 mL AtIn Inject 1 Package into the skin as needed (hypoglycemia).    insulin degludec (TRESIBA U-100 INSULIN) 100 unit/mL injection Inject 0.14 mLs (14 Units total) into the skin once daily.    insulin lispro-aabc (LYUMJEV U-100 INSULIN) 100 unit/mL Inject 7 Units into the skin 3 (three) times daily with meals. Plus correction scale, max TDD 36u    insulin syringe,safetyneedle (BD SAFETYGLIDE INSULIN SYRINGE) 0.3 mL 31 gauge x 15/64" Syrg 1 Device by Misc.(Non-Drug; Combo Route) route 5 (five) times daily.    insulin syringe-needle U-100 0.5 mL " "30 gauge x 1/2" Syrg To use 5 times daily to inject insulin    insulin syringe-needle U-100 1/2 mL 30 gauge Syrg AS DIRECTED 5 TIMES DAILY    lancets Misc 1 lancet by Misc.(Non-Drug; Combo Route) route 5 (five) times daily.    lancing device Misc 1 Device by Misc.(Non-Drug; Combo Route) route 2 (two) times daily with meals.    lisinopriL 10 MG tablet Take 1 tablet (10 mg total) by mouth once daily.    omeprazole (PRILOSEC) 20 MG capsule Take 1 capsule (20 mg total) by mouth once daily.    OMNIPOD 5 G6 INTRO KIT, GEN 5, Crtg SMARTSI Kit(s) SUB-Q Once    ondansetron (ZOFRAN) 4 MG tablet Take 2 tablets (8 mg total) by mouth 3 (three) times daily.    pantoprazole (PROTONIX) 40 MG tablet Take 1 tablet (40 mg total) by mouth once daily.    pen needle, diabetic (BD ULTRA-FINE ROVERTO PEN NEEDLE) 32 gauge x 5/32" Ndle USE AS DIRECTED WITH INSULIN    promethazine (PHENERGAN) 25 MG suppository Place 1 suppository (25 mg total) rectally every 6 (six) hours as needed for Nausea.    rosuvastatin (CRESTOR) 10 MG tablet Take 1 tablet (10 mg total) by mouth once daily.    sucralfate (CARAFATE) 100 mg/mL suspension Take 10 mLs (1 g total) by mouth every 6 (six) hours as needed (indigestion).     Family History       Problem Relation (Age of Onset)    Diabetes Mother    Other Mother, Father          Tobacco Use    Smoking status: Former    Smokeless tobacco: Current   Substance and Sexual Activity    Alcohol use: Yes     Comment: socially    Drug use: No    Sexual activity: Not on file     Review of Systems   Constitutional:  Positive for activity change and appetite change. Negative for chills, diaphoresis, fatigue, fever and unexpected weight change.   Respiratory:  Negative for cough, chest tightness, shortness of breath, wheezing and stridor.    Cardiovascular:  Negative for chest pain, palpitations and leg swelling.   Gastrointestinal:  Positive for abdominal pain, nausea and vomiting. Negative for abdominal distention, anal " bleeding, blood in stool, constipation and diarrhea.   Endocrine: Negative for cold intolerance and heat intolerance.   Genitourinary:  Negative for difficulty urinating, dysuria, flank pain and frequency.   Musculoskeletal:  Negative for arthralgias, joint swelling and myalgias.   Skin: Negative.    Neurological:  Negative for dizziness, weakness, light-headedness and headaches.     Objective:     Vital Signs (Most Recent):  Temp: 98.7 °F (37.1 °C) (09/07/23 0732)  Pulse: (!) 126 (09/07/23 1332)  Resp: 11 (09/07/23 0722)  BP: (!) 104/57 (09/07/23 1202)  SpO2: 99 % (09/07/23 0722) Vital Signs (24h Range):  Temp:  [98.7 °F (37.1 °C)] 98.7 °F (37.1 °C)  Pulse:  [105-137] 126  Resp:  [11-22] 11  SpO2:  [97 %-100 %] 99 %  BP: (104-167)/() 104/57     Weight: 90.7 kg (200 lb)  Body mass index is 25.68 kg/m².     Physical Exam  Constitutional:       General: He is in acute distress.      Appearance: He is not ill-appearing or toxic-appearing.   HENT:      Head: Normocephalic and atraumatic.   Eyes:      Conjunctiva/sclera: Conjunctivae normal.      Pupils: Pupils are equal, round, and reactive to light.   Cardiovascular:      Rate and Rhythm: Regular rhythm. Tachycardia present.      Heart sounds: Normal heart sounds.   Pulmonary:      Effort: Pulmonary effort is normal. No respiratory distress.      Breath sounds: Normal breath sounds. No wheezing or rales.   Abdominal:      General: Bowel sounds are normal. There is no distension.      Palpations: Abdomen is soft.      Tenderness: There is abdominal tenderness (diffuse). There is guarding (voluntary).   Musculoskeletal:         General: Normal range of motion.      Cervical back: Normal range of motion and neck supple.      Right lower leg: No edema.      Left lower leg: No edema.   Skin:     General: Skin is warm and dry.   Neurological:      General: No focal deficit present.      Mental Status: He is alert and oriented to person, place, and time. Mental status  is at baseline.      Cranial Nerves: No cranial nerve deficit.      Motor: No weakness.      Gait: Gait normal.              CRANIAL NERVES     CN III, IV, VI   Pupils are equal, round, and reactive to light.       Significant Labs: All pertinent labs within the past 24 hours have been reviewed.    Significant Imaging: I have reviewed all pertinent imaging results/findings within the past 24 hours.

## 2023-09-07 NOTE — ED NOTES
Pt repeatedly taking off cardiac monitor leads, redirected on importance of keeping them on. Pt verbalizes understanding.

## 2023-09-08 VITALS
HEART RATE: 111 BPM | RESPIRATION RATE: 16 BRPM | BODY MASS INDEX: 24.36 KG/M2 | SYSTOLIC BLOOD PRESSURE: 108 MMHG | WEIGHT: 189.69 LBS | OXYGEN SATURATION: 96 % | TEMPERATURE: 98 F | DIASTOLIC BLOOD PRESSURE: 64 MMHG

## 2023-09-08 LAB
ANION GAP SERPL CALC-SCNC: 15 MMOL/L (ref 8–16)
BASOPHILS # BLD AUTO: 0.01 K/UL (ref 0–0.2)
BASOPHILS NFR BLD: 0.1 % (ref 0–1.9)
BUN SERPL-MCNC: 59 MG/DL (ref 6–20)
CALCIUM SERPL-MCNC: 10.4 MG/DL (ref 8.7–10.5)
CHLORIDE SERPL-SCNC: 120 MMOL/L (ref 95–110)
CO2 SERPL-SCNC: 27 MMOL/L (ref 23–29)
CREAT SERPL-MCNC: 2.5 MG/DL (ref 0.5–1.4)
DIFFERENTIAL METHOD: ABNORMAL
EOSINOPHIL # BLD AUTO: 0 K/UL (ref 0–0.5)
EOSINOPHIL NFR BLD: 0 % (ref 0–8)
ERYTHROCYTE [DISTWIDTH] IN BLOOD BY AUTOMATED COUNT: 13.2 % (ref 11.5–14.5)
EST. GFR  (NO RACE VARIABLE): 33.3 ML/MIN/1.73 M^2
GLUCOSE SERPL-MCNC: 435 MG/DL (ref 70–110)
HCT VFR BLD AUTO: 47.4 % (ref 40–54)
HGB BLD-MCNC: 15.7 G/DL (ref 14–18)
IMM GRANULOCYTES # BLD AUTO: 0.04 K/UL (ref 0–0.04)
IMM GRANULOCYTES NFR BLD AUTO: 0.4 % (ref 0–0.5)
LYMPHOCYTES # BLD AUTO: 0.8 K/UL (ref 1–4.8)
LYMPHOCYTES NFR BLD: 7.8 % (ref 18–48)
MAGNESIUM SERPL-MCNC: 3.2 MG/DL (ref 1.6–2.6)
MCH RBC QN AUTO: 30.2 PG (ref 27–31)
MCHC RBC AUTO-ENTMCNC: 33.1 G/DL (ref 32–36)
MCV RBC AUTO: 91 FL (ref 82–98)
MONOCYTES # BLD AUTO: 0.9 K/UL (ref 0.3–1)
MONOCYTES NFR BLD: 9 % (ref 4–15)
NEUTROPHILS # BLD AUTO: 8.3 K/UL (ref 1.8–7.7)
NEUTROPHILS NFR BLD: 82.7 % (ref 38–73)
NRBC BLD-RTO: 0 /100 WBC
PHOSPHATE SERPL-MCNC: 2.9 MG/DL (ref 2.7–4.5)
PLATELET # BLD AUTO: 222 K/UL (ref 150–450)
PMV BLD AUTO: 11.6 FL (ref 9.2–12.9)
POCT GLUCOSE: 438 MG/DL (ref 70–110)
POCT GLUCOSE: 444 MG/DL (ref 70–110)
POTASSIUM SERPL-SCNC: 4.9 MMOL/L (ref 3.5–5.1)
RBC # BLD AUTO: 5.2 M/UL (ref 4.6–6.2)
SODIUM SERPL-SCNC: 162 MMOL/L (ref 136–145)
WBC # BLD AUTO: 9.98 K/UL (ref 3.9–12.7)

## 2023-09-08 PROCEDURE — 63600175 PHARM REV CODE 636 W HCPCS: Performed by: INTERNAL MEDICINE

## 2023-09-08 PROCEDURE — 25000003 PHARM REV CODE 250: Performed by: INTERNAL MEDICINE

## 2023-09-08 PROCEDURE — G0378 HOSPITAL OBSERVATION PER HR: HCPCS

## 2023-09-08 PROCEDURE — 96376 TX/PRO/DX INJ SAME DRUG ADON: CPT

## 2023-09-08 PROCEDURE — 63600175 PHARM REV CODE 636 W HCPCS: Performed by: HOSPITALIST

## 2023-09-08 PROCEDURE — 80048 BASIC METABOLIC PNL TOTAL CA: CPT | Performed by: INTERNAL MEDICINE

## 2023-09-08 PROCEDURE — 85025 COMPLETE CBC W/AUTO DIFF WBC: CPT | Performed by: INTERNAL MEDICINE

## 2023-09-08 PROCEDURE — 84100 ASSAY OF PHOSPHORUS: CPT | Performed by: INTERNAL MEDICINE

## 2023-09-08 PROCEDURE — 96372 THER/PROPH/DIAG INJ SC/IM: CPT | Performed by: HOSPITALIST

## 2023-09-08 PROCEDURE — 83735 ASSAY OF MAGNESIUM: CPT | Performed by: INTERNAL MEDICINE

## 2023-09-08 PROCEDURE — 36415 COLL VENOUS BLD VENIPUNCTURE: CPT | Performed by: INTERNAL MEDICINE

## 2023-09-08 RX ORDER — INSULIN ASPART 100 [IU]/ML
3 INJECTION, SOLUTION INTRAVENOUS; SUBCUTANEOUS ONCE
Status: COMPLETED | OUTPATIENT
Start: 2023-09-08 | End: 2023-09-08

## 2023-09-08 RX ADMIN — SUCRALFATE 1 G: 1 SUSPENSION ORAL at 02:09

## 2023-09-08 RX ADMIN — INSULIN ASPART 3 UNITS: 100 INJECTION, SOLUTION INTRAVENOUS; SUBCUTANEOUS at 12:09

## 2023-09-08 RX ADMIN — ONDANSETRON 8 MG: 2 INJECTION INTRAMUSCULAR; INTRAVENOUS at 05:09

## 2023-09-08 RX ADMIN — SODIUM CHLORIDE, POTASSIUM CHLORIDE, SODIUM LACTATE AND CALCIUM CHLORIDE: 600; 310; 30; 20 INJECTION, SOLUTION INTRAVENOUS at 12:09

## 2023-09-08 NOTE — NURSING
Patient blood glucose 444mg/dL , 10 unit levemir and 5unit aspart given. Lee NEUMANN notified via secure chat, no additional order received from MD

## 2023-09-08 NOTE — NURSING
During shift change it was noted that Patient removed PIV and fluids were paused.  Patient requested wheelchair from JENNIFER Colón.  Patient stated he was ready to go home, then proceeded to reposition self onto the floor.    Patient's personal belongings were packed in Personal Belongings Bag.  Patient walked into hallway and held onto wall and repositioned himself onto the floor.  Patient stood up independently.  AMA form was signed by Patient and witnessed by JENNIFER Boyd & JENNIFER Ron.  Team notified via secure chat.  Form is in chart.  Patient escorted to elevator because Patient was very weak, holding onto wall while ambulating.     Chem Lab called to report abnormal Na+ 162.  AZIZA Desai M.D. & [facility] notified via secure chat.  Elba Knight RN, notified.

## 2023-09-08 NOTE — PLAN OF CARE
Patient removed PIV.  AMA formed signed.  Patient ambulated off of unit independently.  Personal items secured.

## 2023-09-08 NOTE — NURSING
Patient A&O x4 constantly pulling his pulse oximetry refuse to keep it on. Remove multiple IV. Patient was instructed several time to careful when turning and moving around. Ongoing monitoring patient. Patient  minimal interaction with staff, respond with gesture, occasional brief response. Ongoing monitoring

## 2023-09-08 NOTE — NURSING
Patient arrived in unit in stable condition. No tele box present when patient arrived in unit and no peripheral IV .

## 2023-09-08 NOTE — NURSING
Patient left AMA, patient  removed his IV.AMA form signed by patient and fully aware and liable for his decision. Patient was informed about the consequences of living AMA verbalized  back understanding. Md was notified and charge nurse nurse notified.

## 2023-09-08 NOTE — DISCHARGE SUMMARY
[facility] - Intensive Care ([place] [id number])  [facility] Medicine  Discharge Summary      Patient Name: Andrzej Sinclair  MRN: [id number]  REKHA: [id number]  Patient Class: OP- Observation  Admission Date: 9/7/2023  Hospital Length of Stay: 0 days  Discharge Date and Time:  09/08/2023 4:56 PM  Attending Physician: Aubrie att. providers found   Discharging Provider: Andrzej Desai MD  Primary Care Provider: Viktoriya Jaeger NP  Hospital Medicine Team: [facility] MED [facility] Andrzej Desai MD  Primary Care Team: [facility] MED [facility]    HPI:   37M with PMH of poorly controlled DM1 and hyperemesis cannabinoid syndrome who presents to [facility] for intractable abd pain, nausea and vomiting. Patient has multiple admissions for similar issue. Unable to obtain proper history as patient only willing to communicate by using hand gestures. He states symptoms started 2 days ago. There doesn't appear to be known inciting event and no sick contacts. Denies associated fever, chills, sob, palpitations, dysuria, diarrhea, constipation. Denies hx of gastroparesis, had workup a few years ago which was normal. Has chronic nausea/vomiting for which he takes zofran and phenergan. Last used marijuana 2-3 days ago. Workup in ED remarkable for VS: , RR 19, /84, O2 sat 100% on RA, WBC 12.80, Na 157, K 5.3, Cl 117, BUN 55, Cr 2.5, Glu 328, Bili 1.2, CXR: No acute findings. Patient received fluid bolus and anti-emetics in ED. He will be admitted to hospital medicine service for further management.       * No surgery found *      Hospital Course:   See individual problem list.       Goals of Care Treatment Preferences:  Code Status: Full Code      Consults:     Psychiatric  Cannabinoid hyperemesis syndrome  See intractable N/V      Cardiac/Vascular  Primary hypertension  Hold lisinopril due to BETH  Start amlodipine      Renal/  Hypernatremia  Patient has hypernatremia which is controlled. The hypernatremia is due to Dehydration. We will aim to correct the  sodium by 8-10mEq in 24 hours. We will correct their hypernatremia with Select IV fluids: LR at a rate of 125 ml/hr. The patient's sodium results have been reviewed and are listed below.  Recent Labs   Lab 09/08/23  0444   *       BETH (acute kidney injury)  Patient with acute kidney injury/acute renal failure likely due to pre-renal azotemia due to dehydration BETH is currently stable. Baseline creatinine 1.1 - Labs reviewed- Renal function/electrolytes with Estimated Creatinine Clearance: 47.5 mL/min (A) (based on SCr of 2.5 mg/dL (H)). according to latest data. Monitor urine output and serial BMP and adjust therapy as needed. Avoid nephrotoxins and renally dose meds for GFR listed above.    Likely dehydration 2/2 intractable N/V  IV hydration      CKD (chronic kidney disease) stage 3, GFR 30-59 ml/min        Endocrine  Type 1 diabetes mellitus  Patient's FSGs are uncontrolled due to hyperglycemia on current medication regimen.  Last A1c reviewed-   Lab Results   Component Value Date    HGBA1C 8.5 (H) 04/10/2023     Most recent fingerstick glucose reviewed-   Recent Labs   Lab 09/07/23  2221 09/08/23  0002   POCTGLUCOSE 444* 438*     Current correctional scale  Medium  Maintain anti-hyperglycemic dose as follows-   Antihyperglycemics (From admission, onward)    None        Hold Oral hypoglycemics while patient is in the hospital.    Beta hydroxy 0.1 and VBG 7.465. No signs of DKA  Increase detemir to 10u BID    GI  * Intractable vomiting with nausea  Likely cannabinoid hyperemesis syndrome, although can't rule out gastroparesis  Zofran 8mg Q8H with PRN promethazine and prochlorperazine  CLD, advance as tolerated  IV hydration  Utox  CT abd/pelvis showed mild gastric wall thickening which could relate to gastritis   May need to consider switching to reglan if no improvement  Patient left AMA early morning of 9/8 prior to rounds. AMA form signed.    GERD (gastroesophageal reflux disease)  Cont PPI        Final  Active Diagnoses:    Diagnosis Date Noted POA    PRINCIPAL PROBLEM:  Intractable vomiting with nausea [R11.2] 03/03/2021 Yes    Hypernatremia [E87.0] 09/07/2023 Yes    Type 1 diabetes mellitus [E10.9] 09/07/2023 Yes    GERD (gastroesophageal reflux disease) [K21.9] 03/02/2023 Yes    BETH (acute kidney injury) [N17.9] 04/24/2021 Yes    Cannabinoid hyperemesis syndrome [R11.2, F12.90] 04/22/2021 Yes    Primary hypertension [I10] 03/04/2021 Yes    CKD (chronic kidney disease) stage 3, GFR 30-59 ml/min [N18.30] 03/01/2021 Yes      Problems Resolved During this Admission:       Discharged Condition: against medical advice    Disposition: Left Against Medical Adv*    Follow Up:    Patient Instructions:   No discharge procedures on file.    Significant Diagnostic Studies: Labs: All labs within the past 24 hours have been reviewed    Pending Diagnostic Studies:     None         Medications:  None    Indwelling Lines/Drains at time of discharge:   Lines/Drains/Airways     None                 Time spent on the discharge of patient: 35 minutes         Andrzej Desai MD  Department of Hospital Medicine  [facility] - Intensive Care ([place])

## 2023-09-08 NOTE — ASSESSMENT & PLAN NOTE
Patient's FSGs are uncontrolled due to hyperglycemia on current medication regimen.  Last A1c reviewed-   Lab Results   Component Value Date    HGBA1C 8.5 (H) 04/10/2023     Most recent fingerstick glucose reviewed-   Recent Labs   Lab 09/07/23  2221 09/08/23  0002   POCTGLUCOSE 444* 438*     Current correctional scale  Medium  Maintain anti-hyperglycemic dose as follows-   Antihyperglycemics (From admission, onward)    None        Hold Oral hypoglycemics while patient is in the hospital.    Beta hydroxy 0.1 and VBG 7.465. No signs of DKA  Increase detemir to 10u BID

## 2023-09-08 NOTE — NURSING
Called [facility]  to report AMA.  AZIZA Desai M.D. responded to secure chat and will arrive to floor shortly.  AMA form is in chart.

## 2023-09-08 NOTE — ASSESSMENT & PLAN NOTE
Likely cannabinoid hyperemesis syndrome, although can't rule out gastroparesis  Zofran 8mg Q8H with PRN promethazine and prochlorperazine  CLD, advance as tolerated  IV hydration  Utox  CT abd/pelvis showed mild gastric wall thickening which could relate to gastritis   May need to consider switching to reglan if no improvement  Patient left AMA early morning of 9/8 prior to rounds. AMA form signed.

## 2023-09-08 NOTE — ASSESSMENT & PLAN NOTE
Patient has hypernatremia which is controlled. The hypernatremia is due to Dehydration. We will aim to correct the sodium by 8-10mEq in 24 hours. We will correct their hypernatremia with Select IV fluids: LR at a rate of 125 ml/hr. The patient's sodium results have been reviewed and are listed below.  Recent Labs   Lab 09/08/23  0444   *

## 2023-09-11 ENCOUNTER — TELEPHONE (OUTPATIENT)
Dept: ENDOCRINOLOGY | Facility: CLINIC | Age: 38
End: 2023-09-11
Payer: MEDICARE

## 2023-09-11 ENCOUNTER — NURSE TRIAGE (OUTPATIENT)
Dept: ADMINISTRATIVE | Facility: CLINIC | Age: 38
End: 2023-09-11
Payer: MEDICARE

## 2023-09-11 NOTE — TELEPHONE ENCOUNTER
Wife calling about  but is not currently with him and said that his mom called and said that sugar is just reading high. Pt is not eating and just got out of the hospital with BETH. Wife told I would need to triage but told if not it sounds emergent and can call 911 to be on the safe side to come check him out. He is refusing care , treatment and eating.              Reason for Disposition   Message left with person in household    Protocols used: No Contact or Duplicate Contact Call-A-OH

## 2023-09-11 NOTE — TELEPHONE ENCOUNTER
Spoke with patient's wife and mother with his stubborn behavior. His sugar is really high above 500. We tried talking his mom to calling 911 for his health.

## 2023-09-12 ENCOUNTER — HOSPITAL ENCOUNTER (INPATIENT)
Facility: HOSPITAL | Age: 38
LOS: 6 days | Discharge: HOME | DRG: 637 | End: 2023-09-18
Attending: STUDENT IN AN ORGANIZED HEALTH CARE EDUCATION/TRAINING PROGRAM | Admitting: STUDENT IN AN ORGANIZED HEALTH CARE EDUCATION/TRAINING PROGRAM
Payer: MEDICARE

## 2023-09-12 DIAGNOSIS — E11.10 DKA (DIABETIC KETOACIDOSIS): Primary | ICD-10-CM

## 2023-09-12 DIAGNOSIS — E10.29 TYPE 1 DIABETES MELLITUS WITH MICROALBUMINURIA: ICD-10-CM

## 2023-09-12 DIAGNOSIS — N17.9 ACUTE KIDNEY INJURY: ICD-10-CM

## 2023-09-12 DIAGNOSIS — R73.9 HYPERGLYCEMIA: ICD-10-CM

## 2023-09-12 DIAGNOSIS — R80.9 TYPE 1 DIABETES MELLITUS WITH MICROALBUMINURIA: ICD-10-CM

## 2023-09-12 PROBLEM — A41.9 SEPSIS: Status: ACTIVE | Noted: 2023-09-12

## 2023-09-12 PROBLEM — R79.89 ELEVATED LFTS: Status: ACTIVE | Noted: 2023-09-12

## 2023-09-12 LAB
ALBUMIN SERPL BCP-MCNC: 3.4 G/DL (ref 3.5–5.2)
ALLENS TEST: ABNORMAL
ALP SERPL-CCNC: 281 U/L (ref 55–135)
ALT SERPL W/O P-5'-P-CCNC: 167 U/L (ref 10–44)
ANION GAP SERPL CALC-SCNC: 19 MMOL/L (ref 8–16)
ANION GAP SERPL CALC-SCNC: 20 MMOL/L (ref 8–16)
AST SERPL-CCNC: 183 U/L (ref 10–40)
B-OH-BUTYR BLD STRIP-SCNC: 3.3 MMOL/L (ref 0–0.5)
BACTERIA #/AREA URNS HPF: ABNORMAL /HPF
BACTERIA BLD CULT: NORMAL
BACTERIA BLD CULT: NORMAL
BASOPHILS # BLD AUTO: 0.01 K/UL (ref 0–0.2)
BASOPHILS NFR BLD: 0.1 % (ref 0–1.9)
BILIRUB SERPL-MCNC: 0.8 MG/DL (ref 0.1–1)
BILIRUB UR QL STRIP: NEGATIVE
BUN SERPL-MCNC: 72 MG/DL (ref 6–30)
BUN SERPL-MCNC: 88 MG/DL (ref 6–20)
CALCIUM SERPL-MCNC: 9.4 MG/DL (ref 8.7–10.5)
CHLORIDE SERPL-SCNC: 118 MMOL/L (ref 95–110)
CHLORIDE SERPL-SCNC: 122 MMOL/L (ref 95–110)
CLARITY UR: CLEAR
CO2 SERPL-SCNC: 18 MMOL/L (ref 23–29)
COLOR UR: COLORLESS
CREAT SERPL-MCNC: 2.7 MG/DL (ref 0.5–1.4)
CREAT SERPL-MCNC: 3.7 MG/DL (ref 0.5–1.4)
DELSYS: ABNORMAL
DIFFERENTIAL METHOD: ABNORMAL
EOSINOPHIL # BLD AUTO: 0 K/UL (ref 0–0.5)
EOSINOPHIL NFR BLD: 0 % (ref 0–8)
ERYTHROCYTE [DISTWIDTH] IN BLOOD BY AUTOMATED COUNT: 13.3 % (ref 11.5–14.5)
EST. GFR  (NO RACE VARIABLE): 21 ML/MIN/1.73 M^2
GLUCOSE SERPL-MCNC: 1214 MG/DL (ref 70–110)
GLUCOSE SERPL-MCNC: >700 MG/DL (ref 70–110)
GLUCOSE UR QL STRIP: ABNORMAL
HCO3 UR-SCNC: 22.7 MMOL/L (ref 24–28)
HCT VFR BLD AUTO: 46.7 % (ref 40–54)
HCT VFR BLD CALC: 48 %PCV (ref 36–54)
HGB BLD-MCNC: 14.3 G/DL (ref 14–18)
HGB UR QL STRIP: NEGATIVE
IMM GRANULOCYTES # BLD AUTO: 0.05 K/UL (ref 0–0.04)
IMM GRANULOCYTES NFR BLD AUTO: 0.6 % (ref 0–0.5)
KETONES UR QL STRIP: ABNORMAL
LACTATE SERPL-SCNC: 2.5 MMOL/L (ref 0.5–2.2)
LEUKOCYTE ESTERASE UR QL STRIP: NEGATIVE
LYMPHOCYTES # BLD AUTO: 0.6 K/UL (ref 1–4.8)
LYMPHOCYTES NFR BLD: 7 % (ref 18–48)
MCH RBC QN AUTO: 29.8 PG (ref 27–31)
MCHC RBC AUTO-ENTMCNC: 30.6 G/DL (ref 32–36)
MCV RBC AUTO: 97 FL (ref 82–98)
MICROSCOPIC COMMENT: ABNORMAL
MONOCYTES # BLD AUTO: 0.6 K/UL (ref 0.3–1)
MONOCYTES NFR BLD: 7.2 % (ref 4–15)
NEUTROPHILS # BLD AUTO: 7.2 K/UL (ref 1.8–7.7)
NEUTROPHILS NFR BLD: 85.1 % (ref 38–73)
NITRITE UR QL STRIP: NEGATIVE
NRBC BLD-RTO: 0 /100 WBC
PCO2 BLDA: 47.2 MMHG (ref 35–45)
PH SMN: 7.29 [PH] (ref 7.35–7.45)
PH UR STRIP: 5 [PH] (ref 5–8)
PLATELET # BLD AUTO: 100 K/UL (ref 150–450)
PMV BLD AUTO: 13.9 FL (ref 9.2–12.9)
PO2 BLDA: 50 MMHG (ref 40–60)
POC BE: -4 MMOL/L
POC IONIZED CALCIUM: 1.24 MMOL/L (ref 1.06–1.42)
POC SATURATED O2: 80 % (ref 95–100)
POC TCO2 (MEASURED): 22 MMOL/L (ref 23–29)
POC TCO2: 24 MMOL/L (ref 24–29)
POCT GLUCOSE: >500 MG/DL (ref 70–110)
POTASSIUM BLD-SCNC: 4.7 MMOL/L (ref 3.5–5.1)
POTASSIUM SERPL-SCNC: 4.9 MMOL/L (ref 3.5–5.1)
PROT SERPL-MCNC: 7.1 G/DL (ref 6–8.4)
PROT UR QL STRIP: NEGATIVE
RBC # BLD AUTO: 4.8 M/UL (ref 4.6–6.2)
RBC #/AREA URNS HPF: 1 /HPF (ref 0–4)
SAMPLE: ABNORMAL
SAMPLE: ABNORMAL
SITE: ABNORMAL
SODIUM BLD-SCNC: 157 MMOL/L (ref 136–145)
SODIUM SERPL-SCNC: 156 MMOL/L (ref 136–145)
SP GR UR STRIP: 1.03 (ref 1–1.03)
URN SPEC COLLECT METH UR: ABNORMAL
UROBILINOGEN UR STRIP-ACNC: NEGATIVE EU/DL
WBC # BLD AUTO: 8.47 K/UL (ref 3.9–12.7)
WBC #/AREA URNS HPF: 1 /HPF (ref 0–5)
YEAST URNS QL MICRO: ABNORMAL

## 2023-09-12 PROCEDURE — 85025 COMPLETE CBC W/AUTO DIFF WBC: CPT | Performed by: STUDENT IN AN ORGANIZED HEALTH CARE EDUCATION/TRAINING PROGRAM

## 2023-09-12 PROCEDURE — 93010 EKG 12-LEAD: ICD-10-PCS | Mod: ,,, | Performed by: INTERNAL MEDICINE

## 2023-09-12 PROCEDURE — 82803 BLOOD GASES ANY COMBINATION: CPT

## 2023-09-12 PROCEDURE — 80047 BASIC METABLC PNL IONIZED CA: CPT

## 2023-09-12 PROCEDURE — 63600175 PHARM REV CODE 636 W HCPCS: Performed by: STUDENT IN AN ORGANIZED HEALTH CARE EDUCATION/TRAINING PROGRAM

## 2023-09-12 PROCEDURE — 96365 THER/PROPH/DIAG IV INF INIT: CPT

## 2023-09-12 PROCEDURE — 84295 ASSAY OF SERUM SODIUM: CPT

## 2023-09-12 PROCEDURE — 99900035 HC TECH TIME PER 15 MIN (STAT)

## 2023-09-12 PROCEDURE — 85014 HEMATOCRIT: CPT

## 2023-09-12 PROCEDURE — 82565 ASSAY OF CREATININE: CPT

## 2023-09-12 PROCEDURE — 83605 ASSAY OF LACTIC ACID: CPT | Performed by: STUDENT IN AN ORGANIZED HEALTH CARE EDUCATION/TRAINING PROGRAM

## 2023-09-12 PROCEDURE — 80053 COMPREHEN METABOLIC PANEL: CPT | Performed by: STUDENT IN AN ORGANIZED HEALTH CARE EDUCATION/TRAINING PROGRAM

## 2023-09-12 PROCEDURE — 82010 KETONE BODYS QUAN: CPT | Performed by: STUDENT IN AN ORGANIZED HEALTH CARE EDUCATION/TRAINING PROGRAM

## 2023-09-12 PROCEDURE — 84132 ASSAY OF SERUM POTASSIUM: CPT

## 2023-09-12 PROCEDURE — 82962 GLUCOSE BLOOD TEST: CPT

## 2023-09-12 PROCEDURE — 99291 CRITICAL CARE FIRST HOUR: CPT

## 2023-09-12 PROCEDURE — 80307 DRUG TEST PRSMV CHEM ANLYZR: CPT | Performed by: STUDENT IN AN ORGANIZED HEALTH CARE EDUCATION/TRAINING PROGRAM

## 2023-09-12 PROCEDURE — 82330 ASSAY OF CALCIUM: CPT

## 2023-09-12 PROCEDURE — 87040 BLOOD CULTURE FOR BACTERIA: CPT | Mod: 59 | Performed by: STUDENT IN AN ORGANIZED HEALTH CARE EDUCATION/TRAINING PROGRAM

## 2023-09-12 PROCEDURE — 25000003 PHARM REV CODE 250: Performed by: STUDENT IN AN ORGANIZED HEALTH CARE EDUCATION/TRAINING PROGRAM

## 2023-09-12 PROCEDURE — 93005 ELECTROCARDIOGRAM TRACING: CPT

## 2023-09-12 PROCEDURE — 81000 URINALYSIS NONAUTO W/SCOPE: CPT | Mod: 59 | Performed by: STUDENT IN AN ORGANIZED HEALTH CARE EDUCATION/TRAINING PROGRAM

## 2023-09-12 PROCEDURE — 20000000 HC ICU ROOM

## 2023-09-12 PROCEDURE — 93010 ELECTROCARDIOGRAM REPORT: CPT | Mod: ,,, | Performed by: INTERNAL MEDICINE

## 2023-09-12 RX ORDER — SODIUM CHLORIDE 9 MG/ML
1000 INJECTION, SOLUTION INTRAVENOUS CONTINUOUS
Status: DISCONTINUED | OUTPATIENT
Start: 2023-09-12 | End: 2023-09-12

## 2023-09-12 RX ORDER — SODIUM CHLORIDE 9 MG/ML
1000 INJECTION, SOLUTION INTRAVENOUS
Status: DISCONTINUED | OUTPATIENT
Start: 2023-09-12 | End: 2023-09-12

## 2023-09-12 RX ORDER — SODIUM CHLORIDE 0.9 % (FLUSH) 0.9 %
10 SYRINGE (ML) INJECTION
Status: DISCONTINUED | OUTPATIENT
Start: 2023-09-12 | End: 2023-09-13

## 2023-09-12 RX ORDER — POTASSIUM CHLORIDE 7.45 MG/ML
40 INJECTION INTRAVENOUS
Status: DISCONTINUED | OUTPATIENT
Start: 2023-09-12 | End: 2023-09-15

## 2023-09-12 RX ORDER — ATORVASTATIN CALCIUM 40 MG/1
40 TABLET, FILM COATED ORAL DAILY
Status: DISCONTINUED | OUTPATIENT
Start: 2023-09-13 | End: 2023-09-18 | Stop reason: HOSPADM

## 2023-09-12 RX ORDER — TALC
6 POWDER (GRAM) TOPICAL NIGHTLY PRN
Status: DISCONTINUED | OUTPATIENT
Start: 2023-09-12 | End: 2023-09-15

## 2023-09-12 RX ORDER — ONDANSETRON 2 MG/ML
4 INJECTION INTRAMUSCULAR; INTRAVENOUS EVERY 6 HOURS PRN
Status: DISCONTINUED | OUTPATIENT
Start: 2023-09-12 | End: 2023-09-18 | Stop reason: HOSPADM

## 2023-09-12 RX ORDER — POTASSIUM CHLORIDE 7.45 MG/ML
80 INJECTION INTRAVENOUS
Status: DISCONTINUED | OUTPATIENT
Start: 2023-09-12 | End: 2023-09-15

## 2023-09-12 RX ORDER — SODIUM CHLORIDE 9 MG/ML
125 INJECTION, SOLUTION INTRAVENOUS CONTINUOUS
Status: DISCONTINUED | OUTPATIENT
Start: 2023-09-12 | End: 2023-09-13

## 2023-09-12 RX ORDER — SODIUM CHLORIDE 9 MG/ML
1000 INJECTION, SOLUTION INTRAVENOUS
Status: COMPLETED | OUTPATIENT
Start: 2023-09-12 | End: 2023-09-12

## 2023-09-12 RX ORDER — POTASSIUM CHLORIDE 7.45 MG/ML
60 INJECTION INTRAVENOUS
Status: DISCONTINUED | OUTPATIENT
Start: 2023-09-12 | End: 2023-09-15

## 2023-09-12 RX ORDER — PROCHLORPERAZINE EDISYLATE 5 MG/ML
5 INJECTION INTRAMUSCULAR; INTRAVENOUS EVERY 6 HOURS PRN
Status: DISCONTINUED | OUTPATIENT
Start: 2023-09-12 | End: 2023-09-18 | Stop reason: HOSPADM

## 2023-09-12 RX ORDER — FAMOTIDINE 10 MG/ML
20 INJECTION INTRAVENOUS DAILY
Status: DISCONTINUED | OUTPATIENT
Start: 2023-09-13 | End: 2023-09-18 | Stop reason: HOSPADM

## 2023-09-12 RX ORDER — ACETAMINOPHEN 325 MG/1
650 TABLET ORAL EVERY 4 HOURS PRN
Status: DISCONTINUED | OUTPATIENT
Start: 2023-09-12 | End: 2023-09-18 | Stop reason: HOSPADM

## 2023-09-12 RX ORDER — DEXTROSE MONOHYDRATE AND SODIUM CHLORIDE 5; .45 G/100ML; G/100ML
INJECTION, SOLUTION INTRAVENOUS CONTINUOUS PRN
Status: DISCONTINUED | OUTPATIENT
Start: 2023-09-12 | End: 2023-09-13

## 2023-09-12 RX ADMIN — SODIUM CHLORIDE 1000 ML: 9 INJECTION, SOLUTION INTRAVENOUS at 09:09

## 2023-09-12 RX ADMIN — VANCOMYCIN HYDROCHLORIDE 1750 MG: 10 INJECTION, POWDER, LYOPHILIZED, FOR SOLUTION INTRAVENOUS at 10:09

## 2023-09-12 RX ADMIN — PIPERACILLIN AND TAZOBACTAM 4.5 G: 4; .5 INJECTION, POWDER, LYOPHILIZED, FOR SOLUTION INTRAVENOUS; PARENTERAL at 09:09

## 2023-09-12 RX ADMIN — INSULIN HUMAN 0.1 UNITS/KG/HR: 1 INJECTION, SOLUTION INTRAVENOUS at 11:09

## 2023-09-12 RX ADMIN — SODIUM CHLORIDE 125 ML/HR: 9 INJECTION, SOLUTION INTRAVENOUS at 11:09

## 2023-09-12 NOTE — Clinical Note
Diagnosis: DKA (diabetic ketoacidosis) [173224]   Admitting Provider:: VIKTOR VALLEJO [[id number]]   Future Attending Provider: NBA ROCHA [[id number]]   Reason for IP Medical Treatment  (Clinical interventions that can only be accomplished in the IP setting? ) :: Insulin, monitoring   I certify that Inpatient services for greater than or equal to 2 midnights are medically necessary:: Yes   Plans for Post-Acute care--if anticipated (pick the single best option):: A. No post acute care anticipated at this time   Special Needs:: No Special Needs [1]

## 2023-09-13 PROBLEM — N18.30 ACUTE RENAL FAILURE SUPERIMPOSED ON STAGE 3 CHRONIC KIDNEY DISEASE: Status: ACTIVE | Noted: 2021-04-24

## 2023-09-13 PROBLEM — R65.10 SIRS (SYSTEMIC INFLAMMATORY RESPONSE SYNDROME): Status: ACTIVE | Noted: 2023-09-12

## 2023-09-13 LAB
ALBUMIN SERPL BCP-MCNC: 3.1 G/DL (ref 3.5–5.2)
ALBUMIN SERPL BCP-MCNC: 3.2 G/DL (ref 3.5–5.2)
ALBUMIN SERPL BCP-MCNC: 3.3 G/DL (ref 3.5–5.2)
ALP SERPL-CCNC: 220 U/L (ref 55–135)
ALP SERPL-CCNC: 237 U/L (ref 55–135)
ALP SERPL-CCNC: 249 U/L (ref 55–135)
ALT SERPL W/O P-5'-P-CCNC: 133 U/L (ref 10–44)
ALT SERPL W/O P-5'-P-CCNC: 134 U/L (ref 10–44)
ALT SERPL W/O P-5'-P-CCNC: 146 U/L (ref 10–44)
AMPHET+METHAMPHET UR QL: NEGATIVE
ANION GAP SERPL CALC-SCNC: 15 MMOL/L (ref 8–16)
ANION GAP SERPL CALC-SCNC: ABNORMAL MMOL/L (ref 8–16)
APAP SERPL-MCNC: <3 UG/ML (ref 10–20)
AST SERPL-CCNC: 113 U/L (ref 10–40)
AST SERPL-CCNC: 81 U/L (ref 10–40)
AST SERPL-CCNC: 86 U/L (ref 10–40)
B-OH-BUTYR BLD STRIP-SCNC: 0.1 MMOL/L (ref 0–0.5)
BARBITURATES UR QL SCN>200 NG/ML: NEGATIVE
BASOPHILS # BLD AUTO: 0.01 K/UL (ref 0–0.2)
BASOPHILS NFR BLD: 0.1 % (ref 0–1.9)
BENZODIAZ UR QL SCN>200 NG/ML: NEGATIVE
BILIRUB DIRECT SERPL-MCNC: 0.2 MG/DL (ref 0.1–0.3)
BILIRUB SERPL-MCNC: 0.4 MG/DL (ref 0.1–1)
BILIRUB SERPL-MCNC: 0.5 MG/DL (ref 0.1–1)
BILIRUB SERPL-MCNC: 0.5 MG/DL (ref 0.1–1)
BNP SERPL-MCNC: <10 PG/ML (ref 0–99)
BUN SERPL-MCNC: 46 MG/DL (ref 6–20)
BUN SERPL-MCNC: 54 MG/DL (ref 6–20)
BUN SERPL-MCNC: 60 MG/DL (ref 6–20)
BUN SERPL-MCNC: 67 MG/DL (ref 6–20)
BUN SERPL-MCNC: 67 MG/DL (ref 6–20)
BUN SERPL-MCNC: 68 MG/DL (ref 6–20)
BUN SERPL-MCNC: 74 MG/DL (ref 6–20)
BUN SERPL-MCNC: 79 MG/DL (ref 6–20)
BZE UR QL SCN: NEGATIVE
CALCIUM SERPL-MCNC: 8.1 MG/DL (ref 8.7–10.5)
CALCIUM SERPL-MCNC: 8.1 MG/DL (ref 8.7–10.5)
CALCIUM SERPL-MCNC: 8.6 MG/DL (ref 8.7–10.5)
CALCIUM SERPL-MCNC: 8.7 MG/DL (ref 8.7–10.5)
CALCIUM SERPL-MCNC: 9 MG/DL (ref 8.7–10.5)
CALCIUM SERPL-MCNC: 9 MG/DL (ref 8.7–10.5)
CALCIUM SERPL-MCNC: 9.2 MG/DL (ref 8.7–10.5)
CALCIUM SERPL-MCNC: 9.5 MG/DL (ref 8.7–10.5)
CANNABINOIDS UR QL SCN: NEGATIVE
CHLORIDE SERPL-SCNC: 128 MMOL/L (ref 95–110)
CHLORIDE SERPL-SCNC: >130 MMOL/L (ref 95–110)
CO2 SERPL-SCNC: 18 MMOL/L (ref 23–29)
CO2 SERPL-SCNC: 24 MMOL/L (ref 23–29)
CO2 SERPL-SCNC: 25 MMOL/L (ref 23–29)
CO2 SERPL-SCNC: 26 MMOL/L (ref 23–29)
CREAT SERPL-MCNC: 2.3 MG/DL (ref 0.5–1.4)
CREAT SERPL-MCNC: 2.5 MG/DL (ref 0.5–1.4)
CREAT SERPL-MCNC: 2.6 MG/DL (ref 0.5–1.4)
CREAT SERPL-MCNC: 2.7 MG/DL (ref 0.5–1.4)
CREAT SERPL-MCNC: 2.8 MG/DL (ref 0.5–1.4)
CREAT SERPL-MCNC: 2.9 MG/DL (ref 0.5–1.4)
CREAT SERPL-MCNC: 3.1 MG/DL (ref 0.5–1.4)
CREAT SERPL-MCNC: 3.2 MG/DL (ref 0.5–1.4)
CREAT UR-MCNC: 32.3 MG/DL (ref 23–375)
DIFFERENTIAL METHOD: ABNORMAL
EOSINOPHIL # BLD AUTO: 0 K/UL (ref 0–0.5)
EOSINOPHIL NFR BLD: 0 % (ref 0–8)
ERYTHROCYTE [DISTWIDTH] IN BLOOD BY AUTOMATED COUNT: 13.1 % (ref 11.5–14.5)
EST. GFR  (NO RACE VARIABLE): 25 ML/MIN/1.73 M^2
EST. GFR  (NO RACE VARIABLE): 26 ML/MIN/1.73 M^2
EST. GFR  (NO RACE VARIABLE): 28 ML/MIN/1.73 M^2
EST. GFR  (NO RACE VARIABLE): 29 ML/MIN/1.73 M^2
EST. GFR  (NO RACE VARIABLE): 30 ML/MIN/1.73 M^2
EST. GFR  (NO RACE VARIABLE): 32 ML/MIN/1.73 M^2
EST. GFR  (NO RACE VARIABLE): 33 ML/MIN/1.73 M^2
EST. GFR  (NO RACE VARIABLE): 37 ML/MIN/1.73 M^2
ESTIMATED AVG GLUCOSE: 321 MG/DL (ref 68–131)
GLUCOSE SERPL-MCNC: 124 MG/DL (ref 70–110)
GLUCOSE SERPL-MCNC: 148 MG/DL (ref 70–110)
GLUCOSE SERPL-MCNC: 215 MG/DL (ref 70–110)
GLUCOSE SERPL-MCNC: 255 MG/DL (ref 70–110)
GLUCOSE SERPL-MCNC: 265 MG/DL (ref 70–110)
GLUCOSE SERPL-MCNC: 404 MG/DL (ref 70–110)
GLUCOSE SERPL-MCNC: 575 MG/DL (ref 70–110)
GLUCOSE SERPL-MCNC: 888 MG/DL (ref 70–110)
HAV IGM SERPL QL IA: NORMAL
HBA1C MFR BLD: 12.8 % (ref 4–5.6)
HBV CORE IGM SERPL QL IA: NORMAL
HBV SURFACE AG SERPL QL IA: NORMAL
HCT VFR BLD AUTO: 43.4 % (ref 40–54)
HCV AB SERPL QL IA: NORMAL
HGB BLD-MCNC: 13.8 G/DL (ref 14–18)
HIV1+2 IGG SERPL QL IA.RAPID: NORMAL
IMM GRANULOCYTES # BLD AUTO: 0.06 K/UL (ref 0–0.04)
IMM GRANULOCYTES NFR BLD AUTO: 0.6 % (ref 0–0.5)
INR PPP: 1.1 (ref 0.8–1.2)
LACTATE SERPL-SCNC: 2.9 MMOL/L (ref 0.5–2.2)
LACTATE SERPL-SCNC: 2.9 MMOL/L (ref 0.5–2.2)
LYMPHOCYTES # BLD AUTO: 0.8 K/UL (ref 1–4.8)
LYMPHOCYTES NFR BLD: 8.2 % (ref 18–48)
MAGNESIUM SERPL-MCNC: 3.8 MG/DL (ref 1.6–2.6)
MAGNESIUM SERPL-MCNC: 3.8 MG/DL (ref 1.6–2.6)
MCH RBC QN AUTO: 29.7 PG (ref 27–31)
MCHC RBC AUTO-ENTMCNC: 31.8 G/DL (ref 32–36)
MCV RBC AUTO: 93 FL (ref 82–98)
METHADONE UR QL SCN>300 NG/ML: NEGATIVE
MONOCYTES # BLD AUTO: 0.7 K/UL (ref 0.3–1)
MONOCYTES NFR BLD: 7.9 % (ref 4–15)
NEUTROPHILS # BLD AUTO: 7.7 K/UL (ref 1.8–7.7)
NEUTROPHILS NFR BLD: 83.2 % (ref 38–73)
NRBC BLD-RTO: 0 /100 WBC
OPIATES UR QL SCN: NEGATIVE
OSMOLALITY SERPL: 395 MOSM/KG (ref 280–300)
OSMOLALITY UR: 715 MOSM/KG (ref 50–1200)
PCP UR QL SCN>25 NG/ML: NEGATIVE
PHOSPHATE SERPL-MCNC: 1.5 MG/DL (ref 2.7–4.5)
PLATELET # BLD AUTO: 98 K/UL (ref 150–450)
PMV BLD AUTO: 12.8 FL (ref 9.2–12.9)
POCT GLUCOSE: 121 MG/DL (ref 70–110)
POCT GLUCOSE: 121 MG/DL (ref 70–110)
POCT GLUCOSE: 126 MG/DL (ref 70–110)
POCT GLUCOSE: 129 MG/DL (ref 70–110)
POCT GLUCOSE: 137 MG/DL (ref 70–110)
POCT GLUCOSE: 139 MG/DL (ref 70–110)
POCT GLUCOSE: 160 MG/DL (ref 70–110)
POCT GLUCOSE: 173 MG/DL (ref 70–110)
POCT GLUCOSE: 175 MG/DL (ref 70–110)
POCT GLUCOSE: 191 MG/DL (ref 70–110)
POCT GLUCOSE: 207 MG/DL (ref 70–110)
POCT GLUCOSE: 215 MG/DL (ref 70–110)
POCT GLUCOSE: 228 MG/DL (ref 70–110)
POCT GLUCOSE: 237 MG/DL (ref 70–110)
POCT GLUCOSE: 271 MG/DL (ref 70–110)
POCT GLUCOSE: 335 MG/DL (ref 70–110)
POCT GLUCOSE: >500 MG/DL (ref 70–110)
POCT GLUCOSE: >500 MG/DL (ref 70–110)
POTASSIUM SERPL-SCNC: 2.9 MMOL/L (ref 3.5–5.1)
POTASSIUM SERPL-SCNC: 2.9 MMOL/L (ref 3.5–5.1)
POTASSIUM SERPL-SCNC: 3.5 MMOL/L (ref 3.5–5.1)
POTASSIUM SERPL-SCNC: 3.6 MMOL/L (ref 3.5–5.1)
POTASSIUM SERPL-SCNC: 3.7 MMOL/L (ref 3.5–5.1)
POTASSIUM SERPL-SCNC: 3.9 MMOL/L (ref 3.5–5.1)
POTASSIUM SERPL-SCNC: 4 MMOL/L (ref 3.5–5.1)
POTASSIUM SERPL-SCNC: 4.1 MMOL/L (ref 3.5–5.1)
PROCALCITONIN SERPL IA-MCNC: 0.51 NG/ML
PROT SERPL-MCNC: 6.4 G/DL (ref 6–8.4)
PROT SERPL-MCNC: 6.7 G/DL (ref 6–8.4)
PROT SERPL-MCNC: 6.8 G/DL (ref 6–8.4)
PROTHROMBIN TIME: 11.5 SEC (ref 9–12.5)
RBC # BLD AUTO: 4.65 M/UL (ref 4.6–6.2)
SODIUM SERPL-SCNC: 161 MMOL/L (ref 136–145)
SODIUM SERPL-SCNC: 170 MMOL/L (ref 136–145)
SODIUM SERPL-SCNC: 170 MMOL/L (ref 136–145)
SODIUM SERPL-SCNC: 171 MMOL/L (ref 136–145)
SODIUM SERPL-SCNC: 172 MMOL/L (ref 136–145)
SODIUM SERPL-SCNC: 174 MMOL/L (ref 136–145)
SODIUM SERPL-SCNC: 175 MMOL/L (ref 136–145)
SODIUM SERPL-SCNC: 175 MMOL/L (ref 136–145)
SODIUM UR-SCNC: <20 MMOL/L (ref 20–250)
TOXICOLOGY INFORMATION: NORMAL
TSH SERPL DL<=0.005 MIU/L-ACNC: 1.04 UIU/ML (ref 0.4–4)
VANCOMYCIN SERPL-MCNC: 23.5 UG/ML
WBC # BLD AUTO: 9.26 K/UL (ref 3.9–12.7)

## 2023-09-13 PROCEDURE — 86703 HIV-1/HIV-2 1 RESULT ANTBDY: CPT | Performed by: HOSPITALIST

## 2023-09-13 PROCEDURE — 63600175 PHARM REV CODE 636 W HCPCS: Performed by: INTERNAL MEDICINE

## 2023-09-13 PROCEDURE — 80143 DRUG ASSAY ACETAMINOPHEN: CPT | Performed by: HOSPITALIST

## 2023-09-13 PROCEDURE — 83036 HEMOGLOBIN GLYCOSYLATED A1C: CPT | Performed by: HOSPITALIST

## 2023-09-13 PROCEDURE — 25000003 PHARM REV CODE 250: Performed by: STUDENT IN AN ORGANIZED HEALTH CARE EDUCATION/TRAINING PROGRAM

## 2023-09-13 PROCEDURE — 83605 ASSAY OF LACTIC ACID: CPT | Performed by: STUDENT IN AN ORGANIZED HEALTH CARE EDUCATION/TRAINING PROGRAM

## 2023-09-13 PROCEDURE — 51701 INSERT BLADDER CATHETER: CPT

## 2023-09-13 PROCEDURE — 36415 COLL VENOUS BLD VENIPUNCTURE: CPT | Performed by: INTERNAL MEDICINE

## 2023-09-13 PROCEDURE — 20000000 HC ICU ROOM

## 2023-09-13 PROCEDURE — 84100 ASSAY OF PHOSPHORUS: CPT | Performed by: STUDENT IN AN ORGANIZED HEALTH CARE EDUCATION/TRAINING PROGRAM

## 2023-09-13 PROCEDURE — C1751 CATH, INF, PER/CENT/MIDLINE: HCPCS

## 2023-09-13 PROCEDURE — 84300 ASSAY OF URINE SODIUM: CPT | Performed by: HOSPITALIST

## 2023-09-13 PROCEDURE — 63600175 PHARM REV CODE 636 W HCPCS: Performed by: STUDENT IN AN ORGANIZED HEALTH CARE EDUCATION/TRAINING PROGRAM

## 2023-09-13 PROCEDURE — 83880 ASSAY OF NATRIURETIC PEPTIDE: CPT | Performed by: HOSPITALIST

## 2023-09-13 PROCEDURE — 36410 VNPNXR 3YR/> PHY/QHP DX/THER: CPT

## 2023-09-13 PROCEDURE — 83735 ASSAY OF MAGNESIUM: CPT | Performed by: STUDENT IN AN ORGANIZED HEALTH CARE EDUCATION/TRAINING PROGRAM

## 2023-09-13 PROCEDURE — 83930 ASSAY OF BLOOD OSMOLALITY: CPT | Performed by: HOSPITALIST

## 2023-09-13 PROCEDURE — 80048 BASIC METABOLIC PNL TOTAL CA: CPT | Mod: XB | Performed by: HOSPITALIST

## 2023-09-13 PROCEDURE — 51798 US URINE CAPACITY MEASURE: CPT

## 2023-09-13 PROCEDURE — 80053 COMPREHEN METABOLIC PANEL: CPT | Performed by: HOSPITALIST

## 2023-09-13 PROCEDURE — 94761 N-INVAS EAR/PLS OXIMETRY MLT: CPT

## 2023-09-13 PROCEDURE — 25000003 PHARM REV CODE 250: Performed by: HOSPITALIST

## 2023-09-13 PROCEDURE — 25000003 PHARM REV CODE 250: Performed by: INTERNAL MEDICINE

## 2023-09-13 PROCEDURE — 84145 PROCALCITONIN (PCT): CPT | Performed by: HOSPITALIST

## 2023-09-13 PROCEDURE — 85025 COMPLETE CBC W/AUTO DIFF WBC: CPT | Performed by: STUDENT IN AN ORGANIZED HEALTH CARE EDUCATION/TRAINING PROGRAM

## 2023-09-13 PROCEDURE — 80076 HEPATIC FUNCTION PANEL: CPT | Performed by: STUDENT IN AN ORGANIZED HEALTH CARE EDUCATION/TRAINING PROGRAM

## 2023-09-13 PROCEDURE — 51702 INSERT TEMP BLADDER CATH: CPT

## 2023-09-13 PROCEDURE — A4216 STERILE WATER/SALINE, 10 ML: HCPCS | Performed by: HOSPITALIST

## 2023-09-13 PROCEDURE — 84443 ASSAY THYROID STIM HORMONE: CPT | Performed by: HOSPITALIST

## 2023-09-13 PROCEDURE — 83605 ASSAY OF LACTIC ACID: CPT | Mod: 91 | Performed by: INTERNAL MEDICINE

## 2023-09-13 PROCEDURE — 36569 INSJ PICC 5 YR+ W/O IMAGING: CPT

## 2023-09-13 PROCEDURE — 80053 COMPREHEN METABOLIC PANEL: CPT | Mod: 91 | Performed by: INTERNAL MEDICINE

## 2023-09-13 PROCEDURE — 85610 PROTHROMBIN TIME: CPT | Performed by: INTERNAL MEDICINE

## 2023-09-13 PROCEDURE — 80202 ASSAY OF VANCOMYCIN: CPT | Performed by: HOSPITALIST

## 2023-09-13 PROCEDURE — 83735 ASSAY OF MAGNESIUM: CPT | Mod: 91 | Performed by: INTERNAL MEDICINE

## 2023-09-13 PROCEDURE — 82010 KETONE BODYS QUAN: CPT | Performed by: STUDENT IN AN ORGANIZED HEALTH CARE EDUCATION/TRAINING PROGRAM

## 2023-09-13 PROCEDURE — 36415 COLL VENOUS BLD VENIPUNCTURE: CPT | Performed by: HOSPITALIST

## 2023-09-13 PROCEDURE — 80074 ACUTE HEPATITIS PANEL: CPT | Performed by: STUDENT IN AN ORGANIZED HEALTH CARE EDUCATION/TRAINING PROGRAM

## 2023-09-13 PROCEDURE — 83935 ASSAY OF URINE OSMOLALITY: CPT | Performed by: HOSPITALIST

## 2023-09-13 RX ORDER — SODIUM CHLORIDE 0.9 % (FLUSH) 0.9 %
10 SYRINGE (ML) INJECTION
Status: DISCONTINUED | OUTPATIENT
Start: 2023-09-13 | End: 2023-09-18 | Stop reason: HOSPADM

## 2023-09-13 RX ORDER — POTASSIUM CHLORIDE 7.45 MG/ML
10 INJECTION INTRAVENOUS
Status: COMPLETED | OUTPATIENT
Start: 2023-09-13 | End: 2023-09-13

## 2023-09-13 RX ORDER — DEXTROSE MONOHYDRATE 50 MG/ML
INJECTION, SOLUTION INTRAVENOUS CONTINUOUS
Status: DISCONTINUED | OUTPATIENT
Start: 2023-09-13 | End: 2023-09-17

## 2023-09-13 RX ORDER — MUPIROCIN 20 MG/G
OINTMENT TOPICAL 2 TIMES DAILY
Status: DISPENSED | OUTPATIENT
Start: 2023-09-13 | End: 2023-09-18

## 2023-09-13 RX ORDER — SODIUM CHLORIDE 0.9 % (FLUSH) 0.9 %
10 SYRINGE (ML) INJECTION EVERY 6 HOURS
Status: DISCONTINUED | OUTPATIENT
Start: 2023-09-13 | End: 2023-09-14

## 2023-09-13 RX ORDER — SODIUM CHLORIDE 9 MG/ML
INJECTION, SOLUTION INTRAVENOUS ONCE
Status: COMPLETED | OUTPATIENT
Start: 2023-09-13 | End: 2023-09-13

## 2023-09-13 RX ORDER — SODIUM CHLORIDE 0.9 % (FLUSH) 0.9 %
10 SYRINGE (ML) INJECTION EVERY 6 HOURS
Status: DISCONTINUED | OUTPATIENT
Start: 2023-09-13 | End: 2023-09-18 | Stop reason: HOSPADM

## 2023-09-13 RX ORDER — SODIUM CHLORIDE 0.9 % (FLUSH) 0.9 %
10 SYRINGE (ML) INJECTION
Status: DISCONTINUED | OUTPATIENT
Start: 2023-09-13 | End: 2023-09-14

## 2023-09-13 RX ORDER — SODIUM CHLORIDE 9 MG/ML
INJECTION, SOLUTION INTRAVENOUS ONCE
Status: DISCONTINUED | OUTPATIENT
Start: 2023-09-13 | End: 2023-09-15

## 2023-09-13 RX ADMIN — Medication 6 MG: at 08:09

## 2023-09-13 RX ADMIN — MUPIROCIN: 20 OINTMENT TOPICAL at 08:09

## 2023-09-13 RX ADMIN — Medication 10 ML: at 01:09

## 2023-09-13 RX ADMIN — DEXTROSE MONOHYDRATE: 50 INJECTION, SOLUTION INTRAVENOUS at 05:09

## 2023-09-13 RX ADMIN — Medication 10 ML: at 05:09

## 2023-09-13 RX ADMIN — POTASSIUM PHOSPHATE, MONOBASIC AND POTASSIUM PHOSPHATE, DIBASIC 20 MMOL: 224; 236 INJECTION, SOLUTION, CONCENTRATE INTRAVENOUS at 03:09

## 2023-09-13 RX ADMIN — ATORVASTATIN CALCIUM 40 MG: 40 TABLET, FILM COATED ORAL at 08:09

## 2023-09-13 RX ADMIN — FAMOTIDINE 20 MG: 10 INJECTION INTRAVENOUS at 09:09

## 2023-09-13 RX ADMIN — POTASSIUM CHLORIDE 10 MEQ: 7.46 INJECTION, SOLUTION INTRAVENOUS at 08:09

## 2023-09-13 RX ADMIN — PIPERACILLIN AND TAZOBACTAM 4.5 G: 4; .5 INJECTION, POWDER, LYOPHILIZED, FOR SOLUTION INTRAVENOUS; PARENTERAL at 05:09

## 2023-09-13 RX ADMIN — INSULIN HUMAN 0.2 UNITS/KG/HR: 1 INJECTION, SOLUTION INTRAVENOUS at 04:09

## 2023-09-13 RX ADMIN — INSULIN HUMAN 0.02 UNITS/KG/HR: 1 INJECTION, SOLUTION INTRAVENOUS at 12:09

## 2023-09-13 RX ADMIN — POTASSIUM CHLORIDE 10 MEQ: 7.46 INJECTION, SOLUTION INTRAVENOUS at 06:09

## 2023-09-13 RX ADMIN — POTASSIUM CHLORIDE 10 MEQ: 7.46 INJECTION, SOLUTION INTRAVENOUS at 09:09

## 2023-09-13 RX ADMIN — SODIUM CHLORIDE: 9 INJECTION, SOLUTION INTRAVENOUS at 11:09

## 2023-09-13 RX ADMIN — POTASSIUM CHLORIDE 10 MEQ: 7.46 INJECTION, SOLUTION INTRAVENOUS at 10:09

## 2023-09-13 RX ADMIN — DEXTROSE MONOHYDRATE: 50 INJECTION, SOLUTION INTRAVENOUS at 08:09

## 2023-09-13 NOTE — PROGRESS NOTES
[facility] Medicine  Progress Note    Patient Name: Andrzej Sinclair  MRN: [id number]  Patient Class: IP- Inpatient   Admission Date: 2023  Length of Stay: 1 days  Attending Physician: Wally Majano MD  Primary Care Provider: Viktoriya Jaeger NP        Subjective:     Principal Problem:DKA (diabetic ketoacidosis)        HPI:  This is a 37-year-old male with a past medical history of type 1 diabetes, hypertension, CKD 3, GERD, who presents with hyperglycemia.    Patient presents with generalized fatigue and evaluation of hyperglycemia. Patient initially presented to [facility] on  for evaluation of hyperglycemia.  In the ED, he was noted to be febrile (39.1) and was recommended to be admitted.  Patient decided to leave against medical advice.  He also had a prior presentation on  for similar symptoms. He says he is unsure he if has been taking his insulin.     In the ED, the patient was tachycardic (120).  Labs were suggestive of DKA (B, PH: 7.28, HCO3: 18, A, LA: 2.5, BHB: 3.3), elevated LFTs (AST:  183, ALT:  176, ALP: 281), hypernatremia (corrected, 166), elevated creatinine (3.7 - baseline of 2.5), elevated LFTs (AST: 183, ALT: 167, ALP: 281).  CXR showed no acute process. He was given fluids, vancomycin, Zosyn and was started on insulin. Patient was admitted for further management.       Overview/Hospital Course:  No notes on file    Interval History: Noted some agitation overnight and pulled out midline - now in soft wrist restraints.  States he feels much better.  Still a bit groggy and with slow language, but appropriate.  Denies pain, nausea and vomiting.  All questions answered and patient had no further complaints.    Objective:     Vital Signs (Most Recent):  Temp: 98.6 °F (37 °C) (23 0600)  Pulse: 99 (23 08)  Resp: 10 (23)  BP: 127/86 (23 0600)  SpO2: 99 % (23) Vital Signs (24h Range):  Temp:  [98.4 °F (36.9  °C)-98.6 °F (37 °C)] 98.6 °F (37 °C)  Pulse:  [] 99  Resp:  [10-25] 10  SpO2:  [95 %-100 %] 99 %  BP: (113-149)/(70-94) 127/86     Weight: 84 kg (185 lb 3 oz)  Body mass index is 23.78 kg/m².    Intake/Output Summary (Last 24 hours) at 2023 1019  Last data filed at 2023 0600  Gross per 24 hour   Intake 3269.66 ml   Output 1950 ml   Net 1319.66 ml         Physical Exam  Vitals and nursing note reviewed.   Constitutional:       General: He is not in acute distress.     Appearance: He is ill-appearing. He is not toxic-appearing or diaphoretic.   HENT:      Head: Normocephalic and atraumatic.      Nose: Nose normal.      Mouth/Throat:      Mouth: Mucous membranes are dry.   Eyes:      Extraocular Movements: Extraocular movements intact.   Cardiovascular:      Rate and Rhythm: Normal rate and regular rhythm.      Pulses: Normal pulses.      Heart sounds: No murmur heard.  Pulmonary:      Effort: Pulmonary effort is normal. No respiratory distress.   Abdominal:      General: Abdomen is flat. Bowel sounds are normal.      Palpations: Abdomen is soft.      Tenderness: There is no abdominal tenderness.   Musculoskeletal:      Right lower leg: No edema.      Left lower leg: No edema.   Skin:     General: Skin is warm.      Capillary Refill: Capillary refill takes less than 2 seconds.   Neurological:      Mental Status: He is alert.      Comments: A&Ox3 (unsure which hospital he is at) but groggy and with mildly slow speech production.  Moves all extremities.  Cooperative with exam   Psychiatric:         Mood and Affect: Mood normal.             Significant Labs: All pertinent labs within the past 24 hours have been reviewed.    Significant Imaging: I have reviewed all pertinent imaging results/findings within the past 24 hours.      Assessment/Plan:      * DKA (diabetic ketoacidosis)  -Admitted to inpatient status  -On admit labs consistent with DKA (B, PH: 7.28, HCO3: 18, A, LA: 2.5, BHB:  3.3)  -A1c 8.5 4/10/2023 - repeat now  -Continue insulin drip, IV fluids, accu checks q1hour and BMP q4h  -Blood sugar improved but still with significant hypernatremia after around 3L of NS  -But for his dry lips, appears euvolemic on exam - changed to D5W for now.  -Continue care in ICU    Hypernatremia  -On admit Na 156 with blood sugar 1214 --> corrected to 183mEq/L  -Treated DKA with NS (>3L) and insulin drip --> sugar improved and Na now corrects to 179  -Likely due to significant dehydration on admit.  Now appears euvolemic but for his lips which are chapped.  -Check urine Na, urine osm and serum osm  -Change to D5W - monitor BMP q4h   -Consult nephrology.    Acute renal failure superimposed on stage 3 chronic kidney disease  -Baseline Cr around 1.4  -On admit Cr 3.7  -Avoid nephrotoxic agents and renally dose meds  -Cr improving and now 2.7.  -Consulting nephrology due to significant hypernatremia.  -Monitor BMP as above.    Primary hypertension  -BP solidly normal today  -Reports not on BP meds at home  -Monitor closely.    SIRS (systemic inflammatory response syndrome)  -On admit tachycardic with lactic acidosis, normal wbc and scantly elevated procalcitonin  -No clinical evidence of infection - no UTI, pneumonia, cellulitis, cholecystitis  -Blood cultures obtained in ER and are negative thus far  -Treated with broad spectrum antibiotics initially, but most likely this was due to his DKA and severe dehydration  -Will hold antibiotics and monitor closely.  Low threshold to resume.    Elevated LFTs  -On admit ,  and Tbili normal  -AST and ALT are improving now.  -UDS negative.    -RUQ US showed no abnormalities  -Await hepatitis panel.  Check EtOH, HIV and acetaminophen levels  -Continue treatment of DKA as above  -Repeat CMP in AM    Type 1 diabetes mellitus with complications  -Treatment as above for DKA      VTE Risk Mitigation (From admission, onward)         Ordered     IP VTE LOW RISK  PATIENT  Once         09/12/23 2247     Place sequential compression device  Until discontinued         09/12/23 2247                Discharge Planning   PRECIOUS:      Code Status: Full Code   Is the patient medically ready for discharge?:     Reason for patient still in hospital (select all that apply): Treatment  Discharge Plan A: Home            Critical care time spent on the evaluation and treatment of severe organ dysfunction, review of pertinent labs and imaging studies, discussions with consulting providers and discussions with patient/family: 40 minutes.      Wally Majano MD  Department of Hospital Medicine   [facility] - Intensive Care

## 2023-09-13 NOTE — SUBJECTIVE & OBJECTIVE
"Past Medical History:   Diagnosis Date    Diabetes mellitus     Diabetes mellitus type 1     Diagnosed at age 20       No past surgical history on file.    Review of patient's allergies indicates:   Allergen Reactions    Lactose Other (See Comments)     Gas and moderate to sever abdominal pain       No current facility-administered medications on file prior to encounter.     Current Outpatient Medications on File Prior to Encounter   Medication Sig    acetaminophen (TYLENOL) 325 MG tablet Take 325 mg by mouth daily as needed for Pain.    acetone, urine, test (KETONE URINE TEST) Strp 30 strips by Misc.(Non-Drug; Combo Route) route as needed (hyperglycemia).    aluminum & magnesium hydroxide-simethicone (MYLANTA MAX STRENGTH) 400-400-40 mg/5 mL suspension Take 10 mLs by mouth every 6 (six) hours as needed for Indigestion.    BD INSULIN SYRINGE ULTRA-FINE 0.5 mL 31 gauge x 5/16" Syrg USE FOUR TIMES DAILY AS DIRECTED    blood sugar diagnostic Strp 1 strip by Misc.(Non-Drug; Combo Route) route 5 (five) times daily.    blood-glucose meter Misc Use to test blood glucose 2 times a day with meals.    blood-glucose sensor (DEXCOM G6 SENSOR) Martha 1 each by Misc.(Non-Drug; Combo Route) route every 10 days.    blood-glucose transmitter (DEXCOM G6 TRANSMITTER) Martha 1 each by Misc.(Non-Drug; Combo Route) route every 3 (three) months.    glucagon (GVOKE HYPOPEN 2-PACK) 1 mg/0.2 mL AtIn Inject 1 Package into the skin as needed (hypoglycemia).    insulin degludec (TRESIBA U-100 INSULIN) 100 unit/mL injection Inject 0.14 mLs (14 Units total) into the skin once daily.    insulin lispro-aabc (LYUMJEV U-100 INSULIN) 100 unit/mL Inject 7 Units into the skin 3 (three) times daily with meals. Plus correction scale, max TDD 36u    insulin syringe,safetyneedle (BD SAFETYGLIDE INSULIN SYRINGE) 0.3 mL 31 gauge x 15/64" Syrg 1 Device by Misc.(Non-Drug; Combo Route) route 5 (five) times daily.    insulin syringe-needle U-100 0.5 mL 30 gauge x " "1/2" Syrg To use 5 times daily to inject insulin    insulin syringe-needle U-100 1/2 mL 30 gauge Syrg AS DIRECTED 5 TIMES DAILY    lancets Misc 1 lancet by Misc.(Non-Drug; Combo Route) route 5 (five) times daily.    lancing device Misc 1 Device by Misc.(Non-Drug; Combo Route) route 2 (two) times daily with meals.    lisinopriL 10 MG tablet Take 1 tablet (10 mg total) by mouth once daily.    omeprazole (PRILOSEC) 20 MG capsule Take 1 capsule (20 mg total) by mouth once daily.    OMNIPOD 5 G6 INTRO KIT, GEN 5, Crtg SMARTSI Kit(s) SUB-Q Once    ondansetron (ZOFRAN) 4 MG tablet Take 2 tablets (8 mg total) by mouth 3 (three) times daily.    pantoprazole (PROTONIX) 40 MG tablet Take 1 tablet (40 mg total) by mouth once daily.    pen needle, diabetic (BD ULTRA-FINE ROVERTO PEN NEEDLE) 32 gauge x 5/32" Ndle USE AS DIRECTED WITH INSULIN    promethazine (PHENERGAN) 25 MG suppository Place 1 suppository (25 mg total) rectally every 6 (six) hours as needed for Nausea.    rosuvastatin (CRESTOR) 10 MG tablet Take 1 tablet (10 mg total) by mouth once daily.    sucralfate (CARAFATE) 100 mg/mL suspension Take 10 mLs (1 g total) by mouth every 6 (six) hours as needed (indigestion).     Family History       Problem Relation (Age of Onset)    Diabetes Mother    Other Mother, Father          Tobacco Use    Smoking status: Former    Smokeless tobacco: Current   Substance and Sexual Activity    Alcohol use: Yes     Comment: socially    Drug use: No    Sexual activity: Not on file     Review of Systems   Constitutional:  Positive for appetite change, fatigue and fever.   HENT: Negative.     Eyes: Negative.    Respiratory: Negative.     Cardiovascular: Negative.    Gastrointestinal:  Positive for abdominal pain.   Endocrine: Positive for polyuria.   Genitourinary: Negative.    Musculoskeletal: Negative.    Skin: Negative.    Allergic/Immunologic: Negative.    Neurological: Negative.    Psychiatric/Behavioral: Negative.       Objective: "     Vital Signs (Most Recent):  Temp: 98.5 °F (36.9 °C) (09/12/23 2037)  Pulse: 104 (09/12/23 2157)  Resp: 20 (09/12/23 2037)  BP: 136/78 (09/12/23 2157)  SpO2: 99 % (09/12/23 2157) Vital Signs (24h Range):  Temp:  [98.5 °F (36.9 °C)] 98.5 °F (36.9 °C)  Pulse:  [104-120] 104  Resp:  [20] 20  SpO2:  [99 %] 99 %  BP: (136)/(70-78) 136/78     Weight: 86 kg (189 lb 9.5 oz)  Body mass index is 24.34 kg/m².     Physical Exam  Vitals and nursing note reviewed.   Constitutional:       General: He is in acute distress.      Appearance: He is ill-appearing.   HENT:      Head: Normocephalic and atraumatic.      Nose: Nose normal.      Mouth/Throat:      Mouth: Mucous membranes are dry.   Eyes:      Extraocular Movements: Extraocular movements intact.   Cardiovascular:      Rate and Rhythm: Normal rate.      Pulses: Normal pulses.      Heart sounds: No murmur heard.  Pulmonary:      Effort: Pulmonary effort is normal. No respiratory distress.   Abdominal:      General: Abdomen is flat.      Palpations: Abdomen is soft.      Tenderness: There is abdominal tenderness.   Musculoskeletal:      Right lower leg: No edema.      Left lower leg: No edema.   Skin:     General: Skin is warm.      Capillary Refill: Capillary refill takes less than 2 seconds.   Neurological:      Mental Status: He is alert.      Comments: A&Ox3 (unsure which hospital he is at)   Psychiatric:         Mood and Affect: Mood normal.                Significant Labs: All pertinent labs within the past 24 hours have been reviewed.    Significant Imaging: I have reviewed all pertinent imaging results/findings within the past 24 hours.

## 2023-09-13 NOTE — PROGRESS NOTES
"Pharmacokinetic Initial Assessment: IV Vancomycin    Assessment/Plan:    Initiate intravenous vancomycin with loading dose of 1750 mg once with subsequent doses when random concentrations are less than 20 mcg/mL  Desired empiric serum trough concentration is 10 to 20 mcg/mL  Draw vancomycin random level on 9/13 at 1030.  Pharmacy will continue to follow and monitor vancomycin.      Please contact pharmacy at extension [phone] with any questions regarding this assessment.     Thank you for the consult,   Augustine Bullock       Patient brief summary:  Andrzej Sinclair is a 37 y.o. male initiated on antimicrobial therapy with IV Vancomycin for treatment of suspected bacteremia    Drug Allergies:   Review of patient's allergies indicates:   Allergen Reactions    Lactose Other (See Comments)     Gas and moderate to sever abdominal pain       Actual Body Weight:   86 kg    Renal Function:   Estimated Creatinine Clearance: 32.1 mL/min (A) (based on SCr of 3.7 mg/dL (H)).,     Dialysis Method (if applicable):  N/A    CBC (last 72 hours):  Recent Labs   Lab Result Units 09/12/23  2113   WBC K/uL 8.47   Hemoglobin g/dL 14.3   Hematocrit % 46.7   Platelets K/uL 100*   Gran % % 85.1*   Lymph % % 7.0*   Mono % % 7.2   Eosinophil % % 0.0   Basophil % % 0.1   Differential Method  Automated       Metabolic Panel (last 72 hours):  Recent Labs   Lab Result Units 09/12/23  2113   Sodium mmol/L 156*   Potassium mmol/L 4.9   Chloride mmol/L 118*   CO2 mmol/L 18*   Glucose mg/dL 1,214*   BUN mg/dL 88*   Creatinine mg/dL 3.7*   Albumin g/dL 3.4*   Total Bilirubin mg/dL 0.8   Alkaline Phosphatase U/L 281*   AST U/L 183*   ALT U/L 167*       Drug levels (last 3 results):  No results for input(s): "VANCOMYCINRA", "VANCORANDOM", "VANCOMYCINPE", "VANCOPEAK", "VANCOMYCINTR", "VANCOTROUGH" in the last 72 hours.    Microbiologic Results:  Microbiology Results (last 7 days)       Procedure Component Value Units Date/Time    Blood culture #1 " **CANNOT BE ORDERED STAT** [[id number]] Collected: 09/12/23 2125    Order Status: Sent Specimen: Blood from Peripheral, Forearm, Right Updated: 09/12/23 2146    Blood culture #2 **CANNOT BE ORDERED STAT** [[id number]] Collected: 09/12/23 2115    Order Status: Sent Specimen: Blood from Peripheral, Antecubital, Left Updated: 09/12/23 2146

## 2023-09-13 NOTE — ED PROVIDER NOTES
"Encounter Date: 9/12/2023    SCRIBE #1 NOTE: Charles DIAZ, omar scribing for, and in the presence of,  Robbie Griggs PA-C. I have scribed the following portions of the note - Other sections scribed: HPI, ROS.       History     Chief Complaint   Patient presents with    Hyperglycemia     C/o weakness and hyperglycemia for 2 days. Pt accucheck pre hospital read "HI" pt appears weak. Denies chest pain or sob +nausea     Andrzej Sinclair is a 37 y.o. male with a PMHx of DM type 1, presents to the ED for hyperglycemia onset 2 days. Patient is c/o generalized abdominal pain, nausea and "dryness." Per triage note, patient accucheck pre hospital reads "HI." No medications taken PTA. No alleviating or exacerbating factors noted. Denies fever or any associated symptoms. Denies illicit drug use.   Patient presented similarly to outside facility yesterday, noted to be tachycardic and febrile and in DKA.  Emergency medicine provider recommended admission however patient elected to leave AMA.        The history is provided by the patient. The history is limited by the condition of the patient. No  was used.     Review of patient's allergies indicates:   Allergen Reactions    Lactose Other (See Comments)     Gas and moderate to sever abdominal pain     Past Medical History:   Diagnosis Date    Diabetes mellitus     Diabetes mellitus type 1     Diagnosed at age 20     No past surgical history on file.  Family History   Problem Relation Age of Onset    Other Mother         prediabetes    Diabetes Mother     Other Father         patient does not know his fathers history     Social History     Tobacco Use    Smoking status: Former    Smokeless tobacco: Current   Substance Use Topics    Alcohol use: Yes     Comment: socially    Drug use: No     Review of Systems   Unable to perform ROS: Acuity of condition   Gastrointestinal:  Positive for abdominal pain and nausea.       Physical Exam     Initial Vitals " [09/12/23 2037]   BP Pulse Resp Temp SpO2   136/70 (!) 120 20 98.5 °F (36.9 °C) 99 %      MAP       --         Physical Exam    Nursing note and vitals reviewed.  Constitutional:   Acutely ill-appearing   HENT:   Head: Normocephalic and atraumatic.   Very dry mucous membranes   Eyes: Conjunctivae and EOM are normal.   Neck: Neck supple.   Cardiovascular:  Intact distal pulses.           Tachycardic   Pulmonary/Chest: No respiratory distress.   Abdominal: Abdomen is soft. He exhibits no distension. There is no abdominal tenderness.   Musculoskeletal:         General: Normal range of motion.      Cervical back: Neck supple.     Neurological: He is alert and oriented to person, place, and time. GCS score is 15. GCS eye subscore is 4. GCS verbal subscore is 5. GCS motor subscore is 6.   Groggy, fatigued but alert and oriented   Skin: Skin is warm and dry. Capillary refill takes less than 2 seconds.   Psychiatric: He has a normal mood and affect. His behavior is normal. Judgment and thought content normal.         ED Course   Critical Care    Date/Time: 9/12/2023 9:50 PM    Performed by: Robbie Griggs PA-C  Authorized by: Eliseo Montero MD  Direct patient critical care time: 15 minutes  Additional history critical care time: 10 minutes  Ordering / reviewing critical care time: 10 minutes  Documentation critical care time: 10 minutes  Consulting other physicians critical care time: 5 minutes  Total critical care time (exclusive of procedural time) : 50 minutes  Critical care time was exclusive of separately billable procedures and treating other patients and teaching time.  Critical care was necessary to treat or prevent imminent or life-threatening deterioration of the following conditions: cardiac failure, dehydration, circulatory failure, shock, sepsis, trauma and respiratory failure.  Critical care was time spent personally by me on the following activities: development of treatment plan with patient or  surrogate, discussions with consultants, interpretation of cardiac output measurements, evaluation of patient's response to treatment, examination of patient, obtaining history from patient or surrogate, ordering and performing treatments and interventions, ordering and review of laboratory studies, ordering and review of radiographic studies, pulse oximetry and re-evaluation of patient's condition.        Labs Reviewed   CBC W/ AUTO DIFFERENTIAL - Abnormal; Notable for the following components:       Result Value    MCHC 30.6 (*)     Platelets 100 (*)     MPV 13.9 (*)     Immature Granulocytes 0.6 (*)     Immature Grans (Abs) 0.05 (*)     Lymph # 0.6 (*)     Gran % 85.1 (*)     Lymph % 7.0 (*)     All other components within normal limits   COMPREHENSIVE METABOLIC PANEL - Abnormal; Notable for the following components:    Sodium 156 (*)     Chloride 118 (*)     CO2 18 (*)     Glucose 1,214 (*)     BUN 88 (*)     Creatinine 3.7 (*)     Albumin 3.4 (*)     Alkaline Phosphatase 281 (*)      (*)      (*)     eGFR 21 (*)     Anion Gap 20 (*)     All other components within normal limits    Narrative:     Glucose critical result(s) called and verbal readback obtained from   Susan RODRIGUEZ by LN2 09/12/2023 22:03   BETA - HYDROXYBUTYRATE, SERUM - Abnormal; Notable for the following components:    Beta-Hydroxybutyrate 3.3 (*)     All other components within normal limits   LACTIC ACID, PLASMA - Abnormal; Notable for the following components:    Lactate (Lactic Acid) 2.5 (*)     All other components within normal limits   POCT GLUCOSE - Abnormal; Notable for the following components:    POCT Glucose >500 (*)     All other components within normal limits   ISTAT PROCEDURE - Abnormal; Notable for the following components:    POC PH 7.289 (*)     POC PCO2 47.2 (*)     POC HCO3 22.7 (*)     POC SATURATED O2 80 (*)     All other components within normal limits   ISTAT PROCEDURE - Abnormal; Notable for the following  components:    POC Glucose >700 (*)     POC BUN 72 (*)     POC Creatinine 2.7 (*)     POC Sodium 157 (*)     POC Chloride 122 (*)     POC TCO2 (MEASURED) 22 (*)     POC Anion Gap 19 (*)     All other components within normal limits   POCT GLUCOSE - Abnormal; Notable for the following components:    POCT Glucose >500 (*)     All other components within normal limits   CULTURE, BLOOD   CULTURE, BLOOD   URINALYSIS, REFLEX TO URINE CULTURE   PHOSPHORUS   MAGNESIUM   HEMOGLOBIN A1C   BASIC METABOLIC PANEL   PHOSPHORUS   POCT GLUCOSE MONITORING CONTINUOUS   ISTAT CHEM8   POCT GLUCOSE MONITORING CONTINUOUS          Imaging Results              X-Ray Chest AP Portable (In process)                      Medications   vancomycin - pharmacy to dose (has no administration in time range)   vancomycin (VANCOCIN) 1,750 mg in dextrose 5 % (D5W) 500 mL IVPB (has no administration in time range)   piperacillin-tazobactam (ZOSYN) 4.5 g in dextrose 5 % in water (D5W) 100 mL IVPB (MB+) (0 g Intravenous Stopped 9/12/23 2204)   sodium chloride 0.9% flush 10 mL (has no administration in time range)   0.9%  NaCl infusion (has no administration in time range)   dextrose 5 % and 0.45 % NaCl infusion (has no administration in time range)   dextrose 10% bolus 125 mL 125 mL (has no administration in time range)   dextrose 10% bolus 250 mL 250 mL (has no administration in time range)   sodium chloride 0.9% bolus 1,000 mL 1,000 mL (1,000 mLs Intravenous New Bag 9/12/23 2113)   0.9%  NaCl infusion (1,000 mLs Intravenous New Bag 9/12/23 2113)     Medical Decision Making  37-year-old type 1 diabetic male presents for evaluation of fatigue, dry mouth, abdominal pain.  Patient has had multiple visits to the ED recently for which he was offered admission to the ICU for DKA and subsequently left AMA twice.  He presents today for worsening of his condition.  On exam, patient appears dehydrated, is acutely ill.  He is tachycardic but not febrile.   Cardiopulmonary exam otherwise unremarkable.  Abdomen is soft and nontender.  He has no evidence of acute head trauma.  He is alert and oriented although fatigued and groggy.  DKA protocol was initiated.  Patient had a VBG with a pH of 7.28, bicarb of 22.  Point of care electrolytes notable for elevated creatinine, normal potassium, elevated glucose and sodium.  Fluids and empiric antibiotics were ordered.  I did order Zosyn and vancomycin.  Subsequent labs notable for lactate of 2.5, PHP of 3.3, glucose 1214, hypernatremia at 156, creatinine of 3.7 with a GFR of 21.  No leukocytosis, no significant anemia however CBC does show a thrombocytopenia at 100.  Insulin drip protocols were initiated.  At this point in workup, is evident the patient is in diabetic ketoacidosis with questionable sepsis given recent documented fever.  Will admit to the ICU    Amount and/or Complexity of Data Reviewed  External Data Reviewed: notes.     Details: Reviewed prior hospital admissions.  Patient was evaluated 5 days ago here and again last night at outside facility, recommended admission for diabetic ketoacidosis, question sepsis.  Patient left AMA both times.  Labs: ordered. Decision-making details documented in ED Course.     Details:     Radiology: ordered. Decision-making details documented in ED Course.     Details: Chest x-ray was ordered given concern for infectious etiology.  I reviewed the images.  No acute cardiopulmonary abnormality.  ECG/medicine tests: ordered and independent interpretation performed. Decision-making details documented in ED Course.     Details: EKG shows sinus tachycardia at a rate of 110, rightward axis.  Otherwise normal intervals.  No significant ST or T-wave changes.  No STEMI.  Largely unchanged from prior.  Discussion of management or test interpretation with external provider(s): Discussed patient case with Dr. Rausch, [facility].  He agrees to admit to ICU.    Risk  Prescription drug  management.  Decision regarding hospitalization.  Diagnosis or treatment significantly limited by social determinants of health.      Additional MDM:   Sepsis:   This patient does not have evidence of infective focus  My overall impression is sepsis.  Source: Unknown  Antibiotics given- Antibiotics     Patient Encounter Information Not Found      Latest lactate reviewed- 2.5  Organ dysfunction indicated by Acute kidney injury    Fluid challenge Ideal Body Weight- The patient's ideal body weight is [unfilled] which will be used to calculate fluid bolus of 30 ml/kg for treatment of septic shock.      Post- resuscitation assessment Yes Perfusion exam was performed within 6 hours of septic shock presentation after bolus shows Adequate tissue perfusion assessed by non-invasive monitoring       Will Not start Pressors- Levophed for MAP of 65  Source control achieved by:  Vancomycin, Zosyn             Scribe Attestation:   Scribe #1: I performed the above scribed service and the documentation accurately describes the services I performed. I attest to the accuracy of the note.        ED Course as of 09/12/23 2221 Tue Sep 12, 2023   2120 ISTAT PROCEDURE(!) [AN]   2146 CBC auto differential(!) [AN]   2146 Lactic acid, plasma(!) [AN]   2147 Comprehensive metabolic panel(!) [AN]   2209 Glucose(!!): 1,214 [AN]   2209 Beta-Hydroxybutyrate(!): 3.3 [AN]   2210 Discussed patient case with Dr. Rausch who agrees to admit. [AN]      ED Course User Index  [AN] Robbie Griggs PA-C I, Austin Nokes, PA-C, personally performed the services described in this documentation. All medical record entries made by the scribe were at my direction and in my presence. I have reviewed the chart and agree that the record reflects my personal performance and is accurate and complete.               Medical Decision Making:   Clinical Tests:   Sepsis Perfusion Assessment: "I attest a sepsis perfusion exam was performed within 6 hours of  sepsis, severe sepsis, or septic shock presentation, following fluid resuscitation."      Clinical Impression:   Final diagnoses:  [R73.9] Hyperglycemia  [E11.10] DKA (diabetic ketoacidosis)  [N17.9] Acute kidney injury (Primary)        ED Disposition Condition    Admit Stable                Robbie Griggs, MICHELLE  09/12/23 9447

## 2023-09-13 NOTE — HPI
This is a 37-year-old male with a past medical history of type 1 diabetes, hypertension, CKD 3, GERD, who presents with hyperglycemia.    Patient presents with generalized fatigue and evaluation of hyperglycemia. Patient initially presented to [facility] on  for evaluation of hyperglycemia.  In the ED, he was noted to be febrile (39.1) and was recommended to be admitted.  Patient decided to leave against medical advice.  He also had a prior presentation on  for similar symptoms. He says he is unsure he if has been taking his insulin.     In the ED, the patient was tachycardic (120).  Labs were suggestive of DKA (B, PH: 7.28, HCO3: 18, A, LA: 2.5, BHB: 3.3), elevated LFTs (AST:  183, ALT:  176, ALP: 281), hypernatremia (corrected, 166), elevated creatinine (3.7 - baseline of 2.5), elevated LFTs (AST: 183, ALT: 167, ALP: 281).  CXR showed no acute process. He was given fluids, vancomycin, Zosyn and was started on insulin. Patient was admitted for further management.

## 2023-09-13 NOTE — ASSESSMENT & PLAN NOTE
Patient with acute kidney injury/acute renal failure likely due to pre-renal azotemia due to IVVD BETH is currently stable. Baseline creatinine 2.5 - Labs reviewed- Renal function/electrolytes with Estimated Creatinine Clearance: 32.1 mL/min (A) (based on SCr of 3.7 mg/dL (H)). according to latest data. Monitor urine output and serial BMP and adjust therapy as needed. Avoid nephrotoxins and renally dose meds for GFR listed above.

## 2023-09-13 NOTE — EICU
Intervention Initiated From:  Bedside    Janene intervened regarding:  Time-Out      Comments: Called to the bedside to assist the PICC line rn with a timeout for midline IV placement.

## 2023-09-13 NOTE — ASSESSMENT & PLAN NOTE
-Baseline Cr around 1.4  -On admit Cr 3.7  -Avoid nephrotoxic agents and renally dose meds  -Cr improving and now 2.7.  -Consulting nephrology due to significant hypernatremia.  -Monitor BMP as above.

## 2023-09-13 NOTE — EICU
Na very high ? Accurate- recheck now from periphery  Phos low- recheck  Mg high- recheck  K replete gently for BETH  D/w RN

## 2023-09-13 NOTE — NURSING
Patient in soft wrist restraints, got out of 1 restraint and pulled out IV and midline. Zosyn, insulin drip, NS and K stopped until new IV could be placed. Patient back in restraints.

## 2023-09-13 NOTE — ASSESSMENT & PLAN NOTE
Labs were suggestive of DKA (B, PH: 7.28, HCO3: 18, A, LA: 2.5, BHB: 3.3)  Initiate and maintain DKA protocol   Monitor BMPs

## 2023-09-13 NOTE — ASSESSMENT & PLAN NOTE
LPatient has hypernatremia which is uncontrolled. The hypernatremia is due to Dehydration. We will aim to correct the sodium by 8-10mEq in 24 hours. We will correct their hypernatremia with Select IV fluids: NS at a rate of 100 ml/hr. The patient's sodium results have been reviewed and are listed below.  Recent Labs   Lab 09/12/23  2113   *

## 2023-09-13 NOTE — ASSESSMENT & PLAN NOTE
-On admit ,  and Tbili normal  -AST and ALT are improving now.  -UDS negative.    -RUQ US showed no abnormalities  -Await hepatitis panel.  Check EtOH, HIV and acetaminophen levels  -Continue treatment of DKA as above  -Repeat CMP in AM

## 2023-09-13 NOTE — ASSESSMENT & PLAN NOTE
-Admitted to inpatient status  -On admit labs consistent with DKA (B, PH: 7.28, HCO3: 18, A, LA: 2.5, BHB: 3.3)  -A1c 8.5 4/10/2023 - repeat now  -Continue insulin drip, IV fluids, accu checks q1hour and BMP q4h  -Blood sugar improved but still with significant hypernatremia after around 3L of NS  -But for his dry lips, appears euvolemic on exam - changed to D5W for now.  -Continue care in ICU

## 2023-09-13 NOTE — PLAN OF CARE
[facility] - Intensive Care  Initial Discharge Assessment       Primary Care Provider: Viktoriya Jaeger NP  Case Management Assessment   PCP: See above  [facility]    Patient Arrived From: Home with spouse  Existing Help at Home: Spouse (Marzena)    Barriers to Discharge: None    Discharge Plan:    A. Home with Family   B. Home      Discharge planning assessment completed with assistance from patient's spouse, Marzena, via telephone.  Patient is from home and independent.  Only DMe at this time is a Dexcom glucose monitor.  When medically cleared, patient will discharge to home.  He will need followup appointments with his PCP and additional providers as ordered by the discharging provider.       Admission Diagnosis: DKA (diabetic ketoacidosis) [E11.10]  Hyperglycemia [R73.9]  Acute kidney injury [N17.9]    Admission Date: 9/12/2023  Expected Discharge Date:     Transition of Care Barriers: None    Payor: [facility] MCARE / Plan: [facility] DUAL COMPLETE HMO SNP / Product Type: Medicare Advantage /     Extended Emergency Contact Information  Primary Emergency Contact: MARZENA OLSON   [facility]  Mobile Phone: [phone]  Relation: Spouse    Discharge Plan A: Home  Discharge Plan B: Home with family      [id number] - [place] - [address] [facility] AT [facility]  [address] [place] [address]  [place] [address]  Phone: [phone] Fax: [phone]    [facility] [address] [place] [address]  Phone: [phone] Fax: [phone]    [id number] - [place], [place] - [address] [facility] AT [facility]  [address] [place] [address]  [place] [address]  Phone: [phone] Fax: [phone]      Initial Assessment (most recent)       Adult Discharge Assessment - 09/13/23 1014          Discharge Assessment    Assessment Type  Discharge Planning Assessment     Confirmed/corrected address, phone number and insurance Yes     Confirmed Demographics Correct on Facesheet     Source of Information family;health record     If unable to respond/provide information was family/caregiver contacted? Yes     Contact Name/Number Marzena Jones:  [phone]     Communicated PRECIOUS with patient/caregiver No     Reason For Admission DKA     People in Home spouse     Facility Arrived From: Home     Do you expect to return to your current living situation? Yes     Do you have help at home or someone to help you manage your care at home? Yes     Who are your caregiver(s) and their phone number(s)? spouse:  Marzena Jones:  [phone]     Prior to hospitilization cognitive status: Unable to Assess     Current cognitive status: --   Confused    Equipment Currently Used at Home glucometer   Dexcom Glucose Monitor    Readmission within 30 days? Yes   In OBS 6 days ago    Patient currently being followed by outpatient case management? No     Do you currently have service(s) that help you manage your care at home? No     Do you take prescription medications? Yes     Do you have prescription coverage? Yes     Coverage [employer] Managed Medicare     Do you have any problems affording any of your prescribed medications? No     Who is going to help you get home at discharge? wife     How do you get to doctors appointments? public transportation   MITS    Are you on dialysis? No     Do you take coumadin? No     DME Needed Upon Discharge  none     Discharge Plan discussed with: Spouse/sig other     Name(s) and Number(s) Marzena Jones:  [phone]     Transition of Care Barriers None     Discharge Plan A Home     Discharge Plan B Home with family        OTHER    Name(s) of People in Home spouse

## 2023-09-13 NOTE — NURSING
[facility] [place]  Nurses Note -- 4 Eyes      9/13/2023       Skin assessed on: Q Shift      [x] No Pressure Injuries Present    [x]Prevention Measures Documented    [] Yes LDA  for Pressure Injury Previously documented     [] Yes New Pressure Injury Discovered   [] LDA for New Pressure Injury Added      Attending RN:  Nuha Colin RN     Second RN:  Pamela Villatoro RN

## 2023-09-13 NOTE — PROGRESS NOTES
Pharmacokinetic Assessment Follow Up: IV Vancomycin    Vancomycin serum concentration assessment(s):    The random level was drawn correctly and can be used to guide therapy at this time. The measurement is above the desired definitive target range of 10 to 20 mcg/mL.    Vancomycin Regimen Plan:    No dose today.  Re-dose when the random level is less than 20 mcg/mL, next level to be drawn at 0300 on 9/14/2023    Drug levels (last 3 results):  Recent Labs   Lab Result Units 09/13/23  0939   Vancomycin, Random ug/mL 23.5       Pharmacy will continue to follow and monitor vancomycin.    Please contact pharmacy at extension [phone] for questions regarding this assessment.    Thank you for the consult,   Kristopher Martinez Jr       Patient brief summary:  Andrzej Sinclair is a 37 y.o. male initiated on antimicrobial therapy with IV Vancomycin for treatment of bacteremia    Drug Allergies:   Review of patient's allergies indicates:   Allergen Reactions    Lactose Other (See Comments)     Gas and moderate to sever abdominal pain       Actual Body Weight:   84 kg    Renal Function:   Estimated Creatinine Clearance: 44 mL/min (A) (based on SCr of 2.7 mg/dL (H)).,     Dialysis Method (if applicable):  N/A    CBC (last 72 hours):  Recent Labs   Lab Result Units 09/12/23 2113 09/13/23  0508   WBC K/uL 8.47 9.26   Hemoglobin g/dL 14.3 13.8*   Hematocrit % 46.7 43.4   Platelets K/uL 100* 98*   Gran % % 85.1* 83.2*   Lymph % % 7.0* 8.2*   Mono % % 7.2 7.9   Eosinophil % % 0.0 0.0   Basophil % % 0.1 0.1   Differential Method  Automated Automated       Metabolic Panel (last 72 hours):  Recent Labs   Lab Result Units 09/12/23 2113 09/12/23  2221 09/13/23  0145 09/13/23  0353 09/13/23  0508 09/13/23  0711 09/13/23  0936 09/13/23  0954   Sodium mmol/L 156*  --  161* 170* 171* 174* 175*  --    Sodium, Urine mmol/L  --   --   --   --   --   --   --  <20*   Potassium mmol/L 4.9  --  3.7 2.9* 2.9* 3.5 3.9  --    Chloride mmol/L 118*  --   128* >130* >130* >130* >130*  --    CO2 mmol/L 18*  --  18* 24 24 26 25  --    Glucose mg/dL 1,214*  --  888* 575* 404* 255* 265*  --    Glucose, UA   --  4+*  --   --   --   --   --   --    BUN mg/dL 88*  --  79* 74* 68* 67* 67*  --    Creatinine mg/dL 3.7*  --  3.2* 3.1* 2.9* 2.8* 2.7*  --    Creatinine, Urine mg/dL  --  32.3  --   --   --   --   --   --    Albumin g/dL 3.4*  --  3.3*  --  3.1* 3.2*  --   --    Total Bilirubin mg/dL 0.8  --  0.5  --  0.4 0.5  --   --    Alkaline Phosphatase U/L 281*  --  237*  --  220* 249*  --   --    AST U/L 183*  --  113*  --  86* 81*  --   --    ALT U/L 167*  --  146*  --  133* 134*  --   --    Magnesium mg/dL  --   --   --   --  3.8* 3.8*  --   --    Phosphorus mg/dL  --   --   --   --  1.5*  --   --   --        Vancomycin Administrations:  vancomycin given in the last 96 hours                     vancomycin (VANCOCIN) 1,750 mg in dextrose 5 % (D5W) 500 mL IVPB ()  Restarted 09/12/23 2316     1,750 mg New Bag  2228                    Microbiologic Results:  Microbiology Results (last 7 days)       Procedure Component Value Units Date/Time    Blood culture #1 **CANNOT BE ORDERED STAT** [[id number]] Collected: 09/12/23 2125    Order Status: Completed Specimen: Blood from Peripheral, Forearm, Right Updated: 09/13/23 0512     Blood Culture, Routine No Growth to date    Blood culture #2 **CANNOT BE ORDERED STAT** [[id number]] Collected: 09/12/23 2115    Order Status: Completed Specimen: Blood from Peripheral, Antecubital, Left Updated: 09/13/23 0512     Blood Culture, Routine No Growth to date

## 2023-09-13 NOTE — PLAN OF CARE
Problem: Adult Inpatient Plan of Care  Goal: Plan of Care Review  Outcome: Ongoing, Progressing  Goal: Patient-Specific Goal (Individualized)  Outcome: Ongoing, Progressing  Goal: Absence of Hospital-Acquired Illness or Injury  Outcome: Ongoing, Progressing  Goal: Optimal Comfort and Wellbeing  Outcome: Ongoing, Progressing  Goal: Readiness for Transition of Care  Outcome: Ongoing, Progressing     Problem: Diabetic Ketoacidosis  Goal: Fluid and Electrolyte Balance with Absence of Ketosis  Outcome: Ongoing, Progressing     Problem: Diabetes Comorbidity  Goal: Blood Glucose Level Within Targeted Range  Outcome: Ongoing, Progressing     Problem: Fluid and Electrolyte Imbalance (Acute Kidney Injury/Impairment)  Goal: Fluid and Electrolyte Balance  Outcome: Ongoing, Progressing     Problem: Oral Intake Inadequate (Acute Kidney Injury/Impairment)  Goal: Optimal Nutrition Intake  Outcome: Ongoing, Progressing     Problem: Renal Function Impairment (Acute Kidney Injury/Impairment)  Goal: Effective Renal Function  Outcome: Ongoing, Progressing     Problem: Adjustment to Illness (Sepsis/Septic Shock)  Goal: Optimal Coping  Outcome: Ongoing, Progressing     Problem: Bleeding (Sepsis/Septic Shock)  Goal: Absence of Bleeding  Outcome: Ongoing, Progressing     Problem: Glycemic Control Impaired (Sepsis/Septic Shock)  Goal: Blood Glucose Level Within Desired Range  Outcome: Ongoing, Progressing     Problem: Infection Progression (Sepsis/Septic Shock)  Goal: Absence of Infection Signs and Symptoms  Outcome: Ongoing, Progressing     Problem: Nutrition Impaired (Sepsis/Septic Shock)  Goal: Optimal Nutrition Intake  Outcome: Ongoing, Progressing     Problem: Infection  Goal: Absence of Infection Signs and Symptoms  Outcome: Ongoing, Progressing     Problem: Fall Injury Risk  Goal: Absence of Fall and Fall-Related Injury  Outcome: Ongoing, Progressing     Problem: Restraint, Nonbehavioral (Nonviolent)  Goal: Absence of Harm or  Injury  Outcome: Ongoing, Progressing     Problem: Skin Injury Risk Increased  Goal: Skin Health and Integrity  Outcome: Ongoing, Progressing

## 2023-09-13 NOTE — ASSESSMENT & PLAN NOTE
-On admit Na 156 with blood sugar 1214 --> corrected to 183mEq/L  -Treated DKA with NS (>3L) and insulin drip --> sugar improved and Na now corrects to 179  -Likely due to significant dehydration on admit.  Now appears euvolemic but for his lips which are chapped.  -Check urine Na, urine osm and serum osm  -Change to D5W - monitor BMP q4h   -Consult nephrology.

## 2023-09-13 NOTE — PROCEDURES
"Andrzej Sinclair is a 37 y.o. male patient.    Temp: 98.3 °F (36.8 °C) (09/13/23 1100)  Pulse: 104 (09/13/23 1200)  Resp: 10 (09/13/23 1200)  BP: (!) 140/87 (09/13/23 1130)  SpO2: 99 % (09/13/23 1200)  Weight: 84 kg (185 lb 3 oz) (09/13/23 0536)  Height: 6' 2" (188 cm) (09/12/23 2301)    PICC  Date/Time: 9/13/2023 12:30 PM  Performed by: Samuel Truong, RN  Consent Done: Yes  Time out: Immediately prior to procedure a time out was called to verify the correct patient, procedure, equipment, support staff and site/side marked as required  Indications: med administration  Anesthesia: local infiltration  Local anesthetic: lidocaine 1% without epinephrine  Anesthetic Total (mL): 1  Preparation: skin prepped with ChloraPrep  Skin prep agent dried: skin prep agent completely dried prior to procedure  Sterile barriers: all five maximum sterile barriers used - cap, mask, sterile gown, sterile gloves, and large sterile sheet  Hand hygiene: hand hygiene performed prior to central venous catheter insertion  Location details: right basilic  Catheter type: triple lumen  Catheter Length: 40cm    Ultrasound guidance: yes  Vessel Caliber: medium and large, compressibility normal  Needle advanced into vessel with real time Ultrasound guidance.  Guidewire confirmed in vessel.  Sterile sheath used.  Number of attempts: 1  Post-procedure: blood return through all ports, chlorhexidine patch and sterile dressing applied  Estimated blood loss (mL): 0            Name Samuel Truong   9/13/2023    "

## 2023-09-13 NOTE — CONSULTS
"  Date of Admit: 9/12/2023  Length of Stay: 1   Days  Consulting Staff:MD Gretel    Date of Consult: 9/13/2023    Reason for Consultation     Severe hypernatemia- 174    Subjective:      History of Present Illness:  Andrzej Sinclair is a 37 y.o. year old male with a past medical history significant for dm  who came with sob and nausea. Pt was admitted for dka.  Pt came with a corrected sodium of183, glucose was 1214.Glucose is now 148 and sodium 175. Pt noted with low uop.      Past Medical History:  Past Medical History:   Diagnosis Date    Diabetes mellitus     Diabetes mellitus type 1     Diagnosed at age 20       Past Surgical History:  No past surgical history on file.    Allergies:  Review of patient's allergies indicates:   Allergen Reactions    Lactose Other (See Comments)     Gas and moderate to sever abdominal pain       Home Medications:    No current facility-administered medications on file prior to encounter.     Current Outpatient Medications on File Prior to Encounter   Medication Sig Dispense Refill    acetaminophen (TYLENOL) 325 MG tablet Take 325 mg by mouth daily as needed for Pain.      acetone, urine, test (KETONE URINE TEST) Strp 30 strips by Misc.(Non-Drug; Combo Route) route as needed (hyperglycemia).      aluminum & magnesium hydroxide-simethicone (MYLANTA MAX STRENGTH) 400-400-40 mg/5 mL suspension Take 10 mLs by mouth every 6 (six) hours as needed for Indigestion. 100 mL 0    BD INSULIN SYRINGE ULTRA-FINE 0.5 mL 31 gauge x 5/16" Syrg USE FOUR TIMES DAILY AS DIRECTED      blood sugar diagnostic Strp 1 strip by Misc.(Non-Drug; Combo Route) route 5 (five) times daily. 500 strip 3    blood-glucose meter Misc Use to test blood glucose 2 times a day with meals. 1 each 0    blood-glucose sensor (DEXCOM G6 SENSOR) Martha 1 each by Misc.(Non-Drug; Combo Route) route every 10 days. 3 each 11    blood-glucose transmitter (DEXCOM G6 TRANSMITTER) Martha 1 each by Misc.(Non-Drug; Combo Route) route every 3 " "(three) months. 1 each 3    glucagon (GVOKE HYPOPEN 2-PACK) 1 mg/0.2 mL AtIn Inject 1 Package into the skin as needed (hypoglycemia). 0.4 mL 0    insulin degludec (TRESIBA U-100 INSULIN) 100 unit/mL injection Inject 0.14 mLs (14 Units total) into the skin once daily. 5 mL 10    insulin lispro-aabc (LYUMJEV U-100 INSULIN) 100 unit/mL Inject 7 Units into the skin 3 (three) times daily with meals. Plus correction scale, max TDD 36u 10 mL 6    insulin syringe,safetyneedle (BD SAFETYGLIDE INSULIN SYRINGE) 0.3 mL 31 gauge x 15/64" Syrg 1 Device by Misc.(Non-Drug; Combo Route) route 5 (five) times daily. 150 each 6    insulin syringe-needle U-100 0.5 mL 30 gauge x 1/2" Syrg To use 5 times daily to inject insulin 500 each 3    insulin syringe-needle U-100 1/2 mL 30 gauge Syrg AS DIRECTED 5 TIMES DAILY      lancets Misc 1 lancet by Misc.(Non-Drug; Combo Route) route 5 (five) times daily. 500 each 3    lancing device Misc 1 Device by Misc.(Non-Drug; Combo Route) route 2 (two) times daily with meals. 1 each 0    lisinopriL 10 MG tablet Take 1 tablet (10 mg total) by mouth once daily. 90 tablet 3    omeprazole (PRILOSEC) 20 MG capsule Take 1 capsule (20 mg total) by mouth once daily. 30 capsule 0    OMNIPOD 5 G6 INTRO KIT, GEN 5, Crtg SMARTSI Kit(s) SUB-Q Once      ondansetron (ZOFRAN) 4 MG tablet Take 2 tablets (8 mg total) by mouth 3 (three) times daily. 12 tablet 0    pantoprazole (PROTONIX) 40 MG tablet Take 1 tablet (40 mg total) by mouth once daily. 30 tablet 11    pen needle, diabetic (BD ULTRA-FINE ROVERTO PEN NEEDLE) 32 gauge x 5/32" Ndle USE AS DIRECTED WITH INSULIN 100 each 0    promethazine (PHENERGAN) 25 MG suppository Place 1 suppository (25 mg total) rectally every 6 (six) hours as needed for Nausea. 10 suppository 0    rosuvastatin (CRESTOR) 10 MG tablet Take 1 tablet (10 mg total) by mouth once daily. 90 tablet 3    sucralfate (CARAFATE) 100 mg/mL suspension Take 10 mLs (1 g total) by mouth every 6 (six) hours " "as needed (indigestion). 414 mL 0       Family History:  Family History   Problem Relation Age of Onset    Other Mother         prediabetes    Diabetes Mother     Other Father         patient does not know his fathers history       Social History:  Social History     Tobacco Use    Smoking status: Former    Smokeless tobacco: Current   Substance Use Topics    Alcohol use: Yes     Comment: socially    Drug use: No       Review of Systems:10pt ros -sob, nausea, decrease uop       Objective:     Scheduled Meds:   atorvastatin  40 mg Oral Daily    famotidine (PF)  20 mg Intravenous Daily    mupirocin   Nasal BID    sodium chloride 0.9%  10 mL Intravenous Q6H    sodium chloride 0.9%  10 mL Intravenous Q6H     Continuous Infusions:   dextrose 5 % (D5W) 75 mL/hr at 09/13/23 1159    insulin regular 1 units/mL infusion orderable (DKA) 0.02 Units/kg/hr (09/13/23 1421)     PRN Meds:.acetaminophen, dextrose 10%, dextrose 10%, dextrose 10%, dextrose 10%, melatonin, ondansetron, potassium chloride **AND** potassium chloride **AND** potassium chloride, prochlorperazine, Flushing PICC/Midline Protocol **AND** sodium chloride 0.9% **AND** sodium chloride 0.9%, Flushing PICC/Midline Protocol **AND** sodium chloride 0.9% **AND** sodium chloride 0.9%, Pharmacy to dose Vancomycin consult **AND** vancomycin - pharmacy to dose      Physical Examination:    Vitals: BP (!) 140/87   Pulse 104   Temp 98.3 °F (36.8 °C) (Oral)   Resp 10   Ht 6' 2" (1.88 m)   Wt 84 kg (185 lb 3 oz)   SpO2 99%   BMI 23.78 kg/m²    I/O last 3 completed shifts:  In: 3481.6 [I.V.:1960.7; IV Piggyback:1520.9]  Out: 1950 [Urine:1950]  I/O this shift:  In: 771.6 [P.O.:30; I.V.:488.4; IV Piggyback:253.2]  Out: 150 [Urine:150]      General: wd male in nad  HEENT:ncat,eomi,mm  CVS:s1s2 regular  PULM:ctab  ABD:bs,soft,nd   EXT:no leg edema  NEURO:awake    Laboratory:  Recent Results (from the past 24 hour(s))   ISTAT PROCEDURE    Collection Time: 09/12/23  9:10 PM "   Result Value Ref Range    POC PH 7.289 (L) 7.35 - 7.45    POC PCO2 47.2 (H) 35 - 45 mmHg    POC PO2 50 40 - 60 mmHg    POC HCO3 22.7 (L) 24 - 28 mmol/L    POC BE -4 -2 to 2 mmol/L    POC SATURATED O2 80 (L) 95 - 100 %    POC TCO2 24 24 - 29 mmol/L    Sample VENOUS     Site Other     Allens Test N/A     DelSys Room Air    ISTAT PROCEDURE    Collection Time: 09/12/23  9:10 PM   Result Value Ref Range    POC Glucose >700 (H) 70 - 110 mg/dL    POC BUN 72 (H) 6 - 30 mg/dL    POC Creatinine 2.7 (H) 0.5 - 1.4 mg/dL    POC Sodium 157 (H) 136 - 145 mmol/L    POC Potassium 4.7 3.5 - 5.1 mmol/L    POC Chloride 122 (H) 95 - 110 mmol/L    POC TCO2 (MEASURED) 22 (L) 23 - 29 mmol/L    POC Anion Gap 19 (H) 8 - 16 mmol/L    POC Ionized Calcium 1.24 1.06 - 1.42 mmol/L    POC Hematocrit 48 36 - 54 %PCV    Sample VENOUS    POCT glucose    Collection Time: 09/12/23  9:11 PM   Result Value Ref Range    POCT Glucose >500 (HH) 70 - 110 mg/dL   CBC auto differential    Collection Time: 09/12/23  9:13 PM   Result Value Ref Range    WBC 8.47 3.90 - 12.70 K/uL    RBC 4.80 4.60 - 6.20 M/uL    Hemoglobin 14.3 14.0 - 18.0 g/dL    Hematocrit 46.7 40.0 - 54.0 %    MCV 97 82 - 98 fL    MCH 29.8 27.0 - 31.0 pg    MCHC 30.6 (L) 32.0 - 36.0 g/dL    RDW 13.3 11.5 - 14.5 %    Platelets 100 (L) 150 - 450 K/uL    MPV 13.9 (H) 9.2 - 12.9 fL    Immature Granulocytes 0.6 (H) 0.0 - 0.5 %    Gran # (ANC) 7.2 1.8 - 7.7 K/uL    Immature Grans (Abs) 0.05 (H) 0.00 - 0.04 K/uL    Lymph # 0.6 (L) 1.0 - 4.8 K/uL    Mono # 0.6 0.3 - 1.0 K/uL    Eos # 0.0 0.0 - 0.5 K/uL    Baso # 0.01 0.00 - 0.20 K/uL    nRBC 0 0 /100 WBC    Gran % 85.1 (H) 38.0 - 73.0 %    Lymph % 7.0 (L) 18.0 - 48.0 %    Mono % 7.2 4.0 - 15.0 %    Eosinophil % 0.0 0.0 - 8.0 %    Basophil % 0.1 0.0 - 1.9 %    Differential Method Automated    Comprehensive metabolic panel    Collection Time: 09/12/23  9:13 PM   Result Value Ref Range    Sodium 156 (H) 136 - 145 mmol/L    Potassium 4.9 3.5 - 5.1 mmol/L     Chloride 118 (H) 95 - 110 mmol/L    CO2 18 (L) 23 - 29 mmol/L    Glucose 1,214 (HH) 70 - 110 mg/dL    BUN 88 (H) 6 - 20 mg/dL    Creatinine 3.7 (H) 0.5 - 1.4 mg/dL    Calcium 9.4 8.7 - 10.5 mg/dL    Total Protein 7.1 6.0 - 8.4 g/dL    Albumin 3.4 (L) 3.5 - 5.2 g/dL    Total Bilirubin 0.8 0.1 - 1.0 mg/dL    Alkaline Phosphatase 281 (H) 55 - 135 U/L     (H) 10 - 40 U/L     (H) 10 - 44 U/L    eGFR 21 (A) >60 mL/min/1.73 m^2    Anion Gap 20 (H) 8 - 16 mmol/L   Beta - Hydroxybutyrate, Serum    Collection Time: 09/12/23  9:13 PM   Result Value Ref Range    Beta-Hydroxybutyrate 3.3 (H) 0.0 - 0.5 mmol/L   Lactic acid, plasma    Collection Time: 09/12/23  9:13 PM   Result Value Ref Range    Lactate (Lactic Acid) 2.5 (H) 0.5 - 2.2 mmol/L   Blood culture #2 **CANNOT BE ORDERED STAT**    Collection Time: 09/12/23  9:15 PM    Specimen: Peripheral, Antecubital, Left; Blood   Result Value Ref Range    Blood Culture, Routine No Growth to date    Blood culture #1 **CANNOT BE ORDERED STAT**    Collection Time: 09/12/23  9:25 PM    Specimen: Peripheral, Forearm, Right; Blood   Result Value Ref Range    Blood Culture, Routine No Growth to date    POCT glucose    Collection Time: 09/12/23  9:55 PM   Result Value Ref Range    POCT Glucose >500 (HH) 70 - 110 mg/dL   Urinalysis, Reflex to Urine Culture Urine, Clean Catch    Collection Time: 09/12/23 10:21 PM    Specimen: Urine   Result Value Ref Range    Specimen UA Urine, Clean Catch     Color, UA Colorless (A) Yellow, Straw, Aleyda    Appearance, UA Clear Clear    pH, UA 5.0 5.0 - 8.0    Specific Gravity, UA 1.030 1.005 - 1.030    Protein, UA Negative Negative    Glucose, UA 4+ (A) Negative    Ketones, UA Trace (A) Negative    Bilirubin (UA) Negative Negative    Occult Blood UA Negative Negative    Nitrite, UA Negative Negative    Urobilinogen, UA Negative <2.0 EU/dL    Leukocytes, UA Negative Negative   Urinalysis Microscopic    Collection Time: 09/12/23 10:21 PM    Result Value Ref Range    RBC, UA 1 0 - 4 /hpf    WBC, UA 1 0 - 5 /hpf    Bacteria Few (A) None-Occ /hpf    Yeast, UA None None    Microscopic Comment SEE COMMENT    Drug screen panel, in-house    Collection Time: 09/12/23 10:21 PM   Result Value Ref Range    Benzodiazepines Negative Negative    Methadone metabolites Negative Negative    Cocaine (Metab.) Negative Negative    Opiate Scrn, Ur Negative Negative    Barbiturate Screen, Ur Negative Negative    Amphetamine Screen, Ur Negative Negative    THC Negative Negative    Phencyclidine Negative Negative    Creatinine, Urine 32.3 23.0 - 375.0 mg/dL    Toxicology Information SEE COMMENT    POCT glucose    Collection Time: 09/12/23 11:22 PM   Result Value Ref Range    POCT Glucose >500 (HH) 70 - 110 mg/dL   POCT glucose    Collection Time: 09/13/23 12:25 AM   Result Value Ref Range    POCT Glucose >500 (HH) 70 - 110 mg/dL   POCT glucose    Collection Time: 09/13/23  1:24 AM   Result Value Ref Range    POCT Glucose >500 (HH) 70 - 110 mg/dL   Comprehensive metabolic panel    Collection Time: 09/13/23  1:45 AM   Result Value Ref Range    Sodium 161 (HH) 136 - 145 mmol/L    Potassium 3.7 3.5 - 5.1 mmol/L    Chloride 128 (HH) 95 - 110 mmol/L    CO2 18 (L) 23 - 29 mmol/L    Glucose 888 (HH) 70 - 110 mg/dL    BUN 79 (H) 6 - 20 mg/dL    Creatinine 3.2 (H) 0.5 - 1.4 mg/dL    Calcium 9.0 8.7 - 10.5 mg/dL    Total Protein 6.8 6.0 - 8.4 g/dL    Albumin 3.3 (L) 3.5 - 5.2 g/dL    Total Bilirubin 0.5 0.1 - 1.0 mg/dL    Alkaline Phosphatase 237 (H) 55 - 135 U/L     (H) 10 - 40 U/L     (H) 10 - 44 U/L    eGFR 25 (A) >60 mL/min/1.73 m^2    Anion Gap 15 8 - 16 mmol/L   Lactic acid, plasma    Collection Time: 09/13/23  1:45 AM   Result Value Ref Range    Lactate (Lactic Acid) 2.9 (H) 0.5 - 2.2 mmol/L   Protime-INR    Collection Time: 09/13/23  1:45 AM   Result Value Ref Range    Prothrombin Time 11.5 9.0 - 12.5 sec    INR 1.1 0.8 - 1.2   Basic metabolic panel    Collection  Time: 09/13/23  3:53 AM   Result Value Ref Range    Sodium 170 (HH) 136 - 145 mmol/L    Potassium 2.9 (L) 3.5 - 5.1 mmol/L    Chloride >130 (HH) 95 - 110 mmol/L    CO2 24 23 - 29 mmol/L    Glucose 575 (HH) 70 - 110 mg/dL    BUN 74 (H) 6 - 20 mg/dL    Creatinine 3.1 (H) 0.5 - 1.4 mg/dL    Calcium 9.2 8.7 - 10.5 mg/dL    Anion Gap Unable to calculate 8 - 16 mmol/L    eGFR 26 (A) >60 mL/min/1.73 m^2   Beta - Hydroxybutyrate, Serum    Collection Time: 09/13/23  3:53 AM   Result Value Ref Range    Beta-Hydroxybutyrate 0.1 0.0 - 0.5 mmol/L   Phosphorus    Collection Time: 09/13/23  5:08 AM   Result Value Ref Range    Phosphorus 1.5 (L) 2.7 - 4.5 mg/dL   CBC auto differential    Collection Time: 09/13/23  5:08 AM   Result Value Ref Range    WBC 9.26 3.90 - 12.70 K/uL    RBC 4.65 4.60 - 6.20 M/uL    Hemoglobin 13.8 (L) 14.0 - 18.0 g/dL    Hematocrit 43.4 40.0 - 54.0 %    MCV 93 82 - 98 fL    MCH 29.7 27.0 - 31.0 pg    MCHC 31.8 (L) 32.0 - 36.0 g/dL    RDW 13.1 11.5 - 14.5 %    Platelets 98 (L) 150 - 450 K/uL    MPV 12.8 9.2 - 12.9 fL    Immature Granulocytes 0.6 (H) 0.0 - 0.5 %    Gran # (ANC) 7.7 1.8 - 7.7 K/uL    Immature Grans (Abs) 0.06 (H) 0.00 - 0.04 K/uL    Lymph # 0.8 (L) 1.0 - 4.8 K/uL    Mono # 0.7 0.3 - 1.0 K/uL    Eos # 0.0 0.0 - 0.5 K/uL    Baso # 0.01 0.00 - 0.20 K/uL    nRBC 0 0 /100 WBC    Gran % 83.2 (H) 38.0 - 73.0 %    Lymph % 8.2 (L) 18.0 - 48.0 %    Mono % 7.9 4.0 - 15.0 %    Eosinophil % 0.0 0.0 - 8.0 %    Basophil % 0.1 0.0 - 1.9 %    Differential Method Automated    Magnesium    Collection Time: 09/13/23  5:08 AM   Result Value Ref Range    Magnesium 3.8 (H) 1.6 - 2.6 mg/dL   Hepatic function panel    Collection Time: 09/13/23  5:08 AM   Result Value Ref Range    Total Protein 6.4 6.0 - 8.4 g/dL    Albumin 3.1 (L) 3.5 - 5.2 g/dL    Total Bilirubin 0.4 0.1 - 1.0 mg/dL    Bilirubin, Direct 0.2 0.1 - 0.3 mg/dL    AST 86 (H) 10 - 40 U/L     (H) 10 - 44 U/L    Alkaline Phosphatase 220 (H) 55 - 135  U/L   Hepatitis panel, acute    Collection Time: 09/13/23  5:08 AM   Result Value Ref Range    Hepatitis B Surface Ag Non-reactive Non-reactive    Hep B C IgM Non-reactive Non-reactive    Hep A IgM Non-reactive Non-reactive    Hepatitis C Ab Non-reactive Non-reactive   Basic metabolic panel    Collection Time: 09/13/23  5:08 AM   Result Value Ref Range    Sodium 171 (HH) 136 - 145 mmol/L    Potassium 2.9 (L) 3.5 - 5.1 mmol/L    Chloride >130 (HH) 95 - 110 mmol/L    CO2 24 23 - 29 mmol/L    Glucose 404 (H) 70 - 110 mg/dL    BUN 68 (H) 6 - 20 mg/dL    Creatinine 2.9 (H) 0.5 - 1.4 mg/dL    Calcium 8.7 8.7 - 10.5 mg/dL    Anion Gap Unable to calculate 8 - 16 mmol/L    eGFR 28 (A) >60 mL/min/1.73 m^2   Lactic acid, plasma    Collection Time: 09/13/23  5:08 AM   Result Value Ref Range    Lactate (Lactic Acid) 2.9 (H) 0.5 - 2.2 mmol/L   POCT glucose    Collection Time: 09/13/23  5:16 AM   Result Value Ref Range    POCT Glucose 335 (H) 70 - 110 mg/dL   POCT glucose    Collection Time: 09/13/23  6:25 AM   Result Value Ref Range    POCT Glucose 271 (H) 70 - 110 mg/dL   Comprehensive metabolic panel    Collection Time: 09/13/23  7:11 AM   Result Value Ref Range    Sodium 174 (HH) 136 - 145 mmol/L    Potassium 3.5 3.5 - 5.1 mmol/L    Chloride >130 (HH) 95 - 110 mmol/L    CO2 26 23 - 29 mmol/L    Glucose 255 (H) 70 - 110 mg/dL    BUN 67 (H) 6 - 20 mg/dL    Creatinine 2.8 (H) 0.5 - 1.4 mg/dL    Calcium 9.0 8.7 - 10.5 mg/dL    Total Protein 6.7 6.0 - 8.4 g/dL    Albumin 3.2 (L) 3.5 - 5.2 g/dL    Total Bilirubin 0.5 0.1 - 1.0 mg/dL    Alkaline Phosphatase 249 (H) 55 - 135 U/L    AST 81 (H) 10 - 40 U/L     (H) 10 - 44 U/L    eGFR 29 (A) >60 mL/min/1.73 m^2    Anion Gap Unable to calculate 8 - 16 mmol/L   Magnesium    Collection Time: 09/13/23  7:11 AM   Result Value Ref Range    Magnesium 3.8 (H) 1.6 - 2.6 mg/dL   BNP    Collection Time: 09/13/23  7:11 AM   Result Value Ref Range    BNP <10 0 - 99 pg/mL   POCT glucose     Collection Time: 09/13/23  7:11 AM   Result Value Ref Range    POCT Glucose 228 (H) 70 - 110 mg/dL   Procalcitonin    Collection Time: 09/13/23  7:45 AM   Result Value Ref Range    Procalcitonin 0.51 (H) <0.25 ng/mL   Hemoglobin A1c    Collection Time: 09/13/23  7:45 AM   Result Value Ref Range    Hemoglobin A1C 12.8 (H) 4.0 - 5.6 %    Estimated Avg Glucose 321 (H) 68 - 131 mg/dL   POCT glucose    Collection Time: 09/13/23  8:19 AM   Result Value Ref Range    POCT Glucose 237 (H) 70 - 110 mg/dL   POCT glucose    Collection Time: 09/13/23  9:11 AM   Result Value Ref Range    POCT Glucose 191 (H) 70 - 110 mg/dL   Basic metabolic panel    Collection Time: 09/13/23  9:36 AM   Result Value Ref Range    Sodium 175 (HH) 136 - 145 mmol/L    Potassium 3.9 3.5 - 5.1 mmol/L    Chloride >130 (HH) 95 - 110 mmol/L    CO2 25 23 - 29 mmol/L    Glucose 265 (H) 70 - 110 mg/dL    BUN 67 (H) 6 - 20 mg/dL    Creatinine 2.7 (H) 0.5 - 1.4 mg/dL    Calcium 9.5 8.7 - 10.5 mg/dL    Anion Gap Unable to calculate 8 - 16 mmol/L    eGFR 30 (A) >60 mL/min/1.73 m^2   Vancomycin, random    Collection Time: 09/13/23  9:39 AM   Result Value Ref Range    Vancomycin, Random 23.5 Not established ug/mL   Sodium, Random Urine    Collection Time: 09/13/23  9:54 AM   Result Value Ref Range    Sodium, Urine <20 (A) 20 - 250 mmol/L   POCT glucose    Collection Time: 09/13/23 10:21 AM   Result Value Ref Range    POCT Glucose 173 (H) 70 - 110 mg/dL   POCT glucose    Collection Time: 09/13/23 11:24 AM   Result Value Ref Range    POCT Glucose 129 (H) 70 - 110 mg/dL   POCT glucose    Collection Time: 09/13/23 12:11 PM   Result Value Ref Range    POCT Glucose 137 (H) 70 - 110 mg/dL   Basic metabolic panel    Collection Time: 09/13/23  1:15 PM   Result Value Ref Range    Sodium 175 (HH) 136 - 145 mmol/L    Potassium 4.0 3.5 - 5.1 mmol/L    Chloride >130 (HH) 95 - 110 mmol/L    CO2 25 23 - 29 mmol/L    Glucose 148 (H) 70 - 110 mg/dL    BUN 60 (H) 6 - 20 mg/dL     Creatinine 2.6 (H) 0.5 - 1.4 mg/dL    Calcium 8.6 (L) 8.7 - 10.5 mg/dL    Anion Gap Unable to calculate 8 - 16 mmol/L    eGFR 32 (A) >60 mL/min/1.73 m^2   POCT glucose    Collection Time: 09/13/23  1:15 PM   Result Value Ref Range    POCT Glucose 121 (H) 70 - 110 mg/dL   Acetaminophen level    Collection Time: 09/13/23  1:16 PM   Result Value Ref Range    Acetaminophen (Tylenol), Serum <3.0 (L) 10.0 - 20.0 ug/mL   Rapid HIV    Collection Time: 09/13/23  1:16 PM   Result Value Ref Range    HIV Rapid Testing Non-Reactive Negative   POCT glucose    Collection Time: 09/13/23  2:21 PM   Result Value Ref Range    POCT Glucose 129 (H) 70 - 110 mg/dL               Diagnostic Tests:     US Abdomen Limited [[id number]] Resulted: 09/13/23 1003   Order Status: Completed Updated: 09/13/23 1006   Narrative:     EXAMINATION:   US ABDOMEN LIMITED     CLINICAL HISTORY:   Elevated LFTs. RUQ US;     TECHNIQUE:   Limited ultrasound of the right upper quadrant of the abdomen  was performed.     COMPARISON:   CT 09/08/2023     FINDINGS:   Liver: Normal in size, measuring 16.6 cm. Homogeneous echotexture. No focal hepatic lesions.     Gallbladder: No calculi, wall thickening, or pericholecystic fluid.  No sonographic Menendez's sign.     Biliary system: The common duct is not dilated, measuring 3 mm.  No intrahepatic ductal dilatation.     Pancreas: Obscured by overlying bowel gas.  The visualized portions are unremarkable.     Spleen: Unremarkable, measuring 10.5 cm.     Miscellaneous: No upper abdominal ascites.    Impression:       No sonographic abnormality.       Electronically signed by: Manuel Solitario MD   Date: 09/13/2023   Time: 10:03   X-Ray Chest AP Portable [[id number]] Resulted: 09/12/23 2222   Order Status: Completed Updated: 09/12/23 2225   Narrative:     EXAMINATION:   XR CHEST AP PORTABLE     CLINICAL HISTORY:   hyperglycemia;     TECHNIQUE:   Single frontal view of the chest was performed.     COMPARISON:   Chest  radiograph September 7, 2023     FINDINGS:   Single portable chest view is submitted.  The cardiomediastinal silhouette appears appropriate.     There is no evidence for confluent infiltrate or consolidation, significant pleural effusion or pneumothorax.     Mild curvature of the spine is noted.  The osseous structures appear intact.    Impression:       There is no radiographic evidence for acute intrathoracic process.       Electronically signed by: Jordan Castillo   Date: 09/12/2023   Time: 22:22        Assessment/Plan:     Andrzej Sinclair is a 37 y.o. male admitted with:     1.hyponatremia. about 12l down. Ns bolus. D5w at higher rate. Try to get to 170 today. Dropped a lot already to 175.   2.hypophospatemia. replete prn.  3.dm1. more honk? Dropped insulin fast. Following.      Grace Soto

## 2023-09-13 NOTE — SUBJECTIVE & OBJECTIVE
Interval History: Noted some agitation overnight and pulled out midline - now in soft wrist restraints.  States he feels much better.  Still a bit groggy and with slow language, but appropriate.  Denies pain, nausea and vomiting.  All questions answered and patient had no further complaints.    Objective:     Vital Signs (Most Recent):  Temp: 98.6 °F (37 °C) (09/13/23 0600)  Pulse: 99 (09/13/23 0820)  Resp: 10 (09/13/23 0820)  BP: 127/86 (09/13/23 0600)  SpO2: 99 % (09/13/23 0820) Vital Signs (24h Range):  Temp:  [98.4 °F (36.9 °C)-98.6 °F (37 °C)] 98.6 °F (37 °C)  Pulse:  [] 99  Resp:  [10-25] 10  SpO2:  [95 %-100 %] 99 %  BP: (113-149)/(70-94) 127/86     Weight: 84 kg (185 lb 3 oz)  Body mass index is 23.78 kg/m².    Intake/Output Summary (Last 24 hours) at 9/13/2023 1019  Last data filed at 9/13/2023 0600  Gross per 24 hour   Intake 3269.66 ml   Output 1950 ml   Net 1319.66 ml         Physical Exam  Vitals and nursing note reviewed.   Constitutional:       General: He is not in acute distress.     Appearance: He is ill-appearing. He is not toxic-appearing or diaphoretic.   HENT:      Head: Normocephalic and atraumatic.      Nose: Nose normal.      Mouth/Throat:      Mouth: Mucous membranes are dry.   Eyes:      Extraocular Movements: Extraocular movements intact.   Cardiovascular:      Rate and Rhythm: Normal rate and regular rhythm.      Pulses: Normal pulses.      Heart sounds: No murmur heard.  Pulmonary:      Effort: Pulmonary effort is normal. No respiratory distress.   Abdominal:      General: Abdomen is flat. Bowel sounds are normal.      Palpations: Abdomen is soft.      Tenderness: There is no abdominal tenderness.   Musculoskeletal:      Right lower leg: No edema.      Left lower leg: No edema.   Skin:     General: Skin is warm.      Capillary Refill: Capillary refill takes less than 2 seconds.   Neurological:      Mental Status: He is alert.      Comments: A&Ox3 (unsure which hospital he is at)  but groggy and with mildly slow speech production.  Moves all extremities.  Cooperative with exam   Psychiatric:         Mood and Affect: Mood normal.             Significant Labs: All pertinent labs within the past 24 hours have been reviewed.    Significant Imaging: I have reviewed all pertinent imaging results/findings within the past 24 hours.

## 2023-09-13 NOTE — NURSING
[facility]  Nurses Note -- 4 Eyes      9/12/2023       Skin assessed on: Admit      [x] No Pressure Injuries Present    [x]Prevention Measures Documented    [] Yes LDA  for Pressure Injury Previously documented     [] Yes New Pressure Injury Discovered   [] LDA for New Pressure Injury Added      Attending RN:  Pamela Villatoro RN     Second RN:  Tammy RODRIGUEZ

## 2023-09-13 NOTE — ASSESSMENT & PLAN NOTE
-On admit tachycardic with lactic acidosis, normal wbc and scantly elevated procalcitonin  -No clinical evidence of infection - no UTI, pneumonia, cellulitis, cholecystitis  -Blood cultures obtained in ER and are negative thus far  -Treated with broad spectrum antibiotics initially, but most likely this was due to his DKA and severe dehydration  -Will hold antibiotics and monitor closely.  Low threshold to resume.

## 2023-09-13 NOTE — CONSULTS
Nutrition-Related Diabetes Education      Learners: Andrzej Aubrieil    Current HbA1c:   Hemoglobin A1C   Date Value Ref Range Status   09/13/2023 12.8 (H) 4.0 - 5.6 % Final     Comment:     ADA Screening Guidelines:  5.7-6.4%  Consistent with prediabetes  >or=6.5%  Consistent with diabetes    High levels of fetal hemoglobin interfere with the HbA1C  assay. Heterozygous hemoglobin variants (HbS, HgC, etc)do  not significantly interfere with this assay.   However, presence of multiple variants may affect accuracy.     04/10/2023 8.5 (H) 4.0 - 5.6 % Final     Comment:     ADA Screening Guidelines:  5.7-6.4%  Consistent with prediabetes  >or=6.5%  Consistent with diabetes    High levels of fetal hemoglobin interfere with the HbA1C  assay. Heterozygous hemoglobin variants (HbS, HgC, etc)do  not significantly interfere with this assay.   However, presence of multiple variants may affect accuracy.     03/01/2023 10.2 (H) 4.0 - 5.6 % Final     Comment:     ADA Screening Guidelines:  5.7-6.4%  Consistent with prediabetes  >or=6.5%  Consistent with diabetes    High levels of fetal hemoglobin interfere with the HbA1C  assay. Heterozygous hemoglobin variants (HbS, HgC, etc)do  not significantly interfere with this assay.   However, presence of multiple variants may affect accuracy.          Home diabetes medication(s):   Diabetes Medications               glucagon (GVOKE HYPOPEN 2-PACK) 1 mg/0.2 mL AtIn Inject 1 Package into the skin as needed (hypoglycemia).    insulin degludec (TRESIBA U-100 INSULIN) 100 unit/mL injection Inject 0.14 mLs (14 Units total) into the skin once daily.    insulin lispro-aabc (LYUMJEV U-100 INSULIN) 100 unit/mL Inject 7 Units into the skin 3 (three) times daily with meals. Plus correction scale, max TDD 36u             Nutrition Education with handouts: Diabetes and Diet- Diet and Health - Carbohydrate counting Diet    Comments: RD consult for education on diabetic diet. Pt educated.    Please consult  as needed.  Thank you!     Orly Ni, Registration Eligible, Provisional LDN

## 2023-09-13 NOTE — PLAN OF CARE
Pt remains awake but confused. Resp even and unlabored on ra with no distress noted. Pt has ns at 125cc/hr, zozyn and potassium 10meq infusing to alexander midline site without redness edema or pain noted. Per Doctor Miracle Insulin infusing at 0.05units/kg/hr to #20 right wrist piv with no redness edema or pain noted. Pt remains npo. Last blood sugar 271. Pt with good uop. K+ 2.9, na 171 and chloride >130. Doctor Miracle notified. Pt in bilat soft upper restraints per order 2/2 to pulling at lines. Pt outstanding for RUQ US. Bed low position with sr up and call light in reach

## 2023-09-13 NOTE — ASSESSMENT & PLAN NOTE
This patient does have evidence of infective focus  My overall impression is sepsis.  Source: Unknown  Antibiotics given-   Antibiotics (72h ago, onward)    Start     Stop Route Frequency Ordered    09/12/23 2215  vancomycin (VANCOCIN) 1,750 mg in dextrose 5 % (D5W) 500 mL IVPB         -- IV Once 09/12/23 2102 09/12/23 2200  piperacillin-tazobactam (ZOSYN) 4.5 g in dextrose 5 % in water (D5W) 100 mL IVPB (MB+)         -- IV Every 8 hours (non-standard times) 09/12/23 2105 09/12/23 2156  vancomycin - pharmacy to dose  (vancomycin IVPB (PEDS and ADULTS))        See Hyperspace for full Linked Orders Report.    -- IV pharmacy to manage frequency 09/12/23 2056        Latest lactate reviewed-  Recent Labs   Lab 09/12/23 2113   LACTATE 2.5*     Organ dysfunction indicated by Acute kidney injury and Acute liver injury    Fluid challenge Ideal Body Weight- The patient's ideal body weight is Ideal body weight: 82.2 kg (181 lb 3.5 oz) which will be used to calculate fluid bolus of 30 ml/kg for treatment of septic shock.      Post- resuscitation assessment Yes Perfusion exam was performed within 6 hours of septic shock presentation after bolus shows Adequate tissue perfusion assessed by non-invasive monitoring       Will Not start Pressors- Levophed for MAP of 65  Source control achieved by:   Antibiotics   F/U cultures  Obtain CT A/P once creatinine improves and if symptoms persist   RUQ US

## 2023-09-13 NOTE — ED TRIAGE NOTES
Pt recently signed out AMA from another facility, pt was suppose to be admitted for sepsis; provider at bedside

## 2023-09-13 NOTE — EICU
"eICU Brief Note    Issue: 37 y old male with weakness and hyperglycemia . Diagnosed with DKA with high Betahydroxybutyrate and LA elevated at 2.5 with concern for sepsis. Recent CT abd with ? Colitis.   On broad abx    BP (!) 145/86   Pulse 97   Temp 98.5 °F (36.9 °C)   Resp 12   Ht 6' 2" (1.88 m)   Wt 83.7 kg (184 lb 8.4 oz)   SpO2 98%   BMI 23.69 kg/m²   Intake/Output - Last 3 Shifts         09/11 0700  09/12 0659 09/12 0700  09/13 0659    I.V. (mL/kg)  1097.1 (13.1)    IV Piggyback  1311.4    Total Intake(mL/kg)  2408.5 (28.8)    Net  +2408.5                Resting on video review    Plan :  DKA- still high glucose- follow on insulin; follow AG- was 20 ; follow lytes  Na high ? Vol depletion; on NS   LA high ? Sepsis or vol depletion - ordered recheck   BETH likely prerenal  AST, ALT, alk phos - high - follow   SCD- platelets 100    D/w RN    "

## 2023-09-13 NOTE — H&P
"[facility] Medicine  History & Physical    Patient Name: Andrzej Sinclair  MRN: [id number]  Patient Class: IP- Inpatient  Admission Date: 2023  Attending Physician: Wally Majano MD   Primary Care Provider: Viktoriya Jaeger NP         Patient information was obtained from patient and ER records.     Subjective:     Principal Problem:DKA (diabetic ketoacidosis)    Chief Complaint:   Chief Complaint   Patient presents with    Hyperglycemia     C/o weakness and hyperglycemia for 2 days. Pt accucheck pre hospital read "HI" pt appears weak. Denies chest pain or sob +nausea        HPI: This is a 37-year-old male with a past medical history of type 1 diabetes, hypertension, CKD 3, GERD, who presents with hyperglycemia.    Patient presents with generalized fatigue and evaluation of hyperglycemia. Patient initially presented to [facility] on  for evaluation of hyperglycemia.  In the ED, he was noted to be febrile (39.1) and was recommended to be admitted.  Patient decided to leave against medical advice.  He also had a prior presentation on  for similar symptoms. He says he is unsure he if has been taking his insulin.     In the ED, the patient was tachycardic (120).  Labs were suggestive of DKA (B, PH: 7.28, HCO3: 18, A, LA: 2.5, BHB: 3.3), elevated LFTs (AST:  183, ALT:  176, ALP: 281), hypernatremia (corrected, 166), elevated creatinine (3.7 - baseline of 2.5), elevated LFTs (AST: 183, ALT: 167, ALP: 281).  CXR showed no acute process. He was given fluids, vancomycin, Zosyn and was started on insulin. Patient was admitted for further management.       Past Medical History:   Diagnosis Date    Diabetes mellitus     Diabetes mellitus type 1     Diagnosed at age 20       No past surgical history on file.    Review of patient's allergies indicates:   Allergen Reactions    Lactose Other (See Comments)     Gas and moderate to sever abdominal pain       No current " "facility-administered medications on file prior to encounter.     Current Outpatient Medications on File Prior to Encounter   Medication Sig    acetaminophen (TYLENOL) 325 MG tablet Take 325 mg by mouth daily as needed for Pain.    acetone, urine, test (KETONE URINE TEST) Strp 30 strips by Misc.(Non-Drug; Combo Route) route as needed (hyperglycemia).    aluminum & magnesium hydroxide-simethicone (MYLANTA MAX STRENGTH) 400-400-40 mg/5 mL suspension Take 10 mLs by mouth every 6 (six) hours as needed for Indigestion.    BD INSULIN SYRINGE ULTRA-FINE 0.5 mL 31 gauge x 5/16" Syrg USE FOUR TIMES DAILY AS DIRECTED    blood sugar diagnostic Strp 1 strip by Misc.(Non-Drug; Combo Route) route 5 (five) times daily.    blood-glucose meter Misc Use to test blood glucose 2 times a day with meals.    blood-glucose sensor (DEXCOM G6 SENSOR) Martha 1 each by Misc.(Non-Drug; Combo Route) route every 10 days.    blood-glucose transmitter (DEXCOM G6 TRANSMITTER) Martha 1 each by Misc.(Non-Drug; Combo Route) route every 3 (three) months.    glucagon (GVOKE HYPOPEN 2-PACK) 1 mg/0.2 mL AtIn Inject 1 Package into the skin as needed (hypoglycemia).    insulin degludec (TRESIBA U-100 INSULIN) 100 unit/mL injection Inject 0.14 mLs (14 Units total) into the skin once daily.    insulin lispro-aabc (LYUMJEV U-100 INSULIN) 100 unit/mL Inject 7 Units into the skin 3 (three) times daily with meals. Plus correction scale, max TDD 36u    insulin syringe,safetyneedle (BD SAFETYGLIDE INSULIN SYRINGE) 0.3 mL 31 gauge x 15/64" Syrg 1 Device by Misc.(Non-Drug; Combo Route) route 5 (five) times daily.    insulin syringe-needle U-100 0.5 mL 30 gauge x 1/2" Syrg To use 5 times daily to inject insulin    insulin syringe-needle U-100 1/2 mL 30 gauge Syrg AS DIRECTED 5 TIMES DAILY    lancets Misc 1 lancet by Misc.(Non-Drug; Combo Route) route 5 (five) times daily.    lancing device Misc 1 Device by Misc.(Non-Drug; Combo Route) route 2 (two) times " "daily with meals.    lisinopriL 10 MG tablet Take 1 tablet (10 mg total) by mouth once daily.    omeprazole (PRILOSEC) 20 MG capsule Take 1 capsule (20 mg total) by mouth once daily.    OMNIPOD 5 G6 INTRO KIT, GEN 5, Crtg SMARTSI Kit(s) SUB-Q Once    ondansetron (ZOFRAN) 4 MG tablet Take 2 tablets (8 mg total) by mouth 3 (three) times daily.    pantoprazole (PROTONIX) 40 MG tablet Take 1 tablet (40 mg total) by mouth once daily.    pen needle, diabetic (BD ULTRA-FINE ROVERTO PEN NEEDLE) 32 gauge x 5/32" Ndle USE AS DIRECTED WITH INSULIN    promethazine (PHENERGAN) 25 MG suppository Place 1 suppository (25 mg total) rectally every 6 (six) hours as needed for Nausea.    rosuvastatin (CRESTOR) 10 MG tablet Take 1 tablet (10 mg total) by mouth once daily.    sucralfate (CARAFATE) 100 mg/mL suspension Take 10 mLs (1 g total) by mouth every 6 (six) hours as needed (indigestion).     Family History       Problem Relation (Age of Onset)    Diabetes Mother    Other Mother, Father          Tobacco Use    Smoking status: Former    Smokeless tobacco: Current   Substance and Sexual Activity    Alcohol use: Yes     Comment: socially    Drug use: No    Sexual activity: Not on file     Review of Systems   Constitutional:  Positive for appetite change, fatigue and fever.   HENT: Negative.     Eyes: Negative.    Respiratory: Negative.     Cardiovascular: Negative.    Gastrointestinal:  Positive for abdominal pain.   Endocrine: Positive for polyuria.   Genitourinary: Negative.    Musculoskeletal: Negative.    Skin: Negative.    Allergic/Immunologic: Negative.    Neurological: Negative.    Psychiatric/Behavioral: Negative.       Objective:     Vital Signs (Most Recent):  Temp: 98.5 °F (36.9 °C) (23)  Pulse: 104 (23)  Resp: 20 (23)  BP: 136/78 (23)  SpO2: 99 % (23) Vital Signs (24h Range):  Temp:  [98.5 °F (36.9 °C)] 98.5 °F (36.9 °C)  Pulse:  [104-120] 104  Resp:  " [20] 20  SpO2:  [99 %] 99 %  BP: (136)/(70-78) 136/78     Weight: 86 kg (189 lb 9.5 oz)  Body mass index is 24.34 kg/m².     Physical Exam  Vitals and nursing note reviewed.   Constitutional:       General: He is in acute distress.      Appearance: He is ill-appearing.   HENT:      Head: Normocephalic and atraumatic.      Nose: Nose normal.      Mouth/Throat:      Mouth: Mucous membranes are dry.   Eyes:      Extraocular Movements: Extraocular movements intact.   Cardiovascular:      Rate and Rhythm: Normal rate.      Pulses: Normal pulses.      Heart sounds: No murmur heard.  Pulmonary:      Effort: Pulmonary effort is normal. No respiratory distress.   Abdominal:      General: Abdomen is flat.      Palpations: Abdomen is soft.      Tenderness: There is abdominal tenderness.   Musculoskeletal:      Right lower leg: No edema.      Left lower leg: No edema.   Skin:     General: Skin is warm.      Capillary Refill: Capillary refill takes less than 2 seconds.   Neurological:      Mental Status: He is alert.      Comments: A&Ox3 (unsure which hospital he is at)   Psychiatric:         Mood and Affect: Mood normal.                Significant Labs: All pertinent labs within the past 24 hours have been reviewed.    Significant Imaging: I have reviewed all pertinent imaging results/findings within the past 24 hours.    Assessment/Plan:     * DKA (diabetic ketoacidosis)  Labs were suggestive of DKA (B, PH: 7.28, HCO3: 18, A, LA: 2.5, BHB: 3.3)  Initiate and maintain DKA protocol   Monitor BMPs     Sepsis  This patient does have evidence of infective focus  My overall impression is sepsis.  Source: Unknown  Antibiotics given-   Antibiotics (72h ago, onward)    Start     Stop Route Frequency Ordered    23  vancomycin (VANCOCIN) 1,750 mg in dextrose 5 % (D5W) 500 mL IVPB         -- IV Once 23 220  piperacillin-tazobactam (ZOSYN) 4.5 g in dextrose 5 % in water (D5W) 100 mL IVPB (MB+)          -- IV Every 8 hours (non-standard times) 09/12/23 2105 09/12/23 2156  vancomycin - pharmacy to dose  (vancomycin IVPB (PEDS and ADULTS))        See Hyperspace for full Linked Orders Report.    -- IV pharmacy to manage frequency 09/12/23 2056        Latest lactate reviewed-  Recent Labs   Lab 09/12/23 2113   LACTATE 2.5*     Organ dysfunction indicated by Acute kidney injury and Acute liver injury    Fluid challenge Ideal Body Weight- The patient's ideal body weight is Ideal body weight: 82.2 kg (181 lb 3.5 oz) which will be used to calculate fluid bolus of 30 ml/kg for treatment of septic shock.      Post- resuscitation assessment Yes Perfusion exam was performed within 6 hours of septic shock presentation after bolus shows Adequate tissue perfusion assessed by non-invasive monitoring       Will Not start Pressors- Levophed for MAP of 65  Source control achieved by:   Antibiotics   F/U cultures  Obtain CT A/P once creatinine improves and if symptoms persist   RUQ US     Elevated LFTs  Could be in the setting of sepsis. Check hepatitis panel, RUQ US      Hypernatremia  LPatient has hypernatremia which is uncontrolled. The hypernatremia is due to Dehydration. We will aim to correct the sodium by 8-10mEq in 24 hours. We will correct their hypernatremia with Select IV fluids: NS at a rate of 100 ml/hr. The patient's sodium results have been reviewed and are listed below.  Recent Labs   Lab 09/12/23 2113   *       BETH (acute kidney injury)  Patient with acute kidney injury/acute renal failure likely due to pre-renal azotemia due to IVVD BETH is currently stable. Baseline creatinine 2.5 - Labs reviewed- Renal function/electrolytes with Estimated Creatinine Clearance: 32.1 mL/min (A) (based on SCr of 3.7 mg/dL (H)). according to latest data. Monitor urine output and serial BMP and adjust therapy as needed. Avoid nephrotoxins and renally dose meds for GFR listed above.    Type 1 diabetes mellitus with  complications  See plan for DKA      Primary hypertension  Monitor BP for now. Patient reports not taking any medications currently         VTE Risk Mitigation (From admission, onward)         Ordered     IP VTE LOW RISK PATIENT  Once         09/12/23 2247     Place sequential compression device  Until discontinued         09/12/23 2247              Critical care time spent on the evaluation and treatment of severe organ dysfunction, review of pertinent labs and imaging studies, discussions with consulting providers and discussions with patient/family: 45 minutes.             Luis Alberto aRusch MD  Department of Hospital Medicine  [facility] - Intensive Care

## 2023-09-14 LAB
ALBUMIN SERPL BCP-MCNC: 2.7 G/DL (ref 3.5–5.2)
ALP SERPL-CCNC: 184 U/L (ref 55–135)
ALT SERPL W/O P-5'-P-CCNC: 100 U/L (ref 10–44)
ANION GAP SERPL CALC-SCNC: 8 MMOL/L (ref 8–16)
ANION GAP SERPL CALC-SCNC: ABNORMAL MMOL/L (ref 8–16)
AST SERPL-CCNC: 69 U/L (ref 10–40)
BASOPHILS # BLD AUTO: 0.01 K/UL (ref 0–0.2)
BASOPHILS NFR BLD: 0.1 % (ref 0–1.9)
BILIRUB DIRECT SERPL-MCNC: 0.2 MG/DL (ref 0.1–0.3)
BILIRUB SERPL-MCNC: 0.4 MG/DL (ref 0.1–1)
BUN SERPL-MCNC: 30 MG/DL (ref 6–20)
BUN SERPL-MCNC: 34 MG/DL (ref 6–20)
BUN SERPL-MCNC: 38 MG/DL (ref 6–20)
BUN SERPL-MCNC: 42 MG/DL (ref 6–20)
CALCIUM SERPL-MCNC: 7.9 MG/DL (ref 8.7–10.5)
CALCIUM SERPL-MCNC: 7.9 MG/DL (ref 8.7–10.5)
CALCIUM SERPL-MCNC: 8.1 MG/DL (ref 8.7–10.5)
CALCIUM SERPL-MCNC: 8.1 MG/DL (ref 8.7–10.5)
CHLORIDE SERPL-SCNC: 128 MMOL/L (ref 95–110)
CHLORIDE SERPL-SCNC: >130 MMOL/L (ref 95–110)
CO2 SERPL-SCNC: 24 MMOL/L (ref 23–29)
CO2 SERPL-SCNC: 24 MMOL/L (ref 23–29)
CO2 SERPL-SCNC: 25 MMOL/L (ref 23–29)
CO2 SERPL-SCNC: 26 MMOL/L (ref 23–29)
CREAT SERPL-MCNC: 1.9 MG/DL (ref 0.5–1.4)
CREAT SERPL-MCNC: 2 MG/DL (ref 0.5–1.4)
CREAT SERPL-MCNC: 2.2 MG/DL (ref 0.5–1.4)
CREAT SERPL-MCNC: 2.2 MG/DL (ref 0.5–1.4)
DIFFERENTIAL METHOD: ABNORMAL
EOSINOPHIL # BLD AUTO: 0 K/UL (ref 0–0.5)
EOSINOPHIL NFR BLD: 0.3 % (ref 0–8)
ERYTHROCYTE [DISTWIDTH] IN BLOOD BY AUTOMATED COUNT: 13.3 % (ref 11.5–14.5)
EST. GFR  (NO RACE VARIABLE): 39 ML/MIN/1.73 M^2
EST. GFR  (NO RACE VARIABLE): 39 ML/MIN/1.73 M^2
EST. GFR  (NO RACE VARIABLE): 44 ML/MIN/1.73 M^2
EST. GFR  (NO RACE VARIABLE): 46 ML/MIN/1.73 M^2
GLUCOSE SERPL-MCNC: 137 MG/DL (ref 70–110)
GLUCOSE SERPL-MCNC: 163 MG/DL (ref 70–110)
GLUCOSE SERPL-MCNC: 167 MG/DL (ref 70–110)
GLUCOSE SERPL-MCNC: 320 MG/DL (ref 70–110)
HCT VFR BLD AUTO: 37.3 % (ref 40–54)
HGB BLD-MCNC: 11.8 G/DL (ref 14–18)
IMM GRANULOCYTES # BLD AUTO: 0.1 K/UL (ref 0–0.04)
IMM GRANULOCYTES NFR BLD AUTO: 1.3 % (ref 0–0.5)
LACTATE SERPL-SCNC: 1.9 MMOL/L (ref 0.5–2.2)
LYMPHOCYTES # BLD AUTO: 0.8 K/UL (ref 1–4.8)
LYMPHOCYTES NFR BLD: 10.1 % (ref 18–48)
MAGNESIUM SERPL-MCNC: 2.7 MG/DL (ref 1.6–2.6)
MCH RBC QN AUTO: 29.6 PG (ref 27–31)
MCHC RBC AUTO-ENTMCNC: 31.6 G/DL (ref 32–36)
MCV RBC AUTO: 94 FL (ref 82–98)
MONOCYTES # BLD AUTO: 0.6 K/UL (ref 0.3–1)
MONOCYTES NFR BLD: 7.4 % (ref 4–15)
NEUTROPHILS # BLD AUTO: 6.2 K/UL (ref 1.8–7.7)
NEUTROPHILS NFR BLD: 80.8 % (ref 38–73)
NRBC BLD-RTO: 0 /100 WBC
PHOSPHATE SERPL-MCNC: 4.1 MG/DL (ref 2.7–4.5)
PLATELET # BLD AUTO: 71 K/UL (ref 150–450)
PMV BLD AUTO: 13.1 FL (ref 9.2–12.9)
POCT GLUCOSE: 117 MG/DL (ref 70–110)
POCT GLUCOSE: 133 MG/DL (ref 70–110)
POCT GLUCOSE: 147 MG/DL (ref 70–110)
POCT GLUCOSE: 149 MG/DL (ref 70–110)
POCT GLUCOSE: 169 MG/DL (ref 70–110)
POCT GLUCOSE: 169 MG/DL (ref 70–110)
POCT GLUCOSE: 171 MG/DL (ref 70–110)
POCT GLUCOSE: 180 MG/DL (ref 70–110)
POCT GLUCOSE: 190 MG/DL (ref 70–110)
POCT GLUCOSE: 208 MG/DL (ref 70–110)
POCT GLUCOSE: 338 MG/DL (ref 70–110)
POTASSIUM SERPL-SCNC: 3.5 MMOL/L (ref 3.5–5.1)
POTASSIUM SERPL-SCNC: 3.6 MMOL/L (ref 3.5–5.1)
POTASSIUM SERPL-SCNC: 3.7 MMOL/L (ref 3.5–5.1)
POTASSIUM SERPL-SCNC: 4.3 MMOL/L (ref 3.5–5.1)
PROT SERPL-MCNC: 5.6 G/DL (ref 6–8.4)
RBC # BLD AUTO: 3.99 M/UL (ref 4.6–6.2)
SODIUM SERPL-SCNC: 160 MMOL/L (ref 136–145)
SODIUM SERPL-SCNC: 168 MMOL/L (ref 136–145)
SODIUM SERPL-SCNC: 169 MMOL/L (ref 136–145)
SODIUM SERPL-SCNC: 170 MMOL/L (ref 136–145)
VANCOMYCIN SERPL-MCNC: 10.5 UG/ML
WBC # BLD AUTO: 7.72 K/UL (ref 3.9–12.7)

## 2023-09-14 PROCEDURE — 20000000 HC ICU ROOM

## 2023-09-14 PROCEDURE — 25000003 PHARM REV CODE 250: Performed by: HOSPITALIST

## 2023-09-14 PROCEDURE — 80076 HEPATIC FUNCTION PANEL: CPT | Performed by: HOSPITALIST

## 2023-09-14 PROCEDURE — 63600175 PHARM REV CODE 636 W HCPCS: Performed by: HOSPITALIST

## 2023-09-14 PROCEDURE — 83735 ASSAY OF MAGNESIUM: CPT | Performed by: STUDENT IN AN ORGANIZED HEALTH CARE EDUCATION/TRAINING PROGRAM

## 2023-09-14 PROCEDURE — 80048 BASIC METABOLIC PNL TOTAL CA: CPT | Mod: 91 | Performed by: HOSPITALIST

## 2023-09-14 PROCEDURE — 83605 ASSAY OF LACTIC ACID: CPT | Performed by: HOSPITALIST

## 2023-09-14 PROCEDURE — 25000003 PHARM REV CODE 250: Performed by: INTERNAL MEDICINE

## 2023-09-14 PROCEDURE — 85025 COMPLETE CBC W/AUTO DIFF WBC: CPT | Performed by: STUDENT IN AN ORGANIZED HEALTH CARE EDUCATION/TRAINING PROGRAM

## 2023-09-14 PROCEDURE — 80048 BASIC METABOLIC PNL TOTAL CA: CPT | Mod: 91 | Performed by: INTERNAL MEDICINE

## 2023-09-14 PROCEDURE — 63600175 PHARM REV CODE 636 W HCPCS

## 2023-09-14 PROCEDURE — A4216 STERILE WATER/SALINE, 10 ML: HCPCS | Performed by: HOSPITALIST

## 2023-09-14 PROCEDURE — 80048 BASIC METABOLIC PNL TOTAL CA: CPT | Performed by: HOSPITALIST

## 2023-09-14 PROCEDURE — 80202 ASSAY OF VANCOMYCIN: CPT | Performed by: HOSPITALIST

## 2023-09-14 PROCEDURE — 25000003 PHARM REV CODE 250: Performed by: STUDENT IN AN ORGANIZED HEALTH CARE EDUCATION/TRAINING PROGRAM

## 2023-09-14 PROCEDURE — 84100 ASSAY OF PHOSPHORUS: CPT | Performed by: STUDENT IN AN ORGANIZED HEALTH CARE EDUCATION/TRAINING PROGRAM

## 2023-09-14 RX ORDER — HALOPERIDOL 5 MG/ML
INJECTION INTRAMUSCULAR
Status: COMPLETED
Start: 2023-09-14 | End: 2023-09-14

## 2023-09-14 RX ORDER — HALOPERIDOL 5 MG/ML
5 INJECTION INTRAMUSCULAR EVERY 6 HOURS PRN
Status: DISCONTINUED | OUTPATIENT
Start: 2023-09-14 | End: 2023-09-18 | Stop reason: HOSPADM

## 2023-09-14 RX ORDER — INSULIN ASPART 100 [IU]/ML
0-5 INJECTION, SOLUTION INTRAVENOUS; SUBCUTANEOUS EVERY 4 HOURS PRN
Status: DISCONTINUED | OUTPATIENT
Start: 2023-09-14 | End: 2023-09-15 | Stop reason: DRUGHIGH

## 2023-09-14 RX ORDER — IBUPROFEN 200 MG
24 TABLET ORAL
Status: DISCONTINUED | OUTPATIENT
Start: 2023-09-14 | End: 2023-09-15 | Stop reason: DRUGHIGH

## 2023-09-14 RX ORDER — POTASSIUM CHLORIDE 20 MEQ/1
40 TABLET, EXTENDED RELEASE ORAL ONCE
Status: COMPLETED | OUTPATIENT
Start: 2023-09-14 | End: 2023-09-14

## 2023-09-14 RX ORDER — GLUCAGON 1 MG
1 KIT INJECTION
Status: DISCONTINUED | OUTPATIENT
Start: 2023-09-14 | End: 2023-09-15 | Stop reason: DRUGHIGH

## 2023-09-14 RX ORDER — IBUPROFEN 200 MG
16 TABLET ORAL
Status: DISCONTINUED | OUTPATIENT
Start: 2023-09-14 | End: 2023-09-15 | Stop reason: DRUGHIGH

## 2023-09-14 RX ADMIN — DEXTROSE MONOHYDRATE: 50 INJECTION, SOLUTION INTRAVENOUS at 08:09

## 2023-09-14 RX ADMIN — MUPIROCIN: 20 OINTMENT TOPICAL at 08:09

## 2023-09-14 RX ADMIN — DEXTROSE MONOHYDRATE: 50 INJECTION, SOLUTION INTRAVENOUS at 12:09

## 2023-09-14 RX ADMIN — INSULIN ASPART 4 UNITS: 100 INJECTION, SOLUTION INTRAVENOUS; SUBCUTANEOUS at 03:09

## 2023-09-14 RX ADMIN — DEXTROSE MONOHYDRATE: 50 INJECTION, SOLUTION INTRAVENOUS at 06:09

## 2023-09-14 RX ADMIN — FAMOTIDINE 20 MG: 10 INJECTION INTRAVENOUS at 08:09

## 2023-09-14 RX ADMIN — Medication 10 ML: at 06:09

## 2023-09-14 RX ADMIN — VANCOMYCIN HYDROCHLORIDE 1250 MG: 1.25 INJECTION, POWDER, LYOPHILIZED, FOR SOLUTION INTRAVENOUS at 06:09

## 2023-09-14 RX ADMIN — HALOPERIDOL LACTATE 5 MG: 5 INJECTION, SOLUTION INTRAMUSCULAR at 01:09

## 2023-09-14 RX ADMIN — Medication 10 ML: at 12:09

## 2023-09-14 RX ADMIN — INSULIN HUMAN 0.02 UNITS/KG/HR: 1 INJECTION, SOLUTION INTRAVENOUS at 01:09

## 2023-09-14 RX ADMIN — INSULIN DETEMIR 10 UNITS: 100 INJECTION, SOLUTION SUBCUTANEOUS at 11:09

## 2023-09-14 RX ADMIN — Medication 10 ML: at 11:09

## 2023-09-14 RX ADMIN — ATORVASTATIN CALCIUM 40 MG: 40 TABLET, FILM COATED ORAL at 08:09

## 2023-09-14 RX ADMIN — HALOPERIDOL LACTATE 5 MG: 5 INJECTION, SOLUTION INTRAMUSCULAR at 07:09

## 2023-09-14 RX ADMIN — INSULIN HUMAN 0.1 UNITS/KG/HR: 1 INJECTION, SOLUTION INTRAVENOUS at 01:09

## 2023-09-14 RX ADMIN — POTASSIUM CHLORIDE 40 MEQ: 1500 TABLET, EXTENDED RELEASE ORAL at 11:09

## 2023-09-14 NOTE — PROGRESS NOTES
[facility] Medicine  Progress Note    Patient Name: Andrzej Sinclair  MRN: [id number]  Patient Class: IP- Inpatient   Admission Date: 2023  Length of Stay: 2 days  Attending Physician: Wally Majano MD  Primary Care Provider: Viktoriya Jaeger NP        Subjective:     Principal Problem:DKA (diabetic ketoacidosis)        HPI:  This is a 37-year-old male with a past medical history of type 1 diabetes, hypertension, CKD 3, GERD, who presents with hyperglycemia.    Patient presents with generalized fatigue and evaluation of hyperglycemia. Patient initially presented to [facility] on  for evaluation of hyperglycemia.  In the ED, he was noted to be febrile (39.1) and was recommended to be admitted.  Patient decided to leave against medical advice.  He also had a prior presentation on  for similar symptoms. He says he is unsure he if has been taking his insulin.     In the ED, the patient was tachycardic (120).  Labs were suggestive of DKA (B, PH: 7.28, HCO3: 18, A, LA: 2.5, BHB: 3.3), elevated LFTs (AST:  183, ALT:  176, ALP: 281), hypernatremia (corrected, 166), elevated creatinine (3.7 - baseline of 2.5), elevated LFTs (AST: 183, ALT: 167, ALP: 281).  CXR showed no acute process. He was given fluids, vancomycin, Zosyn and was started on insulin. Patient was admitted for further management.       Overview/Hospital Course:  No notes on file    Interval History: Overnight was confused and agitated requiring soft wrist restraints to prevent self hamr.  This morning calm, collected and oriented x 4.  States he feels much better and is hungry.  Denies pain, nausea and vomiting.  All questions answered and patient had no further complaints.    Objective:     Vital Signs (Most Recent):  Temp: 98.4 °F (36.9 °C) (23 07)  Pulse: 83 (23)  Resp: 15 (23)  BP: 130/76 (23)  SpO2: 100 % (23) Vital Signs (24h Range):  Temp:  [98.2 °F  (36.8 °C)-98.5 °F (36.9 °C)] 98.4 °F (36.9 °C)  Pulse:  [] 83  Resp:  [9-18] 15  SpO2:  [99 %-100 %] 100 %  BP: (119-148)/(73-92) 130/76     Weight: 85.8 kg (189 lb 2.5 oz)  Body mass index is 24.29 kg/m².    Intake/Output Summary (Last 24 hours) at 2023 1042  Last data filed at 2023 1000  Gross per 24 hour   Intake 8238.96 ml   Output 1350 ml   Net 6888.96 ml           Physical Exam  Vitals and nursing note reviewed.   Constitutional:       General: He is not in acute distress.     Appearance: He is not ill-appearing, toxic-appearing or diaphoretic.   HENT:      Head: Normocephalic and atraumatic.      Nose: Nose normal.      Mouth/Throat:      Mouth: Mucous membranes are dry.   Eyes:      Extraocular Movements: Extraocular movements intact.   Cardiovascular:      Rate and Rhythm: Normal rate and regular rhythm.      Pulses: Normal pulses.      Heart sounds: No murmur heard.  Pulmonary:      Effort: Pulmonary effort is normal. No respiratory distress.   Abdominal:      General: Abdomen is flat. Bowel sounds are normal.      Palpations: Abdomen is soft.      Tenderness: There is no abdominal tenderness.   Musculoskeletal:      Right lower leg: No edema.      Left lower leg: No edema.   Skin:     General: Skin is warm.      Capillary Refill: Capillary refill takes less than 2 seconds.   Neurological:      Mental Status: He is alert and oriented to person, place, and time. Mental status is at baseline.      Comments: A&Ox4.  Speech is fluent.  Moves all extremities.  Cooperative with exam   Psychiatric:         Mood and Affect: Mood normal.           Significant Labs: All pertinent labs within the past 24 hours have been reviewed.    Significant Imaging: I have reviewed all pertinent imaging results/findings within the past 24 hours.        Assessment/Plan:      * DKA (diabetic ketoacidosis)  -Admitted to inpatient status  -On admit labs consistent with DKA (B, PH: 7.28, HCO3: 18, A, LA:  2.5, BHB: 3.3)  -A1c 8.5 4/10/2023 but now 12.8!!  -Has been treated with IV fluids, inuslin drip, accu check q1h and bmp q4h.  -Blood sugars have improved nicely.  Na is improving but still significantly elevated and Chloride remains > 130 - so not able to accurately calculate anion-gap.    -Will attempt to transition to basal bolus insulin and advance diet.  Home regimen is supposed to be tresiba 14units qd and lispro 7 units tdiwm.  Will start with detemir 10 units qd and SSI q4h for now.  -Will continue care in ICU due to severe hypernatremia - continue bmp q8h.    Hypernatremia  -On admit Na 156 with blood sugar 1214 --> corrected to 183mEq/L  -Treated DKA with NS (>3L) and insulin drip --> sugar improved and Na is improving  -Likely due to significant dehydration on admit.    -Consulted nephrology and input appreciated.  Discussed with Dr. Soto today  -Continue D5W - rate per nephrology - monitor BMP q8h     Acute renal failure superimposed on stage 3 chronic kidney disease  -Baseline Cr around 1.4  -On admit Cr 3.7  -Felt secondary to significant intravascular volume depletion  -Avoid nephrotoxic agents and renally dose meds  -Cr improving and now 2.0 - CO2 has normalized.  K is normal.    -Consulted nephrology and input appreciated  -Monitor BMP as above.    Primary hypertension  -BP solidly normal today  -Reports not on BP meds at home  -Monitor closely.    SIRS (systemic inflammatory response syndrome)  -On admit tachycardic with lactic acidosis, normal wbc and scantly elevated procalcitonin  -No clinical evidence of infection - no UTI, pneumonia, cellulitis, cholecystitis  -Blood cultures obtained in ER and are negative thus far  -Treated with broad spectrum antibiotics initially, but most likely this was due to his DKA and severe dehydration  -Will hold antibiotics and monitor closely.  Low threshold to resume.    Elevated LFTs  -On admit ,  and Tbili normal  -AST and ALT continue to  improve  -UDS negative.    -RUQ US showed no abnormalities  -UDS, hepatitis panel, HIV, EtOH and acetaminophen levels are all negative  -Continue treatment of dehydration and diabetes as above  -Repeat CMP in AM    Type 1 diabetes mellitus with complications  -Treatment as above for DKA      VTE Risk Mitigation (From admission, onward)         Ordered     IP VTE LOW RISK PATIENT  Once         09/12/23 2247     Place sequential compression device  Until discontinued         09/12/23 2247                Discharge Planning   PRECIOUS:      Code Status: Full Code   Is the patient medically ready for discharge?:     Reason for patient still in hospital (select all that apply): Treatment  Discharge Plan A: Home            Critical care time spent on the evaluation and treatment of severe organ dysfunction, review of pertinent labs and imaging studies, discussions with consulting providers and discussions with patient/family: 45 minutes.      Wally Majano MD  Department of Hospital Medicine   [facility] - Intensive Care

## 2023-09-14 NOTE — PLAN OF CARE
Pt remains awake and confused overnight with no sleeping noted even with melatonin. Resp even and unlabored on ra with no distress noted. Pt has D5 at 175cc/hr and insulin at 0.05units/kg/hr infusing to alexander PICC site without redness edema or pain noted. Concepcion patent and draining 600cc yellow urine to gravity. Pt remains npo. Last blood sugar 171. Pts  na remains 170,chloride >130 and CR 2.2. Doctor Miracle notified. Pt in bilat soft upper restraints per order 2/2 to pulling at lines. Pt denied complaint overnight  Bed low position with sr up and call light in reach
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
## 2023-09-14 NOTE — ASSESSMENT & PLAN NOTE
-Admitted to inpatient status  -On admit labs consistent with DKA (B, PH: 7.28, HCO3: 18, A, LA: 2.5, BHB: 3.3)  -A1c 8.5 4/10/2023 but now 12.8!!  -Has been treated with IV fluids, inuslin drip, accu check q1h and bmp q4h.  -Blood sugars have improved nicely.  Na is improving but still significantly elevated and Chloride remains > 130 - so not able to accurately calculate anion-gap.    -Will attempt to transition to basal bolus insulin and advance diet.  Home regimen is supposed to be tresiba 14units qd and lispro 7 units tdiwm.  Will start with detemir 10 units qd and SSI q4h for now.  -Will continue care in ICU due to severe hypernatremia - continue bmp q8h.

## 2023-09-14 NOTE — ASSESSMENT & PLAN NOTE
-On admit ,  and Tbili normal  -AST and ALT continue to improve  -UDS negative.    -RUQ US showed no abnormalities  -UDS, hepatitis panel, HIV, EtOH and acetaminophen levels are all negative  -Continue treatment of dehydration and diabetes as above  -Repeat CMP in AM

## 2023-09-14 NOTE — SUBJECTIVE & OBJECTIVE
Interval History: Overnight was confused and agitated requiring soft wrist restraints to prevent self hamr.  This morning calm, collected and oriented x 4.  States he feels much better and is hungry.  Denies pain, nausea and vomiting.  All questions answered and patient had no further complaints.    Objective:     Vital Signs (Most Recent):  Temp: 98.4 °F (36.9 °C) (09/14/23 0701)  Pulse: 83 (09/14/23 0930)  Resp: 15 (09/14/23 0930)  BP: 130/76 (09/14/23 0930)  SpO2: 100 % (09/14/23 0930) Vital Signs (24h Range):  Temp:  [98.2 °F (36.8 °C)-98.5 °F (36.9 °C)] 98.4 °F (36.9 °C)  Pulse:  [] 83  Resp:  [9-18] 15  SpO2:  [99 %-100 %] 100 %  BP: (119-148)/(73-92) 130/76     Weight: 85.8 kg (189 lb 2.5 oz)  Body mass index is 24.29 kg/m².    Intake/Output Summary (Last 24 hours) at 9/14/2023 1042  Last data filed at 9/14/2023 1000  Gross per 24 hour   Intake 8238.96 ml   Output 1350 ml   Net 6888.96 ml           Physical Exam  Vitals and nursing note reviewed.   Constitutional:       General: He is not in acute distress.     Appearance: He is not ill-appearing, toxic-appearing or diaphoretic.   HENT:      Head: Normocephalic and atraumatic.      Nose: Nose normal.      Mouth/Throat:      Mouth: Mucous membranes are dry.   Eyes:      Extraocular Movements: Extraocular movements intact.   Cardiovascular:      Rate and Rhythm: Normal rate and regular rhythm.      Pulses: Normal pulses.      Heart sounds: No murmur heard.  Pulmonary:      Effort: Pulmonary effort is normal. No respiratory distress.   Abdominal:      General: Abdomen is flat. Bowel sounds are normal.      Palpations: Abdomen is soft.      Tenderness: There is no abdominal tenderness.   Musculoskeletal:      Right lower leg: No edema.      Left lower leg: No edema.   Skin:     General: Skin is warm.      Capillary Refill: Capillary refill takes less than 2 seconds.   Neurological:      Mental Status: He is alert and oriented to person, place, and time.  Mental status is at baseline.      Comments: A&Ox4.  Speech is fluent.  Moves all extremities.  Cooperative with exam   Psychiatric:         Mood and Affect: Mood normal.           Significant Labs: All pertinent labs within the past 24 hours have been reviewed.    Significant Imaging: I have reviewed all pertinent imaging results/findings within the past 24 hours.

## 2023-09-14 NOTE — PROGRESS NOTES
Pharmacokinetic Assessment Follow Up: IV Vancomycin    Vancomycin serum concentration assessment(s):    The random level was drawn correctly and can be used to guide therapy at this time. The measurement is within the desired definitive target range of 10 to 20 mcg/mL.    Vancomycin Regimen Plan:    Patient Scr remains elevated, continue to pulse dose vancomycin with levels below 20 mcg/ml   Give Vancomycin 1250 mg x1 dose and obtain random level on 9/15 at 0500    Drug levels (last 3 results):  Recent Labs   Lab Result Units 09/13/23  0939 09/14/23  0144   Vancomycin, Random ug/mL 23.5 10.5       Pharmacy will continue to follow and monitor vancomycin.    Please contact pharmacy at extension [phone] for questions regarding this assessment.    Thank you for the consult,   Augustine Bullock       Patient brief summary:  Andrzej Sinclair is a 37 y.o. male initiated on antimicrobial therapy with IV Vancomycin for treatment of bacteremia    Drug Allergies:   Review of patient's allergies indicates:   Allergen Reactions    Lactose Other (See Comments)     Gas and moderate to sever abdominal pain       Actual Body Weight:   84 kg    Renal Function:   Estimated Creatinine Clearance: 54 mL/min (A) (based on SCr of 2.2 mg/dL (H)).,     Dialysis Method (if applicable):  N/A    CBC (last 72 hours):  Recent Labs   Lab Result Units 09/12/23 2113 09/13/23  0508 09/13/23  0745   WBC K/uL 8.47 9.26  --    Hemoglobin g/dL 14.3 13.8*  --    Hemoglobin A1C %  --   --  12.8*   Hematocrit % 46.7 43.4  --    Platelets K/uL 100* 98*  --    Gran % % 85.1* 83.2*  --    Lymph % % 7.0* 8.2*  --    Mono % % 7.2 7.9  --    Eosinophil % % 0.0 0.0  --    Basophil % % 0.1 0.1  --    Differential Method  Automated Automated  --        Metabolic Panel (last 72 hours):  Recent Labs   Lab Result Units 09/12/23  2113 09/12/23  2221 09/13/23  0145 09/13/23  0353 09/13/23  0508 09/13/23  0711 09/13/23  0936 09/13/23  0954 09/13/23  1315 09/13/23  1710  09/13/23 2154 09/14/23  0144   Sodium mmol/L 156*  --  161* 170* 171* 174* 175*  --  175* 172* 170* 170*   Sodium, Urine mmol/L  --   --   --   --   --   --   --  <20*  --   --   --   --    Potassium mmol/L 4.9  --  3.7 2.9* 2.9* 3.5 3.9  --  4.0 4.1 3.6 3.5   Chloride mmol/L 118*  --  128* >130* >130* >130* >130*  --  >130* >130* >130* >130*   CO2 mmol/L 18*  --  18* 24 24 26 25  --  25 24 25 24   Glucose mg/dL 1,214*  --  888* 575* 404* 255* 265*  --  148* 215* 124* 137*   Glucose, UA   --  4+*  --   --   --   --   --   --   --   --   --   --    BUN mg/dL 88*  --  79* 74* 68* 67* 67*  --  60* 54* 46* 42*   Creatinine mg/dL 3.7*  --  3.2* 3.1* 2.9* 2.8* 2.7*  --  2.6* 2.5* 2.3* 2.2*   Creatinine, Urine mg/dL  --  32.3  --   --   --   --   --   --   --   --   --   --    Albumin g/dL 3.4*  --  3.3*  --  3.1* 3.2*  --   --   --   --   --  2.7*   Total Bilirubin mg/dL 0.8  --  0.5  --  0.4 0.5  --   --   --   --   --  0.4   Alkaline Phosphatase U/L 281*  --  237*  --  220* 249*  --   --   --   --   --  184*   AST U/L 183*  --  113*  --  86* 81*  --   --   --   --   --  69*   ALT U/L 167*  --  146*  --  133* 134*  --   --   --   --   --  100*   Magnesium mg/dL  --   --   --   --  3.8* 3.8*  --   --   --   --   --   --    Phosphorus mg/dL  --   --   --   --  1.5*  --   --   --   --   --   --   --        Vancomycin Administrations:  vancomycin given in the last 96 hours                     vancomycin (VANCOCIN) 1,750 mg in dextrose 5 % (D5W) 500 mL IVPB ()  Restarted 09/12/23 2316     1,750 mg New Bag  2228                    Microbiologic Results:  Microbiology Results (last 7 days)       Procedure Component Value Units Date/Time    Blood culture #2 **CANNOT BE ORDERED STAT** [[id number]] Collected: 09/12/23 2115    Order Status: Completed Specimen: Blood from Peripheral, Antecubital, Left Updated: 09/13/23 2303     Blood Culture, Routine No Growth to date      No Growth to date    Blood culture #1 **CANNOT BE ORDERED  STAT** [[id number]] Collected: 09/12/23 2125    Order Status: Completed Specimen: Blood from Peripheral, Forearm, Right Updated: 09/13/23 2303     Blood Culture, Routine No Growth to date      No Growth to date
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
## 2023-09-14 NOTE — ASSESSMENT & PLAN NOTE
-Admitted to inpatient status  -On admit labs consistent with DKA (B, PH: 7.28, HCO3: 18, A, LA: 2.5, BHB: 3.3)  -A1c 8.5 4/10/2023 but now 12.8!!  -Has been treated with IV fluids, inuslin drip, accu check q1h and bmp q4h.  -Blood sugars have improved and anion gap closed.  Transitioned from insulin drip to subcutaneous insulin .  Noted significant hyperglycemia overnight which has improved throughout the day with increased dose of insulin.  Anion gap down to 8 and acidosis has resolved.  -Increase detemir to 14 units tid and aspart to 5 units tid.  Continue SSI ac/hs  -Stable to transfer out of ICU today

## 2023-09-14 NOTE — NURSING
[facility], [facility]  Nurses Note -- 4 Eyes      9/14/2023       Skin assessed on: Q Shift      [x] No Pressure Injuries Present    [x]Prevention Measures Documented    [] Yes LDA  for Pressure Injury Previously documented     [] Yes New Pressure Injury Discovered   [] LDA for New Pressure Injury Added      Attending RN:  Nuha Colin RN     Second RN:  Pamela Villatoro RN

## 2023-09-14 NOTE — ASSESSMENT & PLAN NOTE
-On admit Na 156 with blood sugar 1214 --> corrected to 183mEq/L  -Treated DKA with NS (>3L) and insulin drip --> sugar improved and Na is improving  -Likely due to significant dehydration on admit.    -Consulted nephrology and input appreciated.  Discussed with Dr. Soto today  -Continue D5W - rate per nephrology - monitor BMP q8h

## 2023-09-14 NOTE — NURSING
[facility], [facility] [place]  Nurses Note -- 4 Eyes      9/13/2023       Skin assessed on: Q Shift      [x] No Pressure Injuries Present    [x]Prevention Measures Documented    [] Yes LDA  for Pressure Injury Previously documented     [] Yes New Pressure Injury Discovered   [] LDA for New Pressure Injury Added      Attending RN:  Pamela Villatoro, JENNIFER     Second RN:  Nuha RODRIGUEZ

## 2023-09-14 NOTE — PROGRESS NOTES
Date of Admission:9/12/2023    SUBJECTIVE: more awake    Current Facility-Administered Medications   Medication    0.9%  NaCl infusion    acetaminophen tablet 650 mg    atorvastatin tablet 40 mg    dextrose 10% bolus 125 mL 125 mL    dextrose 10% bolus 125 mL 125 mL    dextrose 10% bolus 250 mL 250 mL    dextrose 10% bolus 250 mL 250 mL    dextrose 5 % (D5W) infusion    famotidine (PF) injection 20 mg    insulin regular in 0.9 % NaCl 100 unit/100 mL (1 unit/mL) infusion    melatonin tablet 6 mg    mupirocin 2 % ointment    ondansetron injection 4 mg    potassium chloride 10 mEq in 100 mL IVPB    And    potassium chloride 10 mEq in 100 mL IVPB    And    potassium chloride 10 mEq in 100 mL IVPB    potassium chloride SA CR tablet 40 mEq    prochlorperazine injection Soln 5 mg    sodium chloride 0.9% flush 10 mL    And    sodium chloride 0.9% flush 10 mL       Wt Readings from Last 3 Encounters:   09/14/23 85.8 kg (189 lb 2.5 oz)   09/07/23 86 kg (189 lb 11.3 oz)   03/09/23 90.7 kg (200 lb)     Temp Readings from Last 3 Encounters:   09/14/23 98.4 °F (36.9 °C) (Oral)   09/08/23 98.3 °F (36.8 °C) (Oral)   03/12/23 98 °F (36.7 °C) (Oral)     BP Readings from Last 3 Encounters:   09/14/23 130/76   09/08/23 108/64   03/12/23 (!) 161/91     Pulse Readings from Last 3 Encounters:   09/14/23 83   09/08/23 (!) 111   03/12/23 68       Intake/Output Summary (Last 24 hours) at 9/14/2023 1032  Last data filed at 9/14/2023 1000  Gross per 24 hour   Intake 8238.96 ml   Output 1350 ml   Net 6888.96 ml       PE:  GEN:wd male in nad  HEENT:ncat,eomi,mm  CVS:s1s2  regular  PULM:ctab  ABD:bs, soft  EXT:no leg edema  NEURO:awake    Recent Labs   Lab 09/14/23  0507   *   *   K 3.7   CL >130*   CO2 25   BUN 38*   CREATININE 2.2*   CALCIUM 8.1*   MG 2.7*       Lab Results   Component Value Date    CALCIUM 8.1 (L) 09/14/2023    PHOS 4.1 09/14/2023       Recent Labs   Lab 09/14/23  0507   WBC 7.72   RBC 3.99*   HGB 11.8*   HCT  37.3*   PLT 71*   MCV 94   MCH 29.6   MCHC 31.6*           A/P:   1.hyponatremia. sodium better. Following. Decrease lab draws.  2.hypophospatemia. replete prn. Better.  3.dm1. sugars better.  Would decrease rate.  4.jordana.  uop better. Following.

## 2023-09-14 NOTE — ASSESSMENT & PLAN NOTE
-Baseline Cr around 1.4  -On admit Cr 3.7  -Felt secondary to significant intravascular volume depletion  -Avoid nephrotoxic agents and renally dose meds  -Cr improving and now 2.0 - CO2 has normalized.  K is normal.    -Consulted nephrology and input appreciated  -Monitor BMP as above.

## 2023-09-15 PROBLEM — G93.41 ACUTE METABOLIC ENCEPHALOPATHY: Status: ACTIVE | Noted: 2023-09-15

## 2023-09-15 LAB
ALBUMIN SERPL BCP-MCNC: 2.7 G/DL (ref 3.5–5.2)
ALP SERPL-CCNC: 175 U/L (ref 55–135)
ALT SERPL W/O P-5'-P-CCNC: 83 U/L (ref 10–44)
ANION GAP SERPL CALC-SCNC: 10 MMOL/L (ref 8–16)
ANION GAP SERPL CALC-SCNC: 10 MMOL/L (ref 8–16)
ANION GAP SERPL CALC-SCNC: 8 MMOL/L (ref 8–16)
AST SERPL-CCNC: 57 U/L (ref 10–40)
BASOPHILS # BLD AUTO: 0 K/UL (ref 0–0.2)
BASOPHILS NFR BLD: 0 % (ref 0–1.9)
BILIRUB DIRECT SERPL-MCNC: 0.1 MG/DL (ref 0.1–0.3)
BILIRUB SERPL-MCNC: 0.5 MG/DL (ref 0.1–1)
BUN SERPL-MCNC: 28 MG/DL (ref 6–20)
BUN SERPL-MCNC: 29 MG/DL (ref 6–20)
BUN SERPL-MCNC: 33 MG/DL (ref 6–20)
CALCIUM SERPL-MCNC: 7.8 MG/DL (ref 8.7–10.5)
CALCIUM SERPL-MCNC: 8.2 MG/DL (ref 8.7–10.5)
CALCIUM SERPL-MCNC: 8.2 MG/DL (ref 8.7–10.5)
CHLORIDE SERPL-SCNC: 123 MMOL/L (ref 95–110)
CHLORIDE SERPL-SCNC: 123 MMOL/L (ref 95–110)
CHLORIDE SERPL-SCNC: 124 MMOL/L (ref 95–110)
CO2 SERPL-SCNC: 25 MMOL/L (ref 23–29)
CO2 SERPL-SCNC: 26 MMOL/L (ref 23–29)
CO2 SERPL-SCNC: 27 MMOL/L (ref 23–29)
CREAT SERPL-MCNC: 1.5 MG/DL (ref 0.5–1.4)
CREAT SERPL-MCNC: 1.6 MG/DL (ref 0.5–1.4)
CREAT SERPL-MCNC: 1.9 MG/DL (ref 0.5–1.4)
DIFFERENTIAL METHOD: ABNORMAL
EOSINOPHIL # BLD AUTO: 0.1 K/UL (ref 0–0.5)
EOSINOPHIL NFR BLD: 1 % (ref 0–8)
ERYTHROCYTE [DISTWIDTH] IN BLOOD BY AUTOMATED COUNT: 13.2 % (ref 11.5–14.5)
EST. GFR  (NO RACE VARIABLE): 46 ML/MIN/1.73 M^2
EST. GFR  (NO RACE VARIABLE): 57 ML/MIN/1.73 M^2
EST. GFR  (NO RACE VARIABLE): >60 ML/MIN/1.73 M^2
GLUCOSE SERPL-MCNC: 239 MG/DL (ref 70–110)
GLUCOSE SERPL-MCNC: 285 MG/DL (ref 70–110)
GLUCOSE SERPL-MCNC: 542 MG/DL (ref 70–110)
HCT VFR BLD AUTO: 34 % (ref 40–54)
HGB BLD-MCNC: 10.8 G/DL (ref 14–18)
IMM GRANULOCYTES # BLD AUTO: 0.08 K/UL (ref 0–0.04)
IMM GRANULOCYTES NFR BLD AUTO: 1.3 % (ref 0–0.5)
LYMPHOCYTES # BLD AUTO: 0.9 K/UL (ref 1–4.8)
LYMPHOCYTES NFR BLD: 14.8 % (ref 18–48)
MAGNESIUM SERPL-MCNC: 2.8 MG/DL (ref 1.6–2.6)
MCH RBC QN AUTO: 29.9 PG (ref 27–31)
MCHC RBC AUTO-ENTMCNC: 31.8 G/DL (ref 32–36)
MCV RBC AUTO: 94 FL (ref 82–98)
MONOCYTES # BLD AUTO: 0.6 K/UL (ref 0.3–1)
MONOCYTES NFR BLD: 9 % (ref 4–15)
NEUTROPHILS # BLD AUTO: 4.7 K/UL (ref 1.8–7.7)
NEUTROPHILS NFR BLD: 73.9 % (ref 38–73)
NRBC BLD-RTO: 0 /100 WBC
PHOSPHATE SERPL-MCNC: 2.8 MG/DL (ref 2.7–4.5)
PLATELET # BLD AUTO: 52 K/UL (ref 150–450)
PMV BLD AUTO: ABNORMAL FL (ref 9.2–12.9)
POCT GLUCOSE: 197 MG/DL (ref 70–110)
POCT GLUCOSE: 222 MG/DL (ref 70–110)
POCT GLUCOSE: 239 MG/DL (ref 70–110)
POCT GLUCOSE: 263 MG/DL (ref 70–110)
POCT GLUCOSE: 404 MG/DL (ref 70–110)
POCT GLUCOSE: 447 MG/DL (ref 70–110)
POCT GLUCOSE: 494 MG/DL (ref 70–110)
POCT GLUCOSE: >500 MG/DL (ref 70–110)
POTASSIUM SERPL-SCNC: 4.1 MMOL/L (ref 3.5–5.1)
POTASSIUM SERPL-SCNC: 4.2 MMOL/L (ref 3.5–5.1)
POTASSIUM SERPL-SCNC: 4.6 MMOL/L (ref 3.5–5.1)
PROT SERPL-MCNC: 5.7 G/DL (ref 6–8.4)
RBC # BLD AUTO: 3.61 M/UL (ref 4.6–6.2)
SODIUM SERPL-SCNC: 158 MMOL/L (ref 136–145)
SODIUM SERPL-SCNC: 159 MMOL/L (ref 136–145)
SODIUM SERPL-SCNC: 159 MMOL/L (ref 136–145)
WBC # BLD AUTO: 6.3 K/UL (ref 3.9–12.7)

## 2023-09-15 PROCEDURE — 25000003 PHARM REV CODE 250: Performed by: HOSPITALIST

## 2023-09-15 PROCEDURE — 36415 COLL VENOUS BLD VENIPUNCTURE: CPT | Performed by: INTERNAL MEDICINE

## 2023-09-15 PROCEDURE — 63600175 PHARM REV CODE 636 W HCPCS: Performed by: INTERNAL MEDICINE

## 2023-09-15 PROCEDURE — 21400001 HC TELEMETRY ROOM

## 2023-09-15 PROCEDURE — 80048 BASIC METABOLIC PNL TOTAL CA: CPT | Mod: 91 | Performed by: INTERNAL MEDICINE

## 2023-09-15 PROCEDURE — G0426 INPT/ED TELECONSULT50: HCPCS | Mod: GT,,, | Performed by: STUDENT IN AN ORGANIZED HEALTH CARE EDUCATION/TRAINING PROGRAM

## 2023-09-15 PROCEDURE — A4216 STERILE WATER/SALINE, 10 ML: HCPCS | Performed by: HOSPITALIST

## 2023-09-15 PROCEDURE — 85025 COMPLETE CBC W/AUTO DIFF WBC: CPT | Performed by: STUDENT IN AN ORGANIZED HEALTH CARE EDUCATION/TRAINING PROGRAM

## 2023-09-15 PROCEDURE — 83735 ASSAY OF MAGNESIUM: CPT | Performed by: STUDENT IN AN ORGANIZED HEALTH CARE EDUCATION/TRAINING PROGRAM

## 2023-09-15 PROCEDURE — G0426 PR INPT TELEHEALTH CONSULT 50M: ICD-10-PCS | Mod: GT,,, | Performed by: STUDENT IN AN ORGANIZED HEALTH CARE EDUCATION/TRAINING PROGRAM

## 2023-09-15 PROCEDURE — 63600175 PHARM REV CODE 636 W HCPCS: Performed by: HOSPITALIST

## 2023-09-15 PROCEDURE — 84100 ASSAY OF PHOSPHORUS: CPT | Performed by: STUDENT IN AN ORGANIZED HEALTH CARE EDUCATION/TRAINING PROGRAM

## 2023-09-15 PROCEDURE — 25000003 PHARM REV CODE 250: Performed by: STUDENT IN AN ORGANIZED HEALTH CARE EDUCATION/TRAINING PROGRAM

## 2023-09-15 PROCEDURE — 25000003 PHARM REV CODE 250: Performed by: INTERNAL MEDICINE

## 2023-09-15 PROCEDURE — 80076 HEPATIC FUNCTION PANEL: CPT | Performed by: HOSPITALIST

## 2023-09-15 PROCEDURE — 80048 BASIC METABOLIC PNL TOTAL CA: CPT | Performed by: INTERNAL MEDICINE

## 2023-09-15 RX ORDER — TRAZODONE HYDROCHLORIDE 50 MG/1
50 TABLET ORAL NIGHTLY
Status: DISCONTINUED | OUTPATIENT
Start: 2023-09-15 | End: 2023-09-18 | Stop reason: HOSPADM

## 2023-09-15 RX ORDER — INSULIN ASPART 100 [IU]/ML
10 INJECTION, SOLUTION INTRAVENOUS; SUBCUTANEOUS ONCE
Status: COMPLETED | OUTPATIENT
Start: 2023-09-15 | End: 2023-09-15

## 2023-09-15 RX ORDER — INSULIN ASPART 100 [IU]/ML
5 INJECTION, SOLUTION INTRAVENOUS; SUBCUTANEOUS
Status: DISCONTINUED | OUTPATIENT
Start: 2023-09-15 | End: 2023-09-18 | Stop reason: HOSPADM

## 2023-09-15 RX ORDER — INSULIN ASPART 100 [IU]/ML
0-10 INJECTION, SOLUTION INTRAVENOUS; SUBCUTANEOUS EVERY 4 HOURS PRN
Status: DISCONTINUED | OUTPATIENT
Start: 2023-09-15 | End: 2023-09-18 | Stop reason: HOSPADM

## 2023-09-15 RX ORDER — BUSPIRONE HYDROCHLORIDE 5 MG/1
5 TABLET ORAL EVERY 8 HOURS PRN
Status: DISCONTINUED | OUTPATIENT
Start: 2023-09-15 | End: 2023-09-18 | Stop reason: HOSPADM

## 2023-09-15 RX ORDER — TALC
6 POWDER (GRAM) TOPICAL NIGHTLY
Status: DISCONTINUED | OUTPATIENT
Start: 2023-09-15 | End: 2023-09-18 | Stop reason: HOSPADM

## 2023-09-15 RX ORDER — GLUCAGON 1 MG
1 KIT INJECTION
Status: DISCONTINUED | OUTPATIENT
Start: 2023-09-15 | End: 2023-09-18 | Stop reason: HOSPADM

## 2023-09-15 RX ADMIN — DEXTROSE MONOHYDRATE: 50 INJECTION, SOLUTION INTRAVENOUS at 03:09

## 2023-09-15 RX ADMIN — INSULIN ASPART 10 UNITS: 100 INJECTION, SOLUTION INTRAVENOUS; SUBCUTANEOUS at 06:09

## 2023-09-15 RX ADMIN — Medication 6 MG: at 09:09

## 2023-09-15 RX ADMIN — MUPIROCIN: 20 OINTMENT TOPICAL at 09:09

## 2023-09-15 RX ADMIN — INSULIN ASPART 3 UNITS: 100 INJECTION, SOLUTION INTRAVENOUS; SUBCUTANEOUS at 12:09

## 2023-09-15 RX ADMIN — MUPIROCIN: 20 OINTMENT TOPICAL at 01:09

## 2023-09-15 RX ADMIN — HALOPERIDOL LACTATE 5 MG: 5 INJECTION, SOLUTION INTRAMUSCULAR at 01:09

## 2023-09-15 RX ADMIN — INSULIN ASPART 4 UNITS: 100 INJECTION, SOLUTION INTRAVENOUS; SUBCUTANEOUS at 01:09

## 2023-09-15 RX ADMIN — Medication 10 ML: at 01:09

## 2023-09-15 RX ADMIN — INSULIN ASPART 5 UNITS: 100 INJECTION, SOLUTION INTRAVENOUS; SUBCUTANEOUS at 01:09

## 2023-09-15 RX ADMIN — TRAZODONE HYDROCHLORIDE 50 MG: 50 TABLET ORAL at 09:09

## 2023-09-15 RX ADMIN — Medication 10 ML: at 06:09

## 2023-09-15 RX ADMIN — INSULIN ASPART 10 UNITS: 100 INJECTION, SOLUTION INTRAVENOUS; SUBCUTANEOUS at 04:09

## 2023-09-15 RX ADMIN — INSULIN ASPART 2 UNITS: 100 INJECTION, SOLUTION INTRAVENOUS; SUBCUTANEOUS at 05:09

## 2023-09-15 RX ADMIN — INSULIN ASPART 5 UNITS: 100 INJECTION, SOLUTION INTRAVENOUS; SUBCUTANEOUS at 05:09

## 2023-09-15 RX ADMIN — FAMOTIDINE 20 MG: 10 INJECTION INTRAVENOUS at 01:09

## 2023-09-15 RX ADMIN — ATORVASTATIN CALCIUM 40 MG: 40 TABLET, FILM COATED ORAL at 01:09

## 2023-09-15 NOTE — NURSING TRANSFER
Nursing Transfer Note      9/15/2023   5:47 PM    Nurse giving handoff: Leni RODRIGUEZ  Nurse receiving handoff:Susan RODRIGUEZ    Reason patient is being transferred: Stepdown     Transfer {TRANSFER TO/FROM:[id number]}: 315    Transfer via bed    Transfer with Patient's belonging.    Transported by Transport personal     Transfer Vital Signs:  Blood Pressure:132/75  Heart Rate:91  O2:100  Temperature:97.9  Respirations:16    Telemetry:    Order for Tele Monitor? Yes    Additional Lines: {Additional Lines:[id number]}    4eyes on Skin: yes    Medicines sent: Insulin X2    Any special needs or follow-up needed: assist with activities     Patient belongings transferred with patient: Yes    Chart send with patient: Yes    Notified: spouse  Marzena condon     Patient reassessed at: *** (date, time)  1  Upon arrival to floor: {IP NSG TRANSFER ARRIVAL [facility]:[id number]}

## 2023-09-15 NOTE — EICU
Glucose 542<-320    Patient sedated  On D5W at 125 ml per hour for hypernatremia  Sodium corrected from 170 to 160 in 24 hours   Insulin sliding scale low dose every 4 hours and received 3 units 9 hours ago  Received detemir 10 units SQ 14 hours ago    Plan:  Decrease D5W to 50 ml per hour  Increased sliding scale to moderate dose scale for NPO patient every 4 hours.with asparts 3 units SQ now as per scale.  Will follow up glucose in 4 hours.  May need detemir twice daily.

## 2023-09-15 NOTE — NURSING
[facility], [facility] [place]  Nurses Note -- 4 Eyes      9/14/2023       Skin assessed on: Q Shift      [x] No Pressure Injuries Present    [x]Prevention Measures Documented    [] Yes LDA  for Pressure Injury Previously documented     [] Yes New Pressure Injury Discovered   [] LDA for New Pressure Injury Added      Attending RN:  AKBAR OLIVAREZ RN     Second RN:  Armando RN

## 2023-09-15 NOTE — ASSESSMENT & PLAN NOTE
-On admit tachycardic with lactic acidosis, normal wbc and scantly elevated procalcitonin  -No clinical evidence of infection - no UTI, pneumonia, cellulitis, cholecystitis  -Blood cultures obtained in ER and are negative thus far  -Treated with broad spectrum antibiotics initially, but most likely this was due to his DKA and severe dehydration  -Stopped antibiotics 9/14.  No further recurrence of SIRs

## 2023-09-15 NOTE — PHYSICIAN QUERY
PT Name: Andrzej Sinlcair  MR #: [id number]    DOCUMENTATION CLARIFICATION     CDS/: Ulises Hicks RN, CCDS              Contact information:   [email or web address]     This form is a permanent document in the medical record.     Query Date: September 15, 2023    By submitting this query, we are merely seeking further clarification of documentation. Please utilize your independent clinical judgment when addressing the question(s) below.    The Medical Record contains the following:   Indicators   Supporting Clinical Findings Location in Medical Record   x AMS, Confusion,  LOC, etc.  Groggy, fatigued but alert and oriented      Noted some agitation overnight and pulled out midline - now in soft wrist restraints    remains awake and confused overnight with no sleeping noted even with melatonin  ED provider            Nursing, ICU       x Acute/Chronic Illness ED, the patient was tachycardic (120).    Labs were suggestive of DKA (B, PH: 7.28, HCO3: 18, A, LA: 2.5, BHB: 3.3),   elevated LFTs (AST:  183, ALT:  176, ALP: 281), hypernatremia (corrected, 166), elevated creatinine (3.7 - baseline of 2.5), elevated LFTs (AST: 183, ALT: 167, ALP: 281).   -- fluids, vancomycin, Zosyn and was started on insulin  H&P: Hosp. Med. ()    Radiology Findings     x Electrolyte Imbalance  Na: 156;    chloride: 118    Na: 161,  170,  171,  174,  175, 172, 170   chloride: 128, 130   Cr: 3.7;    AGap: 20;   CO2: 18;     lactate: 2.5;  BHB: 3.3    Alk. Phos: 281;     AST: 183;   ALT: 167   Osmolality: 395;      Labs      Medication      Treatment              Other 15/ psych: Hyperactive delirium - resolving: given patient's confused state, little to no memory past 2d; signif. abn labs from DKA, hyperN, ^LFTs  rec meds      The noted clinical guidelines are only system guidelines and do not replace the providers clinical judgment.    The National Murphy of Neurologic  Disorders and Stroke (NINDS) of the NIH describes encephalopathy as any diffuse disease of the brain that alters brain function or structure.    Provider, please specify the diagnosis or diagnoses associated with above clinical findings.  [  x ] Metabolic Encephalopathy - Due to electrolyte imbalance, metabolic derangements, or infectious processes, includes Septic Encephalopathy, Uremic Encephalopathy   [   ] Hepatic Encephalopathy - Due to liver disease/failure   [   ] Other Encephalopathy (please specify): ____________________           Please document in your progress notes daily for the duration of treatment until resolved, and include in your discharge summary.    References:  ZEKE Courtney RN, CCDS. (2018, June 9). Notes from the Instructor: Encephalopathy tips. Retrieved October 22, 2020, from [email or web address]/[email or web address]-[email or web address]-tips    ICD-9-CM Coding Clinic First Quarter 2013, Effective with discharges: October 21, 2013 [facility] § Seizure with encephalopathy due to postictal state (2013).    ICD-10-CM/[employer] Integrated Codebook (Version V.20.8.10.0) [Computer software]. (2020). Retrieved October 21, 2020.      Form No. 14358

## 2023-09-15 NOTE — CARE UPDATE
[facility], [facility] [place]  Nurses Note -- 4 Eyes      9/15/2023       Skin assessed on: Q Shift      [x] No Pressure Injuries Present    [x]Prevention Measures Documented    [] Yes LDA  for Pressure Injury Previously documented     [] Yes New Pressure Injury Discovered   [] LDA for New Pressure Injury Added      Attending RN:  ISADORA CEE RN     Second RN:  Marybeth RODRIGUEZ

## 2023-09-15 NOTE — CARE UPDATE
37 year old man with T1DM, HTN, CKDIII and Gerd who presented for evaluation of hyperglycemia.  He was admitted to inpatient status and diagnosed with DKA, hypernatremia, acute on chronic renal failure, acute metabolic encephalopathy, elevated LFTs and SIRs.  DKA treated in standard fasion with q1h accu checks, q4h bmp, IV fluids and insulin drip.  Successfully transitioned to subcutaneous insulin which we have titrated further today.  Corrected sodium on admit in 180s and is now trending down.  Today sodium is 159 --> corrects to 170.  Nephrology consulted and he continues on D5W with q8h bmp.  Likely at least 48 hours more in the hospital, but no acute needs for further ICU care.  Noted acute metabolic encephalopathy with agitation, aggression and hallucinations on 9/14 requiring haldol and 4pt restraints.  Seen by psychiatry and adding scheduled melatonin and trazodone qhs as well as buspar prn anxiety.  Continuing prn haldol but hopeful to avoid.  Mental status much improved and he is apologetic and embarrassed about his actions yesterday.  Renal function improving and getting close to baseline.  No evidence of infection and SIRs has resolved - stopped antibiotics 9/14.  LFTs also slowly improving.  Step down from ICU today, 9/15.

## 2023-09-15 NOTE — PLAN OF CARE
Rhythm:SR  Respiratory:RA  Tube/lines:PICC, PIV X 2  Pt remain calm and slept most shift.  4 point nonviolent restraints in place.  Glucose checked as ordered; sliding scale changed. D5 IVF @ 50 mL. Concepcion in place and secure. Nonviolent restraints in place x 4. Prn giving as ordered. Care explained to pt. Free from fall and injuries.
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
## 2023-09-15 NOTE — PROGRESS NOTES
Date of Admission:9/12/2023    SUBJECTIVE: still not  very alert    Current Facility-Administered Medications   Medication    0.9%  NaCl infusion    acetaminophen tablet 650 mg    atorvastatin tablet 40 mg    busPIRone tablet 5 mg    dextrose 10% bolus 125 mL 125 mL    dextrose 10% bolus 250 mL 250 mL    dextrose 5 % (D5W) infusion    famotidine (PF) injection 20 mg    glucagon (human recombinant) injection 1 mg    haloperidol lactate injection 5 mg    insulin aspart U-100 pen 0-10 Units    insulin aspart U-100 pen 5 Units    insulin detemir U-100 (Levemir) pen 14 Units    melatonin tablet 6 mg    mupirocin 2 % ointment    ondansetron injection 4 mg    prochlorperazine injection Soln 5 mg    sodium chloride 0.9% flush 10 mL    And    sodium chloride 0.9% flush 10 mL    traZODone tablet 50 mg       Wt Readings from Last 3 Encounters:   09/14/23 85.8 kg (189 lb 2.5 oz)   09/07/23 86 kg (189 lb 11.3 oz)   03/09/23 90.7 kg (200 lb)     Temp Readings from Last 3 Encounters:   09/15/23 98.9 °F (37.2 °C)   09/08/23 98.3 °F (36.8 °C) (Oral)   03/12/23 98 °F (36.7 °C) (Oral)     BP Readings from Last 3 Encounters:   09/15/23 125/79   09/08/23 108/64   03/12/23 (!) 161/91     Pulse Readings from Last 3 Encounters:   09/15/23 76   09/08/23 (!) 111   03/12/23 68       Intake/Output Summary (Last 24 hours) at 9/15/2023 1438  Last data filed at 9/15/2023 0600  Gross per 24 hour   Intake 1372.85 ml   Output 2225 ml   Net -852.15 ml         PE:  GEN:wd male in nad  HEENT:ncat,eomi,mm  CVS:s1s2  regular  PULM:ctab  ABD:bs, soft  EXT:no leg edema  NEURO:awakens    Recent Labs   Lab 09/15/23  0430 09/15/23  1101   GLU  --  285*   NA  --  159*   K  --  4.1   CL  --  124*   CO2  --  27   BUN  --  29*   CREATININE  --  1.6*   CALCIUM  --  8.2*   MG 2.8*  --          Lab Results   Component Value Date    CALCIUM 8.2 (L) 09/15/2023    PHOS 2.8 09/15/2023       Recent Labs   Lab 09/15/23  0430   WBC 6.30   RBC 3.61*   HGB 10.8*   HCT 34.0*    PLT 52*   MCV 94   MCH 29.9   MCHC 31.8*             A/P:   1.hyponatremia. sodium better. Following. Needs oral intake.  2.hypophospatemia. repleted. Stable.  3.dm1. sugars up some. Cont tx.  4.jordana.  uop good. Following. Better.

## 2023-09-15 NOTE — ASSESSMENT & PLAN NOTE
-On admit Na 156 with blood sugar 1214 --> corrected to 183mEq/L  -Treated DKA with NS, insulin drip.  -As glucose lowered hypernatremia was unmasked and kevin to 170s --> corrected to 183.   -Likely due to significant dehydration on admit.    -Consulted nephrology and input appreciated.  Discussed with Dr. Soto today  -Sodium is slowly improving.  Today 159--> corrects to 170  -Continue D5W - rate per nephrology - monitor BMP q8h for now.

## 2023-09-15 NOTE — ASSESSMENT & PLAN NOTE
-Baseline Cr around 1.4  -On admit Cr 3.7  -Felt secondary to significant intravascular volume depletion  -Avoid nephrotoxic agents and renally dose meds  -Cr improving and now 1.6  - CO2 has normalized.  K is normal.    -Consulted nephrology and input appreciated  -Monitor BMP as above.

## 2023-09-15 NOTE — EICU
Glucose 404  On D5W at 50 ml per hour   Received aspart 10 units SQ 2 hours ago    Plan:  Aspart 10 units SQ now.

## 2023-09-15 NOTE — SUBJECTIVE & OBJECTIVE
Interval History: No acute events overnight.  Mentals status improved and he is apologetic and embarrassed by his confusion and agitation yesterday.  Free from restraints at time of my visit.  Noted hyperglycemia today.  All questions answered and patient had no further complaints.    Objective:     Vital Signs (Most Recent):  Temp: 98.9 °F (37.2 °C) (09/15/23 1100)  Pulse: 76 (09/15/23 1300)  Resp: 14 (09/15/23 1300)  BP: 125/79 (09/15/23 1300)  SpO2: 100 % (09/15/23 1300) Vital Signs (24h Range):  Temp:  [98 °F (36.7 °C)-98.9 °F (37.2 °C)] 98.9 °F (37.2 °C)  Pulse:  [68-90] 76  Resp:  [10-18] 14  SpO2:  [97 %-100 %] 100 %  BP: (112-151)/(52-96) 125/79     Weight: 85.8 kg (189 lb 2.5 oz)  Body mass index is 24.29 kg/m².    Intake/Output Summary (Last 24 hours) at 9/15/2023 1419  Last data filed at 9/15/2023 0600  Gross per 24 hour   Intake 1372.85 ml   Output 2225 ml   Net -852.15 ml           Physical Exam  Vitals and nursing note reviewed.   Constitutional:       General: He is not in acute distress.     Appearance: He is not ill-appearing, toxic-appearing or diaphoretic.   HENT:      Head: Normocephalic and atraumatic.      Nose: Nose normal.      Mouth/Throat:      Mouth: Mucous membranes are moist.   Eyes:      Extraocular Movements: Extraocular movements intact.   Cardiovascular:      Rate and Rhythm: Normal rate and regular rhythm.      Pulses: Normal pulses.      Heart sounds: No murmur heard.  Pulmonary:      Effort: Pulmonary effort is normal. No respiratory distress.   Abdominal:      General: Abdomen is flat. Bowel sounds are normal.      Palpations: Abdomen is soft.      Tenderness: There is no abdominal tenderness.   Musculoskeletal:      Right lower leg: No edema.      Left lower leg: No edema.   Skin:     General: Skin is warm.      Capillary Refill: Capillary refill takes less than 2 seconds.   Neurological:      Mental Status: He is alert and oriented to person, place, and time. Mental status is  at baseline.      Comments: A&Ox4.  Speech is fluent.  Moves all extremities.  Cooperative with exam   Psychiatric:         Mood and Affect: Mood normal.           Significant Labs: All pertinent labs within the past 24 hours have been reviewed.    Significant Imaging: I have reviewed all pertinent imaging results/findings within the past 24 hours.

## 2023-09-16 LAB
ALBUMIN SERPL BCP-MCNC: 2.6 G/DL (ref 3.5–5.2)
ALP SERPL-CCNC: 153 U/L (ref 55–135)
ALT SERPL W/O P-5'-P-CCNC: 66 U/L (ref 10–44)
ANION GAP SERPL CALC-SCNC: 9 MMOL/L (ref 8–16)
ANION GAP SERPL CALC-SCNC: 9 MMOL/L (ref 8–16)
AST SERPL-CCNC: 44 U/L (ref 10–40)
BACTERIA BLD CULT: NORMAL
BACTERIA BLD CULT: NORMAL
BASOPHILS # BLD AUTO: 0 K/UL (ref 0–0.2)
BASOPHILS NFR BLD: 0 % (ref 0–1.9)
BILIRUB DIRECT SERPL-MCNC: 0.2 MG/DL (ref 0.1–0.3)
BILIRUB SERPL-MCNC: 0.5 MG/DL (ref 0.1–1)
BUN SERPL-MCNC: 25 MG/DL (ref 6–20)
BUN SERPL-MCNC: 26 MG/DL (ref 6–20)
CALCIUM SERPL-MCNC: 8.1 MG/DL (ref 8.7–10.5)
CALCIUM SERPL-MCNC: 8.2 MG/DL (ref 8.7–10.5)
CHLORIDE SERPL-SCNC: 120 MMOL/L (ref 95–110)
CHLORIDE SERPL-SCNC: 121 MMOL/L (ref 95–110)
CO2 SERPL-SCNC: 25 MMOL/L (ref 23–29)
CO2 SERPL-SCNC: 26 MMOL/L (ref 23–29)
CREAT SERPL-MCNC: 1.3 MG/DL (ref 0.5–1.4)
CREAT SERPL-MCNC: 1.4 MG/DL (ref 0.5–1.4)
DIFFERENTIAL METHOD: ABNORMAL
EOSINOPHIL # BLD AUTO: 0.1 K/UL (ref 0–0.5)
EOSINOPHIL NFR BLD: 1.8 % (ref 0–8)
ERYTHROCYTE [DISTWIDTH] IN BLOOD BY AUTOMATED COUNT: 12.9 % (ref 11.5–14.5)
EST. GFR  (NO RACE VARIABLE): >60 ML/MIN/1.73 M^2
EST. GFR  (NO RACE VARIABLE): >60 ML/MIN/1.73 M^2
GLUCOSE SERPL-MCNC: 200 MG/DL (ref 70–110)
GLUCOSE SERPL-MCNC: 259 MG/DL (ref 70–110)
HCT VFR BLD AUTO: 32.1 % (ref 40–54)
HGB BLD-MCNC: 10.2 G/DL (ref 14–18)
IMM GRANULOCYTES # BLD AUTO: 0.04 K/UL (ref 0–0.04)
IMM GRANULOCYTES NFR BLD AUTO: 0.9 % (ref 0–0.5)
LYMPHOCYTES # BLD AUTO: 0.8 K/UL (ref 1–4.8)
LYMPHOCYTES NFR BLD: 17.6 % (ref 18–48)
MAGNESIUM SERPL-MCNC: 2.3 MG/DL (ref 1.6–2.6)
MCH RBC QN AUTO: 29.7 PG (ref 27–31)
MCHC RBC AUTO-ENTMCNC: 31.8 G/DL (ref 32–36)
MCV RBC AUTO: 94 FL (ref 82–98)
MONOCYTES # BLD AUTO: 0.4 K/UL (ref 0.3–1)
MONOCYTES NFR BLD: 9.5 % (ref 4–15)
NEUTROPHILS # BLD AUTO: 3.2 K/UL (ref 1.8–7.7)
NEUTROPHILS NFR BLD: 70.2 % (ref 38–73)
NRBC BLD-RTO: 0 /100 WBC
PHOSPHATE SERPL-MCNC: 2.3 MG/DL (ref 2.7–4.5)
PLATELET # BLD AUTO: 47 K/UL (ref 150–450)
PMV BLD AUTO: 13.4 FL (ref 9.2–12.9)
POCT GLUCOSE: 218 MG/DL (ref 70–110)
POCT GLUCOSE: 249 MG/DL (ref 70–110)
POCT GLUCOSE: 309 MG/DL (ref 70–110)
POCT GLUCOSE: 88 MG/DL (ref 70–110)
POTASSIUM SERPL-SCNC: 4 MMOL/L (ref 3.5–5.1)
POTASSIUM SERPL-SCNC: 4.1 MMOL/L (ref 3.5–5.1)
PROT SERPL-MCNC: 5.2 G/DL (ref 6–8.4)
RBC # BLD AUTO: 3.43 M/UL (ref 4.6–6.2)
SODIUM SERPL-SCNC: 154 MMOL/L (ref 136–145)
SODIUM SERPL-SCNC: 156 MMOL/L (ref 136–145)
WBC # BLD AUTO: 4.55 K/UL (ref 3.9–12.7)

## 2023-09-16 PROCEDURE — 25000003 PHARM REV CODE 250: Performed by: HOSPITALIST

## 2023-09-16 PROCEDURE — 80048 BASIC METABOLIC PNL TOTAL CA: CPT | Mod: 91 | Performed by: HOSPITALIST

## 2023-09-16 PROCEDURE — 25000003 PHARM REV CODE 250: Performed by: STUDENT IN AN ORGANIZED HEALTH CARE EDUCATION/TRAINING PROGRAM

## 2023-09-16 PROCEDURE — 83735 ASSAY OF MAGNESIUM: CPT | Performed by: STUDENT IN AN ORGANIZED HEALTH CARE EDUCATION/TRAINING PROGRAM

## 2023-09-16 PROCEDURE — 36415 COLL VENOUS BLD VENIPUNCTURE: CPT | Performed by: HOSPITALIST

## 2023-09-16 PROCEDURE — 84100 ASSAY OF PHOSPHORUS: CPT | Performed by: STUDENT IN AN ORGANIZED HEALTH CARE EDUCATION/TRAINING PROGRAM

## 2023-09-16 PROCEDURE — A4216 STERILE WATER/SALINE, 10 ML: HCPCS | Performed by: HOSPITALIST

## 2023-09-16 PROCEDURE — 85025 COMPLETE CBC W/AUTO DIFF WBC: CPT | Performed by: STUDENT IN AN ORGANIZED HEALTH CARE EDUCATION/TRAINING PROGRAM

## 2023-09-16 PROCEDURE — 80048 BASIC METABOLIC PNL TOTAL CA: CPT | Performed by: HOSPITALIST

## 2023-09-16 PROCEDURE — 25000003 PHARM REV CODE 250: Performed by: INTERNAL MEDICINE

## 2023-09-16 PROCEDURE — 21400001 HC TELEMETRY ROOM

## 2023-09-16 PROCEDURE — 80076 HEPATIC FUNCTION PANEL: CPT | Performed by: HOSPITALIST

## 2023-09-16 RX ORDER — SODIUM,POTASSIUM PHOSPHATES 280-250MG
1 POWDER IN PACKET (EA) ORAL 2 TIMES DAILY
Status: DISCONTINUED | OUTPATIENT
Start: 2023-09-16 | End: 2023-09-18

## 2023-09-16 RX ADMIN — Medication 1 PACKET: at 08:09

## 2023-09-16 RX ADMIN — Medication 10 ML: at 12:09

## 2023-09-16 RX ADMIN — DEXTROSE MONOHYDRATE: 50 INJECTION, SOLUTION INTRAVENOUS at 08:09

## 2023-09-16 RX ADMIN — TRAZODONE HYDROCHLORIDE 50 MG: 50 TABLET ORAL at 08:09

## 2023-09-16 RX ADMIN — INSULIN ASPART 5 UNITS: 100 INJECTION, SOLUTION INTRAVENOUS; SUBCUTANEOUS at 08:09

## 2023-09-16 RX ADMIN — Medication 10 ML: at 06:09

## 2023-09-16 RX ADMIN — ATORVASTATIN CALCIUM 40 MG: 40 TABLET, FILM COATED ORAL at 08:09

## 2023-09-16 RX ADMIN — MUPIROCIN: 20 OINTMENT TOPICAL at 08:09

## 2023-09-16 RX ADMIN — INSULIN ASPART 8 UNITS: 100 INJECTION, SOLUTION INTRAVENOUS; SUBCUTANEOUS at 08:09

## 2023-09-16 RX ADMIN — Medication 1 PACKET: at 11:09

## 2023-09-16 RX ADMIN — INSULIN ASPART 2 UNITS: 100 INJECTION, SOLUTION INTRAVENOUS; SUBCUTANEOUS at 12:09

## 2023-09-16 RX ADMIN — Medication 6 MG: at 08:09

## 2023-09-16 RX ADMIN — FAMOTIDINE 20 MG: 10 INJECTION INTRAVENOUS at 08:09

## 2023-09-16 RX ADMIN — INSULIN ASPART 5 UNITS: 100 INJECTION, SOLUTION INTRAVENOUS; SUBCUTANEOUS at 11:09

## 2023-09-16 RX ADMIN — INSULIN ASPART 4 UNITS: 100 INJECTION, SOLUTION INTRAVENOUS; SUBCUTANEOUS at 11:09

## 2023-09-16 NOTE — PLAN OF CARE
Mental status:AO X 4   Rhythm:NSR  Respiratory:RA  Tube/lines:PIV; PICC  Behavior and mood appropriate. Care explained to pt. Free from fall and injuries.

## 2023-09-16 NOTE — SUBJECTIVE & OBJECTIVE
Interval History: No new issues     Review of Systems   Constitutional:  Negative for activity change.   HENT:  Negative for congestion.    Eyes:  Negative for discharge.   Respiratory:  Negative for apnea.    Genitourinary:  Negative for difficulty urinating.   Neurological:  Negative for dizziness.     Objective:     Vital Signs (Most Recent):  Temp: 97.7 °F (36.5 °C) (09/16/23 0739)  Pulse: 64 (09/16/23 0739)  Resp: 18 (09/16/23 0739)  BP: (!) 144/69 (09/16/23 0739)  SpO2: 97 % (09/16/23 0739) Vital Signs (24h Range):  Temp:  [97.7 °F (36.5 °C)-98.9 °F (37.2 °C)] 97.7 °F (36.5 °C)  Pulse:  [64-91] 64  Resp:  [10-19] 18  SpO2:  [97 %-100 %] 97 %  BP: (109-146)/(52-81) 144/69     Weight: 85.8 kg (189 lb 2.5 oz)  Body mass index is 24.29 kg/m².    Intake/Output Summary (Last 24 hours) at 9/16/2023 0857  Last data filed at 9/16/2023 0526  Gross per 24 hour   Intake 603.14 ml   Output 2176 ml   Net -1572.86 ml         Physical Exam  HENT:      Head: Atraumatic.   Cardiovascular:      Rate and Rhythm: Normal rate.   Pulmonary:      Effort: Pulmonary effort is normal.   Musculoskeletal:         General: No swelling.   Neurological:      Mental Status: He is alert.             Significant Labs: All pertinent labs within the past 24 hours have been reviewed.  BMP:   Recent Labs   Lab 09/16/23  0510   *   *   K 4.1   *   CO2 25   BUN 25*   CREATININE 1.4   CALCIUM 8.2*   MG 2.3     CBC:   Recent Labs   Lab 09/15/23  0430 09/16/23  0510   WBC 6.30 4.55   HGB 10.8* 10.2*   HCT 34.0* 32.1*   PLT 52* 47*       Significant Imaging: I have reviewed all pertinent imaging results/findings within the past 24 hours.
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
## 2023-09-16 NOTE — NURSING
[facility], [facility] - [place]  Nurses Note -- 4 Eyes      9/16/2023       Skin assessed on: Q Shift      [] No Pressure Injuries Present    []Prevention Measures Documented    [] Yes LDA  for Pressure Injury Previously documented     [] Yes New Pressure Injury Discovered   [] LDA for New Pressure Injury Added      Attending RN:  Beverly Hussein RN     Second RN:  Magalys Asher

## 2023-09-16 NOTE — PLAN OF CARE
Problem: Adult Inpatient Plan of Care  Goal: Plan of Care Review  Outcome: Ongoing, Progressing  Goal: Patient-Specific Goal (Individualized)  Outcome: Ongoing, Progressing  Goal: Absence of Hospital-Acquired Illness or Injury  Outcome: Ongoing, Progressing  Goal: Optimal Comfort and Wellbeing  Outcome: Ongoing, Progressing  Goal: Readiness for Transition of Care  Outcome: Ongoing, Progressing     Problem: Diabetic Ketoacidosis  Goal: Fluid and Electrolyte Balance with Absence of Ketosis  Outcome: Ongoing, Progressing     Problem: Diabetes Comorbidity  Goal: Blood Glucose Level Within Targeted Range  Outcome: Ongoing, Progressing     Problem: Fluid and Electrolyte Imbalance (Acute Kidney Injury/Impairment)  Goal: Fluid and Electrolyte Balance  Outcome: Ongoing, Progressing     Problem: Oral Intake Inadequate (Acute Kidney Injury/Impairment)  Goal: Optimal Nutrition Intake  Outcome: Ongoing, Progressing     Problem: Renal Function Impairment (Acute Kidney Injury/Impairment)  Goal: Effective Renal Function  Outcome: Ongoing, Progressing     Problem: Adjustment to Illness (Sepsis/Septic Shock)  Goal: Optimal Coping  Outcome: Ongoing, Progressing     Problem: Bleeding (Sepsis/Septic Shock)  Goal: Absence of Bleeding  Outcome: Ongoing, Progressing     Problem: Glycemic Control Impaired (Sepsis/Septic Shock)  Goal: Blood Glucose Level Within Desired Range  Outcome: Ongoing, Progressing     Problem: Infection Progression (Sepsis/Septic Shock)  Goal: Absence of Infection Signs and Symptoms  Outcome: Ongoing, Progressing     Problem: Nutrition Impaired (Sepsis/Septic Shock)  Goal: Optimal Nutrition Intake  Outcome: Ongoing, Progressing     Problem: Infection  Goal: Absence of Infection Signs and Symptoms  Outcome: Ongoing, Progressing     Problem: Fall Injury Risk  Goal: Absence of Fall and Fall-Related Injury  Outcome: Ongoing, Progressing     Problem: Restraint, Nonbehavioral (Nonviolent)  Goal: Absence of Harm or  Injury  Outcome: Ongoing, Progressing     Problem: Skin Injury Risk Increased  Goal: Skin Health and Integrity  Outcome: Ongoing, Progressing     Problem: Violence Risk or Actual  Goal: Anger and Impulse Control  Outcome: Ongoing, Progressing

## 2023-09-16 NOTE — PLAN OF CARE
Problem: Adult Inpatient Plan of Care  Goal: Plan of Care Review  Outcome: Ongoing, Progressing   Patient remains  in ICU had uneventful day, off restraints, assessment and VS per flowsheet, camacho-cath D/Linden and void without difficulty, patient status updated to his wife Marzena, pt will be transported to [facility] shortly.

## 2023-09-16 NOTE — NURSING
[facility], [facility]  Nurses Note -- 4 Eyes      9/16/2023       Skin assessed on: Q Shift      [x] No Pressure Injuries Present    [x]Prevention Measures Documented    [] Yes LDA  for Pressure Injury Previously documented     [] Yes New Pressure Injury Discovered   [] LDA for New Pressure Injury Added      Attending RN:  Jossie Hines RN     Second RN:  JENNIFER Davis

## 2023-09-16 NOTE — NURSING
PER handoff received from JENNIFER Paul   Pt resting in bed quietly. NAD noted. No c/o pain.   Fall and safety precautions maintained. Bed alarm activated and audible.. Bed locked in lowest position, with side rails up x2. Call bell and personal items within reach

## 2023-09-16 NOTE — ASSESSMENT & PLAN NOTE
-Baseline Cr around 1.4  -On admit Cr 3.7  -Felt secondary to significant intravascular volume depletion  -Avoid nephrotoxic agents and renally dose meds  -Cr improving and now 1.6  - CO2 has normalized.  K is normal.    -Consulted nephrology and input appreciated  -Monitor BMP as above.  Resolved

## 2023-09-16 NOTE — PROGRESS NOTES
Date of Admission:9/12/2023    SUBJECTIVE: more alert  Asking for soda    Current Facility-Administered Medications   Medication    acetaminophen tablet 650 mg    atorvastatin tablet 40 mg    busPIRone tablet 5 mg    dextrose 10% bolus 125 mL 125 mL    dextrose 10% bolus 250 mL 250 mL    dextrose 5 % (D5W) infusion    famotidine (PF) injection 20 mg    glucagon (human recombinant) injection 1 mg    haloperidol lactate injection 5 mg    insulin aspart U-100 pen 0-10 Units    insulin aspart U-100 pen 5 Units    [START ON 9/17/2023] insulin detemir U-100 (Levemir) pen 20 Units    melatonin tablet 6 mg    mupirocin 2 % ointment    ondansetron injection 4 mg    potassium, sodium phosphates 280-160-250 mg packet 1 packet    prochlorperazine injection Soln 5 mg    sodium chloride 0.9% flush 10 mL    And    sodium chloride 0.9% flush 10 mL    traZODone tablet 50 mg       Wt Readings from Last 3 Encounters:   09/14/23 85.8 kg (189 lb 2.5 oz)   09/07/23 86 kg (189 lb 11.3 oz)   03/09/23 90.7 kg (200 lb)     Temp Readings from Last 3 Encounters:   09/16/23 97.7 °F (36.5 °C) (Oral)   09/08/23 98.3 °F (36.8 °C) (Oral)   03/12/23 98 °F (36.7 °C) (Oral)     BP Readings from Last 3 Encounters:   09/16/23 (!) 144/69   09/08/23 108/64   03/12/23 (!) 161/91     Pulse Readings from Last 3 Encounters:   09/16/23 64   09/08/23 (!) 111   03/12/23 68       Intake/Output Summary (Last 24 hours) at 9/16/2023 1041  Last data filed at 9/16/2023 0526  Gross per 24 hour   Intake 455.84 ml   Output 2176 ml   Net -1720.16 ml         PE:  GEN:wd male in nad  HEENT:ncat,eomi,mm  CVS:s1s2  regular  PULM:ctab  ABD:bs, soft  EXT:no leg edema  NEURO:awake    Recent Labs   Lab 09/16/23  0510   *   *   K 4.1   *   CO2 25   BUN 25*   CREATININE 1.4   CALCIUM 8.2*   MG 2.3         Lab Results   Component Value Date    CALCIUM 8.2 (L) 09/16/2023    PHOS 2.3 (L) 09/16/2023       Recent Labs   Lab 09/16/23  0510   WBC 4.55   RBC 3.43*   HGB  10.2*   HCT 32.1*   PLT 47*   MCV 94   MCH 29.7   MCHC 31.8*             A/P:   1.hyponatremia. sodium better. Following. Needs oral intake.  2.hypophospatemia. replete more.  3.dm1. sugars up some. Cont tx.  4.jordana.  uop good.resolved.

## 2023-09-16 NOTE — HOSPITAL COURSE
Patient admitted to ICU with DKA. Was on DKA protocol with insulin gtt and iVF,his AG is closed,Stepped to floor on 9/15.  Patient with persistent hypernatremia- 154 on 9/16. On D5.his hypernatremia is improved,he was tolerating  diet,blood sugar remains stable,DM education was done.patient was discharged home with insulin and follow up with PCP as out patient.

## 2023-09-16 NOTE — ASSESSMENT & PLAN NOTE
-Admitted to inpatient status  -On admit labs consistent with DKA (B, PH: 7.28, HCO3: 18, A, LA: 2.5, BHB: 3.3)  -A1c 8.5 4/10/2023 but now 12.8!!  -Has been treated with IV fluids, inuslin drip, accu check q1h and bmp q4h.  -Blood sugars have improved and anion gap closed.  Transitioned from insulin drip to subcutaneous insulin .  Noted significant hyperglycemia overnight which has improved throughout the day with increased dose of insulin.  Anion gap down to 8 and acidosis has resolved.  -Increase detemir to 14 units tid and aspart to 5 units tid.  Continue SSI ac/hs  -Stable to transfer out of ICU

## 2023-09-17 LAB
ALBUMIN SERPL BCP-MCNC: 2.5 G/DL (ref 3.5–5.2)
ALP SERPL-CCNC: 133 U/L (ref 55–135)
ALT SERPL W/O P-5'-P-CCNC: 59 U/L (ref 10–44)
ANION GAP SERPL CALC-SCNC: 8 MMOL/L (ref 8–16)
AST SERPL-CCNC: 45 U/L (ref 10–40)
BASOPHILS # BLD AUTO: 0 K/UL (ref 0–0.2)
BASOPHILS NFR BLD: 0 % (ref 0–1.9)
BILIRUB DIRECT SERPL-MCNC: 0.2 MG/DL (ref 0.1–0.3)
BILIRUB SERPL-MCNC: 0.4 MG/DL (ref 0.1–1)
BUN SERPL-MCNC: 16 MG/DL (ref 6–20)
CALCIUM SERPL-MCNC: 8.4 MG/DL (ref 8.7–10.5)
CHLORIDE SERPL-SCNC: 117 MMOL/L (ref 95–110)
CO2 SERPL-SCNC: 24 MMOL/L (ref 23–29)
CREAT SERPL-MCNC: 1.2 MG/DL (ref 0.5–1.4)
DIFFERENTIAL METHOD: ABNORMAL
EOSINOPHIL # BLD AUTO: 0 K/UL (ref 0–0.5)
EOSINOPHIL NFR BLD: 1 % (ref 0–8)
ERYTHROCYTE [DISTWIDTH] IN BLOOD BY AUTOMATED COUNT: 12.5 % (ref 11.5–14.5)
EST. GFR  (NO RACE VARIABLE): >60 ML/MIN/1.73 M^2
GLUCOSE SERPL-MCNC: 270 MG/DL (ref 70–110)
HCT VFR BLD AUTO: 28.5 % (ref 40–54)
HGB BLD-MCNC: 9.2 G/DL (ref 14–18)
IMM GRANULOCYTES # BLD AUTO: 0.03 K/UL (ref 0–0.04)
IMM GRANULOCYTES NFR BLD AUTO: 0.8 % (ref 0–0.5)
LYMPHOCYTES # BLD AUTO: 0.7 K/UL (ref 1–4.8)
LYMPHOCYTES NFR BLD: 16.8 % (ref 18–48)
MAGNESIUM SERPL-MCNC: 2 MG/DL (ref 1.6–2.6)
MCH RBC QN AUTO: 29.6 PG (ref 27–31)
MCHC RBC AUTO-ENTMCNC: 32.3 G/DL (ref 32–36)
MCV RBC AUTO: 92 FL (ref 82–98)
MONOCYTES # BLD AUTO: 0.5 K/UL (ref 0.3–1)
MONOCYTES NFR BLD: 11.5 % (ref 4–15)
NEUTROPHILS # BLD AUTO: 2.8 K/UL (ref 1.8–7.7)
NEUTROPHILS NFR BLD: 69.9 % (ref 38–73)
NRBC BLD-RTO: 0 /100 WBC
PHOSPHATE SERPL-MCNC: 1.8 MG/DL (ref 2.7–4.5)
PLATELET # BLD AUTO: 53 K/UL (ref 150–450)
PMV BLD AUTO: 12.4 FL (ref 9.2–12.9)
POCT GLUCOSE: 255 MG/DL (ref 70–110)
POCT GLUCOSE: 262 MG/DL (ref 70–110)
POCT GLUCOSE: 268 MG/DL (ref 70–110)
POCT GLUCOSE: 272 MG/DL (ref 70–110)
POCT GLUCOSE: 304 MG/DL (ref 70–110)
POTASSIUM SERPL-SCNC: 3.9 MMOL/L (ref 3.5–5.1)
PROT SERPL-MCNC: 5 G/DL (ref 6–8.4)
RBC # BLD AUTO: 3.11 M/UL (ref 4.6–6.2)
SODIUM SERPL-SCNC: 149 MMOL/L (ref 136–145)
WBC # BLD AUTO: 3.93 K/UL (ref 3.9–12.7)

## 2023-09-17 PROCEDURE — 25000003 PHARM REV CODE 250: Performed by: HOSPITALIST

## 2023-09-17 PROCEDURE — 85025 COMPLETE CBC W/AUTO DIFF WBC: CPT | Performed by: STUDENT IN AN ORGANIZED HEALTH CARE EDUCATION/TRAINING PROGRAM

## 2023-09-17 PROCEDURE — 83735 ASSAY OF MAGNESIUM: CPT | Performed by: STUDENT IN AN ORGANIZED HEALTH CARE EDUCATION/TRAINING PROGRAM

## 2023-09-17 PROCEDURE — A4216 STERILE WATER/SALINE, 10 ML: HCPCS | Performed by: HOSPITALIST

## 2023-09-17 PROCEDURE — 80048 BASIC METABOLIC PNL TOTAL CA: CPT | Performed by: INTERNAL MEDICINE

## 2023-09-17 PROCEDURE — 84100 ASSAY OF PHOSPHORUS: CPT | Performed by: STUDENT IN AN ORGANIZED HEALTH CARE EDUCATION/TRAINING PROGRAM

## 2023-09-17 PROCEDURE — 80076 HEPATIC FUNCTION PANEL: CPT | Performed by: HOSPITALIST

## 2023-09-17 PROCEDURE — 25000003 PHARM REV CODE 250: Performed by: INTERNAL MEDICINE

## 2023-09-17 PROCEDURE — 25000003 PHARM REV CODE 250: Performed by: STUDENT IN AN ORGANIZED HEALTH CARE EDUCATION/TRAINING PROGRAM

## 2023-09-17 PROCEDURE — 21400001 HC TELEMETRY ROOM

## 2023-09-17 RX ADMIN — INSULIN ASPART 6 UNITS: 100 INJECTION, SOLUTION INTRAVENOUS; SUBCUTANEOUS at 05:09

## 2023-09-17 RX ADMIN — INSULIN ASPART 5 UNITS: 100 INJECTION, SOLUTION INTRAVENOUS; SUBCUTANEOUS at 08:09

## 2023-09-17 RX ADMIN — ATORVASTATIN CALCIUM 40 MG: 40 TABLET, FILM COATED ORAL at 08:09

## 2023-09-17 RX ADMIN — Medication 10 ML: at 12:09

## 2023-09-17 RX ADMIN — FAMOTIDINE 20 MG: 10 INJECTION INTRAVENOUS at 08:09

## 2023-09-17 RX ADMIN — TRAZODONE HYDROCHLORIDE 50 MG: 50 TABLET ORAL at 09:09

## 2023-09-17 RX ADMIN — Medication 10 ML: at 06:09

## 2023-09-17 RX ADMIN — Medication 10 ML: at 11:09

## 2023-09-17 RX ADMIN — Medication 10 ML: at 04:09

## 2023-09-17 RX ADMIN — Medication 6 MG: at 09:09

## 2023-09-17 RX ADMIN — Medication 10 ML: at 01:09

## 2023-09-17 RX ADMIN — INSULIN ASPART 6 UNITS: 100 INJECTION, SOLUTION INTRAVENOUS; SUBCUTANEOUS at 11:09

## 2023-09-17 RX ADMIN — MUPIROCIN: 20 OINTMENT TOPICAL at 08:09

## 2023-09-17 RX ADMIN — INSULIN ASPART 5 UNITS: 100 INJECTION, SOLUTION INTRAVENOUS; SUBCUTANEOUS at 05:09

## 2023-09-17 RX ADMIN — MUPIROCIN: 20 OINTMENT TOPICAL at 09:09

## 2023-09-17 RX ADMIN — INSULIN ASPART 6 UNITS: 100 INJECTION, SOLUTION INTRAVENOUS; SUBCUTANEOUS at 06:09

## 2023-09-17 RX ADMIN — Medication 1 PACKET: at 09:09

## 2023-09-17 RX ADMIN — INSULIN ASPART 5 UNITS: 100 INJECTION, SOLUTION INTRAVENOUS; SUBCUTANEOUS at 11:09

## 2023-09-17 RX ADMIN — INSULIN ASPART 4 UNITS: 100 INJECTION, SOLUTION INTRAVENOUS; SUBCUTANEOUS at 01:09
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
## 2023-09-17 NOTE — ASSESSMENT & PLAN NOTE
-Admitted to inpatient status  -On admit labs consistent with DKA (B, PH: 7.28, HCO3: 18, A, LA: 2.5, BHB: 3.3)  -A1c 8.5 4/10/2023 but now 12.8!!  -Has been treated with IV fluids, inuslin drip, accu check q1h and bmp q4h.  -Blood sugars have improved and anion gap closed.  Transitioned from insulin drip to subcutaneous insulin .  Noted significant hyperglycemia overnight which has improved throughout the day with increased dose of insulin.  Anion gap down to 8 and acidosis has resolved.  -Increase detemir to 14 units tid and aspart to 5 units tid.  Continue SSI ac/hs  -Stable to transfer out of ICU   Continues with hypernatremia- on D5.

## 2023-09-17 NOTE — ASSESSMENT & PLAN NOTE
-On admit Na 156 with blood sugar 1214 --> corrected to 183mEq/L  -Treated DKA with NS, insulin drip.  -As glucose lowered hypernatremia was unmasked and kevin to 170s --> corrected to 183.   -Likely due to significant dehydration on admit.    -Consulted nephrology and input appreciated.  Discussed with Dr. Soto today  -Sodium is slowly improving.  Today 159--> corrects to 170  -Continue D5W - rate per nephrology - monitor BMP q8h for now.  BMP pending

## 2023-09-17 NOTE — NURSING
PER handoff received from JENNIFER Arevalo     Pt resting in bed quietly. NAD noted. No c/o pain.  Fall and safety precautions maintained. Bed alarm activated and audible.. Bed locked in lowest position, with side rails up x2. Call bell and personal items within reach

## 2023-09-17 NOTE — NURSING
Per handoff received from Jossie AMANDA RN. Patient care assumed. Patients overall condition assessed and patient appears to be in NAD with no complaints of pain. 20g RFA, 20g LFA PIV's, and SHERICE PICC are all clean, dry, and intact. Call light in reach and patient instructed to inform the nurse if anything is needed. Patient stable and is continually being monitored.

## 2023-09-17 NOTE — PLAN OF CARE
Problem: Adult Inpatient Plan of Care  Goal: Plan of Care Review  Flowsheets (Taken 9/17/2023 0251)  Plan of Care Reviewed With: patient  Goal: Absence of Hospital-Acquired Illness or Injury  Intervention: Identify and Manage Fall Risk  Flowsheets (Taken 9/17/2023 0251)  Safety Promotion/Fall Prevention:   Fall Risk reviewed with patient/family   lighting adjusted   medications reviewed   room near unit station   side rails raised x 2  Intervention: Prevent Skin Injury  Flowsheets (Taken 9/17/2023 0251)  Body Position: position changed independently  Intervention: Prevent and Manage VTE (Venous Thromboembolism) Risk  Flowsheets (Taken 9/17/2023 0251)  VTE Prevention/Management: ROM (active) performed  Range of Motion: active ROM (range of motion) encouraged  Goal: Optimal Comfort and Wellbeing  Intervention: Monitor Pain and Promote Comfort  Flowsheets (Taken 9/17/2023 0251)  Pain Management Interventions: pain management plan reviewed with patient/caregiver  Intervention: Provide Person-Centered Care  Flowsheets (Taken 9/17/2023 0251)  Trust Relationship/Rapport:   care explained   questions answered   questions encouraged   thoughts/feelings acknowledged     Problem: Diabetic Ketoacidosis  Goal: Fluid and Electrolyte Balance with Absence of Ketosis  Intervention: Monitor and Manage Ketoacidosis  Flowsheets (Taken 9/17/2023 0251)  Glycemic Management:   blood glucose monitored   supplemental insulin given     Problem: Diabetes Comorbidity  Goal: Blood Glucose Level Within Targeted Range  Intervention: Monitor and Manage Glycemia  Flowsheets (Taken 9/17/2023 0251)  Glycemic Management:   blood glucose monitored   supplemental insulin given

## 2023-09-17 NOTE — SUBJECTIVE & OBJECTIVE
Interval History: No new issues     Review of Systems   Constitutional:  Negative for activity change.   HENT:  Negative for congestion.    Eyes:  Negative for discharge.   Respiratory:  Negative for apnea.    Genitourinary:  Negative for difficulty urinating.   Neurological:  Negative for dizziness.     Objective:     Vital Signs (Most Recent):  Temp: 98.5 °F (36.9 °C) (09/17/23 0728)  Pulse: 74 (09/17/23 0728)  Resp: 18 (09/17/23 0728)  BP: 134/78 (09/17/23 0728)  SpO2: 100 % (09/17/23 0728) Vital Signs (24h Range):  Temp:  [97.4 °F (36.3 °C)-98.8 °F (37.1 °C)] 98.5 °F (36.9 °C)  Pulse:  [67-80] 74  Resp:  [17-19] 18  SpO2:  [98 %-100 %] 100 %  BP: (119-142)/(56-78) 134/78     Weight: 93.3 kg (205 lb 11 oz)  Body mass index is 26.41 kg/m².    Intake/Output Summary (Last 24 hours) at 9/17/2023 0833  Last data filed at 9/17/2023 0230  Gross per 24 hour   Intake 360 ml   Output 1326 ml   Net -966 ml         Physical Exam  HENT:      Head: Atraumatic.   Cardiovascular:      Rate and Rhythm: Normal rate.   Pulmonary:      Effort: Pulmonary effort is normal.   Musculoskeletal:         General: No swelling.   Neurological:      Mental Status: He is alert.             Significant Labs: All pertinent labs within the past 24 hours have been reviewed.  BMP:   Recent Labs   Lab 09/16/23  0510 09/17/23 0418   *  --    *  --    K 4.1  --    *  --    CO2 25  --    BUN 25*  --    CREATININE 1.4  --    CALCIUM 8.2*  --    MG 2.3 2.0     CBC:   Recent Labs   Lab 09/16/23  0510 09/17/23 0418   WBC 4.55 3.93   HGB 10.2* 9.2*   HCT 32.1* 28.5*   PLT 47* 53*       Significant Imaging: I have reviewed all pertinent imaging results/findings within the past 24 hours.

## 2023-09-17 NOTE — NURSING
[facility] [place]  Nurses Note -- 4 Eyes      9/17/2023       Skin assessed on: Q Shift      [x] No Pressure Injuries Present    [x]Prevention Measures Documented    [] Yes LDA  for Pressure Injury Previously documented     [] Yes New Pressure Injury Discovered   [] LDA for New Pressure Injury Added      Attending RN:  Susan Ortiz RN     Second RN:  JENNIFER Arevalo
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
## 2023-09-17 NOTE — NURSING
[facility] [place]  Nurses Note -- 4 Eyes      9/16/2023       Skin assessed on: Q Shift      [x] No Pressure Injuries Present    [x]Prevention Measures Documented    [] Yes LDA  for Pressure Injury Previously documented     [] Yes New Pressure Injury Discovered   [] LDA for New Pressure Injury Added      Attending RN:  Irina Jade, RN     Second RN:  Jossie AMANDA RN

## 2023-09-18 VITALS
SYSTOLIC BLOOD PRESSURE: 136 MMHG | DIASTOLIC BLOOD PRESSURE: 84 MMHG | TEMPERATURE: 98 F | BODY MASS INDEX: 26.66 KG/M2 | HEART RATE: 78 BPM | HEIGHT: 74 IN | WEIGHT: 207.69 LBS | RESPIRATION RATE: 18 BRPM | OXYGEN SATURATION: 100 %

## 2023-09-18 LAB
BASOPHILS # BLD AUTO: 0 K/UL (ref 0–0.2)
BASOPHILS NFR BLD: 0 % (ref 0–1.9)
DIFFERENTIAL METHOD: ABNORMAL
EOSINOPHIL # BLD AUTO: 0.1 K/UL (ref 0–0.5)
EOSINOPHIL NFR BLD: 1.5 % (ref 0–8)
ERYTHROCYTE [DISTWIDTH] IN BLOOD BY AUTOMATED COUNT: 11.9 % (ref 11.5–14.5)
HCT VFR BLD AUTO: 29.7 % (ref 40–54)
HGB BLD-MCNC: 9.4 G/DL (ref 14–18)
IMM GRANULOCYTES # BLD AUTO: 0.02 K/UL (ref 0–0.04)
IMM GRANULOCYTES NFR BLD AUTO: 0.5 % (ref 0–0.5)
LYMPHOCYTES # BLD AUTO: 1 K/UL (ref 1–4.8)
LYMPHOCYTES NFR BLD: 25.4 % (ref 18–48)
MAGNESIUM SERPL-MCNC: 1.6 MG/DL (ref 1.6–2.6)
MCH RBC QN AUTO: 29.4 PG (ref 27–31)
MCHC RBC AUTO-ENTMCNC: 31.6 G/DL (ref 32–36)
MCV RBC AUTO: 93 FL (ref 82–98)
MONOCYTES # BLD AUTO: 0.5 K/UL (ref 0.3–1)
MONOCYTES NFR BLD: 13.3 % (ref 4–15)
NEUTROPHILS # BLD AUTO: 2.4 K/UL (ref 1.8–7.7)
NEUTROPHILS NFR BLD: 59.3 % (ref 38–73)
NRBC BLD-RTO: 0 /100 WBC
PHOSPHATE SERPL-MCNC: 2.7 MG/DL (ref 2.7–4.5)
PLATELET # BLD AUTO: 66 K/UL (ref 150–450)
PMV BLD AUTO: 12.6 FL (ref 9.2–12.9)
POCT GLUCOSE: 159 MG/DL (ref 70–110)
POCT GLUCOSE: 174 MG/DL (ref 70–110)
POCT GLUCOSE: 198 MG/DL (ref 70–110)
POCT GLUCOSE: 240 MG/DL (ref 70–110)
RBC # BLD AUTO: 3.2 M/UL (ref 4.6–6.2)
WBC # BLD AUTO: 4.06 K/UL (ref 3.9–12.7)

## 2023-09-18 PROCEDURE — 25000003 PHARM REV CODE 250: Performed by: STUDENT IN AN ORGANIZED HEALTH CARE EDUCATION/TRAINING PROGRAM

## 2023-09-18 PROCEDURE — 84100 ASSAY OF PHOSPHORUS: CPT | Performed by: STUDENT IN AN ORGANIZED HEALTH CARE EDUCATION/TRAINING PROGRAM

## 2023-09-18 PROCEDURE — 85025 COMPLETE CBC W/AUTO DIFF WBC: CPT | Performed by: STUDENT IN AN ORGANIZED HEALTH CARE EDUCATION/TRAINING PROGRAM

## 2023-09-18 PROCEDURE — 90686 IIV4 VACC NO PRSV 0.5 ML IM: CPT | Performed by: HOSPITALIST

## 2023-09-18 PROCEDURE — 90471 IMMUNIZATION ADMIN: CPT | Performed by: HOSPITALIST

## 2023-09-18 PROCEDURE — 63600175 PHARM REV CODE 636 W HCPCS: Performed by: HOSPITALIST

## 2023-09-18 PROCEDURE — 83735 ASSAY OF MAGNESIUM: CPT | Performed by: STUDENT IN AN ORGANIZED HEALTH CARE EDUCATION/TRAINING PROGRAM

## 2023-09-18 PROCEDURE — G0008 ADMIN INFLUENZA VIRUS VAC: HCPCS | Performed by: HOSPITALIST

## 2023-09-18 PROCEDURE — A4216 STERILE WATER/SALINE, 10 ML: HCPCS | Performed by: HOSPITALIST

## 2023-09-18 PROCEDURE — 25000003 PHARM REV CODE 250: Performed by: HOSPITALIST

## 2023-09-18 RX ORDER — INSULIN DEGLUDEC 100 U/ML
21 INJECTION, SOLUTION SUBCUTANEOUS DAILY
Qty: 7 ML | Refills: 10 | Status: ON HOLD
Start: 2023-09-18 | End: 2023-11-17 | Stop reason: SDUPTHER

## 2023-09-18 RX ADMIN — Medication 10 ML: at 06:09

## 2023-09-18 RX ADMIN — INSULIN ASPART 5 UNITS: 100 INJECTION, SOLUTION INTRAVENOUS; SUBCUTANEOUS at 08:09

## 2023-09-18 RX ADMIN — INFLUENZA VIRUS VACCINE 0.5 ML: 15; 15; 15; 15 SUSPENSION INTRAMUSCULAR at 11:09

## 2023-09-18 RX ADMIN — INSULIN ASPART 1 UNITS: 100 INJECTION, SOLUTION INTRAVENOUS; SUBCUTANEOUS at 12:09

## 2023-09-18 RX ADMIN — INSULIN ASPART 2 UNITS: 100 INJECTION, SOLUTION INTRAVENOUS; SUBCUTANEOUS at 11:09

## 2023-09-18 RX ADMIN — FAMOTIDINE 20 MG: 10 INJECTION INTRAVENOUS at 08:09

## 2023-09-18 RX ADMIN — ATORVASTATIN CALCIUM 40 MG: 40 TABLET, FILM COATED ORAL at 08:09

## 2023-09-18 NOTE — NURSING
Per handoff received from Jossie AMANDA RN. Patient care assumed. Patients overall condition assessed and patient appears to be in NAD with no complaints of pain. SHERICE PICC is all clean, dry, and intact. Call light in reach and patient instructed to inform the nurse if anything is needed. Patient stable and is continually being monitored.

## 2023-09-18 NOTE — DISCHARGE SUMMARY
[facility]  Discharge Summary      Patient Name: Andrzej Sinclair  MRN: [id number]  REKHA: [id number]  Patient Class: IP- Inpatient  Admission Date: 2023  Hospital Length of Stay: 6 days  Discharge Date and Time:  2023 11:28 AM  Attending Physician: Nydia Davila, *   Discharging Provider: Nydia Davila MD  Primary Care Provider: Viktoriya Jaeger NP    Primary Care Team: Networked reference to record PCT     HPI:   This is a 37-year-old male with a past medical history of type 1 diabetes, hypertension, CKD 3, GERD, who presents with hyperglycemia.    Patient presents with generalized fatigue and evaluation of hyperglycemia. Patient initially presented to [facility] on  for evaluation of hyperglycemia.  In the ED, he was noted to be febrile (39.1) and was recommended to be admitted.  Patient decided to leave against medical advice.  He also had a prior presentation on  for similar symptoms. He says he is unsure he if has been taking his insulin.     In the ED, the patient was tachycardic (120).  Labs were suggestive of DKA (B, PH: 7.28, HCO3: 18, A, LA: 2.5, BHB: 3.3), elevated LFTs (AST:  183, ALT:  176, ALP: 281), hypernatremia (corrected, 166), elevated creatinine (3.7 - baseline of 2.5), elevated LFTs (AST: 183, ALT: 167, ALP: 281).  CXR showed no acute process. He was given fluids, vancomycin, Zosyn and was started on insulin. Patient was admitted for further management.       * No surgery found *      Hospital Course:   Patient admitted to ICU with DKA. Was on DKA protocol with insulin gtt and iVF,his AG is closed,Stepped to floor on 9/15.  Patient with persistent hypernatremia- 154 on . On D5.his hypernatremia is improved,he was tolerating  diet,blood sugar remains stable,DM education was done.patient was discharged home with insulin and follow up with PCP as out patient.       Goals of Care Treatment Preferences:  Code Status:  Full Code      Consults:   Consults (From admission, onward)        Status Ordering Provider     Inpatient consult to Telemedicine - Psych  Once        Provider:  Maritza Zamora MD    Completed NBA ROCHA     Inpatient consult to PICC team ([facility])  Once        Provider:  (Not yet assigned)    Acknowledged NBA ROCHA     Inpatient consult to Nephrology  Once        Provider:  Grace Soto MD    Completed NAB ROCHA     Inpatient consult to Midline team  Once        Provider:  (Not yet assigned)    Acknowledged NBA ROCHA     Inpatient consult to Registered Dietitian/Nutritionist  Once        Provider:  (Not yet assigned)    Completed VIKTOR VALLEJO          No new Assessment & Plan notes have been filed under this hospital service since the last note was generated.  Service: Hospital Medicine    Final Active Diagnoses:    Diagnosis Date Noted POA    PRINCIPAL PROBLEM:  DKA (diabetic ketoacidosis) [E11.10] 02/28/2021 Yes    Acute metabolic encephalopathy [G93.41] 09/15/2023 Yes    Elevated LFTs [R79.89] 09/12/2023 Yes    SIRS (systemic inflammatory response syndrome) [R65.10] 09/12/2023 Yes    Hypernatremia [E87.0] 09/07/2023 Yes    Acute renal failure superimposed on stage 3 chronic kidney disease [N17.9, N18.30] 04/24/2021 Yes    Type 1 diabetes mellitus with complications [E10.8] 03/10/2021 Yes    Primary hypertension [I10] 03/04/2021 Yes      Problems Resolved During this Admission:       Discharged Condition: stable    Disposition: Home or Self Care    Follow Up:   Follow-up Information     Viktoriya Jaeger NP Follow up in 1 week(s).    Specialty: Endocrinology  Contact information:  [address] [place] [id number] [phone]                       Patient Instructions:      Activity as tolerated       Significant Diagnostic Studies: Labs:   BMP:   Recent Labs   Lab 09/17/23  0418 09/17/23  0924 09/18/23  0511   GLU  --  270*  --    NA  --  149*  --    K  --  3.9  --   "  CL  --  117*  --    CO2  --  24  --    BUN  --  16  --    CREATININE  --  1.2  --    CALCIUM  --  8.4*  --    MG 2.0  --  1.6   , CMP   Recent Labs   Lab 09/17/23  0418 09/17/23  0924   NA  --  149*   K  --  3.9   CL  --  117*   CO2  --  24   GLU  --  270*   BUN  --  16   CREATININE  --  1.2   CALCIUM  --  8.4*   PROT 5.0*  --    ALBUMIN 2.5*  --    BILITOT 0.4  --    ALKPHOS 133  --    AST 45*  --    ALT 59*  --    ANIONGAP  --  8    and CBC   Recent Labs   Lab 09/17/23  0418 09/18/23  0511   WBC 3.93 4.06   HGB 9.2* 9.4*   HCT 28.5* 29.7*   PLT 53* 66*     Radiology: X-Ray: CXR: X-Ray Chest 1 View (CXR): No results found for this visit on 09/12/23. and X-Ray Chest PA and Lateral (CXR): No results found for this visit on 09/12/23.    Pending Diagnostic Studies:     None         Medications:  Reconciled Home Medications:      Medication List      CHANGE how you take these medications    insulin degludec 100 unit/mL injection  Commonly known as: TRESIBA U-100 INSULIN  Inject 0.21 mLs (21 Units total) into the skin once daily.  What changed: how much to take        CONTINUE taking these medications    ACCU-CHEK GUIDE GLUCOSE METER Misc  Generic drug: blood-glucose meter  Use to test blood glucose 2 times a day with meals.     ACCU-CHEK SOFTCLIX LANCETS Misc  Generic drug: lancets  1 lancet by Misc.(Non-Drug; Combo Route) route 5 (five) times daily.     acetaminophen 325 MG tablet  Commonly known as: TYLENOL  Take 325 mg by mouth daily as needed for Pain.     aluminum & magnesium hydroxide-simethicone 400-400-40 mg/5 mL suspension  Commonly known as: MYLANTA MAX STRENGTH  Take 10 mLs by mouth every 6 (six) hours as needed for Indigestion.     BD SAFETYGLIDE INSULIN SYRINGE 0.3 mL 31 gauge x 15/64" Syrg  Generic drug: insulin syringe,safetyneedle  1 Device by Misc.(Non-Drug; Combo Route) route 5 (five) times daily.     BD ULTRA-FINE ROVERTO PEN NEEDLE 32 gauge x 5/32" Ndle  Generic drug: pen needle, diabetic  USE AS " "DIRECTED WITH INSULIN     blood sugar diagnostic Strp  1 strip by Misc.(Non-Drug; Combo Route) route 5 (five) times daily.     DEXCOM G6 SENSOR Martha  Generic drug: blood-glucose sensor  1 each by Misc.(Non-Drug; Combo Route) route every 10 days.     DEXCOM G6 TRANSMITTER Martha  Generic drug: blood-glucose transmitter  1 each by Misc.(Non-Drug; Combo Route) route every 3 (three) months.     GVOKE HYPOPEN 2-PACK 1 mg/0.2 mL Atin  Generic drug: glucagon  Inject 1 Package into the skin as needed (hypoglycemia).     * BD INSULIN SYRINGE ULTRA-FINE 0.5 mL 31 gauge x 5/16" Syrg  Generic drug: insulin syringe-needle U-100  USE FOUR TIMES DAILY AS DIRECTED     * insulin syringe-needle U-100 0.5 mL 30 gauge x 1/2" Syrg  To use 5 times daily to inject insulin     * insulin syringe-needle U-100 1/2 mL 30 gauge Syrg  AS DIRECTED 5 TIMES DAILY     KETONE URINE TEST Strp  Generic drug: acetone (urine) test  30 strips by Misc.(Non-Drug; Combo Route) route as needed (hyperglycemia).     lancing device Misc  1 Device by Misc.(Non-Drug; Combo Route) route 2 (two) times daily with meals.     lisinopriL 10 MG tablet  Take 1 tablet (10 mg total) by mouth once daily.     LYUMJEV U-100 INSULIN 100 unit/mL  Generic drug: insulin lispro-aabc  Inject 7 Units into the skin 3 (three) times daily with meals. Plus correction scale, max TDD 36u     omeprazole 20 MG capsule  Commonly known as: PRILOSEC  Take 1 capsule (20 mg total) by mouth once daily.     OMNIPOD 5 G6 INTRO KIT (GEN 5) Crtg  Generic drug: insulin pump cart,auto,BT-cntr  SMARTSI Kit(s) SUB-Q Once     ondansetron 4 MG tablet  Commonly known as: ZOFRAN  Take 2 tablets (8 mg total) by mouth 3 (three) times daily.     pantoprazole 40 MG tablet  Commonly known as: PROTONIX  Take 1 tablet (40 mg total) by mouth once daily.     promethazine 25 MG suppository  Commonly known as: PHENERGAN  Place 1 suppository (25 mg total) rectally every 6 (six) hours as needed for Nausea.   "   rosuvastatin 10 MG tablet  Commonly known as: CRESTOR  Take 1 tablet (10 mg total) by mouth once daily.     sucralfate 100 mg/mL suspension  Commonly known as: CARAFATE  Take 10 mLs (1 g total) by mouth every 6 (six) hours as needed (indigestion).         * This list has 3 medication(s) that are the same as other medications prescribed for you. Read the directions carefully, and ask your doctor or other care provider to review them with you.                Indwelling Lines/Drains at time of discharge:   Lines/Drains/Airways     Peripherally Inserted Central Catheter Line  Duration           PICC Triple Lumen 09/13/23 1230 right basilic 4 days                Time spent on the discharge of patient:  Over 30  minutes         Nydia Davila MD  Department of Hospital Medicine  [facility] [place] [facility]

## 2023-09-18 NOTE — PLAN OF CARE
Problem: Adult Inpatient Plan of Care  Goal: Plan of Care Review  Flowsheets (Taken 9/18/2023 0253)  Plan of Care Reviewed With: patient  Goal: Absence of Hospital-Acquired Illness or Injury  Intervention: Identify and Manage Fall Risk  Flowsheets (Taken 9/18/2023 0253)  Safety Promotion/Fall Prevention:   Fall Risk reviewed with patient/family   room near unit station   side rails raised x 2  Intervention: Prevent Skin Injury  Flowsheets (Taken 9/18/2023 0253)  Body Position: position changed independently  Intervention: Prevent and Manage VTE (Venous Thromboembolism) Risk  Flowsheets (Taken 9/18/2023 0253)  VTE Prevention/Management: ROM (active) performed  Range of Motion: active ROM (range of motion) encouraged  Goal: Optimal Comfort and Wellbeing  Intervention: Monitor Pain and Promote Comfort  Flowsheets (Taken 9/18/2023 0253)  Pain Management Interventions: pain management plan reviewed with patient/caregiver  Intervention: Provide Person-Centered Care  Flowsheets (Taken 9/18/2023 0253)  Trust Relationship/Rapport:   care explained   questions answered   questions encouraged   thoughts/feelings acknowledged     Problem: Diabetic Ketoacidosis  Goal: Fluid and Electrolyte Balance with Absence of Ketosis  Intervention: Monitor and Manage Ketoacidosis  Flowsheets (Taken 9/18/2023 0253)  Glycemic Management:   blood glucose monitored   supplemental insulin given     Problem: Diabetes Comorbidity  Goal: Blood Glucose Level Within Targeted Range  Intervention: Monitor and Manage Glycemia  Flowsheets (Taken 9/18/2023 0253)  Glycemic Management:   blood glucose monitored   supplemental insulin given     Problem: Adjustment to Illness (Sepsis/Septic Shock)  Goal: Optimal Coping  Intervention: Optimize Psychosocial Adjustment to Illness  Flowsheets (Taken 9/18/2023 0253)  Supportive Measures: verbalization of feelings encouraged     Problem: Fall Injury Risk  Goal: Absence of Fall and Fall-Related  Injury  Intervention: Identify and Manage Contributors  Flowsheets (Taken 9/18/2023 0253)  Self-Care Promotion: independence encouraged  Medication Review/Management: medications reviewed  Intervention: Promote Injury-Free Environment  Flowsheets (Taken 9/18/2023 0253)  Safety Promotion/Fall Prevention:   Fall Risk reviewed with patient/family   room near unit station   side rails raised x 2

## 2023-09-18 NOTE — NURSING
[facility]  Nurses Note -- 4 Eyes      9/18/2023       Skin assessed on: Q Shift      [x] No Pressure Injuries Present    [x]Prevention Measures Documented    [] Yes LDA  for Pressure Injury Previously documented     [] Yes New Pressure Injury Discovered   [] LDA for New Pressure Injury Added      Attending RN:  Susan Ortiz RN     Second RN:  JENNIFER Arevalo

## 2023-09-18 NOTE — PROGRESS NOTES
"                        Renal Progress Note    Date of Admission:  9/12/2023  8:40 PM    Length of Stay: 6  Days    Subjective: n/a    Objective:    Current Facility-Administered Medications   Medication    acetaminophen tablet 650 mg    atorvastatin tablet 40 mg    busPIRone tablet 5 mg    dextrose 10% bolus 125 mL 125 mL    dextrose 10% bolus 250 mL 250 mL    famotidine (PF) injection 20 mg    glucagon (human recombinant) injection 1 mg    haloperidol lactate injection 5 mg    insulin aspart U-100 pen 0-10 Units    insulin aspart U-100 pen 5 Units    insulin detemir U-100 (Levemir) pen 27 Units    melatonin tablet 6 mg    mupirocin 2 % ointment    ondansetron injection 4 mg    potassium, sodium phosphates 280-160-250 mg packet 1 packet    prochlorperazine injection Soln 5 mg    sodium chloride 0.9% flush 10 mL    And    sodium chloride 0.9% flush 10 mL    traZODone tablet 50 mg       Vitals:    09/17/23 1609 09/17/23 2009 09/18/23 0009 09/18/23 0527   BP: 130/67 128/73 (!) 122/57 (!) 143/75   BP Location: Right arm Left arm Left arm Left arm   Patient Position: Lying Lying Lying Lying   Pulse: 71 73 74 (!) 58   Resp: 18 18 18 20   Temp: 98.3 °F (36.8 °C) 98.3 °F (36.8 °C) 98.4 °F (36.9 °C) 97.9 °F (36.6 °C)   TempSrc: Oral Oral Oral Oral   SpO2: 100% 100% 99% 98%   Weight:    94.2 kg (207 lb 10.8 oz)   Height:           I/O last 3 completed shifts:  In: 360 [P.O.:360]  Out: 2126 [Urine:2125; Stool:1]  I/O this shift:  In: 480 [P.O.:480]  Out: 1050 [Urine:1050]      Physical Exam: n/a      Laboratories:    Recent Labs   Lab 09/18/23  0511   WBC 4.06   RBC 3.20*   HGB 9.4*   HCT 29.7*   PLT 66*   MCV 93   MCH 29.4   MCHC 31.6*       Recent Labs   Lab 09/17/23  0924   CALCIUM 8.4*   *   K 3.9   CO2 24   *   BUN 16   CREATININE 1.2       No results for input(s): "COLORU", "CLARITYU", "SPECGRAV", "PHUR", "PROTEINUA", "GLUCOSEU", "BILIRUBINCON", "BLOODU", "WBCU", "RBCU", "BACTERIA", "MUCUS" in the last 24 " hours.    Microbiology Results (last 7 days)       Procedure Component Value Units Date/Time    Blood culture #1 **CANNOT BE ORDERED STAT** [[id number]] Collected: 09/12/23 2125    Order Status: Completed Specimen: Blood from Peripheral, Forearm, Right Updated: 09/16/23 2303     Blood Culture, Routine No Growth after 4 days.    Blood culture #2 **CANNOT BE ORDERED STAT** [[id number]] Collected: 09/12/23 2115    Order Status: Completed Specimen: Blood from Peripheral, Antecubital, Left Updated: 09/16/23 2303     Blood Culture, Routine No Growth after 4 days.              Diagnostic Tests: n/a        Assessment:    36 y/o male with type 1 DM admitted with:    - s/p DKA  - Chronically uncontrolled DM-1 (HgbA1C 12.8)  - s/p SIRS  - s/p Acute metabolic encephalopathy  - BETH in resolution  - CKD-3  - HTN  - HyperNa+ IMPROVING  - Elevated LFTs  - Anemia et?  - Hypoalbuminemia          Plan:    - Discharge home in progress  - Encourage H2O intake- Glycemic control and other problems per admitting

## 2023-09-18 NOTE — NURSING
[facility]  Nurses Note -- 4 Eyes      9/17/2023       Skin assessed on: Q Shift      [x] No Pressure Injuries Present    [x]Prevention Measures Documented    [] Yes LDA  for Pressure Injury Previously documented     [] Yes New Pressure Injury Discovered   [] LDA for New Pressure Injury Added      Attending RN:  Irina Jade, RN     Second RN:  Jossie AMANDA RN

## 2023-09-18 NOTE — PLAN OF CARE
[facility] - Telemetry  Discharge Final Note    Primary Care Provider: Viktoriya Jaeger NP    Expected Discharge Date: 9/18/2023    Final Discharge Note (most recent)       Final Note - 09/18/23 1043          Final Note    Assessment Type Final Discharge Note     Anticipated Discharge Disposition Home or Self Care     What phone number can be called within the next 1-3 days to see how you are doing after discharge? [phone]     Hospital Resources/Appts/Education Provided Provided patient/caregiver with written discharge plan information;Appointments scheduled and added to AVS        Post-Acute Status    Discharge Delays None known at this time                     Important Message from Medicare             Contact Info       Viktoriya Jaeger NP   Specialty: Endocrinology   Relationship: PCP - General    [address] [place] [id number]   Phone: [phone]       Next Steps: Follow up in 1 week(s)        SW spoke with patient at bedside in regards to anticipated discharge on today. Patient informed of appointments scheduled. Patient to go home with his brother at discharge. Nurse Susan Ortiz informed that patient is clear for discharge from case management standpoint.

## 2023-09-20 ENCOUNTER — PATIENT OUTREACH (OUTPATIENT)
Dept: ADMINISTRATIVE | Facility: CLINIC | Age: 38
End: 2023-09-20
Payer: MEDICARE

## 2023-09-20 ENCOUNTER — TELEPHONE (OUTPATIENT)
Dept: ADMINISTRATIVE | Facility: CLINIC | Age: 38
End: 2023-09-20
Payer: MEDICARE

## 2023-09-20 DIAGNOSIS — E10.10 DIABETIC KETOACIDOSIS WITHOUT COMA ASSOCIATED WITH TYPE 1 DIABETES MELLITUS: Primary | ICD-10-CM

## 2023-09-20 NOTE — PROGRESS NOTES
"Phoned patient in response to reply of "2" to post-discharge texting tracker. Mr. Sinclair's spouse, Mrs. Jones, states that Mr. Sinclair is requesting an increased amount of sensors for the Dexcom machine. Mrs. Jones states that he only gets 3 at a time and they are not lasting the entire month. If approved, she would like an e-prescription sent to their preferred [facility] on file. She also states that he is in need of a virtual appointment with his Endocrinology provider, Viktoriya Jaeger NP, as he is unable to make in person office visits at this time. Message sent to office of Viktoriya Jaeger NP. Mrs. Jones verbalized understanding.  "

## 2023-09-20 NOTE — PROGRESS NOTES
C3 nurse spoke with Andrzej Sinclair  for a TCC post hospital discharge follow up call. The patient had a scheduled HOSFU appointment with Viktoriya Jaeger NP on 9/20/2023, that he states he cancelled.  Offered [facility], patient accepted and agreed.  Referral sent due to no available HOSPFU appointments within 5-7 days post discharge.

## 2023-09-21 ENCOUNTER — TELEPHONE (OUTPATIENT)
Dept: ENDOCRINOLOGY | Facility: CLINIC | Age: 38
End: 2023-09-21
Payer: MEDICARE

## 2023-09-21 ENCOUNTER — PES CALL (OUTPATIENT)
Dept: HOME HEALTH SERVICES | Facility: CLINIC | Age: 38
End: 2023-09-21
Payer: MEDICARE

## 2023-09-21 DIAGNOSIS — E10.36 TYPE 1 DIABETES MELLITUS WITH DIABETIC CATARACT: ICD-10-CM

## 2023-09-21 RX ORDER — BLOOD-GLUCOSE TRANSMITTER
1 EACH MISCELLANEOUS
Qty: 3 EACH | Refills: 3 | Status: SHIPPED | OUTPATIENT
Start: 2023-09-21 | End: 2023-10-18 | Stop reason: SDUPTHER

## 2023-09-21 RX ORDER — BLOOD-GLUCOSE SENSOR
1 EACH MISCELLANEOUS
Qty: 9 EACH | Refills: 3 | Status: SHIPPED | OUTPATIENT
Start: 2023-09-21 | End: 2023-10-18 | Stop reason: SDUPTHER

## 2023-09-21 NOTE — TELEPHONE ENCOUNTER
Called patient about his prescription from the provider. I could not leave a message due to full mailbox.      Please call patient and let them know. I called in a 90 day to mao BUT if they are having problems with the sensor failing then they need to call dexcom for a free replacement

## 2023-09-22 ENCOUNTER — PES CALL (OUTPATIENT)
Dept: HOME HEALTH SERVICES | Facility: CLINIC | Age: 38
End: 2023-09-22
Payer: MEDICARE

## 2023-10-17 RX ORDER — DICYCLOMINE HYDROCHLORIDE 10 MG/1
10 CAPSULE ORAL
Qty: 15 CAPSULE | Refills: 0 | Status: SHIPPED | OUTPATIENT
Start: 2023-10-17

## 2023-10-18 DIAGNOSIS — E10.36 TYPE 1 DIABETES MELLITUS WITH DIABETIC CATARACT: ICD-10-CM

## 2023-10-18 RX ORDER — ALUMINUM HYDROXIDE, MAGNESIUM HYDROXIDE, AND SIMETHICONE 2400; 240; 2400 MG/30ML; MG/30ML; MG/30ML
10 SUSPENSION ORAL EVERY 6 HOURS PRN
Qty: 100 ML | Refills: 0 | Status: ON HOLD | OUTPATIENT
Start: 2023-10-18 | End: 2023-11-13

## 2023-10-18 RX ORDER — BLOOD-GLUCOSE SENSOR
1 EACH MISCELLANEOUS
Qty: 9 EACH | Refills: 3 | Status: SHIPPED | OUTPATIENT
Start: 2023-10-18 | End: 2024-10-17

## 2023-10-18 RX ORDER — DEXTROSE 4 G
TABLET,CHEWABLE ORAL
Qty: 1 EACH | Refills: 0 | Status: ON HOLD | OUTPATIENT
Start: 2023-10-18 | End: 2023-11-13

## 2023-10-18 RX ORDER — BLOOD-GLUCOSE TRANSMITTER
1 EACH MISCELLANEOUS
Qty: 1 EACH | Refills: 3 | Status: SHIPPED | OUTPATIENT
Start: 2023-10-18 | End: 2024-10-17

## 2023-11-10 ENCOUNTER — HOSPITAL ENCOUNTER (INPATIENT)
Facility: HOSPITAL | Age: 38
LOS: 7 days | Discharge: HOME | DRG: 637 | End: 2023-11-17
Attending: STUDENT IN AN ORGANIZED HEALTH CARE EDUCATION/TRAINING PROGRAM | Admitting: INTERNAL MEDICINE
Payer: MEDICARE

## 2023-11-10 DIAGNOSIS — R00.0 TACHYCARDIA: ICD-10-CM

## 2023-11-10 DIAGNOSIS — E10.11 DIABETIC KETOACIDOSIS WITH COMA ASSOCIATED WITH TYPE 1 DIABETES MELLITUS: Primary | ICD-10-CM

## 2023-11-10 DIAGNOSIS — R73.9 HYPERGLYCEMIA: ICD-10-CM

## 2023-11-10 DIAGNOSIS — G93.41 METABOLIC ENCEPHALOPATHY: ICD-10-CM

## 2023-11-10 DIAGNOSIS — R80.9 TYPE 1 DIABETES MELLITUS WITH MICROALBUMINURIA: ICD-10-CM

## 2023-11-10 DIAGNOSIS — E10.65 TYPE 1 DIABETES MELLITUS WITH HYPERGLYCEMIA: ICD-10-CM

## 2023-11-10 DIAGNOSIS — E10.29 TYPE 1 DIABETES MELLITUS WITH MICROALBUMINURIA: ICD-10-CM

## 2023-11-10 DIAGNOSIS — E87.5 HYPERKALEMIA: ICD-10-CM

## 2023-11-10 DIAGNOSIS — R07.9 CHEST PAIN: ICD-10-CM

## 2023-11-10 DIAGNOSIS — N17.9 AKI (ACUTE KIDNEY INJURY): ICD-10-CM

## 2023-11-10 PROBLEM — E87.20 METABOLIC ACIDOSIS: Status: ACTIVE | Noted: 2023-11-10

## 2023-11-10 LAB
ALBUMIN SERPL BCP-MCNC: 3.3 G/DL (ref 3.5–5.2)
ALBUMIN SERPL BCP-MCNC: 3.6 G/DL (ref 3.5–5.2)
ALLENS TEST: ABNORMAL
ALLENS TEST: NORMAL
ALP SERPL-CCNC: 101 U/L (ref 55–135)
ALP SERPL-CCNC: 91 U/L (ref 55–135)
ALT SERPL W/O P-5'-P-CCNC: 17 U/L (ref 10–44)
ALT SERPL W/O P-5'-P-CCNC: 18 U/L (ref 10–44)
AMPHET+METHAMPHET UR QL: NEGATIVE
ANION GAP SERPL CALC-SCNC: 26 MMOL/L (ref 8–16)
ANION GAP SERPL CALC-SCNC: 27 MMOL/L (ref 8–16)
ANION GAP SERPL CALC-SCNC: 30 MMOL/L (ref 8–16)
ANION GAP SERPL CALC-SCNC: 31 MMOL/L (ref 8–16)
AST SERPL-CCNC: 14 U/L (ref 10–40)
AST SERPL-CCNC: 14 U/L (ref 10–40)
B-OH-BUTYR BLD STRIP-SCNC: 5.8 MMOL/L (ref 0–0.5)
BACTERIA #/AREA URNS AUTO: ABNORMAL /HPF
BARBITURATES UR QL SCN>200 NG/ML: NEGATIVE
BASOPHILS # BLD AUTO: 0.03 K/UL (ref 0–0.2)
BASOPHILS # BLD AUTO: 0.05 K/UL (ref 0–0.2)
BASOPHILS NFR BLD: 0.3 % (ref 0–1.9)
BASOPHILS NFR BLD: 0.4 % (ref 0–1.9)
BENZODIAZ UR QL SCN>200 NG/ML: NEGATIVE
BILIRUB SERPL-MCNC: 0.2 MG/DL (ref 0.1–1)
BILIRUB SERPL-MCNC: 0.3 MG/DL (ref 0.1–1)
BILIRUB UR QL STRIP: NEGATIVE
BNP SERPL-MCNC: 154 PG/ML (ref 0–99)
BUN SERPL-MCNC: 79 MG/DL (ref 6–20)
BUN SERPL-MCNC: 79 MG/DL (ref 6–20)
BUN SERPL-MCNC: 81 MG/DL (ref 6–30)
BUN SERPL-MCNC: 85 MG/DL (ref 6–20)
BUN SERPL-MCNC: 87 MG/DL (ref 6–20)
BUN SERPL-MCNC: 90 MG/DL (ref 6–20)
BUN SERPL-MCNC: 90 MG/DL (ref 6–20)
BZE UR QL SCN: NEGATIVE
CALCIUM SERPL-MCNC: 8.2 MG/DL (ref 8.7–10.5)
CALCIUM SERPL-MCNC: 8.3 MG/DL (ref 8.7–10.5)
CALCIUM SERPL-MCNC: 8.4 MG/DL (ref 8.7–10.5)
CALCIUM SERPL-MCNC: 8.6 MG/DL (ref 8.7–10.5)
CALCIUM SERPL-MCNC: 8.7 MG/DL (ref 8.7–10.5)
CALCIUM SERPL-MCNC: 8.7 MG/DL (ref 8.7–10.5)
CANNABINOIDS UR QL SCN: ABNORMAL
CHLORIDE SERPL-SCNC: 103 MMOL/L (ref 95–110)
CHLORIDE SERPL-SCNC: 106 MMOL/L (ref 95–110)
CHLORIDE SERPL-SCNC: 106 MMOL/L (ref 95–110)
CHLORIDE SERPL-SCNC: 108 MMOL/L (ref 95–110)
CHLORIDE SERPL-SCNC: 108 MMOL/L (ref 95–110)
CHLORIDE SERPL-SCNC: 95 MMOL/L (ref 95–110)
CHLORIDE SERPL-SCNC: 99 MMOL/L (ref 95–110)
CLARITY UR REFRACT.AUTO: CLEAR
CO2 SERPL-SCNC: 12 MMOL/L (ref 23–29)
CO2 SERPL-SCNC: 14 MMOL/L (ref 23–29)
CO2 SERPL-SCNC: 16 MMOL/L (ref 23–29)
CO2 SERPL-SCNC: 16 MMOL/L (ref 23–29)
CO2 SERPL-SCNC: 6 MMOL/L (ref 23–29)
CO2 SERPL-SCNC: 9 MMOL/L (ref 23–29)
COLOR UR AUTO: YELLOW
CREAT SERPL-MCNC: 4.9 MG/DL (ref 0.5–1.4)
CREAT SERPL-MCNC: 4.9 MG/DL (ref 0.5–1.4)
CREAT SERPL-MCNC: 5.9 MG/DL (ref 0.5–1.4)
CREAT SERPL-MCNC: 6.1 MG/DL (ref 0.5–1.4)
CREAT SERPL-MCNC: 6.6 MG/DL (ref 0.5–1.4)
CREAT UR-MCNC: 79 MG/DL (ref 23–375)
DIFFERENTIAL METHOD: ABNORMAL
DIFFERENTIAL METHOD: ABNORMAL
EOSINOPHIL # BLD AUTO: 0 K/UL (ref 0–0.5)
EOSINOPHIL # BLD AUTO: 0 K/UL (ref 0–0.5)
EOSINOPHIL NFR BLD: 0 % (ref 0–8)
EOSINOPHIL NFR BLD: 0.1 % (ref 0–8)
ERYTHROCYTE [DISTWIDTH] IN BLOOD BY AUTOMATED COUNT: 13.9 % (ref 11.5–14.5)
ERYTHROCYTE [DISTWIDTH] IN BLOOD BY AUTOMATED COUNT: 14 % (ref 11.5–14.5)
EST. GFR  (NO RACE VARIABLE): 10.4 ML/MIN/1.73 M^2
EST. GFR  (NO RACE VARIABLE): 11.4 ML/MIN/1.73 M^2
EST. GFR  (NO RACE VARIABLE): 11.4 ML/MIN/1.73 M^2
EST. GFR  (NO RACE VARIABLE): 11.9 ML/MIN/1.73 M^2
EST. GFR  (NO RACE VARIABLE): 14.9 ML/MIN/1.73 M^2
EST. GFR  (NO RACE VARIABLE): 14.9 ML/MIN/1.73 M^2
ETHANOL SERPL-MCNC: <10 MG/DL
ETHANOL UR-MCNC: <10 MG/DL
GLUCOSE SERPL-MCNC: 1177 MG/DL (ref 70–110)
GLUCOSE SERPL-MCNC: 1177 MG/DL (ref 70–110)
GLUCOSE SERPL-MCNC: 1317 MG/DL (ref 70–110)
GLUCOSE SERPL-MCNC: 1404 MG/DL (ref 70–110)
GLUCOSE SERPL-MCNC: 1682 MG/DL (ref 70–110)
GLUCOSE SERPL-MCNC: 1885 MG/DL (ref 70–110)
GLUCOSE SERPL-MCNC: >700 MG/DL (ref 70–110)
GLUCOSE UR QL STRIP: ABNORMAL
HCO3 UR-SCNC: 12.4 MMOL/L (ref 24–28)
HCO3 UR-SCNC: 14.9 MMOL/L (ref 24–28)
HCO3 UR-SCNC: 5.5 MMOL/L (ref 24–28)
HCO3 UR-SCNC: 7.1 MMOL/L (ref 24–28)
HCO3 UR-SCNC: 8.3 MMOL/L (ref 24–28)
HCT VFR BLD AUTO: 41 % (ref 40–54)
HCT VFR BLD AUTO: 42.5 % (ref 40–54)
HCT VFR BLD CALC: 42 %PCV (ref 36–54)
HGB BLD-MCNC: 12 G/DL (ref 14–18)
HGB BLD-MCNC: 12.3 G/DL (ref 14–18)
HGB UR QL STRIP: NEGATIVE
HYALINE CASTS UR QL AUTO: 32 /LPF
IMM GRANULOCYTES # BLD AUTO: 0.27 K/UL (ref 0–0.04)
IMM GRANULOCYTES # BLD AUTO: 0.37 K/UL (ref 0–0.04)
IMM GRANULOCYTES NFR BLD AUTO: 2.3 % (ref 0–0.5)
IMM GRANULOCYTES NFR BLD AUTO: 3 % (ref 0–0.5)
INR PPP: 1 (ref 0.8–1.2)
KETONES UR QL STRIP: ABNORMAL
LACTATE SERPL-SCNC: 1.9 MMOL/L (ref 0.5–2.2)
LDH SERPL L TO P-CCNC: 1.88 MMOL/L (ref 0.5–2.2)
LEUKOCYTE ESTERASE UR QL STRIP: NEGATIVE
LIPASE SERPL-CCNC: 94 U/L (ref 4–60)
LYMPHOCYTES # BLD AUTO: 0.6 K/UL (ref 1–4.8)
LYMPHOCYTES # BLD AUTO: 0.7 K/UL (ref 1–4.8)
LYMPHOCYTES NFR BLD: 5.3 % (ref 18–48)
LYMPHOCYTES NFR BLD: 5.8 % (ref 18–48)
MAGNESIUM SERPL-MCNC: 3.3 MG/DL (ref 1.6–2.6)
MCH RBC QN AUTO: 30.5 PG (ref 27–31)
MCH RBC QN AUTO: 31.3 PG (ref 27–31)
MCHC RBC AUTO-ENTMCNC: 28.9 G/DL (ref 32–36)
MCHC RBC AUTO-ENTMCNC: 29.3 G/DL (ref 32–36)
MCV RBC AUTO: 106 FL (ref 82–98)
MCV RBC AUTO: 107 FL (ref 82–98)
METHADONE UR QL SCN>300 NG/ML: NEGATIVE
MICROSCOPIC COMMENT: ABNORMAL
MONOCYTES # BLD AUTO: 1.8 K/UL (ref 0.3–1)
MONOCYTES # BLD AUTO: 1.9 K/UL (ref 0.3–1)
MONOCYTES NFR BLD: 14.8 % (ref 4–15)
MONOCYTES NFR BLD: 16.4 % (ref 4–15)
NEUTROPHILS # BLD AUTO: 8.8 K/UL (ref 1.8–7.7)
NEUTROPHILS # BLD AUTO: 9.4 K/UL (ref 1.8–7.7)
NEUTROPHILS NFR BLD: 75.6 % (ref 38–73)
NEUTROPHILS NFR BLD: 76 % (ref 38–73)
NITRITE UR QL STRIP: NEGATIVE
NRBC BLD-RTO: 0 /100 WBC
NRBC BLD-RTO: 0 /100 WBC
OPIATES UR QL SCN: NEGATIVE
OSMOLALITY SERPL: 452 MOSM/KG (ref 280–300)
OSMOLALITY UR: 433 MOSM/KG (ref 50–1200)
PCO2 BLDA: 20.3 MMHG (ref 35–45)
PCO2 BLDA: 25.2 MMHG (ref 35–45)
PCO2 BLDA: 27.2 MMHG (ref 35–45)
PCO2 BLDA: 33.1 MMHG (ref 35–45)
PCO2 BLDA: 35.1 MMHG (ref 35–45)
PCP UR QL SCN>25 NG/ML: NEGATIVE
PH SMN: 7.02 [PH] (ref 7.35–7.45)
PH SMN: 7.04 [PH] (ref 7.35–7.45)
PH SMN: 7.12 [PH] (ref 7.35–7.45)
PH SMN: 7.18 [PH] (ref 7.35–7.45)
PH SMN: 7.24 [PH] (ref 7.35–7.45)
PH UR STRIP: 5 [PH] (ref 5–8)
PHOSPHATE SERPL-MCNC: 2.5 MG/DL (ref 2.7–4.5)
PHOSPHATE SERPL-MCNC: 3.6 MG/DL (ref 2.7–4.5)
PLATELET # BLD AUTO: 179 K/UL (ref 150–450)
PLATELET # BLD AUTO: 205 K/UL (ref 150–450)
PMV BLD AUTO: 12.1 FL (ref 9.2–12.9)
PMV BLD AUTO: 12.2 FL (ref 9.2–12.9)
PO2 BLDA: 215 MMHG (ref 80–100)
PO2 BLDA: 53 MMHG (ref 40–60)
PO2 BLDA: 74 MMHG (ref 40–60)
POC BE: -13 MMOL/L
POC BE: -16 MMOL/L
POC BE: -21 MMOL/L
POC BE: -24 MMOL/L
POC BE: -25 MMOL/L
POC IONIZED CALCIUM: 1.04 MMOL/L (ref 1.06–1.42)
POC SATURATED O2: 70 % (ref 95–100)
POC SATURATED O2: 79 % (ref 95–100)
POC SATURATED O2: 81 % (ref 95–100)
POC SATURATED O2: 87 % (ref 95–100)
POC SATURATED O2: 99 % (ref 95–100)
POC TCO2 (MEASURED): 7 MMOL/L (ref 23–29)
POC TCO2: 13 MMOL/L (ref 24–29)
POC TCO2: 16 MMOL/L (ref 24–29)
POC TCO2: 6 MMOL/L (ref 24–29)
POC TCO2: 8 MMOL/L (ref 24–29)
POC TCO2: 9 MMOL/L (ref 23–27)
POCT GLUCOSE: >500 MG/DL (ref 70–110)
POTASSIUM BLD-SCNC: 6.7 MMOL/L (ref 3.5–5.1)
POTASSIUM SERPL-SCNC: 2.6 MMOL/L (ref 3.5–5.1)
POTASSIUM SERPL-SCNC: 2.7 MMOL/L (ref 3.5–5.1)
POTASSIUM SERPL-SCNC: 3.1 MMOL/L (ref 3.5–5.1)
POTASSIUM SERPL-SCNC: 4.8 MMOL/L (ref 3.5–5.1)
POTASSIUM SERPL-SCNC: 4.8 MMOL/L (ref 3.5–5.1)
POTASSIUM SERPL-SCNC: 6.8 MMOL/L (ref 3.5–5.1)
PROCALCITONIN SERPL IA-MCNC: 2.79 NG/ML
PROT SERPL-MCNC: 5.9 G/DL (ref 6–8.4)
PROT SERPL-MCNC: 6.6 G/DL (ref 6–8.4)
PROT UR QL STRIP: ABNORMAL
PROTHROMBIN TIME: 10.9 SEC (ref 9–12.5)
RBC # BLD AUTO: 3.84 M/UL (ref 4.6–6.2)
RBC # BLD AUTO: 4.03 M/UL (ref 4.6–6.2)
RBC #/AREA URNS AUTO: 0 /HPF (ref 0–4)
SAMPLE: ABNORMAL
SAMPLE: NORMAL
SITE: ABNORMAL
SITE: NORMAL
SODIUM BLD-SCNC: 130 MMOL/L (ref 136–145)
SODIUM SERPL-SCNC: 131 MMOL/L (ref 136–145)
SODIUM SERPL-SCNC: 139 MMOL/L (ref 136–145)
SODIUM SERPL-SCNC: 145 MMOL/L (ref 136–145)
SODIUM SERPL-SCNC: 146 MMOL/L (ref 136–145)
SODIUM SERPL-SCNC: 150 MMOL/L (ref 136–145)
SODIUM SERPL-SCNC: 150 MMOL/L (ref 136–145)
SP GR UR STRIP: 1.02 (ref 1–1.03)
SQUAMOUS #/AREA URNS AUTO: 0 /HPF
TOXICOLOGY INFORMATION: ABNORMAL
TROPONIN I SERPL DL<=0.01 NG/ML-MCNC: 0.05 NG/ML (ref 0–0.03)
URN SPEC COLLECT METH UR: ABNORMAL
WBC # BLD AUTO: 11.62 K/UL (ref 3.9–12.7)
WBC # BLD AUTO: 12.36 K/UL (ref 3.9–12.7)
WBC #/AREA URNS AUTO: 3 /HPF (ref 0–5)
YEAST UR QL AUTO: ABNORMAL

## 2023-11-10 PROCEDURE — 51702 INSERT TEMP BLADDER CATH: CPT

## 2023-11-10 PROCEDURE — 63600175 PHARM REV CODE 636 W HCPCS

## 2023-11-10 PROCEDURE — 96375 TX/PRO/DX INJ NEW DRUG ADDON: CPT

## 2023-11-10 PROCEDURE — 20000000 HC ICU ROOM

## 2023-11-10 PROCEDURE — 25000003 PHARM REV CODE 250

## 2023-11-10 PROCEDURE — 80307 DRUG TEST PRSMV CHEM ANLYZR: CPT | Performed by: STUDENT IN AN ORGANIZED HEALTH CARE EDUCATION/TRAINING PROGRAM

## 2023-11-10 PROCEDURE — 99291 CRITICAL CARE FIRST HOUR: CPT | Mod: ,,, | Performed by: INTERNAL MEDICINE

## 2023-11-10 PROCEDURE — 36600 WITHDRAWAL OF ARTERIAL BLOOD: CPT

## 2023-11-10 PROCEDURE — 63600175 PHARM REV CODE 636 W HCPCS: Performed by: STUDENT IN AN ORGANIZED HEALTH CARE EDUCATION/TRAINING PROGRAM

## 2023-11-10 PROCEDURE — 84100 ASSAY OF PHOSPHORUS: CPT

## 2023-11-10 PROCEDURE — 96366 THER/PROPH/DIAG IV INF ADDON: CPT | Mod: 59

## 2023-11-10 PROCEDURE — 96365 THER/PROPH/DIAG IV INF INIT: CPT | Mod: 59

## 2023-11-10 PROCEDURE — 83935 ASSAY OF URINE OSMOLALITY: CPT | Performed by: STUDENT IN AN ORGANIZED HEALTH CARE EDUCATION/TRAINING PROGRAM

## 2023-11-10 PROCEDURE — 93010 EKG 12-LEAD: ICD-10-PCS | Mod: 76,,, | Performed by: INTERNAL MEDICINE

## 2023-11-10 PROCEDURE — 82803 BLOOD GASES ANY COMBINATION: CPT

## 2023-11-10 PROCEDURE — 25000242 PHARM REV CODE 250 ALT 637 W/ HCPCS: Performed by: STUDENT IN AN ORGANIZED HEALTH CARE EDUCATION/TRAINING PROGRAM

## 2023-11-10 PROCEDURE — 83605 ASSAY OF LACTIC ACID: CPT

## 2023-11-10 PROCEDURE — 99900035 HC TECH TIME PER 15 MIN (STAT)

## 2023-11-10 PROCEDURE — 94640 AIRWAY INHALATION TREATMENT: CPT

## 2023-11-10 PROCEDURE — 99291 PR CRITICAL CARE, E/M 30-74 MINUTES: ICD-10-PCS | Mod: ,,, | Performed by: INTERNAL MEDICINE

## 2023-11-10 PROCEDURE — 80048 BASIC METABOLIC PNL TOTAL CA: CPT | Mod: 91,XB

## 2023-11-10 PROCEDURE — 82010 KETONE BODYS QUAN: CPT | Performed by: STUDENT IN AN ORGANIZED HEALTH CARE EDUCATION/TRAINING PROGRAM

## 2023-11-10 PROCEDURE — 84145 PROCALCITONIN (PCT): CPT

## 2023-11-10 PROCEDURE — 83690 ASSAY OF LIPASE: CPT

## 2023-11-10 PROCEDURE — 83735 ASSAY OF MAGNESIUM: CPT

## 2023-11-10 PROCEDURE — 93010 ELECTROCARDIOGRAM REPORT: CPT | Mod: 76,,, | Performed by: INTERNAL MEDICINE

## 2023-11-10 PROCEDURE — 81001 URINALYSIS AUTO W/SCOPE: CPT | Mod: XB | Performed by: STUDENT IN AN ORGANIZED HEALTH CARE EDUCATION/TRAINING PROGRAM

## 2023-11-10 PROCEDURE — 83930 ASSAY OF BLOOD OSMOLALITY: CPT | Performed by: STUDENT IN AN ORGANIZED HEALTH CARE EDUCATION/TRAINING PROGRAM

## 2023-11-10 PROCEDURE — 96361 HYDRATE IV INFUSION ADD-ON: CPT

## 2023-11-10 PROCEDURE — 99223 1ST HOSP IP/OBS HIGH 75: CPT | Mod: GC,,, | Performed by: INTERNAL MEDICINE

## 2023-11-10 PROCEDURE — 25000003 PHARM REV CODE 250: Performed by: STUDENT IN AN ORGANIZED HEALTH CARE EDUCATION/TRAINING PROGRAM

## 2023-11-10 PROCEDURE — 85610 PROTHROMBIN TIME: CPT | Performed by: STUDENT IN AN ORGANIZED HEALTH CARE EDUCATION/TRAINING PROGRAM

## 2023-11-10 PROCEDURE — 99285 EMERGENCY DEPT VISIT HI MDM: CPT | Mod: 25

## 2023-11-10 PROCEDURE — 82077 ASSAY SPEC XCP UR&BREATH IA: CPT | Performed by: STUDENT IN AN ORGANIZED HEALTH CARE EDUCATION/TRAINING PROGRAM

## 2023-11-10 PROCEDURE — 96368 THER/DIAG CONCURRENT INF: CPT | Mod: 59

## 2023-11-10 PROCEDURE — 81003 URINALYSIS AUTO W/O SCOPE: CPT | Performed by: STUDENT IN AN ORGANIZED HEALTH CARE EDUCATION/TRAINING PROGRAM

## 2023-11-10 PROCEDURE — 84100 ASSAY OF PHOSPHORUS: CPT | Mod: 91 | Performed by: STUDENT IN AN ORGANIZED HEALTH CARE EDUCATION/TRAINING PROGRAM

## 2023-11-10 PROCEDURE — 85025 COMPLETE CBC W/AUTO DIFF WBC: CPT | Mod: 91 | Performed by: STUDENT IN AN ORGANIZED HEALTH CARE EDUCATION/TRAINING PROGRAM

## 2023-11-10 PROCEDURE — 84484 ASSAY OF TROPONIN QUANT: CPT | Performed by: STUDENT IN AN ORGANIZED HEALTH CARE EDUCATION/TRAINING PROGRAM

## 2023-11-10 PROCEDURE — 83880 ASSAY OF NATRIURETIC PEPTIDE: CPT | Performed by: STUDENT IN AN ORGANIZED HEALTH CARE EDUCATION/TRAINING PROGRAM

## 2023-11-10 PROCEDURE — 93005 ELECTROCARDIOGRAM TRACING: CPT

## 2023-11-10 PROCEDURE — 80047 BASIC METABLC PNL IONIZED CA: CPT

## 2023-11-10 PROCEDURE — 80048 BASIC METABOLIC PNL TOTAL CA: CPT | Mod: XB | Performed by: INTERNAL MEDICINE

## 2023-11-10 PROCEDURE — 99223 PR INITIAL HOSPITAL CARE,LEVL III: ICD-10-PCS | Mod: GC,,, | Performed by: INTERNAL MEDICINE

## 2023-11-10 PROCEDURE — 87040 BLOOD CULTURE FOR BACTERIA: CPT | Performed by: STUDENT IN AN ORGANIZED HEALTH CARE EDUCATION/TRAINING PROGRAM

## 2023-11-10 PROCEDURE — A4217 STERILE WATER/SALINE, 500 ML: HCPCS

## 2023-11-10 PROCEDURE — 80053 COMPREHEN METABOLIC PANEL: CPT | Performed by: STUDENT IN AN ORGANIZED HEALTH CARE EDUCATION/TRAINING PROGRAM

## 2023-11-10 PROCEDURE — 96376 TX/PRO/DX INJ SAME DRUG ADON: CPT

## 2023-11-10 RX ORDER — ALBUTEROL SULFATE 2.5 MG/.5ML
10 SOLUTION RESPIRATORY (INHALATION)
Status: COMPLETED | OUTPATIENT
Start: 2023-11-10 | End: 2023-11-10

## 2023-11-10 RX ORDER — DEXTROSE MONOHYDRATE 100 MG/ML
INJECTION, SOLUTION INTRAVENOUS
Status: DISCONTINUED | OUTPATIENT
Start: 2023-11-10 | End: 2023-11-17 | Stop reason: HOSPADM

## 2023-11-10 RX ORDER — SODIUM CHLORIDE 0.9 % (FLUSH) 0.9 %
10 SYRINGE (ML) INJECTION
Status: DISCONTINUED | OUTPATIENT
Start: 2023-11-10 | End: 2023-11-17 | Stop reason: HOSPADM

## 2023-11-10 RX ORDER — PHENYLEPHRINE HCL IN 0.9% NACL 1 MG/10 ML
SYRINGE (ML) INTRAVENOUS
Status: COMPLETED
Start: 2023-11-10 | End: 2023-11-10

## 2023-11-10 RX ORDER — DEXTROSE MONOHYDRATE 100 MG/ML
INJECTION, SOLUTION INTRAVENOUS
Status: DISCONTINUED | OUTPATIENT
Start: 2023-11-10 | End: 2023-11-10

## 2023-11-10 RX ORDER — POTASSIUM CHLORIDE 29.8 MG/ML
40 INJECTION INTRAVENOUS ONCE
Status: DISCONTINUED | OUTPATIENT
Start: 2023-11-10 | End: 2023-11-11

## 2023-11-10 RX ORDER — HEPARIN SODIUM 5000 [USP'U]/ML
5000 INJECTION, SOLUTION INTRAVENOUS; SUBCUTANEOUS EVERY 8 HOURS
Status: DISCONTINUED | OUTPATIENT
Start: 2023-11-10 | End: 2023-11-13

## 2023-11-10 RX ORDER — SODIUM CHLORIDE 0.9 % (FLUSH) 0.9 %
10 SYRINGE (ML) INJECTION
Status: DISCONTINUED | OUTPATIENT
Start: 2023-11-10 | End: 2023-11-10

## 2023-11-10 RX ORDER — IBUPROFEN 200 MG
24 TABLET ORAL
Status: DISCONTINUED | OUTPATIENT
Start: 2023-11-10 | End: 2023-11-17 | Stop reason: HOSPADM

## 2023-11-10 RX ORDER — SODIUM CHLORIDE 0.9 % (FLUSH) 0.9 %
10 SYRINGE (ML) INJECTION EVERY 12 HOURS PRN
Status: DISCONTINUED | OUTPATIENT
Start: 2023-11-10 | End: 2023-11-17 | Stop reason: HOSPADM

## 2023-11-10 RX ORDER — NOREPINEPHRINE BITARTRATE/D5W 4MG/250ML
0-3 PLASTIC BAG, INJECTION (ML) INTRAVENOUS CONTINUOUS
Status: DISCONTINUED | OUTPATIENT
Start: 2023-11-10 | End: 2023-11-10

## 2023-11-10 RX ORDER — FUROSEMIDE 10 MG/ML
100 INJECTION INTRAMUSCULAR; INTRAVENOUS
Status: DISCONTINUED | OUTPATIENT
Start: 2023-11-10 | End: 2023-11-10

## 2023-11-10 RX ORDER — NALOXONE HCL 0.4 MG/ML
0.02 VIAL (ML) INJECTION
Status: DISCONTINUED | OUTPATIENT
Start: 2023-11-10 | End: 2023-11-17 | Stop reason: HOSPADM

## 2023-11-10 RX ORDER — IBUPROFEN 200 MG
16 TABLET ORAL
Status: DISCONTINUED | OUTPATIENT
Start: 2023-11-10 | End: 2023-11-17 | Stop reason: HOSPADM

## 2023-11-10 RX ORDER — GLUCAGON 1 MG
1 KIT INJECTION
Status: DISCONTINUED | OUTPATIENT
Start: 2023-11-10 | End: 2023-11-17 | Stop reason: HOSPADM

## 2023-11-10 RX ORDER — INDOMETHACIN 25 MG/1
100 CAPSULE ORAL
Status: COMPLETED | OUTPATIENT
Start: 2023-11-10 | End: 2023-11-10

## 2023-11-10 RX ORDER — CALCIUM GLUCONATE 20 MG/ML
1 INJECTION, SOLUTION INTRAVENOUS
Status: COMPLETED | OUTPATIENT
Start: 2023-11-10 | End: 2023-11-10

## 2023-11-10 RX ORDER — DEXTROSE MONOHYDRATE AND SODIUM CHLORIDE 5; .45 G/100ML; G/100ML
INJECTION, SOLUTION INTRAVENOUS CONTINUOUS PRN
Status: DISCONTINUED | OUTPATIENT
Start: 2023-11-10 | End: 2023-11-12

## 2023-11-10 RX ORDER — POTASSIUM CHLORIDE 7.45 MG/ML
10 INJECTION INTRAVENOUS
Status: DISCONTINUED | OUTPATIENT
Start: 2023-11-10 | End: 2023-11-10

## 2023-11-10 RX ORDER — NOREPINEPHRINE BITARTRATE/D5W 4MG/250ML
PLASTIC BAG, INJECTION (ML) INTRAVENOUS
Status: COMPLETED
Start: 2023-11-10 | End: 2023-11-10

## 2023-11-10 RX ORDER — SODIUM CHLORIDE 9 MG/ML
1000 INJECTION, SOLUTION INTRAVENOUS CONTINUOUS
Status: DISCONTINUED | OUTPATIENT
Start: 2023-11-10 | End: 2023-11-10

## 2023-11-10 RX ORDER — PANTOPRAZOLE SODIUM 40 MG/10ML
40 INJECTION, POWDER, LYOPHILIZED, FOR SOLUTION INTRAVENOUS DAILY
Status: DISCONTINUED | OUTPATIENT
Start: 2023-11-11 | End: 2023-11-12

## 2023-11-10 RX ORDER — PHENYLEPHRINE HCL IN 0.9% NACL 1 MG/10 ML
500 SYRINGE (ML) INTRAVENOUS ONCE
Status: COMPLETED | OUTPATIENT
Start: 2023-11-10 | End: 2023-11-10

## 2023-11-10 RX ORDER — POTASSIUM CHLORIDE 14.9 MG/ML
20 INJECTION INTRAVENOUS
Status: COMPLETED | OUTPATIENT
Start: 2023-11-10 | End: 2023-11-10

## 2023-11-10 RX ORDER — MAGNESIUM SULFATE 1 G/100ML
1 INJECTION INTRAVENOUS ONCE
Status: COMPLETED | OUTPATIENT
Start: 2023-11-10 | End: 2023-11-10

## 2023-11-10 RX ADMIN — ALBUTEROL SULFATE 10 MG: 2.5 SOLUTION RESPIRATORY (INHALATION) at 02:11

## 2023-11-10 RX ADMIN — SODIUM CHLORIDE, POTASSIUM CHLORIDE, SODIUM LACTATE AND CALCIUM CHLORIDE 1000 ML: 600; 310; 30; 20 INJECTION, SOLUTION INTRAVENOUS at 02:11

## 2023-11-10 RX ADMIN — NOREPINEPHRINE BITARTRATE 0.02 MCG/KG/MIN: 4 INJECTION, SOLUTION INTRAVENOUS at 03:11

## 2023-11-10 RX ADMIN — SODIUM CHLORIDE, POTASSIUM CHLORIDE, SODIUM LACTATE AND CALCIUM CHLORIDE 1000 ML: 600; 310; 30; 20 INJECTION, SOLUTION INTRAVENOUS at 09:11

## 2023-11-10 RX ADMIN — SODIUM BICARBONATE: 84 INJECTION, SOLUTION INTRAVENOUS at 02:11

## 2023-11-10 RX ADMIN — HEPARIN SODIUM 5000 UNITS: 5000 INJECTION INTRAVENOUS; SUBCUTANEOUS at 10:11

## 2023-11-10 RX ADMIN — Medication 100 MCG: at 03:11

## 2023-11-10 RX ADMIN — VANCOMYCIN HYDROCHLORIDE 1250 MG: 1.25 INJECTION, POWDER, LYOPHILIZED, FOR SOLUTION INTRAVENOUS at 06:11

## 2023-11-10 RX ADMIN — PIPERACILLIN SODIUM AND TAZOBACTAM SODIUM 4.5 G: 4; .5 INJECTION, POWDER, FOR SOLUTION INTRAVENOUS at 10:11

## 2023-11-10 RX ADMIN — CEFEPIME 2 G: 2 INJECTION, POWDER, FOR SOLUTION INTRAVENOUS at 03:11

## 2023-11-10 RX ADMIN — INSULIN HUMAN 10 UNITS: 100 INJECTION, SOLUTION PARENTERAL at 02:11

## 2023-11-10 RX ADMIN — SODIUM BICARBONATE 100 MEQ: 84 INJECTION, SOLUTION INTRAVENOUS at 02:11

## 2023-11-10 RX ADMIN — INSULIN HUMAN 0.1 UNITS/KG/HR: 1 INJECTION, SOLUTION INTRAVENOUS at 10:11

## 2023-11-10 RX ADMIN — POTASSIUM CHLORIDE 20 MEQ: 200 INJECTION, SOLUTION INTRAVENOUS at 06:11

## 2023-11-10 RX ADMIN — Medication 0.02 MCG/KG/MIN: at 03:11

## 2023-11-10 RX ADMIN — MAGNESIUM SULFATE HEPTAHYDRATE 1 G: 500 INJECTION, SOLUTION INTRAMUSCULAR; INTRAVENOUS at 06:11

## 2023-11-10 RX ADMIN — SODIUM CHLORIDE 1000 ML: 9 INJECTION, SOLUTION INTRAVENOUS at 04:11

## 2023-11-10 RX ADMIN — CALCIUM GLUCONATE 1 G: 20 INJECTION, SOLUTION INTRAVENOUS at 02:11

## 2023-11-10 RX ADMIN — INSULIN HUMAN 0.1 UNITS/KG/HR: 1 INJECTION, SOLUTION INTRAVENOUS at 05:11

## 2023-11-10 RX ADMIN — INSULIN HUMAN 0.1 UNITS/KG/HR: 1 INJECTION, SOLUTION INTRAVENOUS at 03:11

## 2023-11-10 RX ADMIN — VASOPRESSIN: 20 INJECTION, SOLUTION INTRAVENOUS at 09:11

## 2023-11-10 RX ADMIN — POTASSIUM CHLORIDE 20 MEQ: 200 INJECTION, SOLUTION INTRAVENOUS at 09:11

## 2023-11-10 NOTE — HPI
Andrzej Sinclair, a 37 y.o. male PMH of Type 1 DM, presents to the ED w/ complaint of hyperglycemia.  Per EMS patient responsive to painful stimuli only. Initial labs showing blood glucose more than 1000, hyperkalemia, hyponatremia, low BP.

## 2023-11-10 NOTE — ED TRIAGE NOTES
Andrzej ADEBAYO Yahir, a 37 y.o. male presents to the ED w/ complaint of hyperglycemia.  Per EMS patient responsive to painful stimuli only.  CBG >500 with EMS.  Patient follows some commands at this time.      Triage note:  Chief Complaint   Patient presents with    Hyperglycemia     Patient with CBG > 500, responds to painful stimuli     Review of patient's allergies indicates:   Allergen Reactions    Lactose Other (See Comments)     Gas and moderate to sever abdominal pain     Past Medical History:   Diagnosis Date    Diabetes mellitus     Diabetes mellitus type 1     Diagnosed at age 20

## 2023-11-10 NOTE — SUBJECTIVE & OBJECTIVE
"Past Medical History:   Diagnosis Date    Diabetes mellitus     Diabetes mellitus type 1     Diagnosed at age 20       History reviewed. No pertinent surgical history.    Review of patient's allergies indicates:   Allergen Reactions    Lactose Other (See Comments)     Gas and moderate to sever abdominal pain     Current Facility-Administered Medications   Medication Frequency    dextrose 10 % infusion PRN    dextrose 10 % infusion PRN    dextrose 10% bolus 125 mL 125 mL PRN    dextrose 10% bolus 250 mL 250 mL PRN    dextrose 5 % and 0.45 % NaCl infusion Continuous PRN    glucagon (human recombinant) injection 1 mg PRN    glucose chewable tablet 16 g PRN    glucose chewable tablet 24 g PRN    insulin regular in 0.9 % NaCl 100 unit/100 mL (1 unit/mL) infusion Continuous    magnesium sulfate in dextrose IVPB (premix) 1 g Once    naloxone 0.4 mg/mL injection 0.02 mg PRN    piperacillin-tazobactam (ZOSYN) 4.5 g in dextrose 5 % in water (D5W) 100 mL IVPB (MB+) Q12H    potassium chloride 40 mEq in 100 mL IVPB (FOR CENTRAL LINE ADMINISTRATION ONLY) Once    sodium chloride 0.2 % Continuous    sodium chloride 0.9% flush 10 mL Q12H PRN    sodium chloride 0.9% flush 10 mL PRN    vancomycin 1,250 mg in dextrose 5 % (D5W) 250 mL IVPB (Vial-Mate) ED 1 Time     Current Outpatient Medications   Medication    acetaminophen (TYLENOL) 325 MG tablet    acetone, urine, test (KETONE URINE TEST) Strp    aluminum & magnesium hydroxide-simethicone (MYLANTA MAX STRENGTH) 400-400-40 mg/5 mL suspension    BD INSULIN SYRINGE ULTRA-FINE 0.5 mL 31 gauge x 5/16" Syrg    blood sugar diagnostic Strp    blood-glucose meter (ACCU-CHEK GUIDE GLUCOSE METER) Misc    blood-glucose sensor (DEXCOM G6 SENSOR) Martha    blood-glucose transmitter (DEXCOM G6 TRANSMITTER) Martha    dicyclomine (BENTYL) 10 MG capsule    glucagon (GVOKE HYPOPEN 2-PACK) 1 mg/0.2 mL AtIn    insulin degludec (TRESIBA U-100 INSULIN) 100 unit/mL injection    insulin lispro-aabc (LYUMJEV " "U-100 INSULIN) 100 unit/mL    insulin syringe,safetyneedle (BD SAFETYGLIDE INSULIN SYRINGE) 0.3 mL 31 gauge x 15/64" Syrg    insulin syringe-needle U-100 0.5 mL 30 gauge x 1/2" Syrg    insulin syringe-needle U-100 1/2 mL 30 gauge Syrg    lancets Misc    lancing device Misc    lisinopriL 10 MG tablet    omeprazole (PRILOSEC) 20 MG capsule    OMNIPOD 5 G6 INTRO KIT, GEN 5, Crtg    ondansetron (ZOFRAN) 4 MG tablet    pantoprazole (PROTONIX) 40 MG tablet    pen needle, diabetic (BD ULTRA-FINE ROVERTO PEN NEEDLE) 32 gauge x 5/32" Ndle    promethazine (PHENERGAN) 25 MG suppository    rosuvastatin (CRESTOR) 10 MG tablet    sucralfate (CARAFATE) 100 mg/mL suspension     Family History       Problem Relation (Age of Onset)    Diabetes Mother    Other Mother, Father          Tobacco Use    Smoking status: Former    Smokeless tobacco: Current   Substance and Sexual Activity    Alcohol use: Yes     Comment: socially    Drug use: No    Sexual activity: Not on file     Review of Systems  Objective:     Vital Signs (Most Recent):  Temp: (!) 94.8 °F (34.9 °C) (11/10/23 1726)  Pulse: 95 (11/10/23 1726)  Resp: 14 (11/10/23 1726)  BP: 126/67 (11/10/23 1726)  SpO2: 100 % (11/10/23 1726) Vital Signs (24h Range):  Temp:  [91.4 °F (33 °C)-98.8 °F (37.1 °C)] 94.8 °F (34.9 °C)  Pulse:  [] 95  Resp:  [14-23] 14  SpO2:  [100 %] 100 %  BP: ()/(49-67) 126/67     Weight: 89.5 kg (197 lb 5 oz) (11/10/23 1519)  Body mass index is 25.33 kg/m².  Body surface area is 2.16 meters squared.    No intake/output data recorded.     Physical Exam  Constitutional:       Appearance: He is toxic-appearing.   HENT:      Head: Normocephalic.      Mouth/Throat:      Mouth: Mucous membranes are dry.   Cardiovascular:      Rate and Rhythm: Tachycardia present.   Pulmonary:      Breath sounds: Normal breath sounds.   Genitourinary:     Comments: Foleys in place  Musculoskeletal:         General: Normal range of motion.          Significant Labs:  ABGs: "   Recent Labs   Lab 11/10/23  1611   PH 7.123*   PCO2 25.2*   HCO3 8.3*   POCSATURATED 99   BE -21*     BMP:   Recent Labs   Lab 11/10/23  1601   GLU 1,682*      K 2.7*   CL 99   CO2 9*   BUN 87*   CREATININE 6.1*   CALCIUM 8.3*     CBC:   Recent Labs   Lab 11/10/23  1401   WBC 11.62   RBC 3.84*   HGB 12.0*   HCT 41.0      *   MCH 31.3*   MCHC 29.3*     Recent Labs   Lab 11/10/23  1540   COLORU Yellow   SPECGRAV 1.020   PHUR 5.0   PROTEINUA Trace*   BACTERIA Rare   NITRITE Negative   LEUKOCYTESUR Negative   HYALINECASTS 32*     All labs within the past 24 hours have been reviewed.    Significant Imaging:  Labs: Reviewed  X-Ray: Reviewed

## 2023-11-10 NOTE — ED PROVIDER NOTES
Encounter Date: 11/10/2023       History     Chief Complaint   Patient presents with    Hyperglycemia     Patient with CBG > 500, responds to painful stimuli     37-year-old male who presents via EMS for unresponsiveness.  Per EMS in the family, the patient has not been eating or drinking for the last few days and has not been taking his insulin.  Blood glucose was above 600.  Patient is unresponsive to verbal stimuli and minimally responsive to physical.  HPI limited secondary to the above.        Review of patient's allergies indicates:   Allergen Reactions    Lactose Other (See Comments)     Gas and moderate to sever abdominal pain     Past Medical History:   Diagnosis Date    Diabetes mellitus     Diabetes mellitus type 1     Diagnosed at age 20     History reviewed. No pertinent surgical history.  Family History   Problem Relation Age of Onset    Other Mother         prediabetes    Diabetes Mother     Other Father         patient does not know his fathers history     Social History     Tobacco Use    Smoking status: Former    Smokeless tobacco: Current   Substance Use Topics    Alcohol use: Yes     Comment: socially    Drug use: No     Review of Systems    Physical Exam     Initial Vitals [11/10/23 1327]   BP Pulse Resp Temp SpO2   100/60 98 14 98.8 °F (37.1 °C) 100 %      MAP       --         Physical Exam    Nursing note and vitals reviewed.  Constitutional:   Unresponsive to verbal stimuli, responsive to physical stimuli   HENT:   Mucous membranes are dry   Eyes: EOM are normal. Pupils are equal, round, and reactive to light.   Neck: Neck supple. No JVD present.   Normal range of motion.  Cardiovascular:            Tachycardic, regular rhythm   Pulmonary/Chest: Breath sounds normal. No stridor. No respiratory distress.   Abdominal: Abdomen is soft. There is no abdominal tenderness.   Musculoskeletal:         General: No tenderness or edema.      Cervical back: Normal range of motion and neck supple.      Neurological: GCS eye subscore is 4. GCS verbal subscore is 5. GCS motor subscore is 6.   Unresponsive to verbal stimuli, responsive to physical, moves all 4 extremities with Physical application   Skin: Skin is warm and dry. Capillary refill takes less than 2 seconds.         ED Course   Central Line    Date/Time: 11/10/2023 2:36 PM    Performed by: Eliseo Montero MD  Authorized by: Eliseo Montero MD    Location procedure was performed:  [facility] EMERGENCY DEPARTMENT  Consent Done ?:  Emergent Situation  Indications:  Hemodialysis, med administration, vascular access and hemodynamic monitoring  Preparation:  Skin prepped with ChloraPrep  Location:  Right femoral  Site selection rationale:  Access  Catheter type:  Triple lumen  Catheter size:  7.5 Fr  Manometry: No    Number of attempts:  1  Securement:  Line sutured, chlorhexidine patch and sterile dressing applied  Complications: No    Specimens: No    Implants: No      Labs Reviewed   CBC W/ AUTO DIFFERENTIAL - Abnormal; Notable for the following components:       Result Value    RBC 4.03 (*)     Hemoglobin 12.3 (*)      (*)     MCHC 28.9 (*)     Immature Granulocytes 3.0 (*)     Gran # (ANC) 9.4 (*)     Immature Grans (Abs) 0.37 (*)     Lymph # 0.7 (*)     Mono # 1.8 (*)     Gran % 76.0 (*)     Lymph % 5.8 (*)     All other components within normal limits   COMPREHENSIVE METABOLIC PANEL - Abnormal; Notable for the following components:    Sodium 131 (*)     Potassium 6.8 (*)     CO2 6 (*)     Glucose 1,885 (*)     BUN 90 (*)     Creatinine 6.6 (*)     Calcium 8.6 (*)     eGFR 10.4 (*)     Anion Gap 30 (*)     All other components within normal limits    Narrative:      glucose  critical result(s) called and verbal readback obtained from   Caro Martinez RN by TAMMIE 11/10/2023 15:09   BETA - HYDROXYBUTYRATE, SERUM - Abnormal; Notable for the following components:    Beta-Hydroxybutyrate 5.8 (*)     All other components within normal limits   URINALYSIS,  REFLEX TO URINE CULTURE - Abnormal; Notable for the following components:    Protein, UA Trace (*)     Glucose, UA 4+ (*)     Ketones, UA 1+ (*)     All other components within normal limits    Narrative:     Specimen Source->Urine   TROPONIN I - Abnormal; Notable for the following components:    Troponin I 0.051 (*)     All other components within normal limits   B-TYPE NATRIURETIC PEPTIDE - Abnormal; Notable for the following components:     (*)     All other components within normal limits   OSMOLALITY, SERUM - Abnormal; Notable for the following components:    Osmolality 452 (*)     All other components within normal limits    Narrative:     serum osmo   critical result(s) called and verbal readback obtained   from Africa Williamson RN by SRC 11/10/2023 14:44   CBC W/ AUTO DIFFERENTIAL - Abnormal; Notable for the following components:    RBC 3.84 (*)     Hemoglobin 12.0 (*)      (*)     MCH 31.3 (*)     MCHC 29.3 (*)     Immature Granulocytes 2.3 (*)     Gran # (ANC) 8.8 (*)     Immature Grans (Abs) 0.27 (*)     Lymph # 0.6 (*)     Mono # 1.9 (*)     Gran % 75.6 (*)     Lymph % 5.3 (*)     Mono % 16.4 (*)     All other components within normal limits   COMPREHENSIVE METABOLIC PANEL - Abnormal; Notable for the following components:    Potassium 2.7 (*)     CO2 9 (*)     Glucose 1,682 (*)     BUN 87 (*)     Creatinine 6.1 (*)     Calcium 8.3 (*)     Total Protein 5.9 (*)     Albumin 3.3 (*)     eGFR 11.4 (*)     Anion Gap 31 (*)     All other components within normal limits    Narrative:        Glucose critical result(s) called and verbal readback obtained   from Africa Williamson RN by LBSIENA 11/10/2023 17:25   TOXICOLOGY SCREEN, URINE, RANDOM (COMPLIANCE) - Abnormal; Notable for the following components:    THC Presumptive Positive (*)     All other components within normal limits    Narrative:     Specimen Source->Urine   LIPASE - Abnormal; Notable for the following components:    Lipase 94 (*)     All  other components within normal limits   PROCALCITONIN - Abnormal; Notable for the following components:    Procalcitonin 2.79 (*)     All other components within normal limits   URINALYSIS MICROSCOPIC - Abnormal; Notable for the following components:    Hyaline Casts, UA 32 (*)     All other components within normal limits    Narrative:     Specimen Source->Urine   BASIC METABOLIC PANEL - Abnormal; Notable for the following components:    Potassium 2.6 (*)     CO2 12 (*)     Glucose 1,404 (*)     BUN 90 (*)     Creatinine 6.1 (*)     Calcium 8.4 (*)     Anion Gap 27 (*)     eGFR 11.4 (*)     All other components within normal limits    Narrative:     add on mg & phos per Africa Williamson RN/Jarrod Arita MD order# [id number]   [id number] 11/10/2023 @ 18:19      Glucose critical result(s) called and verbal readback obtained   from Africa Williamson RN by LBT 11/10/2023 18:48   BASIC METABOLIC PANEL - Abnormal; Notable for the following components:    Sodium 146 (*)     Potassium 3.1 (*)     CO2 14 (*)     BUN 85 (*)     Creatinine 5.9 (*)     Calcium 8.2 (*)     Anion Gap 26 (*)     eGFR 11.9 (*)     All other components within normal limits   PHOSPHORUS - Abnormal; Notable for the following components:    Phosphorus 2.5 (*)     All other components within normal limits    Narrative:     add on mg & phos per Africa Williamson RN/Jarrod Arita MD order# [id number]   [id number] 11/10/2023 @ 18:19    MAGNESIUM - Abnormal; Notable for the following components:    Magnesium 3.3 (*)     All other components within normal limits    Narrative:     add on mg & phos per Africa Williamson RN/Jarrod Arita MD order# [id number]   [id number] 11/10/2023 @ 18:19    POCT GLUCOSE - Abnormal; Notable for the following components:    POCT Glucose >500 (*)     All other components within normal limits   ISTAT PROCEDURE - Abnormal; Notable for the following components:    POC PH 7.044 (*)     POC PCO2 20.3 (*)     POC PO2 74 (*)     POC HCO3 5.5 (*)     POC BE  -25 (*)     POC TCO2 6 (*)     All other components within normal limits   ISTAT PROCEDURE - Abnormal; Notable for the following components:    POC PH 7.024 (*)     POC PCO2 27.2 (*)     POC HCO3 7.1 (*)     POC BE -24 (*)     POC TCO2 8 (*)     All other components within normal limits   ISTAT PROCEDURE - Abnormal; Notable for the following components:    POC Glucose >700 (*)     POC BUN 81 (*)     POC Creatinine 6.1 (*)     POC Sodium 130 (*)     POC Potassium 6.7 (*)     POC TCO2 (MEASURED) 7 (*)     POC Ionized Calcium 1.04 (*)     All other components within normal limits   ISTAT PROCEDURE - Abnormal; Notable for the following components:    POC PH 7.123 (*)     POC PCO2 25.2 (*)     POC PO2 215 (*)     POC HCO3 8.3 (*)     POC BE -21 (*)     POC TCO2 9 (*)     All other components within normal limits   ISTAT PROCEDURE - Abnormal; Notable for the following components:    POC PH 7.183 (*)     POC PCO2 33.1 (*)     POC HCO3 12.4 (*)     POC BE -16 (*)     POC TCO2 13 (*)     All other components within normal limits   ISTAT PROCEDURE - Abnormal; Notable for the following components:    POC PH 7.237 (*)     POC HCO3 14.9 (*)     POC BE -13 (*)     POC TCO2 16 (*)     All other components within normal limits   CULTURE, BLOOD   CULTURE, BLOOD   ALCOHOL,MEDICAL (ETHANOL)   PROTIME-INR   OSMOLALITY, URINE RANDOM    Narrative:     Specimen Source->Urine   LACTIC ACID, PLASMA   CK   MAGNESIUM   PHOSPHORUS   DRUGS OF ABUSE SCREEN, BLOOD   DRUGS OF ABUSE SCREEN, BLOOD   VOLATILE SCREEN, SERUM   ISTAT LACTATE   POCT GLUCOSE MONITORING CONTINUOUS   POCT GLUCOSE MONITORING CONTINUOUS        ECG Results              EKG 12-lead (Final result)  Result time 11/10/23 15:40:07      Final result by Interface, Lab In [facility] (11/10/23 15:40:07)                   Narrative:    Test Reason : R00.0,    Vent. Rate : 143 BPM     Atrial Rate : 136 BPM     P-R Int : 000 ms          QRS Dur : 114 ms      QT Int : 338 ms       P-R-T Axes  : 000 082 -86 degrees     QTc Int : 521 ms    SVT with rapid ventricular response Now present  ST and/or T wave abnormalities suggesting myocardial ischemia globally Now  present  Abnormal ECG  When compared with ECG of 10-NOV-2023 13:36,  Significant changes have occurred  Confirmed by JASBIR REID MD ([id number]) on 11/10/2023 3:39:56 PM    Referred By: SINA   SELF           Confirmed By:JASBIR REID MD                                     EKG 12-lead (Final result)  Result time 11/10/23 14:23:36      Final result by Interface, Lab In [facility] (11/10/23 14:23:36)                   Narrative:    Test Reason : R73.9,    Vent. Rate : 098 BPM     Atrial Rate : 098 BPM     P-R Int : 154 ms          QRS Dur : 122 ms      QT Int : 386 ms       P-R-T Axes : 081 077 081 degrees     QTc Int : 492 ms    Normal sinus rhythm  atypical Right bundle branch block  Cannot exclude Brugada pattern, type 2 Now present  Abnormal ECG  When compared with ECG of 12-SEP-2023 21:00,  QRS duration has increased  Confirmed by JASBIR REID MD ([id number]) on 11/10/2023 2:23:28 PM    Referred By: AAAREFTATIANNA   SELF           Confirmed By:JASBIR REID MD                                  Imaging Results              CT Head Without Contrast (Final result)  Result time 11/10/23 20:33:47      Final result by Alec Hunt MD (11/10/23 20:33:47)                   Impression:      1. Allowing for motion artifact, no convincing acute intracranial abnormalities.      Electronically signed by: Alec Hunt MD  Date:    11/10/2023  Time:    20:33               Narrative:    EXAMINATION:  CT HEAD WITHOUT CONTRAST    CLINICAL HISTORY:  Mental status change, unknown cause;    TECHNIQUE:  Low dose axial images were obtained through the head.  Coronal and sagittal reformations were also performed. Contrast was not administered.    COMPARISON:  None.    FINDINGS:  There is motion artifact.    There is no evidence of acute major vascular territory  infarct, hemorrhage, or mass.  There is no hydrocephalus.  There are no abnormal extra-axial fluid collections.  The paranasal sinuses and mastoid air cells are clear, and there is no evidence of calvarial fracture.  The visualized soft tissues are unremarkable.                                       X-Ray Chest AP Portable (Final result)  Result time 11/10/23 14:30:07      Final result by Wally Bird MD (11/10/23 14:30:07)                   Impression:      See above      Electronically signed by: Wally Bird MD  Date:    11/10/2023  Time:    14:30               Narrative:    EXAMINATION:  XR CHEST AP PORTABLE    CLINICAL HISTORY:  hyperglycemia;    TECHNIQUE:  Single frontal view of the chest was performed.    COMPARISON:  Non 09/13/2023 e    FINDINGS:  Heart size normal.  The lungs are clear.  No pleural effusion                                       Medications   dextrose 5 % and 0.45 % NaCl infusion (has no administration in time range)   sodium chloride 0.9% flush 10 mL (has no administration in time range)   naloxone 0.4 mg/mL injection 0.02 mg (has no administration in time range)   glucose chewable tablet 16 g (has no administration in time range)   glucose chewable tablet 24 g (has no administration in time range)   glucagon (human recombinant) injection 1 mg (has no administration in time range)   piperacillin-tazobactam (ZOSYN) 4.5 g in dextrose 5 % in water (D5W) 100 mL IVPB (MB+) (has no administration in time range)   sodium chloride 0.9% flush 10 mL (has no administration in time range)   dextrose 10 % infusion (has no administration in time range)   dextrose 10 % infusion (has no administration in time range)   insulin regular in 0.9 % NaCl 100 unit/100 mL (1 unit/mL) infusion (0 Units/kg/hr × 89.5 kg Intravenous Stopped 11/10/23 0870)   dextrose 10% bolus 125 mL 125 mL (has no administration in time range)   dextrose 10% bolus 250 mL 250 mL (has no administration in time range)   pantoprazole  injection 40 mg (has no administration in time range)   vancomycin - pharmacy to dose (has no administration in time range)   heparin (porcine) injection 5,000 Units (has no administration in time range)   vancomycin (VANCOCIN) 500 mg in dextrose 5 % in water (D5W) 100 mL IVPB (MB+) (has no administration in time range)   Sodium chloride 0.2% 1000 mL continuous infusion ( Intravenous New Bag 11/10/23 2105)   potassium chloride 40 mEq in 100 mL IVPB (FOR CENTRAL LINE ADMINISTRATION ONLY) (has no administration in time range)   lactated ringers bolus 1,000 mL (has no administration in time range)   lactated ringers bolus 1,000 mL (0 mLs Intravenous Stopped 11/10/23 1513)   albuterol sulfate nebulizer solution 10 mg (10 mg Nebulization Given 11/10/23 1417)   calcium gluconate 1 g in NS IVPB (premixed) (0 g Intravenous Stopped 11/10/23 1510)   insulin regular injection 10 Units 0.1 mL (10 Units Intravenous Given 11/10/23 1425)   sodium bicarbonate solution 100 mEq (100 mEq Intravenous Given 11/10/23 1431)   lactated ringers bolus 1,000 mL (0 mLs Intravenous Stopped 11/10/23 1642)   vancomycin 1,250 mg in dextrose 5 % (D5W) 250 mL IVPB (Vial-Mate) (0 mg Intravenous Stopped 11/10/23 1954)   ceFEPIme (MAXIPIME) 2 g in dextrose 5 % in water (D5W) 100 mL IVPB (MB+) (0 g Intravenous Stopped 11/10/23 1751)   phenylephrine HCl in 0.9% NaCl 1 mg/10 mL (100 mcg/mL) syringe 500 mcg (100 mcg Intravenous Given by Provider 11/10/23 1536)   sodium chloride 0.9% bolus 1,000 mL 1,000 mL (0 mLs Intravenous Stopped 11/10/23 1751)   magnesium sulfate in dextrose IVPB (premix) 1 g (0 g Intravenous Stopped 11/10/23 1953)   potassium chloride 20 mEq in 100 mL IVPB (FOR CENTRAL LINE ADMINISTRATION ONLY) (20 mEq Intravenous New Bag 11/10/23 2106)     Medical Decision Making  Hemodynamically stable. Afebrile. Phonating and protecting the airway spontaneously. No clinical evidence for cardiovascular instability or impending airway compromise.  Examination as above. Additional historians include EMS. Prior medical records reviewed.  Prior intensive care note reviewed.  Patient has a recent admission for DKA/HHS admission complicated by hypernatremia as well as metabolic encephalopathy. Current co-morbidities considered that will impact clinical decision making include as above.    Plan:  DKA protocol, ICU consult, plan for admission due to the aforementioned.      Amount and/or Complexity of Data Reviewed  Labs: ordered.  Radiology: ordered.    Risk  OTC drugs.  Prescription drug management.  Decision regarding hospitalization.               ED Course as of 11/10/23 2113   Fri Nov 10, 2023   1344 Twelve lead EKG per my independent interpretation reveals normal sinus rhythm at a rate of 98.  Rightward axis.  Normal intervals.  Incomplete right bundle branch block pattern noted.  No regional ST segment elevation.  Peak T-wave morphology noted. When compared to prior, right bundle morphology is now present. [BG]   1353 Labs reviewed. HyperK medications ordered. Will provide 2 bicarb amps and initiate a drip. Will continue with aggressive crystalloid resuscitation in addition to the above.  [BG]   1435 Central line placed by me.  No complications. [BG]   1511 Labs reviewed. CMP consistent with DKA. Beta-hydroxy elevated. Will c/w DKA/HHS management with fluids, insulin gtt, bicarb gtt, hyperK meds as needed.  [BG]   1534 Reassessed. Pt went into Afib with RVR at a rate of 150 with Bps in the low 80s. 100mcg of phenylephrine given with transition to levophed.  [BG]   1545 Nephrology consulted.  [BG]   1558 Spoke with Dr. Peres of nephrology. Requested to switch from LR to NSS. He also requested me to add lasix for the hyperkalemia.  [BG]   1605 Spoke to MICU. Case discussed and diagnostics reviewed. They will see the patient bedside.  [BG]   1623 Blood gas reviewed. pH improving.  [BG]      ED Course User Index  [BG] Eliseo Montero MD          Critical care  time spent on the evaluation and treatment of severe organ dysfunction, review of pertinent labs and imaging studies, discussions with consulting providers and discussions with patient/family: 120 minutes.              Clinical Impression:   Final diagnoses:  [R73.9] Hyperglycemia  [R00.0] Tachycardia  [E10.11] Diabetic ketoacidosis with coma associated with type 1 diabetes mellitus (Primary)  [G93.41] Metabolic encephalopathy  [E87.5] Hyperkalemia  [N17.9] BETH (acute kidney injury)        ED Disposition Condition    Admit                 Eliseo Montero MD  11/10/23 5268

## 2023-11-10 NOTE — ED NOTES
Per monitor w/ new tachycardia in 150s w/ hypotension 80s/50 systolic. MD called to bedside for pt reassessment.

## 2023-11-10 NOTE — SUBJECTIVE & OBJECTIVE
Past Medical History:   Diagnosis Date    Diabetes mellitus     Diabetes mellitus type 1     Diagnosed at age 20     History reviewed. No pertinent surgical history.    Review of patient's allergies indicates:   Allergen Reactions    Lactose Other (See Comments)     Gas and moderate to sever abdominal pain     Family History       Problem Relation (Age of Onset)    Diabetes Mother    Other Mother, Father          Tobacco Use    Smoking status: Former    Smokeless tobacco: Current   Substance and Sexual Activity    Alcohol use: Yes     Comment: socially    Drug use: No    Sexual activity: Not on file      Review of Systems   Unable to perform ROS: Acuity of condition     Objective:     Vital Signs (Most Recent):  Temp: (!) 94.8 °F (34.9 °C) (11/10/23 1726)  Pulse: 95 (11/10/23 1726)  Resp: 14 (11/10/23 1726)  BP: 126/67 (11/10/23 1726)  SpO2: 100 % (11/10/23 1726) Vital Signs (24h Range):  Temp:  [91.4 °F (33 °C)-98.8 °F (37.1 °C)] 94.8 °F (34.9 °C)  Pulse:  [] 95  Resp:  [14-23] 14  SpO2:  [100 %] 100 %  BP: ()/(49-67) 126/67   Weight: 89.5 kg (197 lb 5 oz)  Body mass index is 25.33 kg/m².      Intake/Output Summary (Last 24 hours) at 11/10/2023 1741  Last data filed at 11/10/2023 1656  Gross per 24 hour   Intake 2955.9 ml   Output --   Net 2955.9 ml          Physical Exam  Vitals and nursing note reviewed.   Constitutional:       General: He is not in acute distress.     Appearance: He is ill-appearing and toxic-appearing.   HENT:      Mouth/Throat:      Mouth: Mucous membranes are dry.   Eyes:      Extraocular Movements: Extraocular movements intact.      Pupils: Pupils are equal, round, and reactive to light.   Cardiovascular:      Rate and Rhythm: Normal rate.      Pulses: Normal pulses.      Heart sounds: Normal heart sounds. No murmur heard.  Pulmonary:      Effort: Pulmonary effort is normal.      Breath sounds: Normal breath sounds. No wheezing, rhonchi or rales.   Abdominal:      General: Bowel  "sounds are normal. There is no distension.      Palpations: Abdomen is soft.      Tenderness: There is no abdominal tenderness. There is no right CVA tenderness or left CVA tenderness.   Musculoskeletal:         General: No tenderness.      Right lower leg: No edema.      Left lower leg: No edema.   Skin:     General: Skin is dry.      Capillary Refill: Capillary refill takes less than 2 seconds.      Findings: No erythema or rash.   Neurological:      Mental Status: He is lethargic and disoriented.            Vents:     Lines/Drains/Airways       Peripherally Inserted Central Catheter Line  Duration             PICC Triple Lumen 09/13/23 1230 right basilic 58 days              Central Venous Catheter Line  Duration             Percutaneous Central Line Insertion/Assessment - Triple Lumen  11/10/23 1440 Femoral Vein Right;Femoral Right <1 day              Drain  Duration                  Urethral Catheter 11/10/23 1450 Temperature probe <1 day              Peripheral Intravenous Line  Duration                  Peripheral IV - Single Lumen 11/10/23 1347 18 G Anterior;Right <1 day         Peripheral IV - Single Lumen 11/10/23 1402 20 G Left;Posterior Hand <1 day                  Significant Labs:    CBC/Anemia Profile:  Recent Labs   Lab 11/10/23  1342 11/10/23  1349 11/10/23  1401   WBC 12.36  --  11.62   HGB 12.3*  --  12.0*   HCT 42.5 42 41.0     --  179   *  --  107*   RDW 13.9  --  14.0        Chemistries:  Recent Labs   Lab 11/10/23  1342 11/10/23  1601   * 139   K 6.8* 2.7*   CL 95 99   CO2 6* 9*   BUN 90* 87*   CREATININE 6.6* 6.1*   CALCIUM 8.6* 8.3*   ALBUMIN 3.6 3.3*   PROT 6.6 5.9*   BILITOT 0.3 0.2   ALKPHOS 101 91   ALT 18 17   AST 14 14       Blood Culture: No results for input(s): "LABBLOO" in the last 48 hours.  CMP:   Recent Labs   Lab 11/10/23  1342 11/10/23  1601   * 139   K 6.8* 2.7*   CL 95 99   CO2 6* 9*   GLU 1,885* 1,682*   BUN 90* 87*   CREATININE 6.6* 6.1*   CALCIUM " "8.6* 8.3*   PROT 6.6 5.9*   ALBUMIN 3.6 3.3*   BILITOT 0.3 0.2   ALKPHOS 101 91   AST 14 14   ALT 18 17   ANIONGAP 30* 31*     Lactic Acid: No results for input(s): "LACTATE" in the last 48 hours.  Urine Culture: No results for input(s): "LABURIN" in the last 48 hours.  Urine Studies:   Recent Labs   Lab 11/10/23  1540   COLORU Yellow   APPEARANCEUA Clear   PHUR 5.0   SPECGRAV 1.020   PROTEINUA Trace*   GLUCUA 4+*   KETONESU 1+*   BILIRUBINUA Negative   OCCULTUA Negative   NITRITE Negative   LEUKOCYTESUR Negative   RBCUA 0   WBCUA 3   BACTERIA Rare   SQUAMEPITHEL 0   HYALINECASTS 32*     Recent Lab Results  (Last 5 results in the past 24 hours)        11/10/23  1611   11/10/23  1604   11/10/23  1601   11/10/23  1540   11/10/23  1521        Alcohol, Urine   <10             Benzodiazepines   Negative             Methadone metabolites   Negative             Phencyclidine   Negative             Albumin     3.3           ALP     91           Allens Test Pass         N/A       ALT     17           Amphetamine Screen, Ur   Negative             Anion Gap     31           Appearance, UA       Clear         AST     14           Bacteria, UA       Rare         Barbiturate Screen, Ur   Negative             Baso #               Basophil %               Bilirubin (UA)       Negative         BILIRUBIN TOTAL     0.2  Comment: For infants and newborns, interpretation of results should be based  on gestational age, weight and in agreement with clinical  observations.    Premature Infant recommended reference ranges:  Up to 24 hours.............<8.0 mg/dL  Up to 48 hours............<12.0 mg/dL  3-5 days..................<15.0 mg/dL  6-29 days.................<15.0 mg/dL             Site RR         Other       BUN     87           Calcium     8.3           Chloride     99           CO2     9  Comment: *Critical value notification by LT with confirmation of receipt to  Africa Williamson RN at  Date 11/10/23 Time 5:23pm             Cocaine " (Metab.)   Negative             Color, UA       Yellow         Creatinine     6.1           Creatinine, Urine   79.0             Differential Method               eGFR     11.4           Eos #               Eosinophil %               Glucose     1,682  Comment: Glucose critical result(s) called and verbal readback obtained   from Africa Williamson RN by LBT 11/10/2023 17:25             Glucose, UA       4+         Gran # (ANC)               Gran %               Hematocrit               Hemoglobin               Hyaline Casts, UA       32         Immature Grans (Abs)               Immature Granulocytes               INR               Ketones, UA       1+         Leukocytes, UA       Negative         Lymph #               Lymph %               MCH               MCHC               MCV               Microscopic Comment       SEE COMMENT  Comment: Other formed elements not mentioned in the report are not   present in the microscopic examination.            Mono #               Mono %               MPV               NITRITE UA       Negative         nRBC               Occult Blood UA       Negative         Opiate Scrn, Ur   Negative             Osmolality, Urine   433  Comment: The random urine reference ranges provided were established   for 24 hour urine collections.  No reference ranges exist for  random urine specimens.  Correlate clinically.               pH, UA       5.0         Platelet Count               POC BE -21         -24       POC HCO3 8.3         7.1       POC PCO2 25.2         27.2       POC PH 7.123         7.024       POC PO2 215         53       POC SATURATED O2 99         70       POC TCO2 9         8       Potassium     2.7  Comment: *Critical value notification by LT with confirmation of receipt to  Africa Williamson RN at  Date 11/10/23 Time 5:23pm             PROTEIN TOTAL     5.9           Protein, UA       Trace  Comment: Recommend a 24 hour urine protein or a urine   protein/creatinine ratio if  globulin induced proteinuria is  clinically suspected.           Protime               RBC               RBC, UA       0         RDW               Sample ARTERIAL         VENOUS       Sodium     139           Specific Gravity, UA       1.020         Specimen UA       Urine, Clean Catch         Squam Epithel, UA       0         Marijuana (THC) Metabolite   Presumptive Positive             Toxicology Information   SEE COMMENT  Comment: This screen includes the following classes of drugs at the listed   cut-off:    Benzodiazepines 200 ng/ml  Methadone 300 ng/ml  Cocaine metabolite 300 ng/ml  Opiates 300 ng/ml  Barbiturates 200 ng/ml  Amphetamines 1000 ng/ml  Marijuana metabs (THC) 50 ng/ml  Phencyclidine (PCP) 25 ng/ml    This is a screening test. If results do not correlate with clinical   presentation, then a confirmatory send out test is advised.     This report is intended for use in clinical monitoring and management   of   patients. It is not intended for use in employment related drug   testing.               WBC, UA       3         WBC               Yeast, UA       None                              All pertinent labs within the past 24 hours have been reviewed.    Significant Imaging: I have reviewed all pertinent imaging results/findings within the past 24 hours.

## 2023-11-10 NOTE — ED NOTES
I-STAT Chem-8+ Results:   Value Reference Range   Sodium 130 136-145 mmol/L   Potassium  6.7 3.5-5.1 mmol/L   Chloride 103  mmol/L   Ionized Calcium 1.04 1.06-1.42 mmol/L   CO2 (measured) 7 23-29 mmol/L   Glucose >700  mg/dL   BUN 81 6-30 mg/dL   Creatinine 6.1 0.5-1.4 mg/dL   Hematocrit 42 36-54%

## 2023-11-10 NOTE — HPI
Andrzej Sinclair is a 37 year old male with hx of type 1 diabetes mellitus, hypertension, CKD stage 3, GERD, recently admitted for DKA/HHS complicated by hypernatremia as well as metabolic encephalopathy, who initially presented to the ED via EMS today with severe hyperglycemia. In the ED, patient was noted to be unresponsive to verbal stimuli and minimally responsive to physical, but hemodynamically stable and afebrile. Concerns for DKA with labs significant for severe hyperglycemia BG 1885, Na 131, K 6.8, bicarb 6, AG 30. Elevated beta hydroxybutyrate 5.8. Central line was placed in ED after difficulties gaining access. Started on DKA/HHS management with fluids, insulin gtt, bicarb gtt, hyperkalemia shifted. Pt later evaluated to be in hypotensive with atrial fibrillation with RVR and was given dose of phenylephrine, later started on levophed. Nephrology consulted for BETH, creatinine 6.6. Admitted to MICU for DKA.

## 2023-11-10 NOTE — CONSULTS
Patient evaluated by Critical Care. To be admitted to MICU. Full H&P to follow.    Ira Bullock MD  [facility] Fellow  11/10/2023 @ 5:38 PM

## 2023-11-11 LAB
ALBUMIN SERPL BCP-MCNC: 3.6 G/DL (ref 3.5–5.2)
ALLENS TEST: NORMAL
ALP SERPL-CCNC: 91 U/L (ref 55–135)
ALT SERPL W/O P-5'-P-CCNC: 18 U/L (ref 10–44)
ANION GAP SERPL CALC-SCNC: 11 MMOL/L (ref 8–16)
ANION GAP SERPL CALC-SCNC: 12 MMOL/L (ref 8–16)
ANION GAP SERPL CALC-SCNC: 13 MMOL/L (ref 8–16)
ANION GAP SERPL CALC-SCNC: 13 MMOL/L (ref 8–16)
ANION GAP SERPL CALC-SCNC: 16 MMOL/L (ref 8–16)
ANION GAP SERPL CALC-SCNC: 18 MMOL/L (ref 8–16)
ANION GAP SERPL CALC-SCNC: 18 MMOL/L (ref 8–16)
ANION GAP SERPL CALC-SCNC: 20 MMOL/L (ref 8–16)
ANION GAP SERPL CALC-SCNC: 25 MMOL/L (ref 8–16)
AST SERPL-CCNC: 27 U/L (ref 10–40)
BASOPHILS # BLD AUTO: 0.02 K/UL (ref 0–0.2)
BASOPHILS NFR BLD: 0.2 % (ref 0–1.9)
BILIRUB SERPL-MCNC: 0.3 MG/DL (ref 0.1–1)
BUN SERPL-MCNC: 27 MG/DL (ref 6–20)
BUN SERPL-MCNC: 30 MG/DL (ref 6–20)
BUN SERPL-MCNC: 35 MG/DL (ref 6–20)
BUN SERPL-MCNC: 40 MG/DL (ref 6–20)
BUN SERPL-MCNC: 40 MG/DL (ref 6–20)
BUN SERPL-MCNC: 48 MG/DL (ref 6–20)
BUN SERPL-MCNC: 57 MG/DL (ref 6–20)
BUN SERPL-MCNC: 57 MG/DL (ref 6–20)
BUN SERPL-MCNC: 64 MG/DL (ref 6–20)
BUN SERPL-MCNC: 70 MG/DL (ref 6–20)
BUN SERPL-MCNC: 76 MG/DL (ref 6–20)
CALCIUM SERPL-MCNC: 8.2 MG/DL (ref 8.7–10.5)
CALCIUM SERPL-MCNC: 8.4 MG/DL (ref 8.7–10.5)
CALCIUM SERPL-MCNC: 8.5 MG/DL (ref 8.7–10.5)
CALCIUM SERPL-MCNC: 8.5 MG/DL (ref 8.7–10.5)
CALCIUM SERPL-MCNC: 8.6 MG/DL (ref 8.7–10.5)
CALCIUM SERPL-MCNC: 8.8 MG/DL (ref 8.7–10.5)
CALCIUM SERPL-MCNC: 9 MG/DL (ref 8.7–10.5)
CALCIUM SERPL-MCNC: 9.2 MG/DL (ref 8.7–10.5)
CALCIUM SERPL-MCNC: 9.2 MG/DL (ref 8.7–10.5)
CHLORIDE SERPL-SCNC: 114 MMOL/L (ref 95–110)
CHLORIDE SERPL-SCNC: 116 MMOL/L (ref 95–110)
CHLORIDE SERPL-SCNC: 117 MMOL/L (ref 95–110)
CHLORIDE SERPL-SCNC: 117 MMOL/L (ref 95–110)
CHLORIDE SERPL-SCNC: 118 MMOL/L (ref 95–110)
CHLORIDE SERPL-SCNC: 121 MMOL/L (ref 95–110)
CHLORIDE SERPL-SCNC: 124 MMOL/L (ref 95–110)
CHLORIDE SERPL-SCNC: 124 MMOL/L (ref 95–110)
CO2 SERPL-SCNC: 16 MMOL/L (ref 23–29)
CO2 SERPL-SCNC: 20 MMOL/L (ref 23–29)
CO2 SERPL-SCNC: 21 MMOL/L (ref 23–29)
CO2 SERPL-SCNC: 22 MMOL/L (ref 23–29)
CO2 SERPL-SCNC: 22 MMOL/L (ref 23–29)
CO2 SERPL-SCNC: 23 MMOL/L (ref 23–29)
CO2 SERPL-SCNC: 25 MMOL/L (ref 23–29)
CO2 SERPL-SCNC: 27 MMOL/L (ref 23–29)
CO2 SERPL-SCNC: 28 MMOL/L (ref 23–29)
CO2 SERPL-SCNC: 28 MMOL/L (ref 23–29)
CREAT SERPL-MCNC: 2.2 MG/DL (ref 0.5–1.4)
CREAT SERPL-MCNC: 2.2 MG/DL (ref 0.5–1.4)
CREAT SERPL-MCNC: 2.5 MG/DL (ref 0.5–1.4)
CREAT SERPL-MCNC: 2.7 MG/DL (ref 0.5–1.4)
CREAT SERPL-MCNC: 2.8 MG/DL (ref 0.5–1.4)
CREAT SERPL-MCNC: 2.9 MG/DL (ref 0.5–1.4)
CREAT SERPL-MCNC: 3.3 MG/DL (ref 0.5–1.4)
CREAT SERPL-MCNC: 3.3 MG/DL (ref 0.5–1.4)
CREAT SERPL-MCNC: 3.8 MG/DL (ref 0.5–1.4)
CREAT SERPL-MCNC: 4.2 MG/DL (ref 0.5–1.4)
CREAT SERPL-MCNC: 4.2 MG/DL (ref 0.5–1.4)
CREAT SERPL-MCNC: 4.5 MG/DL (ref 0.5–1.4)
CREAT SERPL-MCNC: 5 MG/DL (ref 0.5–1.4)
DELSYS: NORMAL
DIFFERENTIAL METHOD: ABNORMAL
EOSINOPHIL # BLD AUTO: 0 K/UL (ref 0–0.5)
EOSINOPHIL NFR BLD: 0 % (ref 0–8)
ERYTHROCYTE [DISTWIDTH] IN BLOOD BY AUTOMATED COUNT: 12.7 % (ref 11.5–14.5)
EST. GFR  (NO RACE VARIABLE): 14.5 ML/MIN/1.73 M^2
EST. GFR  (NO RACE VARIABLE): 16.5 ML/MIN/1.73 M^2
EST. GFR  (NO RACE VARIABLE): 17.9 ML/MIN/1.73 M^2
EST. GFR  (NO RACE VARIABLE): 17.9 ML/MIN/1.73 M^2
EST. GFR  (NO RACE VARIABLE): 20.2 ML/MIN/1.73 M^2
EST. GFR  (NO RACE VARIABLE): 23.9 ML/MIN/1.73 M^2
EST. GFR  (NO RACE VARIABLE): 23.9 ML/MIN/1.73 M^2
EST. GFR  (NO RACE VARIABLE): 27.9 ML/MIN/1.73 M^2
EST. GFR  (NO RACE VARIABLE): 29.1 ML/MIN/1.73 M^2
EST. GFR  (NO RACE VARIABLE): 30.4 ML/MIN/1.73 M^2
EST. GFR  (NO RACE VARIABLE): 33.3 ML/MIN/1.73 M^2
EST. GFR  (NO RACE VARIABLE): 38.8 ML/MIN/1.73 M^2
EST. GFR  (NO RACE VARIABLE): 38.8 ML/MIN/1.73 M^2
FLOW: 2
GLUCOSE SERPL-MCNC: 253 MG/DL (ref 70–110)
GLUCOSE SERPL-MCNC: 312 MG/DL (ref 70–110)
GLUCOSE SERPL-MCNC: 312 MG/DL (ref 70–110)
GLUCOSE SERPL-MCNC: 327 MG/DL (ref 70–110)
GLUCOSE SERPL-MCNC: 340 MG/DL (ref 70–110)
GLUCOSE SERPL-MCNC: 404 MG/DL (ref 70–110)
GLUCOSE SERPL-MCNC: 446 MG/DL (ref 70–110)
GLUCOSE SERPL-MCNC: 546 MG/DL (ref 70–110)
GLUCOSE SERPL-MCNC: 562 MG/DL (ref 70–110)
GLUCOSE SERPL-MCNC: 591 MG/DL (ref 70–110)
GLUCOSE SERPL-MCNC: 591 MG/DL (ref 70–110)
GLUCOSE SERPL-MCNC: 736 MG/DL (ref 70–110)
GLUCOSE SERPL-MCNC: 950 MG/DL (ref 70–110)
HCO3 UR-SCNC: 24.8 MMOL/L (ref 24–28)
HCT VFR BLD AUTO: 34.4 % (ref 40–54)
HGB BLD-MCNC: 12.3 G/DL (ref 14–18)
IMM GRANULOCYTES # BLD AUTO: 0.24 K/UL (ref 0–0.04)
IMM GRANULOCYTES NFR BLD AUTO: 2.7 % (ref 0–0.5)
LYMPHOCYTES # BLD AUTO: 0.4 K/UL (ref 1–4.8)
LYMPHOCYTES NFR BLD: 4.9 % (ref 18–48)
MAGNESIUM SERPL-MCNC: 3.5 MG/DL (ref 1.6–2.6)
MCH RBC QN AUTO: 30.5 PG (ref 27–31)
MCHC RBC AUTO-ENTMCNC: 35.8 G/DL (ref 32–36)
MCV RBC AUTO: 85 FL (ref 82–98)
MODE: NORMAL
MONOCYTES # BLD AUTO: 1.5 K/UL (ref 0.3–1)
MONOCYTES NFR BLD: 16.6 % (ref 4–15)
NEUTROPHILS # BLD AUTO: 6.8 K/UL (ref 1.8–7.7)
NEUTROPHILS NFR BLD: 75.6 % (ref 38–73)
NRBC BLD-RTO: 0 /100 WBC
OSMOLALITY SERPL: 348 MOSM/KG (ref 280–300)
OSMOLALITY SERPL: 358 MOSM/KG (ref 280–300)
OSMOLALITY SERPL: 365 MOSM/KG (ref 280–300)
OSMOLALITY SERPL: 383 MOSM/KG (ref 280–300)
OSMOLALITY SERPL: 392 MOSM/KG (ref 280–300)
OSMOLALITY SERPL: 416 MOSM/KG (ref 280–300)
PCO2 BLDA: 44.3 MMHG (ref 35–45)
PH SMN: 7.36 [PH] (ref 7.35–7.45)
PHOSPHATE SERPL-MCNC: 2 MG/DL (ref 2.7–4.5)
PHOSPHATE SERPL-MCNC: 2.5 MG/DL (ref 2.7–4.5)
PHOSPHATE SERPL-MCNC: 2.7 MG/DL (ref 2.7–4.5)
PHOSPHATE SERPL-MCNC: 2.7 MG/DL (ref 2.7–4.5)
PHOSPHATE SERPL-MCNC: 3.2 MG/DL (ref 2.7–4.5)
PHOSPHATE SERPL-MCNC: 3.4 MG/DL (ref 2.7–4.5)
PHOSPHATE SERPL-MCNC: 3.7 MG/DL (ref 2.7–4.5)
PLATELET # BLD AUTO: 176 K/UL (ref 150–450)
PMV BLD AUTO: 11 FL (ref 9.2–12.9)
PO2 BLDA: 48 MMHG (ref 40–60)
POC BE: -1 MMOL/L
POC SATURATED O2: 81 % (ref 95–100)
POC TCO2: 26 MMOL/L (ref 24–29)
POCT GLUCOSE: 202 MG/DL (ref 70–110)
POCT GLUCOSE: 202 MG/DL (ref 70–110)
POCT GLUCOSE: 232 MG/DL (ref 70–110)
POCT GLUCOSE: 245 MG/DL (ref 70–110)
POCT GLUCOSE: 271 MG/DL (ref 70–110)
POCT GLUCOSE: 286 MG/DL (ref 70–110)
POCT GLUCOSE: 304 MG/DL (ref 70–110)
POCT GLUCOSE: 328 MG/DL (ref 70–110)
POCT GLUCOSE: 344 MG/DL (ref 70–110)
POCT GLUCOSE: 367 MG/DL (ref 70–110)
POCT GLUCOSE: 377 MG/DL (ref 70–110)
POCT GLUCOSE: 403 MG/DL (ref 70–110)
POCT GLUCOSE: 436 MG/DL (ref 70–110)
POCT GLUCOSE: 478 MG/DL (ref 70–110)
POCT GLUCOSE: 500 MG/DL (ref 70–110)
POCT GLUCOSE: >500 MG/DL (ref 70–110)
POTASSIUM SERPL-SCNC: 3.5 MMOL/L (ref 3.5–5.1)
POTASSIUM SERPL-SCNC: 3.6 MMOL/L (ref 3.5–5.1)
POTASSIUM SERPL-SCNC: 3.7 MMOL/L (ref 3.5–5.1)
POTASSIUM SERPL-SCNC: 3.8 MMOL/L (ref 3.5–5.1)
POTASSIUM SERPL-SCNC: 4.1 MMOL/L (ref 3.5–5.1)
POTASSIUM SERPL-SCNC: 4.3 MMOL/L (ref 3.5–5.1)
POTASSIUM SERPL-SCNC: 4.5 MMOL/L (ref 3.5–5.1)
POTASSIUM SERPL-SCNC: 4.9 MMOL/L (ref 3.5–5.1)
PROT SERPL-MCNC: 6.6 G/DL (ref 6–8.4)
RBC # BLD AUTO: 4.03 M/UL (ref 4.6–6.2)
SAMPLE: NORMAL
SITE: NORMAL
SODIUM SERPL-SCNC: 148 MMOL/L (ref 136–145)
SODIUM SERPL-SCNC: 149 MMOL/L (ref 136–145)
SODIUM SERPL-SCNC: 152 MMOL/L (ref 136–145)
SODIUM SERPL-SCNC: 153 MMOL/L (ref 136–145)
SODIUM SERPL-SCNC: 154 MMOL/L (ref 136–145)
SODIUM SERPL-SCNC: 155 MMOL/L (ref 136–145)
SODIUM SERPL-SCNC: 157 MMOL/L (ref 136–145)
SODIUM SERPL-SCNC: 157 MMOL/L (ref 136–145)
SODIUM SERPL-SCNC: 159 MMOL/L (ref 136–145)
SODIUM SERPL-SCNC: 161 MMOL/L (ref 136–145)
SODIUM SERPL-SCNC: 161 MMOL/L (ref 136–145)
SODIUM SERPL-SCNC: 163 MMOL/L (ref 136–145)
SODIUM SERPL-SCNC: 163 MMOL/L (ref 136–145)
SP02: 100
VANCOMYCIN SERPL-MCNC: 12.5 UG/ML
WBC # BLD AUTO: 8.97 K/UL (ref 3.9–12.7)

## 2023-11-11 PROCEDURE — 63600175 PHARM REV CODE 636 W HCPCS

## 2023-11-11 PROCEDURE — 99233 PR SUBSEQUENT HOSPITAL CARE,LEVL III: ICD-10-PCS | Mod: GC,,, | Performed by: INTERNAL MEDICINE

## 2023-11-11 PROCEDURE — 25000003 PHARM REV CODE 250: Performed by: INTERNAL MEDICINE

## 2023-11-11 PROCEDURE — 99291 PR CRITICAL CARE, E/M 30-74 MINUTES: ICD-10-PCS | Mod: ,,, | Performed by: INTERNAL MEDICINE

## 2023-11-11 PROCEDURE — 83930 ASSAY OF BLOOD OSMOLALITY: CPT | Performed by: INTERNAL MEDICINE

## 2023-11-11 PROCEDURE — 99223 1ST HOSP IP/OBS HIGH 75: CPT | Mod: GC,,, | Performed by: INTERNAL MEDICINE

## 2023-11-11 PROCEDURE — 80048 BASIC METABOLIC PNL TOTAL CA: CPT | Mod: 91,XB | Performed by: INTERNAL MEDICINE

## 2023-11-11 PROCEDURE — 80053 COMPREHEN METABOLIC PANEL: CPT | Performed by: STUDENT IN AN ORGANIZED HEALTH CARE EDUCATION/TRAINING PROGRAM

## 2023-11-11 PROCEDURE — 25000003 PHARM REV CODE 250: Performed by: NURSE PRACTITIONER

## 2023-11-11 PROCEDURE — 84100 ASSAY OF PHOSPHORUS: CPT | Mod: 91 | Performed by: STUDENT IN AN ORGANIZED HEALTH CARE EDUCATION/TRAINING PROGRAM

## 2023-11-11 PROCEDURE — A4217 STERILE WATER/SALINE, 500 ML: HCPCS

## 2023-11-11 PROCEDURE — C9113 INJ PANTOPRAZOLE SODIUM, VIA: HCPCS

## 2023-11-11 PROCEDURE — 99291 CRITICAL CARE FIRST HOUR: CPT | Mod: ,,, | Performed by: INTERNAL MEDICINE

## 2023-11-11 PROCEDURE — 83735 ASSAY OF MAGNESIUM: CPT

## 2023-11-11 PROCEDURE — 25000003 PHARM REV CODE 250

## 2023-11-11 PROCEDURE — 84100 ASSAY OF PHOSPHORUS: CPT

## 2023-11-11 PROCEDURE — 99900035 HC TECH TIME PER 15 MIN (STAT)

## 2023-11-11 PROCEDURE — 85025 COMPLETE CBC W/AUTO DIFF WBC: CPT

## 2023-11-11 PROCEDURE — 80202 ASSAY OF VANCOMYCIN: CPT | Performed by: INTERNAL MEDICINE

## 2023-11-11 PROCEDURE — 99223 PR INITIAL HOSPITAL CARE,LEVL III: ICD-10-PCS | Mod: GC,,, | Performed by: INTERNAL MEDICINE

## 2023-11-11 PROCEDURE — 83930 ASSAY OF BLOOD OSMOLALITY: CPT | Mod: 91

## 2023-11-11 PROCEDURE — 27000221 HC OXYGEN, UP TO 24 HOURS

## 2023-11-11 PROCEDURE — 82803 BLOOD GASES ANY COMBINATION: CPT

## 2023-11-11 PROCEDURE — 63600175 PHARM REV CODE 636 W HCPCS: Performed by: INTERNAL MEDICINE

## 2023-11-11 PROCEDURE — 20000000 HC ICU ROOM

## 2023-11-11 PROCEDURE — 99233 SBSQ HOSP IP/OBS HIGH 50: CPT | Mod: GC,,, | Performed by: INTERNAL MEDICINE

## 2023-11-11 PROCEDURE — 94761 N-INVAS EAR/PLS OXIMETRY MLT: CPT

## 2023-11-11 RX ORDER — LIDOCAINE HYDROCHLORIDE 20 MG/ML
JELLY TOPICAL ONCE
Status: COMPLETED | OUTPATIENT
Start: 2023-11-11 | End: 2023-11-11

## 2023-11-11 RX ORDER — MUPIROCIN 20 MG/G
OINTMENT TOPICAL 2 TIMES DAILY
Status: DISPENSED | OUTPATIENT
Start: 2023-11-11 | End: 2023-11-16

## 2023-11-11 RX ORDER — DEXTROSE MONOHYDRATE 50 MG/ML
INJECTION, SOLUTION INTRAVENOUS CONTINUOUS
Status: DISCONTINUED | OUTPATIENT
Start: 2023-11-11 | End: 2023-11-15

## 2023-11-11 RX ORDER — POTASSIUM CHLORIDE 29.8 MG/ML
40 INJECTION INTRAVENOUS ONCE
Status: COMPLETED | OUTPATIENT
Start: 2023-11-12 | End: 2023-11-12

## 2023-11-11 RX ORDER — POTASSIUM CHLORIDE 29.8 MG/ML
40 INJECTION INTRAVENOUS ONCE
Status: COMPLETED | OUTPATIENT
Start: 2023-11-11 | End: 2023-11-11

## 2023-11-11 RX ADMIN — DEXTROSE MONOHYDRATE: 5 INJECTION, SOLUTION INTRAVENOUS at 08:11

## 2023-11-11 RX ADMIN — DEXTROSE MONOHYDRATE: 5 INJECTION, SOLUTION INTRAVENOUS at 05:11

## 2023-11-11 RX ADMIN — DEXTROSE MONOHYDRATE: 5 INJECTION, SOLUTION INTRAVENOUS at 02:11

## 2023-11-11 RX ADMIN — DEXTROSE MONOHYDRATE: 5 INJECTION, SOLUTION INTRAVENOUS at 07:11

## 2023-11-11 RX ADMIN — PANTOPRAZOLE SODIUM 40 MG: 40 INJECTION, POWDER, FOR SOLUTION INTRAVENOUS at 09:11

## 2023-11-11 RX ADMIN — HEPARIN SODIUM 5000 UNITS: 5000 INJECTION INTRAVENOUS; SUBCUTANEOUS at 09:11

## 2023-11-11 RX ADMIN — MUPIROCIN: 20 OINTMENT TOPICAL at 10:11

## 2023-11-11 RX ADMIN — POTASSIUM CHLORIDE 40 MEQ: 29.8 INJECTION, SOLUTION INTRAVENOUS at 09:11

## 2023-11-11 RX ADMIN — PIPERACILLIN SODIUM AND TAZOBACTAM SODIUM 4.5 G: 4; .5 INJECTION, POWDER, FOR SOLUTION INTRAVENOUS at 10:11

## 2023-11-11 RX ADMIN — DEXTROSE MONOHYDRATE: 5 INJECTION, SOLUTION INTRAVENOUS at 12:11

## 2023-11-11 RX ADMIN — POTASSIUM CHLORIDE 40 MEQ: 29.8 INJECTION, SOLUTION INTRAVENOUS at 02:11

## 2023-11-11 RX ADMIN — HEPARIN SODIUM 5000 UNITS: 5000 INJECTION INTRAVENOUS; SUBCUTANEOUS at 05:11

## 2023-11-11 RX ADMIN — LIDOCAINE HYDROCHLORIDE: 20 JELLY TOPICAL at 09:11

## 2023-11-11 RX ADMIN — VASOPRESSIN: 20 INJECTION, SOLUTION INTRAVENOUS at 12:11

## 2023-11-11 RX ADMIN — DEXTROSE MONOHYDRATE: 5 INJECTION, SOLUTION INTRAVENOUS at 10:11

## 2023-11-11 RX ADMIN — PIPERACILLIN SODIUM AND TAZOBACTAM SODIUM 4.5 G: 4; .5 INJECTION, POWDER, FOR SOLUTION INTRAVENOUS at 06:11

## 2023-11-11 RX ADMIN — HEPARIN SODIUM 5000 UNITS: 5000 INJECTION INTRAVENOUS; SUBCUTANEOUS at 02:11

## 2023-11-11 RX ADMIN — VASOPRESSIN: 20 INJECTION, SOLUTION INTRAVENOUS at 04:11

## 2023-11-11 RX ADMIN — INSULIN HUMAN 0.1 UNITS/KG/HR: 1 INJECTION, SOLUTION INTRAVENOUS at 07:11

## 2023-11-11 RX ADMIN — MUPIROCIN: 20 OINTMENT TOPICAL at 09:11

## 2023-11-11 RX ADMIN — VANCOMYCIN HYDROCHLORIDE 1250 MG: 1.25 INJECTION, POWDER, LYOPHILIZED, FOR SOLUTION INTRAVENOUS at 11:11

## 2023-11-11 NOTE — ASSESSMENT & PLAN NOTE
Required pressors  Infection may be contributing to patients severe presentation  Continue management per primary team

## 2023-11-11 NOTE — PLAN OF CARE
MICU DAILY GOALS     Family/Goals of care/Code Status   Code Status: Full Code    24H Vital Sign Range  Temp:  [91.4 °F (33 °C)-98.8 °F (37.1 °C)]   Pulse:  []   Resp:  [6-23]   BP: ()/(49-82)   SpO2:  [99 %-100 %]      Shift Events (include procedures and significant events)   Pt. Remains lethargic, not following commands but very restless.   blood glucose slowly decreasing, still greater than 500, BMP Q2hrs. Insulin gtt infusing.     AWAKE RASS: Goal -    Actual -      Restraint necessity: Treatment interference   BREATHE SBT: Not intubated    Coordinate A & B, analgesics/sedatives Pain: managed   SAT: Not intubated   Delirium CAM-ICU:     Early(intubated/ Progressive (non-intubated) Mobility MOVE Screen (INTUBATED ONLY): Not intubated    Activity: Activity Management: Rolling - L1   Feeding/Nutrition Diet order: Diet/Nutrition Received: NPO,     Thrombus DVT prophylaxis: VTE Required Core Measure: Pharmacological prophylaxis initiated/maintained   HOB Elevation Head of Bed (HOB) Positioning: HOB at 30-45 degrees   Ulcer Prophylaxis GI: yes   Glucose control managed Glycemic Management: blood glucose monitored   Skin Skin assessed during: Admit    Sacrum intact/not altered? Yes  Heels intact/not altered? Yes  Surgical wound? No    [] No Altered Skin Integrity Present    []Prevention Measures Documented    [] Altered Skin Integrity Present or Discovered   [] LDA present in EPIC              [] LDA added in EPIC   [] Wound Image Taken (required on admit,                   transfer/discharge and every Tuesday)    Wound Care Consulted? No    Attending Nurse: Myrna Brantley RN/Staff Member: Ruben   Bowel Function no issues    Indwelling Catheter Necessity      Urethral Catheter 11/10/23 1450 Temperature probe-Reason for Continuing Urinary Catheterization: Critically ill in ICU and requiring hourly monitoring of intake/output    Percutaneous Central Line Insertion/Assessment - Triple Lumen  11/10/23 1440  Femoral Vein Right;Femoral Right-Line Necessity Review: Hemodynamic instability     De-escalation Antibiotics No       VS and assessment per flow sheet, patient progressing towards goals as tolerated, plan of care reviewed with Mrs. Jones, patient's wife, all concerns addressed, will continue to monitor.   Problem: Adult Inpatient Plan of Care  Goal: Patient-Specific Goal (Individualized)  Outcome: Ongoing, Progressing

## 2023-11-11 NOTE — H&P
[facility] Dept  Critical Care Medicine  History & Physical    Patient Name: Andrzej Sinclair  MRN: [id number]  Admission Date: 11/10/2023  Hospital Length of Stay: 0 days  Code Status: Full Code  Attending Physician: Jarrod Arita MD   Primary Care Provider: Viktoriya Jaeger NP   Principal Problem: DKA (diabetic ketoacidosis)    Subjective:     HPI:  Andrzej Sinclair is a 37 year old male with hx of type 1 diabetes mellitus, hypertension, CKD stage 3, GERD, recently admitted for DKA/HHS complicated by hypernatremia as well as metabolic encephalopathy, who initially presented to the ED via EMS today with severe hyperglycemia. In the ED, patient was noted to be unresponsive to verbal stimuli and minimally responsive to physical, but hemodynamically stable and afebrile. Concerns for DKA with labs significant for severe hyperglycemia BG 1885, Na 131, K 6.8, bicarb 6, AG 30. Elevated beta hydroxybutyrate 5.8. Central line was placed in ED after difficulties gaining access. Started on DKA/HHS management with fluids, insulin gtt, bicarb gtt, hyperkalemia shifted. Pt later evaluated to be in hypotensive with atrial fibrillation with RVR and was given dose of phenylephrine, later started on levophed. Nephrology consulted for BETH, creatinine 6.6. Admitted to MICU for DKA.    Hospital/ICU Course:  No notes on file     Past Medical History:   Diagnosis Date    Diabetes mellitus     Diabetes mellitus type 1     Diagnosed at age 20     History reviewed. No pertinent surgical history.    Review of patient's allergies indicates:   Allergen Reactions    Lactose Other (See Comments)     Gas and moderate to sever abdominal pain     Family History       Problem Relation (Age of Onset)    Diabetes Mother    Other Mother, Father          Tobacco Use    Smoking status: Former    Smokeless tobacco: Current   Substance and Sexual Activity    Alcohol use: Yes     Comment: socially    Drug use: No    Sexual activity: Not on file      Review  of Systems   Unable to perform ROS: Acuity of condition     Objective:     Vital Signs (Most Recent):  Temp: (!) 94.8 °F (34.9 °C) (11/10/23 1726)  Pulse: 95 (11/10/23 1726)  Resp: 14 (11/10/23 1726)  BP: 126/67 (11/10/23 1726)  SpO2: 100 % (11/10/23 1726) Vital Signs (24h Range):  Temp:  [91.4 °F (33 °C)-98.8 °F (37.1 °C)] 94.8 °F (34.9 °C)  Pulse:  [] 95  Resp:  [14-23] 14  SpO2:  [100 %] 100 %  BP: ()/(49-67) 126/67   Weight: 89.5 kg (197 lb 5 oz)  Body mass index is 25.33 kg/m².      Intake/Output Summary (Last 24 hours) at 11/10/2023 1741  Last data filed at 11/10/2023 1656  Gross per 24 hour   Intake 2955.9 ml   Output --   Net 2955.9 ml          Physical Exam  Vitals and nursing note reviewed.   Constitutional:       General: He is not in acute distress.     Appearance: He is ill-appearing and toxic-appearing.   HENT:      Mouth/Throat:      Mouth: Mucous membranes are dry.   Eyes:      Extraocular Movements: Extraocular movements intact.      Pupils: Pupils are equal, round, and reactive to light.   Cardiovascular:      Rate and Rhythm: Normal rate.      Pulses: Normal pulses.      Heart sounds: Normal heart sounds. No murmur heard.  Pulmonary:      Effort: Pulmonary effort is normal.      Breath sounds: Normal breath sounds. No wheezing, rhonchi or rales.   Abdominal:      General: Bowel sounds are normal. There is no distension.      Palpations: Abdomen is soft.      Tenderness: There is no abdominal tenderness. There is no right CVA tenderness or left CVA tenderness.   Musculoskeletal:         General: No tenderness.      Right lower leg: No edema.      Left lower leg: No edema.   Skin:     General: Skin is dry.      Capillary Refill: Capillary refill takes less than 2 seconds.      Findings: No erythema or rash.   Neurological:      Mental Status: He is lethargic and disoriented.            Vents:     Lines/Drains/Airways       Peripherally Inserted Central Catheter Line  Duration              "PICC Triple Lumen 09/13/23 1230 right basilic 58 days              Central Venous Catheter Line  Duration             Percutaneous Central Line Insertion/Assessment - Triple Lumen  11/10/23 1440 Femoral Vein Right;Femoral Right <1 day              Drain  Duration                  Urethral Catheter 11/10/23 1450 Temperature probe <1 day              Peripheral Intravenous Line  Duration                  Peripheral IV - Single Lumen 11/10/23 1347 18 G Anterior;Right <1 day         Peripheral IV - Single Lumen 11/10/23 1402 20 G Left;Posterior Hand <1 day                  Significant Labs:    CBC/Anemia Profile:  Recent Labs   Lab 11/10/23  1342 11/10/23  1349 11/10/23  1401   WBC 12.36  --  11.62   HGB 12.3*  --  12.0*   HCT 42.5 42 41.0     --  179   *  --  107*   RDW 13.9  --  14.0        Chemistries:  Recent Labs   Lab 11/10/23  1342 11/10/23  1601   * 139   K 6.8* 2.7*   CL 95 99   CO2 6* 9*   BUN 90* 87*   CREATININE 6.6* 6.1*   CALCIUM 8.6* 8.3*   ALBUMIN 3.6 3.3*   PROT 6.6 5.9*   BILITOT 0.3 0.2   ALKPHOS 101 91   ALT 18 17   AST 14 14       Blood Culture: No results for input(s): "LABBLOO" in the last 48 hours.  CMP:   Recent Labs   Lab 11/10/23  1342 11/10/23  1601   * 139   K 6.8* 2.7*   CL 95 99   CO2 6* 9*   GLU 1,885* 1,682*   BUN 90* 87*   CREATININE 6.6* 6.1*   CALCIUM 8.6* 8.3*   PROT 6.6 5.9*   ALBUMIN 3.6 3.3*   BILITOT 0.3 0.2   ALKPHOS 101 91   AST 14 14   ALT 18 17   ANIONGAP 30* 31*     Lactic Acid: No results for input(s): "LACTATE" in the last 48 hours.  Urine Culture: No results for input(s): "LABURIN" in the last 48 hours.  Urine Studies:   Recent Labs   Lab 11/10/23  1540   COLORU Yellow   APPEARANCEUA Clear   PHUR 5.0   SPECGRAV 1.020   PROTEINUA Trace*   GLUCUA 4+*   KETONESU 1+*   BILIRUBINUA Negative   OCCULTUA Negative   NITRITE Negative   LEUKOCYTESUR Negative   RBCUA 0   WBCUA 3   BACTERIA Rare   SQUAMEPITHEL 0   HYALINECASTS 32*     Recent Lab Results  " (Last 5 results in the past 24 hours)        11/10/23  1611   11/10/23  1604   11/10/23  1601   11/10/23  1540   11/10/23  1521        Alcohol, Urine   <10             Benzodiazepines   Negative             Methadone metabolites   Negative             Phencyclidine   Negative             Albumin     3.3           ALP     91           Allens Test Pass         N/A       ALT     17           Amphetamine Screen, Ur   Negative             Anion Gap     31           Appearance, UA       Clear         AST     14           Bacteria, UA       Rare         Barbiturate Screen, Ur   Negative             Baso #               Basophil %               Bilirubin (UA)       Negative         BILIRUBIN TOTAL     0.2  Comment: For infants and newborns, interpretation of results should be based  on gestational age, weight and in agreement with clinical  observations.    Premature Infant recommended reference ranges:  Up to 24 hours.............<8.0 mg/dL  Up to 48 hours............<12.0 mg/dL  3-5 days..................<15.0 mg/dL  6-29 days.................<15.0 mg/dL             Site RR         Other       BUN     87           Calcium     8.3           Chloride     99           CO2     9  Comment: *Critical value notification by LT with confirmation of receipt to  Africa Williamson RN at  Date 11/10/23 Time 5:23pm             Cocaine (Metab.)   Negative             Color, UA       Yellow         Creatinine     6.1           Creatinine, Urine   79.0             Differential Method               eGFR     11.4           Eos #               Eosinophil %               Glucose     1,682  Comment: Glucose critical result(s) called and verbal readback obtained   from Africa Williamson RN by LBT 11/10/2023 17:25             Glucose, UA       4+         Gran # (ANC)               Gran %               Hematocrit               Hemoglobin               Hyaline Casts, UA       32         Immature Grans (Abs)               Immature Granulocytes                INR               Ketones, UA       1+         Leukocytes, UA       Negative         Lymph #               Lymph %               MCH               MCHC               MCV               Microscopic Comment       SEE COMMENT  Comment: Other formed elements not mentioned in the report are not   present in the microscopic examination.            Mono #               Mono %               MPV               NITRITE UA       Negative         nRBC               Occult Blood UA       Negative         Opiate Scrn, Ur   Negative             Osmolality, Urine   433  Comment: The random urine reference ranges provided were established   for 24 hour urine collections.  No reference ranges exist for  random urine specimens.  Correlate clinically.               pH, UA       5.0         Platelet Count               POC BE -21         -24       POC HCO3 8.3         7.1       POC PCO2 25.2         27.2       POC PH 7.123         7.024       POC PO2 215         53       POC SATURATED O2 99         70       POC TCO2 9         8       Potassium     2.7  Comment: *Critical value notification by LT with confirmation of receipt to  Africa Williamson RN at  Date 11/10/23 Time 5:23pm             PROTEIN TOTAL     5.9           Protein, UA       Trace  Comment: Recommend a 24 hour urine protein or a urine   protein/creatinine ratio if globulin induced proteinuria is  clinically suspected.           Protime               RBC               RBC, UA       0         RDW               Sample ARTERIAL         VENOUS       Sodium     139           Specific Gravity, UA       1.020         Specimen UA       Urine, Clean Catch         Squam Epithel, UA       0         Marijuana (THC) Metabolite   Presumptive Positive             Toxicology Information   SEE COMMENT  Comment: This screen includes the following classes of drugs at the listed   cut-off:    Benzodiazepines 200 ng/ml  Methadone 300 ng/ml  Cocaine metabolite 300 ng/ml  Opiates 300  ng/ml  Barbiturates 200 ng/ml  Amphetamines 1000 ng/ml  Marijuana metabs (THC) 50 ng/ml  Phencyclidine (PCP) 25 ng/ml    This is a screening test. If results do not correlate with clinical   presentation, then a confirmatory send out test is advised.     This report is intended for use in clinical monitoring and management   of   patients. It is not intended for use in employment related drug   testing.               WBC, UA       3         WBC               Yeast, UA       None                              All pertinent labs within the past 24 hours have been reviewed.    Significant Imaging: I have reviewed all pertinent imaging results/findings within the past 24 hours.  Assessment/Plan:     Neuro  Acute metabolic encephalopathy  Presented with AMS, unclear etiology; infections vs DKA vs drug related  Per EMS, Pt responsive to painful stimuli only. CBG >500 with EMS  Follows some commands but very lethargic    Plan:  - UA/Ucx, Bcx  - CXR without consolidation  - continue broad abx with piptazo and vancomycin  - UDS and volatile screen given high osmolar gap  - if mentation worsens, get CT head non-contrast  - q4h neurochecks  - currently protecting airway however will need close monitoring in case he deteriorates for possible intubation    Renal/  Hyperkalemia  See DKA    Metabolic acidosis  See DKA    BETH (acute kidney injury)  See DKA      Hypernatremia  See DKA    Endocrine  * DKA (diabetic ketoacidosis)  Presented with hyperglycemia BGL > 500 with EMS and >1800 on BMP once arrived to [facility] [place]  Also hyperkalemic with K 6.8; shifted by ED  Started on insulin gtt; BGL improved to 1600 but potassium dropped to 2.7  Also has BETH with sCr 6.1 on presentation; baseline sCr 1.2-1.4  Likely 2/2 DKA and hypovolemia, Pt very dry on exam  Hypernatremic with Na ~170 when corrected for degree of hyperglycemia    Plan:  - hold insulin gtt while hypokalemic; replace potassium and restart insulin gtt once K > 3.5  - nephrology  consulted by ED for BETH/hyperkalemia and potential need for HD; no need currently  - 0.25% NaCl as the fluids for DKA given his severe hypernatremia; want fluid with the lowest osmolality possible  - Q1h BMP to monitor K and Na; replace potassium as needed and adjust rate of NaCl as needed  - camacho catheter; monitor strict I's/O's    Type 1 diabetes mellitus  See DKA    GI  GERD (gastroesophageal reflux disease)  Continue home pantoprazole        Critical Care Daily Checklist:    A: Awake: RASS Goal/Actual Goal:    Actual:     B: Spontaneous Breathing Trial Performed?     C: SAT & SBT Coordinated?  N/A           D: Delirium: CAM-ICU     E: Early Mobility Performed? No   F: Feeding Goal:    Status:     Current Diet Order   Procedures    Diet NPO      AS: Analgesia/Sedation N/A   T: Thromboembolic Prophylaxis heparin   H: HOB > 300 Yes   U: Stress Ulcer Prophylaxis (if needed) pantoprazole   G: Glucose Control Insulin gtt   B: Bowel Function     I: Indwelling Catheter (Lines & Camacho) Necessity PICC, R fem, PIVC, camacho    D: De-escalation of Antimicrobials/Pharmacotherapies Piptazo and vancomycin    Plan for the day/ETD Insulin gtt, replace potassium, monitor sodium    Code Status:  Family/Goals of Care: Full Code       Critical secondary to Patient has a condition that poses threat to life and bodily function: DKA    Critical care was time spent personally by me on the following activities: development of treatment plan with patient or surrogate and bedside caregivers, discussions with consultants, evaluation of patient's response to treatment, examination of patient, ordering and performing treatments and interventions, ordering and review of laboratory studies, ordering and review of radiographic studies, pulse oximetry, re-evaluation of patient's condition. This critical care time did not overlap with that of any other provider or involve time for any procedures.     Jenise Ji MD  Critical Care  Medicine  [facility]

## 2023-11-11 NOTE — ASSESSMENT & PLAN NOTE
Presented with AMS, unclear etiology; infections vs DKA vs drug related  Per EMS, Pt responsive to painful stimuli only. CBG >500 with EMS  Follows some commands but very lethargic    Plan:  - UA negative for infection, Bcx NTD  - CXR without consolidation  - continue piptazo; discontinue vancomycin  - UDS positive for THC; volatile screen pending   - CT head unremarkable; MRI brain pending   - q4h neurochecks  - currently protecting airway however will need close monitoring in case he deteriorates for possible intubation  - NPO

## 2023-11-11 NOTE — ASSESSMENT & PLAN NOTE
Scr at consult 6, baseline 1-1.3  1)BETH multifactoral 2/2 Pre renal vs ATN*   2)Hyponatremia(corrected sodium showing severe hypernatremia)  3)Hypokalemia  4)Severe hyperglycemia  5)Metabolic acidosis with respiratory compensation  6)High osmolal gap    Plan  Volume expansion with fluids  K replacement when less than 5(DKA)  I/Os  Avoid nephrotoxins  Renal dose medications

## 2023-11-11 NOTE — SUBJECTIVE & OBJECTIVE
Interval HPI:   Overnight events: Admitted yesterday. Not currently tolerating diet. Not responding or following commands but he is awake.  Eating:   NPO      PMH, PSH, FH, SH updated and reviewed     ROS:  Unable to obtain due to:  acute illness        Current Medications and/or Treatments Impacting Glycemic Control  Immunotherapy:    Immunosuppressants       None          Steroids:   Hormones (From admission, onward)      None          Pressors:    Autonomic Drugs (From admission, onward)      None          Hyperglycemia/Diabetes Medications:   Antihyperglycemics (From admission, onward)      Start     Stop Route Frequency Ordered    11/10/23 1700  insulin regular in 0.9 % NaCl 100 unit/100 mL (1 unit/mL) infusion        Question Answer Comment   Insulin Rate Adjustment (DO NOT MODIFY ANSWER) \\[employer]\epic\Images\Pharmacy\InsulinInfusions\INSULIN ADJUSTMENT DKA version [email or web address]    Initial dose (DO NOT CHANGE): 0.1 units/kg/hr        -- IV Continuous 11/10/23 1641             PHYSICAL EXAMINATION:  Vitals:    11/11/23 1200   BP: 126/68   Pulse: 79   Resp: 16   Temp: 96.8 °F (36 °C)     Body mass index is 25.33 kg/m².     Physical Exam  Constitutional:       General: He is in acute distress.      Appearance: He is toxic-appearing.   HENT:      Head: Atraumatic.      Nose: Nose normal.      Mouth/Throat:      Mouth: Mucous membranes are dry.   Eyes:      General: No scleral icterus.     Conjunctiva/sclera: Conjunctivae normal.   Cardiovascular:      Rate and Rhythm: Normal rate.      Pulses: Normal pulses.   Pulmonary:      Effort: Pulmonary effort is normal. No respiratory distress.   Musculoskeletal:      Cervical back: Neck supple.      Right lower leg: No edema.      Left lower leg: No edema.   Skin:     General: Skin is warm.   Neurological:      Comments: Not following commands   Psychiatric:         Mood and Affect: Mood normal.         Behavior: Behavior normal.

## 2023-11-11 NOTE — ASSESSMENT & PLAN NOTE
Scr at consult 6, baseline 1-1.3  1)BETH multifactoral 2/2 Pre renal vs ATN*   2)Hyponatremia(corrected sodium showing severe hypernatremia)  3)Hypokalemia  4)Severe hyperglycemia  5)Metabolic acidosis with respiratory compensation  6)High osmolal gap    Plan  Volume expansion with fluids  CPK  Urine sedimentation  2 hourly BMP  Lasix if no urine output  K replacement when less than 5(DKA)  Urine screen for toxins  I/Os  Avoid nephrotoxins  Renal dose medications

## 2023-11-11 NOTE — ASSESSMENT & PLAN NOTE
Diagnosed: age 20  Last hemoglobin A1c: 12.8 09/13/2023    Outpatient regimen: Med rec states he is taking Tresiba 21 units daily and Lyumjev 7 units with meals with DEXCOM G6. Previously prescribed Omnipod 5  - will confirm after resolution of metabolic encephalopathy

## 2023-11-11 NOTE — ASSESSMENT & PLAN NOTE
Sodium 130 at consult time, corrected sodium would be [id number] (Blood glucose of more than 1800 gives correction factor of 2*17)  Risk of hyponatremia to change to hypernatremia quickly, pontine mylenosis > cerebral edema    Plan  1 hourly electrolytes  Change fluids to free water for now  Frequent neuroclinical assessment  K replacement if less than 5

## 2023-11-11 NOTE — ASSESSMENT & PLAN NOTE
Anion gap metabolic acidosis with respiratory compensation  Plan  Acidosis will improves with DKA management

## 2023-11-11 NOTE — ASSESSMENT & PLAN NOTE
Presented with hyperglycemia BGL > 500 with EMS and >1800 on BMP [place] [facility] [place]  Also hyperkalemic with K 6.8; shifted by ED  Started on insulin gtt; BGL improved to 1600 but potassium dropped to 2.7  Also has BETH with sCr 6.1 on presentation; baseline sCr 1.2-1.4  Likely 2/2 DKA and hypovolemia, Pt very dry on exam  Hypernatremic with Na ~170 when corrected for degree of hyperglycemia on arrival; remains elevated    Plan:  - continue D5W gtt at 500cc/hr per nephro to assist with hypernatremia  - hold insulin gtt while BGL in the 300s - will allow for higher BG to avoid rapid correction of glucose and serum osm (falling rapidly currently); AG closed, bicarb improved  - nephrology following, appreciate recs; no HD needs currently sCr improving; assisting with management of hypernatremia   - Q2h BMP to monitor K and Na; replace potassium as needed to maintain > 3.5  - Q4h serum osm  - maintain camacho catheter; monitor strict I's/O's  - endocrine consulted, appreciate recs

## 2023-11-11 NOTE — PROGRESS NOTES
Pharmacokinetic Assessment Follow Up: IV Vancomycin    Vancomycin Regimen Assessment & Plan  - Vancomycin level drawn with morning labs today resulted as 12.5 mcg/mL, goal trough 10-20 mcg/mL.  - Nephrology following for BETH. Will continue pulse dosing.  - Administer vancomycin 1250 mg IV x1 dose today since level is <20. Draw vancomycin level with morning labs tomorrow. Will re-dose as needed depending on level and renal function.    Drug levels (last 3 results):  Recent Labs   Lab Result Units 11/11/23  0407   Vancomycin, Random ug/mL 12.5     Pharmacy will continue to follow and monitor vancomycin.    Please contact pharmacy at extension [phone] for questions regarding this assessment.    Thank you for the consult,   Altaf Tijerina     Patient brief summary:  Andrzej Sinclair is a 37 y.o. male initiated on antimicrobial therapy with IV Vancomycin for treatment of sepsis    Drug Allergies:   Review of patient's allergies indicates:   Allergen Reactions    Lactose Other (See Comments)     Gas and moderate to sever abdominal pain     Actual Body Weight:   89.5 kg    Renal Function:   Estimated Creatinine Clearance: 36 mL/min (A) (based on SCr of 3.3 mg/dL (H)).,     Dialysis Method (if applicable):  N/A

## 2023-11-11 NOTE — CONSULTS
[facility] - Emergency Dept  Nephrology  Consult Note    Patient Name: Andrzej Sinclair  MRN: [id number]  Admission Date: 11/10/2023  Hospital Length of Stay: 0 days  Attending Provider: Jarrod Arita MD   Primary Care Physician: Viktoriya Jaeger NP  Principal Problem:<principal problem not specified>    Inpatient consult to Nephrology  Consult performed by: Padmini Swenson MD  Consult ordered by: Eliseo Montero MD        Subjective:     HPI: Andrzej Sinclair, a 37 y.o. male PMH of Type 1 DM, hypertension, CKD 3, GERD, presents to the ED w/ complaint of hyperglycemia.  Per EMS patient responsive to painful stimuli only. Initial labs showing blood glucose more than 1000, hyperkalemia, hyponatremia, low BP. Multiple admissions in the past with hyperglycemia and acidosis.       Past Medical History:   Diagnosis Date    Diabetes mellitus     Diabetes mellitus type 1     Diagnosed at age 20       History reviewed. No pertinent surgical history.    Review of patient's allergies indicates:   Allergen Reactions    Lactose Other (See Comments)     Gas and moderate to sever abdominal pain     Current Facility-Administered Medications   Medication Frequency    dextrose 10 % infusion PRN    dextrose 10 % infusion PRN    dextrose 10% bolus 125 mL 125 mL PRN    dextrose 10% bolus 250 mL 250 mL PRN    dextrose 5 % and 0.45 % NaCl infusion Continuous PRN    glucagon (human recombinant) injection 1 mg PRN    glucose chewable tablet 16 g PRN    glucose chewable tablet 24 g PRN    insulin regular in 0.9 % NaCl 100 unit/100 mL (1 unit/mL) infusion Continuous    magnesium sulfate in dextrose IVPB (premix) 1 g Once    naloxone 0.4 mg/mL injection 0.02 mg PRN    piperacillin-tazobactam (ZOSYN) 4.5 g in dextrose 5 % in water (D5W) 100 mL IVPB (MB+) Q12H    potassium chloride 40 mEq in 100 mL IVPB (FOR CENTRAL LINE ADMINISTRATION ONLY) Once    sodium chloride 0.2 % Continuous    sodium chloride 0.9% flush 10 mL Q12H PRN    sodium chloride 0.9%  "flush 10 mL PRN    vancomycin 1,250 mg in dextrose 5 % (D5W) 250 mL IVPB (Vial-Mate) ED 1 Time     Current Outpatient Medications   Medication    acetaminophen (TYLENOL) 325 MG tablet    acetone, urine, test (KETONE URINE TEST) Strp    aluminum & magnesium hydroxide-simethicone (MYLANTA MAX STRENGTH) 400-400-40 mg/5 mL suspension    BD INSULIN SYRINGE ULTRA-FINE 0.5 mL 31 gauge x 5/16" Syrg    blood sugar diagnostic Strp    blood-glucose meter (ACCU-CHEK GUIDE GLUCOSE METER) Misc    blood-glucose sensor (DEXCOM G6 SENSOR) Martha    blood-glucose transmitter (DEXCOM G6 TRANSMITTER) Martha    dicyclomine (BENTYL) 10 MG capsule    glucagon (GVOKE HYPOPEN 2-PACK) 1 mg/0.2 mL AtIn    insulin degludec (TRESIBA U-100 INSULIN) 100 unit/mL injection    insulin lispro-aabc (LYUMJEV U-100 INSULIN) 100 unit/mL    insulin syringe,safetyneedle (BD SAFETYGLIDE INSULIN SYRINGE) 0.3 mL 31 gauge x 15/64" Syrg    insulin syringe-needle U-100 0.5 mL 30 gauge x 1/2" Syrg    insulin syringe-needle U-100 1/2 mL 30 gauge Syrg    lancets Misc    lancing device Misc    lisinopriL 10 MG tablet    omeprazole (PRILOSEC) 20 MG capsule    OMNIPOD 5 G6 INTRO KIT, GEN 5, Crtg    ondansetron (ZOFRAN) 4 MG tablet    pantoprazole (PROTONIX) 40 MG tablet    pen needle, diabetic (BD ULTRA-FINE ROVERTO PEN NEEDLE) 32 gauge x 5/32" Ndle    promethazine (PHENERGAN) 25 MG suppository    rosuvastatin (CRESTOR) 10 MG tablet    sucralfate (CARAFATE) 100 mg/mL suspension     Family History       Problem Relation (Age of Onset)    Diabetes Mother    Other Mother, Father          Tobacco Use    Smoking status: Former    Smokeless tobacco: Current   Substance and Sexual Activity    Alcohol use: Yes     Comment: socially    Drug use: No    Sexual activity: Not on file     Review of Systems  Objective:     Vital Signs (Most Recent):  Temp: (!) 94.8 °F (34.9 °C) (11/10/23 1726)  Pulse: 95 (11/10/23 1726)  Resp: 14 (11/10/23 1726)  BP: 126/67 (11/10/23 1726)  SpO2: 100 % " (11/10/23 1726) Vital Signs (24h Range):  Temp:  [91.4 °F (33 °C)-98.8 °F (37.1 °C)] 94.8 °F (34.9 °C)  Pulse:  [] 95  Resp:  [14-23] 14  SpO2:  [100 %] 100 %  BP: ()/(49-67) 126/67     Weight: 89.5 kg (197 lb 5 oz) (11/10/23 1519)  Body mass index is 25.33 kg/m².  Body surface area is 2.16 meters squared.    No intake/output data recorded.     Physical Exam  Constitutional:       Appearance: He is toxic-appearing.   HENT:      Head: Normocephalic.      Mouth/Throat:      Mouth: Mucous membranes are dry.   Cardiovascular:      Rate and Rhythm: Tachycardia present.   Pulmonary:      Breath sounds: Normal breath sounds.   Genitourinary:     Comments: Foleys in place  Musculoskeletal:         General: Normal range of motion.          Significant Labs:  ABGs:   Recent Labs   Lab 11/10/23  1611   PH 7.123*   PCO2 25.2*   HCO3 8.3*   POCSATURATED 99   BE -21*     BMP:   Recent Labs   Lab 11/10/23  1601   GLU 1,682*      K 2.7*   CL 99   CO2 9*   BUN 87*   CREATININE 6.1*   CALCIUM 8.3*     CBC:   Recent Labs   Lab 11/10/23  1401   WBC 11.62   RBC 3.84*   HGB 12.0*   HCT 41.0      *   MCH 31.3*   MCHC 29.3*     Recent Labs   Lab 11/10/23  1540   COLORU Yellow   SPECGRAV 1.020   PHUR 5.0   PROTEINUA Trace*   BACTERIA Rare   NITRITE Negative   LEUKOCYTESUR Negative   HYALINECASTS 32*     All labs within the past 24 hours have been reviewed.    Significant Imaging:  Labs: Reviewed  X-Ray: Reviewed    Assessment/Plan:     Renal/  Metabolic acidosis  Anion gap metabolic acidosis with respiratory compensation  Plan  Acidosis will improves with DKA management    BEHT (acute kidney injury)  Scr at consult 6, baseline 1-1.3  1)BETH multifactoral 2/2 Pre renal vs ATN*   2)Hyponatremia(corrected sodium showing severe hypernatremia)  3)Hyperkalemia, after insulin and volume significant Hypokalemia  4)Severe hyperglycemia (hyperosmolar with severe acidosis, ketosis)  5)Metabolic acidosis with respiratory  compensation  6)High osmolal gap    Plan  Volume expansion with fluids (already 4 liter)   CPK  Urine sedimentation  2 hourly renal functions panel and blood gases, 1-2 hourly electrolytes  Lasix if no urine output  K replacement as needed iv.   Urine screen for toxins  I/Os  Avoid nephrotoxins  Renal dose medications    Hypernatremia  Sodium 130 at consult time, corrected sodium would be [id number] (Blood glucose of more than 1800 gives correction factor of 2*17)  Risk of hyponatremia to change to hypernatremia quickly, pontine mylenosis > cerebral edema    Plan  1-2 hourly electrolytes  Change fluids to 1/4 saline for now, potassium replacements will also add to serum sodium.   Iv fluid rate must be adjusted according to serum electrolytes and volume status  Frequent neuroclinical assessment    Endocrine  DKA (diabetic ketoacidosis)  Per Primary        Thank you for your consult. I will follow-up with patient. Please contact us if you have any additional questions.    Padmini Swenson MD  Nephrology  [facility] - Emergency Dept    [facility] STAFF NOTE    The note from the fellow/resident was reviewed. I have personally interviewed and examined the patient. There were no additional findings with regards to the history or physical exam.    I agree with the assessment and plan of  Dr. Swenson    Recommendations as above.   Frequent monitoring of Na and K crucial to estimate free water replacement needed. Need to avoid severe hypernatremia in the process of correcting the glucose.

## 2023-11-11 NOTE — PROGRESS NOTES
[facility] - Cardiac Medical ICU  Critical Care Medicine  Progress Note    Patient Name: Andrzej Sinclair  MRN: [id number]  Admission Date: 11/10/2023  Hospital Length of Stay: 1 days  Code Status: Full Code  Attending Provider: Jarrod Arita MD  Primary Care Provider: Viktoriya Jaeger NP   Principal Problem: DKA (diabetic ketoacidosis)    Subjective:     HPI:  Andrzej Sinclair is a 37 year old male with hx of type 1 diabetes mellitus, hypertension, CKD stage 3, GERD, recently admitted for DKA/HHS complicated by hypernatremia as well as metabolic encephalopathy, who initially presented to the ED via EMS today with severe hyperglycemia. In the ED, patient was noted to be unresponsive to verbal stimuli and minimally responsive to physical, but hemodynamically stable and afebrile. Concerns for DKA with labs significant for severe hyperglycemia BG 1885, Na 131, K 6.8, bicarb 6, AG 30. Elevated beta hydroxybutyrate 5.8. Central line was placed in ED after difficulties gaining access. Started on DKA/HHS management with fluids, insulin gtt, bicarb gtt, hyperkalemia shifted. Pt later evaluated to be in hypotensive with atrial fibrillation with RVR and was given dose of phenylephrine, later started on levophed. Nephrology consulted for BETH, creatinine 6.6. Admitted to MICU for DKA.    Hospital/ICU Course:  Admitted to MICU for DKA/HHS. Mentation remains the same - lethargic, protecting own airway, attempting to follow commands. CT head without acute process. Serum osm was very high on admission and unsure how quickly it kevin prior to him presenting to hospital, concerned about risk of ODS given improvement in serum osm with resolution of DKA/HHS; MRI brain pending. UDS positive for THC, ethanol < 10. Volatile screen pending. Nephrology consulted for BETH and hypernatremia, assisting with adjusting fluids to prevent rapid correction of sodium and osm. Initially started on 0.2% NaCl but switched to D5W gtt. Monitoring Na and serum osm  frequently. sCr improving with resolution of DKA, making good urine. Cultures NTD, will de-escalate vancomycin and continue piptazo, no obvious source of infection. Endocrine consulted for assistance with insulin gtt given complex case and need to balance the serum osm, Na, and glucose. Phone call to wife Marzena on 11/11 to provide updates.     Interval History/Significant Events: Mentation the same, following some commands. Nephro helping with fluids and rates, remains on D5W gtt, increased the rate to 500cc/hr. Concerned about rate of correction of serum osm and glucose; Na rising. Currently no pressor requirement, normotensive. Making good urine. Endocrine consult for help with DKA/HHS given complex case    Review of Systems   Unable to perform ROS: Acuity of condition     Objective:     Vital Signs (Most Recent):  Temp: 96.8 °F (36 °C) (11/11/23 1200)  Pulse: 79 (11/11/23 1200)  Resp: 16 (11/11/23 1200)  BP: 126/68 (11/11/23 1200)  SpO2: 100 % (11/11/23 1200) Vital Signs (24h Range):  Temp:  [91.4 °F (33 °C)-98.8 °F (37.1 °C)] 96.8 °F (36 °C)  Pulse:  [] 79  Resp:  [6-36] 16  SpO2:  [99 %-100 %] 100 %  BP: ()/(49-82) 126/68   Weight: 89.5 kg (197 lb 5 oz)  Body mass index is 25.33 kg/m².      Intake/Output Summary (Last 24 hours) at 11/11/2023 1307  Last data filed at 11/11/2023 1200  Gross per 24 hour   Intake 15943.89 ml   Output 4550 ml   Net 6645.89 ml          Physical Exam  Vitals reviewed.   Constitutional:       General: He is not in acute distress.     Appearance: He is ill-appearing and toxic-appearing.      Interventions: He is restrained.   HENT:      Mouth/Throat:      Mouth: Mucous membranes are dry.   Eyes:      Extraocular Movements: Extraocular movements intact.      Pupils: Pupils are equal, round, and reactive to light.   Cardiovascular:      Rate and Rhythm: Regular rhythm. Tachycardia present.      Pulses: Normal pulses.      Heart sounds: Normal heart sounds. No murmur  heard.  Pulmonary:      Effort: Pulmonary effort is normal.      Breath sounds: Normal breath sounds. No wheezing, rhonchi or rales.      Comments: On 2L NC  Abdominal:      General: Bowel sounds are normal. There is no distension.      Palpations: Abdomen is soft.      Tenderness: There is no abdominal tenderness. There is no right CVA tenderness or left CVA tenderness.   Musculoskeletal:         General: No tenderness.      Right lower leg: No edema.      Left lower leg: No edema.   Skin:     General: Skin is warm and dry.      Capillary Refill: Capillary refill takes less than 2 seconds.      Findings: No erythema or rash.   Neurological:      Mental Status: He is lethargic.            Vents:     Lines/Drains/Airways       Peripherally Inserted Central Catheter Line  Duration             PICC Triple Lumen 09/13/23 1230 right basilic 59 days              Central Venous Catheter Line  Duration             Percutaneous Central Line Insertion/Assessment - Triple Lumen  11/10/23 1440 Femoral Vein Right;Femoral Right <1 day              Drain  Duration                  Urethral Catheter 11/10/23 1450 Temperature probe <1 day              Peripheral Intravenous Line  Duration                  Peripheral IV - Single Lumen 11/10/23 1347 18 G Anterior;Right <1 day         Peripheral IV - Single Lumen 11/10/23 1402 20 G Left;Posterior Hand <1 day                  Significant Labs:    CBC/Anemia Profile:  Recent Labs   Lab 11/10/23  1342 11/10/23  1349 11/10/23  1401 11/11/23  0407   WBC 12.36  --  11.62 8.97   HGB 12.3*  --  12.0* 12.3*   HCT 42.5 42 41.0 34.4*     --  179 176   *  --  107* 85   RDW 13.9  --  14.0 12.7        Chemistries:  Recent Labs   Lab 11/10/23  1342 11/10/23  1601 11/10/23  1743 11/10/23  1900 11/11/23  0407 11/11/23  0604 11/11/23  0755 11/11/23  1200   * 139 145   < > 161*  161* 159* 163*  163* 157*   K 6.8* 2.7* 2.6*   < > 3.6  3.6 3.7 3.7  3.7 4.5   CL 95 99 106   < > 118*  " 118* 121* 124*  124* 118*   CO2 6* 9* 12*   < > 25  25 25 28  28 27   BUN 90* 87* 90*   < > 70*  70* 64* 57*  57* 48*   CREATININE 6.6* 6.1* 6.1*   < > 4.2*  4.2* 3.8* 3.3*  3.3* 2.9*   CALCIUM 8.6* 8.3* 8.4*   < > 9.0  9.0 9.0 9.2  9.2 8.4*   ALBUMIN 3.6 3.3*  --   --  3.6  --   --   --    PROT 6.6 5.9*  --   --  6.6  --   --   --    BILITOT 0.3 0.2  --   --  0.3  --   --   --    ALKPHOS 101 91  --   --  91  --   --   --    ALT 18 17  --   --  18  --   --   --    AST 14 14  --   --  27  --   --   --    MG  --   --  3.3*  --  3.5*  --   --   --    PHOS  --   --  2.5*   < > 2.7  2.7  --  2.5* 3.7    < > = values in this interval not displayed.       Blood Culture:   Recent Labs   Lab 11/10/23  1749   LABBLOO No Growth to date  No Growth to date     CMP:   Recent Labs   Lab 11/10/23  1342 11/10/23  1601 11/10/23  1743 11/11/23  0407 11/11/23  0604 11/11/23  0755 11/11/23  1200   * 139   < > 161*  161* 159* 163*  163* 157*   K 6.8* 2.7*   < > 3.6  3.6 3.7 3.7  3.7 4.5   CL 95 99   < > 118*  118* 121* 124*  124* 118*   CO2 6* 9*   < > 25  25 25 28  28 27   GLU 1,885* 1,682*   < > 591*  591* 404* 312*  312* 327*   BUN 90* 87*   < > 70*  70* 64* 57*  57* 48*   CREATININE 6.6* 6.1*   < > 4.2*  4.2* 3.8* 3.3*  3.3* 2.9*   CALCIUM 8.6* 8.3*   < > 9.0  9.0 9.0 9.2  9.2 8.4*   PROT 6.6 5.9*  --  6.6  --   --   --    ALBUMIN 3.6 3.3*  --  3.6  --   --   --    BILITOT 0.3 0.2  --  0.3  --   --   --    ALKPHOS 101 91  --  91  --   --   --    AST 14 14  --  27  --   --   --    ALT 18 17  --  18  --   --   --    ANIONGAP 30* 31*   < > 18*  18* 13 11  11 12    < > = values in this interval not displayed.     Lactic Acid:   Recent Labs   Lab 11/10/23  1740   LACTATE 1.9     POCT Glucose:   Recent Labs   Lab 11/11/23  1112 11/11/23  1159 11/11/23  1302   POCTGLUCOSE 202* 304* 344*     Urine Culture: No results for input(s): "LABURIN" in the last 48 hours.  Urine Studies:   Recent Labs   Lab " 11/10/23  1540   COLORU Yellow   APPEARANCEUA Clear   PHUR 5.0   SPECGRAV 1.020   PROTEINUA Trace*   GLUCUA 4+*   KETONESU 1+*   BILIRUBINUA Negative   OCCULTUA Negative   NITRITE Negative   LEUKOCYTESUR Negative   RBCUA 0   WBCUA 3   BACTERIA Rare   SQUAMEPITHEL 0   HYALINECASTS 32*     Recent Lab Results  (Last 5 results in the past 24 hours)        11/11/23  1302   11/11/23  1200   11/11/23  1159   11/11/23  1112   11/11/23  1029        Anion Gap   12             BUN   48             Calcium   8.4             Chloride   118             CO2   27             Creatinine   2.9             eGFR   27.9             Glucose   327             Osmolality   358  Comment: SERUM OSMO critical result(s) called and verbal readback obtained   from ARABELLA CHENEY RN. by [facility] 11/11/2023 12:48               Phosphorus Level   3.7             POCT Glucose 344     304   202   202       Potassium   4.5             Sodium   157                                  All pertinent labs within the past 24 hours have been reviewed.    Significant Imaging:  I have reviewed all pertinent imaging results/findings within the past 24 hours.    ABG  Recent Labs   Lab 11/11/23  0245   PH 7.356   PO2 48   PCO2 44.3   HCO3 24.8   BE -1     Assessment/Plan:     Neuro  Acute metabolic encephalopathy  Presented with AMS, unclear etiology; infections vs DKA vs drug related  Per EMS, Pt responsive to painful stimuli only. CBG >500 with EMS  Follows some commands but very lethargic    Plan:  - UA negative for infection, Bcx NTD  - CXR without consolidation  - continue piptazo; discontinue vancomycin  - UDS positive for THC; volatile screen pending   - CT head unremarkable; MRI brain pending   - q4h neurochecks  - currently protecting airway however will need close monitoring in case he deteriorates for possible intubation  - NPO    Renal/  Hyperkalemia  See DKA    Metabolic acidosis  See DKA    BETH (acute kidney injury)  See DKA      Hypernatremia  See  DKA    Endocrine  * DKA (diabetic ketoacidosis)  Presented with hyperglycemia BGL > 500 with EMS and >1800 on BMP once arrived to [facility] [place]  Also hyperkalemic with K 6.8; shifted by ED  Started on insulin gtt; BGL improved to 1600 but potassium dropped to 2.7  Also has BETH with sCr 6.1 on presentation; baseline sCr 1.2-1.4  Likely 2/2 DKA and hypovolemia, Pt very dry on exam  Hypernatremic with Na ~170 when corrected for degree of hyperglycemia on arrival; remains elevated    Plan:  - continue D5W gtt at 500cc/hr per nephro to assist with hypernatremia  - hold insulin gtt while BGL in the 300s - will allow for higher BG to avoid rapid correction of glucose and serum osm (falling rapidly currently); AG closed, bicarb improved  - nephrology following, appreciate recs; no HD needs currently sCr improving; assisting with management of hypernatremia   - Q2h BMP to monitor K and Na; replace potassium as needed to maintain > 3.5  - Q4h serum osm  - maintain camacho catheter; monitor strict I's/O's  - endocrine consulted, appreciate recs     Type 1 diabetes mellitus  See DKA    GI  GERD (gastroesophageal reflux disease)  Continue home pantoprazole       Critical Care Daily Checklist:    A: Awake: RASS Goal/Actual Goal:    Actual:     B: Spontaneous Breathing Trial Performed?     C: SAT & SBT Coordinated?  N/A                      D: Delirium: CAM-ICU     E: Early Mobility Performed? No   F: Feeding Goal:    Status:     Current Diet Order   Procedures    Diet NPO      AS: Analgesia/Sedation N?A   T: Thromboembolic Prophylaxis heparin   H: HOB > 300 Yes   U: Stress Ulcer Prophylaxis (if needed) pantoprazole   G: Glucose Control Improved, insulin gtt   B: Bowel Function     I: Indwelling Catheter (Lines & Camacho) Necessity PIVC, camacho, R fem central line   D: De-escalation of Antimicrobials/Pharmacotherapies Piptazo, stop vancomycin    Plan for the day/ETD De-escalate abx, f/u nephro and endo recs, monitor Na and serum osm    Code  Status:  Family/Goals of Care: Full Code         Critical secondary to Patient has a condition that poses threat to life and bodily function: DKA/HHS      Critical care was time spent personally by me on the following activities: development of treatment plan with patient or surrogate and bedside caregivers, discussions with consultants, evaluation of patient's response to treatment, examination of patient, ordering and performing treatments and interventions, ordering and review of laboratory studies, ordering and review of radiographic studies, pulse oximetry, re-evaluation of patient's condition. This critical care time did not overlap with that of any other provider or involve time for any procedures.     Jenise Ji MD  Critical Care Medicine  [facility] - Cardiac Medical ICU

## 2023-11-11 NOTE — PROGRESS NOTES
Care update. Patient with improving glucose and potassium but also with rapidly worsening hypernatremia (as expected with correcting the glucose level). Will need increased free water (via 0.22% Saline or D5W). Would Bolus 500cc now and then 250-500cc/hr depending on his serum sodium.   Can now also use D5W to prevent rapid correction of glucose level.  To avoid rapidly changing intracerebral osmolalities would slow down the rate of glucose correction. Also need serum osmolality every 4 hrs.    Prevent serum sodium level to increase further (above 150 mmol/l) to avoid ODS.   Consider getting MRI of brain now to see if a rapid increase in his serum osmolality (before admit) already caused ODS.   Continue q 2 hr electrolyte and also follow serum osmolality.     Management of hourly changes by primary team.

## 2023-11-11 NOTE — ASSESSMENT & PLAN NOTE
Presented with hyperglycemia BGL > 500 with EMS and >1800 on BMP [place] [facility] [place]  Also hyperkalemic with K 6.8; shifted by ED  Started on insulin gtt; BGL improved to 1600 but potassium dropped to 2.7  Also has BETH with sCr 6.1 on presentation; baseline sCr 1.2-1.4  Likely 2/2 DKA and hypovolemia, Pt very dry on exam  Hypernatremic with Na ~170 when corrected for degree of hyperglycemia    Plan:  - hold insulin gtt while hypokalemic; replace potassium and restart insulin gtt once K > 3.5  - nephrology consulted by ED for BETH/hyperkalemia and potential need for HD; no need currently  - 0.25% NaCl as the fluids for DKA given his severe hypernatremia; want fluid with the lowest osmolality possible  - Q1h BMP to monitor K and Na; replace potassium as needed and adjust rate of NaCl as needed  - camacho catheter; monitor strict I's/O's

## 2023-11-11 NOTE — ASSESSMENT & PLAN NOTE
Presented with AMS, unclear etiology; infections vs DKA vs drug related  Per EMS, Pt responsive to painful stimuli only. CBG >500 with EMS  Follows some commands but very lethargic    Plan:  - UA/Ucx, Bcx  - CXR without consolidation  - continue broad abx with piptazo and vancomycin  - UDS and volatile screen given high osmolar gap  - if mentation worsens, get CT head non-contrast  - q4h neurochecks  - currently protecting airway however will need close monitoring in case he deteriorates for possible intubation

## 2023-11-11 NOTE — SUBJECTIVE & OBJECTIVE
Interval History/Significant Events: Mentation the same, following some commands. Nephro helping with fluids and rates, remains on D5W gtt, increased the rate to 500cc/hr. Concerned about rate of correction of serum osm and glucose; Na rising. Currently no pressor requirement, normotensive. Making good urine. Endocrine consult for help with DKA/HHS given complex case    Review of Systems   Unable to perform ROS: Acuity of condition     Objective:     Vital Signs (Most Recent):  Temp: 96.8 °F (36 °C) (11/11/23 1200)  Pulse: 79 (11/11/23 1200)  Resp: 16 (11/11/23 1200)  BP: 126/68 (11/11/23 1200)  SpO2: 100 % (11/11/23 1200) Vital Signs (24h Range):  Temp:  [91.4 °F (33 °C)-98.8 °F (37.1 °C)] 96.8 °F (36 °C)  Pulse:  [] 79  Resp:  [6-36] 16  SpO2:  [99 %-100 %] 100 %  BP: ()/(49-82) 126/68   Weight: 89.5 kg (197 lb 5 oz)  Body mass index is 25.33 kg/m².      Intake/Output Summary (Last 24 hours) at 11/11/2023 1307  Last data filed at 11/11/2023 1200  Gross per 24 hour   Intake 48477.89 ml   Output 4550 ml   Net 6645.89 ml          Physical Exam  Vitals reviewed.   Constitutional:       General: He is not in acute distress.     Appearance: He is ill-appearing and toxic-appearing.      Interventions: He is restrained.   HENT:      Mouth/Throat:      Mouth: Mucous membranes are dry.   Eyes:      Extraocular Movements: Extraocular movements intact.      Pupils: Pupils are equal, round, and reactive to light.   Cardiovascular:      Rate and Rhythm: Regular rhythm. Tachycardia present.      Pulses: Normal pulses.      Heart sounds: Normal heart sounds. No murmur heard.  Pulmonary:      Effort: Pulmonary effort is normal.      Breath sounds: Normal breath sounds. No wheezing, rhonchi or rales.      Comments: On 2L NC  Abdominal:      General: Bowel sounds are normal. There is no distension.      Palpations: Abdomen is soft.      Tenderness: There is no abdominal tenderness. There is no right CVA tenderness or  left CVA tenderness.   Musculoskeletal:         General: No tenderness.      Right lower leg: No edema.      Left lower leg: No edema.   Skin:     General: Skin is warm and dry.      Capillary Refill: Capillary refill takes less than 2 seconds.      Findings: No erythema or rash.   Neurological:      Mental Status: He is lethargic.            Vents:     Lines/Drains/Airways       Peripherally Inserted Central Catheter Line  Duration             PICC Triple Lumen 09/13/23 1230 right basilic 59 days              Central Venous Catheter Line  Duration             Percutaneous Central Line Insertion/Assessment - Triple Lumen  11/10/23 1440 Femoral Vein Right;Femoral Right <1 day              Drain  Duration                  Urethral Catheter 11/10/23 1450 Temperature probe <1 day              Peripheral Intravenous Line  Duration                  Peripheral IV - Single Lumen 11/10/23 1347 18 G Anterior;Right <1 day         Peripheral IV - Single Lumen 11/10/23 1402 20 G Left;Posterior Hand <1 day                  Significant Labs:    CBC/Anemia Profile:  Recent Labs   Lab 11/10/23  1342 11/10/23  1349 11/10/23  1401 11/11/23  0407   WBC 12.36  --  11.62 8.97   HGB 12.3*  --  12.0* 12.3*   HCT 42.5 42 41.0 34.4*     --  179 176   *  --  107* 85   RDW 13.9  --  14.0 12.7        Chemistries:  Recent Labs   Lab 11/10/23  1342 11/10/23  1601 11/10/23  1743 11/10/23  1900 11/11/23  0407 11/11/23  0604 11/11/23  0755 11/11/23  1200   * 139 145   < > 161*  161* 159* 163*  163* 157*   K 6.8* 2.7* 2.6*   < > 3.6  3.6 3.7 3.7  3.7 4.5   CL 95 99 106   < > 118*  118* 121* 124*  124* 118*   CO2 6* 9* 12*   < > 25  25 25 28  28 27   BUN 90* 87* 90*   < > 70*  70* 64* 57*  57* 48*   CREATININE 6.6* 6.1* 6.1*   < > 4.2*  4.2* 3.8* 3.3*  3.3* 2.9*   CALCIUM 8.6* 8.3* 8.4*   < > 9.0  9.0 9.0 9.2  9.2 8.4*   ALBUMIN 3.6 3.3*  --   --  3.6  --   --   --    PROT 6.6 5.9*  --   --  6.6  --   --   --   "  BILITOT 0.3 0.2  --   --  0.3  --   --   --    ALKPHOS 101 91  --   --  91  --   --   --    ALT 18 17  --   --  18  --   --   --    AST 14 14  --   --  27  --   --   --    MG  --   --  3.3*  --  3.5*  --   --   --    PHOS  --   --  2.5*   < > 2.7  2.7  --  2.5* 3.7    < > = values in this interval not displayed.       Blood Culture:   Recent Labs   Lab 11/10/23  1749   LABBLOO No Growth to date  No Growth to date     CMP:   Recent Labs   Lab 11/10/23  1342 11/10/23  1601 11/10/23  1743 11/11/23  0407 11/11/23  0604 11/11/23  0755 11/11/23  1200   * 139   < > 161*  161* 159* 163*  163* 157*   K 6.8* 2.7*   < > 3.6  3.6 3.7 3.7  3.7 4.5   CL 95 99   < > 118*  118* 121* 124*  124* 118*   CO2 6* 9*   < > 25  25 25 28  28 27   GLU 1,885* 1,682*   < > 591*  591* 404* 312*  312* 327*   BUN 90* 87*   < > 70*  70* 64* 57*  57* 48*   CREATININE 6.6* 6.1*   < > 4.2*  4.2* 3.8* 3.3*  3.3* 2.9*   CALCIUM 8.6* 8.3*   < > 9.0  9.0 9.0 9.2  9.2 8.4*   PROT 6.6 5.9*  --  6.6  --   --   --    ALBUMIN 3.6 3.3*  --  3.6  --   --   --    BILITOT 0.3 0.2  --  0.3  --   --   --    ALKPHOS 101 91 -- 91  --   --   --    AST 14 14  --  27  --   --   --    ALT 18 17  --  18  --   --   --    ANIONGAP 30* 31*   < > 18*  18* 13 11  11 12    < > = values in this interval not displayed.     Lactic Acid:   Recent Labs   Lab 11/10/23  1740   LACTATE 1.9     POCT Glucose:   Recent Labs   Lab 11/11/23  1112 11/11/23  1159 11/11/23  1302   POCTGLUCOSE 202* 304* 344*     Urine Culture: No results for input(s): "LABURIN" in the last 48 hours.  Urine Studies:   Recent Labs   Lab 11/10/23  1540   COLORU Yellow   APPEARANCEUA Clear   PHUR 5.0   SPECGRAV 1.020   PROTEINUA Trace*   GLUCUA 4+*   KETONESU 1+*   BILIRUBINUA Negative   OCCULTUA Negative   NITRITE Negative   LEUKOCYTESUR Negative   RBCUA 0   WBCUA 3   BACTERIA Rare   SQUAMEPITHEL 0   HYALINECASTS 32*     Recent Lab Results  (Last 5 results in the past 24 hours)        " 11/11/23  1302   11/11/23  1200   11/11/23  1159   11/11/23  1112   11/11/23  1029        Anion Gap   12             BUN   48             Calcium   8.4             Chloride   118             CO2   27             Creatinine   2.9             eGFR   27.9             Glucose   327             Osmolality   358  Comment: SERUM OSMO critical result(s) called and verbal readback obtained   from ARABELLA CHENEY RN. by [facility] 11/11/2023 12:48               Phosphorus Level   3.7             POCT Glucose 344     304   202   202       Potassium   4.5             Sodium   157                                  All pertinent labs within the past 24 hours have been reviewed.    Significant Imaging:  I have reviewed all pertinent imaging results/findings within the past 24 hours.

## 2023-11-11 NOTE — CONSULTS
Pharmacokinetic Initial Assessment: IV Vancomycin    Assessment/Plan:  Consulted for IV vancomycin TDM indicated for sepsis. Patient in BETH - serum creatinine 6.1 mg/dL (baseline 1.2-1.4). No charted UOP.  Intravenous vancomycin dose of 1250 mg given once in ED. Give additional 500 mg dose after completion of 1250 mg dose to complete 20 mg/kg load. Continue with subsequent doses when random concentrations are less than 20 mcg/mL  Desired empiric serum trough concentration is 15 to 20 mcg/mL  Draw vancomycin random level on 11/11 at with AM labs.  Pharmacy will continue to follow and monitor vancomycin.      Thank you for the consult,   Sal Mora, Pharm.D.  PGY-1 Pharmacy Resident  [id number]     Patient brief summary:  Andrzej Sinclair is a 37 y.o. male initiated on antimicrobial therapy with IV Vancomycin for treatment of suspected sepsis    Drug Allergies:   Review of patient's allergies indicates:   Allergen Reactions    Lactose Other (See Comments)     Gas and moderate to sever abdominal pain       Actual Body Weight:   89.5 kg    Renal Function:   Estimated Creatinine Clearance: 19.5 mL/min (A) (based on SCr of 6.1 mg/dL (H)).,     Dialysis Method (if applicable):  N/A    CBC (last 72 hours):  Recent Labs   Lab Result Units 11/10/23  1342 11/10/23  1401   WBC K/uL 12.36 11.62   Hemoglobin g/dL 12.3* 12.0*   Hematocrit % 42.5 41.0   Platelets K/uL 205 179   Gran % % 76.0* 75.6*   Lymph % % 5.8* 5.3*   Mono % % 14.8 16.4*   Eosinophil % % 0.0 0.1   Basophil % % 0.4 0.3   Differential Method  Automated Automated       Metabolic Panel (last 72 hours):  Recent Labs   Lab Result Units 11/10/23  1342 11/10/23  1540 11/10/23  1601 11/10/23  1604 11/10/23  1743   Sodium mmol/L 131*  --  139  --  145   Potassium mmol/L 6.8*  --  2.7*  --  2.6*   Chloride mmol/L 95  --  99  --  106   CO2 mmol/L 6*  --  9*  --  12*   Glucose mg/dL 1,885*  --  1,682*  --  1,404*   Glucose, UA   --  4+*  --   --   --    BUN mg/dL 90*  --  87*   "--  90*   Creatinine mg/dL 6.6*  --  6.1*  --  6.1*   Creatinine, Urine mg/dL  --   --   --  79.0  --    Albumin g/dL 3.6  --  3.3*  --   --    Total Bilirubin mg/dL 0.3  --  0.2  --   --    Alkaline Phosphatase U/L 101  --  91  --   --    AST U/L 14  --  14  --   --    ALT U/L 18  --  17  --   --    Magnesium mg/dL  --   --   --   --  3.3*   Phosphorus mg/dL  --   --   --   --  2.5*       Drug levels (last 3 results):  No results for input(s): "VANCOMYCINRA", "VANCORANDOM", "VANCOMYCINPE", "VANCOPEAK", "VANCOMYCINTR", "VANCOTROUGH" in the last 72 hours.    Microbiologic Results:  Microbiology Results (last 7 days)       Procedure Component Value Units Date/Time    Blood Culture #1 **CANNOT BE ORDERED STAT** [[id number]] Collected: 11/10/23 1749    Order Status: Sent Specimen: Blood from Peripheral, Hand, Right Updated: 11/10/23 1811    Blood Culture #2 **CANNOT BE ORDERED STAT** [[id number]] Collected: 11/10/23 1749    Order Status: Sent Specimen: Blood from Peripheral, Hand, Left Updated: 11/10/23 1811            "

## 2023-11-11 NOTE — SUBJECTIVE & OBJECTIVE
Interval History: Patient seen and examined. No acute events overnight. Hypothermic with pulse ranging from 100-90s bpm. Systolic blood pressures ranging from 150-110s mmHg via cuff pressures. He is saturating 100% on 2 liters via nasal cannula with documented UOP of 4.1 liters in the last 24 hours. Corrected sodium this morning 166-164 with free water deficit of ~9 liters.    Review of patient's allergies indicates:   Allergen Reactions    Lactose Other (See Comments)     Gas and moderate to sever abdominal pain     Current Facility-Administered Medications   Medication Frequency    dextrose 10 % infusion PRN    dextrose 10 % infusion PRN    dextrose 10% bolus 125 mL 125 mL PRN    dextrose 10% bolus 250 mL 250 mL PRN    dextrose 5 % (D5W) infusion Continuous    dextrose 5 % and 0.45 % NaCl infusion Continuous PRN    glucagon (human recombinant) injection 1 mg PRN    glucose chewable tablet 16 g PRN    glucose chewable tablet 24 g PRN    heparin (porcine) injection 5,000 Units Q8H    insulin regular in 0.9 % NaCl 100 unit/100 mL (1 unit/mL) infusion Continuous    naloxone 0.4 mg/mL injection 0.02 mg PRN    pantoprazole injection 40 mg Daily    piperacillin-tazobactam (ZOSYN) 4.5 g in dextrose 5 % in water (D5W) 100 mL IVPB (MB+) Q12H    potassium chloride 40 mEq in 100 mL IVPB (FOR CENTRAL LINE ADMINISTRATION ONLY) Once    Sodium chloride 0.2% 1000 mL continuous infusion Continuous    sodium chloride 0.9% flush 10 mL Q12H PRN    sodium chloride 0.9% flush 10 mL PRN    vancomycin (VANCOCIN) 500 mg in dextrose 5 % in water (D5W) 100 mL IVPB (MB+) Once    vancomycin - pharmacy to dose pharmacy to manage frequency       Objective:     Vital Signs (Most Recent):  Temp: 97.3 °F (36.3 °C) (11/11/23 0705)  Pulse: 90 (11/11/23 0705)  Resp: 12 (11/11/23 0705)  BP: (!) 140/66 (11/11/23 0705)  SpO2: 100 % (11/11/23 0705) Vital Signs (24h Range):  Temp:  [91.4 °F (33 °C)-98.8 °F (37.1 °C)] 97.3 °F (36.3 °C)  Pulse:  []  90  Resp:  [6-36] 12  SpO2:  [99 %-100 %] 100 %  BP: ()/(49-82) 140/66     Weight: 89.5 kg (197 lb 5 oz) (11/10/23 1519)  Body mass index is 25.33 kg/m².  Body surface area is 2.16 meters squared.    I/O last 3 completed shifts:  In: 7597.7 [I.V.:3478.9; IV Piggyback:4118.8]  Out: 4100 [Urine:4100]     Physical Exam  Constitutional:       Appearance: He is toxic-appearing.   HENT:      Head: Normocephalic.      Mouth/Throat:      Mouth: Mucous membranes are dry.   Cardiovascular:      Rate and Rhythm: Normal rate.   Pulmonary:      Breath sounds: Normal breath sounds.   Genitourinary:     Comments: Foleys in place  Musculoskeletal:         General: Normal range of motion.          Significant Labs:  ABGs:   Recent Labs   Lab 11/11/23  0245   PH 7.356   PCO2 44.3   HCO3 24.8   POCSATURATED 81   BE -1     BMP:   Recent Labs   Lab 11/11/23  0407   *  591*   *  161*   K 3.6  3.6   *  118*   CO2 25  25   BUN 70*  70*   CREATININE 4.2*  4.2*   CALCIUM 9.0  9.0   MG 3.5*     CBC:   Recent Labs   Lab 11/11/23 0407   WBC 8.97   RBC 4.03*   HGB 12.3*   HCT 34.4*      MCV 85   MCH 30.5   MCHC 35.8     CMP:   Recent Labs   Lab 11/11/23 0407   *  591*   CALCIUM 9.0  9.0   ALBUMIN 3.6   PROT 6.6   *  161*   K 3.6  3.6   CO2 25  25   *  118*   BUN 70*  70*   CREATININE 4.2*  4.2*   ALKPHOS 91   ALT 18   AST 27   BILITOT 0.3     Coagulation:   Recent Labs   Lab 11/10/23  1401   INR 1.0     LFTs:   Recent Labs   Lab 11/11/23 0407   ALT 18   AST 27   ALKPHOS 91   BILITOT 0.3   PROT 6.6   ALBUMIN 3.6     Microbiology Results (last 7 days)       Procedure Component Value Units Date/Time    Blood Culture #1 **CANNOT BE ORDERED STAT** [[id number]] Collected: 11/10/23 1749    Order Status: Completed Specimen: Blood from Peripheral, Hand, Right Updated: 11/11/23 0115     Blood Culture, Routine No Growth to date    Blood Culture #2 **CANNOT BE ORDERED STAT** [[id number]]  Collected: 11/10/23 4979    Order Status: Completed Specimen: Blood from Peripheral, Hand, Left Updated: 11/11/23 0115     Blood Culture, Routine No Growth to date          Specimen (24h ago, onward)      None          Recent Labs   Lab 11/10/23  1540   COLORU Yellow   SPECGRAV 1.020   PHUR 5.0   PROTEINUA Trace*   BACTERIA Rare   NITRITE Negative   LEUKOCYTESUR Negative   HYALINECASTS 32*      Significant Imaging:  I have reviewed all imagining in the last 24 hours.

## 2023-11-11 NOTE — ASSESSMENT & PLAN NOTE
Key History and Diagnostic Findings  Type 1 diabetic with frequent admission for DKA and HHS  Admission labs: BG 1885, bicarb 6, AG 30, BHO 5.8 and sOSM 452  Etiology: missed insulin vs. infection    Plan  - Continue ICU level of care for DKA and HHS  - Recent labs show resolution of elevated anion gap metabolic acidosis  - Continue insulin gtt per DKA protocol until patient is able to tolerate diet  - Continue hydration for management of HHS, most recent serum Osm 383, could trend sOsm until less than 320  - Patient would be a candidate for the transition insulin gtt once able to tolerate diet.

## 2023-11-11 NOTE — CONSULTS
"[facility] - [facility]  Endocrinology  Diabetes Consult Note    Consult Requested by: Jarrod Arita MD   Reason for admit: DKA (diabetic ketoacidosis)    HISTORY OF PRESENT ILLNESS:  Andrzej Sinclair is a 37 year old male with PMH significant for poorly controlled type 1 DM c/b frequent DKA, HTN, CKD3 and GERD who was BIBEMS for unresponsiveness. Patient is a not following commands on my assessment and history was obtained from the medical record. Family reportedly called EMS when they found patient down and unresponsive. He reportedly had not been eating or taking his insulin for several days. Patient was found to be hyperglycemic and brought in to the ED. He was found to be unresponsive to verbal stimuli and minimally responsive to physical, but hemodynamically stable and afebrile. Labs showing severe DKA (BG 1885, bicarb 6, AG 30, BHO 5.8) and HHS (sOSM 452). Complications including acute renal failure and shock requiring pressors concerning for septic shock. Endocrinology is consulted for DKA/HHS, initial BGL >1800, hypernatremic, trying to prevent rapid correction of osm and Na, would like assistance with insulin gtt."    Patient was treated with insulin infusion per DKA protocol and aggressive hydration. Acidosis has since resolved and anion gap is closed. However patient is still minimally responsive, not following commands and not eating.    Regarding Type 1 DM    Diagnosed: Age 20  Last hemoglobin A1c: 12.8 09/13/2023  Last seen with Endocrinology: 05/02/2023 with DILIP Mendoza. He can cancelled a virtual appointment with our office in september  Last Hospitalization for DKA: 9/12-9/18    Per records, He was prescribed omnipod 5 in May but unclear of adherence.  Med rec states he is taking Tresiba 21 units daily and Lyumjev 7 units with meals. He has been having issues with Dexcom adherence as an outpatient. Using more than 3 dexcoms per month. Prescriptions have been sent but staff have been unable to " contact him.      BG trends today:      Pertinent Labs       Interval HPI:   Overnight events: Admitted yesterday. Not currently tolerating diet. Not responding or following commands but he is awake.  Eating:   NPO      PMH, PSH, FH, SH updated and reviewed     ROS:  Unable to obtain due to:  acute illness        Current Medications and/or Treatments Impacting Glycemic Control  Immunotherapy:    Immunosuppressants       None          Steroids:   Hormones (From admission, onward)      None          Pressors:    Autonomic Drugs (From admission, onward)      None          Hyperglycemia/Diabetes Medications:   Antihyperglycemics (From admission, onward)      Start     Stop Route Frequency Ordered    11/10/23 1700  insulin regular in 0.9 % NaCl 100 unit/100 mL (1 unit/mL) infusion        Question Answer Comment   Insulin Rate Adjustment (DO NOT MODIFY ANSWER) \\[email or web address] ADJUSTMENT DKA version [email or web address]    Initial dose (DO NOT CHANGE): 0.1 units/kg/hr        -- IV Continuous 11/10/23 1641             PHYSICAL EXAMINATION:  Vitals:    11/11/23 1200   BP: 126/68   Pulse: 79   Resp: 16   Temp: 96.8 °F (36 °C)     Body mass index is 25.33 kg/m².     Physical Exam  Constitutional:       General: He is in acute distress.      Appearance: He is toxic-appearing.   HENT:      Head: Atraumatic.      Nose: Nose normal.      Mouth/Throat:      Mouth: Mucous membranes are dry.   Eyes:      General: No scleral icterus.     Conjunctiva/sclera: Conjunctivae normal.   Cardiovascular:      Rate and Rhythm: Normal rate.      Pulses: Normal pulses.   Pulmonary:      Effort: Pulmonary effort is normal. No respiratory distress.   Musculoskeletal:      Cervical back: Neck supple.      Right lower leg: No edema.      Left lower leg: No edema.   Skin:     General: Skin is warm.   Neurological:      Comments: Not following commands   Psychiatric:         Mood and Affect: Mood normal.          "Behavior: Behavior normal.            Labs Reviewed and Include   Recent Labs   Lab 11/11/23  0407 11/11/23  0604 11/11/23  1200   *  591*   < > 327*   CALCIUM 9.0  9.0   < > 8.4*   ALBUMIN 3.6  --   --    PROT 6.6  --   --    *  161*   < > 157*   K 3.6  3.6   < > 4.5   CO2 25  25   < > 27   *  118*   < > 118*   BUN 70*  70*   < > 48*   CREATININE 4.2*  4.2*   < > 2.9*   ALKPHOS 91  --   --    ALT 18  --   --    AST 27  --   --    BILITOT 0.3  --   --     < > = values in this interval not displayed.     Lab Results   Component Value Date    WBC 8.97 11/11/2023    HGB 12.3 (L) 11/11/2023    HCT 34.4 (L) 11/11/2023    MCV 85 11/11/2023     11/11/2023     No results for input(s): "TSH", "FREET4" in the last 168 hours.  Lab Results   Component Value Date    HGBA1C 12.8 (H) 09/13/2023       Nutritional status:   Body mass index is 25.33 kg/m².  Lab Results   Component Value Date    ALBUMIN 3.6 11/11/2023    ALBUMIN 3.3 (L) 11/10/2023    ALBUMIN 3.6 11/10/2023     No results found for: "PREALBUMIN"    Estimated Creatinine Clearance: 40.9 mL/min (A) (based on SCr of 2.9 mg/dL (H)).    Accu-Checks  Recent Labs     11/10/23  2308 11/11/23  0011 11/11/23  0158 11/11/23  0406 11/11/23  0606 11/11/23  0753 11/11/23  0901 11/11/23  1029 11/11/23  1112 11/11/23  1159   POCTGLUCOSE >500* >500* >500* >500* 377* 286* 232* 202* 202* 304*        ASSESSMENT and PLAN    Renal/  Hypernatremia  Appreciate nephrology recommendations      ID  SIRS (systemic inflammatory response syndrome)  Required pressors  Infection may be contributing to patients severe presentation  Continue management per primary team      Endocrine  * DKA (diabetic ketoacidosis)  Key History and Diagnostic Findings  Type 1 diabetic with frequent admission for DKA and HHS  Admission labs: BG 1885, bicarb 6, AG 30, BHO 5.8 and sOSM 452  Etiology: missed insulin vs. infection    Plan  - Continue ICU level of care for DKA and HHS  - " Recent labs show resolution of elevated anion gap metabolic acidosis  - Continue insulin gtt per DKA protocol until patient is able to tolerate diet  - Continue hydration for management of HHS, most recent serum Osm 383, could trend sOsm until less than 320  - Patient would be a candidate for the transition insulin gtt once able to tolerate diet.          Type 1 diabetes mellitus  Diagnosed: age 20  Last hemoglobin A1c: 12.8 09/13/2023    Outpatient regimen: Med rec states he is taking Tresiba 21 units daily and Lyumjev 7 units with meals with DEXCOM G6. Previously prescribed Omnipod 5  - will confirm after resolution of metabolic encephalopathy        Plan discussed with patient, family, and RN at bedside.     Juwan Humphreys,   Endocrinology  [facility]

## 2023-11-11 NOTE — HOSPITAL COURSE
Admitted to MICU for DKA/HHS. Mentation remains the same - lethargic, protecting own airway, attempting to follow commands. CT head without acute process. Serum osm was very high on admission and unsure how quickly it kevin prior to him presenting to hospital, concerned about risk of ODS given improvement in serum osm with resolution of DKA/HHS; MRI brain pending. UDS positive for THC, ethanol < 10. Volatile screen pending. Nephrology consulted for BETH and hypernatremia, assisting with adjusting fluids to prevent rapid correction of sodium and osm. Initially started on 0.2% NaCl but switched to D5W gtt. Monitoring Na and serum osm frequently. sCr improving with resolution of DKA, making good urine. Cultures NTD, will de-escalate vancomycin and continue piptazo, no obvious source of infection. Endocrine consulted for assistance with insulin gtt given complex case and need to balance the serum osm, Na, and glucose. Phone call to wife Marzena on 11/11 to provide updates. Na, serum osm, and glucose slowly improving. MRI brain without acute or significant findings. Mentation improved, answering questions and following commands. De-escalated vancomycin and piptazo as no infective source. Na imprStable for stepdown from ICU on 11/13.

## 2023-11-11 NOTE — PROGRESS NOTES
[facility]  Nephrology  Progress Note    Patient Name: Andrzej Sinclair  MRN: [id number]  Admission Date: 11/10/2023  Hospital Length of Stay: 1 days  Attending Provider: Jarrod Arita MD   Primary Care Physician: Viktoriya Jaeger NP  Principal Problem:DKA (diabetic ketoacidosis)    Subjective:     HPI: Andrzej Sinclair, a 37 y.o. male PMH of Type 1 DM, presents to the ED w/ complaint of hyperglycemia.  Per EMS patient responsive to painful stimuli only. Initial labs showing blood glucose more than 1000, hyperkalemia, hyponatremia, low BP.    Interval History: Patient seen and examined. No acute events overnight. Hypothermic with pulse ranging from 100-90s bpm. Systolic blood pressures ranging from 150-110s mmHg via cuff pressures. He is saturating 100% on 2 liters via nasal cannula with documented UOP of 4.1 liters in the last 24 hours. Corrected sodium this morning 166-164 with free water deficit of ~9 liters.    Review of patient's allergies indicates:   Allergen Reactions    Lactose Other (See Comments)     Gas and moderate to sever abdominal pain     Current Facility-Administered Medications   Medication Frequency    dextrose 10 % infusion PRN    dextrose 10 % infusion PRN    dextrose 10% bolus 125 mL 125 mL PRN    dextrose 10% bolus 250 mL 250 mL PRN    dextrose 5 % (D5W) infusion Continuous    dextrose 5 % and 0.45 % NaCl infusion Continuous PRN    glucagon (human recombinant) injection 1 mg PRN    glucose chewable tablet 16 g PRN    glucose chewable tablet 24 g PRN    heparin (porcine) injection 5,000 Units Q8H    insulin regular in 0.9 % NaCl 100 unit/100 mL (1 unit/mL) infusion Continuous    naloxone 0.4 mg/mL injection 0.02 mg PRN    pantoprazole injection 40 mg Daily    piperacillin-tazobactam (ZOSYN) 4.5 g in dextrose 5 % in water (D5W) 100 mL IVPB (MB+) Q12H    potassium chloride 40 mEq in 100 mL IVPB (FOR CENTRAL LINE ADMINISTRATION ONLY) Once    Sodium chloride 0.2% 1000 mL continuous  infusion Continuous    sodium chloride 0.9% flush 10 mL Q12H PRN    sodium chloride 0.9% flush 10 mL PRN    vancomycin (VANCOCIN) 500 mg in dextrose 5 % in water (D5W) 100 mL IVPB (MB+) Once    vancomycin - pharmacy to dose pharmacy to manage frequency       Objective:     Vital Signs (Most Recent):  Temp: 97.3 °F (36.3 °C) (11/11/23 0705)  Pulse: 90 (11/11/23 0705)  Resp: 12 (11/11/23 0705)  BP: (!) 140/66 (11/11/23 0705)  SpO2: 100 % (11/11/23 0705) Vital Signs (24h Range):  Temp:  [91.4 °F (33 °C)-98.8 °F (37.1 °C)] 97.3 °F (36.3 °C)  Pulse:  [] 90  Resp:  [6-36] 12  SpO2:  [99 %-100 %] 100 %  BP: ()/(49-82) 140/66     Weight: 89.5 kg (197 lb 5 oz) (11/10/23 1519)  Body mass index is 25.33 kg/m².  Body surface area is 2.16 meters squared.    I/O last 3 completed shifts:  In: 7597.7 [I.V.:3478.9; IV Piggyback:4118.8]  Out: 4100 [Urine:4100]     Physical Exam  Constitutional:       Appearance: He is toxic-appearing.   HENT:      Head: Normocephalic.      Mouth/Throat:      Mouth: Mucous membranes are dry.   Cardiovascular:      Rate and Rhythm: Normal rate.   Pulmonary:      Breath sounds: Normal breath sounds.   Genitourinary:     Comments: Foleys in place  Musculoskeletal:         General: Normal range of motion.          Significant Labs:  ABGs:   Recent Labs   Lab 11/11/23  0245   PH 7.356   PCO2 44.3   HCO3 24.8   POCSATURATED 81   BE -1     BMP:   Recent Labs   Lab 11/11/23  0407   *  591*   *  161*   K 3.6  3.6   *  118*   CO2 25  25   BUN 70*  70*   CREATININE 4.2*  4.2*   CALCIUM 9.0  9.0   MG 3.5*     CBC:   Recent Labs   Lab 11/11/23 0407   WBC 8.97   RBC 4.03*   HGB 12.3*   HCT 34.4*      MCV 85   MCH 30.5   MCHC 35.8     CMP:   Recent Labs   Lab 11/11/23 0407   *  591*   CALCIUM 9.0  9.0   ALBUMIN 3.6   PROT 6.6   *  161*   K 3.6  3.6   CO2 25  25   *  118*   BUN 70*  70*   CREATININE 4.2*  4.2*   ALKPHOS 91   ALT 18   AST 27    BILITOT 0.3     Coagulation:   Recent Labs   Lab 11/10/23  1401   INR 1.0     LFTs:   Recent Labs   Lab 11/11/23  0407   ALT 18   AST 27   ALKPHOS 91   BILITOT 0.3   PROT 6.6   ALBUMIN 3.6     Microbiology Results (last 7 days)       Procedure Component Value Units Date/Time    Blood Culture #1 **CANNOT BE ORDERED STAT** [[id number]] Collected: 11/10/23 1749    Order Status: Completed Specimen: Blood from Peripheral, Hand, Right Updated: 11/11/23 0115     Blood Culture, Routine No Growth to date    Blood Culture #2 **CANNOT BE ORDERED STAT** [[id number]] Collected: 11/10/23 1749    Order Status: Completed Specimen: Blood from Peripheral, Hand, Left Updated: 11/11/23 0115     Blood Culture, Routine No Growth to date          Specimen (24h ago, onward)      None          Recent Labs   Lab 11/10/23  1540   COLORU Yellow   SPECGRAV 1.020   PHUR 5.0   PROTEINUA Trace*   BACTERIA Rare   NITRITE Negative   LEUKOCYTESUR Negative   HYALINECASTS 32*      Significant Imaging:  I have reviewed all imagining in the last 24 hours.  Assessment/Plan:     Renal/  Metabolic acidosis  Anion gap metabolic acidosis with respiratory compensation  Plan  Acidosis will improves with DKA management    BETH (acute kidney injury)  Scr at consult 6, baseline 1-1.3  1)BETH multifactoral 2/2 Pre renal vs ATN*   2)Hyponatremia(corrected sodium showing severe hypernatremia)  3)Hypokalemia  4)Severe hyperglycemia  5)Metabolic acidosis with respiratory compensation  6)High osmolal gap    Plan  Volume expansion with fluids  K replacement when less than 5(DKA)  I/Os  Avoid nephrotoxins  Renal dose medications    Hypernatremia  Sodium 130 at consult time, corrected sodium would be [id number] (Blood glucose of more than 1800 gives correction factor of 2*17)  Risk of hyponatremia to change to hypernatremia quickly, pontine mylenosis > cerebral edema    Recommendations/Plan:  - 4.1 liters documented UOP in the last 24 hours with corrected sodium this  morning 166-164 with free water deficit of ~9 liters  - would increase D5W infusion from 250 to 500 mL/hr  -avoid rapidly changing intracerebral osmolalities (no more than 6-8 mEq in 24 hours)   - Q2H BMP and Q4H osmolality every 4 hrs  - consider getting MRI of brain now to see if a rapid increase in his serum osmolality (before admit) already caused ODS.   - frequent neuroclinical assessment  - K replacement if less than 5    Endocrine  * DKA (diabetic ketoacidosis)  Per Primary    Thank you for your consult. I will follow-up with patient. Please contact us if you have any additional questions.    Marquez Bejarano MD  Nephrology  [facility]

## 2023-11-12 LAB
ALBUMIN SERPL BCP-MCNC: 3.1 G/DL (ref 3.5–5.2)
ALLENS TEST: ABNORMAL
ALP SERPL-CCNC: 78 U/L (ref 55–135)
ALT SERPL W/O P-5'-P-CCNC: 19 U/L (ref 10–44)
ANION GAP SERPL CALC-SCNC: 10 MMOL/L (ref 8–16)
ANION GAP SERPL CALC-SCNC: 11 MMOL/L (ref 8–16)
ANION GAP SERPL CALC-SCNC: 11 MMOL/L (ref 8–16)
ANION GAP SERPL CALC-SCNC: 7 MMOL/L (ref 8–16)
ANION GAP SERPL CALC-SCNC: 8 MMOL/L (ref 8–16)
ANION GAP SERPL CALC-SCNC: 8 MMOL/L (ref 8–16)
ANION GAP SERPL CALC-SCNC: 9 MMOL/L (ref 8–16)
ANION GAP SERPL CALC-SCNC: 9 MMOL/L (ref 8–16)
AST SERPL-CCNC: 34 U/L (ref 10–40)
BASOPHILS # BLD AUTO: 0.01 K/UL (ref 0–0.2)
BASOPHILS NFR BLD: 0.2 % (ref 0–1.9)
BILIRUB SERPL-MCNC: 0.3 MG/DL (ref 0.1–1)
BUN SERPL-MCNC: 10 MG/DL (ref 6–20)
BUN SERPL-MCNC: 11 MG/DL (ref 6–20)
BUN SERPL-MCNC: 12 MG/DL (ref 6–20)
BUN SERPL-MCNC: 14 MG/DL (ref 6–20)
BUN SERPL-MCNC: 15 MG/DL (ref 6–20)
BUN SERPL-MCNC: 17 MG/DL (ref 6–20)
BUN SERPL-MCNC: 18 MG/DL (ref 6–20)
BUN SERPL-MCNC: 21 MG/DL (ref 6–20)
BUN SERPL-MCNC: 25 MG/DL (ref 6–20)
BUN SERPL-MCNC: 9 MG/DL (ref 6–20)
BUN SERPL-MCNC: 9 MG/DL (ref 6–20)
CALCIUM SERPL-MCNC: 8.3 MG/DL (ref 8.7–10.5)
CALCIUM SERPL-MCNC: 8.4 MG/DL (ref 8.7–10.5)
CALCIUM SERPL-MCNC: 8.6 MG/DL (ref 8.7–10.5)
CHLORIDE SERPL-SCNC: 117 MMOL/L (ref 95–110)
CHLORIDE SERPL-SCNC: 118 MMOL/L (ref 95–110)
CHLORIDE SERPL-SCNC: 119 MMOL/L (ref 95–110)
CHLORIDE SERPL-SCNC: 119 MMOL/L (ref 95–110)
CO2 SERPL-SCNC: 24 MMOL/L (ref 23–29)
CO2 SERPL-SCNC: 25 MMOL/L (ref 23–29)
CO2 SERPL-SCNC: 26 MMOL/L (ref 23–29)
CREAT SERPL-MCNC: 1.5 MG/DL (ref 0.5–1.4)
CREAT SERPL-MCNC: 1.6 MG/DL (ref 0.5–1.4)
CREAT SERPL-MCNC: 1.7 MG/DL (ref 0.5–1.4)
CREAT SERPL-MCNC: 1.8 MG/DL (ref 0.5–1.4)
CREAT SERPL-MCNC: 1.8 MG/DL (ref 0.5–1.4)
CREAT SERPL-MCNC: 1.9 MG/DL (ref 0.5–1.4)
CREAT SERPL-MCNC: 2 MG/DL (ref 0.5–1.4)
DELSYS: ABNORMAL
DIFFERENTIAL METHOD: ABNORMAL
EOSINOPHIL # BLD AUTO: 0 K/UL (ref 0–0.5)
EOSINOPHIL NFR BLD: 0.3 % (ref 0–8)
ERYTHROCYTE [DISTWIDTH] IN BLOOD BY AUTOMATED COUNT: 13.2 % (ref 11.5–14.5)
EST. GFR  (NO RACE VARIABLE): 43.5 ML/MIN/1.73 M^2
EST. GFR  (NO RACE VARIABLE): 46.3 ML/MIN/1.73 M^2
EST. GFR  (NO RACE VARIABLE): 49.4 ML/MIN/1.73 M^2
EST. GFR  (NO RACE VARIABLE): 49.4 ML/MIN/1.73 M^2
EST. GFR  (NO RACE VARIABLE): 52.9 ML/MIN/1.73 M^2
EST. GFR  (NO RACE VARIABLE): 56.9 ML/MIN/1.73 M^2
EST. GFR  (NO RACE VARIABLE): >60 ML/MIN/1.73 M^2
GLUCOSE SERPL-MCNC: 107 MG/DL (ref 70–110)
GLUCOSE SERPL-MCNC: 126 MG/DL (ref 70–110)
GLUCOSE SERPL-MCNC: 146 MG/DL (ref 70–110)
GLUCOSE SERPL-MCNC: 146 MG/DL (ref 70–110)
GLUCOSE SERPL-MCNC: 155 MG/DL (ref 70–110)
GLUCOSE SERPL-MCNC: 168 MG/DL (ref 70–110)
GLUCOSE SERPL-MCNC: 178 MG/DL (ref 70–110)
GLUCOSE SERPL-MCNC: 180 MG/DL (ref 70–110)
GLUCOSE SERPL-MCNC: 181 MG/DL (ref 70–110)
GLUCOSE SERPL-MCNC: 216 MG/DL (ref 70–110)
GLUCOSE SERPL-MCNC: 225 MG/DL (ref 70–110)
HCO3 UR-SCNC: 25.6 MMOL/L (ref 24–28)
HCT VFR BLD AUTO: 35.9 % (ref 40–54)
HCT VFR BLD CALC: 37 %PCV (ref 36–54)
HGB BLD-MCNC: 12.2 G/DL (ref 14–18)
IMM GRANULOCYTES # BLD AUTO: 0.04 K/UL (ref 0–0.04)
IMM GRANULOCYTES NFR BLD AUTO: 0.7 % (ref 0–0.5)
LYMPHOCYTES # BLD AUTO: 1.1 K/UL (ref 1–4.8)
LYMPHOCYTES NFR BLD: 18.5 % (ref 18–48)
MAGNESIUM SERPL-MCNC: 2.5 MG/DL (ref 1.6–2.6)
MCH RBC QN AUTO: 30.8 PG (ref 27–31)
MCHC RBC AUTO-ENTMCNC: 34 G/DL (ref 32–36)
MCV RBC AUTO: 91 FL (ref 82–98)
MODE: ABNORMAL
MONOCYTES # BLD AUTO: 0.6 K/UL (ref 0.3–1)
MONOCYTES NFR BLD: 11 % (ref 4–15)
NEUTROPHILS # BLD AUTO: 4 K/UL (ref 1.8–7.7)
NEUTROPHILS NFR BLD: 69.3 % (ref 38–73)
NRBC BLD-RTO: 0 /100 WBC
OSMOLALITY SERPL: 319 MOSM/KG (ref 280–300)
OSMOLALITY SERPL: 321 MOSM/KG (ref 280–300)
OSMOLALITY SERPL: 324 MOSM/KG (ref 280–300)
OSMOLALITY SERPL: 328 MOSM/KG (ref 280–300)
OSMOLALITY SERPL: 328 MOSM/KG (ref 280–300)
OSMOLALITY SERPL: 332 MOSM/KG (ref 280–300)
OSMOLALITY UR: 345 MOSM/KG (ref 50–1200)
PCO2 BLDA: 46.8 MMHG (ref 35–45)
PH SMN: 7.35 [PH] (ref 7.35–7.45)
PHOSPHATE SERPL-MCNC: 1.7 MG/DL (ref 2.7–4.5)
PHOSPHATE SERPL-MCNC: 1.9 MG/DL (ref 2.7–4.5)
PHOSPHATE SERPL-MCNC: 2 MG/DL (ref 2.7–4.5)
PHOSPHATE SERPL-MCNC: 2.1 MG/DL (ref 2.7–4.5)
PHOSPHATE SERPL-MCNC: 2.1 MG/DL (ref 2.7–4.5)
PHOSPHATE SERPL-MCNC: 2.9 MG/DL (ref 2.7–4.5)
PLATELET # BLD AUTO: 123 K/UL (ref 150–450)
PMV BLD AUTO: 11.2 FL (ref 9.2–12.9)
PO2 BLDA: 35 MMHG (ref 40–60)
POC BE: 0 MMOL/L
POC IONIZED CALCIUM: 1.26 MMOL/L (ref 1.06–1.42)
POC SATURATED O2: 64 % (ref 95–100)
POC TCO2: 27 MMOL/L (ref 24–29)
POCT GLUCOSE: 100 MG/DL (ref 70–110)
POCT GLUCOSE: 109 MG/DL (ref 70–110)
POCT GLUCOSE: 124 MG/DL (ref 70–110)
POCT GLUCOSE: 139 MG/DL (ref 70–110)
POCT GLUCOSE: 140 MG/DL (ref 70–110)
POCT GLUCOSE: 141 MG/DL (ref 70–110)
POCT GLUCOSE: 148 MG/DL (ref 70–110)
POCT GLUCOSE: 150 MG/DL (ref 70–110)
POCT GLUCOSE: 158 MG/DL (ref 70–110)
POCT GLUCOSE: 159 MG/DL (ref 70–110)
POCT GLUCOSE: 159 MG/DL (ref 70–110)
POCT GLUCOSE: 160 MG/DL (ref 70–110)
POCT GLUCOSE: 161 MG/DL (ref 70–110)
POCT GLUCOSE: 162 MG/DL (ref 70–110)
POCT GLUCOSE: 167 MG/DL (ref 70–110)
POCT GLUCOSE: 172 MG/DL (ref 70–110)
POCT GLUCOSE: 175 MG/DL (ref 70–110)
POCT GLUCOSE: 176 MG/DL (ref 70–110)
POCT GLUCOSE: 181 MG/DL (ref 70–110)
POCT GLUCOSE: 200 MG/DL (ref 70–110)
POCT GLUCOSE: 213 MG/DL (ref 70–110)
POCT GLUCOSE: 220 MG/DL (ref 70–110)
POTASSIUM BLD-SCNC: 3.7 MMOL/L (ref 3.5–5.1)
POTASSIUM SERPL-SCNC: 3.2 MMOL/L (ref 3.5–5.1)
POTASSIUM SERPL-SCNC: 3.4 MMOL/L (ref 3.5–5.1)
POTASSIUM SERPL-SCNC: 3.4 MMOL/L (ref 3.5–5.1)
POTASSIUM SERPL-SCNC: 3.5 MMOL/L (ref 3.5–5.1)
POTASSIUM SERPL-SCNC: 3.6 MMOL/L (ref 3.5–5.1)
POTASSIUM SERPL-SCNC: 3.7 MMOL/L (ref 3.5–5.1)
POTASSIUM SERPL-SCNC: 3.8 MMOL/L (ref 3.5–5.1)
POTASSIUM SERPL-SCNC: 3.9 MMOL/L (ref 3.5–5.1)
POTASSIUM SERPL-SCNC: 5.8 MMOL/L (ref 3.5–5.1)
POTASSIUM UR-SCNC: 18 MMOL/L (ref 15–95)
PROT SERPL-MCNC: 5.9 G/DL (ref 6–8.4)
RBC # BLD AUTO: 3.96 M/UL (ref 4.6–6.2)
SAMPLE: ABNORMAL
SITE: ABNORMAL
SODIUM BLD-SCNC: 151 MMOL/L (ref 136–145)
SODIUM SERPL-SCNC: 151 MMOL/L (ref 136–145)
SODIUM SERPL-SCNC: 152 MMOL/L (ref 136–145)
SODIUM SERPL-SCNC: 153 MMOL/L (ref 136–145)
SODIUM SERPL-SCNC: 153 MMOL/L (ref 136–145)
SODIUM SERPL-SCNC: 154 MMOL/L (ref 136–145)
SODIUM UR-SCNC: 12 MMOL/L (ref 20–250)
SP02: 100
WBC # BLD AUTO: 5.83 K/UL (ref 3.9–12.7)

## 2023-11-12 PROCEDURE — 83930 ASSAY OF BLOOD OSMOLALITY: CPT

## 2023-11-12 PROCEDURE — 94761 N-INVAS EAR/PLS OXIMETRY MLT: CPT

## 2023-11-12 PROCEDURE — 84300 ASSAY OF URINE SODIUM: CPT | Performed by: INTERNAL MEDICINE

## 2023-11-12 PROCEDURE — 63600175 PHARM REV CODE 636 W HCPCS

## 2023-11-12 PROCEDURE — 83735 ASSAY OF MAGNESIUM: CPT

## 2023-11-12 PROCEDURE — 83935 ASSAY OF URINE OSMOLALITY: CPT | Performed by: INTERNAL MEDICINE

## 2023-11-12 PROCEDURE — 93005 ELECTROCARDIOGRAM TRACING: CPT

## 2023-11-12 PROCEDURE — 99900035 HC TECH TIME PER 15 MIN (STAT)

## 2023-11-12 PROCEDURE — 25000003 PHARM REV CODE 250

## 2023-11-12 PROCEDURE — 99291 PR CRITICAL CARE, E/M 30-74 MINUTES: ICD-10-PCS | Mod: ,,, | Performed by: INTERNAL MEDICINE

## 2023-11-12 PROCEDURE — 93010 ELECTROCARDIOGRAM REPORT: CPT | Mod: ,,, | Performed by: INTERNAL MEDICINE

## 2023-11-12 PROCEDURE — 84100 ASSAY OF PHOSPHORUS: CPT | Mod: 91 | Performed by: STUDENT IN AN ORGANIZED HEALTH CARE EDUCATION/TRAINING PROGRAM

## 2023-11-12 PROCEDURE — 80048 BASIC METABOLIC PNL TOTAL CA: CPT | Performed by: INTERNAL MEDICINE

## 2023-11-12 PROCEDURE — 99233 SBSQ HOSP IP/OBS HIGH 50: CPT | Mod: GC,,, | Performed by: INTERNAL MEDICINE

## 2023-11-12 PROCEDURE — 20000000 HC ICU ROOM

## 2023-11-12 PROCEDURE — 85025 COMPLETE CBC W/AUTO DIFF WBC: CPT

## 2023-11-12 PROCEDURE — 84100 ASSAY OF PHOSPHORUS: CPT | Performed by: STUDENT IN AN ORGANIZED HEALTH CARE EDUCATION/TRAINING PROGRAM

## 2023-11-12 PROCEDURE — 25000003 PHARM REV CODE 250: Performed by: STUDENT IN AN ORGANIZED HEALTH CARE EDUCATION/TRAINING PROGRAM

## 2023-11-12 PROCEDURE — 25000003 PHARM REV CODE 250: Performed by: INTERNAL MEDICINE

## 2023-11-12 PROCEDURE — 93010 EKG 12-LEAD: ICD-10-PCS | Mod: ,,, | Performed by: INTERNAL MEDICINE

## 2023-11-12 PROCEDURE — 84133 ASSAY OF URINE POTASSIUM: CPT | Performed by: INTERNAL MEDICINE

## 2023-11-12 PROCEDURE — 99291 CRITICAL CARE FIRST HOUR: CPT | Mod: ,,, | Performed by: INTERNAL MEDICINE

## 2023-11-12 PROCEDURE — S5010 5% DEXTROSE AND 0.45% SALINE: HCPCS | Performed by: STUDENT IN AN ORGANIZED HEALTH CARE EDUCATION/TRAINING PROGRAM

## 2023-11-12 PROCEDURE — 80048 BASIC METABOLIC PNL TOTAL CA: CPT | Mod: 91,XB | Performed by: INTERNAL MEDICINE

## 2023-11-12 PROCEDURE — 80053 COMPREHEN METABOLIC PANEL: CPT | Performed by: STUDENT IN AN ORGANIZED HEALTH CARE EDUCATION/TRAINING PROGRAM

## 2023-11-12 PROCEDURE — C9113 INJ PANTOPRAZOLE SODIUM, VIA: HCPCS

## 2023-11-12 PROCEDURE — 99232 SBSQ HOSP IP/OBS MODERATE 35: CPT | Mod: GC,,, | Performed by: INTERNAL MEDICINE

## 2023-11-12 PROCEDURE — 63600175 PHARM REV CODE 636 W HCPCS: Performed by: INTERNAL MEDICINE

## 2023-11-12 PROCEDURE — 83930 ASSAY OF BLOOD OSMOLALITY: CPT | Mod: 91

## 2023-11-12 PROCEDURE — 99233 PR SUBSEQUENT HOSPITAL CARE,LEVL III: ICD-10-PCS | Mod: GC,,, | Performed by: INTERNAL MEDICINE

## 2023-11-12 PROCEDURE — 99232 PR SUBSEQUENT HOSPITAL CARE,LEVL II: ICD-10-PCS | Mod: GC,,, | Performed by: INTERNAL MEDICINE

## 2023-11-12 RX ORDER — PANTOPRAZOLE SODIUM 40 MG/1
40 TABLET, DELAYED RELEASE ORAL DAILY
Status: DISCONTINUED | OUTPATIENT
Start: 2023-11-13 | End: 2023-11-13

## 2023-11-12 RX ORDER — POTASSIUM CHLORIDE 7.45 MG/ML
10 INJECTION INTRAVENOUS
Status: COMPLETED | OUTPATIENT
Start: 2023-11-12 | End: 2023-11-12

## 2023-11-12 RX ORDER — POTASSIUM CHLORIDE 29.8 MG/ML
40 INJECTION INTRAVENOUS ONCE
Status: COMPLETED | OUTPATIENT
Start: 2023-11-12 | End: 2023-11-12

## 2023-11-12 RX ADMIN — DEXTROSE AND SODIUM CHLORIDE: 5; 450 INJECTION, SOLUTION INTRAVENOUS at 09:11

## 2023-11-12 RX ADMIN — HEPARIN SODIUM 5000 UNITS: 5000 INJECTION INTRAVENOUS; SUBCUTANEOUS at 09:11

## 2023-11-12 RX ADMIN — SODIUM PHOSPHATE, MONOBASIC, MONOHYDRATE AND SODIUM PHOSPHATE, DIBASIC, ANHYDROUS 20.01 MMOL: 142; 276 INJECTION, SOLUTION INTRAVENOUS at 05:11

## 2023-11-12 RX ADMIN — PIPERACILLIN SODIUM AND TAZOBACTAM SODIUM 4.5 G: 4; .5 INJECTION, POWDER, FOR SOLUTION INTRAVENOUS at 06:11

## 2023-11-12 RX ADMIN — POTASSIUM BICARBONATE 25 MEQ: 978 TABLET, EFFERVESCENT ORAL at 11:11

## 2023-11-12 RX ADMIN — DEXTROSE MONOHYDRATE: 5 INJECTION, SOLUTION INTRAVENOUS at 05:11

## 2023-11-12 RX ADMIN — DEXTROSE MONOHYDRATE: 5 INJECTION, SOLUTION INTRAVENOUS at 12:11

## 2023-11-12 RX ADMIN — POTASSIUM CHLORIDE 10 MEQ: 7.46 INJECTION, SOLUTION INTRAVENOUS at 05:11

## 2023-11-12 RX ADMIN — POTASSIUM CHLORIDE 40 MEQ: 29.8 INJECTION, SOLUTION INTRAVENOUS at 09:11

## 2023-11-12 RX ADMIN — POTASSIUM CHLORIDE 10 MEQ: 7.46 INJECTION, SOLUTION INTRAVENOUS at 06:11

## 2023-11-12 RX ADMIN — PIPERACILLIN SODIUM AND TAZOBACTAM SODIUM 4.5 G: 4; .5 INJECTION, POWDER, FOR SOLUTION INTRAVENOUS at 10:11

## 2023-11-12 RX ADMIN — POTASSIUM CHLORIDE 10 MEQ: 7.46 INJECTION, SOLUTION INTRAVENOUS at 08:11

## 2023-11-12 RX ADMIN — INSULIN HUMAN 3.5 UNITS/HR: 1 INJECTION, SOLUTION INTRAVENOUS at 12:11

## 2023-11-12 RX ADMIN — INSULIN HUMAN 0.05 UNITS/KG/HR: 1 INJECTION, SOLUTION INTRAVENOUS at 12:11

## 2023-11-12 RX ADMIN — DEXTROSE MONOHYDRATE: 5 INJECTION, SOLUTION INTRAVENOUS at 01:11

## 2023-11-12 RX ADMIN — HEPARIN SODIUM 5000 UNITS: 5000 INJECTION INTRAVENOUS; SUBCUTANEOUS at 01:11

## 2023-11-12 RX ADMIN — POTASSIUM PHOSPHATE, MONOBASIC POTASSIUM PHOSPHATE, DIBASIC 20 MMOL: 224; 236 INJECTION, SOLUTION, CONCENTRATE INTRAVENOUS at 02:11

## 2023-11-12 RX ADMIN — HEPARIN SODIUM 5000 UNITS: 5000 INJECTION INTRAVENOUS; SUBCUTANEOUS at 05:11

## 2023-11-12 RX ADMIN — MUPIROCIN: 20 OINTMENT TOPICAL at 09:11

## 2023-11-12 RX ADMIN — PANTOPRAZOLE SODIUM 40 MG: 40 INJECTION, POWDER, FOR SOLUTION INTRAVENOUS at 08:11

## 2023-11-12 RX ADMIN — DEXTROSE MONOHYDRATE: 5 INJECTION, SOLUTION INTRAVENOUS at 09:11

## 2023-11-12 RX ADMIN — PIPERACILLIN SODIUM AND TAZOBACTAM SODIUM 4.5 G: 4; .5 INJECTION, POWDER, FOR SOLUTION INTRAVENOUS at 02:11

## 2023-11-12 RX ADMIN — MUPIROCIN: 20 OINTMENT TOPICAL at 08:11

## 2023-11-12 RX ADMIN — POTASSIUM CHLORIDE 40 MEQ: 29.8 INJECTION, SOLUTION INTRAVENOUS at 12:11

## 2023-11-12 NOTE — ASSESSMENT & PLAN NOTE
Presented with hyperglycemia BGL > 500 with EMS and >1800 on BMP [place] [facility] [place] hyperkalemic with K 6.8; shifted by ED  Started on insulin gtt; BGL improved to 1600 but potassium dropped to 2.7  Also has BETH with sCr 6.1 on presentation; baseline sCr 1.2-1.4  Likely 2/2 DKA and hypovolemia, Pt very dry on exam  Hypernatremic with Na ~170 when corrected for degree of hyperglycemia on arrival    Plan:  - now out of DKA but still hyperosmolar  - titrating gtt to allow for slow resolution of hypernatremia and hyperosmolality  - endocrine consulted, appreciate recs; recommend transitioning to a transitional insulin gtt   - nephrology following, appreciate recs; no HD needs currently sCr improving; assisting with management of hypernatremia   - Q2h BMP to monitor K and Na; replace potassium as needed to maintain > 3.5  - Q4h serum osm  - maintain camacho catheter; monitor strict I's/O's

## 2023-11-12 NOTE — ASSESSMENT & PLAN NOTE
Key History and Diagnostic Findings  Type 1 diabetic with frequent admission for DKA and HHS  Admission labs: BG 1885, bicarb 6, AG 30, BHO 5.8 and sOSM 452  Etiology: missed insulin vs. infection    Plan  - Continue ICU level of care for DKA and HHS  - Labs show resolution of elevated anion gap metabolic acidosis  - Would still benefit from IV insulin infusion while HHS resolves, unfortunately, weight based DKA infusion has been titrated off multiple times. Patient is a type 1 diabetic and insulin should not be held.    - Start transition insulin gtt with instructions to titrate down to 1-2 units/hour, note that he was last prescribed 21 units of tresiba daily (appx 1 unit/hr) and 7 units lyumjev with meals  - Start bariatric (no sugar) clear liquids diet

## 2023-11-12 NOTE — ASSESSMENT & PLAN NOTE
Presented with AMS, unclear etiology; infections vs DKA vs drug related  Per EMS, Pt responsive to painful stimuli only. CBG >500 with EMS  Follows some commands but very lethargic    Plan:  - UA negative for infection, Bcx NTD  - CXR without consolidation  - continue piptazo; discontinue vancomycin  - UDS positive for THC; volatile screen pending   - CT head unremarkable; MRI brain NAD   - q4h neurochecks  - currently protecting airway however will need close monitoring in case he deteriorates for possible intubation; mentation improving, following commands and answering questions

## 2023-11-12 NOTE — PROGRESS NOTES
[facility]  Nephrology  Progress Note    Patient Name: Andrzej Sinclair  MRN: [id number]  Admission Date: 11/10/2023  Hospital Length of Stay: 2 days  Attending Provider: Jarrod Arita MD   Primary Care Physician: Viktoriya Jaeger NP  Principal Problem:DKA (diabetic ketoacidosis)    Subjective:     HPI: Andrzej Sinclair, a 37 y.o. male PMH of Type 1 DM, presents to the ED w/ complaint of hyperglycemia.  Per EMS patient responsive to painful stimuli only. Initial labs showing blood glucose more than 1000, hyperkalemia, hyponatremia, low BP.    Interval History: Patient seen and examined. No acute events overnight. Hypothermic with pulse ranging from 90-60s bpm. Systolic blood pressures ranging from 140-110s mmHg via cuff pressures. He is saturating +99% on room air with documented UOP of 2.375 liters in the last 24 hours (net positive ~8 liters). Corrected sodium this morning 166-164 with free water deficit of ~9 liters. Renal function largely stable. Serum sodium this morning corrected to 153.     Review of patient's allergies indicates:   Allergen Reactions    Lactose Other (See Comments)     Gas and moderate to sever abdominal pain     Current Facility-Administered Medications   Medication Frequency    dextrose 10 % infusion PRN    dextrose 10 % infusion PRN    dextrose 10% bolus 125 mL 125 mL PRN    dextrose 10% bolus 250 mL 250 mL PRN    dextrose 5 % (D5W) infusion Continuous    dextrose 5 % and 0.45 % NaCl infusion Continuous PRN    glucagon (human recombinant) injection 1 mg PRN    glucose chewable tablet 16 g PRN    glucose chewable tablet 24 g PRN    heparin (porcine) injection 5,000 Units Q8H    insulin regular in 0.9 % NaCl 100 unit/100 mL (1 unit/mL) infusion Continuous    mupirocin 2 % ointment BID    naloxone 0.4 mg/mL injection 0.02 mg PRN    pantoprazole injection 40 mg Daily    piperacillin-tazobactam (ZOSYN) 4.5 g in dextrose 5 % in water (D5W) 100 mL IVPB (MB+) Q8H    sodium chloride  0.9% flush 10 mL Q12H PRN    sodium chloride 0.9% flush 10 mL PRN       Objective:     Vital Signs (Most Recent):  Temp: 98.8 °F (37.1 °C) (11/12/23 1200)  Pulse: 82 (11/12/23 1200)  Resp: 13 (11/12/23 1200)  BP: 131/81 (11/12/23 1200)  SpO2: 99 % (11/12/23 1200) Vital Signs (24h Range):  Temp:  [93.6 °F (34.2 °C)-98.8 °F (37.1 °C)] 98.8 °F (37.1 °C)  Pulse:  [64-92] 82  Resp:  [10-22] 13  SpO2:  [99 %-100 %] 99 %  BP: (117-141)/(61-81) 131/81     Weight: 89.5 kg (197 lb 5 oz) (11/10/23 1519)  Body mass index is 25.33 kg/m².  Body surface area is 2.16 meters squared.    I/O last 3 completed shifts:  In: 23711.2 [I.V.:77076.2; IV Piggyback:2593]  Out: 6475 [Urine:6475]    Physical Exam  Constitutional:       Appearance: He is toxic-appearing.   HENT:      Head: Normocephalic.      Mouth/Throat:      Mouth: Mucous membranes are moist.   Cardiovascular:      Rate and Rhythm: Normal rate.   Pulmonary:      Breath sounds: Normal breath sounds.   Genitourinary:     Comments: Foleys in place  Musculoskeletal:         General: Normal range of motion.          Significant Labs:  ABGs:   Recent Labs   Lab 11/11/23  0245   PH 7.356   PCO2 44.3   HCO3 24.8   POCSATURATED 81   BE -1       BMP:   Recent Labs   Lab 11/12/23  0411 11/12/23  0605 11/12/23  1159   *   < > 181*   *   < > 152*   K 3.8   < > 5.8*   *   < > 119*   CO2 25   < > 26   BUN 21*   < > 14   CREATININE 1.9*   < > 1.6*   CALCIUM 8.3*   < > 8.4*   MG 2.5  --   --     < > = values in this interval not displayed.       CBC:   Recent Labs   Lab 11/12/23  1106   WBC 5.83   RBC 3.96*   HGB 12.2*   HCT 35.9*   *   MCV 91   MCH 30.8   MCHC 34.0       CMP:   Recent Labs   Lab 11/12/23  0411 11/12/23  0605 11/12/23  1159   *   < > 181*   CALCIUM 8.3*   < > 8.4*   ALBUMIN 3.1*  --   --    PROT 5.9*  --   --    *   < > 152*   K 3.8   < > 5.8*   CO2 25   < > 26   *   < > 119*   BUN 21*   < > 14   CREATININE 1.9*   < > 1.6*    ALKPHOS 78  --   --    ALT 19  --   --    AST 34  --   --    BILITOT 0.3  --   --     < > = values in this interval not displayed.       Coagulation:   Recent Labs   Lab 11/10/23  1401   INR 1.0       LFTs:   Recent Labs   Lab 11/12/23  0411   ALT 19   AST 34   ALKPHOS 78   BILITOT 0.3   PROT 5.9*   ALBUMIN 3.1*       Microbiology Results (last 7 days)       Procedure Component Value Units Date/Time    Blood Culture #1 **CANNOT BE ORDERED STAT** [[id number]] Collected: 11/10/23 1749    Order Status: Completed Specimen: Blood from Peripheral, Hand, Right Updated: 11/11/23 1822     Blood Culture, Routine No Growth to date      No Growth to date    Blood Culture #2 **CANNOT BE ORDERED STAT** [[id number]] Collected: 11/10/23 1749    Order Status: Completed Specimen: Blood from Peripheral, Hand, Left Updated: 11/11/23 1822     Blood Culture, Routine No Growth to date      No Growth to date          Specimen (24h ago, onward)      None          Recent Labs   Lab 11/10/23  1540   COLORU Yellow   SPECGRAV 1.020   PHUR 5.0   PROTEINUA Trace*   BACTERIA Rare   NITRITE Negative   LEUKOCYTESUR Negative   HYALINECASTS 32*        Significant Imaging:  I have reviewed all imagining in the last 24 hours.  Assessment/Plan:     Renal/  BETH (acute kidney injury)  Scr at consult 6, baseline 1-1.3  1)BETH multifactoral 2/2 Pre renal vs ATN*   2)Hyponatremia(corrected sodium showing severe hypernatremia)  3)Hypokalemia  4)Severe hyperglycemia  5)Metabolic acidosis with respiratory compensation  6)High osmolal gap    Plan  Volume expansion with fluids  I/Os  Avoid nephrotoxins  Renal dose medications    Hypernatremia  Sodium 130 at consult time, corrected sodium would be [id number] (Blood glucose of more than 1800 gives correction factor of 2*17)  Risk of hyponatremia to change to hypernatremia quickly, pontine mylenosis > cerebral edema    Recommendations/Plan:  - would continue with D5W at 125 mL/hr and repeat urine electrolytes  and osmolality studies   -avoid rapidly changing intracerebral osmolalities (no more than 6-8 mEq in 24 hours)   - Q2H BMP and Q4H osmolality every 4 hrs  - consider getting MRI of brain now to see if a rapid increase in his serum osmolality (before admit) already caused ODS.   - frequent neuroclinical assessment although MRI brain on 11/11 without radiographic evidence of osmotic myelinolysis  - K replacement if less than 5    Endocrine  * DKA (diabetic ketoacidosis)  Per Primary      Thank you for your consult. I will follow-up with patient. Please contact us if you have any additional questions.    Marquez Bejarano MD  Nephrology  [facility]

## 2023-11-12 NOTE — ASSESSMENT & PLAN NOTE
Sodium 130 at consult time, corrected sodium would be [id number] (Blood glucose of more than 1800 gives correction factor of 2*17)  Risk of hyponatremia to change to hypernatremia quickly, pontine mylenosis > cerebral edema    Recommendations/Plan:  - would continue   -avoid rapidly changing intracerebral osmolalities (no more than 6-8 mEq in 24 hours)   - Q2H BMP and Q4H osmolality every 4 hrs  - consider getting MRI of brain now to see if a rapid increase in his serum osmolality (before admit) already caused ODS.   - frequent neuroclinical assessment although MRI brain on 11/11 without radiographic evidence of osmotic myelinolysis  - K replacement if less than 5

## 2023-11-12 NOTE — PROGRESS NOTES
"[facility]  Endocrinology  Progress Note    Admit Date: 11/10/2023     Andrzej Sinclair is a 37 year old male with PMH significant for poorly controlled type 1 DM c/b frequent DKA, HTN, CKD3 and GERD who was BIBEMS for unresponsiveness. Patient is a not following commands on my assessment and history was obtained from the medical record. Family reportedly called EMS when they found patient down and unresponsive. He reportedly had not been eating or taking his insulin for several days. Patient was found to be hyperglycemic and brought in to the ED. He was found to be unresponsive to verbal stimuli and minimally responsive to physical, but hemodynamically stable and afebrile. Labs showing severe DKA (BG 1885, bicarb 6, AG 30, BHO 5.8) and HHS (sOSM 452). Complications including acute renal failure and shock requiring pressors concerning for septic shock. Endocrinology is consulted for DKA/HHS, initial BGL >1800, hypernatremic, trying to prevent rapid correction of osm and Na, would like assistance with insulin gtt."    Patient was treated with insulin infusion per DKA protocol and aggressive hydration. Acidosis has since resolved and anion gap is closed. However patient is still minimally responsive, not following commands and not eating.    Regarding Type 1 DM    Diagnosed: Age 20  Last hemoglobin A1c: 12.8 09/13/2023  Last seen with Endocrinology: 05/02/2023 with DILIP Mendoza. He can cancelled a virtual appointment with our office in september  Last Hospitalization for DKA: 9/12-9/18    Per records, He was prescribed omnipod 5 in May but unclear of adherence.  Med rec states he is taking Tresiba 21 units daily and Lyumjev 7 units with meals. He has been having issues with Dexcom adherence as an outpatient. Using more than 3 dexcoms per month. Prescriptions have been sent but staff have been unable to contact him.      BG trends today:      Pertinent Labs       Interval HPI:   Overnight events: " "More awake today. Responds to voice, answers minimal questions. States that he thinks he can eat. Reports missing his long acting insulin, would not say why.    Eating:   NPO  Nausea: Yes  Hypoglycemia and intervention: No  Fever: No  TPN and/or TF: No    /81 (Patient Position: Lying)   Pulse 82   Temp 98.8 °F (37.1 °C)   Resp 13   Ht 6' 2" (1.88 m)   Wt 89.5 kg (197 lb 5 oz)   SpO2 99%   BMI 25.33 kg/m²     Labs Reviewed and Include    Recent Labs   Lab 11/12/23  0411 11/12/23  0605 11/12/23  1159   *   < > 181*   CALCIUM 8.3*   < > 8.4*   ALBUMIN 3.1*  --   --    PROT 5.9*  --   --    *   < > 152*   K 3.8   < > 5.8*   CO2 25   < > 26   *   < > 119*   BUN 21*   < > 14   CREATININE 1.9*   < > 1.6*   ALKPHOS 78  --   --    ALT 19  --   --    AST 34  --   --    BILITOT 0.3  --   --     < > = values in this interval not displayed.     Lab Results   Component Value Date    WBC 5.83 11/12/2023    HGB 12.2 (L) 11/12/2023    HCT 35.9 (L) 11/12/2023    MCV 91 11/12/2023     (L) 11/12/2023     No results for input(s): "TSH", "FREET4" in the last 168 hours.  Lab Results   Component Value Date    HGBA1C 12.8 (H) 09/13/2023       Nutritional status:   Body mass index is 25.33 kg/m².  Lab Results   Component Value Date    ALBUMIN 3.1 (L) 11/12/2023    ALBUMIN 3.6 11/11/2023    ALBUMIN 3.3 (L) 11/10/2023     No results found for: "PREALBUMIN"    Estimated Creatinine Clearance: 74.2 mL/min (A) (based on SCr of 1.6 mg/dL (H)).    Accu-Checks  Recent Labs     11/12/23  0117 11/12/23  0207 11/12/23  0304 11/12/23  0418 11/12/23  0506 11/12/23  0609 11/12/23  0753 11/12/23  0903 11/12/23  1007 11/12/23  1106   POCTGLUCOSE 141* 148* 159* 213* 200* 220* 176* 150* 167* 172*       Current Medications and/or Treatments Impacting Glycemic Control  Immunotherapy:    Immunosuppressants       None          Steroids:   Hormones (From admission, onward)      None          Pressors:    Autonomic Drugs (From " admission, onward)      None          Hyperglycemia/Diabetes Medications:   Antihyperglycemics (From admission, onward)      Start     Stop Route Frequency Ordered    11/12/23 1345  insulin regular in 0.9 % NaCl 100 unit/100 mL (1 unit/mL) infusion        Question:  Enter initial dose (Units/hr):  Answer:  3.5    -- IV Continuous 11/12/23 1237            ASSESSMENT and PLAN    Renal/  Hypernatremia  Appreciate nephrology recommendations      Endocrine  * DKA (diabetic ketoacidosis)  Key History and Diagnostic Findings  Type 1 diabetic with frequent admission for DKA and HHS  Admission labs: BG 1885, bicarb 6, AG 30, BHO 5.8 and sOSM 452  Etiology: missed insulin vs. infection    Plan  - Continue ICU level of care for DKA and HHS  - Labs show resolution of elevated anion gap metabolic acidosis  - Would still benefit from IV insulin infusion while HHS resolves, unfortunately, weight based DKA infusion has been titrated off multiple times. Patient is a type 1 diabetic and insulin should not be held.    - Start transition insulin gtt with instructions to titrate down to 1-2 units/hour, note that he was last prescribed 21 units of tresiba daily (appx 1 unit/hr) and 7 units lyumjev with meals  - Start bariatric (no sugar) clear liquids diet          Type 1 diabetes mellitus  Diagnosed: age 20  Last hemoglobin A1c: 12.8 09/13/2023    Outpatient regimen: Med rec states he is taking Tresiba 21 units daily and Lyumjev 7 units with meals with DEXCOM G6. Previously prescribed Omnipod 5  - will confirm after resolution of metabolic encephalopathy        Juwan Humphreys DO  Endocrinology  [facility]

## 2023-11-12 NOTE — SUBJECTIVE & OBJECTIVE
"Interval HPI:   Overnight events: More awake today. Responds to voice, answers minimal questions. States that he thinks he can eat. Reports missing his long acting insulin, would not say why.    Eating:   NPO  Nausea: Yes  Hypoglycemia and intervention: No  Fever: No  TPN and/or TF: No    /81 (Patient Position: Lying)   Pulse 82   Temp 98.8 °F (37.1 °C)   Resp 13   Ht 6' 2" (1.88 m)   Wt 89.5 kg (197 lb 5 oz)   SpO2 99%   BMI 25.33 kg/m²     Labs Reviewed and Include    Recent Labs   Lab 11/12/23  0411 11/12/23  0605 11/12/23  1159   *   < > 181*   CALCIUM 8.3*   < > 8.4*   ALBUMIN 3.1*  --   --    PROT 5.9*  --   --    *   < > 152*   K 3.8   < > 5.8*   CO2 25   < > 26   *   < > 119*   BUN 21*   < > 14   CREATININE 1.9*   < > 1.6*   ALKPHOS 78  --   --    ALT 19  --   --    AST 34  --   --    BILITOT 0.3  --   --     < > = values in this interval not displayed.     Lab Results   Component Value Date    WBC 5.83 11/12/2023    HGB 12.2 (L) 11/12/2023    HCT 35.9 (L) 11/12/2023    MCV 91 11/12/2023     (L) 11/12/2023     No results for input(s): "TSH", "FREET4" in the last 168 hours.  Lab Results   Component Value Date    HGBA1C 12.8 (H) 09/13/2023       Nutritional status:   Body mass index is 25.33 kg/m².  Lab Results   Component Value Date    ALBUMIN 3.1 (L) 11/12/2023    ALBUMIN 3.6 11/11/2023    ALBUMIN 3.3 (L) 11/10/2023     No results found for: "PREALBUMIN"    Estimated Creatinine Clearance: 74.2 mL/min (A) (based on SCr of 1.6 mg/dL (H)).    Accu-Checks  Recent Labs     11/12/23  0117 11/12/23  0207 11/12/23  0304 11/12/23  0418 11/12/23  0506 11/12/23  0609 11/12/23  0753 11/12/23  0903 11/12/23  1007 11/12/23  1106   POCTGLUCOSE 141* 148* 159* 213* 200* 220* 176* 150* 167* 172*       Current Medications and/or Treatments Impacting Glycemic Control  Immunotherapy:    Immunosuppressants       None          Steroids:   Hormones (From admission, onward)      None      "     Pressors:    Autonomic Drugs (From admission, onward)      None          Hyperglycemia/Diabetes Medications:   Antihyperglycemics (From admission, onward)      Start     Stop Route Frequency Ordered    11/12/23 1345  insulin regular in 0.9 % NaCl 100 unit/100 mL (1 unit/mL) infusion        Question:  Enter initial dose (Units/hr):  Answer:  3.5    -- IV Continuous 11/12/23 4514

## 2023-11-12 NOTE — PROGRESS NOTES
[facility] - Cardiac Medical ICU  Critical Care Medicine  Progress Note    Patient Name: Andrzej Sinclair  MRN: [id number]  Admission Date: 11/10/2023  Hospital Length of Stay: 2 days  Code Status: Full Code  Attending Provider: Jarrod Arita MD  Primary Care Provider: Viktoriya Jaeger NP   Principal Problem: DKA (diabetic ketoacidosis)    Subjective:     HPI:  Andrzej Sinclair is a 37 year old male with hx of type 1 diabetes mellitus, hypertension, CKD stage 3, GERD, recently admitted for DKA/HHS complicated by hypernatremia as well as metabolic encephalopathy, who initially presented to the ED via EMS today with severe hyperglycemia. In the ED, patient was noted to be unresponsive to verbal stimuli and minimally responsive to physical, but hemodynamically stable and afebrile. Concerns for DKA with labs significant for severe hyperglycemia BG 1885, Na 131, K 6.8, bicarb 6, AG 30. Elevated beta hydroxybutyrate 5.8. Central line was placed in ED after difficulties gaining access. Started on DKA/HHS management with fluids, insulin gtt, bicarb gtt, hyperkalemia shifted. Pt later evaluated to be in hypotensive with atrial fibrillation with RVR and was given dose of phenylephrine, later started on levophed. Nephrology consulted for BETH, creatinine 6.6. Admitted to MICU for DKA.    Hospital/ICU Course:  Admitted to MICU for DKA/HHS. Mentation remains the same - lethargic, protecting own airway, attempting to follow commands. CT head without acute process. Serum osm was very high on admission and unsure how quickly it kevin prior to him presenting to hospital, concerned about risk of ODS given improvement in serum osm with resolution of DKA/HHS; MRI brain pending. UDS positive for THC, ethanol < 10. Volatile screen pending. Nephrology consulted for BETH and hypernatremia, assisting with adjusting fluids to prevent rapid correction of sodium and osm. Initially started on 0.2% NaCl but switched to D5W gtt. Monitoring Na and serum osm  frequently. sCr improving with resolution of DKA, making good urine. Cultures NTD, will de-escalate vancomycin and continue piptazo, no obvious source of infection. Endocrine consulted for assistance with insulin gtt given complex case and need to balance the serum osm, Na, and glucose. Phone call to wife Marzena on 11/11 to provide updates. Na, serum osm, and glucose slowly improving. MRI brain without acute or significant findings. Mentation slowly improving, answering questions and following commands. De-escalated vancomycin, continued piptazo.     Interval History/Significant Events: No major issues overnight, corrected Na essentially stable from yesterday. Nephro providing recs with fluids and rate. Insulin held this AM for hypokalemia, will replace potassium and then restart gtt at lower rate. Endocrine following and helping manage insulin gtt    Review of Systems   Unable to perform ROS: Acuity of condition     Objective:     Vital Signs (Most Recent):  Temp: 98.8 °F (37.1 °C) (11/12/23 1200)  Pulse: 82 (11/12/23 1200)  Resp: 13 (11/12/23 1200)  BP: 131/81 (11/12/23 1200)  SpO2: 99 % (11/12/23 1200) Vital Signs (24h Range):  Temp:  [93.6 °F (34.2 °C)-98.8 °F (37.1 °C)] 98.8 °F (37.1 °C)  Pulse:  [64-92] 82  Resp:  [10-22] 13  SpO2:  [99 %-100 %] 99 %  BP: (117-141)/(61-81) 131/81   Weight: 89.5 kg (197 lb 5 oz)  Body mass index is 25.33 kg/m².      Intake/Output Summary (Last 24 hours) at 11/12/2023 1242  Last data filed at 11/12/2023 1200  Gross per 24 hour   Intake 7749.45 ml   Output 2425 ml   Net 5324.45 ml            Physical Exam  Vitals and nursing note reviewed.   Constitutional:       General: He is not in acute distress.     Appearance: He is ill-appearing and toxic-appearing.      Interventions: He is restrained.   HENT:      Mouth/Throat:      Mouth: Mucous membranes are dry.   Eyes:      Extraocular Movements: Extraocular movements intact.      Pupils: Pupils are equal, round, and reactive to  light.   Cardiovascular:      Rate and Rhythm: Regular rhythm. Tachycardia present.      Pulses: Normal pulses.      Heart sounds: Normal heart sounds. No murmur heard.  Pulmonary:      Effort: Pulmonary effort is normal.      Breath sounds: Normal breath sounds. No wheezing, rhonchi or rales.      Comments: On 2L NC  Abdominal:      General: Bowel sounds are normal. There is no distension.      Palpations: Abdomen is soft.      Tenderness: There is no abdominal tenderness. There is no right CVA tenderness or left CVA tenderness.   Musculoskeletal:         General: No tenderness.      Right lower leg: No edema.      Left lower leg: No edema.   Skin:     General: Skin is warm and dry.      Capillary Refill: Capillary refill takes less than 2 seconds.      Findings: No erythema or rash.   Neurological:      Mental Status: He is lethargic.      Comments: Remains lethargic but more alert today, following commands and answering questions            Vents:     Lines/Drains/Airways       Peripherally Inserted Central Catheter Line  Duration             PICC Triple Lumen 09/13/23 1230 right basilic 60 days              Central Venous Catheter Line  Duration             Percutaneous Central Line Insertion/Assessment - Triple Lumen  11/10/23 1440 Femoral Vein Right;Femoral Right 1 day              Drain  Duration                  Urethral Catheter 11/10/23 1450 Temperature probe 1 day              Peripheral Intravenous Line  Duration                  Peripheral IV - Single Lumen 11/10/23 1347 18 G Anterior;Right 1 day         Peripheral IV - Single Lumen 11/10/23 1402 20 G Left;Posterior Hand 1 day                  Significant Labs:    CBC/Anemia Profile:  Recent Labs   Lab 11/10/23  1401 11/11/23  0407 11/12/23  1106   WBC 11.62 8.97 5.83   HGB 12.0* 12.3* 12.2*   HCT 41.0 34.4* 35.9*    176 123*   * 85 91   RDW 14.0 12.7 13.2       Chemistries:  Recent Labs   Lab 11/10/23  1601 11/10/23  1743 11/10/23  1900  11/11/23  0407 11/11/23  0604 11/12/23  0411 11/12/23  0605 11/12/23  0754 11/12/23  1007 11/12/23  1159    145   < > 161*  161*   < > 152*   < > 151* 152* 152*   K 2.7* 2.6*   < > 3.6  3.6   < > 3.8   < > 3.2* 3.4* 5.8*   CL 99 106   < > 118*  118*   < > 117*   < > 117* 117* 119*   CO2 9* 12*   < > 25  25   < > 25   < > 26 24 26   BUN 87* 90*   < > 70*  70*   < > 21*   < > 17 15 14   CREATININE 6.1* 6.1*   < > 4.2*  4.2*   < > 1.9*   < > 1.8* 1.7* 1.6*   CALCIUM 8.3* 8.4*   < > 9.0  9.0   < > 8.3*   < > 8.3* 8.4* 8.4*   ALBUMIN 3.3*  --   --  3.6  --  3.1*  --   --   --   --    PROT 5.9*  --   --  6.6  --  5.9*  --   --   --   --    BILITOT 0.2  --   --  0.3  --  0.3  --   --   --   --    ALKPHOS 91  --   --  91  --  78  --   --   --   --    ALT 17  --   --  18  --  19  --   --   --   --    AST 14  --   --  27  --  34  --   --   --   --    MG  --  3.3*  --  3.5*  --  2.5  --   --   --   --    PHOS  --  2.5*   < > 2.7  2.7   < > 2.1*  --  2.0*  --  2.1*    < > = values in this interval not displayed.         Blood Culture:   Recent Labs   Lab 11/10/23  1749   LABBLOO No Growth to date  No Growth to date  No Growth to date  No Growth to date       CMP:   Recent Labs   Lab 11/10/23  1601 11/10/23  1743 11/11/23  0407 11/11/23  0604 11/12/23  0411 11/12/23  0605 11/12/23  0754 11/12/23  1007 11/12/23  1159      < > 161*  161*   < > 152*   < > 151* 152* 152*   K 2.7*   < > 3.6  3.6   < > 3.8   < > 3.2* 3.4* 5.8*   CL 99   < > 118*  118*   < > 117*   < > 117* 117* 119*   CO2 9*   < > 25  25   < > 25   < > 26 24 26   GLU 1,682*   < > 591*  591*   < > 216*   < > 180* 178* 181*   BUN 87*   < > 70*  70*   < > 21*   < > 17 15 14   CREATININE 6.1*   < > 4.2*  4.2*   < > 1.9*   < > 1.8* 1.7* 1.6*   CALCIUM 8.3*   < > 9.0  9.0   < > 8.3*   < > 8.3* 8.4* 8.4*   PROT 5.9*  --  6.6  --  5.9*  --   --   --   --    ALBUMIN 3.3*  --  3.6  --  3.1*  --   --   --   --    BILITOT 0.2  --  0.3  --  0.3  --    "--   --   --    ALKPHOS 91  --  91  --  78  --   --   --   --    AST 14  --  27  --  34  --   --   --   --    ALT 17  --  18  --  19  --   --   --   --    ANIONGAP 31*   < > 18*  18*   < > 10   < > 8 11 7*    < > = values in this interval not displayed.       Lactic Acid:   Recent Labs   Lab 11/10/23  1740   LACTATE 1.9       POCT Glucose:   Recent Labs   Lab 11/12/23  0903 11/12/23  1007 11/12/23  1106   POCTGLUCOSE 150* 167* 172*       Urine Culture: No results for input(s): "LABURIN" in the last 48 hours.  Urine Studies:   Recent Labs   Lab 11/10/23  1540   COLORU Yellow   APPEARANCEUA Clear   PHUR 5.0   SPECGRAV 1.020   PROTEINUA Trace*   GLUCUA 4+*   KETONESU 1+*   BILIRUBINUA Negative   OCCULTUA Negative   NITRITE Negative   LEUKOCYTESUR Negative   RBCUA 0   WBCUA 3   BACTERIA Rare   SQUAMEPITHEL 0   HYALINECASTS 32*       Recent Lab Results  (Last 5 results in the past 24 hours)        11/12/23  1159   11/12/23  1106   11/12/23  1106   11/12/23  1007   11/12/23  1007        Anion Gap 7         11       Baso #     0.01           Basophil %     0.2           BUN 14         15       Calcium 8.4         8.4       Chloride 119         117       CO2 26         24       Creatinine 1.6         1.7       Differential Method     Automated           eGFR 56.9         52.9       Eos #     0.0           Eosinophil %     0.3           Glucose 181         178       Gran # (ANC)     4.0           Gran %     69.3           Hematocrit     35.9           Hemoglobin     12.2           Immature Grans (Abs)     0.04  Comment: Mild elevation in immature granulocytes is non specific and   can be seen in a variety of conditions including stress response,   acute inflammation, trauma and pregnancy. Correlation with other   laboratory and clinical findings is essential.             Immature Granulocytes     0.7           Lymph #     1.1           Lymph %     18.5           MCH     30.8           MCHC     34.0           MCV     91      "      Mono #     0.6           Mono %     11.0           MPV     11.2           nRBC     0           Osmolality 324  Comment: osmo critical result(s) called and verbal readback obtained from   yonis medina rn by PAMELA 11/12/2023 12:36                 Phosphorus Level 2.1               Platelet Count     123           POCT Glucose   172     167         Potassium 5.8  Comment: *No Visible Hemolysis         3.4       RBC     3.96           RDW     13.2           Sodium 152         152       WBC     5.83                                All pertinent labs within the past 24 hours have been reviewed.    Significant Imaging:  I have reviewed all pertinent imaging results/findings within the past 24 hours.    ABG  Recent Labs   Lab 11/11/23  0245   PH 7.356   PO2 48   PCO2 44.3   HCO3 24.8   BE -1     Assessment/Plan:     Neuro  Acute metabolic encephalopathy  Presented with AMS, unclear etiology; infections vs DKA vs drug related  Per EMS, Pt responsive to painful stimuli only. CBG >500 with EMS  Follows some commands but very lethargic    Plan:  - UA negative for infection, Bcx NTD  - CXR without consolidation  - continue piptazo; discontinue vancomycin  - UDS positive for THC; volatile screen pending   - CT head unremarkable; MRI brain NAD   - q4h neurochecks  - currently protecting airway however will need close monitoring in case he deteriorates for possible intubation; mentation improving, following commands and answering questions     Renal/  Hyperkalemia  See DKA    Metabolic acidosis  See DKA    BETH (acute kidney injury)  See DKA      Hypernatremia  See DKA    Endocrine  * DKA (diabetic ketoacidosis)  Presented with hyperglycemia BGL > 500 with EMS and >1800 on BMP once arrived to C  Also hyperkalemic with K 6.8; shifted by ED  Started on insulin gtt; BGL improved to 1600 but potassium dropped to 2.7  Also has BETH with sCr 6.1 on presentation; baseline sCr 1.2-1.4  Likely 2/2 DKA and hypovolemia, Pt very dry on  exam  Hypernatremic with Na ~170 when corrected for degree of hyperglycemia on arrival    Plan:  - now out of DKA but still hyperosmolar  - titrating gtt to allow for slow resolution of hypernatremia and hyperosmolality  - endocrine consulted, appreciate recs; recommend transitioning to a transitional insulin gtt   - nephrology following, appreciate recs; no HD needs currently sCr improving; assisting with management of hypernatremia   - Q2h BMP to monitor K and Na; replace potassium as needed to maintain > 3.5  - Q4h serum osm  - maintain camacho catheter; monitor strict I's/O's    Type 1 diabetes mellitus  See DKA    GI  GERD (gastroesophageal reflux disease)  Continue home pantoprazole       Critical Care Daily Checklist:    A: Awake: RASS Goal/Actual Goal:    Actual:     B: Spontaneous Breathing Trial Performed?     C: SAT & SBT Coordinated?  N/A                      D: Delirium: CAM-ICU     E: Early Mobility Performed? No   F: Feeding Goal:    Status:     Current Diet Order   Procedures    Diet Clear liquid (no sugar)/Bariatric      AS: Analgesia/Sedation N/A   T: Thromboembolic Prophylaxis heparin   H: HOB > 300 Yes   U: Stress Ulcer Prophylaxis (if needed) pantoprazole   G: Glucose Control Improved, insulin gtt   B: Bowel Function     I: Indwelling Catheter (Lines & Camacho) Necessity PIVC, camacho, R fem central line   D: De-escalation of Antimicrobials/Pharmacotherapies Continue piptazo    Plan for the day/ETD F/u consultant recs, replace potassium, transitional insulin gtt, frequent lab monitoring    Code Status:  Family/Goals of Care: Full Code       Critical secondary to Patient has a condition that poses threat to life and bodily function: DKA/HHS      Critical care was time spent personally by me on the following activities: development of treatment plan with patient or surrogate and bedside caregivers, discussions with consultants, evaluation of patient's response to treatment, examination of patient,  ordering and performing treatments and interventions, ordering and review of laboratory studies, ordering and review of radiographic studies, pulse oximetry, re-evaluation of patient's condition. This critical care time did not overlap with that of any other provider or involve time for any procedures.     Jenise Ji MD  Critical Care Medicine  [facility] - Cardiac Medical ICU

## 2023-11-12 NOTE — PLAN OF CARE
MICU DAILY GOALS     Family/Goals of care/Code Status   Code Status: Full Code    24H Vital Sign Range  Temp:  [93.6 °F (34.2 °C)-98.4 °F (36.9 °C)]   Pulse:  [64-96]   Resp:  [10-26]   BP: (117-141)/(61-81)   SpO2:  [99 %-100 %]      Shift Events (include procedures and significant events)   No acute events throughout shift. On insulin gtt overnight. Potassium and phos repleted.    AWAKE RASS: Goal -    Actual -      Restraint necessity: Removing medical devices   BREATHE SBT: Not intubated    Coordinate A & B, analgesics/sedatives Pain: managed   SAT: Not intubated   Delirium CAM-ICU:     Early(intubated/ Progressive (non-intubated) Mobility MOVE Screen (INTUBATED ONLY): Not intubated    Activity: Activity Management: Rolling - L1   Feeding/Nutrition Diet order: Diet/Nutrition Received: NPO,     Thrombus DVT prophylaxis: VTE Required Core Measure: Pharmacological prophylaxis initiated/maintained   HOB Elevation Head of Bed (HOB) Positioning: HOB at 20-30 degrees   Ulcer Prophylaxis GI: yes   Glucose control managed Glycemic Management: blood glucose monitored   Skin Skin assessed during: Q Shift Change    Sacrum intact/not altered? Yes  Heels intact/not altered? Yes  Surgical wound? No    [] No Altered Skin Integrity Present    []Prevention Measures Documented    [] Altered Skin Integrity Present or Discovered   [] LDA present in EPIC              [] LDA added in EPIC   [] Wound Image Taken (required on admit,                   transfer/discharge and every Tuesday)    Wound Care Consulted? No    Attending Nurse: AUGUSTO Mack       Bowel Function no issues    Indwelling Catheter Necessity      Urethral Catheter 11/10/23 1450 Temperature probe-Reason for Continuing Urinary Catheterization: Critically ill in ICU and requiring hourly monitoring of intake/output       De-escalation Antibiotics No       VS and assessment per flow sheet, patient progressing towards goals as tolerated, plan of care reviewed with  pt , all  concerns addressed, will continue to monitor.

## 2023-11-12 NOTE — ASSESSMENT & PLAN NOTE
Scr at consult 6, baseline 1-1.3  1)BETH multifactoral 2/2 Pre renal vs ATN*   2)Hyponatremia(corrected sodium showing severe hypernatremia)  3)Hypokalemia  4)Severe hyperglycemia  5)Metabolic acidosis with respiratory compensation  6)High osmolal gap    Plan  Volume expansion with fluids  I/Os  Avoid nephrotoxins  Renal dose medications

## 2023-11-12 NOTE — SUBJECTIVE & OBJECTIVE
Interval History: Patient seen and examined. No acute events overnight. Hypothermic with pulse ranging from 90-60s bpm. Systolic blood pressures ranging from 140-110s mmHg via cuff pressures. He is saturating +99% on room air with documented UOP of 2.375 liters in the last 24 hours (net positive ~8 liters). Corrected sodium this morning 166-164 with free water deficit of ~9 liters. Renal function largely stable. Serum sodium this morning corrected to 153.     Review of patient's allergies indicates:   Allergen Reactions    Lactose Other (See Comments)     Gas and moderate to sever abdominal pain     Current Facility-Administered Medications   Medication Frequency    dextrose 10 % infusion PRN    dextrose 10 % infusion PRN    dextrose 10% bolus 125 mL 125 mL PRN    dextrose 10% bolus 250 mL 250 mL PRN    dextrose 5 % (D5W) infusion Continuous    dextrose 5 % and 0.45 % NaCl infusion Continuous PRN    glucagon (human recombinant) injection 1 mg PRN    glucose chewable tablet 16 g PRN    glucose chewable tablet 24 g PRN    heparin (porcine) injection 5,000 Units Q8H    insulin regular in 0.9 % NaCl 100 unit/100 mL (1 unit/mL) infusion Continuous    mupirocin 2 % ointment BID    naloxone 0.4 mg/mL injection 0.02 mg PRN    pantoprazole injection 40 mg Daily    piperacillin-tazobactam (ZOSYN) 4.5 g in dextrose 5 % in water (D5W) 100 mL IVPB (MB+) Q8H    sodium chloride 0.9% flush 10 mL Q12H PRN    sodium chloride 0.9% flush 10 mL PRN       Objective:     Vital Signs (Most Recent):  Temp: 98.8 °F (37.1 °C) (11/12/23 1200)  Pulse: 82 (11/12/23 1200)  Resp: 13 (11/12/23 1200)  BP: 131/81 (11/12/23 1200)  SpO2: 99 % (11/12/23 1200) Vital Signs (24h Range):  Temp:  [93.6 °F (34.2 °C)-98.8 °F (37.1 °C)] 98.8 °F (37.1 °C)  Pulse:  [64-92] 82  Resp:  [10-22] 13  SpO2:  [99 %-100 %] 99 %  BP: (117-141)/(61-81) 131/81     Weight: 89.5 kg (197 lb 5 oz) (11/10/23 9840)  Body mass index is 25.33 kg/m².  Body surface area is 2.16  meters squared.    I/O last 3 completed shifts:  In: 54913.2 [I.V.:96528.2; IV Piggyback:2493]  Out: 6475 [Urine:6475]     Physical Exam  Constitutional:       Appearance: He is toxic-appearing.   HENT:      Head: Normocephalic.      Mouth/Throat:      Mouth: Mucous membranes are moist.   Cardiovascular:      Rate and Rhythm: Normal rate.   Pulmonary:      Breath sounds: Normal breath sounds.   Genitourinary:     Comments: Foleys in place  Musculoskeletal:         General: Normal range of motion.          Significant Labs:  ABGs:   Recent Labs   Lab 11/11/23  0245   PH 7.356   PCO2 44.3   HCO3 24.8   POCSATURATED 81   BE -1       BMP:   Recent Labs   Lab 11/12/23 0411 11/12/23 0605 11/12/23  1159   *   < > 181*   *   < > 152*   K 3.8   < > 5.8*   *   < > 119*   CO2 25   < > 26   BUN 21*   < > 14   CREATININE 1.9*   < > 1.6*   CALCIUM 8.3*   < > 8.4*   MG 2.5  --   --     < > = values in this interval not displayed.       CBC:   Recent Labs   Lab 11/12/23  1106   WBC 5.83   RBC 3.96*   HGB 12.2*   HCT 35.9*   *   MCV 91   MCH 30.8   MCHC 34.0       CMP:   Recent Labs   Lab 11/12/23 0411 11/12/23 0605 11/12/23  1159   *   < > 181*   CALCIUM 8.3*   < > 8.4*   ALBUMIN 3.1*  --   --    PROT 5.9*  --   --    *   < > 152*   K 3.8   < > 5.8*   CO2 25   < > 26   *   < > 119*   BUN 21*   < > 14   CREATININE 1.9*   < > 1.6*   ALKPHOS 78  --   --    ALT 19  --   --    AST 34  --   --    BILITOT 0.3  --   --     < > = values in this interval not displayed.       Coagulation:   Recent Labs   Lab 11/10/23  1401   INR 1.0       LFTs:   Recent Labs   Lab 11/12/23  0411   ALT 19   AST 34   ALKPHOS 78   BILITOT 0.3   PROT 5.9*   ALBUMIN 3.1*       Microbiology Results (last 7 days)       Procedure Component Value Units Date/Time    Blood Culture #1 **CANNOT BE ORDERED STAT** [[id number]] Collected: 11/10/23 1749    Order Status: Completed Specimen: Blood from Peripheral, Hand, Right  Updated: 11/11/23 1822     Blood Culture, Routine No Growth to date      No Growth to date    Blood Culture #2 **CANNOT BE ORDERED STAT** [[id number]] Collected: 11/10/23 1749    Order Status: Completed Specimen: Blood from Peripheral, Hand, Left Updated: 11/11/23 1822     Blood Culture, Routine No Growth to date      No Growth to date          Specimen (24h ago, onward)      None          Recent Labs   Lab 11/10/23  1540   COLORU Yellow   SPECGRAV 1.020   PHUR 5.0   PROTEINUA Trace*   BACTERIA Rare   NITRITE Negative   LEUKOCYTESUR Negative   HYALINECASTS 32*        Significant Imaging:  I have reviewed all imagining in the last 24 hours.

## 2023-11-12 NOTE — SUBJECTIVE & OBJECTIVE
Interval History/Significant Events: No major issues overnight, corrected Na essentially stable from yesterday. Nephro providing recs with fluids and rate. Insulin held this AM for hypokalemia, will replace potassium and then restart gtt at lower rate. Endocrine following and helping manage insulin gtt    Review of Systems   Unable to perform ROS: Acuity of condition     Objective:     Vital Signs (Most Recent):  Temp: 98.8 °F (37.1 °C) (11/12/23 1200)  Pulse: 82 (11/12/23 1200)  Resp: 13 (11/12/23 1200)  BP: 131/81 (11/12/23 1200)  SpO2: 99 % (11/12/23 1200) Vital Signs (24h Range):  Temp:  [93.6 °F (34.2 °C)-98.8 °F (37.1 °C)] 98.8 °F (37.1 °C)  Pulse:  [64-92] 82  Resp:  [10-22] 13  SpO2:  [99 %-100 %] 99 %  BP: (117-141)/(61-81) 131/81   Weight: 89.5 kg (197 lb 5 oz)  Body mass index is 25.33 kg/m².      Intake/Output Summary (Last 24 hours) at 11/12/2023 1242  Last data filed at 11/12/2023 1200  Gross per 24 hour   Intake 7749.45 ml   Output 2425 ml   Net 5324.45 ml            Physical Exam  Vitals and nursing note reviewed.   Constitutional:       General: He is not in acute distress.     Appearance: He is ill-appearing and toxic-appearing.      Interventions: He is restrained.   HENT:      Mouth/Throat:      Mouth: Mucous membranes are dry.   Eyes:      Extraocular Movements: Extraocular movements intact.      Pupils: Pupils are equal, round, and reactive to light.   Cardiovascular:      Rate and Rhythm: Regular rhythm. Tachycardia present.      Pulses: Normal pulses.      Heart sounds: Normal heart sounds. No murmur heard.  Pulmonary:      Effort: Pulmonary effort is normal.      Breath sounds: Normal breath sounds. No wheezing, rhonchi or rales.      Comments: On 2L NC  Abdominal:      General: Bowel sounds are normal. There is no distension.      Palpations: Abdomen is soft.      Tenderness: There is no abdominal tenderness. There is no right CVA tenderness or left CVA tenderness.   Musculoskeletal:          General: No tenderness.      Right lower leg: No edema.      Left lower leg: No edema.   Skin:     General: Skin is warm and dry.      Capillary Refill: Capillary refill takes less than 2 seconds.      Findings: No erythema or rash.   Neurological:      Mental Status: He is lethargic.      Comments: Remains lethargic but more alert today, following commands and answering questions            Vents:     Lines/Drains/Airways       Peripherally Inserted Central Catheter Line  Duration             PICC Triple Lumen 09/13/23 1230 right basilic 60 days              Central Venous Catheter Line  Duration             Percutaneous Central Line Insertion/Assessment - Triple Lumen  11/10/23 1440 Femoral Vein Right;Femoral Right 1 day              Drain  Duration                  Urethral Catheter 11/10/23 1450 Temperature probe 1 day              Peripheral Intravenous Line  Duration                  Peripheral IV - Single Lumen 11/10/23 1347 18 G Anterior;Right 1 day         Peripheral IV - Single Lumen 11/10/23 1402 20 G Left;Posterior Hand 1 day                  Significant Labs:    CBC/Anemia Profile:  Recent Labs   Lab 11/10/23  1401 11/11/23  0407 11/12/23  1106   WBC 11.62 8.97 5.83   HGB 12.0* 12.3* 12.2*   HCT 41.0 34.4* 35.9*    176 123*   * 85 91   RDW 14.0 12.7 13.2       Chemistries:  Recent Labs   Lab 11/10/23  1601 11/10/23  1743 11/10/23  1900 11/11/23  0407 11/11/23  0604 11/12/23  0411 11/12/23  0605 11/12/23  0754 11/12/23  1007 11/12/23  1159    145   < > 161*  161*   < > 152*   < > 151* 152* 152*   K 2.7* 2.6*   < > 3.6  3.6   < > 3.8   < > 3.2* 3.4* 5.8*   CL 99 106   < > 118*  118*   < > 117*   < > 117* 117* 119*   CO2 9* 12*   < > 25  25   < > 25   < > 26 24 26   BUN 87* 90*   < > 70*  70*   < > 21*   < > 17 15 14   CREATININE 6.1* 6.1*   < > 4.2*  4.2*   < > 1.9*   < > 1.8* 1.7* 1.6*   CALCIUM 8.3* 8.4*   < > 9.0  9.0   < > 8.3*   < > 8.3* 8.4* 8.4*   ALBUMIN 3.3*  --   --   "3.6  --  3.1*  --   --   --   --    PROT 5.9*  --   --  6.6  --  5.9*  --   --   --   --    BILITOT 0.2  --   --  0.3  --  0.3  --   --   --   --    ALKPHOS 91  --   --  91  --  78  --   --   --   --    ALT 17  --   --  18  --  19  --   --   --   --    AST 14  --   --  27  --  34  --   --   --   --    MG  --  3.3*  --  3.5*  --  2.5  --   --   --   --    PHOS  --  2.5*   < > 2.7  2.7   < > 2.1*  --  2.0*  --  2.1*    < > = values in this interval not displayed.         Blood Culture:   Recent Labs   Lab 11/10/23  1749   LABBLOO No Growth to date  No Growth to date  No Growth to date  No Growth to date       CMP:   Recent Labs   Lab 11/10/23  1601 11/10/23  1743 11/11/23  0407 11/11/23  0604 11/12/23  0411 11/12/23  0605 11/12/23  0754 11/12/23  1007 11/12/23  1159      < > 161*  161*   < > 152*   < > 151* 152* 152*   K 2.7*   < > 3.6  3.6   < > 3.8   < > 3.2* 3.4* 5.8*   CL 99   < > 118*  118*   < > 117*   < > 117* 117* 119*   CO2 9*   < > 25  25   < > 25   < > 26 24 26   GLU 1,682*   < > 591*  591*   < > 216*   < > 180* 178* 181*   BUN 87*   < > 70*  70*   < > 21*   < > 17 15 14   CREATININE 6.1*   < > 4.2*  4.2*   < > 1.9*   < > 1.8* 1.7* 1.6*   CALCIUM 8.3*   < > 9.0  9.0   < > 8.3*   < > 8.3* 8.4* 8.4*   PROT 5.9*  --  6.6  --  5.9*  --   --   --   --    ALBUMIN 3.3*  --  3.6  --  3.1*  --   --   --   --    BILITOT 0.2  --  0.3  --  0.3  --   --   --   --    ALKPHOS 91  --  91  --  78  --   --   --   --    AST 14  --  27  --  34  --   --   --   --    ALT 17  --  18  --  19  --   --   --   --    ANIONGAP 31*   < > 18*  18*   < > 10   < > 8 11 7*    < > = values in this interval not displayed.       Lactic Acid:   Recent Labs   Lab 11/10/23  1740   LACTATE 1.9       POCT Glucose:   Recent Labs   Lab 11/12/23  0903 11/12/23  1007 11/12/23  1106   POCTGLUCOSE 150* 167* 172*       Urine Culture: No results for input(s): "LABURIN" in the last 48 hours.  Urine Studies:   Recent Labs   Lab " 11/10/23  1540   COLORU Yellow   APPEARANCEUA Clear   PHUR 5.0   SPECGRAV 1.020   PROTEINUA Trace*   GLUCUA 4+*   KETONESU 1+*   BILIRUBINUA Negative   OCCULTUA Negative   NITRITE Negative   LEUKOCYTESUR Negative   RBCUA 0   WBCUA 3   BACTERIA Rare   SQUAMEPITHEL 0   HYALINECASTS 32*       Recent Lab Results  (Last 5 results in the past 24 hours)        11/12/23  1159   11/12/23  1106   11/12/23  1106   11/12/23  1007   11/12/23  1007        Anion Gap 7         11       Baso #     0.01           Basophil %     0.2           BUN 14         15       Calcium 8.4         8.4       Chloride 119         117       CO2 26         24       Creatinine 1.6         1.7       Differential Method     Automated           eGFR 56.9         52.9       Eos #     0.0           Eosinophil %     0.3           Glucose 181         178       Gran # (ANC)     4.0           Gran %     69.3           Hematocrit     35.9           Hemoglobin     12.2           Immature Grans (Abs)     0.04  Comment: Mild elevation in immature granulocytes is non specific and   can be seen in a variety of conditions including stress response,   acute inflammation, trauma and pregnancy. Correlation with other   laboratory and clinical findings is essential.             Immature Granulocytes     0.7           Lymph #     1.1           Lymph %     18.5           MCH     30.8           MCHC     34.0           MCV     91           Mono #     0.6           Mono %     11.0           MPV     11.2           nRBC     0           Osmolality 324  Comment: osmo critical result(s) called and verbal readback obtained from   yonis medina rn by PAMELA 11/12/2023 12:36                 Phosphorus Level 2.1               Platelet Count     123           POCT Glucose   172     167         Potassium 5.8  Comment: *No Visible Hemolysis         3.4       RBC     3.96           RDW     13.2           Sodium 152         152       WBC     5.83                                All pertinent  labs within the past 24 hours have been reviewed.    Significant Imaging:  I have reviewed all pertinent imaging results/findings within the past 24 hours.

## 2023-11-13 PROBLEM — R65.10 SIRS (SYSTEMIC INFLAMMATORY RESPONSE SYNDROME): Status: RESOLVED | Noted: 2023-09-12 | Resolved: 2023-11-13

## 2023-11-13 PROBLEM — K21.9 GERD (GASTROESOPHAGEAL REFLUX DISEASE): Status: RESOLVED | Noted: 2023-03-02 | Resolved: 2023-11-13

## 2023-11-13 LAB
ALBUMIN SERPL BCP-MCNC: 2.9 G/DL (ref 3.5–5.2)
ALP SERPL-CCNC: 75 U/L (ref 55–135)
ALT SERPL W/O P-5'-P-CCNC: 18 U/L (ref 10–44)
AMPHETAMINES SERPL QL: NEGATIVE
AMPHETAMINES SERPL QL: NEGATIVE
ANION GAP SERPL CALC-SCNC: 12 MMOL/L (ref 8–16)
ANION GAP SERPL CALC-SCNC: 6 MMOL/L (ref 8–16)
ANION GAP SERPL CALC-SCNC: 7 MMOL/L (ref 8–16)
ANION GAP SERPL CALC-SCNC: 9 MMOL/L (ref 8–16)
AST SERPL-CCNC: 29 U/L (ref 10–40)
BARBITURATES SERPL QL SCN: NEGATIVE
BARBITURATES SERPL QL SCN: NEGATIVE
BASOPHILS # BLD AUTO: 0.01 K/UL (ref 0–0.2)
BASOPHILS NFR BLD: 0.3 % (ref 0–1.9)
BENZODIAZ SERPL QL SCN: NEGATIVE
BENZODIAZ SERPL QL SCN: NEGATIVE
BILIRUB SERPL-MCNC: 0.7 MG/DL (ref 0.1–1)
BUN SERPL-MCNC: 8 MG/DL (ref 6–20)
BUN SERPL-MCNC: 9 MG/DL (ref 6–20)
BZE SERPL QL: NEGATIVE
BZE SERPL QL: NEGATIVE
CALCIUM SERPL-MCNC: 8.6 MG/DL (ref 8.7–10.5)
CALCIUM SERPL-MCNC: 8.7 MG/DL (ref 8.7–10.5)
CALCIUM SERPL-MCNC: 8.8 MG/DL (ref 8.7–10.5)
CALCIUM SERPL-MCNC: 8.9 MG/DL (ref 8.7–10.5)
CARBOXYTHC SERPL QL SCN: POSITIVE
CARBOXYTHC SERPL QL SCN: POSITIVE
CHLORIDE SERPL-SCNC: 112 MMOL/L (ref 95–110)
CHLORIDE SERPL-SCNC: 113 MMOL/L (ref 95–110)
CHLORIDE SERPL-SCNC: 114 MMOL/L (ref 95–110)
CHLORIDE SERPL-SCNC: 115 MMOL/L (ref 95–110)
CHLORIDE SERPL-SCNC: 115 MMOL/L (ref 95–110)
CHLORIDE SERPL-SCNC: 116 MMOL/L (ref 95–110)
CO2 SERPL-SCNC: 24 MMOL/L (ref 23–29)
CO2 SERPL-SCNC: 26 MMOL/L (ref 23–29)
CO2 SERPL-SCNC: 26 MMOL/L (ref 23–29)
CO2 SERPL-SCNC: 27 MMOL/L (ref 23–29)
CO2 SERPL-SCNC: 28 MMOL/L (ref 23–29)
CO2 SERPL-SCNC: 28 MMOL/L (ref 23–29)
CREAT SERPL-MCNC: 1.3 MG/DL (ref 0.5–1.4)
CREAT SERPL-MCNC: 1.4 MG/DL (ref 0.5–1.4)
CREAT SERPL-MCNC: 1.5 MG/DL (ref 0.5–1.4)
DIFFERENTIAL METHOD: ABNORMAL
EOSINOPHIL # BLD AUTO: 0 K/UL (ref 0–0.5)
EOSINOPHIL NFR BLD: 0.8 % (ref 0–8)
ERYTHROCYTE [DISTWIDTH] IN BLOOD BY AUTOMATED COUNT: 13.4 % (ref 11.5–14.5)
EST. GFR  (NO RACE VARIABLE): >60 ML/MIN/1.73 M^2
ETHANOL SERPL QL SCN: NEGATIVE
ETHANOL SERPL QL SCN: NEGATIVE
GLUCOSE SERPL-MCNC: 113 MG/DL (ref 70–110)
GLUCOSE SERPL-MCNC: 126 MG/DL (ref 70–110)
GLUCOSE SERPL-MCNC: 135 MG/DL (ref 70–110)
GLUCOSE SERPL-MCNC: 135 MG/DL (ref 70–110)
GLUCOSE SERPL-MCNC: 152 MG/DL (ref 70–110)
GLUCOSE SERPL-MCNC: 172 MG/DL (ref 70–110)
HCT VFR BLD AUTO: 37.5 % (ref 40–54)
HGB BLD-MCNC: 12.5 G/DL (ref 14–18)
IMM GRANULOCYTES # BLD AUTO: 0.01 K/UL (ref 0–0.04)
IMM GRANULOCYTES NFR BLD AUTO: 0.3 % (ref 0–0.5)
LYMPHOCYTES # BLD AUTO: 1.4 K/UL (ref 1–4.8)
LYMPHOCYTES NFR BLD: 38.5 % (ref 18–48)
MAGNESIUM SERPL-MCNC: 2.2 MG/DL (ref 1.6–2.6)
MCH RBC QN AUTO: 30.4 PG (ref 27–31)
MCHC RBC AUTO-ENTMCNC: 33.3 G/DL (ref 32–36)
MCV RBC AUTO: 91 FL (ref 82–98)
METHADONE SERPL QL SCN: NEGATIVE
METHADONE SERPL QL SCN: NEGATIVE
MONOCYTES # BLD AUTO: 0.4 K/UL (ref 0.3–1)
MONOCYTES NFR BLD: 10.4 % (ref 4–15)
NEUTROPHILS # BLD AUTO: 1.8 K/UL (ref 1.8–7.7)
NEUTROPHILS NFR BLD: 49.7 % (ref 38–73)
NRBC BLD-RTO: 0 /100 WBC
OPIATES SERPL QL SCN: NEGATIVE
OPIATES SERPL QL SCN: NEGATIVE
OSMOLALITY SERPL: 304 MOSM/KG (ref 280–300)
OSMOLALITY SERPL: 308 MOSM/KG (ref 280–300)
OSMOLALITY SERPL: 312 MOSM/KG (ref 280–300)
OSMOLALITY SERPL: 314 MOSM/KG (ref 280–300)
OSMOLALITY SERPL: 314 MOSM/KG (ref 280–300)
PCP SERPL QL SCN: NEGATIVE
PCP SERPL QL SCN: NEGATIVE
PHOSPHATE SERPL-MCNC: 2.6 MG/DL (ref 2.7–4.5)
PHOSPHATE SERPL-MCNC: 2.9 MG/DL (ref 2.7–4.5)
PHOSPHATE SERPL-MCNC: 3 MG/DL (ref 2.7–4.5)
PHOSPHATE SERPL-MCNC: 3.4 MG/DL (ref 2.7–4.5)
PHOSPHATE SERPL-MCNC: 4.6 MG/DL (ref 2.7–4.5)
PLATELET # BLD AUTO: 112 K/UL (ref 150–450)
PMV BLD AUTO: 10.3 FL (ref 9.2–12.9)
POCT GLUCOSE: 103 MG/DL (ref 70–110)
POCT GLUCOSE: 110 MG/DL (ref 70–110)
POCT GLUCOSE: 112 MG/DL (ref 70–110)
POCT GLUCOSE: 114 MG/DL (ref 70–110)
POCT GLUCOSE: 122 MG/DL (ref 70–110)
POCT GLUCOSE: 123 MG/DL (ref 70–110)
POCT GLUCOSE: 125 MG/DL (ref 70–110)
POCT GLUCOSE: 125 MG/DL (ref 70–110)
POCT GLUCOSE: 126 MG/DL (ref 70–110)
POCT GLUCOSE: 127 MG/DL (ref 70–110)
POCT GLUCOSE: 129 MG/DL (ref 70–110)
POCT GLUCOSE: 139 MG/DL (ref 70–110)
POCT GLUCOSE: 142 MG/DL (ref 70–110)
POCT GLUCOSE: 142 MG/DL (ref 70–110)
POCT GLUCOSE: 150 MG/DL (ref 70–110)
POCT GLUCOSE: 152 MG/DL (ref 70–110)
POCT GLUCOSE: 158 MG/DL (ref 70–110)
POCT GLUCOSE: 161 MG/DL (ref 70–110)
POCT GLUCOSE: 171 MG/DL (ref 70–110)
POCT GLUCOSE: 172 MG/DL (ref 70–110)
POTASSIUM SERPL-SCNC: 3.7 MMOL/L (ref 3.5–5.1)
POTASSIUM SERPL-SCNC: 3.9 MMOL/L (ref 3.5–5.1)
POTASSIUM SERPL-SCNC: 3.9 MMOL/L (ref 3.5–5.1)
POTASSIUM SERPL-SCNC: 4 MMOL/L (ref 3.5–5.1)
POTASSIUM SERPL-SCNC: 4.3 MMOL/L (ref 3.5–5.1)
POTASSIUM SERPL-SCNC: 4.4 MMOL/L (ref 3.5–5.1)
PROPOXYPH SERPL QL: NEGATIVE
PROPOXYPH SERPL QL: NEGATIVE
PROT SERPL-MCNC: 5.8 G/DL (ref 6–8.4)
RBC # BLD AUTO: 4.11 M/UL (ref 4.6–6.2)
SODIUM SERPL-SCNC: 146 MMOL/L (ref 136–145)
SODIUM SERPL-SCNC: 149 MMOL/L (ref 136–145)
SODIUM SERPL-SCNC: 150 MMOL/L (ref 136–145)
SODIUM SERPL-SCNC: 151 MMOL/L (ref 136–145)
WBC # BLD AUTO: 3.64 K/UL (ref 3.9–12.7)

## 2023-11-13 PROCEDURE — 99233 PR SUBSEQUENT HOSPITAL CARE,LEVL III: ICD-10-PCS | Mod: GC,,, | Performed by: INTERNAL MEDICINE

## 2023-11-13 PROCEDURE — 20600001 HC STEP DOWN PRIVATE ROOM

## 2023-11-13 PROCEDURE — 83930 ASSAY OF BLOOD OSMOLALITY: CPT | Mod: 91

## 2023-11-13 PROCEDURE — 63600175 PHARM REV CODE 636 W HCPCS

## 2023-11-13 PROCEDURE — 99232 SBSQ HOSP IP/OBS MODERATE 35: CPT | Mod: GC,,, | Performed by: INTERNAL MEDICINE

## 2023-11-13 PROCEDURE — 99233 SBSQ HOSP IP/OBS HIGH 50: CPT | Mod: ,,, | Performed by: INTERNAL MEDICINE

## 2023-11-13 PROCEDURE — 80048 BASIC METABOLIC PNL TOTAL CA: CPT | Mod: 91,XB

## 2023-11-13 PROCEDURE — 99233 PR SUBSEQUENT HOSPITAL CARE,LEVL III: ICD-10-PCS | Mod: ,,, | Performed by: INTERNAL MEDICINE

## 2023-11-13 PROCEDURE — 83735 ASSAY OF MAGNESIUM: CPT

## 2023-11-13 PROCEDURE — 84100 ASSAY OF PHOSPHORUS: CPT | Mod: 91 | Performed by: STUDENT IN AN ORGANIZED HEALTH CARE EDUCATION/TRAINING PROGRAM

## 2023-11-13 PROCEDURE — 85025 COMPLETE CBC W/AUTO DIFF WBC: CPT

## 2023-11-13 PROCEDURE — 80048 BASIC METABOLIC PNL TOTAL CA: CPT | Mod: XB | Performed by: INTERNAL MEDICINE

## 2023-11-13 PROCEDURE — 63600175 PHARM REV CODE 636 W HCPCS: Performed by: STUDENT IN AN ORGANIZED HEALTH CARE EDUCATION/TRAINING PROGRAM

## 2023-11-13 PROCEDURE — 94761 N-INVAS EAR/PLS OXIMETRY MLT: CPT

## 2023-11-13 PROCEDURE — 63600175 PHARM REV CODE 636 W HCPCS: Performed by: INTERNAL MEDICINE

## 2023-11-13 PROCEDURE — 25000003 PHARM REV CODE 250

## 2023-11-13 PROCEDURE — 99233 SBSQ HOSP IP/OBS HIGH 50: CPT | Mod: GC,,, | Performed by: INTERNAL MEDICINE

## 2023-11-13 PROCEDURE — 99232 PR SUBSEQUENT HOSPITAL CARE,LEVL II: ICD-10-PCS | Mod: GC,,, | Performed by: INTERNAL MEDICINE

## 2023-11-13 PROCEDURE — 25000003 PHARM REV CODE 250: Performed by: INTERNAL MEDICINE

## 2023-11-13 PROCEDURE — 25000003 PHARM REV CODE 250: Performed by: STUDENT IN AN ORGANIZED HEALTH CARE EDUCATION/TRAINING PROGRAM

## 2023-11-13 PROCEDURE — 80053 COMPREHEN METABOLIC PANEL: CPT | Performed by: STUDENT IN AN ORGANIZED HEALTH CARE EDUCATION/TRAINING PROGRAM

## 2023-11-13 RX ORDER — INSULIN ASPART 100 [IU]/ML
5 INJECTION, SOLUTION INTRAVENOUS; SUBCUTANEOUS
Status: DISCONTINUED | OUTPATIENT
Start: 2023-11-13 | End: 2023-11-15

## 2023-11-13 RX ORDER — POLYETHYLENE GLYCOL 3350 17 G/17G
17 POWDER, FOR SOLUTION ORAL DAILY
Status: DISCONTINUED | OUTPATIENT
Start: 2023-11-13 | End: 2023-11-15

## 2023-11-13 RX ORDER — ENOXAPARIN SODIUM 100 MG/ML
40 INJECTION SUBCUTANEOUS EVERY 24 HOURS
Status: DISCONTINUED | OUTPATIENT
Start: 2023-11-13 | End: 2023-11-17 | Stop reason: HOSPADM

## 2023-11-13 RX ORDER — AMOXICILLIN 250 MG
1 CAPSULE ORAL DAILY
Status: DISCONTINUED | OUTPATIENT
Start: 2023-11-13 | End: 2023-11-15

## 2023-11-13 RX ORDER — INSULIN ASPART 100 [IU]/ML
0-5 INJECTION, SOLUTION INTRAVENOUS; SUBCUTANEOUS
Status: DISCONTINUED | OUTPATIENT
Start: 2023-11-13 | End: 2023-11-13

## 2023-11-13 RX ORDER — INSULIN ASPART 100 [IU]/ML
0-5 INJECTION, SOLUTION INTRAVENOUS; SUBCUTANEOUS
Status: DISCONTINUED | OUTPATIENT
Start: 2023-11-13 | End: 2023-11-17 | Stop reason: HOSPADM

## 2023-11-13 RX ADMIN — SODIUM PHOSPHATE, MONOBASIC, MONOHYDRATE AND SODIUM PHOSPHATE, DIBASIC, ANHYDROUS 15 MMOL: 142; 276 INJECTION, SOLUTION INTRAVENOUS at 05:11

## 2023-11-13 RX ADMIN — INSULIN ASPART 1 UNITS: 100 INJECTION, SOLUTION INTRAVENOUS; SUBCUTANEOUS at 05:11

## 2023-11-13 RX ADMIN — SENNOSIDES AND DOCUSATE SODIUM 1 TABLET: 8.6; 5 TABLET ORAL at 02:11

## 2023-11-13 RX ADMIN — INSULIN HUMAN 2 UNITS/HR: 1 INJECTION, SOLUTION INTRAVENOUS at 08:11

## 2023-11-13 RX ADMIN — DEXTROSE MONOHYDRATE: 5 INJECTION, SOLUTION INTRAVENOUS at 02:11

## 2023-11-13 RX ADMIN — HEPARIN SODIUM 5000 UNITS: 5000 INJECTION INTRAVENOUS; SUBCUTANEOUS at 05:11

## 2023-11-13 RX ADMIN — PANTOPRAZOLE SODIUM 40 MG: 40 TABLET, DELAYED RELEASE ORAL at 08:11

## 2023-11-13 RX ADMIN — DEXTROSE MONOHYDRATE: 5 INJECTION, SOLUTION INTRAVENOUS at 09:11

## 2023-11-13 RX ADMIN — ENOXAPARIN SODIUM 40 MG: 40 INJECTION SUBCUTANEOUS at 04:11

## 2023-11-13 RX ADMIN — PIPERACILLIN SODIUM AND TAZOBACTAM SODIUM 4.5 G: 4; .5 INJECTION, POWDER, FOR SOLUTION INTRAVENOUS at 02:11

## 2023-11-13 RX ADMIN — PIPERACILLIN SODIUM AND TAZOBACTAM SODIUM 4.5 G: 4; .5 INJECTION, POWDER, FOR SOLUTION INTRAVENOUS at 09:11

## 2023-11-13 RX ADMIN — INSULIN ASPART 1 UNITS: 100 INJECTION, SOLUTION INTRAVENOUS; SUBCUTANEOUS at 06:11

## 2023-11-13 RX ADMIN — MUPIROCIN: 20 OINTMENT TOPICAL at 08:11

## 2023-11-13 RX ADMIN — POLYETHYLENE GLYCOL 3350 17 G: 17 POWDER, FOR SOLUTION ORAL at 02:11

## 2023-11-13 RX ADMIN — INSULIN HUMAN 2 UNITS/HR: 1 INJECTION, SOLUTION INTRAVENOUS at 12:11

## 2023-11-13 RX ADMIN — DEXTROSE MONOHYDRATE: 5 INJECTION, SOLUTION INTRAVENOUS at 05:11

## 2023-11-13 NOTE — ASSESSMENT & PLAN NOTE
On D5W gtt for hypernatremia and BETH per nephro  Please notify us for any change in dextrose-containing IVF so that we can adjust insulin accordingly

## 2023-11-13 NOTE — SUBJECTIVE & OBJECTIVE
Interval History/Significant Events: No major issues overnight. Na, BGL, and serum osm improved. Nephro recommending increasing D5W gtt to 180cc/hr as still hypernatremic with Na 150. Remains on transitional gtt. Will upgrade diet today    Review of Systems   Constitutional:  Negative for chills and fever.   HENT:  Negative for congestion and rhinorrhea.    Eyes:  Negative for photophobia and visual disturbance.   Respiratory:  Negative for cough, shortness of breath and wheezing.    Cardiovascular:  Negative for chest pain, palpitations and leg swelling.   Gastrointestinal:  Negative for abdominal pain, diarrhea, nausea and vomiting.   Genitourinary:  Negative for dysuria, frequency and hematuria.   Musculoskeletal:  Negative for arthralgias and myalgias.   Skin:  Negative for rash and wound.   Neurological:  Negative for dizziness and headaches.   Psychiatric/Behavioral:  Negative for agitation and confusion.      Objective:     Vital Signs (Most Recent):  Temp: 99.3 °F (37.4 °C) (11/13/23 1301)  Pulse: 89 (11/13/23 1301)  Resp: (!) 9 (11/13/23 1301)  BP: 130/75 (11/13/23 1301)  SpO2: 100 % (11/13/23 1301) Vital Signs (24h Range):  Temp:  [99.1 °F (37.3 °C)-100 °F (37.8 °C)] 99.3 °F (37.4 °C)  Pulse:  [] 89  Resp:  [9-23] 9  SpO2:  [97 %-100 %] 100 %  BP: (113-142)/(62-92) 130/75   Weight: 89.5 kg (197 lb 5 oz)  Body mass index is 25.33 kg/m².      Intake/Output Summary (Last 24 hours) at 11/13/2023 1339  Last data filed at 11/13/2023 1301  Gross per 24 hour   Intake 4046.4 ml   Output 3055 ml   Net 991.4 ml            Physical Exam  Vitals and nursing note reviewed.   Constitutional:       General: He is not in acute distress.     Appearance: He is ill-appearing. He is not toxic-appearing.      Interventions: He is restrained.   HENT:      Mouth/Throat:      Mouth: Mucous membranes are dry.      Comments: Lips dry but significantly improved compared to admission  Eyes:      Extraocular Movements:  Extraocular movements intact.      Pupils: Pupils are equal, round, and reactive to light.   Cardiovascular:      Rate and Rhythm: Regular rhythm. Tachycardia present.      Pulses: Normal pulses.      Heart sounds: Normal heart sounds. No murmur heard.  Pulmonary:      Effort: Pulmonary effort is normal.      Breath sounds: Normal breath sounds. No wheezing, rhonchi or rales.      Comments: On 2L NC  Abdominal:      General: Bowel sounds are normal. There is no distension.      Palpations: Abdomen is soft.      Tenderness: There is no abdominal tenderness. There is no right CVA tenderness or left CVA tenderness.   Musculoskeletal:         General: No tenderness.      Right lower leg: No edema.      Left lower leg: No edema.   Skin:     General: Skin is warm and dry.      Capillary Refill: Capillary refill takes less than 2 seconds.      Findings: No erythema or rash.   Neurological:      General: No focal deficit present.      Mental Status: He is alert and oriented to person, place, and time.      Comments: More alert, following commands and answering questions   Psychiatric:         Mood and Affect: Mood normal.         Thought Content: Thought content normal.            Vents:     Lines/Drains/Airways       Peripherally Inserted Central Catheter Line  Duration             PICC Triple Lumen 09/13/23 1230 right basilic 61 days              Central Venous Catheter Line  Duration             Percutaneous Central Line Insertion/Assessment - Triple Lumen  11/10/23 1440 Femoral Vein Right;Femoral Right 2 days              Drain  Duration                  Urethral Catheter 11/10/23 1450 Temperature probe 2 days              Peripheral Intravenous Line  Duration                  Peripheral IV - Single Lumen 11/10/23 1347 18 G Anterior;Right 2 days         Peripheral IV - Single Lumen 11/10/23 1402 20 G Left;Posterior Hand 2 days                  Significant Labs:    CBC/Anemia Profile:  Recent Labs   Lab 11/12/23  1106  "11/12/23  1349 11/13/23 0244   WBC 5.83  --  3.64*   HGB 12.2*  --  12.5*   HCT 35.9* 37 37.5*   *  --  112*   MCV 91  --  91   RDW 13.2  --  13.4       Chemistries:  Recent Labs   Lab 11/12/23  0411 11/12/23  0605 11/13/23  0244 11/13/23  0617 11/13/23  0840 11/13/23  1138   *   < > 151* 150* 150*  150* 149*   K 3.8   < > 4.0 3.7 3.9  3.9 4.4   *   < > 116* 114* 115*  115* 113*   CO2 25   < > 28 27 26  26 24   BUN 21*   < > 8 8 8  8 8   CREATININE 1.9*   < > 1.5* 1.4 1.4  1.4 1.3   CALCIUM 8.3*   < > 8.8 8.7 8.7  8.7 8.6*   ALBUMIN 3.1*  --  2.9*  --   --   --    PROT 5.9*  --  5.8*  --   --   --    BILITOT 0.3  --  0.7  --   --   --    ALKPHOS 78  --  75  --   --   --    ALT 19  --  18  --   --   --    AST 34  --  29  --   --   --    MG 2.5  --  2.2  --   --   --    PHOS 2.1*   < > 2.6*  --  4.6* 3.4    < > = values in this interval not displayed.         Blood Culture:   No results for input(s): "LABBLOO" in the last 48 hours.    CMP:   Recent Labs   Lab 11/12/23  0411 11/12/23  0605 11/13/23  0244 11/13/23  0617 11/13/23  0840 11/13/23  1138   *   < > 151* 150* 150*  150* 149*   K 3.8   < > 4.0 3.7 3.9  3.9 4.4   *   < > 116* 114* 115*  115* 113*   CO2 25   < > 28 27 26  26 24   *   < > 126* 113* 135*  135* 152*   BUN 21*   < > 8 8 8  8 8   CREATININE 1.9*   < > 1.5* 1.4 1.4  1.4 1.3   CALCIUM 8.3*   < > 8.8 8.7 8.7  8.7 8.6*   PROT 5.9*  --  5.8*  --   --   --    ALBUMIN 3.1*  --  2.9*  --   --   --    BILITOT 0.3  --  0.7  --   --   --    ALKPHOS 78  --  75  --   --   --    AST 34  --  29  --   --   --    ALT 19  --  18  --   --   --    ANIONGAP 10   < > 7* 9 9  9 12    < > = values in this interval not displayed.       Lactic Acid:   No results for input(s): "LACTATE" in the last 48 hours.    POCT Glucose:   Recent Labs   Lab 11/13/23  1045 11/13/23  1138 11/13/23  1243   POCTGLUCOSE 127* 152* 150*       Urine Culture: No results for input(s): "LABURIN" " "in the last 48 hours.  Urine Studies:   No results for input(s): "COLORU", "APPEARANCEUA", "PHUR", "SPECGRAV", "PROTEINUA", "GLUCUA", "KETONESU", "BILIRUBINUA", "OCCULTUA", "NITRITE", "UROBILINOGEN", "LEUKOCYTESUR", "RBCUA", "WBCUA", "BACTERIA", "SQUAMEPITHEL", "HYALINECASTS" in the last 48 hours.    Invalid input(s): "WRIGHTSUR"    Recent Lab Results  (Last 5 results in the past 24 hours)        11/13/23  1243   11/13/23  1138   11/13/23  1138   11/13/23  1045   11/13/23  0944        Anion Gap     12           BUN     8           Calcium     8.6           Chloride     113           CO2     24           Creatinine     1.3           eGFR     >60.0           Glucose     152           Osmolality               Phosphorus Level     3.4           POCT Glucose 150   152     127   114       Potassium     4.4           Sodium     149                                All pertinent labs within the past 24 hours have been reviewed.    Significant Imaging:  I have reviewed all pertinent imaging results/findings within the past 24 hours.  "

## 2023-11-13 NOTE — PROGRESS NOTES
"[facility] - [facility]  Endocrinology  Progress Note    Admit Date: 11/10/2023     Andrzej Sinclair is a 37 year old male with PMH significant for poorly controlled type 1 DM c/b frequent DKA, HTN, CKD3 and GERD who was BIBEMS for unresponsiveness. Patient is a not following commands on my assessment and history was obtained from the medical record. Family reportedly called EMS when they found patient down and unresponsive. He reportedly had not been eating or taking his insulin for several days. Patient was found to be hyperglycemic and brought in to the ED. He was found to be unresponsive to verbal stimuli and minimally responsive to physical, but hemodynamically stable and afebrile. Labs showing severe DKA (BG 1885, bicarb 6, AG 30, BHO 5.8) and HHS (sOSM 452). Complications including acute renal failure and shock requiring pressors concerning for septic shock. Endocrinology is consulted for DKA/HHS, initial BGL >1800, hypernatremic, trying to prevent rapid correction of osm and Na, would like assistance with insulin gtt."    Patient was treated with insulin infusion per DKA protocol and aggressive hydration. Acidosis has since resolved and anion gap is closed. However patient is still minimally responsive, not following commands and not eating.    Regarding Type 1 DM    Diagnosed: Age 20  Last hemoglobin A1c: 12.8 09/13/2023  Last seen with Endocrinology: 05/02/2023 with DILIP Mendoza. He can cancelled a virtual appointment with our office in september  Last Hospitalization for DKA: 9/12-9/18    Per records, He was prescribed omnipod 5 in May but unclear of adherence.  Med rec states he is taking Tresiba 21 units daily and Lyumjev 7 units with meals. He has been having issues with Dexcom adherence as an outpatient. Using more than 3 dexcoms per month. Prescriptions have been sent but staff have been unable to contact him.      BG trends today:      Pertinent Labs       Interval HPI:   Overnight events: BG " "has been at  goal on transition gtt at rate of 2.0 u/hr o/n. Today diet will be advanced and D5W rate will be increased for hypernatremia per nephro.  Eating:   <25%  Nausea: No  Hypoglycemia and intervention: No  Fever: No  TPN and/or TF: No  If yes, type of TF/TPN and rate: n/a    /77 (BP Location: Right arm, Patient Position: Lying)   Pulse 82   Temp 99.3 °F (37.4 °C) (Core Bladder)   Resp 13   Ht 6' 2" (1.88 m)   Wt 89.5 kg (197 lb 5 oz)   SpO2 100%   BMI 25.33 kg/m²     Labs Reviewed and Include    Recent Labs   Lab 11/13/23  0244 11/13/23  0617 11/13/23  1138   *   < > 152*   CALCIUM 8.8   < > 8.6*   ALBUMIN 2.9*  --   --    PROT 5.8*  --   --    *   < > 149*   K 4.0   < > 4.4   CO2 28   < > 24   *   < > 113*   BUN 8   < > 8   CREATININE 1.5*   < > 1.3   ALKPHOS 75  --   --    ALT 18  --   --    AST 29  --   --    BILITOT 0.7  --   --     < > = values in this interval not displayed.     Lab Results   Component Value Date    WBC 3.64 (L) 11/13/2023    HGB 12.5 (L) 11/13/2023    HCT 37.5 (L) 11/13/2023    MCV 91 11/13/2023     (L) 11/13/2023     No results for input(s): "TSH", "FREET4" in the last 168 hours.  Lab Results   Component Value Date    HGBA1C 12.8 (H) 09/13/2023       Nutritional status:   Body mass index is 25.33 kg/m².  Lab Results   Component Value Date    ALBUMIN 2.9 (L) 11/13/2023    ALBUMIN 3.1 (L) 11/12/2023    ALBUMIN 3.6 11/11/2023     No results found for: "PREALBUMIN"    Estimated Creatinine Clearance: 91.3 mL/min (based on SCr of 1.3 mg/dL).    Accu-Checks  Recent Labs     11/13/23  0522 11/13/23  0616 11/13/23  0721 11/13/23  0839 11/13/23  0944 11/13/23  1045 11/13/23  1138 11/13/23  1243 11/13/23  1405 11/13/23  1458   POCTGLUCOSE 123* 112* 110 126* 114* 127* 152* 150* 142* 161*       Current Medications and/or Treatments Impacting Glycemic Control  Immunotherapy:    Immunosuppressants       None          Steroids:   Hormones (From admission, " onward)      None          Pressors:    Autonomic Drugs (From admission, onward)      None          Hyperglycemia/Diabetes Medications:   Antihyperglycemics (From admission, onward)      Start     Stop Route Frequency Ordered    11/13/23 1645  insulin aspart U-100 pen 5 Units         -- SubQ 3 times daily with meals 11/13/23 1447    11/13/23 1623  insulin aspart U-100 pen 0-5 Units         -- SubQ As needed (PRN) 11/13/23 1523    11/13/23 1130  insulin regular in 0.9 % NaCl 100 unit/100 mL (1 unit/mL) infusion        Question:  Enter initial dose (Units/hr):  Answer:  2    -- IV Continuous 11/13/23 1125            ASSESSMENT and PLAN    Neuro  Acute metabolic encephalopathy  Resolved. Pt appears to be back at presumed mental status baseline.      Renal/  Hypernatremia  On D5W gtt for hypernatremia and BETH per nephro  Please notify us for any change in dextrose-containing IVF so that we can adjust insulin accordingly       Endocrine  * DKA (diabetic ketoacidosis)  Key History and Diagnostic Findings  Type 1 diabetic with frequent admission for DKA and [facility]  Admission labs: BG 1885, bicarb 6, AG 30, BHO 5.8 and sOSM 452  Etiology: missed insulin vs. Infection    DKA now resolved and pt has been switched from DKA gtt to transition insulin gtt and subq prandial and correction insulin    Type 1 diabetes mellitus  Diagnosed: age 20  Last hemoglobin A1c: 12.8 09/13/2023    Outpatient regimen:   He reports Toujeo 20u daily and Lyumjev 10u ac (Tresiba is what is on MAR so I suspect he is using Tresiba for basal, not Toujeo)  Also has DEXCOM G6. Previously prescribed Omnipod 5    Endocrinology consulted for BG management.   BG goal 140-180     He is getting D5W for hypernatremia and BETH per nephro. Please let me know when rate is changed or if D5W is stopped as this will change his insulin requirement and I will adjust rate of transition gtt at that time.    - Transition drip at 2 units/hr with step-down parameters. Pt is a  type 1 diabetic and cannot go without basal insulin. If gtt is paused for more than 30 min for any reason, please page on-call endocrine fellow   - Novolog (aspart) insulin 5 Units SQ TIDWM and prn for BG excursions low dose SSI (150/50).  - BG checks /hs/0200  - Hypoglycemia protocol in place    ** Please notify Endocrine for any change and/or advance in diet**  ** Please call Endocrine for any BG related issues **    Discharge Planning:   TBD. Please notify endocrinology prior to discharge.          Stefany Aggarwal MD  Endocrinology  [facility] - Cardiac Medical ICU

## 2023-11-13 NOTE — ASSESSMENT & PLAN NOTE
Sodium 130 at consult time, corrected sodium would be [id number] (Blood glucose of more than 1800 gives correction factor of 2*17)  Risk of hyponatremia to change to hypernatremia quickly, pontine mylenosis > cerebral edema  Latest Na 150  Recommendations/Plan:  - D5W continue, slowly to lower serum sodium levels 4-6meq/24h.  -avoid rapidly changing intracerebral osmolalities (no more than 6-8 mEq in 24 hours)   - Q2H BMP and Q4H osmolality every 4 hrs  - consider getting MRI of brain now to see if a rapid increase in his serum osmolality (before admit) already caused ODS.   - frequent neuroclinical assessment although MRI brain on 11/11 without radiographic evidence of osmotic myelinolysis  - K replacement if less than 5

## 2023-11-13 NOTE — ASSESSMENT & PLAN NOTE
Presented with hyperglycemia BGL > 500 with EMS and >1800 on BMP once arrived to [facility]  Also hyperkalemic with K 6.8; shifted by ED  Started on insulin gtt; BGL improved to 1600 but potassium dropped to 2.7  Also has BETH with sCr 6.1 on presentation; baseline sCr 1.2-1.4  Likely 2/2 DKA and hypovolemia, Pt very dry on exam  Hypernatremic with Na ~170 when corrected for degree of hyperglycemia on arrival    Plan:  - now out of DKA but still hyperosmolar  - endocrine consulted, appreciate recs; transitioned to a transitional insulin gtt; started aspart with meals and SSI  - upgraded diet to diabetic diet  - nephrology following, appreciate recs; no HD needs currently, sCr improving; assisting with management of hypernatremia; increase D5W gtt to 180cc/hr  - Q8h BMP to monitor K and Na; replace potassium as needed to maintain > 3.5  - Q4h serum osm  - maintain camacho catheter; monitor strict I's/O's

## 2023-11-13 NOTE — PROGRESS NOTES
[facility] - Cardiac Medical ICU  Nephrology  Progress Note    Patient Name: Andrzej Sinclair  MRN: [id number]  Admission Date: 11/10/2023  Hospital Length of Stay: 3 days  Attending Provider: Abdirizak Negron MD   Primary Care Physician: Viktoriya Jaeger NP  Principal Problem:DKA (diabetic ketoacidosis)    Subjective:     HPI: Andrzej Sinclair, a 37 y.o. male PMH of Type 1 DM, presents to the ED w/ complaint of hyperglycemia.  Per EMS patient responsive to painful stimuli only. Initial labs showing blood glucose more than 1000, hyperkalemia, hyponatremia, low BP.    Interval History:   No acute events overnight, serum sodium levels are improving slowly with D5W, good urine output of 2.7 liter over the last 24h, blood glucose under control, vital signs are stable.    Review of patient's allergies indicates:   Allergen Reactions    Lactose Other (See Comments)     Gas and moderate to sever abdominal pain     Current Facility-Administered Medications   Medication Frequency    dextrose 10 % infusion PRN    dextrose 10 % infusion PRN    dextrose 10% bolus 125 mL 125 mL PRN    dextrose 10% bolus 250 mL 250 mL PRN    dextrose 5 % (D5W) infusion Continuous    glucagon (human recombinant) injection 1 mg PRN    glucose chewable tablet 16 g PRN    glucose chewable tablet 24 g PRN    heparin (porcine) injection 5,000 Units Q8H    insulin regular in 0.9 % NaCl 100 unit/100 mL (1 unit/mL) infusion Continuous    mupirocin 2 % ointment BID    naloxone 0.4 mg/mL injection 0.02 mg PRN    pantoprazole EC tablet 40 mg Daily    piperacillin-tazobactam (ZOSYN) 4.5 g in dextrose 5 % in water (D5W) 100 mL IVPB (MB+) Q8H    sodium chloride 0.9% flush 10 mL Q12H PRN    sodium chloride 0.9% flush 10 mL PRN       Objective:     Vital Signs (Most Recent):  Temp: 100 °F (37.8 °C) (11/13/23 1001)  Pulse: 90 (11/13/23 1001)  Resp: 11 (11/13/23 1001)  BP: 133/80 (11/13/23 1001)  SpO2: 100 % (11/13/23 1001) Vital Signs (24h Range):  Temp:  [98.8 °F  (37.1 °C)-100 °F (37.8 °C)] 100 °F (37.8 °C)  Pulse:  [] 90  Resp:  [9-23] 11  SpO2:  [97 %-100 %] 100 %  BP: (113-142)/(62-92) 133/80     Weight: 89.5 kg (197 lb 5 oz) (11/10/23 1519)  Body mass index is 25.33 kg/m².  Body surface area is 2.16 meters squared.    I/O last 3 completed shifts:  In: 7866.2 [I.V.:6128.1; IV Piggyback:1738]  Out: 3755 [Urine:3755]     Physical Exam  Constitutional:       Appearance: He is obese.   HENT:      Head: Normocephalic.   Cardiovascular:      Rate and Rhythm: Normal rate.   Pulmonary:      Effort: Pulmonary effort is normal.   Genitourinary:     Comments: Foleys in place  Skin:     General: Skin is warm.   Neurological:      General: No focal deficit present.      Mental Status: He is alert and oriented to person, place, and time.   Psychiatric:         Mood and Affect: Mood normal.          Significant Labs:  ABGs:   Recent Labs   Lab 11/12/23  1349   PH 7.346*   PCO2 46.8*   HCO3 25.6   POCSATURATED 64   BE 0     BMP:   Recent Labs   Lab 11/13/23  0244 11/13/23  0617 11/13/23  0840   *   < > 135*  135*   *   < > 150*  150*   K 4.0   < > 3.9  3.9   *   < > 115*  115*   CO2 28   < > 26  26   BUN 8   < > 8  8   CREATININE 1.5*   < > 1.4  1.4   CALCIUM 8.8   < > 8.7  8.7   MG 2.2  --   --     < > = values in this interval not displayed.     CBC:   Recent Labs   Lab 11/13/23  0244   WBC 3.64*   RBC 4.11*   HGB 12.5*   HCT 37.5*   *   MCV 91   MCH 30.4   MCHC 33.3        Significant Imaging:  Labs: Reviewed  Assessment/Plan:     Renal/  BETH (acute kidney injury)  Scr at consult 6, baseline 1-1.3  1)BETH multifactoral 2/2 Pre renal vs ATN*   2)Hyponatremia(corrected sodium showing severe hypernatremia)  3)Hypokalemia  4)Severe hyperglycemia  5)Metabolic acidosis with respiratory compensation  6)High osmolal gap    Plan  D5W, for slowly correction of hypernatremia.  I/Os  Avoid nephrotoxins  Renal dose medications    Hypernatremia  Sodium 130  at consult time, (corrected 160-165). Risk of hyponatremia to change to hypernatremia quickly, pontine mylenosis > cerebral edema.  Today sodium is 150 meq/l  Estimated free water deficit around 4 liter          Recommendations/Plan:  - D5W continue at an increased rate to lower serum sodium - would increase D5W rate to 180-200ml/plus po intake    - BMP every 8 hrs   - frequent neuroclinical assessment although MRI brain on 11/11 without radiographic evidence of osmotic myelinolysis    - K replacement as needed        Thank you for your consult. I will follow-up with patient. Please contact us if you have any additional questions.    Padmini Swenson MD  Nephrology  [facility] - Cardiac Medical ICU      [facility]FF NOTE    The note from the fellow/resident was reviewed. I have personally interviewed and examined the patient. There were no additional findings with regards to the history or physical exam.    I agree with the assessment and plan of  Dr. Swenson    Please increase free water intake (see above), keep in mind that the patient has some osmotic diuresis

## 2023-11-13 NOTE — PLAN OF CARE
[facility] - Card[facility] Medical [facility]  Initial Discharge Assessment       Primary Care Provider: Viktoriya Jaeger NP    Admission Diagnosis: Tachycardia [R00.0]  Hyperglycemia [R73.9]  Chest pain [R07.9]  Diabetic ketoacidosis with coma associated with type 1 diabetes mellitus [E10.11]    Admission Date: 11/10/2023  Expected Discharge Date: 11/16/2023    Transition of Care Barriers: Does not adhere to care plan    Payor: [facility] MCARE / Plan: [facility] DUAL COMPLETE HMO SNP / Product Type: Medicare Advantage /     Extended Emergency Contact Information  Primary Emergency Contact: MARZENA OLSON   [facility]  Mobile Phone: [phone]  Relation: Spouse    Discharge Plan A: Home with family  Discharge Plan B: Home with family      [id number] - [place] - [address] [facility] [facility] AT [facility]  [address] [place] [address]  [facility] [place] [address]  Phone: [phone] Fax: [phone]    [facility][id number] - [place] - [address] [facility] AT [facility] & [facility]  [address] [facility] [address]  [place] [address]  Phone: [phone] Fax: [phone]      Transferred from:     Past Medical History:   Diagnosis Date    Diabetes mellitus     Diabetes mellitus type 1     Diagnosed at age 20         CM met with patient in room for Discharge Planning Assessment.  Patient was able to answer questions.  Per patient, he lives with Marzena Olson (spouse) [phone] Josekhalifkarla Olson (spouse) [phone]  in a 1st floor apartment with 0 step(s) to enter.   Per patient, he was independent with ADLS and used no DME for ambulation. Per patient, he is not on dialysis and does not take Coumadin.  Patient will have help from Marzena Olson (spouse) [phone]  upon discharge.   Discharge Planning discussed with patient in room.  All questions addressed.  CM will follow for needs.      Discharge Plan A and Plan B have  been determined by review of patient's clinical status, future medical and therapeutic needs, and coverage/benefits for post-acute care in coordination with multidisciplinary team members.        Initial Assessment (most recent)       Adult Discharge Assessment - 11/13/23 1548          Discharge Assessment    Assessment Type Discharge Planning Assessment     Confirmed/corrected address, phone number and insurance Yes     Confirmed Demographics Correct on Facesheet     Source of Information patient     When was your last doctors appointment? 09/21/23     Communicated PRECIOUS with patient/caregiver Date not available/Unable to determine     Reason For Admission DKA (diabetic ketoacidosis)     People in Home spouse     Facility Arrived From: Home     Do you expect to return to your current living situation? Yes     Do you have help at home or someone to help you manage your care at home? Yes     Who are your caregiver(s) and their phone number(s)? Marzena Jones (spouse) [phone]     Prior to hospitilization cognitive status: Unable to Assess   patient 's medical acuity    Current cognitive status: Alert/Oriented     Equipment Currently Used at Home none     Readmission within 30 days? No     Patient currently being followed by outpatient case management? No     Do you currently have service(s) that help you manage your care at home? Yes     Name and Contact number of agency [facility]     Is the pt/caregiver preference to resume services with current agency Yes     Do you take prescription medications? Yes     Do you have prescription coverage? Yes     Coverage [facility] DUAL COMPLETE HMO SNP     Do you have any problems affording any of your prescribed medications? No     Is the patient taking medications as prescribed? yes     Who is going to help you get home at discharge? Marzena Jones (spouse) [phone]     How do you get to doctors appointments? family or friend will  provide     Are you on dialysis? No     Do you take coumadin? No     DME Needed Upon Discharge  other (see comments)   TBD    Discharge Plan discussed with: Patient     Transition of Care Barriers Does not adhere to care plan     Discharge Plan A Home with family     Discharge Plan B Home with family        Physical Activity    On average, how many days per week do you engage in moderate to strenuous exercise (like a brisk walk)? 0 days     On average, how many minutes do you engage in exercise at this level? 0 min        Housing Stability    In the last 12 months, was there a time when you were not able to pay the mortgage or rent on time? No     In the last 12 months, how many places have you lived? 1     In the last 12 months, was there a time when you did not have a steady place to sleep or slept in a shelter (including now)? No        Transportation Needs    In the past 12 months, has lack of transportation kept you from medical appointments or from getting medications? No     In the past 12 months, has lack of transportation kept you from meetings, work, or from getting things needed for daily living? No        Food Insecurity    Within the past 12 months, you worried that your food would run out before you got the money to buy more. Sometimes true     Within the past 12 months, the food you bought just didn't last and you didn't have money to get more. Sometimes true        Stress    Do you feel stress - tense, restless, nervous, or anxious, or unable to sleep at night because your mind is troubled all the time - these days? To some extent        Social Connections    In a typical week, how many times do you talk on the phone with family, friends, or neighbors? More than three times a week     How often do you get together with friends or relatives? More than three times a week     How often do you attend Mosque or Evangelical services? Never     Do you belong to any clubs or organizations such as Mosque  groups, unions, fraternal or athletic groups, or school groups? No     How often do you attend meetings of the clubs or organizations you belong to? Never     Are you , , , , never , or living with a partner?         Alcohol Use    Q1: How often do you have a drink containing alcohol? Monthly or less     Q2: How many drinks containing alcohol do you have on a typical day when you are drinking? 1 or 2     Q3: How often do you have six or more drinks on one occasion? Never        OTHER    Name(s) of People in Home Marzena Olson (spouse) [phone]                            PCP:  Viktoriya Jaeger NP  [phone]        Pharmacy:    [facility][id number] - [place] - [address] [facility] AT [facility] & [address] [address]  Phone: [phone] Fax: [phone]    [id number] - [place]  [address] [facility] AT [facility] & [facility]  [address] [place] [address]  Phone: [phone] Fax: [phone]        Emergency Contacts:  Extended Emergency Contact Information  Primary Emergency Contact: MARZENA OLSON   [facility]  Mobile Phone: [phone]  Relation: Spouse      Insurance:    Payor: [facility] MCARE / Plan: [facility] DUAL COMPLETE HMO SNP / Product Type: Medicare Advantage /     Eunice Torres RN     [phone]      11/13/2023  3:59 PM

## 2023-11-13 NOTE — ASSESSMENT & PLAN NOTE
Presented with AMS, unclear etiology; infections vs DKA vs drug related  Per EMS, Pt responsive to painful stimuli only. CBG >500 with EMS  Follows some commands but very lethargic    Plan:  - UA negative for infection, Bcx NTD  - CXR without consolidation  - discontinue piptazo; discontinued vancomycin  - UDS positive for THC; volatile screen pending   - CT head unremarkable; MRI brain NAD   - q4h neurochecks  - currently protecting airway however will need close monitoring in case he deteriorates for possible intubation; mentation improved, following commands and answering questions

## 2023-11-13 NOTE — ASSESSMENT & PLAN NOTE
Key History and Diagnostic Findings  Type 1 diabetic with frequent admission for DKA and HHS  Admission labs: BG 1885, bicarb 6, AG 30, BHO 5.8 and sOSM 452  Etiology: missed insulin vs. Infection    DKA now resolved and pt has been switched from DKA gtt to transition insulin gtt and subq prandial and correction insulin

## 2023-11-13 NOTE — RESIDENT HANDOFF
Handoff     Primary Team: [facility] CRITICAL CARE MEDICINE TEAM 1 Room Number: [id number] A     Patient Name: Andrzej Sinclair MRN: [id number]     Date of Birth: [id number] Allergies: Lactose     Age: 37 y.o. Admit Date: 11/10/2023     Sex: male  BMI: Body mass index is 25.33 kg/m².     Code Status: Full Code        Illness Level: Watcher - Yes    Reason for Admission: DKA (diabetic ketoacidosis)    Brief HPI: Andrzej Sinclair is a 37 year old male with hx of type 1 diabetes mellitus, hypertension, CKD stage 3, GERD, recently admitted for DKA/HHS complicated by hypernatremia as well as metabolic encephalopathy, who initially presented to the ED via EMS today with severe hyperglycemia. In the ED, patient was noted to be unresponsive to verbal stimuli and minimally responsive to physical, but hemodynamically stable and afebrile. Concerns for DKA with labs significant for severe hyperglycemia BG 1885, Na 131, K 6.8, bicarb 6, AG 30. Elevated beta hydroxybutyrate 5.8. Central line was placed in ED after difficulties gaining access. Started on DKA/HHS management with fluids, insulin gtt, bicarb gtt, hyperkalemia shifted. Pt later evaluated to be in hypotensive with atrial fibrillation with RVR and was given dose of phenylephrine, later started on levophed. Nephrology consulted for BETH, creatinine 6.6. Admitted to MICU for DKA.     Hospital Course: Admitted to MICU for DKA/HHS. Mentation remains the same - lethargic, protecting own airway, attempting to follow commands. CT head without acute process. Serum osm was very high on admission and unsure how quickly it kevin prior to him presenting to hospital, concerned about risk of ODS given improvement in serum osm with resolution of DKA/HHS; MRI brain pending. UDS positive for THC, ethanol < 10. Volatile screen pending. Nephrology consulted for BETH and hypernatremia, assisting with adjusting fluids to prevent rapid correction of sodium and osm. Initially started on 0.2% NaCl but switched to D5W  gtt. Monitoring Na and serum osm frequently. sCr improving with resolution of DKA, making good urine. Cultures NTD, will de-escalate vancomycin and continue piptazo, no obvious source of infection. Endocrine consulted for assistance with insulin gtt given complex case and need to balance the serum osm, Na, and glucose. Phone call to wife Marzena on 11/11 to provide updates. Na, serum osm, and glucose slowly improving. MRI brain without acute or significant findings. Mentation improved, answering questions and following commands. De-escalated vancomycin and piptazo as no infective source. Na imprStable for stepdown from ICU on 11/13.     Tasks: follow-up endocrine and nephrology recommendations, BMP q8h, monitor Na and serum osm, notify endocrine of changes in D5W gtt so they can adjust transitional gtt, needs close endocrine f/u on discharge    Contingency Plan: Contact MICU for any questions/concerns     Estimated Discharge Date: TBD    Discharge Disposition: Home or Self Care

## 2023-11-13 NOTE — SUBJECTIVE & OBJECTIVE
Interval History:   No acute events overnight, serum sodium levels are improving slowly with D5W, good urine output of 2.7 liter over the last 24h, blood glucose under control, vital signs are stable.    Review of patient's allergies indicates:   Allergen Reactions    Lactose Other (See Comments)     Gas and moderate to sever abdominal pain     Current Facility-Administered Medications   Medication Frequency    dextrose 10 % infusion PRN    dextrose 10 % infusion PRN    dextrose 10% bolus 125 mL 125 mL PRN    dextrose 10% bolus 250 mL 250 mL PRN    dextrose 5 % (D5W) infusion Continuous    glucagon (human recombinant) injection 1 mg PRN    glucose chewable tablet 16 g PRN    glucose chewable tablet 24 g PRN    heparin (porcine) injection 5,000 Units Q8H    insulin regular in 0.9 % NaCl 100 unit/100 mL (1 unit/mL) infusion Continuous    mupirocin 2 % ointment BID    naloxone 0.4 mg/mL injection 0.02 mg PRN    pantoprazole EC tablet 40 mg Daily    piperacillin-tazobactam (ZOSYN) 4.5 g in dextrose 5 % in water (D5W) 100 mL IVPB (MB+) Q8H    sodium chloride 0.9% flush 10 mL Q12H PRN    sodium chloride 0.9% flush 10 mL PRN       Objective:     Vital Signs (Most Recent):  Temp: 100 °F (37.8 °C) (11/13/23 1001)  Pulse: 90 (11/13/23 1001)  Resp: 11 (11/13/23 1001)  BP: 133/80 (11/13/23 1001)  SpO2: 100 % (11/13/23 1001) Vital Signs (24h Range):  Temp:  [98.8 °F (37.1 °C)-100 °F (37.8 °C)] 100 °F (37.8 °C)  Pulse:  [] 90  Resp:  [9-23] 11  SpO2:  [97 %-100 %] 100 %  BP: (113-142)/(62-92) 133/80     Weight: 89.5 kg (197 lb 5 oz) (11/10/23 1519)  Body mass index is 25.33 kg/m².  Body surface area is 2.16 meters squared.    I/O last 3 completed shifts:  In: 7866.2 [I.V.:4128.1; IV Piggyback:1738]  Out: 3755 [Urine:3755]     Physical Exam  Constitutional:       Appearance: He is obese.   HENT:      Head: Normocephalic.   Cardiovascular:      Rate and Rhythm: Normal rate.   Pulmonary:      Effort: Pulmonary effort is  normal.   Genitourinary:     Comments: Foleys in place  Skin:     General: Skin is warm.   Neurological:      General: No focal deficit present.      Mental Status: He is alert and oriented to person, place, and time.   Psychiatric:         Mood and Affect: Mood normal.          Significant Labs:  ABGs:   Recent Labs   Lab 11/12/23  1349   PH 7.346*   PCO2 46.8*   HCO3 25.6   POCSATURATED 64   BE 0     BMP:   Recent Labs   Lab 11/13/23  0244 11/13/23  0617 11/13/23  0840   *   < > 135*  135*   *   < > 150*  150*   K 4.0   < > 3.9  3.9   *   < > 115*  115*   CO2 28   < > 26  26   BUN 8   < > 8  8   CREATININE 1.5*   < > 1.4  1.4   CALCIUM 8.8   < > 8.7  8.7   MG 2.2  --   --     < > = values in this interval not displayed.     CBC:   Recent Labs   Lab 11/13/23  0244   WBC 3.64*   RBC 4.11*   HGB 12.5*   HCT 37.5*   *   MCV 91   MCH 30.4   MCHC 33.3        Significant Imaging:  Labs: Reviewed

## 2023-11-13 NOTE — SUBJECTIVE & OBJECTIVE
"Interval HPI:   Overnight events: BG has been at  goal on transition gtt at rate of 2.0 u/hr o/n. Today diet will be advanced and D5W rate will be increased for hypernatremia per nephro.  Eating:   <25%  Nausea: No  Hypoglycemia and intervention: No  Fever: No  TPN and/or TF: No  If yes, type of TF/TPN and rate: n/a    /77 (BP Location: Right arm, Patient Position: Lying)   Pulse 82   Temp 99.3 °F (37.4 °C) (Core Bladder)   Resp 13   Ht 6' 2" (1.88 m)   Wt 89.5 kg (197 lb 5 oz)   SpO2 100%   BMI 25.33 kg/m²     Labs Reviewed and Include    Recent Labs   Lab 11/13/23  0244 11/13/23  0617 11/13/23  1138   *   < > 152*   CALCIUM 8.8   < > 8.6*   ALBUMIN 2.9*  --   --    PROT 5.8*  --   --    *   < > 149*   K 4.0   < > 4.4   CO2 28   < > 24   *   < > 113*   BUN 8   < > 8   CREATININE 1.5*   < > 1.3   ALKPHOS 75  --   --    ALT 18  --   --    AST 29  --   --    BILITOT 0.7  --   --     < > = values in this interval not displayed.     Lab Results   Component Value Date    WBC 3.64 (L) 11/13/2023    HGB 12.5 (L) 11/13/2023    HCT 37.5 (L) 11/13/2023    MCV 91 11/13/2023     (L) 11/13/2023     No results for input(s): "TSH", "FREET4" in the last 168 hours.  Lab Results   Component Value Date    HGBA1C 12.8 (H) 09/13/2023       Nutritional status:   Body mass index is 25.33 kg/m².  Lab Results   Component Value Date    ALBUMIN 2.9 (L) 11/13/2023    ALBUMIN 3.1 (L) 11/12/2023    ALBUMIN 3.6 11/11/2023     No results found for: "PREALBUMIN"    Estimated Creatinine Clearance: 91.3 mL/min (based on SCr of 1.3 mg/dL).    Accu-Checks  Recent Labs     11/13/23  0522 11/13/23  0616 11/13/23  0721 11/13/23  0839 11/13/23  0944 11/13/23  1045 11/13/23  1138 11/13/23  1243 11/13/23  1405 11/13/23  1458   POCTGLUCOSE 123* 112* 110 126* 114* 127* 152* 150* 142* 161*       Current Medications and/or Treatments Impacting Glycemic Control  Immunotherapy:    Immunosuppressants       None      "     Steroids:   Hormones (From admission, onward)      None          Pressors:    Autonomic Drugs (From admission, onward)      None          Hyperglycemia/Diabetes Medications:   Antihyperglycemics (From admission, onward)      Start     Stop Route Frequency Ordered    11/13/23 1645  insulin aspart U-100 pen 5 Units         -- SubQ 3 times daily with meals 11/13/23 1447    11/13/23 1623  insulin aspart U-100 pen 0-5 Units         -- SubQ As needed (PRN) 11/13/23 1523    11/13/23 1130  insulin regular in 0.9 % NaCl 100 unit/100 mL (1 unit/mL) infusion        Question:  Enter initial dose (Units/hr):  Answer:  2    -- IV Continuous 11/13/23 1123

## 2023-11-13 NOTE — PLAN OF CARE
MICU DAILY GOALS     Family/Goals of care/Code Status   Code Status: Full Code    24H Vital Sign Range  Temp:  [98.4 °F (36.9 °C)-100 °F (37.8 °C)]   Pulse:  []   Resp:  [9-23]   BP: (113-142)/(62-92)   SpO2:  [97 %-100 %]      Shift Events (include procedures and significant events)   No acute events throughout shift. On transitional insulin gtt. Repleted potassium and phos.    AWAKE RASS: Goal -    Actual -      Restraint necessity: Not necessary   BREATHE SBT: Not intubated    Coordinate A & B, analgesics/sedatives Pain: managed   SAT: Not intubated   Delirium CAM-ICU:     Early(intubated/ Progressive (non-intubated) Mobility MOVE Screen (INTUBATED ONLY): Not intubated    Activity: Activity Management: Rolling - L1   Feeding/Nutrition Diet order: Diet/Nutrition Received: NPO,     Thrombus DVT prophylaxis: VTE Required Core Measure: Pharmacological prophylaxis initiated/maintained   HOB Elevation Head of Bed (HOB) Positioning: HOB at 20-30 degrees   Ulcer Prophylaxis GI: yes   Glucose control managed Glycemic Management: blood glucose monitored   Skin Skin assessed during: Q Shift Change    Sacrum intact/not altered? Yes  Heels intact/not altered? Yes  Surgical wound? No    [] No Altered Skin Integrity Present    []Prevention Measures Documented    [] Altered Skin Integrity Present or Discovered   [] LDA present in EPIC              [] LDA added in EPIC   [] Wound Image Taken (required on admit,                   transfer/discharge and every Tuesday)    Wound Care Consulted? No    Attending Nurse: AUGUSTO Mack RN       Bowel Function no issues    Indwelling Catheter Necessity      Urethral Catheter 11/10/23 1450 Temperature probe-Reason for Continuing Urinary Catheterization: Critically ill in ICU and requiring hourly monitoring of intake/output    Percutaneous Central Line Insertion/Assessment - Triple Lumen  11/10/23 1440 Femoral Vein Right;Femoral Right-Line Necessity Review: Hemodynamic instability      De-escalation Antibiotics No       VS and assessment per flow sheet, patient progressing towards goals as tolerated, plan of care reviewed with  pt , all concerns addressed, will continue to monitor.

## 2023-11-13 NOTE — PROGRESS NOTES
[facility] - Cardiac Medical ICU  Critical Care Medicine  Progress Note    Patient Name: Andrzej Sinclair  MRN: [id number]  Admission Date: 11/10/2023  Hospital Length of Stay: 3 days  Code Status: Full Code  Attending Provider: Abdirizak Negron MD  Primary Care Provider: Viktoriya Jaeger NP   Principal Problem: DKA (diabetic ketoacidosis)    Subjective:     HPI:  Andrzej Sinclair is a 37 year old male with hx of type 1 diabetes mellitus, hypertension, CKD stage 3, GERD, recently admitted for DKA/HHS complicated by hypernatremia as well as metabolic encephalopathy, who initially presented to the ED via EMS today with severe hyperglycemia. In the ED, patient was noted to be unresponsive to verbal stimuli and minimally responsive to physical, but hemodynamically stable and afebrile. Concerns for DKA with labs significant for severe hyperglycemia BG 1885, Na 131, K 6.8, bicarb 6, AG 30. Elevated beta hydroxybutyrate 5.8. Central line was placed in ED after difficulties gaining access. Started on DKA/HHS management with fluids, insulin gtt, bicarb gtt, hyperkalemia shifted. Pt later evaluated to be in hypotensive with atrial fibrillation with RVR and was given dose of phenylephrine, later started on levophed. Nephrology consulted for BETH, creatinine 6.6. Admitted to MICU for DKA.    Hospital/ICU Course:  Admitted to MICU for DKA/HHS. Mentation remains the same - lethargic, protecting own airway, attempting to follow commands. CT head without acute process. Serum osm was very high on admission and unsure how quickly it kevin prior to him presenting to hospital, concerned about risk of ODS given improvement in serum osm with resolution of DKA/HHS; MRI brain pending. UDS positive for THC, ethanol < 10. Volatile screen pending. Nephrology consulted for BETH and hypernatremia, assisting with adjusting fluids to prevent rapid correction of sodium and osm. Initially started on 0.2% NaCl but switched to D5W gtt. Monitoring Na and  serum osm frequently. sCr improving with resolution of DKA, making good urine. Cultures NTD, will de-escalate vancomycin and continue piptazo, no obvious source of infection. Endocrine consulted for assistance with insulin gtt given complex case and need to balance the serum osm, Na, and glucose. Phone call to wife Marzena on 11/11 to provide updates. Na, serum osm, and glucose slowly improving. MRI brain without acute or significant findings. Mentation improved, answering questions and following commands. De-escalated vancomycin and piptazo as no infective source. Na imprStable for stepdown from ICU on 11/13.    Interval History/Significant Events: No major issues overnight. Na, BGL, and serum osm improved. Nephro recommending increasing D5W gtt to 180cc/hr as still hypernatremic with Na 150. Remains on transitional gtt. Will upgrade diet today    Review of Systems   Constitutional:  Negative for chills and fever.   HENT:  Negative for congestion and rhinorrhea.    Eyes:  Negative for photophobia and visual disturbance.   Respiratory:  Negative for cough, shortness of breath and wheezing.    Cardiovascular:  Negative for chest pain, palpitations and leg swelling.   Gastrointestinal:  Negative for abdominal pain, diarrhea, nausea and vomiting.   Genitourinary:  Negative for dysuria, frequency and hematuria.   Musculoskeletal:  Negative for arthralgias and myalgias.   Skin:  Negative for rash and wound.   Neurological:  Negative for dizziness and headaches.   Psychiatric/Behavioral:  Negative for agitation and confusion.      Objective:     Vital Signs (Most Recent):  Temp: 99.3 °F (37.4 °C) (11/13/23 1301)  Pulse: 89 (11/13/23 1301)  Resp: (!) 9 (11/13/23 1301)  BP: 130/75 (11/13/23 1301)  SpO2: 100 % (11/13/23 1301) Vital Signs (24h Range):  Temp:  [99.1 °F (37.3 °C)-100 °F (37.8 °C)] 99.3 °F (37.4 °C)  Pulse:  [] 89  Resp:  [9-23] 9  SpO2:  [97 %-100 %] 100 %  BP: (113-142)/(62-92) 130/75   Weight: 89.5 kg  (197 lb 5 oz)  Body mass index is 25.33 kg/m².      Intake/Output Summary (Last 24 hours) at 11/13/2023 1339  Last data filed at 11/13/2023 1301  Gross per 24 hour   Intake 4046.4 ml   Output 3055 ml   Net 991.4 ml            Physical Exam  Vitals and nursing note reviewed.   Constitutional:       General: He is not in acute distress.     Appearance: He is ill-appearing. He is not toxic-appearing.      Interventions: He is restrained.   HENT:      Mouth/Throat:      Mouth: Mucous membranes are dry.      Comments: Lips dry but significantly improved compared to admission  Eyes:      Extraocular Movements: Extraocular movements intact.      Pupils: Pupils are equal, round, and reactive to light.   Cardiovascular:      Rate and Rhythm: Regular rhythm. Tachycardia present.      Pulses: Normal pulses.      Heart sounds: Normal heart sounds. No murmur heard.  Pulmonary:      Effort: Pulmonary effort is normal.      Breath sounds: Normal breath sounds. No wheezing, rhonchi or rales.      Comments: On 2L NC  Abdominal:      General: Bowel sounds are normal. There is no distension.      Palpations: Abdomen is soft.      Tenderness: There is no abdominal tenderness. There is no right CVA tenderness or left CVA tenderness.   Musculoskeletal:         General: No tenderness.      Right lower leg: No edema.      Left lower leg: No edema.   Skin:     General: Skin is warm and dry.      Capillary Refill: Capillary refill takes less than 2 seconds.      Findings: No erythema or rash.   Neurological:      General: No focal deficit present.      Mental Status: He is alert and oriented to person, place, and time.      Comments: More alert, following commands and answering questions   Psychiatric:         Mood and Affect: Mood normal.         Thought Content: Thought content normal.            Vents:     Lines/Drains/Airways       Peripherally Inserted Central Catheter Line  Duration             PICC Triple Lumen 09/13/23 1230 right  "basilic 61 days              Central Venous Catheter Line  Duration             Percutaneous Central Line Insertion/Assessment - Triple Lumen  11/10/23 1440 Femoral Vein Right;Femoral Right 2 days              Drain  Duration                  Urethral Catheter 11/10/23 1450 Temperature probe 2 days              Peripheral Intravenous Line  Duration                  Peripheral IV - Single Lumen 11/10/23 1347 18 G Anterior;Right 2 days         Peripheral IV - Single Lumen 11/10/23 1402 20 G Left;Posterior Hand 2 days                  Significant Labs:    CBC/Anemia Profile:  Recent Labs   Lab 11/12/23  1106 11/12/23  1349 11/13/23  0244   WBC 5.83  --  3.64*   HGB 12.2*  --  12.5*   HCT 35.9* 37 37.5*   *  --  112*   MCV 91  --  91   RDW 13.2  --  13.4       Chemistries:  Recent Labs   Lab 11/12/23  0411 11/12/23  0605 11/13/23  0244 11/13/23  0617 11/13/23  0840 11/13/23  1138   *   < > 151* 150* 150*  150* 149*   K 3.8   < > 4.0 3.7 3.9  3.9 4.4   *   < > 116* 114* 115*  115* 113*   CO2 25   < > 28 27 26  26 24   BUN 21*   < > 8 8 8  8 8   CREATININE 1.9*   < > 1.5* 1.4 1.4  1.4 1.3   CALCIUM 8.3*   < > 8.8 8.7 8.7  8.7 8.6*   ALBUMIN 3.1*  --  2.9*  --   --   --    PROT 5.9*  --  5.8*  --   --   --    BILITOT 0.3  --  0.7  --   --   --    ALKPHOS 78  --  75  --   --   --    ALT 19  --  18  --   --   --    AST 34  --  29  --   --   --    MG 2.5  --  2.2  --   --   --    PHOS 2.1*   < > 2.6*  --  4.6* 3.4    < > = values in this interval not displayed.         Blood Culture:   No results for input(s): "LABBLOO" in the last 48 hours.    CMP:   Recent Labs   Lab 11/12/23  0411 11/12/23  0605 11/13/23  0244 11/13/23  0617 11/13/23  0840 11/13/23  1138   *   < > 151* 150* 150*  150* 149*   K 3.8   < > 4.0 3.7 3.9  3.9 4.4   *   < > 116* 114* 115*  115* 113*   CO2 25   < > 28 27 26  26 24   *   < > 126* 113* 135*  135* 152*   BUN 21*   < > 8 8 8  8 8   CREATININE 1.9*   < " "> 1.5* 1.4 1.4  1.4 1.3   CALCIUM 8.3*   < > 8.8 8.7 8.7  8.7 8.6*   PROT 5.9*  --  5.8*  --   --   --    ALBUMIN 3.1*  --  2.9*  --   --   --    BILITOT 0.3  --  0.7  --   --   --    ALKPHOS 78  --  75  --   --   --    AST 34  --  29  --   --   --    ALT 19  --  18  --   --   --    ANIONGAP 10   < > 7* 9 9  9 12    < > = values in this interval not displayed.       Lactic Acid:   No results for input(s): "LACTATE" in the last 48 hours.    POCT Glucose:   Recent Labs   Lab 11/13/23  1045 11/13/23  1138 11/13/23  1243   POCTGLUCOSE 127* 152* 150*       Urine Culture: No results for input(s): "LABURIN" in the last 48 hours.  Urine Studies:   No results for input(s): "COLORU", "APPEARANCEUA", "PHUR", "SPECGRAV", "PROTEINUA", "GLUCUA", "KETONESU", "BILIRUBINUA", "OCCULTUA", "NITRITE", "UROBILINOGEN", "LEUKOCYTESUR", "RBCUA", "WBCUA", "BACTERIA", "SQUAMEPITHEL", "HYALINECASTS" in the last 48 hours.    Invalid input(s): "WRIGHTSUR"    Recent Lab Results  (Last 5 results in the past 24 hours)        11/13/23  1243   11/13/23  1138   11/13/23  1138   11/13/23  1045   11/13/23  0944        Anion Gap     12           BUN     8           Calcium     8.6           Chloride     113           CO2     24           Creatinine     1.3           eGFR     >60.0           Glucose     152           Osmolality               Phosphorus Level     3.4           POCT Glucose 150   152     127   114       Potassium     4.4           Sodium     149                                All pertinent labs within the past 24 hours have been reviewed.    Significant Imaging:  I have reviewed all pertinent imaging results/findings within the past 24 hours.    ABG  Recent Labs   Lab 11/12/23  1349   PH 7.346*   PO2 35*   PCO2 46.8*   HCO3 25.6   BE 0     Assessment/Plan:     Neuro  Acute metabolic encephalopathy  Presented with AMS, unclear etiology; infections vs DKA vs drug related  Per EMS, Pt responsive to painful stimuli only. CBG >500 with " EMS  Follows some commands but very lethargic    Plan:  - UA negative for infection, Bcx NTD  - CXR without consolidation  - discontinue piptazo; discontinued vancomycin  - UDS positive for THC; volatile screen pending   - CT head unremarkable; MRI brain NAD   - q4h neurochecks  - currently protecting airway however will need close monitoring in case he deteriorates for possible intubation; mentation improved, following commands and answering questions     Renal/  Hyperkalemia  See DKA    Metabolic acidosis  See DKA    BETH (acute kidney injury)  See DKA      Hypernatremia  See DKA    Endocrine  * DKA (diabetic ketoacidosis)  Presented with hyperglycemia BGL > 500 with EMS and >1800 on BMP once arrived to [facility] [place]  Also hyperkalemic with K 6.8; shifted by ED  Started on insulin gtt; BGL improved to 1600 but potassium dropped to 2.7  Also has BETH with sCr 6.1 on presentation; baseline sCr 1.2-1.4  Likely 2/2 DKA and hypovolemia, Pt very dry on exam  Hypernatremic with Na ~170 when corrected for degree of hyperglycemia on arrival    Plan:  - now out of DKA but still hyperosmolar  - endocrine consulted, appreciate recs; transitioned to a transitional insulin gtt; started aspart with meals and SSI  - upgraded diet to diabetic diet  - nephrology following, appreciate recs; no HD needs currently, sCr improving; assisting with management of hypernatremia; increase D5W gtt to 180cc/hr  - Q8h BMP to monitor K and Na; replace potassium as needed to maintain > 3.5  - Q4h serum osm  - maintain camacho catheter; monitor strict I's/O's    Type 1 diabetes mellitus  See DKA       Critical Care Daily Checklist:    A: Awake: RASS Goal/Actual Goal:    Actual:     B: Spontaneous Breathing Trial Performed?     C: SAT & SBT Coordinated?  N/Q                      D: Delirium: CAM-ICU Overall CAM-ICU: Negative   E: Early Mobility Performed? No   F: Feeding Goal:    Status:     Current Diet Order   Procedures    Diet diabetic 2000 Calorie      Order Specific Question:   Total calories:     Answer:   2000 Calorie      AS: Analgesia/Sedation N/A   T: Thromboembolic Prophylaxis lovenox   H: HOB > 300 Yes   U: Stress Ulcer Prophylaxis (if needed) N/A   G: Glucose Control Controlled, transitional gtt, aspart AC, SSI   B: Bowel Function Stool Occurrence: 0   I: Indwelling Catheter (Lines & Concepcion) Necessity Concepcion, PIVC, central line   D: De-escalation of Antimicrobials/Pharmacotherapies Stop abx    Plan for the day/ETD Stop abx, upgrade diet, stepdown    Code Status:  Family/Goals of Care: Full Code       Critical secondary to Patient has a condition that poses threat to life and bodily function: DKA/HHS      Critical care was time spent personally by me on the following activities: development of treatment plan with patient or surrogate and bedside caregivers, discussions with consultants, evaluation of patient's response to treatment, examination of patient, ordering and performing treatments and interventions, ordering and review of laboratory studies, ordering and review of radiographic studies, pulse oximetry, re-evaluation of patient's condition. This critical care time did not overlap with that of any other provider or involve time for any procedures.     Jenise Ji MD  Critical Care Medicine  [facility] - Cardiac Medical ICU

## 2023-11-13 NOTE — ASSESSMENT & PLAN NOTE
Diagnosed: age 20  Last hemoglobin A1c: 12.8 09/13/2023    Outpatient regimen:   He reports Toujeo 20u daily and Lyumjev 10u ac (Tresiba is what is on MAR so I suspect he is using Tresiba for basal, not Toujeo)  Also has DEXCOM G6. Previously prescribed Omnipod 5    Endocrinology consulted for BG management.   BG goal 140-180     He is getting D5W for hypernatremia and BETH per nephro. Please let me know when rate is changed or if D5W is stopped as this will change his insulin requirement and I will adjust rate of transition gtt at that time.    - Transition drip at 2 units/hr with step-down parameters. Pt is a type 1 diabetic and cannot go without basal insulin. If gtt is paused for more than 30 min for any reason, please page on-call endocrine fellow   - Novolog (aspart) insulin 5 Units SQ TIDWM and prn for BG excursions low dose SSI (150/50).  - BG checks /hs/0200  - Hypoglycemia protocol in place    ** Please notify Endocrine for any change and/or advance in diet**  ** Please call Endocrine for any BG related issues **    Discharge Planning:   TBD. Please notify endocrinology prior to discharge.

## 2023-11-13 NOTE — ASSESSMENT & PLAN NOTE
Scr at consult 6, baseline 1-1.3  1)BETH multifactoral 2/2 Pre renal vs ATN*   2)Hyponatremia(corrected sodium showing severe hypernatremia)  3)Hypokalemia  4)Severe hyperglycemia  5)Metabolic acidosis with respiratory compensation  6)High osmolal gap    Plan  D5W, for slowly correction of hypernatremia.  I/Os  Avoid nephrotoxins  Renal dose medications

## 2023-11-14 LAB
ALBUMIN SERPL BCP-MCNC: 2.8 G/DL (ref 3.5–5.2)
ALP SERPL-CCNC: 75 U/L (ref 55–135)
ALT SERPL W/O P-5'-P-CCNC: 17 U/L (ref 10–44)
ANION GAP SERPL CALC-SCNC: 11 MMOL/L (ref 8–16)
ANION GAP SERPL CALC-SCNC: 6 MMOL/L (ref 8–16)
ANION GAP SERPL CALC-SCNC: 8 MMOL/L (ref 8–16)
ANION GAP SERPL CALC-SCNC: 8 MMOL/L (ref 8–16)
AST SERPL-CCNC: 23 U/L (ref 10–40)
BASOPHILS # BLD AUTO: 0.01 K/UL (ref 0–0.2)
BASOPHILS NFR BLD: 0.3 % (ref 0–1.9)
BILIRUB SERPL-MCNC: 0.7 MG/DL (ref 0.1–1)
BUN SERPL-MCNC: 12 MG/DL (ref 6–20)
BUN SERPL-MCNC: 13 MG/DL (ref 6–20)
BUN SERPL-MCNC: 13 MG/DL (ref 6–20)
BUN SERPL-MCNC: 9 MG/DL (ref 6–20)
CALCIUM SERPL-MCNC: 9 MG/DL (ref 8.7–10.5)
CALCIUM SERPL-MCNC: 9.1 MG/DL (ref 8.7–10.5)
CALCIUM SERPL-MCNC: 9.3 MG/DL (ref 8.7–10.5)
CALCIUM SERPL-MCNC: 9.7 MG/DL (ref 8.7–10.5)
CHLORIDE SERPL-SCNC: 107 MMOL/L (ref 95–110)
CHLORIDE SERPL-SCNC: 107 MMOL/L (ref 95–110)
CHLORIDE SERPL-SCNC: 110 MMOL/L (ref 95–110)
CHLORIDE SERPL-SCNC: 111 MMOL/L (ref 95–110)
CO2 SERPL-SCNC: 23 MMOL/L (ref 23–29)
CO2 SERPL-SCNC: 24 MMOL/L (ref 23–29)
CO2 SERPL-SCNC: 26 MMOL/L (ref 23–29)
CO2 SERPL-SCNC: 31 MMOL/L (ref 23–29)
CREAT SERPL-MCNC: 1.2 MG/DL (ref 0.5–1.4)
CREAT SERPL-MCNC: 1.3 MG/DL (ref 0.5–1.4)
CREAT SERPL-MCNC: 1.4 MG/DL (ref 0.5–1.4)
CREAT SERPL-MCNC: 1.5 MG/DL (ref 0.5–1.4)
DIFFERENTIAL METHOD: ABNORMAL
EOSINOPHIL # BLD AUTO: 0 K/UL (ref 0–0.5)
EOSINOPHIL NFR BLD: 1.2 % (ref 0–8)
ERYTHROCYTE [DISTWIDTH] IN BLOOD BY AUTOMATED COUNT: 13.2 % (ref 11.5–14.5)
EST. GFR  (NO RACE VARIABLE): >60 ML/MIN/1.73 M^2
GLUCOSE SERPL-MCNC: 134 MG/DL (ref 70–110)
GLUCOSE SERPL-MCNC: 279 MG/DL (ref 70–110)
GLUCOSE SERPL-MCNC: 301 MG/DL (ref 70–110)
GLUCOSE SERPL-MCNC: 311 MG/DL (ref 70–110)
HCT VFR BLD AUTO: 36.7 % (ref 40–54)
HGB BLD-MCNC: 12.3 G/DL (ref 14–18)
IMM GRANULOCYTES # BLD AUTO: 0.01 K/UL (ref 0–0.04)
IMM GRANULOCYTES NFR BLD AUTO: 0.3 % (ref 0–0.5)
LYMPHOCYTES # BLD AUTO: 1.3 K/UL (ref 1–4.8)
LYMPHOCYTES NFR BLD: 38.3 % (ref 18–48)
MAGNESIUM SERPL-MCNC: 1.9 MG/DL (ref 1.6–2.6)
MCH RBC QN AUTO: 30.4 PG (ref 27–31)
MCHC RBC AUTO-ENTMCNC: 33.5 G/DL (ref 32–36)
MCV RBC AUTO: 91 FL (ref 82–98)
MONOCYTES # BLD AUTO: 0.3 K/UL (ref 0.3–1)
MONOCYTES NFR BLD: 9.7 % (ref 4–15)
NEUTROPHILS # BLD AUTO: 1.7 K/UL (ref 1.8–7.7)
NEUTROPHILS NFR BLD: 50.2 % (ref 38–73)
NRBC BLD-RTO: 0 /100 WBC
OSMOLALITY SERPL: 311 MOSM/KG (ref 280–300)
OSMOLALITY SERPL: 313 MOSM/KG (ref 280–300)
OSMOLALITY SERPL: 315 MOSM/KG (ref 280–300)
OSMOLALITY SERPL: 316 MOSM/KG (ref 280–300)
OSMOLALITY UR: 402 MOSM/KG (ref 50–1200)
PHOSPHATE SERPL-MCNC: 1.9 MG/DL (ref 2.7–4.5)
PHOSPHATE SERPL-MCNC: 2.4 MG/DL (ref 2.7–4.5)
PHOSPHATE SERPL-MCNC: 2.4 MG/DL (ref 2.7–4.5)
PHOSPHATE SERPL-MCNC: 2.5 MG/DL (ref 2.7–4.5)
PLATELET # BLD AUTO: 96 K/UL (ref 150–450)
PMV BLD AUTO: 11.3 FL (ref 9.2–12.9)
POCT GLUCOSE: 123 MG/DL (ref 70–110)
POCT GLUCOSE: 185 MG/DL (ref 70–110)
POCT GLUCOSE: 245 MG/DL (ref 70–110)
POCT GLUCOSE: 271 MG/DL (ref 70–110)
POCT GLUCOSE: 340 MG/DL (ref 70–110)
POTASSIUM SERPL-SCNC: 4.5 MMOL/L (ref 3.5–5.1)
POTASSIUM SERPL-SCNC: 4.5 MMOL/L (ref 3.5–5.1)
POTASSIUM SERPL-SCNC: 4.7 MMOL/L (ref 3.5–5.1)
POTASSIUM SERPL-SCNC: 5.1 MMOL/L (ref 3.5–5.1)
POTASSIUM UR-SCNC: 11 MMOL/L (ref 15–95)
PROT SERPL-MCNC: 5.7 G/DL (ref 6–8.4)
RBC # BLD AUTO: 4.04 M/UL (ref 4.6–6.2)
SODIUM SERPL-SCNC: 138 MMOL/L (ref 136–145)
SODIUM SERPL-SCNC: 142 MMOL/L (ref 136–145)
SODIUM SERPL-SCNC: 144 MMOL/L (ref 136–145)
SODIUM SERPL-SCNC: 148 MMOL/L (ref 136–145)
SODIUM UR-SCNC: 62 MMOL/L (ref 20–250)
WBC # BLD AUTO: 3.39 K/UL (ref 3.9–12.7)

## 2023-11-14 PROCEDURE — 80048 BASIC METABOLIC PNL TOTAL CA: CPT | Mod: XB

## 2023-11-14 PROCEDURE — 83735 ASSAY OF MAGNESIUM: CPT

## 2023-11-14 PROCEDURE — 63600175 PHARM REV CODE 636 W HCPCS

## 2023-11-14 PROCEDURE — 36415 COLL VENOUS BLD VENIPUNCTURE: CPT | Performed by: STUDENT IN AN ORGANIZED HEALTH CARE EDUCATION/TRAINING PROGRAM

## 2023-11-14 PROCEDURE — 99232 PR SUBSEQUENT HOSPITAL CARE,LEVL II: ICD-10-PCS | Mod: ,,, | Performed by: INTERNAL MEDICINE

## 2023-11-14 PROCEDURE — 99233 SBSQ HOSP IP/OBS HIGH 50: CPT | Mod: GC,,, | Performed by: INTERNAL MEDICINE

## 2023-11-14 PROCEDURE — 83930 ASSAY OF BLOOD OSMOLALITY: CPT

## 2023-11-14 PROCEDURE — 80048 BASIC METABOLIC PNL TOTAL CA: CPT | Mod: 91,XB

## 2023-11-14 PROCEDURE — 84300 ASSAY OF URINE SODIUM: CPT | Performed by: INTERNAL MEDICINE

## 2023-11-14 PROCEDURE — 99232 SBSQ HOSP IP/OBS MODERATE 35: CPT | Mod: ,,, | Performed by: INTERNAL MEDICINE

## 2023-11-14 PROCEDURE — 80053 COMPREHEN METABOLIC PANEL: CPT | Performed by: STUDENT IN AN ORGANIZED HEALTH CARE EDUCATION/TRAINING PROGRAM

## 2023-11-14 PROCEDURE — 84100 ASSAY OF PHOSPHORUS: CPT | Performed by: STUDENT IN AN ORGANIZED HEALTH CARE EDUCATION/TRAINING PROGRAM

## 2023-11-14 PROCEDURE — 85025 COMPLETE CBC W/AUTO DIFF WBC: CPT

## 2023-11-14 PROCEDURE — 25000003 PHARM REV CODE 250

## 2023-11-14 PROCEDURE — 25000003 PHARM REV CODE 250: Performed by: STUDENT IN AN ORGANIZED HEALTH CARE EDUCATION/TRAINING PROGRAM

## 2023-11-14 PROCEDURE — 20600001 HC STEP DOWN PRIVATE ROOM

## 2023-11-14 PROCEDURE — 83930 ASSAY OF BLOOD OSMOLALITY: CPT | Mod: 91

## 2023-11-14 PROCEDURE — 84100 ASSAY OF PHOSPHORUS: CPT | Mod: 91 | Performed by: STUDENT IN AN ORGANIZED HEALTH CARE EDUCATION/TRAINING PROGRAM

## 2023-11-14 PROCEDURE — 83935 ASSAY OF URINE OSMOLALITY: CPT | Performed by: INTERNAL MEDICINE

## 2023-11-14 PROCEDURE — 99233 PR SUBSEQUENT HOSPITAL CARE,LEVL III: ICD-10-PCS | Mod: GC,,, | Performed by: INTERNAL MEDICINE

## 2023-11-14 PROCEDURE — 36415 COLL VENOUS BLD VENIPUNCTURE: CPT

## 2023-11-14 PROCEDURE — 84133 ASSAY OF URINE POTASSIUM: CPT | Performed by: INTERNAL MEDICINE

## 2023-11-14 RX ADMIN — INSULIN ASPART 5 UNITS: 100 INJECTION, SOLUTION INTRAVENOUS; SUBCUTANEOUS at 07:11

## 2023-11-14 RX ADMIN — DEXTROSE MONOHYDRATE: 5 INJECTION, SOLUTION INTRAVENOUS at 11:11

## 2023-11-14 RX ADMIN — INSULIN ASPART 4 UNITS: 100 INJECTION, SOLUTION INTRAVENOUS; SUBCUTANEOUS at 02:11

## 2023-11-14 RX ADMIN — ENOXAPARIN SODIUM 40 MG: 40 INJECTION SUBCUTANEOUS at 04:11

## 2023-11-14 RX ADMIN — SENNOSIDES AND DOCUSATE SODIUM 1 TABLET: 8.6; 5 TABLET ORAL at 07:11

## 2023-11-14 RX ADMIN — INSULIN ASPART 5 UNITS: 100 INJECTION, SOLUTION INTRAVENOUS; SUBCUTANEOUS at 04:11

## 2023-11-14 RX ADMIN — DEXTROSE MONOHYDRATE: 5 INJECTION, SOLUTION INTRAVENOUS at 07:11

## 2023-11-14 RX ADMIN — DEXTROSE MONOHYDRATE: 5 INJECTION, SOLUTION INTRAVENOUS at 02:11

## 2023-11-14 RX ADMIN — INSULIN ASPART 5 UNITS: 100 INJECTION, SOLUTION INTRAVENOUS; SUBCUTANEOUS at 12:11

## 2023-11-14 RX ADMIN — INSULIN ASPART 3 UNITS: 100 INJECTION, SOLUTION INTRAVENOUS; SUBCUTANEOUS at 08:11

## 2023-11-14 RX ADMIN — POLYETHYLENE GLYCOL 3350 17 G: 17 POWDER, FOR SOLUTION ORAL at 07:11

## 2023-11-14 RX ADMIN — MUPIROCIN: 20 OINTMENT TOPICAL at 08:11

## 2023-11-14 RX ADMIN — MUPIROCIN: 20 OINTMENT TOPICAL at 10:11

## 2023-11-14 NOTE — NURSING TRANSFER
Nursing Transfer Note      11/13/2023   10:40 PM    Nurse giving handoff: JENNIFER Ward    Nurse receiving handoff:JENNIFER Bates     Reason patient is being transferred: Step down     Transfer From: [id number] to [id number]    Transfer via wheelchair    Transfer with cardiac monitoring    Transported by RN    Transfer Vital Signs:  Blood Pressure:137/81  Heart Rate:80  O2:100%  Temperature:99.2  Respirations:16    Telemetry: n/a  Order for Tele Monitor? No    Additional Lines:     4eyes on Skin: yes    Medicines sent: Insulin pen    Any special needs or follow-up needed: Assess urine output post camacho removal    Patient belongings transferred with patient: Yes    Chart send with patient: Yes    Notified: spouse    Patient reassessed at: 11/13/23, 7908   1  Upon arrival to floor: cardiac monitor applied, patient oriented to room, call bell in reach, and bed in lowest position

## 2023-11-14 NOTE — ASSESSMENT & PLAN NOTE
Presented with hyperglycemia BGL > 500 with EMS and 1800 on BMP [place] [facility] [place]    - Hyperkalemic with K 6.8 on admit; shifted by ED  - Started on insulin gtt; BGL improved to 1600 but potassium dropped to 2.7  - Also has BETH with sCr 6.1 on presentation; baseline sCr 1.2-1.4  - Likely 2/2 DKA and hypovolemia, Pt very dry on admit exam  - Hypernatremic with Na ~170 when corrected for degree of hyperglycemia on arrival     - Endocrine consulted  -- Continue transitional insulin gtt  - Nephrology following  -- sCr improved  -- Continue D5W for management of hypernatremia  - Q8h BMP to monitor K and Na  - Replace potassium as needed to maintain > 3.5  - Q4h serum osm  - Monitor strict I's/O's

## 2023-11-14 NOTE — SUBJECTIVE & OBJECTIVE
Interval History:   No events overnight. Patient with no complaints. Continue insulin drip per endocrine and D5W per nephrology.       Review of Systems   Constitutional:  Negative for chills and fever.   HENT:  Negative for congestion and rhinorrhea.    Eyes:  Negative for photophobia and visual disturbance.   Respiratory:  Negative for cough, shortness of breath and wheezing.    Cardiovascular:  Negative for chest pain, palpitations and leg swelling.   Gastrointestinal:  Negative for abdominal pain, diarrhea, nausea and vomiting.   Genitourinary:  Negative for dysuria, frequency and hematuria.   Musculoskeletal:  Negative for arthralgias and myalgias.   Skin:  Negative for rash and wound.   Neurological:  Negative for dizziness and headaches.   Psychiatric/Behavioral:  Negative for agitation and confusion.      Objective:     Vital Signs (Most Recent):  Temp: 96.8 °F (36 °C) (11/14/23 1159)  Pulse: 101 (11/14/23 1159)  Resp: 19 (11/14/23 1159)  BP: 121/64 (11/14/23 1159)  SpO2: 99 % (11/14/23 1159) Vital Signs (24h Range):  Temp:  [96.8 °F (36 °C)-99.5 °F (37.5 °C)] 96.8 °F (36 °C)  Pulse:  [] 101  Resp:  [6-23] 19  SpO2:  [96 %-100 %] 99 %  BP: (120-137)/(64-86) 121/64     Weight: 89.5 kg (197 lb 5 oz)  Body mass index is 25.33 kg/m².    Intake/Output Summary (Last 24 hours) at 11/14/2023 1426  Last data filed at 11/14/2023 1221  Gross per 24 hour   Intake 490 ml   Output 3200 ml   Net -2710 ml         Physical Exam  Constitutional:       Appearance: Normal appearance. He is normal weight.   HENT:      Head: Normocephalic and atraumatic.      Mouth/Throat:      Mouth: Mucous membranes are moist.   Eyes:      Extraocular Movements: Extraocular movements intact.      Conjunctiva/sclera: Conjunctivae normal.   Cardiovascular:      Rate and Rhythm: Normal rate and regular rhythm.      Heart sounds: No murmur heard.  Pulmonary:      Effort: Pulmonary effort is normal. No respiratory distress.      Breath sounds:  Normal breath sounds. No wheezing or rales.   Abdominal:      General: Abdomen is flat. There is no distension.      Palpations: Abdomen is soft.      Tenderness: There is no abdominal tenderness. There is no guarding.   Musculoskeletal:         General: No swelling or tenderness.   Skin:     Findings: No rash.   Neurological:      General: No focal deficit present.      Mental Status: He is alert and oriented to person, place, and time. Mental status is at baseline.   Psychiatric:         Mood and Affect: Mood normal.         Behavior: Behavior normal.             Significant Labs: All pertinent labs within the past 24 hours have been reviewed.  CBC:   Recent Labs   Lab 11/13/23  0244 11/14/23  0306   WBC 3.64* 3.39*   HGB 12.5* 12.3*   HCT 37.5* 36.7*   * 96*     CMP:   Recent Labs   Lab 11/13/23 0244 11/13/23  0617 11/14/23  0011 11/14/23  0306 11/14/23  0932   *   < > 144 142 148*   K 4.0   < > 4.5 4.5 4.7   *   < > 107 110 111*   CO2 28   < > 26 24 31*   *   < > 279* 311* 134*   BUN 8   < > 9 13 12   CREATININE 1.5*   < > 1.2 1.3 1.4   CALCIUM 8.8   < > 9.3 9.0 9.7   PROT 5.8*  --   --  5.7*  --    ALBUMIN 2.9*  --   --  2.8*  --    BILITOT 0.7  --   --  0.7  --    ALKPHOS 75  --   --  75  --    AST 29  --   --  23  --    ALT 18  --   --  17  --    ANIONGAP 7*   < > 11 8 6*    < > = values in this interval not displayed.     POCT Glucose:   Recent Labs   Lab 11/14/23  0225 11/14/23  0924 11/14/23  1145   POCTGLUCOSE 340* 123* 185*       Significant Imaging: I have reviewed all pertinent imaging results/findings within the past 24 hours.

## 2023-11-14 NOTE — ASSESSMENT & PLAN NOTE
Patient with acute kidney injury/acute renal failure likely due to pre-renal azotemia due to dehydration BETH is currently stable. Baseline creatinine  normal  - Labs reviewed- Renal function/electrolytes with Estimated Creatinine Clearance: 84.8 mL/min (based on SCr of 1.4 mg/dL). according to latest data. Monitor urine output and serial BMP and adjust therapy as needed. Avoid nephrotoxins and renally dose meds for GFR listed above.

## 2023-11-14 NOTE — PROGRESS NOTES
[facility] - [facility] ([place] [address])  Nephrology  Progress Note    Patient Name: Andrzej Sinclair  MRN: [id number]  Admission Date: 11/10/2023  Hospital Length of Stay: 4 days  Attending Provider: Ruslan Goodwin MD   Primary Care Physician: Viktoriya Jaeger NP  Principal Problem:DKA (diabetic ketoacidosis)    Subjective:     HPI: Andrzej Sinclair, a 37 y.o. male PMH of Type 1 DM, presents to the ED w/ complaint of hyperglycemia.  Per EMS patient responsive to painful stimuli only. Initial labs showing blood glucose more than 1000, hyperkalemia, hyponatremia, low BP.    Interval History:   No acute events overnight, step down from ICU, P/O allowed, P/O water intake 7-8 cups since yesterday, good urine output 3.1 liter, on D5W @180ml/h, serum sodium normalized 142.  Would recommed stopping of D5W as he is having good oral intake of fluids.    Review of patient's allergies indicates:   Allergen Reactions    Lactose Other (See Comments)     Gas and moderate to sever abdominal pain     Current Facility-Administered Medications   Medication Frequency    dextrose 10 % infusion PRN    dextrose 10 % infusion PRN    dextrose 10% bolus 125 mL 125 mL PRN    dextrose 10% bolus 250 mL 250 mL PRN    dextrose 5 % (D5W) infusion Continuous    enoxaparin injection 40 mg Q24H (prophylaxis, 1700)    glucagon (human recombinant) injection 1 mg PRN    glucose chewable tablet 16 g PRN    glucose chewable tablet 24 g PRN    insulin aspart U-100 pen 0-5 Units PRN    insulin aspart U-100 pen 5 Units TIDWM    insulin regular in 0.9 % NaCl 100 unit/100 mL (1 unit/mL) infusion Continuous    mupirocin 2 % ointment BID    naloxone 0.4 mg/mL injection 0.02 mg PRN    polyethylene glycol packet 17 g Daily    senna-docusate 8.6-50 mg per tablet 1 tablet Daily    sodium chloride 0.9% flush 10 mL Q12H PRN    sodium chloride 0.9% flush 10 mL PRN       Objective:     Vital Signs (Most Recent):  Temp: 97.8 °F (36.6 °C) (11/14/23 0915)  Pulse: 97  (11/14/23 0915)  Resp: 18 (11/14/23 0915)  BP: 130/75 (11/14/23 0915)  SpO2: 96 % (11/14/23 0915) Vital Signs (24h Range):  Temp:  [97.8 °F (36.6 °C)-99.5 °F (37.5 °C)] 97.8 °F (36.6 °C)  Pulse:  [80-97] 97  Resp:  [6-23] 18  SpO2:  [96 %-100 %] 96 %  BP: (120-137)/(70-86) 130/75     Weight: 89.5 kg (197 lb 5 oz) (11/10/23 1519)  Body mass index is 25.33 kg/m².  Body surface area is 2.16 meters squared.    I/O last 3 completed shifts:  In: 3315.7 [P.O.:500; I.V.:2020.3; IV Piggyback:795.4]  Out: 5130 [Urine:5130]     Physical Exam   Constitutional:       Appearance: He is obese.   HENT:      Head: Normocephalic.   Cardiovascular:      Rate and Rhythm: Normal rate.   Pulmonary:      Effort: Pulmonary effort is normal.   Genitourinary:     Comments: Foleys in place  Skin:     General: Skin is warm.   Neurological:      General: No focal deficit present.      Mental Status: He is alert and oriented to person, place, and time.   Psychiatric:         Mood and Affect: Mood normal.  Significant Labs:  BMP:   Recent Labs   Lab 11/14/23  0306   *      K 4.5      CO2 24   BUN 13   CREATININE 1.3   CALCIUM 9.0   MG 1.9     CBC:   Recent Labs   Lab 11/14/23  0306   WBC 3.39*   RBC 4.04*   HGB 12.3*   HCT 36.7*   PLT 96*   MCV 91   MCH 30.4   MCHC 33.5        Significant Imaging:  Labs: Reviewed  Assessment/Plan:     Renal/  BETH (acute kidney injury)  Scr at consult 6, baseline 1-1.3  BETH multifactoral 2/2 Pre renal vs ATN*   High osmolal gap    Scr 11/14 1.3  Plan  Would follow renal functions  I/Os  Q12 electrolytes  Avoid nephrotoxins  Renal dose medications    Hypernatremia  Sodium 130 at consult time, corrected sodium would be [id number] (Blood glucose of more than 1800 gives correction factor of 2*17)  Risk of hyponatremia to change to hypernatremia quickly, pontine mylenosis > cerebral edema  Latest Serum Na 142    Recommendations/Plan:  -Would recommend discontinue D5W  - P/O fluids.  - consider  getting MRI of brain to see if a rapid increase in his serum osmolality (before admit) already caused ODS.   - K replacement if less than 5        Thank you for your consult. I will follow-up with patient. Please contact us if you have any additional questions.    Padmini Swenson MD  Nephrology  [facility] - Intensive Care ([place]-)

## 2023-11-14 NOTE — PLAN OF CARE
11/14/23 0826   Post-Acute Status   Post-Acute Authorization Other   Coverage [facility] DUAL COMPLETE HMO SNP   Other Status No Post-Acute Service Needs   Hospital Resources/Appts/Education Provided Provided education on problems/symptoms using teachback   Discharge Delays (!) Procedure Scheduling (IR, OR, Labs, Echo, Cath, Echo, EEG)   Discharge Plan   Discharge Plan A Home with family   Discharge Plan B Home     CM met with patient/family to discuss any changes in discharge planning.  Patients plan is to  dc home with family, no needs.  No changes in DC plans. PRECIOUS: 11/16/23      Ange Lacey RN  Case Management  [facility]  [phone]

## 2023-11-14 NOTE — ASSESSMENT & PLAN NOTE
This patient has hyperkalemia which is controlled. We will monitor for arrhythmias with EKG or continuous telemetry. We will treat the hyperkalemia with  DKA management . The likely etiology of the hyperkalemia is BETH.  The patients latest potassium has been reviewed and the results are listed below  Recent Labs   Lab 11/14/23  0932   K 4.7

## 2023-11-14 NOTE — SUBJECTIVE & OBJECTIVE
"Interval HPI:   Overnight events: BG has been at  goal on transition gtt at rate of 2.2 u/hr o/n. Today diet will be advanced and D5W rate will be increased for hypernatremia per nephro.  Eatin%  Nausea: No  Hypoglycemia and intervention: No  Fever: No  TPN and/or TF: No  If yes, type of TF/TPN and rate: n/a    /64 (BP Location: Right arm, Patient Position: Lying)   Pulse 101   Temp 96.8 °F (36 °C) (Oral)   Resp 19   Ht 6' 2" (1.88 m)   Wt 89.5 kg (197 lb 5 oz)   SpO2 99%   BMI 25.33 kg/m²     Labs Reviewed and Include    Recent Labs   Lab 23  0306 23  0932   * 134*   CALCIUM 9.0 9.7   ALBUMIN 2.8*  --    PROT 5.7*  --     148*   K 4.5 4.7   CO2 24 31*    111*   BUN 13 12   CREATININE 1.3 1.4   ALKPHOS 75  --    ALT 17  --    AST 23  --    BILITOT 0.7  --      Lab Results   Component Value Date    WBC 3.39 (L) 2023    HGB 12.3 (L) 2023    HCT 36.7 (L) 2023    MCV 91 2023    PLT 96 (L) 2023     No results for input(s): "TSH", "FREET4" in the last 168 hours.  Lab Results   Component Value Date    HGBA1C 12.8 (H) 2023       Nutritional status:   Body mass index is 25.33 kg/m².  Lab Results   Component Value Date    ALBUMIN 2.8 (L) 2023    ALBUMIN 2.9 (L) 2023    ALBUMIN 3.1 (L) 2023     No results found for: "PREALBUMIN"    Estimated Creatinine Clearance: 84.8 mL/min (based on SCr of 1.4 mg/dL).    Accu-Checks  Recent Labs     23  1243 23  1405 23  1458 23  1615 23  1728 23  1832 23  2155 23  0225 23  0924 23  1145   POCTGLUCOSE 150* 142* 161* 142* 171* 158* 172* 340* 123* 185*       Current Medications and/or Treatments Impacting Glycemic Control  Immunotherapy:    Immunosuppressants       None          Steroids:   Hormones (From admission, onward)      None          Pressors:    Autonomic Drugs (From admission, onward)      None      "     Hyperglycemia/Diabetes Medications:   Antihyperglycemics (From admission, onward)      Start     Stop Route Frequency Ordered    11/14/23 0630  insulin regular in 0.9 % NaCl 100 unit/100 mL (1 unit/mL) infusion        Question:  Enter initial dose (Units/hr):  Answer:  2.5    -- IV Continuous 11/14/23 0629    11/13/23 1645  insulin aspart U-100 pen 5 Units         -- SubQ 3 times daily with meals 11/13/23 1447    11/13/23 1623  insulin aspart U-100 pen 0-5 Units         -- SubQ As needed (PRN) 11/13/23 152

## 2023-11-14 NOTE — ASSESSMENT & PLAN NOTE
Presented with AMS, unclear etiology; infections vs DKA vs drug related. Per EMS, Pt responsive to painful stimuli only. CBG >500 with EMS. Follows some commands but very lethargic on admissin.     - UA negative for infection, Bcx NTD  - CXR without consolidation  - Discontinue piptazo; discontinued vancomycin  - UDS positive for THC; volatile screen pending   - CT head unremarkable; MRI brain NAD   - Mentation improved to baseline

## 2023-11-14 NOTE — ASSESSMENT & PLAN NOTE
Patient has hypernatremia which is controlled. The hypernatremia is due to Diabetes insipidus. We will aim to correct the sodium by 8-10mEq in 24 hours. We will correct their hypernatremia with Select IV fluids: D5W at a rate of 180 ml/hr. The patient's sodium results have been reviewed and are listed below.  Recent Labs   Lab 11/14/23  0932   *     - See above

## 2023-11-14 NOTE — ASSESSMENT & PLAN NOTE
Sodium 130 at consult time, corrected sodium would be [id number] (Blood glucose of more than 1800 gives correction factor of 2*17)  Risk of hyponatremia to change to hypernatremia quickly, pontine mylenosis > cerebral edema  Latest Na 142  Recommendations/Plan:  - D5W continue, P/O fluids.  - consider getting MRI of brain now to see if a rapid increase in his serum osmolality (before admit) already caused ODS.   - frequent neuroclinical assessment although MRI brain on 11/11 without radiographic evidence of osmotic myelinolysis  - K replacement if less than 5

## 2023-11-14 NOTE — PROGRESS NOTES
"[facility] ([facility]  Endocrinology  Progress Note    Admit Date: 11/10/2023     Andrzej Sinclair is a 37 year old male with PMH significant for poorly controlled type 1 DM c/b frequent DKA, HTN, CKD3 and GERD who was BIBEMS for unresponsiveness. Patient is a not following commands on my assessment and history was obtained from the medical record. Family reportedly called EMS when they found patient down and unresponsive. He reportedly had not been eating or taking his insulin for several days. Patient was found to be hyperglycemic and brought in to the ED. He was found to be unresponsive to verbal stimuli and minimally responsive to physical, but hemodynamically stable and afebrile. Labs showing severe DKA (BG 1885, bicarb 6, AG 30, BHO 5.8) and HHS (sOSM 452). Complications including acute renal failure and shock requiring pressors concerning for septic shock. Endocrinology is consulted for DKA/HHS, initial BGL >1800, hypernatremic, trying to prevent rapid correction of osm and Na, would like assistance with insulin gtt."    Patient was treated with insulin infusion per DKA protocol and aggressive hydration. Acidosis has since resolved and anion gap is closed. However patient is still minimally responsive, not following commands and not eating.    Regarding Type 1 DM    Diagnosed: Age 20  Last hemoglobin A1c: 12.8 09/13/2023  Last seen with Endocrinology: 05/02/2023 with DILIP Mendoza. He can cancelled a virtual appointment with our office in september  Last Hospitalization for DKA: 9/12-9/18    Per records, He was prescribed omnipod 5 in May but unclear of adherence.  Med rec states he is taking Tresiba 21 units daily and Lyumjev 7 units with meals. He has been having issues with Dexcom adherence as an outpatient. Using more than 3 dexcoms per month. Prescriptions have been sent but staff have been unable to contact him.      BG trends today:      Pertinent Labs       Interval HPI:   Overnight " "events: BG has been at  goal on transition gtt at rate of 2.2 u/hr o/n. Today diet will be advanced and D5W rate will be increased for hypernatremia per nephro.  Eatin%  Nausea: No  Hypoglycemia and intervention: No  Fever: No  TPN and/or TF: No  If yes, type of TF/TPN and rate: n/a    /64 (BP Location: Right arm, Patient Position: Lying)   Pulse 101   Temp 96.8 °F (36 °C) (Oral)   Resp 19   Ht 6' 2" (1.88 m)   Wt 89.5 kg (197 lb 5 oz)   SpO2 99%   BMI 25.33 kg/m²     Labs Reviewed and Include    Recent Labs   Lab 23  0306 23  0932   * 134*   CALCIUM 9.0 9.7   ALBUMIN 2.8*  --    PROT 5.7*  --     148*   K 4.5 4.7   CO2 24 31*    111*   BUN 13 12   CREATININE 1.3 1.4   ALKPHOS 75  --    ALT 17  --    AST 23  --    BILITOT 0.7  --      Lab Results   Component Value Date    WBC 3.39 (L) 2023    HGB 12.3 (L) 2023    HCT 36.7 (L) 2023    MCV 91 2023    PLT 96 (L) 2023     No results for input(s): "TSH", "FREET4" in the last 168 hours.  Lab Results   Component Value Date    HGBA1C 12.8 (H) 2023       Nutritional status:   Body mass index is 25.33 kg/m².  Lab Results   Component Value Date    ALBUMIN 2.8 (L) 2023    ALBUMIN 2.9 (L) 2023    ALBUMIN 3.1 (L) 2023     No results found for: "PREALBUMIN"    Estimated Creatinine Clearance: 84.8 mL/min (based on SCr of 1.4 mg/dL).    Accu-Checks  Recent Labs     23  1243 23  1405 23  1458 23  1615 23  1728 23  1832 23  2155 23  0225 23  0924 23  1145   POCTGLUCOSE 150* 142* 161* 142* 171* 158* 172* 340* 123* 185*       Current Medications and/or Treatments Impacting Glycemic Control  Immunotherapy:    Immunosuppressants       None          Steroids:   Hormones (From admission, onward)      None          Pressors:    Autonomic Drugs (From admission, onward)      None          Hyperglycemia/Diabetes Medications: "   Antihyperglycemics (From admission, onward)      Start     Stop Route Frequency Ordered    11/14/23 0630  insulin regular in 0.9 % NaCl 100 unit/100 mL (1 unit/mL) infusion        Question:  Enter initial dose (Units/hr):  Answer:  2.5    -- IV Continuous 11/14/23 0629    11/13/23 1645  insulin aspart U-100 pen 5 Units         -- SubQ 3 times daily with meals 11/13/23 1447    11/13/23 1623  insulin aspart U-100 pen 0-5 Units         -- SubQ As needed (PRN) 11/13/23 1523            ASSESSMENT and PLAN    Endocrine  Type 1 diabetes mellitus  Diagnosed: age 20  Last hemoglobin A1c: 12.8 09/13/2023    Outpatient regimen:   He reports Toujeo 20u daily and Lyumjev 10u ac (Tresiba is what is on MAR so I suspect he is using Tresiba for basal, not Toujeo)  Also has DEXCOM G6. Previously prescribed Omnipod 5    Endocrinology consulted for BG management.   BG goal 140-180     He is getting D5W for hypernatremia and BETH per nephro. Please let me know when rate is changed or if D5W is stopped as this will change his insulin requirement and I will adjust rate of transition gtt at that time.    - Transition drip at 2.2 units/hr with step-down parameters. Pt is a type 1 diabetic and cannot go without basal insulin. If gtt is paused for more than 30 min for any reason, please page on-call endocrine fellow   - Novolog (aspart) insulin 5 Units SQ TIDWM and prn for BG excursions low dose SSI (150/50).  - BG checks /hs/0200  - Hypoglycemia protocol in place    ** Please notify Endocrine for any change and/or advance in diet**  ** Please call Endocrine for any BG related issues **    Discharge Planning:   TBD. Please notify endocrinology prior to discharge.          Leilani Gallardo MD  Endocrinology  [facility] - Intensive Care ([place]-)

## 2023-11-14 NOTE — HOSPITAL COURSE
Admitted to MICU for DKA/HHS and airway protection. CT head without acute process. Serum osm was very high on admission and unsure how quickly it kevin prior to him presenting to hospital, concerned about risk of ODS given improvement in serum osm with resolution of DKA/HHS; MRI brain normal. UDS positive for THC, ethanol < 10. Nephrology consulted for BETH and hypernatremia. sCr improved with resolution of DKA, making good urine. Cultures NTD, de-escalated vancomycin and continue piptazo, no obvious source of infection. Endocrine consulted for assistance with insulin gtt given complex case and need to balance the serum osm, Na, and glucose. Mentation improved back to baseline. Discontinued vancomycin and piptazo as no infective source. Na improved with D5 per Nephrology. Stable for stepdown from ICU on 11/13. D5 discontinued after sodium normalized. Insulin gtt Dc'd and transitioned to basal/bolus regimen. Patient's sugars remained stable and tolerated diet well. Endocrine recommending discharging him on his home insulin regimen. Will have ambulatory follow up with endocrine.

## 2023-11-14 NOTE — SUBJECTIVE & OBJECTIVE
Interval History:   No acute events overnight, step down from ICU, P/O allowed, P/O water intake 7-8 cups since yesterday, good urine output 3.1 liter, on D5W @180ml/h, serum sodium normalized 142.      Review of patient's allergies indicates:   Allergen Reactions    Lactose Other (See Comments)     Gas and moderate to sever abdominal pain     Current Facility-Administered Medications   Medication Frequency    dextrose 10 % infusion PRN    dextrose 10 % infusion PRN    dextrose 10% bolus 125 mL 125 mL PRN    dextrose 10% bolus 250 mL 250 mL PRN    dextrose 5 % (D5W) infusion Continuous    enoxaparin injection 40 mg Q24H (prophylaxis, 1700)    glucagon (human recombinant) injection 1 mg PRN    glucose chewable tablet 16 g PRN    glucose chewable tablet 24 g PRN    insulin aspart U-100 pen 0-5 Units PRN    insulin aspart U-100 pen 5 Units TIDWM    insulin regular in 0.9 % NaCl 100 unit/100 mL (1 unit/mL) infusion Continuous    mupirocin 2 % ointment BID    naloxone 0.4 mg/mL injection 0.02 mg PRN    polyethylene glycol packet 17 g Daily    senna-docusate 8.6-50 mg per tablet 1 tablet Daily    sodium chloride 0.9% flush 10 mL Q12H PRN    sodium chloride 0.9% flush 10 mL PRN       Objective:     Vital Signs (Most Recent):  Temp: 97.8 °F (36.6 °C) (11/14/23 0915)  Pulse: 97 (11/14/23 0915)  Resp: 18 (11/14/23 0915)  BP: 130/75 (11/14/23 0915)  SpO2: 96 % (11/14/23 0915) Vital Signs (24h Range):  Temp:  [97.8 °F (36.6 °C)-99.5 °F (37.5 °C)] 97.8 °F (36.6 °C)  Pulse:  [80-97] 97  Resp:  [6-23] 18  SpO2:  [96 %-100 %] 96 %  BP: (120-137)/(70-86) 130/75     Weight: 89.5 kg (197 lb 5 oz) (11/10/23 1519)  Body mass index is 25.33 kg/m².  Body surface area is 2.16 meters squared.    I/O last 3 completed shifts:  In: 3315.7 [P.O.:500; I.V.:2020.3; IV Piggyback:795.4]  Out: 5130 [Urine:5130]     Physical Exam   Constitutional:       Appearance: He is obese.   HENT:      Head: Normocephalic.   Cardiovascular:      Rate and Rhythm:  Normal rate.   Pulmonary:      Effort: Pulmonary effort is normal.   Genitourinary:     Comments: Foleys in place  Skin:     General: Skin is warm.   Neurological:      General: No focal deficit present.      Mental Status: He is alert and oriented to person, place, and time.   Psychiatric:         Mood and Affect: Mood normal.  Significant Labs:  BMP:   Recent Labs   Lab 11/14/23  0306   *      K 4.5      CO2 24   BUN 13   CREATININE 1.3   CALCIUM 9.0   MG 1.9     CBC:   Recent Labs   Lab 11/14/23  0306   WBC 3.39*   RBC 4.04*   HGB 12.3*   HCT 36.7*   PLT 96*   MCV 91   MCH 30.4   MCHC 33.5        Significant Imaging:  Labs: Reviewed

## 2023-11-14 NOTE — PLAN OF CARE
Pt dx DKA. AAOx4. No c/o at present time. Insulin gtt infusing well to right EJ PIV. D5W infusing to Left PIV. Explained current plan of care, voiced understanding. Raffy lovell.

## 2023-11-14 NOTE — PROGRESS NOTES
[facility] - Intensive Care ([address] Medicine  Progress Note    Patient Name: Andrzej Sinclair  MRN: [id number]  Patient Class: IP- Inpatient   Admission Date: 11/10/2023  Length of Stay: 4 days  Attending Physician: Ruslan Goodwin MD  Primary Care Provider: Viktoriay Jaeger NP        Subjective:     Principal Problem:DKA (diabetic ketoacidosis)        HPI:  Andrzej Sinclair is a 37 year old male with hx of type 1 diabetes mellitus, hypertension, CKD stage 3, GERD, recently admitted for DKA/HHS complicated by hypernatremia as well as metabolic encephalopathy, who initially presented to the ED via EMS today with severe hyperglycemia. In the ED, patient was noted to be unresponsive to verbal stimuli and minimally responsive to physical, but hemodynamically stable and afebrile. Concerns for DKA with labs significant for severe hyperglycemia BG 1885, Na 131, K 6.8, bicarb 6, AG 30. Elevated beta hydroxybutyrate 5.8. Central line was placed in ED after difficulties gaining access. Started on DKA/HHS management with fluids, insulin gtt, bicarb gtt, hyperkalemia shifted. Pt later evaluated to be in hypotensive with atrial fibrillation with RVR and was given dose of phenylephrine, later started on levophed. Nephrology consulted for BETH, creatinine 6.6. Admitted to MICU for DKA.     Overview/Hospital Course:  Admitted to MICU for DKA/HHS. Mentation remains the same - lethargic, protecting own airway, attempting to follow commands. CT head without acute process. Serum osm was very high on admission and unsure how quickly it kevin prior to him presenting to hospital, concerned about risk of ODS given improvement in serum osm with resolution of DKA/HHS; MRI brain pending. UDS positive for THC, ethanol < 10. Volatile screen pending. Nephrology consulted for BETH and hypernatremia, assisting with adjusting fluids to prevent rapid correction of sodium and osm. Initially started on 0.2% NaCl but switched to D5W gtt.  Monitoring Na and serum osm frequently. sCr improving with resolution of DKA, making good urine. Cultures NTD, will de-escalate vancomycin and continue piptazo, no obvious source of infection. Endocrine consulted for assistance with insulin gtt given complex case and need to balance the serum osm, Na, and glucose. Phone call to wife Marzena on 11/11 to provide updates. Na, serum osm, and glucose slowly improving. MRI brain without acute or significant findings. Mentation improved, answering questions and following commands. De-escalated vancomycin and piptazo as no infective source. Na imprStable for stepdown from ICU on 11/13.    Interval History:   No events overnight. Patient with no complaints. Continue insulin drip per endocrine and D5W per nephrology.       Review of Systems   Constitutional:  Negative for chills and fever.   HENT:  Negative for congestion and rhinorrhea.    Eyes:  Negative for photophobia and visual disturbance.   Respiratory:  Negative for cough, shortness of breath and wheezing.    Cardiovascular:  Negative for chest pain, palpitations and leg swelling.   Gastrointestinal:  Negative for abdominal pain, diarrhea, nausea and vomiting.   Genitourinary:  Negative for dysuria, frequency and hematuria.   Musculoskeletal:  Negative for arthralgias and myalgias.   Skin:  Negative for rash and wound.   Neurological:  Negative for dizziness and headaches.   Psychiatric/Behavioral:  Negative for agitation and confusion.      Objective:     Vital Signs (Most Recent):  Temp: 96.8 °F (36 °C) (11/14/23 1159)  Pulse: 101 (11/14/23 1159)  Resp: 19 (11/14/23 1159)  BP: 121/64 (11/14/23 1159)  SpO2: 99 % (11/14/23 1159) Vital Signs (24h Range):  Temp:  [96.8 °F (36 °C)-99.5 °F (37.5 °C)] 96.8 °F (36 °C)  Pulse:  [] 101  Resp:  [6-23] 19  SpO2:  [96 %-100 %] 99 %  BP: (120-137)/(64-86) 121/64     Weight: 89.5 kg (197 lb 5 oz)  Body mass index is 25.33 kg/m².    Intake/Output Summary (Last 24 hours) at  11/14/2023 1426  Last data filed at 11/14/2023 1221  Gross per 24 hour   Intake 490 ml   Output 3200 ml   Net -2710 ml         Physical Exam  Constitutional:       Appearance: Normal appearance. He is normal weight.   HENT:      Head: Normocephalic and atraumatic.      Mouth/Throat:      Mouth: Mucous membranes are moist.   Eyes:      Extraocular Movements: Extraocular movements intact.      Conjunctiva/sclera: Conjunctivae normal.   Cardiovascular:      Rate and Rhythm: Normal rate and regular rhythm.      Heart sounds: No murmur heard.  Pulmonary:      Effort: Pulmonary effort is normal. No respiratory distress.      Breath sounds: Normal breath sounds. No wheezing or rales.   Abdominal:      General: Abdomen is flat. There is no distension.      Palpations: Abdomen is soft.      Tenderness: There is no abdominal tenderness. There is no guarding.   Musculoskeletal:         General: No swelling or tenderness.   Skin:     Findings: No rash.   Neurological:      General: No focal deficit present.      Mental Status: He is alert and oriented to person, place, and time. Mental status is at baseline.   Psychiatric:         Mood and Affect: Mood normal.         Behavior: Behavior normal.             Significant Labs: All pertinent labs within the past 24 hours have been reviewed.  CBC:   Recent Labs   Lab 11/13/23  0244 11/14/23  0306   WBC 3.64* 3.39*   HGB 12.5* 12.3*   HCT 37.5* 36.7*   * 96*     CMP:   Recent Labs   Lab 11/13/23  0244 11/13/23  0617 11/14/23  0011 11/14/23  0306 11/14/23  0932   *   < > 144 142 148*   K 4.0   < > 4.5 4.5 4.7   *   < > 107 110 111*   CO2 28   < > 26 24 31*   *   < > 279* 311* 134*   BUN 8   < > 9 13 12   CREATININE 1.5*   < > 1.2 1.3 1.4   CALCIUM 8.8   < > 9.3 9.0 9.7   PROT 5.8*  --   --  5.7*  --    ALBUMIN 2.9*  --   --  2.8*  --    BILITOT 0.7  --   --  0.7  --    ALKPHOS 75  --   --  75  --    AST 29  --   --  23  --    ALT 18  --   --  17  --     ANIONGAP 7*   < > 11 8 6*    < > = values in this interval not displayed.     POCT Glucose:   Recent Labs   Lab 11/14/23  0225 11/14/23  0924 11/14/23  1145   POCTGLUCOSE 340* 123* 185*       Significant Imaging: I have reviewed all pertinent imaging results/findings within the past 24 hours.    Assessment/Plan:      * DKA (diabetic ketoacidosis)  Presented with hyperglycemia BGL > 500 with EMS and 1800 on BMP once arrived to [facility] [place]    - Hyperkalemic with K 6.8 on admit; shifted by ED  - Started on insulin gtt; BGL improved to 1600 but potassium dropped to 2.7  - Also has BETH with sCr 6.1 on presentation; baseline sCr 1.2-1.4  - Likely 2/2 DKA and hypovolemia, Pt very dry on admit exam  - Hypernatremic with Na ~170 when corrected for degree of hyperglycemia on arrival     - Endocrine consulted  -- Continue transitional insulin gtt  - Nephrology following  -- sCr improved  -- Continue D5W for management of hypernatremia  - Q8h BMP to monitor K and Na  - Replace potassium as needed to maintain > 3.5  - Q4h serum osm  - Monitor strict I's/O's    BETH (acute kidney injury)  Patient with acute kidney injury/acute renal failure likely due to pre-renal azotemia due to dehydration EBTH is currently stable. Baseline creatinine  normal  - Labs reviewed- Renal function/electrolytes with Estimated Creatinine Clearance: 84.8 mL/min (based on SCr of 1.4 mg/dL). according to latest data. Monitor urine output and serial BMP and adjust therapy as needed. Avoid nephrotoxins and renally dose meds for GFR listed above.    Acute metabolic encephalopathy  Presented with AMS, unclear etiology; infections vs DKA vs drug related. Per EMS, Pt responsive to painful stimuli only. CBG >500 with EMS. Follows some commands but very lethargic on admissin.     - UA negative for infection, Bcx NTD  - CXR without consolidation  - Discontinue piptazo; discontinued vancomycin  - UDS positive for THC; volatile screen pending   - CT head unremarkable; MRI  brain NAD   - Mentation improved to baseline      Hyperkalemia  This patient has hyperkalemia which is controlled. We will monitor for arrhythmias with EKG or continuous telemetry. We will treat the hyperkalemia with  DKA management . The likely etiology of the hyperkalemia is BETH.  The patients latest potassium has been reviewed and the results are listed below  Recent Labs   Lab 11/14/23  0932   K 4.7             Metabolic acidosis  See above      Type 1 diabetes mellitus  See above    Hypernatremia  Patient has hypernatremia which is controlled. The hypernatremia is due to Diabetes insipidus. We will aim to correct the sodium by 8-10mEq in 24 hours. We will correct their hypernatremia with Select IV fluids: D5W at a rate of 180 ml/hr. The patient's sodium results have been reviewed and are listed below.  Recent Labs   Lab 11/14/23  0932   *     - See above      VTE Risk Mitigation (From admission, onward)           Ordered     enoxaparin injection 40 mg  Every 24 hours         11/13/23 1157     IP VTE LOW RISK PATIENT  Once         11/10/23 1640     Place sequential compression device  Until discontinued         11/10/23 1640                    Discharge Planning   PRECIOUS: 11/16/2023     Code Status: Full Code   Is the patient medically ready for discharge?: No    Reason for patient still in hospital (select all that apply): Patient trending condition, Laboratory test, Treatment, Consult recommendations, and Pending disposition  Discharge Plan A: Home with family   Discharge Delays: (!) Procedure Scheduling (IR, OR, Labs, Echo, Cath, Echo, EEG)              Ruslan Goodwin MD  Department of Hospital Medicine   [facility] - Intensive Care ([place])

## 2023-11-14 NOTE — ASSESSMENT & PLAN NOTE
Scr at consult 6, baseline 1-1.3  BETH multifactoral 2/2 Pre renal vs ATN*   High osmolal gap    Scr 11/14 1.3  Plan  Sodium levels improved, P/O intake and continue the D5W.  I/Os  Q12 electrolytes  Avoid nephrotoxins  Renal dose medications

## 2023-11-14 NOTE — NURSING
Nurses Note -- 4 Eyes      11/14/2023   1:19 AM      Skin assessed during: Transfer      [x] No Altered Skin Integrity Present    []Prevention Measures Documented      [] Yes- Altered Skin Integrity Present or Discovered   [] LDA Added if Not in Epic (Describe Wound)   [] New Altered Skin Integrity was Present on Admit and Documented in LDA   [] Wound Image Taken    Wound Care Consulted? No    Attending Nurse:  Jana Brantley RN/Staff Member:  dede Isaac

## 2023-11-14 NOTE — PLAN OF CARE
Pt status stable. Insulin gtt infusing well to right PIV, IVF continued as ordered. Dr Maradiaga kept abreast of pt status. Orders followed. No c/o. Reinforced current plan and changes made.  Callbell within reach. Questions answered.

## 2023-11-14 NOTE — PLAN OF CARE
EOS: tx from micu for dka, insulin gtt was at 2units/hr, recently changed to 2.5 units after I called endocrine to inform them of the glucose increasing. Does not want to touch the d5 as that is being monitored by nephro. Vss. Patient denies any needs. Care of plan discussed and ongoing.  Problem: Violence Risk or Actual  Goal: Anger and Impulse Control  Outcome: Ongoing, Progressing     Problem: Infection  Goal: Absence of Infection Signs and Symptoms  Outcome: Ongoing, Progressing     Problem: Fall Injury Risk  Goal: Absence of Fall and Fall-Related Injury  Outcome: Ongoing, Progressing     Problem: Restraint, Nonbehavioral (Nonviolent)  Goal: Absence of Harm or Injury  Outcome: Ongoing, Progressing     Problem: Adult Inpatient Plan of Care  Goal: Patient-Specific Goal (Individualized)  Outcome: Ongoing, Progressing  Goal: Absence of Hospital-Acquired Illness or Injury  Outcome: Ongoing, Progressing  Goal: Optimal Comfort and Wellbeing  Outcome: Ongoing, Progressing  Goal: Readiness for Transition of Care  Outcome: Ongoing, Progressing     Problem: Diabetes Comorbidity  Goal: Blood Glucose Level Within Targeted Range  Outcome: Ongoing, Progressing     Problem: Fluid and Electrolyte Imbalance (Acute Kidney Injury/Impairment)  Goal: Fluid and Electrolyte Balance  Outcome: Ongoing, Progressing     Problem: Oral Intake Inadequate (Acute Kidney Injury/Impairment)  Goal: Optimal Nutrition Intake  Outcome: Ongoing, Progressing     Problem: Renal Function Impairment (Acute Kidney Injury/Impairment)  Goal: Effective Renal Function  Outcome: Ongoing, Progressing     Problem: Skin Injury Risk Increased  Goal: Skin Health and Integrity  Outcome: Ongoing, Progressing

## 2023-11-15 LAB
ALBUMIN SERPL BCP-MCNC: 3.3 G/DL (ref 3.5–5.2)
ALP SERPL-CCNC: 89 U/L (ref 55–135)
ALT SERPL W/O P-5'-P-CCNC: 19 U/L (ref 10–44)
ANION GAP SERPL CALC-SCNC: 10 MMOL/L (ref 8–16)
ANION GAP SERPL CALC-SCNC: 11 MMOL/L (ref 8–16)
ANION GAP SERPL CALC-SCNC: 12 MMOL/L (ref 8–16)
ANION GAP SERPL CALC-SCNC: 8 MMOL/L (ref 8–16)
AST SERPL-CCNC: 33 U/L (ref 10–40)
BACTERIA BLD CULT: NORMAL
BACTERIA BLD CULT: NORMAL
BASOPHILS # BLD AUTO: 0.01 K/UL (ref 0–0.2)
BASOPHILS NFR BLD: 0.3 % (ref 0–1.9)
BILIRUB SERPL-MCNC: 0.6 MG/DL (ref 0.1–1)
BUN SERPL-MCNC: 10 MG/DL (ref 6–20)
BUN SERPL-MCNC: 11 MG/DL (ref 6–20)
BUN SERPL-MCNC: 12 MG/DL (ref 6–20)
BUN SERPL-MCNC: 13 MG/DL (ref 6–20)
CALCIUM SERPL-MCNC: 9.5 MG/DL (ref 8.7–10.5)
CALCIUM SERPL-MCNC: 9.7 MG/DL (ref 8.7–10.5)
CALCIUM SERPL-MCNC: 9.7 MG/DL (ref 8.7–10.5)
CALCIUM SERPL-MCNC: 9.8 MG/DL (ref 8.7–10.5)
CHLORIDE SERPL-SCNC: 102 MMOL/L (ref 95–110)
CHLORIDE SERPL-SCNC: 107 MMOL/L (ref 95–110)
CHLORIDE SERPL-SCNC: 109 MMOL/L (ref 95–110)
CHLORIDE SERPL-SCNC: 110 MMOL/L (ref 95–110)
CO2 SERPL-SCNC: 21 MMOL/L (ref 23–29)
CO2 SERPL-SCNC: 23 MMOL/L (ref 23–29)
CO2 SERPL-SCNC: 25 MMOL/L (ref 23–29)
CO2 SERPL-SCNC: 27 MMOL/L (ref 23–29)
CREAT SERPL-MCNC: 1.1 MG/DL (ref 0.5–1.4)
CREAT SERPL-MCNC: 1.2 MG/DL (ref 0.5–1.4)
CREAT SERPL-MCNC: 1.3 MG/DL (ref 0.5–1.4)
CREAT SERPL-MCNC: 1.4 MG/DL (ref 0.5–1.4)
DIFFERENTIAL METHOD: ABNORMAL
EOSINOPHIL # BLD AUTO: 0.1 K/UL (ref 0–0.5)
EOSINOPHIL NFR BLD: 2.2 % (ref 0–8)
ERYTHROCYTE [DISTWIDTH] IN BLOOD BY AUTOMATED COUNT: 12.7 % (ref 11.5–14.5)
EST. GFR  (NO RACE VARIABLE): >60 ML/MIN/1.73 M^2
GLUCOSE SERPL-MCNC: 153 MG/DL (ref 70–110)
GLUCOSE SERPL-MCNC: 159 MG/DL (ref 70–110)
GLUCOSE SERPL-MCNC: 271 MG/DL (ref 70–110)
GLUCOSE SERPL-MCNC: 294 MG/DL (ref 70–110)
HCT VFR BLD AUTO: 39.5 % (ref 40–54)
HGB BLD-MCNC: 13.1 G/DL (ref 14–18)
IMM GRANULOCYTES # BLD AUTO: 0.01 K/UL (ref 0–0.04)
IMM GRANULOCYTES NFR BLD AUTO: 0.3 % (ref 0–0.5)
LYMPHOCYTES # BLD AUTO: 0.9 K/UL (ref 1–4.8)
LYMPHOCYTES NFR BLD: 25.5 % (ref 18–48)
MAGNESIUM SERPL-MCNC: 1.8 MG/DL (ref 1.6–2.6)
MCH RBC QN AUTO: 30.5 PG (ref 27–31)
MCHC RBC AUTO-ENTMCNC: 33.2 G/DL (ref 32–36)
MCV RBC AUTO: 92 FL (ref 82–98)
MONOCYTES # BLD AUTO: 0.5 K/UL (ref 0.3–1)
MONOCYTES NFR BLD: 13.8 % (ref 4–15)
NEUTROPHILS # BLD AUTO: 2.1 K/UL (ref 1.8–7.7)
NEUTROPHILS NFR BLD: 57.9 % (ref 38–73)
NRBC BLD-RTO: 0 /100 WBC
OSMOLALITY SERPL: 303 MOSM/KG (ref 280–300)
PHOSPHATE SERPL-MCNC: 2.8 MG/DL (ref 2.7–4.5)
PLATELET # BLD AUTO: 99 K/UL (ref 150–450)
PMV BLD AUTO: 11.8 FL (ref 9.2–12.9)
POCT GLUCOSE: 121 MG/DL (ref 70–110)
POCT GLUCOSE: 137 MG/DL (ref 70–110)
POCT GLUCOSE: 180 MG/DL (ref 70–110)
POCT GLUCOSE: 258 MG/DL (ref 70–110)
POCT GLUCOSE: 269 MG/DL (ref 70–110)
POCT GLUCOSE: 333 MG/DL (ref 70–110)
POTASSIUM SERPL-SCNC: 4.3 MMOL/L (ref 3.5–5.1)
POTASSIUM SERPL-SCNC: 4.5 MMOL/L (ref 3.5–5.1)
POTASSIUM SERPL-SCNC: 5.4 MMOL/L (ref 3.5–5.1)
POTASSIUM SERPL-SCNC: 5.8 MMOL/L (ref 3.5–5.1)
PROT SERPL-MCNC: 6.7 G/DL (ref 6–8.4)
RBC # BLD AUTO: 4.29 M/UL (ref 4.6–6.2)
SODIUM SERPL-SCNC: 139 MMOL/L (ref 136–145)
SODIUM SERPL-SCNC: 141 MMOL/L (ref 136–145)
SODIUM SERPL-SCNC: 142 MMOL/L (ref 136–145)
SODIUM SERPL-SCNC: 143 MMOL/L (ref 136–145)
WBC # BLD AUTO: 3.69 K/UL (ref 3.9–12.7)

## 2023-11-15 PROCEDURE — 99232 SBSQ HOSP IP/OBS MODERATE 35: CPT | Mod: GC,,, | Performed by: INTERNAL MEDICINE

## 2023-11-15 PROCEDURE — 63600175 PHARM REV CODE 636 W HCPCS

## 2023-11-15 PROCEDURE — 80048 BASIC METABOLIC PNL TOTAL CA: CPT | Mod: 91,XB

## 2023-11-15 PROCEDURE — 83930 ASSAY OF BLOOD OSMOLALITY: CPT | Performed by: STUDENT IN AN ORGANIZED HEALTH CARE EDUCATION/TRAINING PROGRAM

## 2023-11-15 PROCEDURE — 99232 PR SUBSEQUENT HOSPITAL CARE,LEVL II: ICD-10-PCS | Mod: GC,,, | Performed by: INTERNAL MEDICINE

## 2023-11-15 PROCEDURE — 80053 COMPREHEN METABOLIC PANEL: CPT | Performed by: STUDENT IN AN ORGANIZED HEALTH CARE EDUCATION/TRAINING PROGRAM

## 2023-11-15 PROCEDURE — 36415 COLL VENOUS BLD VENIPUNCTURE: CPT

## 2023-11-15 PROCEDURE — 80048 BASIC METABOLIC PNL TOTAL CA: CPT

## 2023-11-15 PROCEDURE — 84100 ASSAY OF PHOSPHORUS: CPT | Performed by: STUDENT IN AN ORGANIZED HEALTH CARE EDUCATION/TRAINING PROGRAM

## 2023-11-15 PROCEDURE — 25000003 PHARM REV CODE 250: Performed by: STUDENT IN AN ORGANIZED HEALTH CARE EDUCATION/TRAINING PROGRAM

## 2023-11-15 PROCEDURE — 63600175 PHARM REV CODE 636 W HCPCS: Performed by: STUDENT IN AN ORGANIZED HEALTH CARE EDUCATION/TRAINING PROGRAM

## 2023-11-15 PROCEDURE — 20600001 HC STEP DOWN PRIVATE ROOM

## 2023-11-15 PROCEDURE — 63600175 PHARM REV CODE 636 W HCPCS: Performed by: NURSE PRACTITIONER

## 2023-11-15 PROCEDURE — 85025 COMPLETE CBC W/AUTO DIFF WBC: CPT

## 2023-11-15 PROCEDURE — 83735 ASSAY OF MAGNESIUM: CPT | Performed by: STUDENT IN AN ORGANIZED HEALTH CARE EDUCATION/TRAINING PROGRAM

## 2023-11-15 PROCEDURE — 83690 ASSAY OF LIPASE: CPT

## 2023-11-15 PROCEDURE — 99232 PR SUBSEQUENT HOSPITAL CARE,LEVL II: ICD-10-PCS | Mod: ,,, | Performed by: INTERNAL MEDICINE

## 2023-11-15 PROCEDURE — 99232 SBSQ HOSP IP/OBS MODERATE 35: CPT | Mod: ,,, | Performed by: INTERNAL MEDICINE

## 2023-11-15 RX ORDER — ACETAMINOPHEN 325 MG/1
650 TABLET ORAL EVERY 6 HOURS PRN
Status: DISCONTINUED | OUTPATIENT
Start: 2023-11-15 | End: 2023-11-17 | Stop reason: HOSPADM

## 2023-11-15 RX ORDER — METOCLOPRAMIDE HYDROCHLORIDE 5 MG/ML
10 INJECTION INTRAMUSCULAR; INTRAVENOUS ONCE
Status: COMPLETED | OUTPATIENT
Start: 2023-11-15 | End: 2023-11-15

## 2023-11-15 RX ORDER — POLYETHYLENE GLYCOL 3350 17 G/17G
17 POWDER, FOR SOLUTION ORAL 2 TIMES DAILY PRN
Status: DISCONTINUED | OUTPATIENT
Start: 2023-11-15 | End: 2023-11-17 | Stop reason: HOSPADM

## 2023-11-15 RX ORDER — INSULIN ASPART 100 [IU]/ML
7 INJECTION, SOLUTION INTRAVENOUS; SUBCUTANEOUS
Status: DISCONTINUED | OUTPATIENT
Start: 2023-11-16 | End: 2023-11-16

## 2023-11-15 RX ORDER — MORPHINE SULFATE 2 MG/ML
2 INJECTION, SOLUTION INTRAMUSCULAR; INTRAVENOUS ONCE
Status: COMPLETED | OUTPATIENT
Start: 2023-11-16 | End: 2023-11-15

## 2023-11-15 RX ORDER — DICYCLOMINE HYDROCHLORIDE 10 MG/ML
20 INJECTION INTRAMUSCULAR ONCE
Status: COMPLETED | OUTPATIENT
Start: 2023-11-15 | End: 2023-11-15

## 2023-11-15 RX ORDER — BISACODYL 10 MG
10 SUPPOSITORY, RECTAL RECTAL DAILY PRN
Status: DISCONTINUED | OUTPATIENT
Start: 2023-11-15 | End: 2023-11-17 | Stop reason: HOSPADM

## 2023-11-15 RX ORDER — ONDANSETRON 4 MG/1
4 TABLET, ORALLY DISINTEGRATING ORAL EVERY 6 HOURS PRN
Status: DISCONTINUED | OUTPATIENT
Start: 2023-11-15 | End: 2023-11-17 | Stop reason: HOSPADM

## 2023-11-15 RX ORDER — ONDANSETRON 2 MG/ML
4 INJECTION INTRAMUSCULAR; INTRAVENOUS EVERY 6 HOURS PRN
Status: DISCONTINUED | OUTPATIENT
Start: 2023-11-15 | End: 2023-11-17 | Stop reason: HOSPADM

## 2023-11-15 RX ORDER — INSULIN ASPART 100 [IU]/ML
6 INJECTION, SOLUTION INTRAVENOUS; SUBCUTANEOUS
Status: DISCONTINUED | OUTPATIENT
Start: 2023-11-15 | End: 2023-11-15

## 2023-11-15 RX ADMIN — INSULIN ASPART 5 UNITS: 100 INJECTION, SOLUTION INTRAVENOUS; SUBCUTANEOUS at 11:11

## 2023-11-15 RX ADMIN — MORPHINE SULFATE 2 MG: 2 INJECTION, SOLUTION INTRAMUSCULAR; INTRAVENOUS at 11:11

## 2023-11-15 RX ADMIN — INSULIN ASPART 5 UNITS: 100 INJECTION, SOLUTION INTRAVENOUS; SUBCUTANEOUS at 08:11

## 2023-11-15 RX ADMIN — INSULIN ASPART 6 UNITS: 100 INJECTION, SOLUTION INTRAVENOUS; SUBCUTANEOUS at 04:11

## 2023-11-15 RX ADMIN — MUPIROCIN: 20 OINTMENT TOPICAL at 07:11

## 2023-11-15 RX ADMIN — ONDANSETRON 4 MG: 2 INJECTION INTRAMUSCULAR; INTRAVENOUS at 04:11

## 2023-11-15 RX ADMIN — MUPIROCIN: 20 OINTMENT TOPICAL at 08:11

## 2023-11-15 RX ADMIN — ENOXAPARIN SODIUM 40 MG: 40 INJECTION SUBCUTANEOUS at 04:11

## 2023-11-15 RX ADMIN — INSULIN DETEMIR 12 UNITS: 100 INJECTION, SOLUTION SUBCUTANEOUS at 09:11

## 2023-11-15 RX ADMIN — INSULIN ASPART 4 UNITS: 100 INJECTION, SOLUTION INTRAVENOUS; SUBCUTANEOUS at 04:11

## 2023-11-15 RX ADMIN — INSULIN HUMAN 1.5 UNITS/HR: 1 INJECTION, SOLUTION INTRAVENOUS at 04:11

## 2023-11-15 RX ADMIN — DICYCLOMINE HYDROCHLORIDE 20 MG: 20 INJECTION, SOLUTION INTRAMUSCULAR at 09:11

## 2023-11-15 RX ADMIN — METOCLOPRAMIDE 10 MG: 5 INJECTION, SOLUTION INTRAMUSCULAR; INTRAVENOUS at 08:11

## 2023-11-15 NOTE — ASSESSMENT & PLAN NOTE
Patient has hypernatremia which is controlled. The hypernatremia is due to Diabetes insipidus. We will aim to correct the sodium by 8-10mEq in 24 hours. We will correct their hypernatremia with Select IV fluids: D5W at a rate of 180 ml/hr. The patient's sodium results have been reviewed and are listed below.  Recent Labs   Lab 11/15/23  1024          - See above

## 2023-11-15 NOTE — ASSESSMENT & PLAN NOTE
Presented with hyperglycemia BGL > 500 with EMS and 1800 on BMP once arrived to [facility] [place]    - Hyperkalemic with K 6.8 on admit; shifted by ED  - Started on insulin gtt; BGL improved to 1600 but potassium dropped to 2.7  - Also has BETH with sCr 6.1 on presentation; baseline sCr 1.2-1.4  - Likely 2/2 DKA and hypovolemia, Pt very dry on admit exam  - Hypernatremic with Na ~170 when corrected for degree of hyperglycemia on arrival     - Endocrine consulted  -- Continue transitional insulin gtt  - Nephrology following  -- sCr improved  -- s/p D5W for management of hypernatremia  - BMP to monitor K and Na  - Replace potassium as needed to maintain > 3.5  - Daily serum osm  - Monitor strict I's/O's

## 2023-11-15 NOTE — NURSING
Patient A&O X4, Breathing even and unlabored no sign of distress noted overnight. Current insulin drip 2.2 unit/hr, D5 180ml/hr. Patient request for lab to be collected at 0600AM. U/S of upper extremities completed. Patient had an uneventful night.   Vitals:    11/14/23 1554 11/14/23 2009 11/14/23 2337 11/15/23 0434   BP: 129/72 139/72 139/77 (!) 143/71   BP Location: Left arm Right leg Right leg Left leg   Patient Position: Lying Lying Lying Lying   Pulse: 85 89 89 80   Resp: 20 16 16 18   Temp: 96.7 °F (35.9 °C) 99.2 °F (37.3 °C) 98.6 °F (37 °C) 98.6 °F (37 °C)   TempSrc: Oral Oral Oral Oral   SpO2: 100% 96% 97% 97%   Weight:       Height:

## 2023-11-15 NOTE — SUBJECTIVE & OBJECTIVE
Interval History:   No acute events overnight, doing good, has good oral intake of fluids.  Would recommend stopping D5W.    Review of patient's allergies indicates:   Allergen Reactions    Lactose Other (See Comments)     Gas and moderate to sever abdominal pain     Current Facility-Administered Medications   Medication Frequency    acetaminophen tablet 650 mg Q6H PRN    bisacodyL suppository 10 mg Daily PRN    dextrose 10 % infusion PRN    dextrose 10 % infusion PRN    dextrose 10% bolus 125 mL 125 mL PRN    dextrose 10% bolus 250 mL 250 mL PRN    enoxaparin injection 40 mg Q24H (prophylaxis, 1700)    glucagon (human recombinant) injection 1 mg PRN    glucose chewable tablet 16 g PRN    glucose chewable tablet 24 g PRN    insulin aspart U-100 pen 0-5 Units PRN    insulin aspart U-100 pen 5 Units TIDWM    insulin regular in 0.9 % NaCl 100 unit/100 mL (1 unit/mL) infusion Continuous    mupirocin 2 % ointment BID    naloxone 0.4 mg/mL injection 0.02 mg PRN    polyethylene glycol packet 17 g BID PRN    sodium chloride 0.9% flush 10 mL Q12H PRN    sodium chloride 0.9% flush 10 mL PRN       Objective:     Vital Signs (Most Recent):  Temp: 98.6 °F (37 °C) (11/15/23 0736)  Pulse: 76 (11/15/23 0736)  Resp: 18 (11/15/23 0736)  BP: 108/62 (11/15/23 0736)  SpO2: 97 % (11/15/23 0736) Vital Signs (24h Range):  Temp:  [96.7 °F (35.9 °C)-99.2 °F (37.3 °C)] 98.6 °F (37 °C)  Pulse:  [] 76  Resp:  [16-20] 18  SpO2:  [96 %-100 %] 97 %  BP: (108-143)/(62-77) 108/62     Weight: 89.5 kg (197 lb 5 oz) (11/10/23 1519)  Body mass index is 25.33 kg/m².  Body surface area is 2.16 meters squared.    I/O last 3 completed shifts:  In: 900 [P.O.:900]  Out: 4120 [Urine:4120]     Physical Exam   Constitutional:       Appearance: He is obese.   HENT:      Head: Normocephalic.   Cardiovascular:      Rate and Rhythm: Normal rate.   Pulmonary:      Effort: Pulmonary effort is normal.   Genitourinary:     Comments: Harpreet removed  Skin:      General: Skin is warm.   Neurological:      General: No focal deficit present.      Mental Status: He is alert and oriented to person, place, and time.   Psychiatric:         Mood and Affect: Mood normal  Significant Labs:  BMP:   Recent Labs   Lab 11/14/23  0306 11/14/23  0932 11/15/23  0009   *   < > 159*      < > 143   K 4.5   < > 4.3      < > 110   CO2 24   < > 25   BUN 13   < > 12   CREATININE 1.3   < > 1.1   CALCIUM 9.0   < > 9.5   MG 1.9  --   --     < > = values in this interval not displayed.        Significant Imaging:  Labs: Reviewed

## 2023-11-15 NOTE — SUBJECTIVE & OBJECTIVE
"Interval HPI:   Overnight events: Yesterday with temporary IV access issues, now on 2.2 units of transitional insulin gtt with BG on lower end of normal, ranging 120-160. Sodium levels improved, now at 143 while on D5 gtt per nephro.   Eatin%  Nausea: No  Hypoglycemia and intervention: No  Fever: No  TPN and/or TF: No  If yes, type of TF/TPN and rate: n/a    /62 (BP Location: Left arm, Patient Position: Lying)   Pulse 76   Temp 98.6 °F (37 °C) (Oral)   Resp 18   Ht 6' 2" (1.88 m)   Wt 89.5 kg (197 lb 5 oz)   SpO2 97%   BMI 25.33 kg/m²     Labs Reviewed and Include    Recent Labs   Lab 11/15/23  0009   *   CALCIUM 9.5      K 4.3   CO2 25      BUN 12   CREATININE 1.1     Lab Results   Component Value Date    WBC 3.39 (L) 2023    HGB 12.3 (L) 2023    HCT 36.7 (L) 2023    MCV 91 2023    PLT 96 (L) 2023     No results for input(s): "TSH", "FREET4" in the last 168 hours.  Lab Results   Component Value Date    HGBA1C 12.8 (H) 2023       Nutritional status:   Body mass index is 25.33 kg/m².  Lab Results   Component Value Date    ALBUMIN 2.8 (L) 2023    ALBUMIN 2.9 (L) 2023    ALBUMIN 3.1 (L) 2023     No results found for: "PREALBUMIN"    Estimated Creatinine Clearance: 107.9 mL/min (based on SCr of 1.1 mg/dL).    Accu-Checks  Recent Labs     23  1728 23  1832 23  2155 23  0225 23  0924 23  1145 23  1554 11/14/23  2012 11/15/23  0220 11/15/23  0735   POCTGLUCOSE 171* 158* 172* 340* 123* 185* 245* 271* 137* 121*       Current Medications and/or Treatments Impacting Glycemic Control  Immunotherapy:    Immunosuppressants       None          Steroids:   Hormones (From admission, onward)      None          Pressors:    Autonomic Drugs (From admission, onward)      None          Hyperglycemia/Diabetes Medications:   Antihyperglycemics (From admission, onward)      Start     Stop Route Frequency " Ordered    11/14/23 0630  insulin regular in 0.9 % NaCl 100 unit/100 mL (1 unit/mL) infusion        Question:  Enter initial dose (Units/hr):  Answer:  2.5    -- IV Continuous 11/14/23 0629    11/13/23 1645  insulin aspart U-100 pen 5 Units         -- SubQ 3 times daily with meals 11/13/23 1447    11/13/23 1623  insulin aspart U-100 pen 0-5 Units         -- SubQ As needed (PRN) 11/13/23 1529

## 2023-11-15 NOTE — PROGRESS NOTES
[facility] - Intensive Care ([address] Medicine  Progress Note    Patient Name: Andrzej Sinclair  MRN: [id number]  Patient Class: IP- Inpatient   Admission Date: 11/10/2023  Length of Stay: 5 days  Attending Physician: Ruslan Goodwin MD  Primary Care Provider: Viktoriya Jaeger NP        Subjective:     Principal Problem:DKA (diabetic ketoacidosis)        HPI:  Andrzej Sinclair is a 37 year old male with hx of type 1 diabetes mellitus, hypertension, CKD stage 3, GERD, recently admitted for DKA/HHS complicated by hypernatremia as well as metabolic encephalopathy, who initially presented to the ED via EMS today with severe hyperglycemia. In the ED, patient was noted to be unresponsive to verbal stimuli and minimally responsive to physical, but hemodynamically stable and afebrile. Concerns for DKA with labs significant for severe hyperglycemia BG 1885, Na 131, K 6.8, bicarb 6, AG 30. Elevated beta hydroxybutyrate 5.8. Central line was placed in ED after difficulties gaining access. Started on DKA/HHS management with fluids, insulin gtt, bicarb gtt, hyperkalemia shifted. Pt later evaluated to be in hypotensive with atrial fibrillation with RVR and was given dose of phenylephrine, later started on levophed. Nephrology consulted for BETH, creatinine 6.6. Admitted to MICU for DKA.     Overview/Hospital Course:  Admitted to MICU for DKA/HHS. Mentation remains the same - lethargic, protecting own airway, attempting to follow commands. CT head without acute process. Serum osm was very high on admission and unsure how quickly it kevin prior to him presenting to hospital, concerned about risk of ODS given improvement in serum osm with resolution of DKA/HHS; MRI brain pending. UDS positive for THC, ethanol < 10. Volatile screen pending. Nephrology consulted for BETH and hypernatremia, assisting with adjusting fluids to prevent rapid correction of sodium and osm. Initially started on 0.2% NaCl but switched to D5W gtt.  Monitoring Na and serum osm frequently. sCr improving with resolution of DKA, making good urine. Cultures NTD, will de-escalate vancomycin and continue piptazo, no obvious source of infection. Endocrine consulted for assistance with insulin gtt given complex case and need to balance the serum osm, Na, and glucose. Phone call to wife Marzena on 11/11 to provide updates. Na, serum osm, and glucose slowly improving. MRI brain without acute or significant findings. Mentation improved, answering questions and following commands. De-escalated vancomycin and piptazo as no infective source. Na imprStable for stepdown from ICU on 11/13.    Interval History:   No events overnight. Patient with no complaints. Na improved and D5 discontinued. Endocrine following for DM management.       Review of Systems   Constitutional:  Negative for chills and fever.   HENT:  Negative for congestion and rhinorrhea.    Eyes:  Negative for photophobia and visual disturbance.   Respiratory:  Negative for cough, shortness of breath and wheezing.    Cardiovascular:  Negative for chest pain, palpitations and leg swelling.   Gastrointestinal:  Negative for abdominal pain, diarrhea, nausea and vomiting.   Genitourinary:  Negative for dysuria, frequency and hematuria.   Musculoskeletal:  Negative for arthralgias and myalgias.   Skin:  Negative for rash and wound.   Neurological:  Negative for dizziness and headaches.   Psychiatric/Behavioral:  Negative for agitation and confusion.      Objective:     Vital Signs (Most Recent):  Temp: 97.9 °F (36.6 °C) (11/15/23 1126)  Pulse: 87 (11/15/23 1126)  Resp: 18 (11/15/23 1126)  BP: 128/75 (11/15/23 1126)  SpO2: 98 % (11/15/23 1126) Vital Signs (24h Range):  Temp:  [96.7 °F (35.9 °C)-99.2 °F (37.3 °C)] 97.9 °F (36.6 °C)  Pulse:  [76-89] 87  Resp:  [16-20] 18  SpO2:  [96 %-100 %] 98 %  BP: (108-143)/(62-77) 128/75     Weight: 89.5 kg (197 lb 5 oz)  Body mass index is 25.33 kg/m².    Intake/Output Summary  (Last 24 hours) at 11/15/2023 1345  Last data filed at 11/14/2023 2337  Gross per 24 hour   Intake 420 ml   Output 1420 ml   Net -1000 ml         Physical Exam  Constitutional:       Appearance: Normal appearance. He is normal weight.   HENT:      Head: Normocephalic and atraumatic.      Mouth/Throat:      Mouth: Mucous membranes are moist.   Eyes:      Extraocular Movements: Extraocular movements intact.      Conjunctiva/sclera: Conjunctivae normal.   Cardiovascular:      Rate and Rhythm: Normal rate and regular rhythm.      Heart sounds: No murmur heard.  Pulmonary:      Effort: Pulmonary effort is normal. No respiratory distress.      Breath sounds: Normal breath sounds. No wheezing or rales.   Abdominal:      General: Abdomen is flat. There is no distension.      Palpations: Abdomen is soft.      Tenderness: There is no abdominal tenderness. There is no guarding.   Musculoskeletal:         General: No swelling or tenderness.   Skin:     Findings: No rash.   Neurological:      General: No focal deficit present.      Mental Status: He is alert and oriented to person, place, and time. Mental status is at baseline.   Psychiatric:         Mood and Affect: Mood normal.         Behavior: Behavior normal.             Significant Labs: All pertinent labs within the past 24 hours have been reviewed.  CBC:   Recent Labs   Lab 11/14/23  0306 11/15/23  1024   WBC 3.39* 3.69*   HGB 12.3* 13.1*   HCT 36.7* 39.5*   PLT 96* 99*     CMP:   Recent Labs   Lab 11/14/23  0306 11/14/23  0932 11/14/23  1638 11/15/23  0009 11/15/23  1024      < > 138 143 142   K 4.5   < > 5.1 4.3 4.5      < > 107 110 109   CO2 24   < > 23 25 23   *   < > 301* 159* 153*   BUN 13   < > 13 12 10   CREATININE 1.3   < > 1.5* 1.1 1.2   CALCIUM 9.0   < > 9.1 9.5 9.7   PROT 5.7*  --   --   --  6.7   ALBUMIN 2.8*  --   --   --  3.3*   BILITOT 0.7  --   --   --  0.6   ALKPHOS 75  --   --   --  89   AST 23  --   --   --  33   ALT 17  --   --    --  19   ANIONGAP 8   < > 8 8 10    < > = values in this interval not displayed.     POCT Glucose:   Recent Labs   Lab 11/15/23  0220 11/15/23  0735 11/15/23  1125   POCTGLUCOSE 137* 121* 180*       Significant Imaging: I have reviewed all pertinent imaging results/findings within the past 24 hours.    Assessment/Plan:      * DKA (diabetic ketoacidosis)  Presented with hyperglycemia BGL > 500 with EMS and 1800 on BMP once arrived to [facility]    - Hyperkalemic with K 6.8 on admit; shifted by ED  - Started on insulin gtt; BGL improved to 1600 but potassium dropped to 2.7  - Also has BETH with sCr 6.1 on presentation; baseline sCr 1.2-1.4  - Likely 2/2 DKA and hypovolemia, Pt very dry on admit exam  - Hypernatremic with Na ~170 when corrected for degree of hyperglycemia on arrival     - Endocrine consulted  -- Continue transitional insulin gtt  - Nephrology following  -- sCr improved  -- s/p D5W for management of hypernatremia  - BMP to monitor K and Na  - Replace potassium as needed to maintain > 3.5  - Daily serum osm  - Monitor strict I's/O's    BETH (acute kidney injury)  Patient with acute kidney injury/acute renal failure likely due to pre-renal azotemia due to dehydration BETH is currently stable. Baseline creatinine  normal  - Labs reviewed- Renal function/electrolytes with Estimated Creatinine Clearance: 98.9 mL/min (based on SCr of 1.2 mg/dL). according to latest data. Monitor urine output and serial BMP and adjust therapy as needed. Avoid nephrotoxins and renally dose meds for GFR listed above.    Acute metabolic encephalopathy  Presented with AMS, unclear etiology; infections vs DKA vs drug related. Per EMS, Pt responsive to painful stimuli only. CBG >500 with EMS. Follows some commands but very lethargic on admissin.     - UA negative for infection, Bcx NTD  - CXR without consolidation  - Discontinue piptazo; discontinued vancomycin  - UDS positive for THC; volatile screen pending   - CT head unremarkable; MRI  brain NAD   - Mentation improved to baseline      Hyperkalemia  This patient has hyperkalemia which is controlled. We will monitor for arrhythmias with EKG or continuous telemetry. We will treat the hyperkalemia with  DKA management . The likely etiology of the hyperkalemia is BETH.  The patients latest potassium has been reviewed and the results are listed below  Recent Labs   Lab 11/14/23  0932   K 4.7             Metabolic acidosis  See above      Type 1 diabetes mellitus  See above    Hypernatremia  Patient has hypernatremia which is controlled. The hypernatremia is due to Diabetes insipidus. We will aim to correct the sodium by 8-10mEq in 24 hours. We will correct their hypernatremia with Select IV fluids: D5W at a rate of 180 ml/hr. The patient's sodium results have been reviewed and are listed below.  Recent Labs   Lab 11/15/23  1024          - See above      VTE Risk Mitigation (From admission, onward)           Ordered     enoxaparin injection 40 mg  Every 24 hours         11/13/23 1157     IP VTE LOW RISK PATIENT  Once         11/10/23 1640     Place sequential compression device  Until discontinued         11/10/23 1640                    Discharge Planning   PRECIOUS: 11/16/2023     Code Status: Full Code   Is the patient medically ready for discharge?: No    Reason for patient still in hospital (select all that apply): Patient trending condition, Laboratory test, Treatment, Consult recommendations, and Pending disposition  Discharge Plan A: Home with family   Discharge Delays: (!) Procedure Scheduling (IR, OR, Labs, Echo, Cath, Echo, EEG)              Ruslan Goodwin MD  Department of Hospital Medicine   [facility] - Intensive Care ([place])

## 2023-11-15 NOTE — PLAN OF CARE
Pt stable. Nausea relieved at present time. No c/o. Insulin gtt at 1.5units/hr continued as ordered. Dr Aggarwal and primary team updated on pt status throughout the day. Safety maintained. Callbell within reach. Reinforced current plan of care.

## 2023-11-15 NOTE — ASSESSMENT & PLAN NOTE
Patient with acute kidney injury/acute renal failure likely due to pre-renal azotemia due to dehydration BETH is currently stable. Baseline creatinine  normal  - Labs reviewed- Renal function/electrolytes with Estimated Creatinine Clearance: 98.9 mL/min (based on SCr of 1.2 mg/dL). according to latest data. Monitor urine output and serial BMP and adjust therapy as needed. Avoid nephrotoxins and renally dose meds for GFR listed above.

## 2023-11-15 NOTE — NURSING
Patient off unit to U/S in stable condition, breathing even and unlabored  and A&O X4,  transport via stretcher

## 2023-11-15 NOTE — SUBJECTIVE & OBJECTIVE
Interval History:   No events overnight. Patient with no complaints. Na improved and D5 discontinued. Endocrine following for DM management.       Review of Systems   Constitutional:  Negative for chills and fever.   HENT:  Negative for congestion and rhinorrhea.    Eyes:  Negative for photophobia and visual disturbance.   Respiratory:  Negative for cough, shortness of breath and wheezing.    Cardiovascular:  Negative for chest pain, palpitations and leg swelling.   Gastrointestinal:  Negative for abdominal pain, diarrhea, nausea and vomiting.   Genitourinary:  Negative for dysuria, frequency and hematuria.   Musculoskeletal:  Negative for arthralgias and myalgias.   Skin:  Negative for rash and wound.   Neurological:  Negative for dizziness and headaches.   Psychiatric/Behavioral:  Negative for agitation and confusion.      Objective:     Vital Signs (Most Recent):  Temp: 97.9 °F (36.6 °C) (11/15/23 1126)  Pulse: 87 (11/15/23 1126)  Resp: 18 (11/15/23 1126)  BP: 128/75 (11/15/23 1126)  SpO2: 98 % (11/15/23 1126) Vital Signs (24h Range):  Temp:  [96.7 °F (35.9 °C)-99.2 °F (37.3 °C)] 97.9 °F (36.6 °C)  Pulse:  [76-89] 87  Resp:  [16-20] 18  SpO2:  [96 %-100 %] 98 %  BP: (108-143)/(62-77) 128/75     Weight: 89.5 kg (197 lb 5 oz)  Body mass index is 25.33 kg/m².    Intake/Output Summary (Last 24 hours) at 11/15/2023 1345  Last data filed at 11/14/2023 2337  Gross per 24 hour   Intake 420 ml   Output 1420 ml   Net -1000 ml         Physical Exam  Constitutional:       Appearance: Normal appearance. He is normal weight.   HENT:      Head: Normocephalic and atraumatic.      Mouth/Throat:      Mouth: Mucous membranes are moist.   Eyes:      Extraocular Movements: Extraocular movements intact.      Conjunctiva/sclera: Conjunctivae normal.   Cardiovascular:      Rate and Rhythm: Normal rate and regular rhythm.      Heart sounds: No murmur heard.  Pulmonary:      Effort: Pulmonary effort is normal. No respiratory distress.       Breath sounds: Normal breath sounds. No wheezing or rales.   Abdominal:      General: Abdomen is flat. There is no distension.      Palpations: Abdomen is soft.      Tenderness: There is no abdominal tenderness. There is no guarding.   Musculoskeletal:         General: No swelling or tenderness.   Skin:     Findings: No rash.   Neurological:      General: No focal deficit present.      Mental Status: He is alert and oriented to person, place, and time. Mental status is at baseline.   Psychiatric:         Mood and Affect: Mood normal.         Behavior: Behavior normal.             Significant Labs: All pertinent labs within the past 24 hours have been reviewed.  CBC:   Recent Labs   Lab 11/14/23  0306 11/15/23  1024   WBC 3.39* 3.69*   HGB 12.3* 13.1*   HCT 36.7* 39.5*   PLT 96* 99*     CMP:   Recent Labs   Lab 11/14/23  0306 11/14/23  0932 11/14/23  1638 11/15/23  0009 11/15/23  1024      < > 138 143 142   K 4.5   < > 5.1 4.3 4.5      < > 107 110 109   CO2 24   < > 23 25 23   *   < > 301* 159* 153*   BUN 13   < > 13 12 10   CREATININE 1.3   < > 1.5* 1.1 1.2   CALCIUM 9.0   < > 9.1 9.5 9.7   PROT 5.7*  --   --   --  6.7   ALBUMIN 2.8*  --   --   --  3.3*   BILITOT 0.7  --   --   --  0.6   ALKPHOS 75  --   --   --  89   AST 23  --   --   --  33   ALT 17  --   --   --  19   ANIONGAP 8   < > 8 8 10    < > = values in this interval not displayed.     POCT Glucose:   Recent Labs   Lab 11/15/23  0220 11/15/23  0735 11/15/23  1125   POCTGLUCOSE 137* 121* 180*       Significant Imaging: I have reviewed all pertinent imaging results/findings within the past 24 hours.

## 2023-11-15 NOTE — PROGRESS NOTES
"[facility] - Intensive Care ([facility]  Endocrinology  Progress Note    Admit Date: 11/10/2023     Andrzej Sinclair is a 37 year old male with PMH significant for poorly controlled type 1 DM c/b frequent DKA, HTN, CKD3 and GERD who was BIBEMS for unresponsiveness. Patient is a not following commands on my assessment and history was obtained from the medical record. Family reportedly called EMS when they found patient down and unresponsive. He reportedly had not been eating or taking his insulin for several days. Patient was found to be hyperglycemic and brought in to the ED. He was found to be unresponsive to verbal stimuli and minimally responsive to physical, but hemodynamically stable and afebrile. Labs showing severe DKA (BG 1885, bicarb 6, AG 30, BHO 5.8) and HHS (sOSM 452). Complications including acute renal failure and shock requiring pressors concerning for septic shock. Endocrinology is consulted for DKA/HHS, initial BGL >1800, hypernatremic, trying to prevent rapid correction of osm and Na, would like assistance with insulin gtt."    Patient was treated with insulin infusion per DKA protocol and aggressive hydration. Acidosis has since resolved and anion gap is closed. However patient is still minimally responsive, not following commands and not eating.    Regarding Type 1 DM    Diagnosed: Age 20  Last hemoglobin A1c: 12.8 09/13/2023  Last seen with Endocrinology: 05/02/2023 with DILIP Mendoza. He can cancelled a virtual appointment with our office in september  Last Hospitalization for DKA: 9/12-9/18    Per records, He was prescribed omnipod 5 in May but unclear of adherence.  Med rec states he is taking Tresiba 21 units daily and Lyumjev 7 units with meals. He has been having issues with Dexcom adherence as an outpatient. Using more than 3 dexcoms per month. Prescriptions have been sent but staff have been unable to contact him.      BG trends today:      Pertinent Labs       Interval HPI:   Overnight " "events: Yesterday with temporary IV access issues, on 2.2 units of transitional insulin gtt overnight with BG on lower end of normal, ranging 120-160. Sodium levels improved, now at 143 while on D5 gtt per nephro - d/tiana this AM by nephro.  Eatin%  Nausea: No  Hypoglycemia and intervention: No  Fever: No  TPN and/or TF: No  If yes, type of TF/TPN and rate: n/a    /62 (BP Location: Left arm, Patient Position: Lying)   Pulse 76   Temp 98.6 °F (37 °C) (Oral)   Resp 18   Ht 6' 2" (1.88 m)   Wt 89.5 kg (197 lb 5 oz)   SpO2 97%   BMI 25.33 kg/m²     Labs Reviewed and Include    Recent Labs   Lab 11/15/23  0009   *   CALCIUM 9.5      K 4.3   CO2 25      BUN 12   CREATININE 1.1     Lab Results   Component Value Date    WBC 3.39 (L) 2023    HGB 12.3 (L) 2023    HCT 36.7 (L) 2023    MCV 91 2023    PLT 96 (L) 2023     No results for input(s): "TSH", "FREET4" in the last 168 hours.  Lab Results   Component Value Date    HGBA1C 12.8 (H) 2023       Nutritional status:   Body mass index is 25.33 kg/m².  Lab Results   Component Value Date    ALBUMIN 2.8 (L) 2023    ALBUMIN 2.9 (L) 2023    ALBUMIN 3.1 (L) 2023     No results found for: "PREALBUMIN"    Estimated Creatinine Clearance: 107.9 mL/min (based on SCr of 1.1 mg/dL).    Accu-Checks  Recent Labs     23  1728 23  1832 23  2155 23  0225 23  0924 23  1145 23  1554 11/14/23  2012 11/15/23  0220 11/15/23  0735   POCTGLUCOSE 171* 158* 172* 340* 123* 185* 245* 271* 137* 121*       Current Medications and/or Treatments Impacting Glycemic Control  Immunotherapy:    Immunosuppressants       None          Steroids:   Hormones (From admission, onward)      None          Pressors:    Autonomic Drugs (From admission, onward)      None          Hyperglycemia/Diabetes Medications:   Antihyperglycemics (From admission, onward)      Start     Stop Route " Frequency Ordered    11/14/23 0630  insulin regular in 0.9 % NaCl 100 unit/100 mL (1 unit/mL) infusion        Question:  Enter initial dose (Units/hr):  Answer:  2.5    -- IV Continuous 11/14/23 0629    11/13/23 1645  insulin aspart U-100 pen 5 Units         -- SubQ 3 times daily with meals 11/13/23 1447    11/13/23 1623  insulin aspart U-100 pen 0-5 Units         -- SubQ As needed (PRN) 11/13/23 1523              ASSESSMENT and PLAN    Renal/  Hypernatremia  On D5W gtt for hypernatremia and BETH per nephro  Please notify us for any change in dextrose-containing IVF so that we can adjust insulin accordingly       Endocrine  Type 1 diabetes mellitus  Diagnosed: age 20  Last hemoglobin A1c: 12.8 09/13/2023    Outpatient regimen:   He reports Toujeo 20u daily and Lyumjev 10u ac (Tresiba is what is on MAR so I suspect he is using Tresiba for basal, not Toujeo)  Also has DEXCOM G6. Previously prescribed Omnipod 5    Endocrinology consulted for BG management.   BG goal 140-180     He is getting D5W for hypernatremia and BETH per nephro. Please let me know when rate is changed or if D5W is stopped as this will change his insulin requirement and I will adjust rate of transition gtt at that time.    - Transition drip at 1.5 units/hr with step-down parameters. Pt is a type 1 diabetic and cannot go without basal insulin. If gtt is paused for more than 30 min for any reason, please page on-call endocrine fellow   - Novolog (aspart) insulin 5 Units SQ TIDWM and prn for BG excursions low dose SSI (150/50).  - BG checks ac/hs/0200  - Hypoglycemia protocol in place    ** Please notify Endocrine for any change and/or advance in diet**  ** Please call Endocrine for any BG related issues **    Discharge Planning:   TBD. Please notify endocrinology prior to discharge.          Leilani Gallardo MD  Endocrinology  [facility] - Intensive Care ([place])

## 2023-11-15 NOTE — PROGRESS NOTES
[facility] - Intensive Care ([place] [address])  Nephrology  Progress Note    Patient Name: Andrzej Sinclair  MRN: [id number]  Admission Date: 11/10/2023  Hospital Length of Stay: 5 days  Attending Provider: Ruslan Goodwin MD   Primary Care Physician: Viktoriya Jaeger NP  Principal Problem:DKA (diabetic ketoacidosis)    Subjective:     HPI: Andrzej Sinclair, a 37 y.o. male PMH of Type 1 DM, presents to the ED w/ complaint of hyperglycemia.  Per EMS patient responsive to painful stimuli only. Initial labs showing blood glucose more than 1000, hyperkalemia, hyponatremia, low BP.    Interval History:   No acute events overnight, doing good, has good oral intake of fluids.  Would recommend stopping D5W.    Review of patient's allergies indicates:   Allergen Reactions    Lactose Other (See Comments)     Gas and moderate to sever abdominal pain     Current Facility-Administered Medications   Medication Frequency    acetaminophen tablet 650 mg Q6H PRN    bisacodyL suppository 10 mg Daily PRN    dextrose 10 % infusion PRN    dextrose 10 % infusion PRN    dextrose 10% bolus 125 mL 125 mL PRN    dextrose 10% bolus 250 mL 250 mL PRN    enoxaparin injection 40 mg Q24H (prophylaxis, 1700)    glucagon (human recombinant) injection 1 mg PRN    glucose chewable tablet 16 g PRN    glucose chewable tablet 24 g PRN    insulin aspart U-100 pen 0-5 Units PRN    insulin aspart U-100 pen 5 Units TIDWM    insulin regular in 0.9 % NaCl 100 unit/100 mL (1 unit/mL) infusion Continuous    mupirocin 2 % ointment BID    naloxone 0.4 mg/mL injection 0.02 mg PRN    polyethylene glycol packet 17 g BID PRN    sodium chloride 0.9% flush 10 mL Q12H PRN    sodium chloride 0.9% flush 10 mL PRN       Objective:     Vital Signs (Most Recent):  Temp: 98.6 °F (37 °C) (11/15/23 0736)  Pulse: 76 (11/15/23 0736)  Resp: 18 (11/15/23 0736)  BP: 108/62 (11/15/23 0736)  SpO2: 97 % (11/15/23 0736) Vital Signs (24h Range):  Temp:  [96.7 °F (35.9 °C)-99.2 °F (37.3 °C)]  98.6 °F (37 °C)  Pulse:  [] 76  Resp:  [16-20] 18  SpO2:  [96 %-100 %] 97 %  BP: (108-143)/(62-77) 108/62     Weight: 89.5 kg (197 lb 5 oz) (11/10/23 1519)  Body mass index is 25.33 kg/m².  Body surface area is 2.16 meters squared.    I/O last 3 completed shifts:  In: 900 [P.O.:900]  Out: 4120 [Urine:4120]     Physical Exam   Constitutional:       Appearance: He is obese.   HENT:      Head: Normocephalic.   Cardiovascular:      Rate and Rhythm: Normal rate.   Pulmonary:      Effort: Pulmonary effort is normal.   Genitourinary:     Comments: Foleys removed  Skin:     General: Skin is warm.   Neurological:      General: No focal deficit present.      Mental Status: He is alert and oriented to person, place, and time.   Psychiatric:         Mood and Affect: Mood normal  Significant Labs:  BMP:   Recent Labs   Lab 11/14/23  0306 11/14/23  0932 11/15/23  0009   *   < > 159*      < > 143   K 4.5   < > 4.3      < > 110   CO2 24   < > 25   BUN 13   < > 12   CREATININE 1.3   < > 1.1   CALCIUM 9.0   < > 9.5   MG 1.9  --   --     < > = values in this interval not displayed.        Significant Imaging:  Labs: Reviewed  Assessment/Plan:     Renal/  BETH (acute kidney injury)  Scr at consult 6, baseline 1-1.3  BETH multifactoral 2/2 Pre renal vs ATN*   High osmolal gap    Scr 11/15 1.1  Na 143    Plan  Sodium levels improved, P/O fluids intake, D5W D/C.  I/Os  BMP daily  Avoid nephrotoxins  Renal dose medications        Thank you for your consult. I will follow-up with patient. Please contact us if you have any additional questions.    Padmini Swenson MD  Nephrology  [facility] - Intensive Care ([place]-)

## 2023-11-15 NOTE — ASSESSMENT & PLAN NOTE
Diagnosed: age 20  Last hemoglobin A1c: 12.8 09/13/2023    Outpatient regimen:   He reports Toujeo 20u daily and Lyumjev 10u ac (Tresiba is what is on MAR so I suspect he is using Tresiba for basal, not Toujeo)  Also has DEXCOM G6. Previously prescribed Omnipod 5    Endocrinology consulted for BG management.   BG goal 140-180     He is getting D5W for hypernatremia and BETH per nephro. Please let me know when rate is changed or if D5W is stopped as this will change his insulin requirement and I will adjust rate of transition gtt at that time.    - Transition drip at 2.2 units/hr with step-down parameters. Pt is a type 1 diabetic and cannot go without basal insulin. If gtt is paused for more than 30 min for any reason, please page on-call endocrine fellow   - Novolog (aspart) insulin 5 Units SQ TIDWM and prn for BG excursions low dose SSI (150/50).  - BG checks ac/hs/0200  - Hypoglycemia protocol in place    ** Please notify Endocrine for any change and/or advance in diet**  ** Please call Endocrine for any BG related issues **    Discharge Planning:   TBD. Please notify endocrinology prior to discharge.

## 2023-11-15 NOTE — ASSESSMENT & PLAN NOTE
Scr at consult 6, baseline 1-1.3  BETH multifactoral 2/2 Pre renal vs ATN*   High osmolal gap    Scr 11/15 1.1  Na 143    Plan  Sodium levels improved, P/O fluids intake, D5W D/C.  I/Os  BMP daily  Avoid nephrotoxins  Renal dose medications

## 2023-11-15 NOTE — PLAN OF CARE
Pt dx DKA. AAOX4. No c/o. Insulin gtt and IVF infusing well to right PIV. Voiding in urinal without difficulty. Stable. Explained current plan of care, voiced understanding.

## 2023-11-15 NOTE — CARE UPDATE
D5W gtt was discontinued by nephrology this AM. Transition gtt rate lowered. Plan to switch to levemir this evening -- Levemir 12u BID. I have placed orders and scheduled insulin gtt to end 2hrs after first levemir dose is given. Pt is a type 1 and will go back into DKA easily if without basal insulin. Therefore, must overlap Levemir and insulin gtt by 2hrs (since levemir takes 2hrs to take effect).    Levemir should never be held in a type 1 diabetic.   Also increased prandial insulin today to 7u AC. Continue low dose correction scale prn.      Please page/call on call for endocrine for any questions regarding BG or insulin.       Stefany Aggarwal MD  [facility], [address]th [address]  [address] [place], [place], [address]    Office: [phone]  Fax: [phone]

## 2023-11-15 NOTE — PLAN OF CARE
Problem: Violence Risk or Actual  Goal: Anger and Impulse Control  Outcome: Ongoing, Progressing     Problem: Infection  Goal: Absence of Infection Signs and Symptoms  Outcome: Ongoing, Progressing     Problem: Fall Injury Risk  Goal: Absence of Fall and Fall-Related Injury  Outcome: Ongoing, Progressing     Problem: Restraint, Nonbehavioral (Nonviolent)  Goal: Absence of Harm or Injury  Outcome: Ongoing, Progressing     Problem: Adult Inpatient Plan of Care  Goal: Patient-Specific Goal (Individualized)  Outcome: Ongoing, Progressing  Goal: Absence of Hospital-Acquired Illness or Injury  Outcome: Ongoing, Progressing  Goal: Optimal Comfort and Wellbeing  Outcome: Ongoing, Progressing  Goal: Readiness for Transition of Care  Outcome: Ongoing, Progressing     Problem: Diabetes Comorbidity  Goal: Blood Glucose Level Within Targeted Range  Outcome: Ongoing, Progressing     Problem: Fluid and Electrolyte Imbalance (Acute Kidney Injury/Impairment)  Goal: Fluid and Electrolyte Balance  Outcome: Ongoing, Progressing     Problem: Oral Intake Inadequate (Acute Kidney Injury/Impairment)  Goal: Optimal Nutrition Intake  Outcome: Ongoing, Progressing     Problem: Renal Function Impairment (Acute Kidney Injury/Impairment)  Goal: Effective Renal Function  Outcome: Ongoing, Progressing     Problem: Skin Injury Risk Increased  Goal: Skin Health and Integrity  Outcome: Ongoing, Progressing

## 2023-11-16 PROBLEM — E87.0 HYPERNATREMIA: Status: RESOLVED | Noted: 2023-09-07 | Resolved: 2023-11-16

## 2023-11-16 LAB
ALBUMIN SERPL BCP-MCNC: 3.3 G/DL (ref 3.5–5.2)
ALP SERPL-CCNC: 90 U/L (ref 55–135)
ALT SERPL W/O P-5'-P-CCNC: 30 U/L (ref 10–44)
ANION GAP SERPL CALC-SCNC: 11 MMOL/L (ref 8–16)
ANION GAP SERPL CALC-SCNC: 13 MMOL/L (ref 8–16)
ANION GAP SERPL CALC-SCNC: 7 MMOL/L (ref 8–16)
AST SERPL-CCNC: 41 U/L (ref 10–40)
BASOPHILS # BLD AUTO: 0.01 K/UL (ref 0–0.2)
BASOPHILS NFR BLD: 0.2 % (ref 0–1.9)
BILIRUB SERPL-MCNC: 0.5 MG/DL (ref 0.1–1)
BUN SERPL-MCNC: 18 MG/DL (ref 6–20)
BUN SERPL-MCNC: 20 MG/DL (ref 6–20)
BUN SERPL-MCNC: 20 MG/DL (ref 6–20)
CALCIUM SERPL-MCNC: 9.6 MG/DL (ref 8.7–10.5)
CALCIUM SERPL-MCNC: 9.8 MG/DL (ref 8.7–10.5)
CALCIUM SERPL-MCNC: 9.9 MG/DL (ref 8.7–10.5)
CHLORIDE SERPL-SCNC: 103 MMOL/L (ref 95–110)
CHLORIDE SERPL-SCNC: 104 MMOL/L (ref 95–110)
CHLORIDE SERPL-SCNC: 109 MMOL/L (ref 95–110)
CO2 SERPL-SCNC: 26 MMOL/L (ref 23–29)
CO2 SERPL-SCNC: 29 MMOL/L (ref 23–29)
CO2 SERPL-SCNC: 30 MMOL/L (ref 23–29)
CREAT SERPL-MCNC: 1.5 MG/DL (ref 0.5–1.4)
DIFFERENTIAL METHOD: ABNORMAL
EOSINOPHIL # BLD AUTO: 0 K/UL (ref 0–0.5)
EOSINOPHIL NFR BLD: 0 % (ref 0–8)
ERYTHROCYTE [DISTWIDTH] IN BLOOD BY AUTOMATED COUNT: 12.3 % (ref 11.5–14.5)
EST. GFR  (NO RACE VARIABLE): >60 ML/MIN/1.73 M^2
GLUCOSE SERPL-MCNC: 103 MG/DL (ref 70–110)
GLUCOSE SERPL-MCNC: 337 MG/DL (ref 70–110)
GLUCOSE SERPL-MCNC: 371 MG/DL (ref 70–110)
HCT VFR BLD AUTO: 37.1 % (ref 40–54)
HGB BLD-MCNC: 12.6 G/DL (ref 14–18)
IMM GRANULOCYTES # BLD AUTO: 0.02 K/UL (ref 0–0.04)
IMM GRANULOCYTES NFR BLD AUTO: 0.3 % (ref 0–0.5)
LIPASE SERPL-CCNC: 37 U/L (ref 4–60)
LYMPHOCYTES # BLD AUTO: 0.6 K/UL (ref 1–4.8)
LYMPHOCYTES NFR BLD: 8.8 % (ref 18–48)
MAGNESIUM SERPL-MCNC: 1.9 MG/DL (ref 1.6–2.6)
MCH RBC QN AUTO: 30.4 PG (ref 27–31)
MCHC RBC AUTO-ENTMCNC: 34 G/DL (ref 32–36)
MCV RBC AUTO: 90 FL (ref 82–98)
MONOCYTES # BLD AUTO: 0.5 K/UL (ref 0.3–1)
MONOCYTES NFR BLD: 7.8 % (ref 4–15)
NEUTROPHILS # BLD AUTO: 5.2 K/UL (ref 1.8–7.7)
NEUTROPHILS NFR BLD: 82.9 % (ref 38–73)
NRBC BLD-RTO: 0 /100 WBC
OSMOLALITY SERPL: 318 MOSM/KG (ref 280–300)
PHOSPHATE SERPL-MCNC: 4.3 MG/DL (ref 2.7–4.5)
PLATELET # BLD AUTO: 120 K/UL (ref 150–450)
PMV BLD AUTO: 12.2 FL (ref 9.2–12.9)
POCT GLUCOSE: 159 MG/DL (ref 70–110)
POCT GLUCOSE: 244 MG/DL (ref 70–110)
POCT GLUCOSE: 296 MG/DL (ref 70–110)
POCT GLUCOSE: 329 MG/DL (ref 70–110)
POCT GLUCOSE: 371 MG/DL (ref 70–110)
POCT GLUCOSE: 81 MG/DL (ref 70–110)
POTASSIUM SERPL-SCNC: 4.6 MMOL/L (ref 3.5–5.1)
POTASSIUM SERPL-SCNC: 5 MMOL/L (ref 3.5–5.1)
POTASSIUM SERPL-SCNC: 5.4 MMOL/L (ref 3.5–5.1)
PROT SERPL-MCNC: 6.9 G/DL (ref 6–8.4)
RBC # BLD AUTO: 4.14 M/UL (ref 4.6–6.2)
SODIUM SERPL-SCNC: 143 MMOL/L (ref 136–145)
SODIUM SERPL-SCNC: 143 MMOL/L (ref 136–145)
SODIUM SERPL-SCNC: 146 MMOL/L (ref 136–145)
WBC # BLD AUTO: 6.27 K/UL (ref 3.9–12.7)

## 2023-11-16 PROCEDURE — 83930 ASSAY OF BLOOD OSMOLALITY: CPT | Performed by: STUDENT IN AN ORGANIZED HEALTH CARE EDUCATION/TRAINING PROGRAM

## 2023-11-16 PROCEDURE — 84100 ASSAY OF PHOSPHORUS: CPT | Performed by: STUDENT IN AN ORGANIZED HEALTH CARE EDUCATION/TRAINING PROGRAM

## 2023-11-16 PROCEDURE — 80053 COMPREHEN METABOLIC PANEL: CPT | Performed by: STUDENT IN AN ORGANIZED HEALTH CARE EDUCATION/TRAINING PROGRAM

## 2023-11-16 PROCEDURE — 83735 ASSAY OF MAGNESIUM: CPT | Performed by: STUDENT IN AN ORGANIZED HEALTH CARE EDUCATION/TRAINING PROGRAM

## 2023-11-16 PROCEDURE — 36415 COLL VENOUS BLD VENIPUNCTURE: CPT

## 2023-11-16 PROCEDURE — 63600175 PHARM REV CODE 636 W HCPCS

## 2023-11-16 PROCEDURE — 99232 SBSQ HOSP IP/OBS MODERATE 35: CPT | Mod: ,,, | Performed by: INTERNAL MEDICINE

## 2023-11-16 PROCEDURE — 63600175 PHARM REV CODE 636 W HCPCS: Performed by: NURSE PRACTITIONER

## 2023-11-16 PROCEDURE — 25000003 PHARM REV CODE 250: Performed by: INTERNAL MEDICINE

## 2023-11-16 PROCEDURE — 99232 PR SUBSEQUENT HOSPITAL CARE,LEVL II: ICD-10-PCS | Mod: ,,, | Performed by: INTERNAL MEDICINE

## 2023-11-16 PROCEDURE — 85025 COMPLETE CBC W/AUTO DIFF WBC: CPT

## 2023-11-16 PROCEDURE — 20600001 HC STEP DOWN PRIVATE ROOM

## 2023-11-16 PROCEDURE — 36415 COLL VENOUS BLD VENIPUNCTURE: CPT | Performed by: STUDENT IN AN ORGANIZED HEALTH CARE EDUCATION/TRAINING PROGRAM

## 2023-11-16 PROCEDURE — 80048 BASIC METABOLIC PNL TOTAL CA: CPT

## 2023-11-16 RX ORDER — SODIUM CHLORIDE 9 MG/ML
INJECTION, SOLUTION INTRAVENOUS CONTINUOUS
Status: ACTIVE | OUTPATIENT
Start: 2023-11-16 | End: 2023-11-16

## 2023-11-16 RX ORDER — INSULIN ASPART 100 [IU]/ML
8 INJECTION, SOLUTION INTRAVENOUS; SUBCUTANEOUS
Status: DISCONTINUED | OUTPATIENT
Start: 2023-11-16 | End: 2023-11-17 | Stop reason: HOSPADM

## 2023-11-16 RX ADMIN — INSULIN ASPART 1 UNITS: 100 INJECTION, SOLUTION INTRAVENOUS; SUBCUTANEOUS at 10:11

## 2023-11-16 RX ADMIN — INSULIN ASPART 7 UNITS: 100 INJECTION, SOLUTION INTRAVENOUS; SUBCUTANEOUS at 08:11

## 2023-11-16 RX ADMIN — SODIUM CHLORIDE: 9 INJECTION, SOLUTION INTRAVENOUS at 11:11

## 2023-11-16 RX ADMIN — SODIUM ZIRCONIUM CYCLOSILICATE 10 G: 10 POWDER, FOR SUSPENSION ORAL at 08:11

## 2023-11-16 RX ADMIN — ENOXAPARIN SODIUM 40 MG: 40 INJECTION SUBCUTANEOUS at 06:11

## 2023-11-16 RX ADMIN — INSULIN ASPART 2 UNITS: 100 INJECTION, SOLUTION INTRAVENOUS; SUBCUTANEOUS at 12:11

## 2023-11-16 RX ADMIN — INSULIN ASPART 7 UNITS: 100 INJECTION, SOLUTION INTRAVENOUS; SUBCUTANEOUS at 12:11

## 2023-11-16 RX ADMIN — INSULIN ASPART 4 UNITS: 100 INJECTION, SOLUTION INTRAVENOUS; SUBCUTANEOUS at 08:11

## 2023-11-16 RX ADMIN — INSULIN ASPART 5 UNITS: 100 INJECTION, SOLUTION INTRAVENOUS; SUBCUTANEOUS at 03:11

## 2023-11-16 RX ADMIN — INSULIN DETEMIR 12 UNITS: 100 INJECTION, SOLUTION SUBCUTANEOUS at 08:11

## 2023-11-16 NOTE — PLAN OF CARE
11/16/23 0852   Discharge Reassessment   Assessment Type Discharge Planning Reassessment   Did the patient's condition or plan change since previous assessment? No   Discharge Plan discussed with: Patient   Communicated PRECIOUS with patient/caregiver Yes   Discharge Plan A Home with family   Discharge Plan B Home   DME Needed Upon Discharge  other (see comments)  (TBD)   Transition of Care Barriers None   Why the patient remains in the hospital Requires continued medical care   Post-Acute Status   Post-Acute Authorization Other   Coverage [facility] DUAL COMPLETE O SNP   Other Status No Post-Acute Service Needs   Hospital Resources/Appts/Education Provided Provided education on problems/symptoms using teachback   Discharge Delays (!) Procedure Scheduling (IR, OR, Labs, Echo, Cath, Echo, EEG)  (monitoring glucose and insulin drip)     CM  spoke with patient to discuss any changes in discharge planning.  Patients plan is to dc home with family.   No changes in DC plans. PRECIOUS:  11/16/23    Discharge Recommendations: none    Ange Lacey RN  Case Management  [facility]  [phone]

## 2023-11-16 NOTE — SUBJECTIVE & OBJECTIVE
"Interval HPI:   Overnight events: Globally hyperglycemic. Received 11 units (7 units with meals + 4 units correction) and  BG decreased from 329 to 244 at lunch.      Eatin%  Nausea: No  Hypoglycemia and intervention: No  Fever: No  TPN and/or TF: No  If yes, type of TF/TPN and rate: n/a    /70 (BP Location: Left arm, Patient Position: Lying)   Pulse 103   Temp 98.1 °F (36.7 °C) (Oral)   Resp 18   Ht 6' 2" (1.88 m)   Wt 89.5 kg (197 lb 5 oz)   SpO2 98%   BMI 25.33 kg/m²     Labs Reviewed and Include    Recent Labs   Lab 23  0626   *   CALCIUM 9.9   ALBUMIN 3.3*   PROT 6.9      K 5.0   CO2 29      BUN 20   CREATININE 1.5*   ALKPHOS 90   ALT 30   AST 41*   BILITOT 0.5     Lab Results   Component Value Date    WBC 6.27 2023    HGB 12.6 (L) 2023    HCT 37.1 (L) 2023    MCV 90 2023     (L) 2023     No results for input(s): "TSH", "FREET4" in the last 168 hours.  Lab Results   Component Value Date    HGBA1C 12.8 (H) 2023       Nutritional status:   Body mass index is 25.33 kg/m².  Lab Results   Component Value Date    ALBUMIN 3.3 (L) 2023    ALBUMIN 3.3 (L) 11/15/2023    ALBUMIN 2.8 (L) 2023     No results found for: "PREALBUMIN"    Estimated Creatinine Clearance: 79.2 mL/min (A) (based on SCr of 1.5 mg/dL (H)).    Accu-Checks  Recent Labs     11/14/23  2012 11/15/23  0220 11/15/23  0735 11/15/23  1125 11/15/23  1553 11/15/23  2127 11/15/23  2129 23  0240 23  0628 23  0743   POCTGLUCOSE 271* 137* 121* 180* 333* 258* 269* 371* 296* 329*       Current Medications and/or Treatments Impacting Glycemic Control  Immunotherapy:    Immunosuppressants       None          Steroids:   Hormones (From admission, onward)      None          Pressors:    Autonomic Drugs (From admission, onward)      None          Hyperglycemia/Diabetes Medications:   Antihyperglycemics (From admission, onward)      Start     Stop Route " Frequency Ordered    11/16/23 0715  insulin aspart U-100 pen 7 Units         -- SubQ 3 times daily with meals 11/15/23 1757    11/15/23 2100  insulin detemir U-100 (Levemir) pen 12 Units         -- SubQ 2 times daily 11/15/23 1623    11/15/23 1030  insulin regular in 0.9 % NaCl 100 unit/100 mL (1 unit/mL) infusion        Question:  Enter initial dose (Units/hr):  Answer:  1.5    11/15/23 2300 IV Continuous 11/15/23 1021    11/13/23 1623  insulin aspart U-100 pen 0-5 Units         -- SubQ As needed (PRN) 11/13/23 1528

## 2023-11-16 NOTE — ASSESSMENT & PLAN NOTE
Presented with AMS, unclear etiology; infections vs DKA vs drug related. Per EMS, Pt responsive to painful stimuli only. CBG >500 with EMS. Follows some commands but very lethargic on admissin.     - UA negative for infection, Bcx NTD  - CXR without consolidation  - Discontinue piptazo; discontinued vancomycin  - UDS positive for THC; volatile screen pending   - CT head unremarkable; MRI brain NAD   - Mentation improved to baseline  - Resolved

## 2023-11-16 NOTE — PROGRESS NOTES
[facility] - [facility] ([address] Medicine  Progress Note    Patient Name: Andrzej Sinclair  MRN: [id number]  Patient Class: IP- Inpatient   Admission Date: 11/10/2023  Length of Stay: 6 days  Attending Physician: Jerad Calvo MD  Primary Care Provider: Viktoriya Jaeger NP        Subjective:     Principal Problem:DKA (diabetic ketoacidosis)        HPI:  Andrzej Sinclair is a 37 year old male with hx of type 1 diabetes mellitus, hypertension, CKD stage 3, GERD, recently admitted for DKA/HHS complicated by hypernatremia as well as metabolic encephalopathy, who initially presented to the ED via EMS today with severe hyperglycemia. In the ED, patient was noted to be unresponsive to verbal stimuli and minimally responsive to physical, but hemodynamically stable and afebrile. Concerns for DKA with labs significant for severe hyperglycemia BG 1885, Na 131, K 6.8, bicarb 6, AG 30. Elevated beta hydroxybutyrate 5.8. Central line was placed in ED after difficulties gaining access. Started on DKA/HHS management with fluids, insulin gtt, bicarb gtt, hyperkalemia shifted. Pt later evaluated to be in hypotensive with atrial fibrillation with RVR and was given dose of phenylephrine, later started on levophed. Nephrology consulted for BETH, creatinine 6.6. Admitted to MICU for DKA.     Overview/Hospital Course:  Admitted to MICU for DKA/HHS and airway protection. CT head without acute process. Serum osm was very high on admission and unsure how quickly it kevin prior to him presenting to hospital, concerned about risk of ODS given improvement in serum osm with resolution of DKA/HHS; MRI brain normal. UDS positive for THC, ethanol < 10. Nephrology consulted for BETH and hypernatremia. sCr improved with resolution of DKA, making good urine. Cultures NTD, de-escalated vancomycin and continue piptazo, no obvious source of infection. Endocrine consulted for assistance with insulin gtt given complex case and need to balance  the serum osm, Na, and glucose. Mentation improved back to baseline. Discontinued vancomycin and piptazo as no infective source. Na improved with D5 per Nephrology. Stable for stepdown from ICU on 11/13. D5 discontinued after sodium normalized. Insulin gtt Dc'd and transitioned to basal/bolus regimen.    Interval History: Pt seen and examined this morning on rounds. NAEON. Recently Dc'd off insulin gtt and transitioned to basal/bolus per endocrine. Blood sugars elevated 200-300's overnight. Patient denies abdominal pain but did have one episode of emesis after eating barbeque for dinner. Confirmed patient on a diabetic diet. N/V resolved as of this AM.         Objective:     Vital Signs (Most Recent):  Temp: 98.1 °F (36.7 °C) (11/16/23 0744)  Pulse: 103 (11/16/23 0744)  Resp: 18 (11/16/23 0744)  BP: 132/70 (11/16/23 0744)  SpO2: 98 % (11/16/23 0744) Vital Signs (24h Range):  Temp:  [97.9 °F (36.6 °C)-99.8 °F (37.7 °C)] 98.1 °F (36.7 °C)  Pulse:  [] 103  Resp:  [16-18] 18  SpO2:  [96 %-100 %] 98 %  BP: (114-132)/(63-75) 132/70     Weight: 89.5 kg (197 lb 5 oz)  Body mass index is 25.33 kg/m².    Intake/Output Summary (Last 24 hours) at 11/16/2023 1045  Last data filed at 11/16/2023 0944  Gross per 24 hour   Intake 1354.1 ml   Output 1850 ml   Net -495.9 ml      Physical Exam  Gen: in NAD, appears stated age  Neuro: AAOx4, CN2-12 grossly intact BL; motor, sensory, and strength grossly intact BL  HEENT: NTNC, EOMI, PERRLA, MMM; no thyromegaly or lymphadenopathy; no JVD appreciated  CVS: RRR, no m/r/g; S1/S2 auscultated with no S3 or S4; capillary refill < 2 sec  Resp: lungs CTAB, no w/r/r; no belabored breathing or accessory muscle use appreciated   Abd: BS+ in all 4 quadrants; NTND, soft to palpation; no organomegaly appreciated   Extrem: pulses full, equal, and regular over all 4 extremities; no UE or LE edema BL      MELD 3.0: 11 at 11/12/2023  9:56 PM  MELD-Na: 10 at 11/12/2023  9:56 PM  Calculated  "from:  Serum Creatinine: 1.5 mg/dL at 11/12/2023  9:56 PM  Serum Sodium: 152 mmol/L (Using max of 137 mmol/L) at 11/12/2023  9:56 PM  Total Bilirubin: 0.3 mg/dL (Using min of 1 mg/dL) at 11/12/2023  4:11 AM  Serum Albumin: 3.1 g/dL at 11/12/2023  4:11 AM  INR(ratio): 1.0 at 11/10/2023  2:01 PM  Age at listing (hypothetical): 37 years  Sex: Male at 11/12/2023  9:56 PM      Significant Labs:  CBC:  Recent Labs   Lab 11/15/23  1024 11/16/23  0626   WBC 3.69* 6.27   HGB 13.1* 12.6*   HCT 39.5* 37.1*   PLT 99* 120*     CMP:  Recent Labs   Lab 11/15/23  1024 11/15/23  1551 11/15/23  2236 11/16/23  0626 11/16/23  0850      < > 141 143 143   K 4.5   < > 5.4* 5.0 5.4*      < > 102 103 104   CO2 23   < > 27 29 26   *   < > 294* 337* 371*   BUN 10   < > 13 20 20   CREATININE 1.2   < > 1.4 1.5* 1.5*   CALCIUM 9.7   < > 9.8 9.9 9.6   PROT 6.7  --   --  6.9  --    ALBUMIN 3.3*  --   --  3.3*  --    BILITOT 0.6  --   --  0.5  --    ALKPHOS 89  --   --  90  --    AST 33  --   --  41*  --    ALT 19  --   --  30  --    ANIONGAP 10   < > 12 11 13    < > = values in this interval not displayed.     PTINR:  No results for input(s): "INR" in the last 48 hours.    Significant Procedures:   Dobutamine Stress Test with Color Flow: No results found for this or any previous visit.        Assessment/Plan:      * DKA (diabetic ketoacidosis)  Presented with hyperglycemia BGL > 500 with EMS and 1800 on BMP once arrived to [facility]    - Hyperkalemic with K 6.8 on admit; shifted by ED  - Started on insulin gtt; BGL improved to 1600 but potassium dropped to 2.7  - Also has BETH with sCr 6.1 on presentation; baseline sCr 1.2-1.4  - Likely 2/2 DKA and hypovolemia, Pt very dry on admit exam  - Hypernatremic with Na ~170 when corrected for degree of hyperglycemia on arrival     - Endocrine consulted  -- Transitioned off insulin gtt, now on basal/bolus  -- Appreciate recs  -- Diabetes diet  - Nephrology following  -- sCr on admission 5.0, " now improved to 1.5  -- s/p D5W for management of hypernatremia  - BMP to monitor K and Na  - Daily serum osm  - Monitor strict I's/O's    Type 1 diabetes mellitus  See above    BETH (acute kidney injury)  Patient with acute kidney injury/acute renal failure likely due to pre-renal azotemia due to dehydration BETH is currently stable. Baseline creatinine  normal  - Labs reviewed- Renal function/electrolytes with Estimated Creatinine Clearance: 79.2 mL/min (A) (based on SCr of 1.5 mg/dL (H)). According to latest data. Monitor urine output and serial BMP and adjust therapy as needed. Avoid nephrotoxins and renally dose meds for GFR listed above.  - sCr improved, nephrology signed off    Hyperkalemia  This patient has hyperkalemia which is controlled. We will monitor for arrhythmias with EKG or continuous telemetry. We will treat the hyperkalemia with  DKA management . The likely etiology of the hyperkalemia is BETH.  The patients latest potassium has been reviewed and the results are listed below  Recent Labs   Lab 11/16/23  0850   K 5.4*     - Continue glucose control per endocrine as likely elevated in setting of shifting  - Lokelma x1 given          Metabolic acidosis  - See DKA  - Resolved    Acute metabolic encephalopathy  Presented with AMS, unclear etiology; infections vs DKA vs drug related. Per EMS, Pt responsive to painful stimuli only. CBG >500 with EMS. Follows some commands but very lethargic on admissin.     - UA negative for infection, Bcx NTD  - CXR without consolidation  - Discontinue piptazo; discontinued vancomycin  - UDS positive for THC; volatile screen pending   - CT head unremarkable; MRI brain NAD   - Mentation improved to baseline  - Resolved      VTE Risk Mitigation (From admission, onward)           Ordered     enoxaparin injection 40 mg  Every 24 hours         11/13/23 1157     IP VTE LOW RISK PATIENT  Once         11/10/23 1640     Place sequential compression device  Until discontinued          11/10/23 1640                    Discharge Planning   PRECIOUS: 11/17/2023     Code Status: Full Code   Is the patient medically ready for discharge?: No    Reason for patient still in hospital (select all that apply): Treatment  Discharge Plan A: Home with family   Discharge Delays: (!) Procedure Scheduling (IR, OR, Labs, Echo, Cath, Echo, EEG) (monitoring glucose and insulin drip)              Jerad Calvo MD  Department of Hospital Medicine   [facility] - Intensive Care ([place])

## 2023-11-16 NOTE — ASSESSMENT & PLAN NOTE
Patient with acute kidney injury/acute renal failure likely due to pre-renal azotemia due to dehydration BETH is currently stable. Baseline creatinine  normal  - Labs reviewed- Renal function/electrolytes with Estimated Creatinine Clearance: 79.2 mL/min (A) (based on SCr of 1.5 mg/dL (H)). According to latest data. Monitor urine output and serial BMP and adjust therapy as needed. Avoid nephrotoxins and renally dose meds for GFR listed above.  - sCr improved, nephrology signed off

## 2023-11-16 NOTE — ASSESSMENT & PLAN NOTE
Patient has hypernatremia which is controlled. The hypernatremia is due to Diabetes insipidus. We will aim to correct the sodium by 8-10mEq in 24 hours. We will correct their hypernatremia with Select IV fluids: D5W at a rate of 180 ml/hr. The patient's sodium results have been reviewed and are listed below.  Recent Labs   Lab 11/16/23  0850          - See above

## 2023-11-16 NOTE — SUBJECTIVE & OBJECTIVE
Interval History: Pt seen and examined this morning on roxane ARGUELLO. Recently Dc'd off insulin gtt and transitioned to basal/bolus per endocrine. Blood sugars elevated 200-300's overnight. Patient denies abdominal pain but did have one episode of emesis after eating barbeque for dinner. Confirmed patient on a diabetic diet. N/V resolved as of this AM.         Objective:     Vital Signs (Most Recent):  Temp: 98.1 °F (36.7 °C) (11/16/23 0744)  Pulse: 103 (11/16/23 0744)  Resp: 18 (11/16/23 0744)  BP: 132/70 (11/16/23 0744)  SpO2: 98 % (11/16/23 0744) Vital Signs (24h Range):  Temp:  [97.9 °F (36.6 °C)-99.8 °F (37.7 °C)] 98.1 °F (36.7 °C)  Pulse:  [] 103  Resp:  [16-18] 18  SpO2:  [96 %-100 %] 98 %  BP: (114-132)/(63-75) 132/70     Weight: 89.5 kg (197 lb 5 oz)  Body mass index is 25.33 kg/m².    Intake/Output Summary (Last 24 hours) at 11/16/2023 1045  Last data filed at 11/16/2023 0944  Gross per 24 hour   Intake 1354.1 ml   Output 1850 ml   Net -495.9 ml      Physical Exam  Gen: in NAD, appears stated age  Neuro: AAOx4, CN2-12 grossly intact BL; motor, sensory, and strength grossly intact BL  HEENT: NTNC, EOMI, PERRLA, MMM; no thyromegaly or lymphadenopathy; no JVD appreciated  CVS: RRR, no m/r/g; S1/S2 auscultated with no S3 or S4; capillary refill < 2 sec  Resp: lungs CTAB, no w/r/r; no belabored breathing or accessory muscle use appreciated   Abd: BS+ in all 4 quadrants; NTND, soft to palpation; no organomegaly appreciated   Extrem: pulses full, equal, and regular over all 4 extremities; no UE or LE edema BL      MELD 3.0: 11 at 11/12/2023  9:56 PM  MELD-Na: 10 at 11/12/2023  9:56 PM  Calculated from:  Serum Creatinine: 1.5 mg/dL at 11/12/2023  9:56 PM  Serum Sodium: 152 mmol/L (Using max of 137 mmol/L) at 11/12/2023  9:56 PM  Total Bilirubin: 0.3 mg/dL (Using min of 1 mg/dL) at 11/12/2023  4:11 AM  Serum Albumin: 3.1 g/dL at 11/12/2023  4:11 AM  INR(ratio): 1.0 at 11/10/2023  2:01 PM  Age at listing  "(hypothetical): 37 years  Sex: Male at 11/12/2023  9:56 PM      Significant Labs:  CBC:  Recent Labs   Lab 11/15/23  1024 11/16/23  0626   WBC 3.69* 6.27   HGB 13.1* 12.6*   HCT 39.5* 37.1*   PLT 99* 120*     CMP:  Recent Labs   Lab 11/15/23  1024 11/15/23  1551 11/15/23  2236 11/16/23  0626 11/16/23  0850      < > 141 143 143   K 4.5   < > 5.4* 5.0 5.4*      < > 102 103 104   CO2 23   < > 27 29 26   *   < > 294* 337* 371*   BUN 10   < > 13 20 20   CREATININE 1.2   < > 1.4 1.5* 1.5*   CALCIUM 9.7   < > 9.8 9.9 9.6   PROT 6.7  --   --  6.9  --    ALBUMIN 3.3*  --   --  3.3*  --    BILITOT 0.6  --   --  0.5  --    ALKPHOS 89  --   --  90  --    AST 33  --   --  41*  --    ALT 19  --   --  30  --    ANIONGAP 10   < > 12 11 13    < > = values in this interval not displayed.     PTINR:  No results for input(s): "INR" in the last 48 hours.    Significant Procedures:   Dobutamine Stress Test with Color Flow: No results found for this or any previous visit.      "

## 2023-11-16 NOTE — PROGRESS NOTES
"[facility] - Intensive Care ([facility]-)  Endocrinology  Progress Note    Admit Date: 11/10/2023     Andrzej Sinclair is a 37 year old male with PMH significant for poorly controlled type 1 DM c/b frequent DKA, HTN, CKD3 and GERD who was BIBEMS for unresponsiveness. Patient is a not following commands on my assessment and history was obtained from the medical record. Family reportedly called EMS when they found patient down and unresponsive. He reportedly had not been eating or taking his insulin for several days. Patient was found to be hyperglycemic and brought in to the ED. He was found to be unresponsive to verbal stimuli and minimally responsive to physical, but hemodynamically stable and afebrile. Labs showing severe DKA (BG 1885, bicarb 6, AG 30, BHO 5.8) and HHS (sOSM 452). Complications including acute renal failure and shock requiring pressors concerning for septic shock. Endocrinology is consulted for DKA/HHS, initial BGL >1800, hypernatremic, trying to prevent rapid correction of osm and Na, would like assistance with insulin gtt."    Patient was treated with insulin infusion per DKA protocol and aggressive hydration. Acidosis has since resolved and anion gap is closed. However patient is still minimally responsive, not following commands and not eating.    Regarding Type 1 DM    Diagnosed: Age 20  Last hemoglobin A1c: 12.8 09/13/2023  Last seen with Endocrinology: 05/02/2023 with DILIP Mendoza. He can cancelled a virtual appointment with our office in september  Last Hospitalization for DKA: 9/12-9/18    Per records, He was prescribed omnipod 5 in May but unclear of adherence.  Med rec states he is taking Tresiba 21 units daily and Lyumjev 7 units with meals. He has been having issues with Dexcom adherence as an outpatient. Using more than 3 dexcoms per month. Prescriptions have been sent but staff have been unable to contact him.      BG trends today:      Pertinent Labs       Interval HPI:   Overnight " "events: Globally hyperglycemic. Received 11 units (7 units with meals + 4 units correction) and  BG decreased from 329 to 244 at lunch.      Eatin%  Nausea: No  Hypoglycemia and intervention: No  Fever: No  TPN and/or TF: No  If yes, type of TF/TPN and rate: n/a    /70 (BP Location: Left arm, Patient Position: Lying)   Pulse 103   Temp 98.1 °F (36.7 °C) (Oral)   Resp 18   Ht 6' 2" (1.88 m)   Wt 89.5 kg (197 lb 5 oz)   SpO2 98%   BMI 25.33 kg/m²     Labs Reviewed and Include    Recent Labs   Lab 23  0626   *   CALCIUM 9.9   ALBUMIN 3.3*   PROT 6.9      K 5.0   CO2 29      BUN 20   CREATININE 1.5*   ALKPHOS 90   ALT 30   AST 41*   BILITOT 0.5     Lab Results   Component Value Date    WBC 6.27 2023    HGB 12.6 (L) 2023    HCT 37.1 (L) 2023    MCV 90 2023     (L) 2023     No results for input(s): "TSH", "FREET4" in the last 168 hours.  Lab Results   Component Value Date    HGBA1C 12.8 (H) 2023       Nutritional status:   Body mass index is 25.33 kg/m².  Lab Results   Component Value Date    ALBUMIN 3.3 (L) 2023    ALBUMIN 3.3 (L) 11/15/2023    ALBUMIN 2.8 (L) 2023     No results found for: "PREALBUMIN"    Estimated Creatinine Clearance: 79.2 mL/min (A) (based on SCr of 1.5 mg/dL (H)).    Accu-Checks  Recent Labs     11/14/23  2012 11/15/23  0220 11/15/23  0735 11/15/23  1125 11/15/23  1553 11/15/23  2127 11/15/23  2129 23  0240 23  0628 23  0743   POCTGLUCOSE 271* 137* 121* 180* 333* 258* 269* 371* 296* 329*       Current Medications and/or Treatments Impacting Glycemic Control  Immunotherapy:    Immunosuppressants       None          Steroids:   Hormones (From admission, onward)      None          Pressors:    Autonomic Drugs (From admission, onward)      None          Hyperglycemia/Diabetes Medications:   Antihyperglycemics (From admission, onward)      Start     Stop Route Frequency Ordered    " 11/16/23 0715  insulin aspart U-100 pen 7 Units         -- SubQ 3 times daily with meals 11/15/23 1757    11/15/23 2100  insulin detemir U-100 (Levemir) pen 12 Units         -- SubQ 2 times daily 11/15/23 1623    11/15/23 1030  insulin regular in 0.9 % NaCl 100 unit/100 mL (1 unit/mL) infusion        Question:  Enter initial dose (Units/hr):  Answer:  1.5    11/15/23 2300 IV Continuous 11/15/23 1021    11/13/23 1623  insulin aspart U-100 pen 0-5 Units         -- SubQ As needed (PRN) 11/13/23 1523              ASSESSMENT and PLAN    Neuro  Acute metabolic encephalopathy  Resolved. Pt appears to be back at presumed mental status baseline.      Endocrine  * DKA (diabetic ketoacidosis)  Key History and Diagnostic Findings  Type 1 diabetic with frequent admission for DKA and [facility]  Admission labs: BG 1885, bicarb 6, AG 30, BHO 5.8 and sOSM 452  Etiology: missed insulin vs. Infection    DKA now resolved and pt has been switched from DKA gtt to transition insulin gtt and subq prandial and correction insulin    Type 1 diabetes mellitus  Diagnosed: age 20  Last hemoglobin A1c: 12.8 09/13/2023    Outpatient regimen:   He reports Toujeo 20u daily and Lyumjev 10u ac (Tresiba is what is on MAR so I suspect he is using Tresiba for basal, not Toujeo)  Also has DEXCOM G6. Previously prescribed Omnipod 5    Endocrinology consulted for BG management.   BG goal 140-180     - Increase Levemir to 15 units BID  - Increase Novolog (aspart) insulin to 8 Units SQ TIDWM and prn for BG excursions - if BG still elevated at dinner tonight will plan to increase Novolog tomorrow morning  - Continue low dose SSI (150/50).  - BG checks /hs/0200  - Hypoglycemia protocol in place    ** Please notify Endocrine for any change and/or advance in diet**  ** Please call Endocrine for any BG related issues **    Discharge Planning: anticipate stable from endocrine standpoint next 24H  TBD. Please notify endocrinology prior to discharge.          Juwan  DO Juliann  Endocrinology  [facility] - Intensive [facility] ([place])

## 2023-11-16 NOTE — NURSING
Patient had unmeasured large dark green emesis x2 and complained of severe abdominal pain, Cheyanne NP  notified via secured chat  ordered received for metoclopramide 10mg  IV push and dicyclomine 20mg  IM.    Patient diaphoretic BG check x2 258mg/dL 269mg/dL.

## 2023-11-16 NOTE — ASSESSMENT & PLAN NOTE
This patient has hyperkalemia which is controlled. We will monitor for arrhythmias with EKG or continuous telemetry. We will treat the hyperkalemia with  DKA management . The likely etiology of the hyperkalemia is BETH.  The patients latest potassium has been reviewed and the results are listed below  Recent Labs   Lab 11/16/23  0850   K 5.4*     - Continue glucose control per endocrine as likely elevated in setting of shifting  - Lokelma x1 given

## 2023-11-16 NOTE — ASSESSMENT & PLAN NOTE
Diagnosed: age 20  Last hemoglobin A1c: 12.8 09/13/2023    Outpatient regimen:   He reports Toujeo 20u daily and Lyumjev 10u ac (Tresiba is what is on MAR so I suspect he is using Tresiba for basal, not Toujeo)  Also has DEXCOM G6. Previously prescribed Omnipod 5    Endocrinology consulted for BG management.   BG goal 140-180     - Increase Levemir to 15 units BID  - Increase Novolog (aspart) insulin to 8 Units SQ TIDWM and prn for BG excursions - if BG still elevated at dinner tonight will plan to increase Novolog tomorrow morning  - Continue low dose SSI (150/50).  - BG checks ac/hs/0200  - Hypoglycemia protocol in place    ** Please notify Endocrine for any change and/or advance in diet**  ** Please call Endocrine for any BG related issues **    Discharge Planning: anticipate stable from endocrine standpoint next 24H  TBD. Please notify endocrinology prior to discharge.

## 2023-11-16 NOTE — ASSESSMENT & PLAN NOTE
Presented with hyperglycemia BGL > 500 with EMS and 1800 on BMP [place] [facility]    - Hyperkalemic with K 6.8 on admit; shifted by ED  - Started on insulin gtt; BGL improved to 1600 but potassium dropped to 2.7  - Also has BETH with sCr 6.1 on presentation; baseline sCr 1.2-1.4  - Likely 2/2 DKA and hypovolemia, Pt very dry on admit exam  - Hypernatremic with Na ~170 when corrected for degree of hyperglycemia on arrival     - Endocrine consulted  -- Transitioned off insulin gtt, now on basal/bolus  -- Appreciate recs  -- Diabetes diet  - Nephrology following  -- sCr on admission 5.0, now improved to 1.5  -- s/p D5W for management of hypernatremia  - BMP to monitor K and Na  - Daily serum osm  - Monitor strict I's/O's

## 2023-11-17 VITALS
SYSTOLIC BLOOD PRESSURE: 137 MMHG | DIASTOLIC BLOOD PRESSURE: 76 MMHG | RESPIRATION RATE: 18 BRPM | WEIGHT: 197.31 LBS | OXYGEN SATURATION: 100 % | BODY MASS INDEX: 25.32 KG/M2 | HEIGHT: 74 IN | TEMPERATURE: 99 F | HEART RATE: 92 BPM

## 2023-11-17 LAB
ALBUMIN SERPL BCP-MCNC: 3.2 G/DL (ref 3.5–5.2)
ALP SERPL-CCNC: 79 U/L (ref 55–135)
ALT SERPL W/O P-5'-P-CCNC: 28 U/L (ref 10–44)
ANION GAP SERPL CALC-SCNC: 10 MMOL/L (ref 8–16)
AST SERPL-CCNC: 43 U/L (ref 10–40)
BASOPHILS # BLD AUTO: 0.01 K/UL (ref 0–0.2)
BASOPHILS NFR BLD: 0.2 % (ref 0–1.9)
BILIRUB SERPL-MCNC: 0.4 MG/DL (ref 0.1–1)
BUN SERPL-MCNC: 17 MG/DL (ref 6–20)
CALCIUM SERPL-MCNC: 9.5 MG/DL (ref 8.7–10.5)
CHLORIDE SERPL-SCNC: 107 MMOL/L (ref 95–110)
CO2 SERPL-SCNC: 25 MMOL/L (ref 23–29)
CREAT SERPL-MCNC: 1.4 MG/DL (ref 0.5–1.4)
DIFFERENTIAL METHOD: ABNORMAL
EOSINOPHIL # BLD AUTO: 0.1 K/UL (ref 0–0.5)
EOSINOPHIL NFR BLD: 1.4 % (ref 0–8)
ERYTHROCYTE [DISTWIDTH] IN BLOOD BY AUTOMATED COUNT: 12.4 % (ref 11.5–14.5)
EST. GFR  (NO RACE VARIABLE): >60 ML/MIN/1.73 M^2
GLUCOSE SERPL-MCNC: 134 MG/DL (ref 70–110)
HCT VFR BLD AUTO: 34.9 % (ref 40–54)
HGB BLD-MCNC: 11.7 G/DL (ref 14–18)
IMM GRANULOCYTES # BLD AUTO: 0.02 K/UL (ref 0–0.04)
IMM GRANULOCYTES NFR BLD AUTO: 0.4 % (ref 0–0.5)
LYMPHOCYTES # BLD AUTO: 1 K/UL (ref 1–4.8)
LYMPHOCYTES NFR BLD: 20.9 % (ref 18–48)
MAGNESIUM SERPL-MCNC: 1.8 MG/DL (ref 1.6–2.6)
MCH RBC QN AUTO: 30.4 PG (ref 27–31)
MCHC RBC AUTO-ENTMCNC: 33.5 G/DL (ref 32–36)
MCV RBC AUTO: 91 FL (ref 82–98)
MONOCYTES # BLD AUTO: 0.7 K/UL (ref 0.3–1)
MONOCYTES NFR BLD: 13.6 % (ref 4–15)
NEUTROPHILS # BLD AUTO: 3.1 K/UL (ref 1.8–7.7)
NEUTROPHILS NFR BLD: 63.5 % (ref 38–73)
NRBC BLD-RTO: 0 /100 WBC
OSMOLALITY SERPL: 301 MOSM/KG (ref 280–300)
PHOSPHATE SERPL-MCNC: 3.3 MG/DL (ref 2.7–4.5)
PLATELET # BLD AUTO: 179 K/UL (ref 150–450)
PMV BLD AUTO: 11.9 FL (ref 9.2–12.9)
POCT GLUCOSE: 113 MG/DL (ref 70–110)
POCT GLUCOSE: 123 MG/DL (ref 70–110)
POTASSIUM SERPL-SCNC: 4.3 MMOL/L (ref 3.5–5.1)
PROT SERPL-MCNC: 6.7 G/DL (ref 6–8.4)
RBC # BLD AUTO: 3.85 M/UL (ref 4.6–6.2)
SODIUM SERPL-SCNC: 142 MMOL/L (ref 136–145)
WBC # BLD AUTO: 4.84 K/UL (ref 3.9–12.7)

## 2023-11-17 PROCEDURE — 83930 ASSAY OF BLOOD OSMOLALITY: CPT | Performed by: STUDENT IN AN ORGANIZED HEALTH CARE EDUCATION/TRAINING PROGRAM

## 2023-11-17 PROCEDURE — 36415 COLL VENOUS BLD VENIPUNCTURE: CPT | Performed by: STUDENT IN AN ORGANIZED HEALTH CARE EDUCATION/TRAINING PROGRAM

## 2023-11-17 PROCEDURE — 83735 ASSAY OF MAGNESIUM: CPT | Performed by: STUDENT IN AN ORGANIZED HEALTH CARE EDUCATION/TRAINING PROGRAM

## 2023-11-17 PROCEDURE — 84100 ASSAY OF PHOSPHORUS: CPT | Performed by: STUDENT IN AN ORGANIZED HEALTH CARE EDUCATION/TRAINING PROGRAM

## 2023-11-17 PROCEDURE — 99232 PR SUBSEQUENT HOSPITAL CARE,LEVL II: ICD-10-PCS | Mod: ,,, | Performed by: INTERNAL MEDICINE

## 2023-11-17 PROCEDURE — 99232 SBSQ HOSP IP/OBS MODERATE 35: CPT | Mod: ,,, | Performed by: INTERNAL MEDICINE

## 2023-11-17 PROCEDURE — 85025 COMPLETE CBC W/AUTO DIFF WBC: CPT

## 2023-11-17 PROCEDURE — 80053 COMPREHEN METABOLIC PANEL: CPT | Performed by: STUDENT IN AN ORGANIZED HEALTH CARE EDUCATION/TRAINING PROGRAM

## 2023-11-17 RX ORDER — INSULIN GLARGINE 300 U/ML
22 INJECTION, SOLUTION SUBCUTANEOUS NIGHTLY
Qty: 3 ML | Refills: 0 | Status: SHIPPED | OUTPATIENT
Start: 2023-11-17 | End: 2023-12-28

## 2023-11-17 RX ORDER — INSULIN DEGLUDEC 100 U/ML
21 INJECTION, SOLUTION SUBCUTANEOUS DAILY
Qty: 7 ML | Refills: 0
Start: 2023-11-17 | End: 2023-11-17 | Stop reason: HOSPADM

## 2023-11-17 RX ORDER — INSULIN LISPRO-AABC 100 [IU]/ML
7 INJECTION, SOLUTION SUBCUTANEOUS
Qty: 3 PEN | Refills: 0 | Status: SHIPPED | OUTPATIENT
Start: 2023-11-17 | End: 2024-03-12

## 2023-11-17 RX ORDER — DICYCLOMINE HYDROCHLORIDE 10 MG/ML
20 INJECTION INTRAMUSCULAR ONCE AS NEEDED
Status: DISCONTINUED | OUTPATIENT
Start: 2023-11-17 | End: 2023-11-17 | Stop reason: HOSPADM

## 2023-11-17 RX ORDER — BLOOD-GLUCOSE TRANSMITTER
1 EACH MISCELLANEOUS
Qty: 1 EACH | Refills: 0 | Status: SHIPPED | OUTPATIENT
Start: 2023-11-17 | End: 2024-11-16

## 2023-11-17 RX ORDER — INSULIN DEGLUDEC 100 U/ML
21 INJECTION, SOLUTION SUBCUTANEOUS DAILY
Qty: 7 ML | Refills: 0
Start: 2023-11-17 | End: 2023-11-17 | Stop reason: SDUPTHER

## 2023-11-17 RX ORDER — INSULIN LISPRO-AABC 100 [IU]/ML
7 INJECTION, SOLUTION INTRAVENOUS; SUBCUTANEOUS
Qty: 10 ML | Refills: 0 | Status: SHIPPED | OUTPATIENT
Start: 2023-11-17 | End: 2023-11-17 | Stop reason: SDUPTHER

## 2023-11-17 RX ADMIN — INSULIN ASPART 8 UNITS: 100 INJECTION, SOLUTION INTRAVENOUS; SUBCUTANEOUS at 09:11

## 2023-11-17 NOTE — PHYSICIAN QUERY
"PT Name: Andrzej Sinclair  MR #: [id number]    DOCUMENTATION CLARIFICATION      CDS/: Ulises Hicks RN              Contact information:    [email or web address]      This form is a permanent document in the medical record.      Query Date: November 17, 2023    By submitting this query, we are merely seeking further clarification of documentation. Please utilize your independent clinical judgment when addressing the question(s) below.       Indicators Supporting Clinical Findings Location in Medical Record    Anemia, Thrombocytopenia, Neutropenia, Pancytopenia documented     x H&H 11/10  HH:  12.3/  42.5  11/11     "    12.3/ 34.4  11/12     "    12.2/  35.9  11/13      "    12.5/37.5  11/14      "     12.3/36.7  11/15      "    13.1/ 39.5     Labs    x WBC 11/13  WBC:   3.64  11/14  WBC:   3.39  11/15   WBC:    3.69    Labs    x Neutrophils/ Granulocytes/ ANC 11/14  ANC: 1.7    Labs    x Platelets 11/10  PLTs:  205  11/12      "    123  11/13       "     112  11/14        "     96  11/15       "    99    Labs     Transfusion(s)     x Treatments: Heparin, 5000 Units SQ; 11/12 (x2 doses);  11/13  Enoxaparin, SQ, 40mg,; 11/14-16  LR, bolus, 1000 ml; 11/10  (3L)  NS, bolus, 1000 ml; 11/10   MAR   x Acute/ Chronic illness Admitted to MICU for DKA.   severe hyperglycemia BG 1885, Na 131, K 6.8, bicarb 6, AG 30. Elevated beta hydroxybutyrate 5.8.   Started on DKA/HHS management with fluids, insulin gtt, bicarb gtt, hyperkalemia shifted     hx of type 1 diabetes mellitus, hypertension, CKD stage 3, GERD,  11/10 H&P: Critical care med.     Other:     Pancytopenia is defined by:  Hb < 12g/dL (non-pregnant women) or <13g/dL (men) + ANC < 1800/microL + Platelets < 150,000/microL    The clinical guidelines noted are only a system guideline. It does not replace the providers clinical judgment.      Provider, please specify diagnosis or diagnoses associated with above clinical findings.    [   ] Pancytopenia " due to other drug, heparin/enoxaparin  please specify drug: _______     [   ] Pancytopenia, unspecified   [   ] Other Hematological Diagnosis (please specify): ________   [ x] Clinically Undetermined    Likely pseudo in setting of hyperglycemia       Please document in your progress notes daily for the duration of treatment, until resolved, and include in your discharge summary.    Reference:    AISLINN Alatorre (n.oxana.). Approach to the adult with pancytopenia. [employer]. Retrieved September 7, 2022, from https://www.[employer]/contents/approach-to-the-adult-with-pancytopenia?search=pancytopenia&source=search_result&selectedTitle=1~150&usage_type=default&display_rank=1    Form No. 25202

## 2023-11-17 NOTE — PROGRESS NOTES
I have reviewed and concur with Dr. Juwan Humphreys's history, physical, assessment, and plan.  I have personally interviewed and examined the patient.        Felisa Mathew MD

## 2023-11-17 NOTE — ASSESSMENT & PLAN NOTE
Diagnosed: age 20  Last hemoglobin A1c: 12.8 09/13/2023    Outpatient regimen:   He reports Toujeo 20u daily and Lyumjev 10u ac (Tresiba is what is on MAR so I suspect he is using Tresiba for basal, not Toujeo)  Also has DEXCOM G6. Previously prescribed Omnipod 5    Endocrinology consulted for BG management.   BG goal 140-180     - BG at goal with Levemir to 15 units BID and Novolog (aspart) insulin to 8 Units SQ TIDWM and prn  - Continue low dose SSI (150/50).  - BG checks /hs/0200  - Hypoglycemia protocol in place    Discharge Planning:   - Increase Toujeo to 22 units nightly. Instructed to take one dose regardless of bedtime blood glucose readings.   - Continue Lyumjev at 10 units with meals as reported.   - Provided DEXCOM G7 to bridge him until insurance refills his DEXCOMs in December.   - Please discharge home with a new glucometer for backup.   - He reports not needing insulin supplies.   - Will set up for Discharge Clinic

## 2023-11-17 NOTE — PROGRESS NOTES
"[facility] - Intensive Care ([facility]-)  Endocrinology  Progress Note    Admit Date: 11/10/2023     Andrzej Sinclair is a 37 year old male with PMH significant for poorly controlled type 1 DM c/b frequent DKA, HTN, CKD3 and GERD who was BIBEMS for unresponsiveness. Patient is a not following commands on my assessment and history was obtained from the medical record. Family reportedly called EMS when they found patient down and unresponsive. He reportedly had not been eating or taking his insulin for several days. Patient was found to be hyperglycemic and brought in to the ED. He was found to be unresponsive to verbal stimuli and minimally responsive to physical, but hemodynamically stable and afebrile. Labs showing severe DKA (BG 1885, bicarb 6, AG 30, BHO 5.8) and HHS (sOSM 452). Complications including acute renal failure and shock requiring pressors concerning for septic shock. Endocrinology is consulted for DKA/HHS, initial BGL >1800, hypernatremic, trying to prevent rapid correction of osm and Na, would like assistance with insulin gtt."    Patient was treated with insulin infusion per DKA protocol and aggressive hydration. Acidosis has since resolved and anion gap is closed. However patient is still minimally responsive, not following commands and not eating.    Regarding Type 1 DM    Diagnosed: Age 20  Last hemoglobin A1c: 12.8 09/13/2023  Last seen with Endocrinology: 05/02/2023 with DILIP Mendoza. He can cancelled a virtual appointment with our office in september  Last Hospitalization for DKA: 9/12-9/18    Per records, He was prescribed omnipod 5 in May but unclear of adherence.  Med rec states he is taking Tresiba 21 units daily and Lyumjev 7 units with meals. He has been having issues with Dexcom adherence as an outpatient. Using more than 3 dexcoms per month. Prescriptions have been sent but staff have been unable to contact him.      BG trends today:      Pertinent Labs       Interval HPI:   Overnight " "events: No acute events. Planning for discharge home today.     He states that he had been using Toujeo on a sliding scale for his long acting 15 - 20 units nightly based on his blood sugar and AM blood sugars were elevated even on days he used 20 units.    Also ran out of dexcom. Believes he went into DKA because he caught an infection from one of his relatives.    BG trending at goal since increasing levemir.    Eatin%  Nausea: No  Hypoglycemia and intervention: No  Fever: No  TPN and/or TF: No    /76 (BP Location: Left arm, Patient Position: Lying)   Pulse 92   Temp 98.9 °F (37.2 °C) (Oral)   Resp 18   Ht 6' 2" (1.88 m)   Wt 89.5 kg (197 lb 5 oz)   SpO2 100%   BMI 25.33 kg/m²     Labs Reviewed and Include    Recent Labs   Lab 23   *   CALCIUM 9.5   ALBUMIN 3.2*   PROT 6.7      K 4.3   CO2 25      BUN 17   CREATININE 1.4   ALKPHOS 79   ALT 28   AST 43*   BILITOT 0.4     Lab Results   Component Value Date    WBC 4.84 2023    HGB 11.7 (L) 2023    HCT 34.9 (L) 2023    MCV 91 2023     2023     No results for input(s): "TSH", "FREET4" in the last 168 hours.  Lab Results   Component Value Date    HGBA1C 12.8 (H) 2023       Nutritional status:   Body mass index is 25.33 kg/m².  Lab Results   Component Value Date    ALBUMIN 3.2 (L) 2023    ALBUMIN 3.3 (L) 2023    ALBUMIN 3.3 (L) 11/15/2023     No results found for: "PREALBUMIN"    Estimated Creatinine Clearance: 84.8 mL/min (based on SCr of 1.4 mg/dL).    Accu-Checks  Recent Labs     11/15/23  2127 11/15/23  2129 11/16/23  0240 23  0628 23  0743 23  1203 23  1705 23  2131 23  0634 23  0752   POCTGLUCOSE 258* 269* 371* 296* 329* 244* 81 159* 123* 113*       Current Medications and/or Treatments Impacting Glycemic Control  Immunotherapy:    Immunosuppressants       None          Steroids:   Hormones (From admission, onward) "      None          Pressors:    Autonomic Drugs (From admission, onward)      None          Hyperglycemia/Diabetes Medications:   Antihyperglycemics (From admission, onward)      Start     Stop Route Frequency Ordered    11/16/23 2100  insulin detemir U-100 (Levemir) pen 15 Units         -- SubQ 2 times daily 11/16/23 1421    11/16/23 1645  insulin aspart U-100 pen 8 Units         -- SubQ 3 times daily with meals 11/16/23 1421    11/15/23 1030  insulin regular in 0.9 % NaCl 100 unit/100 mL (1 unit/mL) infusion        Question:  Enter initial dose (Units/hr):  Answer:  1.5    11/15/23 2300 IV Continuous 11/15/23 1021    11/13/23 1623  insulin aspart U-100 pen 0-5 Units         -- SubQ As needed (PRN) 11/13/23 1523            ASSESSMENT and PLAN    Neuro  Acute metabolic encephalopathy  Resolved. Pt appears to be back at presumed mental status baseline.      Endocrine  * DKA (diabetic ketoacidosis)  Key History and Diagnostic Findings  Type 1 diabetic with frequent admission for DKA and HHS  Admission labs: BG 1885, bicarb 6, AG 30, BHO 5.8 and sOSM 452  Etiology: missed insulin vs. Infection    DKA now resolved and pt has been switched from DKA gtt to transition insulin gtt and subq prandial and correction insulin    Type 1 diabetes mellitus  Diagnosed: age 20  Last hemoglobin A1c: 12.8 09/13/2023    Outpatient regimen:   He reports Toujeo 20u daily and Lyumjev 10u ac (Tresiba is what is on MAR so I suspect he is using Tresiba for basal, not Toujeo)  Also has DEXCOM G6. Previously prescribed Omnipod 5    Endocrinology consulted for BG management.   BG goal 140-180     - BG at goal with Levemir to 15 units BID and Novolog (aspart) insulin to 8 Units SQ TIDWM and prn  - Continue low dose SSI (150/50).  - BG checks ac/hs/0200  - Hypoglycemia protocol in place    Discharge Planning:   - Increase Toujeo to 22 units nightly. Instructed to take one dose regardless of bedtime blood glucose readings.   - Continue Lyumjev at  10 units with meals as reported.   - Provided DEXCOM G7 to bridge him until insurance refills his DEXCOMs in December.   - Please discharge home with a new glucometer for backup.   - He reports not needing insulin supplies.   - Will set up for Discharge Clinic            Juwan Humphreys DO  Endocrinology  [facility] - Int[facility] ([place])

## 2023-11-17 NOTE — SUBJECTIVE & OBJECTIVE
"Interval HPI:   Overnight events: No acute events. Planning for discharge home today.     He states that he had been using Toujeo on a sliding scale for his long acting 15 - 20 units nightly based on his blood sugar and AM blood sugars were elevated even on days he used 20 units.    Also ran out of dexcom. Believes he went into DKA because he caught an infection from one of his relatives.    BG trending at goal since increasing levemir.    Eatin%  Nausea: No  Hypoglycemia and intervention: No  Fever: No  TPN and/or TF: No    /76 (BP Location: Left arm, Patient Position: Lying)   Pulse 92   Temp 98.9 °F (37.2 °C) (Oral)   Resp 18   Ht 6' 2" (1.88 m)   Wt 89.5 kg (197 lb 5 oz)   SpO2 100%   BMI 25.33 kg/m²     Labs Reviewed and Include    Recent Labs   Lab 23  0828   *   CALCIUM 9.5   ALBUMIN 3.2*   PROT 6.7      K 4.3   CO2 25      BUN 17   CREATININE 1.4   ALKPHOS 79   ALT 28   AST 43*   BILITOT 0.4     Lab Results   Component Value Date    WBC 4.84 2023    HGB 11.7 (L) 2023    HCT 34.9 (L) 2023    MCV 91 2023     2023     No results for input(s): "TSH", "FREET4" in the last 168 hours.  Lab Results   Component Value Date    HGBA1C 12.8 (H) 2023       Nutritional status:   Body mass index is 25.33 kg/m².  Lab Results   Component Value Date    ALBUMIN 3.2 (L) 2023    ALBUMIN 3.3 (L) 2023    ALBUMIN 3.3 (L) 11/15/2023     No results found for: "PREALBUMIN"    Estimated Creatinine Clearance: 84.8 mL/min (based on SCr of 1.4 mg/dL).    Accu-Checks  Recent Labs     11/15/23  2127 11/15/23  2129 11/16/23  0240 23  0628 23  0743 23  1203 23  1705 23  2131 23  0634 23  0752   POCTGLUCOSE 258* 269* 371* 296* 329* 244* 81 159* 123* 113*       Current Medications and/or Treatments Impacting Glycemic Control  Immunotherapy:    Immunosuppressants       None          Steroids:   Hormones " (From admission, onward)      None          Pressors:    Autonomic Drugs (From admission, onward)      None          Hyperglycemia/Diabetes Medications:   Antihyperglycemics (From admission, onward)      Start     Stop Route Frequency Ordered    11/16/23 2100  insulin detemir U-100 (Levemir) pen 15 Units         -- SubQ 2 times daily 11/16/23 1421    11/16/23 1645  insulin aspart U-100 pen 8 Units         -- SubQ 3 times daily with meals 11/16/23 1421    11/15/23 1030  insulin regular in 0.9 % NaCl 100 unit/100 mL (1 unit/mL) infusion        Question:  Enter initial dose (Units/hr):  Answer:  1.5    11/15/23 2300 IV Continuous 11/15/23 1021    11/13/23 1623  insulin aspart U-100 pen 0-5 Units         -- SubQ As needed (PRN) 11/13/23 5281

## 2023-11-17 NOTE — PLAN OF CARE
Problem: Diabetes Comorbidity  Goal: Blood Glucose Level Within Targeted Range  Outcome: Ongoing, Progressing     Problem: Oral Intake Inadequate (Acute Kidney Injury/Impairment)  Goal: Optimal Nutrition Intake  Outcome: Ongoing, Progressing     Problem: Adult Inpatient Plan of Care  Goal: Optimal Comfort and Wellbeing  Outcome: Ongoing, Progressing

## 2023-11-17 NOTE — NURSING
Patient refused to have vital sign check and lab collection. Patient does not want to be disturb unitil 0600 AM. Dr Griffiths was made aware via secure chat  of patient refusal of care until 0600AM.

## 2023-11-17 NOTE — PLAN OF CARE
11/17/23 0921   Post-Acute Status   Post-Acute Authorization Other   Coverage [facility] DUAL COMPLETE HMO SNP   Other Status No Post-Acute Service Needs   Hospital Resources/Appts/Education Provided Provided education on problems/symptoms using teachback   Discharge Delays None known at this time   Discharge Plan   Discharge Plan A Home with family   Discharge Plan B Home     CM met with patient to discuss any changes in discharge planning.  Patients plan is to  dc home, no needs.   No changes in DC plans. PRECIOUS: 11/17/23    0924 am  CM spoke with Jade Yuan @ Endocrinology for hospital follow up:  11/20/2023 at 3:30 pm   And  paper scrubs ordered       Discharge Recommendations: none, patient is independent    Ange Lacey RN  Case Management  [facility]  [phone]

## 2023-11-17 NOTE — PLAN OF CARE
[facility] - Int[facility] ([facility]  Discharge Final Note    Primary Care Provider: Viktoriya Jaeger NP    Expected Discharge Date: 11/17/2023    Final Discharge Note (most recent)       Final Note - 11/17/23 0944          Final Note    What phone number can be called within the next 1-3 days to see how you are doing after discharge? [phone]                     Important Message from Medicare  Important Message from Medicare regarding Discharge Appeal Rights: Explained to patient/caregiver, Signed/date by patient/caregiver     Date IMM was signed: 11/15/23  Time IMM was signed: 1117        CM met with patient  and discussed discharge plans, patient to DC home with  no needs. Patients medications delivered to bedside. No  HME/DME recommended. and follow up appointment[s] scheduled.   Transportation provided by "[employer] :  [place] is   [phone]  [facility]  [id number]  [place] [id number]    Total Cost:  $19.33      Ange Lacey RN  Case Management  [facility]  [phone]

## 2023-11-17 NOTE — PHYSICIAN QUERY
PT Name: Andrzej Sinclair  MR #: [id number]     DOCUMENTATION CLARIFICATION     CDS/: Ulises Hicks RN, CCDS              Contact information:   [email or web address]    This form is a permanent document in the medical record.     Query Date: November 17, 2023    By submitting this query, we are merely seeking further clarification of documentation.  Please utilize your independent clinical judgment when addressing the question(s) below.  The Medical Record contains the following:  Indicators Supporting Clinical Findings Location in Medical Record   x HR         RR          BP        Temp 11/10@1101  T91.4;   HR: 91;  RR: 23;     94/54  11/10@1501   95.7;           101;         21;    108/56  11/10@1901   96.1;            103;        13;     128/62  11/10@2301     97;             97;            9;    158/72  11/11@0301     97.3;          95;            22;   136/79  11/11@0701     97.2;            83;          12;   135/71       Flow sheet vitals   x Lactic Acid          Procalcitonin 11/10  lactate: 1.9;       procalcitonin: 2.79   Labs    x WBC           Bands          CRP  11/10  WBC: 12.36  11/13  WBC:   3.64  11/14  WBC:   3.39  11/15   WBC:    3.69 Labs     Culture(s)      AMS, Confusion, LOC, etc.      Organ Dysfunction/Failure     x Bacteremia or Sepsis / Septic Complications including acute renal failure and shock requiring pressors concerning for septic shock. Endocrinology is consulted for DKA/HHS, initial BGL >1800, hypernatremic    11/11-16   Endocrinology    x Known or Suspected Source of Infection documented Cultures NTD, will de-escalate vancomycin and continue piptazo,   no obvious source of infection. UA negative for infection,     De-escalated vancomycin and piptazo as no infective source.    11/12, 11/13 CCM, PN      11/13  CCM, PN    (Failed) Outpatient Treatment     x Medication phenylephrine, IV; 11/10@1536  Norepinephrine ; infusion; 11/10  LR, bolus, 1000 ml; 11/10  (3L)  NS,  bolus, 1000 ml; 11/10    Cefepime, IV; 11/10  Vancomycin, IV; 11/10; 11/11  Zosyn, IV; 11/10; 11/13   MAR    Treatment     x Other DKA with hyperosmolar state with severe hyperglycemia  Corrected Hypernatremia  Metabolic Acidosis  BETH  Encephalopathy 2/2 above. CT head unremarkable  check septic w/u and empirically cover with antibiotic   He is lethargic and disoriented.   Acute metabolic encephalopathy  continue broad abx with piptazo and vancomycin    11/10 H&P; Critical care med.  (CCM)        Provider, please specify diagnosis or diagnoses associated with above clinical findings.  [ x  ] SIRS without infectious etiology;  Sepsis is ruled out      [   ] Sepsis due to unknown organism---  please specify the source of infection:      [   ] Sepsis with Septic Shock--  please specify the source of infection:     [   ] Other Infectious Disease (please specify): __________           Please document in your progress notes daily for the duration of treatment until resolved and include in your discharge summary.

## 2023-11-17 NOTE — DISCHARGE SUMMARY
[facility] - Intensive Care ([place] [id number])  [facility] Medicine  Discharge Summary      Patient Name: Andrzej Sinclair  MRN: [id number]  REKHA: [id number]  Patient Class: IP- Inpatient  Admission Date: 11/10/2023  Hospital Length of Stay: 7 days  Discharge Date and Time:  11/17/2023 9:12 AM  Attending Physician: Jerad Calvo MD   Discharging Provider: Jerad Calvo MD  Primary Care Provider: Viktoriya Jaeger NP  Hospital Medicine Team: [facility] [place] [facility] Jerad Calvo MD  Primary Care Team: [facility] [place] [facility]    HPI:   Andrzej Sinclair is a 37 year old male with hx of type 1 diabetes mellitus, hypertension, CKD stage 3, GERD, recently admitted for DKA/HHS complicated by hypernatremia as well as metabolic encephalopathy, who initially presented to the ED via EMS today with severe hyperglycemia. In the ED, patient was noted to be unresponsive to verbal stimuli and minimally responsive to physical, but hemodynamically stable and afebrile. Concerns for DKA with labs significant for severe hyperglycemia BG 1885, Na 131, K 6.8, bicarb 6, AG 30. Elevated beta hydroxybutyrate 5.8. Central line was placed in ED after difficulties gaining access. Started on DKA/HHS management with fluids, insulin gtt, bicarb gtt, hyperkalemia shifted. Pt later evaluated to be in hypotensive with atrial fibrillation with RVR and was given dose of phenylephrine, later started on levophed. Nephrology consulted for BETH, creatinine 6.6. Admitted to MICU for DKA.     * No surgery found *      Hospital Course:   Admitted to MICU for DKA/HHS and airway protection. CT head without acute process. Serum osm was very high on admission and unsure how quickly it kevin prior to him presenting to hospital, concerned about risk of ODS given improvement in serum osm with resolution of DKA/HHS; MRI brain normal. UDS positive for THC, ethanol < 10. Nephrology consulted for BETH and hypernatremia. sCr improved with resolution of DKA, making good urine. Cultures NTD,  de-escalated vancomycin and continue piptazo, no obvious source of infection. Endocrine consulted for assistance with insulin gtt given complex case and need to balance the serum osm, Na, and glucose. Mentation improved back to baseline. Discontinued vancomycin and piptazo as no infective source. Na improved with D5 per Nephrology. Stable for stepdown from ICU on 11/13. D5 discontinued after sodium normalized. Insulin gtt Dc'd and transitioned to basal/bolus regimen. Patient's sugars remained stable and tolerated diet well. Endocrine recommending discharging him on his home insulin regimen. Will have ambulatory follow up with endocrine.     Goals of Care Treatment Preferences:  Code Status: Full Code      Consults:   Consults (From admission, onward)          Status Ordering Provider     Inpatient consult to Midline team  Once        Provider:  (Not yet assigned)    Completed RAJ MORALES     Inpatient consult to Endocrinology  Once        Provider:  (Not yet assigned)    Completed TONY RUBIO     Pharmacy to dose Vancomycin consult  Once        Provider:  (Not yet assigned)   See [facility] for full Linked Orders Report.    Completed TONY RUBIO     Inpatient consult to Critical Care Medicine  Once        Provider:  (Not yet assigned)    Completed JONATHAN SHERMAN     Inpatient consult to Nephrology  Once        Provider:  (Not yet assigned)    Completed JONATHAN SHERMAN            No new Assessment & Plan notes have been filed under this hospital service since the last note was generated.  Service: Hospital Medicine    Final Active Diagnoses:    Diagnosis Date Noted POA    PRINCIPAL PROBLEM:  DKA (diabetic ketoacidosis) [E11.10] 02/28/2021 Yes    Type 1 diabetes mellitus [E10.9] 09/07/2023 Yes    BETH (acute kidney injury) [N17.9] 11/10/2023 Yes    Hyperkalemia [E87.5] 11/10/2023 Yes    Metabolic acidosis [E87.20] 11/10/2023 Yes    Acute metabolic encephalopathy [G93.41] 09/15/2023 Yes      Problems  "Resolved During this Admission:    Diagnosis Date Noted Date Resolved POA    SIRS (systemic inflammatory response syndrome) [R65.10] 09/12/2023 11/13/2023 Yes    Hypernatremia [E87.0] 09/07/2023 11/16/2023 Yes    GERD (gastroesophageal reflux disease) [K21.9] 03/02/2023 11/13/2023 Yes       Discharged Condition: good    Disposition: Home or Self Care    Follow Up:    Patient Instructions:      Ambulatory referral/consult to Endocrinology   Standing Status: Future   Referral Priority: Routine Referral Type: Consultation   Requested Specialty: Endocrinology   Number of Visits Requested: 1       Significant Diagnostic Studies: N/A  Medications:    Reconciled Home Medications:      Medication List        START taking these medications      LYUMJEV KWIKPEN U-100 INSULIN 100 unit/mL pen  Generic drug: insulin lispro-aabc  Inject 7 Units into the skin 3 (three) times daily with meals. Plus correction scale, max TDD 36u  Replaces: LYUMJEV U-100 INSULIN 100 unit/mL     TOUJEO SOLOSTAR U-300 INSULIN 300 unit/mL (1.5 mL) Inpn pen  Generic drug: insulin glargine (TOUJEO)  Inject 22 Units into the skin every evening.            CHANGE how you take these medications      * BD ULTRA-FINE ROVERTO PEN NEEDLE 32 gauge x 5/32" Ndle  Generic drug: pen needle, diabetic  USE AS DIRECTED WITH INSULIN  What changed: Another medication with the same name was added. Make sure you understand how and when to take each.     * pen needle, diabetic 29 gauge x 1/2" Ndle  Use to inject insulin into the skin.  What changed: You were already taking a medication with the same name, and this prescription was added. Make sure you understand how and when to take each.     * DEXCOM G6 TRANSMITTER Marhta  Generic drug: blood-glucose transmitter  1 each by Misc.(Non-Drug; Combo Route) route every 3 (three) months.  What changed: Another medication with the same name was added. Make sure you understand how and when to take each.     * DEXCOM G6 TRANSMITTER " Martha  Generic drug: blood-glucose transmitter  1 Device by Misc.(Non-Drug; Combo Route) route 3 (three) times daily with meals.  What changed: You were already taking a medication with the same name, and this prescription was added. Make sure you understand how and when to take each.           * This list has 4 medication(s) that are the same as other medications prescribed for you. Read the directions carefully, and ask your doctor or other care provider to review them with you.                CONTINUE taking these medications      DEXCOM G6 SENSOR Martha  Generic drug: blood-glucose sensor  1 each by Misc.(Non-Drug; Combo Route) route every 10 days.     dicyclomine 10 MG capsule  Commonly known as: BENTYL  Take 1 capsule (10 mg total) by mouth as needed.     lisinopriL 10 MG tablet  Take 1 tablet (10 mg total) by mouth once daily.            STOP taking these medications      insulin degludec 100 unit/mL injection  Commonly known as: TRESIBA U-100 INSULIN     LYUMJEV U-100 INSULIN 100 unit/mL  Generic drug: insulin lispro-aabc  Replaced by: LYUMJEV KWIKPEN U-100 INSULIN 100 unit/mL pen              Indwelling Lines/Drains at time of discharge:   Lines/Drains/Airways       None                   Time spent on the discharge of patient: 35 minutes         Jerad Calvo MD  Department of Hospital Medicine  [facility] - Intensive Care ([place]

## 2023-11-20 ENCOUNTER — OFFICE VISIT (OUTPATIENT)
Dept: ENDOCRINOLOGY | Facility: CLINIC | Age: 38
End: 2023-11-20
Payer: MEDICARE

## 2023-11-20 VITALS
WEIGHT: 202.81 LBS | DIASTOLIC BLOOD PRESSURE: 80 MMHG | HEART RATE: 103 BPM | HEIGHT: 74 IN | BODY MASS INDEX: 26.03 KG/M2 | SYSTOLIC BLOOD PRESSURE: 125 MMHG

## 2023-11-20 DIAGNOSIS — E10.65 TYPE 1 DIABETES MELLITUS WITH HYPERGLYCEMIA: ICD-10-CM

## 2023-11-20 DIAGNOSIS — E10.65 TYPE 1 DIABETES MELLITUS WITH HYPERGLYCEMIA: Primary | ICD-10-CM

## 2023-11-20 DIAGNOSIS — E78.2 MIXED HYPERLIPIDEMIA: ICD-10-CM

## 2023-11-20 PROCEDURE — 3066F NEPHROPATHY DOC TX: CPT | Mod: CPTII,S$GLB,, | Performed by: INTERNAL MEDICINE

## 2023-11-20 PROCEDURE — 3060F POS MICROALBUMINURIA REV: CPT | Mod: CPTII,S$GLB,, | Performed by: INTERNAL MEDICINE

## 2023-11-20 PROCEDURE — 3008F BODY MASS INDEX DOCD: CPT | Mod: CPTII,S$GLB,, | Performed by: INTERNAL MEDICINE

## 2023-11-20 PROCEDURE — 99999 PR PBB SHADOW E&M-EST. PATIENT-LVL IV: CPT | Mod: PBBFAC,,, | Performed by: INTERNAL MEDICINE

## 2023-11-20 PROCEDURE — 3046F HEMOGLOBIN A1C LEVEL >9.0%: CPT | Mod: CPTII,S$GLB,, | Performed by: INTERNAL MEDICINE

## 2023-11-20 PROCEDURE — 3046F PR MOST RECENT HEMOGLOBIN A1C LEVEL > 9.0%: ICD-10-PCS | Mod: CPTII,S$GLB,, | Performed by: INTERNAL MEDICINE

## 2023-11-20 PROCEDURE — 3074F SYST BP LT 130 MM HG: CPT | Mod: CPTII,S$GLB,, | Performed by: INTERNAL MEDICINE

## 2023-11-20 PROCEDURE — 3074F PR MOST RECENT SYSTOLIC BLOOD PRESSURE < 130 MM HG: ICD-10-PCS | Mod: CPTII,S$GLB,, | Performed by: INTERNAL MEDICINE

## 2023-11-20 PROCEDURE — 99999 PR PBB SHADOW E&M-EST. PATIENT-LVL IV: ICD-10-PCS | Mod: PBBFAC,,, | Performed by: INTERNAL MEDICINE

## 2023-11-20 PROCEDURE — 4010F ACE/ARB THERAPY RXD/TAKEN: CPT | Mod: CPTII,S$GLB,, | Performed by: INTERNAL MEDICINE

## 2023-11-20 PROCEDURE — 1159F PR MEDICATION LIST DOCUMENTED IN MEDICAL RECORD: ICD-10-PCS | Mod: CPTII,S$GLB,, | Performed by: INTERNAL MEDICINE

## 2023-11-20 PROCEDURE — 99214 OFFICE O/P EST MOD 30 MIN: CPT | Mod: S$GLB,,, | Performed by: INTERNAL MEDICINE

## 2023-11-20 PROCEDURE — 4010F PR ACE/ARB THEARPY RXD/TAKEN: ICD-10-PCS | Mod: CPTII,S$GLB,, | Performed by: INTERNAL MEDICINE

## 2023-11-20 PROCEDURE — 1159F MED LIST DOCD IN RCRD: CPT | Mod: CPTII,S$GLB,, | Performed by: INTERNAL MEDICINE

## 2023-11-20 PROCEDURE — 1160F RVW MEDS BY RX/DR IN RCRD: CPT | Mod: CPTII,S$GLB,, | Performed by: INTERNAL MEDICINE

## 2023-11-20 PROCEDURE — 3079F DIAST BP 80-89 MM HG: CPT | Mod: CPTII,S$GLB,, | Performed by: INTERNAL MEDICINE

## 2023-11-20 PROCEDURE — 3060F PR POS MICROALBUMINURIA RESULT DOCUMENTED/REVIEW: ICD-10-PCS | Mod: CPTII,S$GLB,, | Performed by: INTERNAL MEDICINE

## 2023-11-20 PROCEDURE — 1111F PR DISCHARGE MEDS RECONCILED W/ CURRENT OUTPATIENT MED LIST: ICD-10-PCS | Mod: CPTII,S$GLB,, | Performed by: INTERNAL MEDICINE

## 2023-11-20 PROCEDURE — 1160F PR REVIEW ALL MEDS BY PRESCRIBER/CLIN PHARMACIST DOCUMENTED: ICD-10-PCS | Mod: CPTII,S$GLB,, | Performed by: INTERNAL MEDICINE

## 2023-11-20 PROCEDURE — 3008F PR BODY MASS INDEX (BMI) DOCUMENTED: ICD-10-PCS | Mod: CPTII,S$GLB,, | Performed by: INTERNAL MEDICINE

## 2023-11-20 PROCEDURE — 3079F PR MOST RECENT DIASTOLIC BLOOD PRESSURE 80-89 MM HG: ICD-10-PCS | Mod: CPTII,S$GLB,, | Performed by: INTERNAL MEDICINE

## 2023-11-20 PROCEDURE — 99214 PR OFFICE/OUTPT VISIT, EST, LEVL IV, 30-39 MIN: ICD-10-PCS | Mod: S$GLB,,, | Performed by: INTERNAL MEDICINE

## 2023-11-20 PROCEDURE — 1111F DSCHRG MED/CURRENT MED MERGE: CPT | Mod: CPTII,S$GLB,, | Performed by: INTERNAL MEDICINE

## 2023-11-20 PROCEDURE — 3066F PR DOCUMENTATION OF TREATMENT FOR NEPHROPATHY: ICD-10-PCS | Mod: CPTII,S$GLB,, | Performed by: INTERNAL MEDICINE

## 2023-11-20 RX ORDER — INSULIN PUMP SYRINGE, 3 ML
EACH MISCELLANEOUS
Qty: 1 EACH | Refills: 0 | Status: SHIPPED | OUTPATIENT
Start: 2023-11-20 | End: 2023-11-20 | Stop reason: SDUPTHER

## 2023-11-20 RX ORDER — PRAVASTATIN SODIUM 10 MG/1
TABLET ORAL
Qty: 30 TABLET | Refills: 11 | Status: SHIPPED | OUTPATIENT
Start: 2023-11-20 | End: 2023-11-20 | Stop reason: SDUPTHER

## 2023-11-20 NOTE — PROGRESS NOTES
ENDOCRINOLOGY CLINIC    Subjective:      Patient ID: Andrzej Sinclair is referred by Jenise Ji MD     Chief Complaint: Type 1 diabetes    HPI:   Andrzej Sinclair is a 37 y.o. male who presents for follow-up of type 1 diabetes.  Patient was seen by Sandra Sewell APRN,FNP-C on 05/02/2023.    Diabetes Hx:  Diagnosed w/ DM:  Diagnosed at 20 years of age  Complications:   Retinopathy: No   Last eye exam: >1 year back  Neuropathy: No   Last foot exam: : 03/10/2021  Nephropathy: Yes, has albuminuria  Cardiovascular: No  Gastroparesis: No  DKA/HHS:  Multiple episodes of DKA, last in 11/2023.  Per notes, frequent ER presentations for intractable N/V and abdominal pain related to cannabis use. Past gastric emptying study in 2021 was normal.     Recent admission in 11/11/2023 with DKA and blood sugar of 1885.  Patient was noted to be unresponsive to verbal stimuli and minimally responsive to physical.  Was also noted to have hypotension and atrial fibrillation with RVR. Patient reportedly had not been eating or taking his insulin few days prior to the admission.    Severe Hypoglycemia: No, Hypoglycemia unawareness: No, Glucagon: Has glucagon injections  Hypoglycemic episodes: Not recently    Current meds:   Tresiba 22 units at bedtime  Lyumjev 7 units with ISF 50, target 180    Ms. Scobemarsha had prescribed OmniPod 5 in 01/2023. However, his insurance doesn't cover diabetes education so he had started on his own and this led to significant hyperglycemia.     Compliance with meds:  Reports compliance     Previous meds:     Home glucose checks: Dexcom G7.  Compliant: Yes  Link sent to the patient.    Diet/Exercise:   3 meals a day, occasional snacks  Drinks adequate water  Not very active    Diabetes education: 3/2021     Last A1c:   Lab Results   Component Value Date    HGBA1C 12.8 (H) 09/13/2023    HGBA1C 8.5 (H) 04/10/2023    HGBA1C 10.2 (H) 03/01/2023     Microalbumin:   Lab Results   Component Value Date    LABMICR  "116.0 04/10/2023    CREATRANDUR 79.0 11/10/2023    MICALBCREAT 85.3 (H) 04/10/2023     Lab Results   Component Value Date    EGFRNORACEVR >60.0 11/17/2023    CREATININE 1.4 11/17/2023     Lipids:   Lab Results   Component Value Date    CHOL 274 (H) 04/10/2023    TRIG 106 04/10/2023    HDL 80 (H) 04/10/2023    LDLCALC 172.8 (H) 04/10/2023    CHOLHDL 29.2 04/10/2023     TSH:  Lab Results   Component Value Date    TSH 1.043 09/13/2023     Lab Results   Component Value Date    HGB 11.7 (L) 11/17/2023      Aspirin: No  Statins: No. Was on rosuvastatin and atorvastatin, had feet swelling and stopped it.   ACEI/ARB: Yes, on lisinopril  Fatty liver: Yes  HTN: No    Denies history of pancreatitis or medullary thyroid cancer.  History recurrent UTI: No  History of recurrent fungal infection: No    Polyuria: No  Polydipsia: No    FHx of DM: No  Heart disease: No    ROS: see HPI     Objective:     Physical Exam     /80 (BP Location: Right arm, Patient Position: Sitting, BP Method: Large (Automatic))   Pulse 103   Ht 6' 2" (1.88 m)   Wt 92 kg (202 lb 13.2 oz)   BMI 26.04 kg/m²     Wt Readings from Last 3 Encounters:   11/20/23 92 kg (202 lb 13.2 oz)   11/10/23 89.5 kg (197 lb 5 oz)   09/18/23 94.2 kg (207 lb 10.8 oz)       Constitutional:  Pleasant,  in no acute distress.   HENT:   Head:    Normocephalic and atraumatic.   Eyes:    EOMI. No scleral icterus.   Cardiovascular:  Normal rate  Respiratory:   Effort normal   Neurological:  No tremor  Skin:    Skin is warm, dry  Extremity:  No edema    DIABETIC FOOT EXAM: 11/20/2023 - normal sensation to microfilament testing bilaterally.  No fissures, wounds, or ulcers.    Injection sites normal w/o lipohypertrophy    LABORATORY REVIEW:  See HPI for other labs reviewed today      Chemistry        Component Value Date/Time     11/17/2023 0828    K 4.3 11/17/2023 0828     11/17/2023 0828    CO2 25 11/17/2023 0828    BUN 17 11/17/2023 0828    CREATININE 1.4 " 11/17/2023 0828     (H) 11/17/2023 0828        Component Value Date/Time    CALCIUM 9.5 11/17/2023 0828    ALKPHOS 79 11/17/2023 0828    AST 43 (H) 11/17/2023 0828    ALT 28 11/17/2023 0828    BILITOT 0.4 11/17/2023 0828    ESTGFRAFRICA >60.0 01/03/2022 1359    EGFRNONAA >60.0 01/03/2022 1359          Lab Results   Component Value Date    HGBA1C 12.8 (H) 09/13/2023    HGBA1C 8.5 (H) 04/10/2023    HGBA1C 10.2 (H) 03/01/2023     Other labs reviewed today in HPI    Assessment/Plan:       Problem List Items Addressed This Visit          Cardiac/Vascular    Mixed hyperlipidemia       Intolerance to atorvastatin and rosuvastatin.   Discussed doing a small dose of pravastatin to see if he tolerates it.   Monitor fasting lipids.     Lab Results   Component Value Date    CHOL 274 (H) 04/10/2023    LDLCALC 172.8 (H) 04/10/2023    TRIG 106 04/10/2023    HDL 80 (H) 04/10/2023            Relevant Medications    pravastatin (PRAVACHOL) 10 MG tablet       Endocrine    Type 1 diabetes mellitus with hyperglycemia - Primary       Recent A1c was 12.8.  Admitted with DKA in 11/2023, was not taking his insulin.   Continue current diabetes regimen.  Discussed importance of compliance to his diabetes management.     Discussed goal A1c of 6.5-7%  Call if blood sugars are persistently less than 70 or greater than 200.     Continue Dexcom CGM and link has been sent to the patient.   Review blood sugars to adjust his insulin doses.     Discussed importance of diet and lifestyle modifications for diabetes management  Hypoglycemia management discussed. Carry glucose tablets or snacks at all times.   Patient has glucagon injection.     Complications:  Follow up for regular diabetes eye exam  Daily self examination of feet.  Microalbumin: Monitor. On ACEI    Last A1c:   Lab Results   Component Value Date    HGBA1C 12.8 (H) 09/13/2023   microalbumin:   Lab Results   Component Value Date    LABMICR 116.0 04/10/2023    CREATRANDUR 79.0  11/10/2023    MICALBCREAT 85.3 (H) 04/10/2023              Relevant Medications    blood-glucose meter kit    blood sugar diagnostic Strp    lancets 31 gauge Misc    Other Relevant Orders    Ambulatory referral/consult to Optometry      Follow up in about 3 months (around 2/20/2024).     Ayah Cazares MD

## 2023-11-20 NOTE — ASSESSMENT & PLAN NOTE
Intolerance to atorvastatin and rosuvastatin.   Discussed doing a small dose of pravastatin to see if he tolerates it.   Monitor fasting lipids.     Lab Results   Component Value Date    CHOL 274 (H) 04/10/2023    LDLCALC 172.8 (H) 04/10/2023    TRIG 106 04/10/2023    HDL 80 (H) 04/10/2023

## 2023-11-20 NOTE — ASSESSMENT & PLAN NOTE
Recent A1c was 12.8.  Admitted with DKA in 11/2023, was not taking his insulin.   Continue current diabetes regimen.  Discussed importance of compliance to his diabetes management.     Discussed goal A1c of 6.5-7%  Call if blood sugars are persistently less than 70 or greater than 200.     Continue Dexcom CGM and link has been sent to the patient.   Review blood sugars to adjust his insulin doses.     Discussed importance of diet and lifestyle modifications for diabetes management  Hypoglycemia management discussed. Carry glucose tablets or snacks at all times.   Patient has glucagon injection.     Complications:  Follow up for regular diabetes eye exam  Daily self examination of feet.  Microalbumin: Monitor. On ACEI    Last A1c:   Lab Results   Component Value Date    HGBA1C 12.8 (H) 09/13/2023     microalbumin:   Lab Results   Component Value Date    LABMICR 116.0 04/10/2023    CREATRANDUR 79.0 11/10/2023    MICALBCREAT 85.3 (H) 04/10/2023

## 2023-11-20 NOTE — PATIENT INSTRUCTIONS
Continue current diabetes medications.    Check blood sugars before meals and bedtime.   Call if blood sugars are frequently less than 70 or above 200.  Follow consistent carbohydrate diet   Brisk walk 30 minutes a day, 5 days a week    Call if you need prescriptions for medications.  Carry glucose tablets or snacks with you at all times.     Follow-up in 3-4 months.

## 2023-11-21 ENCOUNTER — PATIENT OUTREACH (OUTPATIENT)
Dept: ADMINISTRATIVE | Facility: CLINIC | Age: 38
End: 2023-11-21
Payer: MEDICARE

## 2023-11-21 RX ORDER — INSULIN PUMP SYRINGE, 3 ML
EACH MISCELLANEOUS
Qty: 1 EACH | Refills: 0 | Status: SHIPPED | OUTPATIENT
Start: 2023-11-21

## 2023-11-21 RX ORDER — PRAVASTATIN SODIUM 10 MG/1
TABLET ORAL
Qty: 30 TABLET | Refills: 11 | Status: SHIPPED | OUTPATIENT
Start: 2023-11-21

## 2023-11-21 NOTE — PROGRESS NOTES
C3 nurse attempted to contact Andrzej Sinclair for a TCC post hospital discharge follow up call. No answer. Left voicemail with callback information. The patient does not have a scheduled HOSFU appointment. Message sent to PCP staff for assistance with scheduling visit with patient.

## 2023-11-24 ENCOUNTER — TELEPHONE (OUTPATIENT)
Dept: OPTOMETRY | Facility: CLINIC | Age: 38
End: 2023-11-24
Payer: MEDICARE

## 2023-11-24 NOTE — PROGRESS NOTES
2nd attempt to make TCC Call. Left voicemail. Please call [phone] leave your first and last name and date of birth and ask for Faith. I will return your call as soon as possible.

## 2023-11-27 ENCOUNTER — PATIENT MESSAGE (OUTPATIENT)
Dept: ENDOCRINOLOGY | Facility: CLINIC | Age: 38
End: 2023-11-27
Payer: MEDICARE

## 2023-11-27 NOTE — PROGRESS NOTES
3rd attempt for TCC Call; Left voicemail. Please call [phone] leave first and last name and  and ask for Faith. I will return your call as soon as possible.

## 2023-11-28 ENCOUNTER — TELEPHONE (OUTPATIENT)
Dept: ENDOCRINOLOGY | Facility: CLINIC | Age: 38
End: 2023-11-28
Payer: MEDICARE

## 2023-11-28 NOTE — TELEPHONE ENCOUNTER
Called patient to give 's advise regarding abdominal pain. No answer send a message via patient portal.

## 2023-12-18 PROBLEM — N17.9 ACUTE RENAL FAILURE SUPERIMPOSED ON STAGE 3 CHRONIC KIDNEY DISEASE: Status: RESOLVED | Noted: 2021-04-24 | Resolved: 2023-12-18

## 2023-12-18 PROBLEM — N18.30 ACUTE RENAL FAILURE SUPERIMPOSED ON STAGE 3 CHRONIC KIDNEY DISEASE: Status: RESOLVED | Noted: 2021-04-24 | Resolved: 2023-12-18

## 2024-01-02 ENCOUNTER — PATIENT MESSAGE (OUTPATIENT)
Dept: ENDOCRINOLOGY | Facility: CLINIC | Age: 39
End: 2024-01-02
Payer: MEDICARE

## 2024-01-02 DIAGNOSIS — E10.36 TYPE 1 DIABETES MELLITUS WITH DIABETIC CATARACT: ICD-10-CM

## 2024-01-02 DIAGNOSIS — E10.8 TYPE 1 DIABETES MELLITUS WITH COMPLICATIONS: ICD-10-CM

## 2024-01-02 RX ORDER — INSULIN DEGLUDEC INJECTION 100 U/ML
INJECTION, SOLUTION SUBCUTANEOUS
Qty: 6 ML | Refills: 3 | Status: SHIPPED | OUTPATIENT
Start: 2024-01-02 | End: 2024-01-04 | Stop reason: SDUPTHER

## 2024-01-02 RX ORDER — INSULIN PMP CART,AUT,G6/7,CNTR
1 EACH SUBCUTANEOUS ONCE
Qty: 1 EACH | Refills: 0 | Status: SHIPPED | OUTPATIENT
Start: 2024-01-02 | End: 2024-01-02

## 2024-01-04 DIAGNOSIS — E10.65 TYPE 1 DIABETES MELLITUS WITH HYPERGLYCEMIA: ICD-10-CM

## 2024-01-04 DIAGNOSIS — E10.36 TYPE 1 DIABETES MELLITUS WITH DIABETIC CATARACT: ICD-10-CM

## 2024-01-04 RX ORDER — INSULIN DEGLUDEC 100 U/ML
INJECTION, SOLUTION SUBCUTANEOUS
Qty: 6 ML | Refills: 3 | Status: SHIPPED | OUTPATIENT
Start: 2024-01-04 | End: 2024-02-21 | Stop reason: SDUPTHER

## 2024-02-12 PROBLEM — E11.10 DKA (DIABETIC KETOACIDOSIS): Status: RESOLVED | Noted: 2021-02-28 | Resolved: 2024-02-12

## 2024-02-19 PROBLEM — N17.9 AKI (ACUTE KIDNEY INJURY): Status: RESOLVED | Noted: 2023-11-10 | Resolved: 2024-02-19

## 2024-02-21 ENCOUNTER — PATIENT MESSAGE (OUTPATIENT)
Dept: ENDOCRINOLOGY | Facility: CLINIC | Age: 39
End: 2024-02-21
Payer: MEDICARE

## 2024-02-21 DIAGNOSIS — E10.36 TYPE 1 DIABETES MELLITUS WITH DIABETIC CATARACT: ICD-10-CM

## 2024-02-21 RX ORDER — INSULIN DEGLUDEC 100 U/ML
13 INJECTION, SOLUTION SUBCUTANEOUS DAILY
Qty: 3.9 ML | Refills: 1 | Status: SHIPPED | OUTPATIENT
Start: 2024-02-21 | End: 2024-02-22 | Stop reason: SDUPTHER

## 2024-02-21 RX ORDER — INSULIN DEGLUDEC 100 U/ML
13 INJECTION, SOLUTION SUBCUTANEOUS DAILY
Qty: 4 ML | Refills: 11 | Status: SHIPPED | OUTPATIENT
Start: 2024-02-21 | End: 2024-02-21 | Stop reason: SDUPTHER

## 2024-02-21 RX ORDER — INSULIN DEGLUDEC 100 U/ML
INJECTION, SOLUTION SUBCUTANEOUS
Qty: 6 ML | Refills: 3 | Status: SHIPPED | OUTPATIENT
Start: 2024-02-21 | End: 2024-02-21 | Stop reason: SDUPTHER

## 2024-02-22 DIAGNOSIS — E10.36 TYPE 1 DIABETES MELLITUS WITH DIABETIC CATARACT: ICD-10-CM

## 2024-02-22 RX ORDER — INSULIN DEGLUDEC 100 U/ML
13 INJECTION, SOLUTION SUBCUTANEOUS DAILY
Qty: 3.9 ML | Refills: 11 | Status: ON HOLD | OUTPATIENT
Start: 2024-02-22 | End: 2024-04-02

## 2024-03-12 DIAGNOSIS — E10.29 TYPE 1 DIABETES MELLITUS WITH MICROALBUMINURIA: ICD-10-CM

## 2024-03-12 DIAGNOSIS — R80.9 TYPE 1 DIABETES MELLITUS WITH MICROALBUMINURIA: ICD-10-CM

## 2024-03-12 RX ORDER — INSULIN LISPRO-AABC 100 [IU]/ML
7 INJECTION, SOLUTION SUBCUTANEOUS
Qty: 4 PEN | Refills: 4 | Status: ON HOLD | OUTPATIENT
Start: 2024-03-12 | End: 2024-04-02

## 2024-03-31 ENCOUNTER — HOSPITAL ENCOUNTER (OUTPATIENT)
Facility: HOSPITAL | Age: 39
Discharge: HOME | End: 2024-04-02
Attending: EMERGENCY MEDICINE | Admitting: HOSPITALIST
Payer: MEDICARE

## 2024-03-31 DIAGNOSIS — R80.9 TYPE 1 DIABETES MELLITUS WITH MICROALBUMINURIA: ICD-10-CM

## 2024-03-31 DIAGNOSIS — R11.2 NAUSEA AND VOMITING, UNSPECIFIED VOMITING TYPE: ICD-10-CM

## 2024-03-31 DIAGNOSIS — N17.9 ACUTE KIDNEY INJURY: ICD-10-CM

## 2024-03-31 DIAGNOSIS — E10.29 TYPE 1 DIABETES MELLITUS WITH MICROALBUMINURIA: ICD-10-CM

## 2024-03-31 DIAGNOSIS — E10.36 TYPE 1 DIABETES MELLITUS WITH DIABETIC CATARACT: ICD-10-CM

## 2024-03-31 DIAGNOSIS — R07.9 CHEST PAIN: ICD-10-CM

## 2024-03-31 DIAGNOSIS — E10.65 TYPE 1 DIABETES MELLITUS WITH HYPERGLYCEMIA: ICD-10-CM

## 2024-03-31 DIAGNOSIS — E11.00 HYPEROSMOLAR HYPERGLYCEMIC STATE (HHS): Primary | ICD-10-CM

## 2024-03-31 DIAGNOSIS — R10.84 GENERALIZED ABDOMINAL PAIN: ICD-10-CM

## 2024-03-31 DIAGNOSIS — E10.8 TYPE 1 DIABETES MELLITUS WITH COMPLICATIONS: ICD-10-CM

## 2024-03-31 LAB
ALBUMIN SERPL BCP-MCNC: 4.2 G/DL (ref 3.5–5.2)
ALLENS TEST: ABNORMAL
ALP SERPL-CCNC: 81 U/L (ref 55–135)
ALT SERPL W/O P-5'-P-CCNC: 29 U/L (ref 10–44)
ANION GAP SERPL CALC-SCNC: 16 MMOL/L (ref 8–16)
AST SERPL-CCNC: 28 U/L (ref 10–40)
B-OH-BUTYR BLD STRIP-SCNC: 2.9 MMOL/L (ref 0–0.5)
BASOPHILS # BLD AUTO: 0.02 K/UL (ref 0–0.2)
BASOPHILS NFR BLD: 0.4 % (ref 0–1.9)
BILIRUB SERPL-MCNC: 1.1 MG/DL (ref 0.1–1)
BNP SERPL-MCNC: <10 PG/ML (ref 0–99)
BUN SERPL-MCNC: 28 MG/DL (ref 6–20)
BUN SERPL-MCNC: 30 MG/DL (ref 6–30)
CALCIUM SERPL-MCNC: 10.1 MG/DL (ref 8.7–10.5)
CHLORIDE SERPL-SCNC: 101 MMOL/L (ref 95–110)
CHLORIDE SERPL-SCNC: 102 MMOL/L (ref 95–110)
CO2 SERPL-SCNC: 23 MMOL/L (ref 23–29)
CREAT SERPL-MCNC: 1.8 MG/DL (ref 0.5–1.4)
CREAT SERPL-MCNC: 2 MG/DL (ref 0.5–1.4)
DIFFERENTIAL METHOD BLD: ABNORMAL
EOSINOPHIL # BLD AUTO: 0 K/UL (ref 0–0.5)
EOSINOPHIL NFR BLD: 0 % (ref 0–8)
ERYTHROCYTE [DISTWIDTH] IN BLOOD BY AUTOMATED COUNT: 13.3 % (ref 11.5–14.5)
EST. GFR  (NO RACE VARIABLE): 43.3 ML/MIN/1.73 M^2
ESTIMATED AVG GLUCOSE: 249 MG/DL (ref 68–131)
GLUCOSE SERPL-MCNC: 507 MG/DL (ref 70–110)
GLUCOSE SERPL-MCNC: 518 MG/DL (ref 70–110)
HBA1C MFR BLD: 10.3 % (ref 4–5.6)
HCO3 UR-SCNC: 27.5 MMOL/L (ref 24–28)
HCT VFR BLD AUTO: 50.4 % (ref 40–54)
HCT VFR BLD CALC: 53 %PCV (ref 36–54)
HGB BLD-MCNC: 16.9 G/DL (ref 14–18)
HIV 1+2 AB+HIV1 P24 AG SERPL QL IA: NORMAL
IMM GRANULOCYTES # BLD AUTO: 0.01 K/UL (ref 0–0.04)
IMM GRANULOCYTES NFR BLD AUTO: 0.2 % (ref 0–0.5)
INFLUENZA A, MOLECULAR: NOT DETECTED
INFLUENZA B, MOLECULAR: NOT DETECTED
LIPASE SERPL-CCNC: 6 U/L (ref 4–60)
LYMPHOCYTES # BLD AUTO: 0.6 K/UL (ref 1–4.8)
LYMPHOCYTES NFR BLD: 12 % (ref 18–48)
MAGNESIUM SERPL-MCNC: 2.1 MG/DL (ref 1.6–2.6)
MCH RBC QN AUTO: 30.1 PG (ref 27–31)
MCHC RBC AUTO-ENTMCNC: 33.5 G/DL (ref 32–36)
MCV RBC AUTO: 90 FL (ref 82–98)
MONOCYTES # BLD AUTO: 0.4 K/UL (ref 0.3–1)
MONOCYTES NFR BLD: 7.6 % (ref 4–15)
NEUTROPHILS # BLD AUTO: 4 K/UL (ref 1.8–7.7)
NEUTROPHILS NFR BLD: 79.8 % (ref 38–73)
NRBC BLD-RTO: 0 /100 WBC
OSMOLALITY SERPL: 335 MOSM/KG (ref 280–300)
PCO2 BLDA: 53 MMHG (ref 35–45)
PH SMN: 7.32 [PH] (ref 7.35–7.45)
PLATELET # BLD AUTO: 198 K/UL (ref 150–450)
PMV BLD AUTO: 12.1 FL (ref 9.2–12.9)
PO2 BLDA: 29 MMHG (ref 40–60)
POC BE: 1 MMOL/L
POC IONIZED CALCIUM: 1.19 MMOL/L (ref 1.06–1.42)
POC SATURATED O2: 50 % (ref 95–100)
POC TCO2 (MEASURED): 28 MMOL/L (ref 23–29)
POC TCO2: 29 MMOL/L (ref 24–29)
POCT GLUCOSE: >500 MG/DL (ref 70–110)
POCT GLUCOSE: >500 MG/DL (ref 70–110)
POTASSIUM BLD-SCNC: 5.2 MMOL/L (ref 3.5–5.1)
POTASSIUM SERPL-SCNC: 5.3 MMOL/L (ref 3.5–5.1)
PROT SERPL-MCNC: 8.3 G/DL (ref 6–8.4)
RBC # BLD AUTO: 5.62 M/UL (ref 4.6–6.2)
RSV AG BY MOLECULAR METHOD: NOT DETECTED
SAMPLE: ABNORMAL
SAMPLE: ABNORMAL
SARS-COV-2 RNA RESP QL NAA+PROBE: NOT DETECTED
SITE: ABNORMAL
SODIUM BLD-SCNC: 140 MMOL/L (ref 136–145)
SODIUM SERPL-SCNC: 140 MMOL/L (ref 136–145)
TROPONIN I SERPL DL<=0.01 NG/ML-MCNC: <0.006 NG/ML (ref 0–0.03)
WBC # BLD AUTO: 4.98 K/UL (ref 3.9–12.7)

## 2024-03-31 PROCEDURE — 84484 ASSAY OF TROPONIN QUANT: CPT | Performed by: EMERGENCY MEDICINE

## 2024-03-31 PROCEDURE — 80053 COMPREHEN METABOLIC PANEL: CPT | Performed by: HOSPITALIST

## 2024-03-31 PROCEDURE — 96365 THER/PROPH/DIAG IV INF INIT: CPT

## 2024-03-31 PROCEDURE — 83735 ASSAY OF MAGNESIUM: CPT | Mod: 91 | Performed by: PHYSICIAN ASSISTANT

## 2024-03-31 PROCEDURE — 94761 N-INVAS EAR/PLS OXIMETRY MLT: CPT

## 2024-03-31 PROCEDURE — 80053 COMPREHEN METABOLIC PANEL: CPT | Mod: 91 | Performed by: EMERGENCY MEDICINE

## 2024-03-31 PROCEDURE — 83036 HEMOGLOBIN GLYCOSYLATED A1C: CPT | Performed by: EMERGENCY MEDICINE

## 2024-03-31 PROCEDURE — 87389 HIV-1 AG W/HIV-1&-2 AB AG IA: CPT | Performed by: PHYSICIAN ASSISTANT

## 2024-03-31 PROCEDURE — 84100 ASSAY OF PHOSPHORUS: CPT | Performed by: HOSPITALIST

## 2024-03-31 PROCEDURE — 99285 EMERGENCY DEPT VISIT HI MDM: CPT | Mod: 25

## 2024-03-31 PROCEDURE — 93005 ELECTROCARDIOGRAM TRACING: CPT

## 2024-03-31 PROCEDURE — 96375 TX/PRO/DX INJ NEW DRUG ADDON: CPT

## 2024-03-31 PROCEDURE — 99900035 HC TECH TIME PER 15 MIN (STAT)

## 2024-03-31 PROCEDURE — 63600175 PHARM REV CODE 636 W HCPCS: Performed by: EMERGENCY MEDICINE

## 2024-03-31 PROCEDURE — G0378 HOSPITAL OBSERVATION PER HR: HCPCS

## 2024-03-31 PROCEDURE — 83930 ASSAY OF BLOOD OSMOLALITY: CPT | Performed by: EMERGENCY MEDICINE

## 2024-03-31 PROCEDURE — 93010 ELECTROCARDIOGRAM REPORT: CPT | Mod: ,,, | Performed by: INTERNAL MEDICINE

## 2024-03-31 PROCEDURE — 82803 BLOOD GASES ANY COMBINATION: CPT

## 2024-03-31 PROCEDURE — 85025 COMPLETE CBC W/AUTO DIFF WBC: CPT | Performed by: EMERGENCY MEDICINE

## 2024-03-31 PROCEDURE — 83690 ASSAY OF LIPASE: CPT | Performed by: EMERGENCY MEDICINE

## 2024-03-31 PROCEDURE — 82010 KETONE BODYS QUAN: CPT | Performed by: EMERGENCY MEDICINE

## 2024-03-31 PROCEDURE — 0241U SARS-COV2 (COVID) WITH FLU/RSV BY PCR: CPT | Performed by: NURSE PRACTITIONER

## 2024-03-31 PROCEDURE — 82330 ASSAY OF CALCIUM: CPT

## 2024-03-31 PROCEDURE — 83880 ASSAY OF NATRIURETIC PEPTIDE: CPT | Performed by: EMERGENCY MEDICINE

## 2024-03-31 PROCEDURE — 82962 GLUCOSE BLOOD TEST: CPT

## 2024-03-31 PROCEDURE — 83735 ASSAY OF MAGNESIUM: CPT | Performed by: HOSPITALIST

## 2024-03-31 PROCEDURE — 25000003 PHARM REV CODE 250: Performed by: EMERGENCY MEDICINE

## 2024-03-31 PROCEDURE — 80047 BASIC METABLC PNL IONIZED CA: CPT

## 2024-03-31 RX ORDER — ACETAMINOPHEN 325 MG/1
650 TABLET ORAL EVERY 4 HOURS PRN
Status: DISCONTINUED | OUTPATIENT
Start: 2024-03-31 | End: 2024-04-02 | Stop reason: HOSPADM

## 2024-03-31 RX ORDER — MORPHINE SULFATE 4 MG/ML
4 INJECTION, SOLUTION INTRAMUSCULAR; INTRAVENOUS
Status: COMPLETED | OUTPATIENT
Start: 2024-03-31 | End: 2024-03-31

## 2024-03-31 RX ORDER — HYDROMORPHONE HYDROCHLORIDE 1 MG/ML
0.5 INJECTION, SOLUTION INTRAMUSCULAR; INTRAVENOUS; SUBCUTANEOUS EVERY 4 HOURS PRN
Status: DISCONTINUED | OUTPATIENT
Start: 2024-03-31 | End: 2024-04-02 | Stop reason: HOSPADM

## 2024-03-31 RX ORDER — SODIUM CHLORIDE 9 MG/ML
1000 INJECTION, SOLUTION INTRAVENOUS CONTINUOUS
Status: ACTIVE | OUTPATIENT
Start: 2024-03-31 | End: 2024-04-01

## 2024-03-31 RX ORDER — ACETAMINOPHEN 500 MG
1000 TABLET ORAL EVERY 8 HOURS PRN
Status: DISCONTINUED | OUTPATIENT
Start: 2024-03-31 | End: 2024-04-02 | Stop reason: HOSPADM

## 2024-03-31 RX ORDER — TALC
6 POWDER (GRAM) TOPICAL NIGHTLY PRN
Status: DISCONTINUED | OUTPATIENT
Start: 2024-03-31 | End: 2024-04-02 | Stop reason: HOSPADM

## 2024-03-31 RX ORDER — DICYCLOMINE HYDROCHLORIDE 10 MG/1
10 CAPSULE ORAL 4 TIMES DAILY PRN
Status: DISCONTINUED | OUTPATIENT
Start: 2024-03-31 | End: 2024-04-02 | Stop reason: HOSPADM

## 2024-03-31 RX ORDER — PRAVASTATIN SODIUM 10 MG/1
10 TABLET ORAL DAILY
Status: DISCONTINUED | OUTPATIENT
Start: 2024-04-01 | End: 2024-04-02 | Stop reason: HOSPADM

## 2024-03-31 RX ORDER — OXYCODONE HYDROCHLORIDE 5 MG/1
5 TABLET ORAL EVERY 4 HOURS PRN
Status: DISCONTINUED | OUTPATIENT
Start: 2024-03-31 | End: 2024-04-02 | Stop reason: HOSPADM

## 2024-03-31 RX ORDER — SODIUM CHLORIDE 0.9 % (FLUSH) 0.9 %
10 SYRINGE (ML) INJECTION
Status: DISCONTINUED | OUTPATIENT
Start: 2024-03-31 | End: 2024-04-01

## 2024-03-31 RX ORDER — ONDANSETRON HYDROCHLORIDE 2 MG/ML
4 INJECTION, SOLUTION INTRAVENOUS
Status: COMPLETED | OUTPATIENT
Start: 2024-03-31 | End: 2024-03-31

## 2024-03-31 RX ORDER — FAMOTIDINE 10 MG/ML
20 INJECTION INTRAVENOUS 2 TIMES DAILY
Status: DISCONTINUED | OUTPATIENT
Start: 2024-03-31 | End: 2024-03-31

## 2024-03-31 RX ORDER — ONDANSETRON HYDROCHLORIDE 2 MG/ML
4 INJECTION, SOLUTION INTRAVENOUS EVERY 6 HOURS PRN
Status: DISCONTINUED | OUTPATIENT
Start: 2024-03-31 | End: 2024-04-02 | Stop reason: HOSPADM

## 2024-03-31 RX ORDER — PANTOPRAZOLE SODIUM 40 MG/10ML
40 INJECTION, POWDER, LYOPHILIZED, FOR SOLUTION INTRAVENOUS DAILY
Status: DISCONTINUED | OUTPATIENT
Start: 2024-03-31 | End: 2024-04-02

## 2024-03-31 RX ORDER — SODIUM CHLORIDE 0.9 % (FLUSH) 0.9 %
10 SYRINGE (ML) INJECTION
Status: DISCONTINUED | OUTPATIENT
Start: 2024-03-31 | End: 2024-04-02 | Stop reason: HOSPADM

## 2024-03-31 RX ORDER — HYDROMORPHONE HYDROCHLORIDE 1 MG/ML
1 INJECTION, SOLUTION INTRAMUSCULAR; INTRAVENOUS; SUBCUTANEOUS
Status: COMPLETED | OUTPATIENT
Start: 2024-03-31 | End: 2024-03-31

## 2024-03-31 RX ORDER — DEXTROSE MONOHYDRATE AND SODIUM CHLORIDE 5; .45 G/100ML; G/100ML
INJECTION, SOLUTION INTRAVENOUS CONTINUOUS PRN
Status: DISCONTINUED | OUTPATIENT
Start: 2024-03-31 | End: 2024-04-01

## 2024-03-31 RX ADMIN — INSULIN HUMAN 0.1 UNITS/KG/HR: 1 INJECTION, SOLUTION INTRAVENOUS at 10:03

## 2024-03-31 RX ADMIN — MORPHINE SULFATE 4 MG: 4 INJECTION INTRAVENOUS at 07:03

## 2024-03-31 RX ADMIN — ONDANSETRON 4 MG: 2 INJECTION INTRAMUSCULAR; INTRAVENOUS at 07:03

## 2024-03-31 RX ADMIN — SODIUM CHLORIDE 1000 ML: 0.9 INJECTION, SOLUTION INTRAVENOUS at 10:03

## 2024-03-31 RX ADMIN — HYDROMORPHONE HYDROCHLORIDE 1 MG: 1 INJECTION, SOLUTION INTRAMUSCULAR; INTRAVENOUS; SUBCUTANEOUS at 08:03

## 2024-03-31 NOTE — ED PROVIDER NOTES
Emergency Department Provider Note    Andrzej Sinclair   38 y.o. male   [id number]      3/31/2024       History     This history was obtained from the patient without limitations.      He is a 38-year-old with history of type I diabetes mellitus who presents by ambulance.  He complains generalized abdominal pain, nausea, vomiting, lethargy, chest pain, headache, and dizziness that began 2 days ago.  He has been compliant with his insulin.  He denies dietary changes preceding the onset of these symptoms.  He denies recent hospitalization.  He is also having epigastric pain that radiates to the retrosternal region with associated shortness of breath.         Past Medical History:   Diagnosis Date    Diabetes mellitus     Diabetes mellitus type 1     Diagnosed at age 20      History reviewed. No pertinent surgical history.   Family History   Problem Relation Age of Onset    Other Mother         prediabetes    Diabetes Mother     Other Father         patient does not know his fathers history      Social History     Socioeconomic History    Marital status:    Tobacco Use    Smoking status: Former    Smokeless tobacco: Never   Substance and Sexual Activity    Alcohol use: Not Currently     Comment: socially    Drug use: No     Social Determinants of Health     Financial Resource Strain: Low Risk  (4/1/2024)    Overall Financial Resource Strain (CARDIA)     Difficulty of Paying Living Expenses: Not hard at all   Food Insecurity: Patient Declined (4/1/2024)    Hunger Vital Sign     Worried About Running Out of Food in the Last Year: Patient declined     Ran Out of Food in the Last Year: Patient declined   Transportation Needs: No Transportation Needs (4/1/2024)    PRAPARE - Transportation     Lack of Transportation (Medical): No     Lack of Transportation (Non-Medical): No   Physical Activity: Insufficiently Active (4/1/2024)    Exercise Vital Sign     Days of Exercise per Week: 7 days     Minutes of Exercise per  Session: 20 min   Stress: Stress Concern Present (11/13/2023)    [employer] - Occupational Stress Questionnaire     Feeling of Stress : To some extent   Social Connections: Unknown (4/1/2024)    Social Connection and Isolation Panel [NHANES]     Frequency of Communication with Friends and Family: More than three times a week     Frequency of Social Gatherings with Friends and Family: Once a week     Attends Taoism Services: Patient declined     Active Member of Clubs or Organizations: Patient declined     Attends Club or Organization Meetings: Patient declined     Marital Status:    Housing Stability: Low Risk  (4/1/2024)    Housing Stability Vital Sign     Unable to Pay for Housing in the Last Year: No     Number of Places Lived in the Last Year: 1     Unstable Housing in the Last Year: No      Review of patient's allergies indicates:   Allergen Reactions    Lactose Other (See Comments)     Gas and moderate to sever abdominal pain           Physical Examination     Initial Vitals [03/31/24 1819]   BP Pulse Resp Temp SpO2   (!) 163/97 96 18 98.5 °F (36.9 °C) 97 %      MAP       --           Physical Exam    Nursing note and vitals reviewed.  Constitutional: He is not diaphoretic. He appears distressed.   HENT:   Mouth/Throat: Mucous membranes are dry.   Eyes: Conjunctivae are normal. No scleral icterus.   Cardiovascular:  Normal rate, regular rhythm and normal heart sounds.     Exam reveals no gallop and no friction rub.       No murmur heard.  Pulmonary/Chest: No respiratory distress. He has no wheezes. He has no rhonchi.   Abdominal: Abdomen is soft. He exhibits no distension. There is abdominal tenderness in the epigastric area. There is guarding.   Musculoskeletal:         General: No edema.     Neurological: He is alert and oriented to person, place, and time. GCS score is 15. GCS eye subscore is 4. GCS verbal subscore is 5. GCS motor subscore is 6.   Skin: Skin is warm  and dry. No pallor.            Labs     Labs Reviewed   CBC W/ AUTO DIFFERENTIAL - Abnormal; Notable for the following components:       Result Value    Lymph # 0.6 (*)     Gran % 79.8 (*)     Lymph % 12.0 (*)     All other components within normal limits    Narrative:     Release to patient->Immediate   COMPREHENSIVE METABOLIC PANEL - Abnormal; Notable for the following components:    Potassium 5.3 (*)     Glucose 518 (*)     BUN 28 (*)     Creatinine 2.0 (*)     Total Bilirubin 1.1 (*)     eGFR 43.3 (*)     All other components within normal limits    Narrative:     Release to patient->Immediate   BETA - HYDROXYBUTYRATE, SERUM - Abnormal; Notable for the following components:    Beta-Hydroxybutyrate 2.9 (*)     All other components within normal limits    Narrative:     Release to patient->Immediate   OSMOLALITY, SERUM - Abnormal; Notable for the following components:    Osmolality 335 (*)     All other components within normal limits    Narrative:     SERUM OSMO critical result(s) called and verbal readback obtained   from BUSHRA MENA LPN by LLT1 03/31/2024 20:11   HEMOGLOBIN A1C - Abnormal; Notable for the following components:    Hemoglobin A1C 10.3 (*)     Estimated Avg Glucose 249 (*)     All other components within normal limits    Narrative:     With next lab draw   POCT GLUCOSE - Abnormal; Notable for the following components:    POCT Glucose >500 (*)     All other components within normal limits   ISTAT PROCEDURE - Abnormal; Notable for the following components:    POC PH 7.323 (*)     POC PCO2 53.0 (*)     POC PO2 29 (*)     All other components within normal limits   ISTAT PROCEDURE - Abnormal; Notable for the following components:    POC Glucose 507 (*)     POC Creatinine 1.8 (*)     POC Potassium 5.2 (*)     All other components within normal limits   POCT GLUCOSE - Abnormal; Notable for the following components:    POCT Glucose >500 (*)     All other components within normal limits   HIV 1 / 2  ANTIBODY    Narrative:     Release to patient->Immediate   TROPONIN I    Narrative:     Release to patient->Immediate   B-TYPE NATRIURETIC PEPTIDE    Narrative:     Release to patient->Immediate   LIPASE    Narrative:     With next lab draw   MAGNESIUM   MAGNESIUM    Narrative:     ADD ON MAGNESIUM PER ANETA JOHNSTON NP/ORDER# [id number] @ 22:35      Release to patient->Immediate   URINALYSIS, REFLEX TO URINE CULTURE   TOXICOLOGY SCREEN, URINE, RANDOM (COMPLIANCE)   SODIUM, URINE, RANDOM   PROTEIN, QUANTITATIVE, URINE RANDOM   CREATININE, URINE, RANDOM        Imaging     Imaging Results              X-Ray Chest AP Portable (Final result)  Result time 03/31/24 20:01:53      Final result by Yasmani Veloz MD (03/31/24 20:01:53)                   Impression:      No acute process.      Electronically signed by: Yasmani Veloz MD  Date:    03/31/2024  Time:    20:01               Narrative:    EXAMINATION:  XR CHEST AP PORTABLE    CLINICAL HISTORY:  Chest pain;    TECHNIQUE:  Single frontal view of the chest was performed.    COMPARISON:  11/10/2023.    FINDINGS:  Monitoring EKG leads are present.  The trachea is unremarkable.  The cardiomediastinal silhouette is within normal limits.  The hilar structures are unremarkable.  There is no evidence of free air beneath the hemidiaphragms.  There are no pleural effusions.  There is no evidence of a pneumothorax.  There is no evidence of pneumomediastinum.  No airspace opacity is present.  The osseous structures are unremarkable.                                        ED Course     The patient received the following medications:  Medications   0.9%  NaCl infusion (0 mLs Intravenous Stopped 4/1/24 5958)   dextrose 10% bolus 125 mL 125 mL (has no administration in time range)   dextrose 10% bolus 250 mL 250 mL (has no administration in time range)   pravastatin tablet 10 mg (10 mg Oral Given 4/1/24 0852)   pantoprazole injection 40 mg (40 mg Intravenous Given 4/1/24 0852)   sodium  chloride 0.9% flush 10 mL (has no administration in time range)   ondansetron injection 4 mg (has no administration in time range)   acetaminophen tablet 650 mg (has no administration in time range)   melatonin tablet 6 mg (has no administration in time range)   dextrose 10% bolus 125 mL 125 mL (has no administration in time range)   dextrose 10% bolus 250 mL 250 mL (has no administration in time range)   oxyCODONE immediate release tablet 5 mg (5 mg Oral Given 4/1/24 0029)   acetaminophen tablet 1,000 mg (has no administration in time range)   HYDROmorphone injection 0.5 mg (0.5 mg Intravenous Given 4/1/24 1920)   dicyclomine capsule 10 mg (has no administration in time range)   glucose chewable tablet 16 g (16 g Oral Given 4/1/24 1947)   glucose chewable tablet 24 g (has no administration in time range)   dextrose 50% injection 12.5 g (has no administration in time range)   dextrose 50% injection 25 g (has no administration in time range)   glucagon (human recombinant) injection 1 mg (has no administration in time range)   insulin detemir U-100 (Levemir) pen 10 Units (10 Units Subcutaneous Given 4/1/24 1028)   insulin aspart U-100 pen 8 Units (8 Units Subcutaneous Given 4/1/24 1704)   insulin aspart U-100 pen 0-5 Units (has no administration in time range)   lactated ringers infusion ( Intravenous New Bag 4/1/24 1228)   ondansetron injection 4 mg (4 mg Intravenous Given 3/31/24 1908)   morphine injection 4 mg (4 mg Intravenous Given 3/31/24 1908)   HYDROmorphone injection 1 mg (1 mg Intravenous Given 3/31/24 2033)   sodium chloride 0.9% bolus 1,000 mL 1,000 mL (0 mLs Intravenous Stopped 4/1/24 0218)       Procedures    ED Course as of 04/01/24 2118   Sun Mar 31, 2024   1909 CBG > 500 mg/dL [LP]   1946 EKG 12-lead  Independently interpreted by me:   Normal sinus rhythm.  Ventricular rate 93 bpm.  Normal axis.  Incomplete RBBB.  Normal QT interval.  No ST segment elevation or depression. [LP]   2031 POC Glucose(!!):  507 [LP]   2032 POC Creatinine(!): 1.8 [LP]   2032 Potassium, Blood Gas(!): 5.2 [LP]   2032 Beta-Hydroxybutyrate(!): 2.9 [LP]   2032 Osmolality(!!): 335 [LP]   2054 Glucose(!!): 518 [LP]   2055 Potassium(!): 5.3 [LP]   2055 BUN(!): 28 [LP]   2055 Creatinine(!): 2.0 [LP]      ED Course User Index  [LP] Breezy Arzate III, MD        Medical Decision Making                 Medical Decision Making  Differential diagnoses:  DKA, HHS, dehydration, gastritis, duodenitis, pancreatitis    On lab review, blood glucose elevated with normal anion gap and only mildly depressed serum bicarbonate level.  These findings along with elevated serum osmolality consistent with HHS.    Patient signed out to Dr. Dove at shift change pending additional lab results and hospital medicine admission.    Problems Addressed:  Acute kidney injury: acute illness or injury  Chest pain: acute illness or injury  Generalized abdominal pain: acute illness or injury  Hyperosmolar hyperglycemic state (HHS): acute illness or injury  Nausea and vomiting, unspecified vomiting type: acute illness or injury  Type 1 diabetes mellitus with hyperglycemia: acute illness or injury    Amount and/or Complexity of Data Reviewed  Labs: ordered. Decision-making details documented in ED Course.  Radiology: ordered.  ECG/medicine tests:  Decision-making details documented in ED Course.    Risk  Prescription drug management.      Critical Care   Date: 04/01/2024  Performed by: Breezy Arzate III, MD   Authorized by: Breezy Arzate III, MD    Total critical care time (exclusive of procedural time) : 30 minutes  Critical care was necessary to treat or prevent imminent or life-threatening deterioration of the following conditions:  HHS          Diagnoses       ICD-10-CM ICD-9-CM   1. Hyperosmolar hyperglycemic state (HHS)  E11.00 250.22   2. Chest pain  R07.9 786.50   3. Generalized abdominal pain  R10.84 789.07   4. Nausea and vomiting, unspecified vomiting type  R11.2  787.01   5. Type 1 diabetes mellitus with hyperglycemia  E10.65 250.01   6. Acute kidney injury  N17.9 584.9         Dispostion      ED Disposition Condition    Observation Stable                Breezy Arzate III, MD  04/01/24 4302

## 2024-03-31 NOTE — ED TRIAGE NOTES
Andrzej J Yahir, an 38 y.o. male presents to the ED via EMS for abd pain and emesis since Friday. Reports high Dexcom BG readings. Endorses CP, SOB, NV, HA, dizziness, fatigue, weakness. Denies diarrhea, urinary complaints. +DM1. Dexcom in RUE.      Chief Complaint   Patient presents with    Abdominal Pain     10/10 abd pain dark in color, started Friday.     Review of patient's allergies indicates:   Allergen Reactions    Lactose Other (See Comments)     Gas and moderate to sever abdominal pain     Past Medical History:   Diagnosis Date    Diabetes mellitus     Diabetes mellitus type 1     Diagnosed at age 20

## 2024-04-01 PROBLEM — N18.30 ACUTE RENAL FAILURE SUPERIMPOSED ON STAGE 3 CHRONIC KIDNEY DISEASE: Status: RESOLVED | Noted: 2021-03-01 | Resolved: 2024-04-01

## 2024-04-01 PROBLEM — N17.9 ACUTE RENAL FAILURE SUPERIMPOSED ON STAGE 3 CHRONIC KIDNEY DISEASE: Status: RESOLVED | Noted: 2021-03-01 | Resolved: 2024-04-01

## 2024-04-01 PROBLEM — E10.10 DIABETIC KETOACIDOSIS WITHOUT COMA ASSOCIATED WITH TYPE 1 DIABETES MELLITUS: Status: RESOLVED | Noted: 2021-02-28 | Resolved: 2024-04-01

## 2024-04-01 PROBLEM — R10.9 ABDOMINAL PAIN: Status: RESOLVED | Noted: 2024-04-01 | Resolved: 2024-04-01

## 2024-04-01 PROBLEM — R10.9 ABDOMINAL PAIN: Status: ACTIVE | Noted: 2024-04-01

## 2024-04-01 PROBLEM — E87.5 HYPERKALEMIA: Status: RESOLVED | Noted: 2023-11-10 | Resolved: 2024-04-01

## 2024-04-01 PROBLEM — R11.2 INTRACTABLE VOMITING WITH NAUSEA: Status: RESOLVED | Noted: 2021-03-03 | Resolved: 2024-04-01

## 2024-04-01 LAB
ALBUMIN SERPL BCP-MCNC: 3.8 G/DL (ref 3.5–5.2)
ALBUMIN SERPL BCP-MCNC: 3.9 G/DL (ref 3.5–5.2)
ALP SERPL-CCNC: 81 U/L (ref 55–135)
ALP SERPL-CCNC: 81 U/L (ref 55–135)
ALT SERPL W/O P-5'-P-CCNC: 23 U/L (ref 10–44)
ALT SERPL W/O P-5'-P-CCNC: 27 U/L (ref 10–44)
ANION GAP SERPL CALC-SCNC: 12 MMOL/L (ref 8–16)
ANION GAP SERPL CALC-SCNC: 13 MMOL/L (ref 8–16)
ANION GAP SERPL CALC-SCNC: 18 MMOL/L (ref 8–16)
ANION GAP SERPL CALC-SCNC: 18 MMOL/L (ref 8–16)
ANION GAP SERPL CALC-SCNC: 8 MMOL/L (ref 8–16)
ANION GAP SERPL CALC-SCNC: 9 MMOL/L (ref 8–16)
AST SERPL-CCNC: 17 U/L (ref 10–40)
AST SERPL-CCNC: 23 U/L (ref 10–40)
BASOPHILS # BLD AUTO: 0.04 K/UL (ref 0–0.2)
BASOPHILS NFR BLD: 0.6 % (ref 0–1.9)
BILIRUB SERPL-MCNC: 0.9 MG/DL (ref 0.1–1)
BILIRUB SERPL-MCNC: 0.9 MG/DL (ref 0.1–1)
BUN SERPL-MCNC: 24 MG/DL (ref 6–20)
BUN SERPL-MCNC: 25 MG/DL (ref 6–20)
BUN SERPL-MCNC: 26 MG/DL (ref 6–20)
BUN SERPL-MCNC: 31 MG/DL (ref 6–20)
BUN SERPL-MCNC: 33 MG/DL (ref 6–20)
BUN SERPL-MCNC: 33 MG/DL (ref 6–20)
CALCIUM SERPL-MCNC: 10.4 MG/DL (ref 8.7–10.5)
CALCIUM SERPL-MCNC: 9.5 MG/DL (ref 8.7–10.5)
CALCIUM SERPL-MCNC: 9.6 MG/DL (ref 8.7–10.5)
CALCIUM SERPL-MCNC: 9.9 MG/DL (ref 8.7–10.5)
CHLORIDE SERPL-SCNC: 103 MMOL/L (ref 95–110)
CHLORIDE SERPL-SCNC: 103 MMOL/L (ref 95–110)
CHLORIDE SERPL-SCNC: 107 MMOL/L (ref 95–110)
CHLORIDE SERPL-SCNC: 108 MMOL/L (ref 95–110)
CHLORIDE SERPL-SCNC: 108 MMOL/L (ref 95–110)
CHLORIDE SERPL-SCNC: 109 MMOL/L (ref 95–110)
CO2 SERPL-SCNC: 19 MMOL/L (ref 23–29)
CO2 SERPL-SCNC: 19 MMOL/L (ref 23–29)
CO2 SERPL-SCNC: 24 MMOL/L (ref 23–29)
CO2 SERPL-SCNC: 26 MMOL/L (ref 23–29)
CO2 SERPL-SCNC: 27 MMOL/L (ref 23–29)
CO2 SERPL-SCNC: 27 MMOL/L (ref 23–29)
CREAT SERPL-MCNC: 1.5 MG/DL (ref 0.5–1.4)
CREAT SERPL-MCNC: 1.8 MG/DL (ref 0.5–1.4)
CREAT SERPL-MCNC: 2 MG/DL (ref 0.5–1.4)
CREAT SERPL-MCNC: 2 MG/DL (ref 0.5–1.4)
DIFFERENTIAL METHOD BLD: NORMAL
EOSINOPHIL # BLD AUTO: 0 K/UL (ref 0–0.5)
EOSINOPHIL NFR BLD: 0 % (ref 0–8)
ERYTHROCYTE [DISTWIDTH] IN BLOOD BY AUTOMATED COUNT: 13 % (ref 11.5–14.5)
EST. GFR  (NO RACE VARIABLE): 43.3 ML/MIN/1.73 M^2
EST. GFR  (NO RACE VARIABLE): 43.3 ML/MIN/1.73 M^2
EST. GFR  (NO RACE VARIABLE): 49.1 ML/MIN/1.73 M^2
EST. GFR  (NO RACE VARIABLE): >60 ML/MIN/1.73 M^2
GLUCOSE SERPL-MCNC: 158 MG/DL (ref 70–110)
GLUCOSE SERPL-MCNC: 510 MG/DL (ref 70–110)
GLUCOSE SERPL-MCNC: 510 MG/DL (ref 70–110)
GLUCOSE SERPL-MCNC: 73 MG/DL (ref 70–110)
GLUCOSE SERPL-MCNC: 80 MG/DL (ref 70–110)
GLUCOSE SERPL-MCNC: 81 MG/DL (ref 70–110)
HCT VFR BLD AUTO: 47.4 % (ref 40–54)
HGB BLD-MCNC: 16.3 G/DL (ref 14–18)
IMM GRANULOCYTES # BLD AUTO: 0.01 K/UL (ref 0–0.04)
IMM GRANULOCYTES NFR BLD AUTO: 0.2 % (ref 0–0.5)
LYMPHOCYTES # BLD AUTO: 1.5 K/UL (ref 1–4.8)
LYMPHOCYTES NFR BLD: 22 % (ref 18–48)
MAGNESIUM SERPL-MCNC: 2.1 MG/DL (ref 1.6–2.6)
MAGNESIUM SERPL-MCNC: 2.3 MG/DL (ref 1.6–2.6)
MCH RBC QN AUTO: 30.2 PG (ref 27–31)
MCHC RBC AUTO-ENTMCNC: 34.4 G/DL (ref 32–36)
MCV RBC AUTO: 88 FL (ref 82–98)
MONOCYTES # BLD AUTO: 0.6 K/UL (ref 0.3–1)
MONOCYTES NFR BLD: 9 % (ref 4–15)
NEUTROPHILS # BLD AUTO: 4.5 K/UL (ref 1.8–7.7)
NEUTROPHILS NFR BLD: 68.2 % (ref 38–73)
NRBC BLD-RTO: 0 /100 WBC
OHS QRS DURATION: 104 MS
OHS QTC CALCULATION: 430 MS
OSMOLALITY UR: 624 MOSM/KG (ref 50–1200)
PHOSPHATE SERPL-MCNC: 2.8 MG/DL (ref 2.7–4.5)
PHOSPHATE SERPL-MCNC: 3.4 MG/DL (ref 2.7–4.5)
PHOSPHATE SERPL-MCNC: 4.1 MG/DL (ref 2.7–4.5)
PHOSPHATE SERPL-MCNC: 4.3 MG/DL (ref 2.7–4.5)
PHOSPHATE SERPL-MCNC: 4.3 MG/DL (ref 2.7–4.5)
PHOSPHATE SERPL-MCNC: 4.6 MG/DL (ref 2.7–4.5)
PHOSPHATE SERPL-MCNC: 4.7 MG/DL (ref 2.7–4.5)
PLATELET # BLD AUTO: 182 K/UL (ref 150–450)
PMV BLD AUTO: 11 FL (ref 9.2–12.9)
POCT GLUCOSE: 108 MG/DL (ref 70–110)
POCT GLUCOSE: 112 MG/DL (ref 70–110)
POCT GLUCOSE: 116 MG/DL (ref 70–110)
POCT GLUCOSE: 147 MG/DL (ref 70–110)
POCT GLUCOSE: 232 MG/DL (ref 70–110)
POCT GLUCOSE: 316 MG/DL (ref 70–110)
POCT GLUCOSE: 377 MG/DL (ref 70–110)
POCT GLUCOSE: 399 MG/DL (ref 70–110)
POCT GLUCOSE: 59 MG/DL (ref 70–110)
POCT GLUCOSE: 64 MG/DL (ref 70–110)
POCT GLUCOSE: 74 MG/DL (ref 70–110)
POCT GLUCOSE: 75 MG/DL (ref 70–110)
POCT GLUCOSE: 81 MG/DL (ref 70–110)
POCT GLUCOSE: 82 MG/DL (ref 70–110)
POCT GLUCOSE: 82 MG/DL (ref 70–110)
POTASSIUM SERPL-SCNC: 3.9 MMOL/L (ref 3.5–5.1)
POTASSIUM SERPL-SCNC: 4.1 MMOL/L (ref 3.5–5.1)
POTASSIUM SERPL-SCNC: 4.6 MMOL/L (ref 3.5–5.1)
POTASSIUM SERPL-SCNC: 4.6 MMOL/L (ref 3.5–5.1)
PROT SERPL-MCNC: 7.2 G/DL (ref 6–8.4)
PROT SERPL-MCNC: 7.6 G/DL (ref 6–8.4)
RBC # BLD AUTO: 5.4 M/UL (ref 4.6–6.2)
SODIUM SERPL-SCNC: 140 MMOL/L (ref 136–145)
SODIUM SERPL-SCNC: 140 MMOL/L (ref 136–145)
SODIUM SERPL-SCNC: 142 MMOL/L (ref 136–145)
SODIUM SERPL-SCNC: 144 MMOL/L (ref 136–145)
SODIUM SERPL-SCNC: 146 MMOL/L (ref 136–145)
SODIUM SERPL-SCNC: 146 MMOL/L (ref 136–145)
WBC # BLD AUTO: 6.59 K/UL (ref 3.9–12.7)

## 2024-04-01 PROCEDURE — 99215 OFFICE O/P EST HI 40 MIN: CPT | Mod: ,,, | Performed by: INTERNAL MEDICINE

## 2024-04-01 PROCEDURE — 84100 ASSAY OF PHOSPHORUS: CPT | Mod: 91 | Performed by: EMERGENCY MEDICINE

## 2024-04-01 PROCEDURE — 96375 TX/PRO/DX INJ NEW DRUG ADDON: CPT

## 2024-04-01 PROCEDURE — G0378 HOSPITAL OBSERVATION PER HR: HCPCS

## 2024-04-01 PROCEDURE — 36415 COLL VENOUS BLD VENIPUNCTURE: CPT | Mod: XB | Performed by: STUDENT IN AN ORGANIZED HEALTH CARE EDUCATION/TRAINING PROGRAM

## 2024-04-01 PROCEDURE — 85025 COMPLETE CBC W/AUTO DIFF WBC: CPT | Performed by: NURSE PRACTITIONER

## 2024-04-01 PROCEDURE — 96376 TX/PRO/DX INJ SAME DRUG ADON: CPT

## 2024-04-01 PROCEDURE — S5010 5% DEXTROSE AND 0.45% SALINE: HCPCS | Performed by: EMERGENCY MEDICINE

## 2024-04-01 PROCEDURE — 80048 BASIC METABOLIC PNL TOTAL CA: CPT | Mod: XB | Performed by: NURSE PRACTITIONER

## 2024-04-01 PROCEDURE — 36415 COLL VENOUS BLD VENIPUNCTURE: CPT | Performed by: EMERGENCY MEDICINE

## 2024-04-01 PROCEDURE — 96366 THER/PROPH/DIAG IV INF ADDON: CPT

## 2024-04-01 PROCEDURE — 96361 HYDRATE IV INFUSION ADD-ON: CPT

## 2024-04-01 PROCEDURE — 63600175 PHARM REV CODE 636 W HCPCS: Performed by: STUDENT IN AN ORGANIZED HEALTH CARE EDUCATION/TRAINING PROGRAM

## 2024-04-01 PROCEDURE — 96372 THER/PROPH/DIAG INJ SC/IM: CPT | Performed by: STUDENT IN AN ORGANIZED HEALTH CARE EDUCATION/TRAINING PROGRAM

## 2024-04-01 PROCEDURE — 25000003 PHARM REV CODE 250: Performed by: NURSE PRACTITIONER

## 2024-04-01 PROCEDURE — 63600175 PHARM REV CODE 636 W HCPCS: Performed by: NURSE PRACTITIONER

## 2024-04-01 PROCEDURE — 83935 ASSAY OF URINE OSMOLALITY: CPT | Performed by: NURSE PRACTITIONER

## 2024-04-01 PROCEDURE — C9113 INJ PANTOPRAZOLE SODIUM, VIA: HCPCS | Performed by: NURSE PRACTITIONER

## 2024-04-01 PROCEDURE — 25000003 PHARM REV CODE 250: Performed by: STUDENT IN AN ORGANIZED HEALTH CARE EDUCATION/TRAINING PROGRAM

## 2024-04-01 PROCEDURE — 80048 BASIC METABOLIC PNL TOTAL CA: CPT | Mod: 91,XB | Performed by: STUDENT IN AN ORGANIZED HEALTH CARE EDUCATION/TRAINING PROGRAM

## 2024-04-01 PROCEDURE — 80053 COMPREHEN METABOLIC PANEL: CPT | Performed by: NURSE PRACTITIONER

## 2024-04-01 PROCEDURE — 83735 ASSAY OF MAGNESIUM: CPT | Performed by: NURSE PRACTITIONER

## 2024-04-01 PROCEDURE — 25000003 PHARM REV CODE 250: Performed by: EMERGENCY MEDICINE

## 2024-04-01 RX ORDER — INSULIN ASPART 100 [IU]/ML
0-5 INJECTION, SOLUTION INTRAVENOUS; SUBCUTANEOUS
Status: DISCONTINUED | OUTPATIENT
Start: 2024-04-01 | End: 2024-04-02 | Stop reason: HOSPADM

## 2024-04-01 RX ORDER — IBUPROFEN 200 MG
16 TABLET ORAL
Status: DISCONTINUED | OUTPATIENT
Start: 2024-04-01 | End: 2024-04-02 | Stop reason: HOSPADM

## 2024-04-01 RX ORDER — INSULIN ASPART 100 [IU]/ML
8 INJECTION, SOLUTION INTRAVENOUS; SUBCUTANEOUS
Status: DISCONTINUED | OUTPATIENT
Start: 2024-04-01 | End: 2024-04-02 | Stop reason: HOSPADM

## 2024-04-01 RX ORDER — GLUCAGON 1 MG
1 KIT INJECTION
Status: DISCONTINUED | OUTPATIENT
Start: 2024-04-01 | End: 2024-04-02 | Stop reason: HOSPADM

## 2024-04-01 RX ORDER — IBUPROFEN 200 MG
24 TABLET ORAL
Status: DISCONTINUED | OUTPATIENT
Start: 2024-04-01 | End: 2024-04-02 | Stop reason: HOSPADM

## 2024-04-01 RX ORDER — SODIUM CHLORIDE, SODIUM LACTATE, POTASSIUM CHLORIDE, CALCIUM CHLORIDE 600; 310; 30; 20 MG/100ML; MG/100ML; MG/100ML; MG/100ML
INJECTION, SOLUTION INTRAVENOUS CONTINUOUS
Status: DISCONTINUED | OUTPATIENT
Start: 2024-04-01 | End: 2024-04-02

## 2024-04-01 RX ADMIN — INSULIN DETEMIR 10 UNITS: 100 INJECTION, SOLUTION SUBCUTANEOUS at 10:04

## 2024-04-01 RX ADMIN — HYDROMORPHONE HYDROCHLORIDE 0.5 MG: 1 INJECTION, SOLUTION INTRAMUSCULAR; INTRAVENOUS; SUBCUTANEOUS at 01:04

## 2024-04-01 RX ADMIN — PRAVASTATIN SODIUM 10 MG: 10 TABLET ORAL at 08:04

## 2024-04-01 RX ADMIN — DEXTROSE AND SODIUM CHLORIDE: 5; 450 INJECTION, SOLUTION INTRAVENOUS at 04:04

## 2024-04-01 RX ADMIN — SODIUM CHLORIDE, POTASSIUM CHLORIDE, SODIUM LACTATE AND CALCIUM CHLORIDE: 600; 310; 30; 20 INJECTION, SOLUTION INTRAVENOUS at 12:04

## 2024-04-01 RX ADMIN — PANTOPRAZOLE SODIUM 40 MG: 40 INJECTION, POWDER, FOR SOLUTION INTRAVENOUS at 01:04

## 2024-04-01 RX ADMIN — INSULIN ASPART 8 UNITS: 100 INJECTION, SOLUTION INTRAVENOUS; SUBCUTANEOUS at 01:04

## 2024-04-01 RX ADMIN — INSULIN ASPART 8 UNITS: 100 INJECTION, SOLUTION INTRAVENOUS; SUBCUTANEOUS at 05:04

## 2024-04-01 RX ADMIN — Medication 16 G: at 07:04

## 2024-04-01 RX ADMIN — HYDROMORPHONE HYDROCHLORIDE 0.5 MG: 1 INJECTION, SOLUTION INTRAMUSCULAR; INTRAVENOUS; SUBCUTANEOUS at 05:04

## 2024-04-01 RX ADMIN — HYDROMORPHONE HYDROCHLORIDE 0.5 MG: 1 INJECTION, SOLUTION INTRAMUSCULAR; INTRAVENOUS; SUBCUTANEOUS at 07:04

## 2024-04-01 RX ADMIN — SODIUM CHLORIDE 1000 ML: 9 INJECTION, SOLUTION INTRAVENOUS at 01:04

## 2024-04-01 RX ADMIN — PANTOPRAZOLE SODIUM 40 MG: 40 INJECTION, POWDER, FOR SOLUTION INTRAVENOUS at 08:04

## 2024-04-01 RX ADMIN — OXYCODONE 5 MG: 5 TABLET ORAL at 12:04

## 2024-04-01 NOTE — SUBJECTIVE & OBJECTIVE
"Past Medical History:   Diagnosis Date    Diabetes mellitus     Diabetes mellitus type 1     Diagnosed at age 20       History reviewed. No pertinent surgical history.    Review of patient's allergies indicates:   Allergen Reactions    Lactose Other (See Comments)     Gas and moderate to sever abdominal pain       No current facility-administered medications on file prior to encounter.     Current Outpatient Medications on File Prior to Encounter   Medication Sig    blood sugar diagnostic Strp 3-4 times a day    blood-glucose meter kit Use as instructed    blood-glucose sensor (DEXCOM G6 SENSOR) Martha 1 each by Misc.(Non-Drug; Combo Route) route every 10 days.    blood-glucose transmitter (DEXCOM G6 TRANSMITTER) Martha 1 each by Misc.(Non-Drug; Combo Route) route every 3 (three) months.    blood-glucose transmitter (DEXCOM G6 TRANSMITTER) Martha 1 Device by Misc.(Non-Drug; Combo Route) route 3 (three) times daily with meals.    dicyclomine (BENTYL) 10 MG capsule Take 1 capsule (10 mg total) by mouth as needed.    insulin degludec (TRESIBA FLEXTOUCH U-100) 100 unit/mL (3 mL) insulin pen Inject 13 Units into the skin once daily. ADMINISTER 13 UNITS UNDER THE SKIN EVERY DAY    insulin lispro-aabc (LYUMJEV KWIKPEN U-100 INSULIN) 100 unit/mL pen Inject 7 Units into the skin 3 (three) times daily with meals. Take additional sliding scale based on the blood sugars.  Maximum daily dose of 36 units.    lancets 31 gauge Misc 1 lancet  by Misc.(Non-Drug; Combo Route) route 2 (two) times daily.    lisinopriL 10 MG tablet Take 1 tablet (10 mg total) by mouth once daily.    pen needle, diabetic (BD ULTRA-FINE ROVERTO PEN NEEDLE) 32 gauge x 5/32" Ndle USE AS DIRECTED WITH INSULIN    pen needle, diabetic 29 gauge x 1/2" Ndle Use to inject insulin into the skin.    pravastatin (PRAVACHOL) 10 MG tablet Take 5 mg once a day [half a pill daily]     Family History       Problem Relation (Age of Onset)    Diabetes Mother    Other Mother, Father "          Tobacco Use    Smoking status: Former    Smokeless tobacco: Never   Substance and Sexual Activity    Alcohol use: Not Currently     Comment: socially    Drug use: No    Sexual activity: Not on file     Review of Systems   Constitutional:  Positive for fatigue. Negative for chills, diaphoresis and fever.   HENT:  Negative for congestion, rhinorrhea and sore throat.    Eyes:  Negative for photophobia and visual disturbance.   Respiratory:  Negative for cough, shortness of breath and wheezing.         +GRANT   Cardiovascular:  Positive for chest pain (w/ vomiting). Negative for palpitations and leg swelling.   Gastrointestinal:  Positive for abdominal pain, nausea and vomiting. Negative for abdominal distention, constipation and diarrhea.   Genitourinary:  Negative for dysuria, frequency and hematuria.   Musculoskeletal:  Negative for back pain, gait problem and neck pain.   Skin:  Negative for color change, pallor, rash and wound.   Neurological:  Positive for dizziness and headaches. Negative for syncope and weakness.   Psychiatric/Behavioral:  Negative for confusion and hallucinations. The patient is not nervous/anxious.      Objective:     Vital Signs (Most Recent):  Temp: 98.2 °F (36.8 °C) (03/31/24 1835)  Pulse: 92 (03/31/24 2017)  Resp: 18 (03/31/24 2033)  BP: 130/77 (03/31/24 2017)  SpO2: 98 % (03/31/24 2017) Vital Signs (24h Range):  Temp:  [98.2 °F (36.8 °C)-98.5 °F (36.9 °C)] 98.2 °F (36.8 °C)  Pulse:  [92-96] 92  Resp:  [14-19] 18  SpO2:  [97 %-99 %] 98 %  BP: (130-163)/(77-97) 130/77     Weight: 99.8 kg (220 lb)  Body mass index is 28.25 kg/m².     Physical Exam  Vitals and nursing note reviewed.   Constitutional:       General: He is not in acute distress.     Appearance: He is ill-appearing. He is not toxic-appearing or diaphoretic.   HENT:      Head: Normocephalic and atraumatic.      Nose: Nose normal.      Mouth/Throat:      Mouth: Mucous membranes are dry.   Eyes:      Pupils: Pupils are  "equal, round, and reactive to light.   Cardiovascular:      Rate and Rhythm: Normal rate and regular rhythm.      Pulses: Normal pulses.   Pulmonary:      Effort: Pulmonary effort is normal. No respiratory distress.      Breath sounds: No wheezing, rhonchi or rales.   Abdominal:      General: Bowel sounds are normal. There is no distension.      Palpations: Abdomen is soft.      Tenderness: There is generalized abdominal tenderness. There is no guarding.   Musculoskeletal:         General: Normal range of motion.      Cervical back: Normal range of motion.      Right lower leg: No edema.      Left lower leg: No edema.   Skin:     General: Skin is warm and dry.      Capillary Refill: Capillary refill takes less than 2 seconds.   Neurological:      General: No focal deficit present.      Mental Status: He is alert and oriented to person, place, and time.      Sensory: No sensory deficit.      Motor: No weakness.   Psychiatric:         Mood and Affect: Mood normal.         Behavior: Behavior normal.              CRANIAL NERVES     CN III, IV, VI   Pupils are equal, round, and reactive to light.       Significant Labs: All pertinent labs within the past 24 hours have been reviewed.  ABGs:   Recent Labs   Lab 03/31/24 1910   PH 7.323*   PCO2 53.0*   HCO3 27.5   POCSATURATED 50   BE 1   PO2 29*     CBC:   Recent Labs   Lab 03/31/24 1906 03/31/24 1915   WBC 4.98  --    HGB 16.9  --    HCT 50.4 53     --      CMP:   Recent Labs   Lab 03/31/24 1906      K 5.3*      CO2 23   *   BUN 28*   CREATININE 2.0*   CALCIUM 10.1   PROT 8.3   ALBUMIN 4.2   BILITOT 1.1*   ALKPHOS 81   AST 28   ALT 29   ANIONGAP 16     Cardiac Markers:   Recent Labs   Lab 03/31/24 1906   BNP <10     Troponin:   Recent Labs   Lab 03/31/24 1906   TROPONINI <0.006     Urine Studies: No results for input(s): "COLORU", "APPEARANCEUA", "PHUR", "SPECGRAV", "PROTEINUA", "GLUCUA", "KETONESU", "BILIRUBINUA", "OCCULTUA", "NITRITE", " ""UROBILINOGEN", "LEUKOCYTESUR", "RBCUA", "WBCUA", "BACTERIA", "SQUAMEPITHEL", "HYALINECASTS" in the last 48 hours.    Invalid input(s): "WRIGHTSUR"    Significant Imaging: I have reviewed all pertinent imaging results/findings within the past 24 hours.  "

## 2024-04-01 NOTE — ASSESSMENT & PLAN NOTE
Key History and Diagnostic Findings  T1DM with multiple DKA admissions  Not entirely clear if he is monitoring his blood glucose appropriately at home or how he is making adjustments to his insulin. Suspect DKA etiology is due to missed insulin doses.  During last hospitalization he was well controlled on L15BID and T8JBASL.  A1c 10.3 now is improved from previous but still indicates poor control    Plan  - Start Levemir 10 units BID and Novolog 8 units with meals, which is a lower dose than he was on previously but there is concern for lower blood glucose levels while on the insulin drip. Continue to monitor BG levels overnight to determine appropriate insulin requirement.  - Needs diabetic education and discharge clinic appointments set up prior to discharge  - He reports that he has all the supplies he needs at home including dexcom

## 2024-04-01 NOTE — PROGRESS NOTES
[facility] - Telemetry [facility] Medicine  Progress Note    Patient Name: Andrzej Sinclair  MRN: [id number]  Patient Class: OP- Observation   Admission Date: 3/31/2024  Length of Stay: 0 days  Attending Physician: Darshan Knight MD  Primary Care Provider: Viktoriya Jaeger NP        Subjective:     Principal Problem:Hyperosmolar hyperglycemic state (HHS)        HPI:  Andrzej Sinclair is a 38 y.o. male with a PMHx of HTN, CKD3, GERD, DKA/HHS, and DM1 who presents to the ED with complaints of N/V and generalized abd pain x2 days. The patient endorses associated fatigue, headache, GRANT, dizziness, and chest pain with vomiting. Denies fever/chills. He reports compliance with his home insulin. He denies any recent dietary changes. The patient denies SOB at rest, cough, diarrhea, or dysuria. He endorses marijuana use but states he has not smoked in about a week.    In the ED, patient initially tachycardic otherwise vitals stable, afebrile. CBC unremarkable. K+ 5.3. Cr 2.0 (bl 1.4). Glucose 518. Tbili 1.1. Lipase WNL. Serum osm 335. Beta-hydroxybutyrate 2.9. BNP <10. Troponin <0.006. EKG shows SR with  R atrial enlargement and incomplete R bbb, 93 BPM. pH 7.32. CXR negative for acute process. UA pending. The patient received IV morphine 4mg IV, IV dilaudid 1mg IV, zofran, and was started on an insulin drip and continuous IVF.    Overview/Hospital Course:  Corrected 4/1.    Interval History: Stable overnight. Discharge tomorrow after stabilization.      Objective:     Vital Signs (Most Recent):  Temp: 98.1 °F (36.7 °C) (04/01/24 1605)  Pulse: 97 (04/01/24 1605)  Resp: 18 (04/01/24 1605)  BP: 132/70 (04/01/24 1605)  SpO2: 98 % (04/01/24 1605) Vital Signs (24h Range):  Temp:  [97.2 °F (36.2 °C)-98.8 °F (37.1 °C)] 98.1 °F (36.7 °C)  Pulse:  [] 97  Resp:  [14-19] 18  SpO2:  [96 %-99 %] 98 %  BP: (118-163)/(70-97) 132/70     Weight: 92.9 kg (204 lb 11.2 oz)  Body mass index is 26.28 kg/m².    Intake/Output Summary (Last 24  hours) at 4/1/2024 1710  Last data filed at 4/1/2024 1030  Gross per 24 hour   Intake --   Output 480 ml   Net -480 ml         Physical Exam  Vitals and nursing note reviewed.   Constitutional:       General: He is not in acute distress.     Appearance: Normal appearance. He is not diaphoretic.   HENT:      Head: Normocephalic and atraumatic.      Nose: Nose normal.      Mouth/Throat:      Mouth: Mucous membranes are moist.      Pharynx: Oropharynx is clear.   Eyes:      Pupils: Pupils are equal, round, and reactive to light.   Cardiovascular:      Rate and Rhythm: Normal rate and regular rhythm.      Pulses: Normal pulses.   Pulmonary:      Effort: Pulmonary effort is normal. No respiratory distress.      Breath sounds: No wheezing, rhonchi or rales.   Abdominal:      General: Bowel sounds are normal. There is no distension.      Palpations: Abdomen is soft.      Tenderness: There is no guarding.   Musculoskeletal:         General: Normal range of motion.      Cervical back: Normal range of motion.      Right lower leg: No edema.      Left lower leg: No edema.   Skin:     General: Skin is warm and dry.      Capillary Refill: Capillary refill takes less than 2 seconds.   Neurological:      General: No focal deficit present.      Mental Status: He is alert and oriented to person, place, and time.      Sensory: No sensory deficit.      Motor: No weakness.   Psychiatric:         Mood and Affect: Mood normal.         Behavior: Behavior normal.             Significant Labs: All pertinent labs within the past 24 hours have been reviewed.  CBC:   Recent Labs   Lab 03/31/24  1906 03/31/24  1915 04/01/24  0442   WBC 4.98  --  6.59   HGB 16.9  --  16.3   HCT 50.4 53 47.4     --  182     CMP:   Recent Labs   Lab 03/31/24  1906 03/31/24  2330 04/01/24  0442 04/01/24  0842 04/01/24  1200    140  140 146* 146* 144   K 5.3* 4.6  4.6 3.9 4.1 3.9    103  103 109 108 108   CO2 23 19*  19* 24 26 27   *  "510*  510* 158* 73 81   BUN 28* 33*  33* 31* 26* 25*   CREATININE 2.0* 2.0*  2.0* 1.8* 1.5* 1.5*   CALCIUM 10.1 9.9  9.9 10.4 9.9 9.6   PROT 8.3 7.6 7.2  --   --    ALBUMIN 4.2 3.8 3.9  --   --    BILITOT 1.1* 0.9 0.9  --   --    ALKPHOS 81 81 81  --   --    AST 28 23 17  --   --    ALT 29 27 23  --   --    ANIONGAP 16 18*  18* 13 12 9       Significant Imaging: I have reviewed all pertinent imaging results/findings within the past 24 hours.    Assessment/Plan:      * Hyperosmolar hyperglycemic state ([facility])  Reports compliance with home insulin stating he takes 10-15 of Tresiba daily and takes scheduled lispro "only if my glucose is high enough" if not will only take SSI  -, AG 16, pH 7.32, [id number] , K 5.3, beta hydroxybutyrate 2.9  -Insulin gtt started  -XRAY clear, UA pending  -RSV/flu/COVID negative    - Resolved. Switch to subcu insulin and monitor. Discharge tomorrow.    Mixed hyperlipidemia   Patient is chronically on statin.will continue for now. Monitor clinically. Last LDL was   Lab Results   Component Value Date    LDLCALC 172.8 (H) 04/10/2023       Type 1 diabetes mellitus with complications  -Treatment as above for [facility]    Primary hypertension  Chronic, controlled. Latest blood pressure and vitals reviewed-     Temp:  [98 °F (36.7 °C)-98.5 °F (36.9 °C)]   Pulse:  []   Resp:  [14-19]   BP: (127-163)/(72-97)   SpO2:  [96 %-99 %] .   Home meds for hypertension were reviewed and noted below.   Hypertension Medications               lisinopriL 10 MG tablet Take 1 tablet (10 mg total) by mouth once daily.            While in the hospital, will manage blood pressure as follows; Adjust home antihypertensive regimen as follows- Hold lisinopril in the setting of BETH    Will utilize p.r.n. blood pressure medication only if patient's blood pressure greater than 180/110 and he develops symptoms such as worsening chest pain or shortness of breath.      VTE Risk Mitigation (From admission, onward)       "     Ordered     IP VTE LOW RISK PATIENT  Once         03/31/24 2225     Place sequential compression device  Until discontinued         03/31/24 2225                    Discharge Planning   PRECIOUS:      Code Status: Full Code   Is the patient medically ready for discharge?:     Reason for patient still in hospital (select all that apply): Patient trending condition  Discharge Plan A: Home, Home with family                  Darshan Knight MD  Department of Hospital Medicine   [facility] - Telemetry Stepdown

## 2024-04-01 NOTE — ASSESSMENT & PLAN NOTE
Patient is chronically on statin.will continue for now. Monitor clinically. Last LDL was   Lab Results   Component Value Date    LDLCALC 172.8 (H) 04/10/2023

## 2024-04-01 NOTE — ASSESSMENT & PLAN NOTE
"Reports compliance with home insulin stating he takes 10-15 of Tresiba daily and takes scheduled lispro "only if my glucose is high enough" if not will only take SSI  -, AG 16, pH 7.32, [id number] , K 5.3, beta hydroxybutyrate 2.9  -Insulin gtt started  -XRAY clear, UA pending  -RSV/flu/COVID negative    - Will continue insulin gtt, bolus ns 1 L and follow BMP q4 with accuchecks q 1 hr   - Stop insulin drip once AG closes /bicarb > 17 then start lantus.  - Will switch to D5 1/2 normal saline if Glucose <200 and gap not closed   - Add K to fluids once K < 5.3  - f/u BMP/accuchecks/A1c  - Dietician consulted.  - Endocrinology consulted, appreciate assistance.  "

## 2024-04-01 NOTE — ASSESSMENT & PLAN NOTE
Key History and Diagnostic Findings  Suspected Etiology: Unclear, possibly acute illness? Suspect due to med noncompliance           Lab Results   Component Value Date     BHYDRXBUT 2.9 (H) 03/31/2024     BHYDRXBUT 5.8 (H) 11/10/2023     CO2 27 04/01/2024     CO2 26 04/01/2024     CO2 24 04/01/2024     CO2 19 (L) 03/31/2024     CO2 19 (L) 03/31/2024     ANIONGAP 9 04/01/2024     ANIONGAP 12 04/01/2024     ANIONGAP 13 04/01/2024     ANIONGAP 18 (H) 03/31/2024     ANIONGAP 18 (H) 03/31/2024     CREATININE 1.5 (H) 04/01/2024     CREATININE 1.5 (H) 04/01/2024         Plan  - DKA resolved now, transition this morning basal/bolus and continue monitoring

## 2024-04-01 NOTE — ED NOTES
I-STAT Chem-8+ Results:   Value Reference Range   Sodium 140 136-145 mmol/L   Potassium  5.2 3.5-5.1 mmol/L   Chloride 102  mmol/L   Ionized Calcium 1.19 1.06-1.42 mmol/L   CO2 (measured) 28 23-29 mmol/L   Glucose 507  mg/dL   BUN 30 6-30 mg/dL   Creatinine 1.8 0.5-1.4 mg/dL   Hematocrit 53 36-54%

## 2024-04-01 NOTE — PLAN OF CARE
[facility] - Telemetry Stepdown  Initial Discharge Assessment       Primary Care Provider: Viktoriya Jaeger NP    Admission Diagnosis: Generalized abdominal pain [R10.84]  Type 1 diabetes mellitus with hyperglycemia [E10.65]  Chest pain [R07.9]  Acute kidney injury [N17.9]  Hyperosmolar hyperglycemic state (HHS) [E11.00]  Nausea and vomiting, unspecified vomiting type [R11.2]    Admission Date: 3/31/2024  Expected Discharge Date:          Payor: [facility] MANAGED MCARE / Plan: [facility] DUAL COMPLETE HMO SNP / Product Type: Medicare Advantage /     Extended Emergency Contact Information  Primary Emergency Contact: TORIBIO OLSON   [facility]  Mobile Phone: [phone]  Relation: Spouse    Discharge Plan A: Home, Home with family  Discharge Plan B: Home with family, Home Health (TBD)      [id number] - [place], [place] - [address] AT [address] [facility]  [address] [place] [address]  [place] [address]  Phone: [phone] Fax: [phone]    [facility][id number] - [place], [place] - [address] AT [facility] [address] [facility] [address] [facility]  [address]  [place] [address]  Phone: [phone] Fax: [phone]    [id number] - [place], [place] - [address] [facility] [address] [facility]  [address]  [place] [address]  Phone: [phone] Fax: [phone]      Initial Assessment (most recent)       Adult Discharge Assessment - 04/01/24 1031          Discharge Assessment    Assessment Type Discharge Planning Assessment     Confirmed/corrected address, phone number and insurance Yes     Confirmed Demographics Correct on Facesheet     Source of Information patient     Does patient/caregiver understand observation status Yes     Communicated PRECIOUS with patient/caregiver Date not available/Unable to determine     Reason For Admission Hyperosmolar hyperglycemic state (HHS)      People in Home spouse     Facility Arrived From: Home     Do you expect to return to your current living situation? Yes     Do you have help at home or someone to help you manage your care at home? Yes     Who are your caregiver(s) and their phone number(s)? (Wife) Marzena [phone]     Prior to hospitilization cognitive status: Alert/Oriented     Current cognitive status: Alert/Oriented     Walking or Climbing Stairs Difficulty no     Dressing/Bathing Difficulty no     Home Layout Able to live on 1st floor     Equipment Currently Used at Home none     Readmission within 30 days? No     Patient currently being followed by outpatient case management? No     Do you currently have service(s) that help you manage your care at home? No     Do you take prescription medications? Yes     Do you have prescription coverage? Yes     Coverage [facility] DUAL COMPLETE HMO SNP     Do you have any problems affording any of your prescribed medications? No     Is the patient taking medications as prescribed? yes     Who is going to help you get home at discharge? (Wife) Marzena [phone]     How do you get to doctors appointments? car, drives self;family or friend will provide     Are you on dialysis? No     Do you take coumadin? No     Discharge Plan A Home;Home with family     Discharge Plan B Home with family;Home Health   TBD    DME Needed Upon Discharge  --   TBD    Discharge Plan discussed with: Patient        Physical Activity    On average, how many days per week do you engage in moderate to strenuous exercise (like a brisk walk)? 7 days     On average, how many minutes do you engage in exercise at this level? 20 min        Financial Resource Strain    How hard is it for you to pay for the very basics like food, housing, medical care, and heating? Not hard at all        Housing Stability    In the last 12 months, was there a time when you were not able to pay the mortgage or rent on time?  No     In the last 12 months, was there a time when you did not have a steady place to sleep or slept in a shelter (including now)? No        Transportation Needs    In the past 12 months, has lack of transportation kept you from medical appointments or from getting medications? No     In the past 12 months, has lack of transportation kept you from meetings, work, or from getting things needed for daily living? No        Food Insecurity    Within the past 12 months, you worried that your food would run out before you got the money to buy more. Patient declined     Within the past 12 months, the food you bought just didn't last and you didn't have money to get more. Patient declined        Stress    Do you feel stress - tense, restless, nervous, or anxious, or unable to sleep at night because your mind is troubled all the time - these days? --   Pt stated everyday stressor varies (up and down)       Social Connections    In a typical week, how many times do you talk on the phone with family, friends, or neighbors? More than three times a week     How often do you get together with friends or relatives? Once a week     How often do you attend Shinto or Church services? Patient declined     Do you belong to any clubs or organizations such as Shinto groups, unions, fraternal or athletic groups, or school groups? Patient declined     How often do you attend meetings of the clubs or organizations you belong to? Patient declined     Are you , , , , never , or living with a partner?         Alcohol Use    Q1: How often do you have a drink containing alcohol? Patient unable to answer     Q2: How many drinks containing alcohol do you have on a typical day when you are drinking? Patient declined     Q3: How often do you have six or more drinks on one occasion? Patient declined                   SW met with patient and in room to complete DPA. Questions answered / contact numbers  provided.  Use PREFERRED PHARMACY / BEDSIDE DELIVERY for any necessary medications at time of discharge. Pt is independent with all ADLs - does not use DME, In-home equipment, is not on HD, BTs or home oxygen. Pt stated his wife- Igor will be assisting with help upon discharge. He stated also, his wife will be providing transportation home. Hospital follow up will be scheduled with PCP. Will continue to follow for course of hospitalization.     Discharge Plan A and Plan B have been determined by review of patient's clinical status, future medical and therapeutic needs, and coverage/benefits for post-acute care in coordination with multidisciplinary team members.    PIETRO Sky   [facility]    Case Management Dept  [phone]

## 2024-04-01 NOTE — PLAN OF CARE
Problem: Adult Inpatient Plan of Care  Goal: Plan of Care Review  Outcome: Ongoing, Progressing  Flowsheets (Taken 4/1/2024 0606)  Plan of Care Reviewed With: patient  Goal: Optimal Comfort and Wellbeing  Outcome: Ongoing, Progressing  Intervention: Monitor Pain and Promote Comfort  Flowsheets (Taken 4/1/2024 0606)  Pain Management Interventions:   medication offered   pain management plan reviewed with patient/caregiver  Intervention: Provide Person-Centered Care  Flowsheets (Taken 4/1/2024 0606)  Trust Relationship/Rapport:   care explained   reassurance provided   choices provided   thoughts/feelings acknowledged   emotional support provided   empathic listening provided   questions answered   questions encouraged     Problem: Diabetic Ketoacidosis  Goal: Fluid and Electrolyte Balance with Absence of Ketosis  Outcome: Ongoing, Progressing  Intervention: Monitor and Manage Ketoacidosis  Flowsheets (Taken 4/1/2024 0606)  Fluid/Electrolyte Management: intravenous fluid replacement initiated  Glycemic Management:   blood glucose monitored   insulin infusion adjusted     Problem: Diabetes Comorbidity  Goal: Blood Glucose Level Within Targeted Range  Outcome: Ongoing, Progressing  Intervention: Monitor and Manage Glycemia  Flowsheets (Taken 4/1/2024 0606)  Glycemic Management:   blood glucose monitored   insulin infusion adjusted     Problem: Fluid and Electrolyte Imbalance (Acute Kidney Injury/Impairment)  Goal: Fluid and Electrolyte Balance  Outcome: Ongoing, Progressing  Intervention: Monitor and Manage Fluid and Electrolyte Balance  Flowsheets (Taken 4/1/2024 0606)  Fluid/Electrolyte Management: intravenous fluid replacement initiated

## 2024-04-01 NOTE — H&P
[facility] - Emergency Dept  [facility]  History & Physical    Patient Name: Andrzej Sinclair  MRN: [id number]  Patient Class: OP- Observation  Admission Date: 3/31/2024  Attending Physician: Cornel Ventura MD   Primary Care Provider: Viktoriya Jaeger NP         Patient information was obtained from patient, past medical records, and ER records.     Subjective:     Principal Problem:Hyperosmolar hyperglycemic state (HHS)    Chief Complaint:   Chief Complaint   Patient presents with    Abdominal Pain     10/10 abd pain dark in color, started Friday.        HPI: Andrzej Sinclair is a 38 y.o. male with a PMHx of HTN, CKD3, GERD, DKA/HHS, and DM1 who presents to the ED with complaints of N/V and generalized abd pain x2 days. The patient endorses associated fatigue, headache, GRANT, dizziness, and chest pain with vomiting. Denies fever/chills. He reports compliance with his home insulin. He denies any recent dietary changes. The patient denies SOB at rest, cough, diarrhea, or dysuria. He endorses marijuana use but states he has not smoked in about a week.    In the ED, patient initially tachycardic otherwise vitals stable, afebrile. CBC unremarkable. K+ 5.3. Cr 2.0 (bl 1.4). Glucose 518. Tbili 1.1. Lipase WNL. Serum osm 335. Beta-hydroxybutyrate 2.9. BNP <10. Troponin <0.006. EKG shows SR with  R atrial enlargement and incomplete R bbb, 93 BPM. pH 7.32. CXR negative for acute process. UA pending. The patient received IV morphine 4mg IV, IV dilaudid 1mg IV, zofran, and was started on an insulin drip and continuous IVF.    Past Medical History:   Diagnosis Date    Diabetes mellitus     Diabetes mellitus type 1     Diagnosed at age 20       History reviewed. No pertinent surgical history.    Review of patient's allergies indicates:   Allergen Reactions    Lactose Other (See Comments)     Gas and moderate to sever abdominal pain       No current facility-administered medications on file prior to encounter.     Current  "Outpatient Medications on File Prior to Encounter   Medication Sig    blood sugar diagnostic Strp 3-4 times a day    blood-glucose meter kit Use as instructed    blood-glucose sensor (DEXCOM G6 SENSOR) Martha 1 each by Misc.(Non-Drug; Combo Route) route every 10 days.    blood-glucose transmitter (DEXCOM G6 TRANSMITTER) Martha 1 each by Misc.(Non-Drug; Combo Route) route every 3 (three) months.    blood-glucose transmitter (DEXCOM G6 TRANSMITTER) Martha 1 Device by Misc.(Non-Drug; Combo Route) route 3 (three) times daily with meals.    dicyclomine (BENTYL) 10 MG capsule Take 1 capsule (10 mg total) by mouth as needed.    insulin degludec (TRESIBA FLEXTOUCH U-100) 100 unit/mL (3 mL) insulin pen Inject 13 Units into the skin once daily. ADMINISTER 13 UNITS UNDER THE SKIN EVERY DAY    insulin lispro-aabc (LYUMJEV KWIKPEN U-100 INSULIN) 100 unit/mL pen Inject 7 Units into the skin 3 (three) times daily with meals. Take additional sliding scale based on the blood sugars.  Maximum daily dose of 36 units.    lancets 31 gauge Misc 1 lancet  by Misc.(Non-Drug; Combo Route) route 2 (two) times daily.    lisinopriL 10 MG tablet Take 1 tablet (10 mg total) by mouth once daily.    pen needle, diabetic (BD ULTRA-FINE ROVERTO PEN NEEDLE) 32 gauge x 5/32" Ndle USE AS DIRECTED WITH INSULIN    pen needle, diabetic 29 gauge x 1/2" Ndle Use to inject insulin into the skin.    pravastatin (PRAVACHOL) 10 MG tablet Take 5 mg once a day [half a pill daily]     Family History       Problem Relation (Age of Onset)    Diabetes Mother    Other Mother, Father          Tobacco Use    Smoking status: Former    Smokeless tobacco: Never   Substance and Sexual Activity    Alcohol use: Not Currently     Comment: socially    Drug use: No    Sexual activity: Not on file     Review of Systems   Constitutional:  Positive for fatigue. Negative for chills, diaphoresis and fever.   HENT:  Negative for congestion, rhinorrhea and sore throat.    Eyes:  Negative for " photophobia and visual disturbance.   Respiratory:  Negative for cough, shortness of breath and wheezing.         +GRANT   Cardiovascular:  Positive for chest pain (w/ vomiting). Negative for palpitations and leg swelling.   Gastrointestinal:  Positive for abdominal pain, nausea and vomiting. Negative for abdominal distention, constipation and diarrhea.   Genitourinary:  Negative for dysuria, frequency and hematuria.   Musculoskeletal:  Negative for back pain, gait problem and neck pain.   Skin:  Negative for color change, pallor, rash and wound.   Neurological:  Positive for dizziness and headaches. Negative for syncope and weakness.   Psychiatric/Behavioral:  Negative for confusion and hallucinations. The patient is not nervous/anxious.      Objective:     Vital Signs (Most Recent):  Temp: 98.2 °F (36.8 °C) (03/31/24 1835)  Pulse: 92 (03/31/24 2017)  Resp: 18 (03/31/24 2033)  BP: 130/77 (03/31/24 2017)  SpO2: 98 % (03/31/24 2017) Vital Signs (24h Range):  Temp:  [98.2 °F (36.8 °C)-98.5 °F (36.9 °C)] 98.2 °F (36.8 °C)  Pulse:  [92-96] 92  Resp:  [14-19] 18  SpO2:  [97 %-99 %] 98 %  BP: (130-163)/(77-97) 130/77     Weight: 99.8 kg (220 lb)  Body mass index is 28.25 kg/m².     Physical Exam  Vitals and nursing note reviewed.   Constitutional:       General: He is not in acute distress.     Appearance: He is ill-appearing. He is not toxic-appearing or diaphoretic.   HENT:      Head: Normocephalic and atraumatic.      Nose: Nose normal.      Mouth/Throat:      Mouth: Mucous membranes are dry.   Eyes:      Pupils: Pupils are equal, round, and reactive to light.   Cardiovascular:      Rate and Rhythm: Normal rate and regular rhythm.      Pulses: Normal pulses.   Pulmonary:      Effort: Pulmonary effort is normal. No respiratory distress.      Breath sounds: No wheezing, rhonchi or rales.   Abdominal:      General: Bowel sounds are normal. There is no distension.      Palpations: Abdomen is soft.      Tenderness: There is  "generalized abdominal tenderness. There is no guarding.   Musculoskeletal:         General: Normal range of motion.      Cervical back: Normal range of motion.      Right lower leg: No edema.      Left lower leg: No edema.   Skin:     General: Skin is warm and dry.      Capillary Refill: Capillary refill takes less than 2 seconds.   Neurological:      General: No focal deficit present.      Mental Status: He is alert and oriented to person, place, and time.      Sensory: No sensory deficit.      Motor: No weakness.   Psychiatric:         Mood and Affect: Mood normal.         Behavior: Behavior normal.              CRANIAL NERVES     CN III, IV, VI   Pupils are equal, round, and reactive to light.       Significant Labs: All pertinent labs within the past 24 hours have been reviewed.  ABGs:   Recent Labs   Lab 03/31/24 1910   PH 7.323*   PCO2 53.0*   HCO3 27.5   POCSATURATED 50   BE 1   PO2 29*     CBC:   Recent Labs   Lab 03/31/24 1906 03/31/24 1915   WBC 4.98  --    HGB 16.9  --    HCT 50.4 53     --      CMP:   Recent Labs   Lab 03/31/24 1906      K 5.3*      CO2 23   *   BUN 28*   CREATININE 2.0*   CALCIUM 10.1   PROT 8.3   ALBUMIN 4.2   BILITOT 1.1*   ALKPHOS 81   AST 28   ALT 29   ANIONGAP 16     Cardiac Markers:   Recent Labs   Lab 03/31/24 1906   BNP <10     Troponin:   Recent Labs   Lab 03/31/24 1906   TROPONINI <0.006     Urine Studies: No results for input(s): "COLORU", "APPEARANCEUA", "PHUR", "SPECGRAV", "PROTEINUA", "GLUCUA", "KETONESU", "BILIRUBINUA", "OCCULTUA", "NITRITE", "UROBILINOGEN", "LEUKOCYTESUR", "RBCUA", "WBCUA", "BACTERIA", "SQUAMEPITHEL", "HYALINECASTS" in the last 48 hours.    Invalid input(s): "WRIGHTSUR"    Significant Imaging: I have reviewed all pertinent imaging results/findings within the past 24 hours.  Assessment/Plan:     * Hyperosmolar hyperglycemic state (HHS)  Reports compliance with home insulin stating he takes 10-15 of Tresiba daily and takes " "scheduled lispro "only if my glucose is high enough" if not will only take SSI  -, AG 16, pH 7.32, [id number] , K 5.3, beta hydroxybutyrate 2.9  -Insulin gtt started  -XRAY clear, UA pending  -RSV/flu/COVID negative    - Will continue insulin gtt, bolus ns 1 L and follow BMP q4 with accuchecks q 1 hr   - Stop insulin drip once AG closes /bicarb > 17 then start lantus.  - Will switch to D5 1/2 normal saline if Glucose <200 and gap not closed   - Add K to fluids once K < 5.3  - f/u BMP/accuchecks/A1c  - Dietician consulted.  - Endocrinology consulted, appreciate assistance.    Acute renal failure superimposed on stage 3 chronic kidney disease  Patient with acute kidney injury/acute renal failure likely due to pre-renal azotemia due to dehydration BETH is currently stable. Baseline creatinine  1.4  - Labs reviewed- Renal function/electrolytes with Estimated Creatinine Clearance: 63.8 mL/min (A) (based on SCr of 2 mg/dL (H)). according to latest data. Monitor urine output and serial BMP and adjust therapy as needed. Avoid nephrotoxins and renally dose meds for GFR listed above.    Intractable vomiting with nausea  Abdominal pain   Cannabinoid hyperemesis syndrome   Appears to be a chronic issue. Likely multifactorial HHS, cannabinoid hyperemesis syndrome, and possible gastroparesis.  -UDS pending.  -IVF  -Restart protonix.  -PRN antiemetics  -Currently tolerating sugar free clears  -Will limit narcotics as much as possible    Mixed hyperlipidemia   Patient is chronically on statin.will continue for now. Monitor clinically. Last LDL was   Lab Results   Component Value Date    LDLCALC 172.8 (H) 04/10/2023       Hyperkalemia  This patient has hyperkalemia which is uncontrolled. We will monitor for arrhythmias with EKG or continuous telemetry. We will treat the hyperkalemia with  IVF and IV insulin . The likely etiology of the hyperkalemia is  BETH and HHS .  The patients latest potassium has been reviewed and the results " are listed below  Recent Labs   Lab 03/31/24  1906   K 5.3*   BMP q4      Type 1 diabetes mellitus with complications  -Treatment as above for HHS    Primary hypertension  Chronic, controlled. Latest blood pressure and vitals reviewed-     Temp:  [98 °F (36.7 °C)-98.5 °F (36.9 °C)]   Pulse:  []   Resp:  [14-19]   BP: (127-163)/(72-97)   SpO2:  [96 %-99 %] .   Home meds for hypertension were reviewed and noted below.   Hypertension Medications               lisinopriL 10 MG tablet Take 1 tablet (10 mg total) by mouth once daily.            While in the hospital, will manage blood pressure as follows; Adjust home antihypertensive regimen as follows- Hold lisinopril in the setting of BETH    Will utilize p.r.n. blood pressure medication only if patient's blood pressure greater than 180/110 and he develops symptoms such as worsening chest pain or shortness of breath.      VTE Risk Mitigation (From admission, onward)      None                 On 03/31/2024, patient should be placed in hospital observation services under my care in collaboration with Cornel Ventura MD.           Rachael Handy NP  Department of Hospital Medicine  [facility] Dept

## 2024-04-01 NOTE — HOSPITAL COURSE
Corrected 4/1.  Plan for discharge today 4/2 with adjustments to insulin regimen per recommendations of endocrinology.

## 2024-04-01 NOTE — ASSESSMENT & PLAN NOTE
Patient with acute kidney injury/acute renal failure likely due to pre-renal azotemia due to dehydration BETH is currently stable. Baseline creatinine  1.4  - Labs reviewed- Renal function/electrolytes with Estimated Creatinine Clearance: 63.8 mL/min (A) (based on SCr of 2 mg/dL (H)). according to latest data. Monitor urine output and serial BMP and adjust therapy as needed. Avoid nephrotoxins and renally dose meds for GFR listed above.

## 2024-04-01 NOTE — PROVIDER PROGRESS NOTES - EMERGENCY DEPT.
Signout Note    I received signout from the previous provider.     Chief complaint:  Abdominal Pain (10/10 abd pain dark in color, started Friday.)      Pertinent history &exam:  Andrzej Sinclair is a 38 y.o. male with pertinent PMH of type 1 diabetes who presented to emergency department with complaint of abdominal pain and vomiting.  He was found to have elevated blood glucose, anion gap of 16, elevated beta hydroxybutyrate and Mota, but no significant acidosis.  Also with acute kidney injury.  Receiving fluids.  Will start insulin drip.  Signed out pending Hospital Medicine admission.    Vitals:    03/31/24 2033   BP:    Pulse:    Resp: 18   Temp:        Imaging Studies:    X-Ray Chest AP Portable   Final Result      No acute process.         Electronically signed by: Yasmani Veloz MD   Date:    03/31/2024   Time:    20:01          Medications Given:  Medications   sodium chloride 0.9% flush 10 mL (has no administration in time range)   0.9%  NaCl infusion (has no administration in time range)   dextrose 5 % and 0.45 % NaCl infusion (has no administration in time range)   insulin regular in 0.9 % NaCl 100 unit/100 mL (1 unit/mL) infusion (has no administration in time range)   dextrose 10% bolus 125 mL 125 mL (has no administration in time range)   dextrose 10% bolus 250 mL 250 mL (has no administration in time range)   ondansetron injection 4 mg (4 mg Intravenous Given 3/31/24 1908)   morphine injection 4 mg (4 mg Intravenous Given 3/31/24 1908)   HYDROmorphone injection 1 mg (1 mg Intravenous Given 3/31/24 2033)       Pending Items/ MDM:  38 y.o. male with above complaints.  Will start on insulin drip and admit to Hospital Medicine.    Diagnostic Impression:    1. Hyperosmolar hyperglycemic state (HHS)    2. Chest pain    3. Generalized abdominal pain    4. Nausea and vomiting, unspecified vomiting type    5. Type 1 diabetes mellitus with hyperglycemia    6. Acute kidney injury         ED Disposition Condition     Observation Stable               Barbara Dove MD  Emergency Medicine

## 2024-04-01 NOTE — SUBJECTIVE & OBJECTIVE
Interval History: Stable overnight. Discharge tomorrow after stabilization.      Objective:     Vital Signs (Most Recent):  Temp: 98.1 °F (36.7 °C) (04/01/24 1605)  Pulse: 97 (04/01/24 1605)  Resp: 18 (04/01/24 1605)  BP: 132/70 (04/01/24 1605)  SpO2: 98 % (04/01/24 1605) Vital Signs (24h Range):  Temp:  [97.2 °F (36.2 °C)-98.8 °F (37.1 °C)] 98.1 °F (36.7 °C)  Pulse:  [] 97  Resp:  [14-19] 18  SpO2:  [96 %-99 %] 98 %  BP: (118-163)/(70-97) 132/70     Weight: 92.9 kg (204 lb 11.2 oz)  Body mass index is 26.28 kg/m².    Intake/Output Summary (Last 24 hours) at 4/1/2024 1710  Last data filed at 4/1/2024 1030  Gross per 24 hour   Intake --   Output 480 ml   Net -480 ml         Physical Exam  Vitals and nursing note reviewed.   Constitutional:       General: He is not in acute distress.     Appearance: Normal appearance. He is not diaphoretic.   HENT:      Head: Normocephalic and atraumatic.      Nose: Nose normal.      Mouth/Throat:      Mouth: Mucous membranes are moist.      Pharynx: Oropharynx is clear.   Eyes:      Pupils: Pupils are equal, round, and reactive to light.   Cardiovascular:      Rate and Rhythm: Normal rate and regular rhythm.      Pulses: Normal pulses.   Pulmonary:      Effort: Pulmonary effort is normal. No respiratory distress.      Breath sounds: No wheezing, rhonchi or rales.   Abdominal:      General: Bowel sounds are normal. There is no distension.      Palpations: Abdomen is soft.      Tenderness: There is no guarding.   Musculoskeletal:         General: Normal range of motion.      Cervical back: Normal range of motion.      Right lower leg: No edema.      Left lower leg: No edema.   Skin:     General: Skin is warm and dry.      Capillary Refill: Capillary refill takes less than 2 seconds.   Neurological:      General: No focal deficit present.      Mental Status: He is alert and oriented to person, place, and time.      Sensory: No sensory deficit.      Motor: No weakness.    Psychiatric:         Mood and Affect: Mood normal.         Behavior: Behavior normal.             Significant Labs: All pertinent labs within the past 24 hours have been reviewed.  CBC:   Recent Labs   Lab 03/31/24 1906 03/31/24  1915 04/01/24  0442   WBC 4.98  --  6.59   HGB 16.9  --  16.3   HCT 50.4 53 47.4     --  182     CMP:   Recent Labs   Lab 03/31/24 1906 03/31/24  2330 04/01/24 0442 04/01/24  0842 04/01/24  1200    140  140 146* 146* 144   K 5.3* 4.6  4.6 3.9 4.1 3.9    103  103 109 108 108   CO2 23 19*  19* 24 26 27   * 510*  510* 158* 73 81   BUN 28* 33*  33* 31* 26* 25*   CREATININE 2.0* 2.0*  2.0* 1.8* 1.5* 1.5*   CALCIUM 10.1 9.9  9.9 10.4 9.9 9.6   PROT 8.3 7.6 7.2  --   --    ALBUMIN 4.2 3.8 3.9  --   --    BILITOT 1.1* 0.9 0.9  --   --    ALKPHOS 81 81 81  --   --    AST 28 23 17  --   --    ALT 29 27 23  --   --    ANIONGAP 16 18*  18* 13 12 9       Significant Imaging: I have reviewed all pertinent imaging results/findings within the past 24 hours.

## 2024-04-01 NOTE — NURSING
Nurses Note -- 4 Eyes      3/31/24  2330      Skin assessed during: Admit      [x] No Altered Skin Integrity Present    []Prevention Measures Documented      [] Yes- Altered Skin Integrity Present or Discovered   [] LDA Added if Not in Epic (Describe Wound)   [] New Altered Skin Integrity was Present on Admit and Documented in LDA   [] Wound Image Taken    Wound Care Consulted? No    Attending Nurse:  JENNIFER Galvan    Second RN/Staff Member:  JENNIFER Mg

## 2024-04-01 NOTE — CONSULTS
Nutrition-Related Diabetes Education      Time Spent: 5 minutes    Learners: Patient     Current HbA1c: 10.3    Is patient aware of their A1c and their goal A1c? Yes    Nutrition Education with handouts: MyPlate, CHO counting    Comments: Pt familiar with diabetic diet. Provided and explained handout detailing sources of carbohydrates, appropriate serving sizes, and the plate method for meal planning. Pt voiced understanding. All questions and concerns answered.    Pt tolerating clear liquid diet. Reports good appetite PTA & UBW of 200#. Appears nourished w/ no indicators of malnutrition.     Barriers to Learning: Non-compliance     Follow up: Yes    Please consult as needed.    Thank you!  MS Viji, RD, LDN

## 2024-04-01 NOTE — ASSESSMENT & PLAN NOTE
Chronic, controlled. Latest blood pressure and vitals reviewed-     Temp:  [98 °F (36.7 °C)-98.5 °F (36.9 °C)]   Pulse:  []   Resp:  [14-19]   BP: (127-163)/(72-97)   SpO2:  [96 %-99 %] .   Home meds for hypertension were reviewed and noted below.   Hypertension Medications               lisinopriL 10 MG tablet Take 1 tablet (10 mg total) by mouth once daily.            While in the hospital, will manage blood pressure as follows; Adjust home antihypertensive regimen as follows- Hold lisinopril in the setting of BETH    Will utilize p.r.n. blood pressure medication only if patient's blood pressure greater than 180/110 and he develops symptoms such as worsening chest pain or shortness of breath.

## 2024-04-01 NOTE — CONSULTS
[facility] - Telemetry Stepdown  Endocrinology  Diabetes Consult Note    Consult Requested by: Darshan Knight MD   Reason for admit: Hyperosmolar hyperglycemic state (HHS)    HISTORY OF PRESENT ILLNESS:  Andrzej Sinclair is a 37 year old male with PMH significant for poorly controlled type 1 DM c/b frequent DKA, HTN, CKD3 and GERD who presented to the hospital with complaint of nausea, vomiting and abdominal pain for 2 days. He recently started working at [employer] and thinks that he may have gotten sick since he started working there. He denies polydipsia or polyuria. He reports that he is taking Toujeo 17 units daily and between 16 and 25 units of mealtime insulin based on his blood glucose. He reports using dexcom to check his blood sugar and occasional finger sticks for confirmation. He states that he has enough supplies at home.    He was last admitted for DKA in 2023. He was discharged with 22 units of Tresiba and 10 units of Lyumjev with meals. He followed up with Dr. Cazares in the outpatient office after that hospitalization but no changes were made to his insulin regimen. Patient states that he made these adjustments to his insulin regimen on his own.    In the ED, patient was tachycardic but otherwise vitals stable, Labs showed Glucose 518.BHOB 2.9 Serum osm 335. pH 7.32. The patient was treated with insulin infusion per DKA protocol and aggressive hydration. Acidosis has since resolved and anion gap is closed.        Regarding Type 1 DM     Diagnosed: Age 20  Last hemoglobin A1c: 10.3 24  Last seen with Endocrinology: 2023 with Dr. Cazares  Last Hospitalization for DKA: 2023  Home regimen: Tresiba 17 units daily, Lyumjev 7 units with meals         Interval HPI:   Overnight events:  Eatin%  Nausea: No  Hypoglycemia and intervention: No  Fever: No  TPN and/or TF: No    PMH, PSH, FH, SH updated and reviewed     ROS:    Review of Systems   Constitutional:  Negative for fever.    Gastrointestinal:  Negative for abdominal pain, nausea and vomiting.   Endocrine: Negative for cold intolerance, heat intolerance, polydipsia and polyuria.   Neurological:  Positive for weakness.       Current Medications and/or Treatments Impacting Glycemic Control  Immunotherapy:    Immunosuppressants       None          Steroids:   Hormones (From admission, onward)      Start     Stop Route Frequency Ordered    03/31/24 2324  melatonin tablet 6 mg         -- Oral Nightly PRN 03/31/24 2225          Pressors:    Autonomic Drugs (From admission, onward)      None          Hyperglycemia/Diabetes Medications:   Antihyperglycemics (From admission, onward)      Start     Stop Route Frequency Ordered    04/01/24 1130  insulin aspart U-100 pen 8 Units         -- SubQ 3 times daily with meals 04/01/24 1001    04/01/24 1058  insulin aspart U-100 pen 0-5 Units         -- SubQ Before meals & nightly PRN 04/01/24 1001    04/01/24 1015  insulin detemir U-100 (Levemir) pen 10 Units         -- SubQ 2 times daily 04/01/24 1001             PHYSICAL EXAMINATION:  Vitals:    04/01/24 1127   BP: 130/81   Pulse: 94   Resp: 18   Temp: 98.8 °F (37.1 °C)     Body mass index is 26.28 kg/m².     Physical Exam  Constitutional:       General: He is not in acute distress.     Appearance: He is not ill-appearing.   Eyes:      Conjunctiva/sclera: Conjunctivae normal.   Cardiovascular:      Rate and Rhythm: Normal rate.   Pulmonary:      Effort: Pulmonary effort is normal. No respiratory distress.   Neurological:      Mental Status: He is alert. Mental status is at baseline.   Psychiatric:         Mood and Affect: Mood normal.         Behavior: Behavior normal.            Labs Reviewed and Include   Recent Labs   Lab 04/01/24  0442 04/01/24  0842 04/01/24  1200   *   < > 81   CALCIUM 10.4   < > 9.6   ALBUMIN 3.9  --   --    PROT 7.2  --   --    *   < > 144   K 3.9   < > 3.9   CO2 24   < > 27      < > 108   BUN 31*   < > 25*  "  CREATININE 1.8*   < > 1.5*   ALKPHOS 81  --   --    ALT 23  --   --    AST 17  --   --    BILITOT 0.9  --   --     < > = values in this interval not displayed.     Lab Results   Component Value Date    WBC 6.59 04/01/2024    HGB 16.3 04/01/2024    HCT 47.4 04/01/2024    MCV 88 04/01/2024     04/01/2024     No results for input(s): "TSH", "FREET4" in the last 168 hours.  Lab Results   Component Value Date    HGBA1C 10.3 (H) 03/31/2024       Nutritional status:   Body mass index is 26.28 kg/m².  Lab Results   Component Value Date    ALBUMIN 3.9 04/01/2024    ALBUMIN 3.8 03/31/2024    ALBUMIN 4.2 03/31/2024     No results found for: "PREALBUMIN"    Estimated Creatinine Clearance: 78.4 mL/min (A) (based on SCr of 1.5 mg/dL (H)).    Accu-Checks  Recent Labs     04/01/24  0121 04/01/24  0226 04/01/24  0329 04/01/24  0433 04/01/24  0535 04/01/24  0636 04/01/24  0743 04/01/24  0856 04/01/24  0947 04/01/24  1229   POCTGLUCOSE 399* 316* 232* 147* 116* 108 82 81 75 82        ASSESSMENT and PLAN    Endocrine  Type 1 diabetes mellitus with complications  Key History and Diagnostic Findings  T1DM with multiple DKA admissions  Not entirely clear if he is monitoring his blood glucose appropriately at home or how he is making adjustments to his insulin. Suspect DKA etiology is due to missed insulin doses.  During last hospitalization he was well controlled on L15BID and B1GBUGE.  A1c 10.3 now is improved from previous but still indicates poor control    Plan  - Start Levemir 10 units BID and Novolog 8 units with meals, which is a lower dose than he was on previously but there is concern for lower blood glucose levels while on the insulin drip. Continue to monitor BG levels overnight to determine appropriate insulin requirement.  - Needs diabetic education and discharge clinic appointments set up prior to discharge  - He reports that he has all the supplies he needs at home including dexcom        Diabetic ketoacidosis " without coma associated with type 1 diabetes mellitus  Key History and Diagnostic Findings  Suspected Etiology: Unclear, possibly acute illness? Suspect due to med noncompliance           Lab Results   Component Value Date     BHYDRXBUT 2.9 (H) 03/31/2024     BHYDRXBUT 5.8 (H) 11/10/2023     CO2 27 04/01/2024     CO2 26 04/01/2024     CO2 24 04/01/2024     CO2 19 (L) 03/31/2024     CO2 19 (L) 03/31/2024     ANIONGAP 9 04/01/2024     ANIONGAP 12 04/01/2024     ANIONGAP 13 04/01/2024     ANIONGAP 18 (H) 03/31/2024     ANIONGAP 18 (H) 03/31/2024     CREATININE 1.5 (H) 04/01/2024     CREATININE 1.5 (H) 04/01/2024         Plan  - DKA resolved now, transition this morning basal/bolus and continue monitoring      GI  Intractable vomiting with nausea  In the setting of DKA  Start diet now          Plan discussed with patient, family, and RN at bedside.     Juwan Humphreys DO  Endocrinology  [facility] - Tele[facility] Stepdown

## 2024-04-01 NOTE — ASSESSMENT & PLAN NOTE
"Reports compliance with home insulin stating he takes 10-15 of Tresiba daily and takes scheduled lispro "only if my glucose is high enough" if not will only take SSI  -, AG 16, pH 7.32, [id number] , K 5.3, beta hydroxybutyrate 2.9  -Insulin gtt started  -XRAY clear, UA pending  -RSV/flu/COVID negative    - Resolved. Switch to subcu insulin and monitor. Discharge tomorrow.  "

## 2024-04-01 NOTE — PLAN OF CARE
Problem: Adult Inpatient Plan of Care  Goal: Plan of Care Review  Outcome: Ongoing, Progressing  Goal: Patient-Specific Goal (Individualized)  Outcome: Ongoing, Progressing  Goal: Absence of Hospital-Acquired Illness or Injury  Outcome: Ongoing, Progressing  Goal: Optimal Comfort and Wellbeing  Outcome: Ongoing, Progressing  Goal: Readiness for Transition of Care  Outcome: Ongoing, Progressing     Problem: Diabetic Ketoacidosis  Goal: Fluid and Electrolyte Balance with Absence of Ketosis  Outcome: Ongoing, Progressing     Problem: Diabetes Comorbidity  Goal: Blood Glucose Level Within Targeted Range  Outcome: Ongoing, Progressing     Problem: Fluid and Electrolyte Imbalance (Acute Kidney Injury/Impairment)  Goal: Fluid and Electrolyte Balance  Outcome: Ongoing, Progressing     Problem: Oral Intake Inadequate (Acute Kidney Injury/Impairment)  Goal: Optimal Nutrition Intake  Outcome: Ongoing, Progressing     Problem: Renal Function Impairment (Acute Kidney Injury/Impairment)  Goal: Effective Renal Function  Outcome: Ongoing, Progressing      Yes

## 2024-04-01 NOTE — HPI
Andrzej Sinclair is a 37 year old male with PMH significant for poorly controlled type 1 DM c/b frequent DKA, HTN, CKD3 and GERD who presented to the hospital with complaint of nausea, vomiting and abdominal pain for 2 days. He recently started working at [employer] and thinks that he may have gotten sick since he started working there. He denies polydipsia or polyuria. He reports that he is taking Toujeo 17 units daily and between 16 and 25 units of mealtime insulin based on his blood glucose. He reports using dexcom to check his blood sugar and occasional finger sticks for confirmation. He states that he has enough supplies at home.    He was last admitted for DKA in November 2023. He was discharged with 22 units of Tresiba and 10 units of Lyumjev with meals. He followed up with Dr. Cazares in the outpatient office after that hospitalization but no changes were made to his insulin regimen. Patient states that he made these adjustments to his insulin regimen on his own.    In the ED, patient was tachycardic but otherwise vitals stable, Labs showed Glucose 518.BHOB 2.9 Serum osm 335. pH 7.32. The patient was treated with insulin infusion per DKA protocol and aggressive hydration. Acidosis has since resolved and anion gap is closed.        Regarding Type 1 DM     Diagnosed: Age 20  Last hemoglobin A1c: 10.3 03/31/24  Last seen with Endocrinology: 11/20/2023 with Dr. Cazares  Last Hospitalization for DKA: 11/2023  Home regimen: Tresiba 17 units daily, Lyumjev 7 units with meals

## 2024-04-01 NOTE — HPI
Andrzej Sinclair is a 38 y.o. male with a PMHx of HTN, CKD3, GERD, DKA/HHS, and DM1 who presents to the ED with complaints of N/V and generalized abd pain x2 days. The patient endorses associated fatigue, headache, GRANT, dizziness, and chest pain with vomiting. Denies fever/chills. He reports compliance with his home insulin. He denies any recent dietary changes. The patient denies SOB at rest, cough, diarrhea, or dysuria. He endorses marijuana use but states he has not smoked in about a week.    In the ED, patient initially tachycardic otherwise vitals stable, afebrile. CBC unremarkable. K+ 5.3. Cr 2.0 (bl 1.4). Glucose 518. Tbili 1.1. Lipase WNL. Serum osm 335. Beta-hydroxybutyrate 2.9. BNP <10. Troponin <0.006. EKG shows SR with  R atrial enlargement and incomplete R bbb, 93 BPM. pH 7.32. CXR negative for acute process. UA pending. The patient received IV morphine 4mg IV, IV dilaudid 1mg IV, zofran, and was started on an insulin drip and continuous IVF.

## 2024-04-01 NOTE — ASSESSMENT & PLAN NOTE
This patient has hyperkalemia which is uncontrolled. We will monitor for arrhythmias with EKG or continuous telemetry. We will treat the hyperkalemia with  IVF and IV insulin . The likely etiology of the hyperkalemia is  BETH and HHS .  The patients latest potassium has been reviewed and the results are listed below  Recent Labs   Lab 03/31/24  1906   K 5.3*   BMP q4

## 2024-04-01 NOTE — ASSESSMENT & PLAN NOTE
Abdominal pain   Cannabinoid hyperemesis syndrome   Appears to be a chronic issue. Likely multifactorial HHS, cannabinoid hyperemesis syndrome, and possible gastroparesis.  -UDS pending.  -IVF  -Restart protonix.  -PRN antiemetics  -Currently tolerating sugar free clears  -Will limit narcotics as much as possible

## 2024-04-01 NOTE — SUBJECTIVE & OBJECTIVE
Interval HPI:   Overnight events:  Eatin%  Nausea: No  Hypoglycemia and intervention: No  Fever: No  TPN and/or TF: No    PMH, PSH, FH, SH updated and reviewed     ROS:    Review of Systems   Constitutional:  Negative for fever.   Gastrointestinal:  Negative for abdominal pain, nausea and vomiting.   Endocrine: Negative for cold intolerance, heat intolerance, polydipsia and polyuria.   Neurological:  Positive for weakness.       Current Medications and/or Treatments Impacting Glycemic Control  Immunotherapy:    Immunosuppressants       None          Steroids:   Hormones (From admission, onward)      Start     Stop Route Frequency Ordered    24 2324  melatonin tablet 6 mg         -- Oral Nightly PRN 24 2225          Pressors:    Autonomic Drugs (From admission, onward)      None          Hyperglycemia/Diabetes Medications:   Antihyperglycemics (From admission, onward)      Start     Stop Route Frequency Ordered    24 1130  insulin aspart U-100 pen 8 Units         -- SubQ 3 times daily with meals 24 1001    24 1058  insulin aspart U-100 pen 0-5 Units         -- SubQ Before meals & nightly PRN 24 1001    24 1015  insulin detemir U-100 (Levemir) pen 10 Units         -- SubQ 2 times daily 24 1001             PHYSICAL EXAMINATION:  Vitals:    24 1127   BP: 130/81   Pulse: 94   Resp: 18   Temp: 98.8 °F (37.1 °C)     Body mass index is 26.28 kg/m².     Physical Exam  Constitutional:       General: He is not in acute distress.     Appearance: He is not ill-appearing.   Eyes:      Conjunctiva/sclera: Conjunctivae normal.   Cardiovascular:      Rate and Rhythm: Normal rate.   Pulmonary:      Effort: Pulmonary effort is normal. No respiratory distress.   Neurological:      Mental Status: He is alert. Mental status is at baseline.   Psychiatric:         Mood and Affect: Mood normal.         Behavior: Behavior normal.

## 2024-04-02 VITALS
TEMPERATURE: 99 F | OXYGEN SATURATION: 100 % | HEART RATE: 79 BPM | RESPIRATION RATE: 19 BRPM | WEIGHT: 204.69 LBS | SYSTOLIC BLOOD PRESSURE: 130 MMHG | HEIGHT: 74 IN | BODY MASS INDEX: 26.27 KG/M2 | DIASTOLIC BLOOD PRESSURE: 71 MMHG

## 2024-04-02 LAB
ALBUMIN SERPL BCP-MCNC: 3 G/DL (ref 3.5–5.2)
ALP SERPL-CCNC: 59 U/L (ref 55–135)
ALT SERPL W/O P-5'-P-CCNC: 18 U/L (ref 10–44)
ANION GAP SERPL CALC-SCNC: 9 MMOL/L (ref 8–16)
AST SERPL-CCNC: 19 U/L (ref 10–40)
BASOPHILS # BLD AUTO: 0.02 K/UL (ref 0–0.2)
BASOPHILS NFR BLD: 0.5 % (ref 0–1.9)
BILIRUB SERPL-MCNC: 1.1 MG/DL (ref 0.1–1)
BUN SERPL-MCNC: 19 MG/DL (ref 6–20)
CALCIUM SERPL-MCNC: 8.7 MG/DL (ref 8.7–10.5)
CHLORIDE SERPL-SCNC: 106 MMOL/L (ref 95–110)
CO2 SERPL-SCNC: 24 MMOL/L (ref 23–29)
CREAT SERPL-MCNC: 1.4 MG/DL (ref 0.5–1.4)
DIFFERENTIAL METHOD BLD: ABNORMAL
EOSINOPHIL # BLD AUTO: 0.1 K/UL (ref 0–0.5)
EOSINOPHIL NFR BLD: 1.6 % (ref 0–8)
ERYTHROCYTE [DISTWIDTH] IN BLOOD BY AUTOMATED COUNT: 12.6 % (ref 11.5–14.5)
EST. GFR  (NO RACE VARIABLE): >60 ML/MIN/1.73 M^2
GLUCOSE SERPL-MCNC: 157 MG/DL (ref 70–110)
HCT VFR BLD AUTO: 39 % (ref 40–54)
HGB BLD-MCNC: 13.3 G/DL (ref 14–18)
IMM GRANULOCYTES # BLD AUTO: 0 K/UL (ref 0–0.04)
IMM GRANULOCYTES NFR BLD AUTO: 0 % (ref 0–0.5)
LYMPHOCYTES # BLD AUTO: 1.6 K/UL (ref 1–4.8)
LYMPHOCYTES NFR BLD: 41.6 % (ref 18–48)
MAGNESIUM SERPL-MCNC: 1.6 MG/DL (ref 1.6–2.6)
MCH RBC QN AUTO: 30.1 PG (ref 27–31)
MCHC RBC AUTO-ENTMCNC: 34.1 G/DL (ref 32–36)
MCV RBC AUTO: 88 FL (ref 82–98)
MONOCYTES # BLD AUTO: 0.4 K/UL (ref 0.3–1)
MONOCYTES NFR BLD: 11.7 % (ref 4–15)
NEUTROPHILS # BLD AUTO: 1.7 K/UL (ref 1.8–7.7)
NEUTROPHILS NFR BLD: 44.6 % (ref 38–73)
NRBC BLD-RTO: 0 /100 WBC
PHOSPHATE SERPL-MCNC: 2.9 MG/DL (ref 2.7–4.5)
PLATELET # BLD AUTO: 160 K/UL (ref 150–450)
PMV BLD AUTO: 11.7 FL (ref 9.2–12.9)
POCT GLUCOSE: 309 MG/DL (ref 70–110)
POCT GLUCOSE: 314 MG/DL (ref 70–110)
POTASSIUM SERPL-SCNC: 3.8 MMOL/L (ref 3.5–5.1)
PROT SERPL-MCNC: 5.7 G/DL (ref 6–8.4)
RBC # BLD AUTO: 4.42 M/UL (ref 4.6–6.2)
SODIUM SERPL-SCNC: 139 MMOL/L (ref 136–145)
WBC # BLD AUTO: 3.77 K/UL (ref 3.9–12.7)

## 2024-04-02 PROCEDURE — 96372 THER/PROPH/DIAG INJ SC/IM: CPT | Performed by: STUDENT IN AN ORGANIZED HEALTH CARE EDUCATION/TRAINING PROGRAM

## 2024-04-02 PROCEDURE — 25000003 PHARM REV CODE 250: Performed by: NURSE PRACTITIONER

## 2024-04-02 PROCEDURE — 85025 COMPLETE CBC W/AUTO DIFF WBC: CPT | Performed by: NURSE PRACTITIONER

## 2024-04-02 PROCEDURE — 99214 OFFICE O/P EST MOD 30 MIN: CPT | Mod: GC,,, | Performed by: INTERNAL MEDICINE

## 2024-04-02 PROCEDURE — 63600175 PHARM REV CODE 636 W HCPCS: Performed by: NURSE PRACTITIONER

## 2024-04-02 PROCEDURE — C9113 INJ PANTOPRAZOLE SODIUM, VIA: HCPCS | Performed by: NURSE PRACTITIONER

## 2024-04-02 PROCEDURE — 96376 TX/PRO/DX INJ SAME DRUG ADON: CPT

## 2024-04-02 PROCEDURE — 80053 COMPREHEN METABOLIC PANEL: CPT | Performed by: NURSE PRACTITIONER

## 2024-04-02 PROCEDURE — 83735 ASSAY OF MAGNESIUM: CPT | Performed by: NURSE PRACTITIONER

## 2024-04-02 PROCEDURE — G0378 HOSPITAL OBSERVATION PER HR: HCPCS

## 2024-04-02 PROCEDURE — 96361 HYDRATE IV INFUSION ADD-ON: CPT

## 2024-04-02 PROCEDURE — 84100 ASSAY OF PHOSPHORUS: CPT | Performed by: NURSE PRACTITIONER

## 2024-04-02 PROCEDURE — 36415 COLL VENOUS BLD VENIPUNCTURE: CPT | Performed by: NURSE PRACTITIONER

## 2024-04-02 PROCEDURE — 63600175 PHARM REV CODE 636 W HCPCS: Performed by: STUDENT IN AN ORGANIZED HEALTH CARE EDUCATION/TRAINING PROGRAM

## 2024-04-02 RX ORDER — PANTOPRAZOLE SODIUM 40 MG/1
40 TABLET, DELAYED RELEASE ORAL DAILY
Status: DISCONTINUED | OUTPATIENT
Start: 2024-04-03 | End: 2024-04-02 | Stop reason: HOSPADM

## 2024-04-02 RX ORDER — INSULIN LISPRO-AABC 100 [IU]/ML
8 INJECTION, SOLUTION SUBCUTANEOUS
Qty: 4 PEN | Refills: 4
Start: 2024-04-02

## 2024-04-02 RX ORDER — INSULIN DEGLUDEC 100 U/ML
22 INJECTION, SOLUTION SUBCUTANEOUS DAILY
Qty: 6 ML | Refills: 11 | Status: SHIPPED | OUTPATIENT
Start: 2024-04-02 | End: 2025-04-02

## 2024-04-02 RX ORDER — INSULIN DEGLUDEC 100 U/ML
22 INJECTION, SOLUTION SUBCUTANEOUS DAILY
Qty: 6.6 ML | Refills: 11
Start: 2024-04-02 | End: 2024-04-02

## 2024-04-02 RX ADMIN — INSULIN ASPART 4 UNITS: 100 INJECTION, SOLUTION INTRAVENOUS; SUBCUTANEOUS at 12:04

## 2024-04-02 RX ADMIN — INSULIN ASPART 8 UNITS: 100 INJECTION, SOLUTION INTRAVENOUS; SUBCUTANEOUS at 12:04

## 2024-04-02 RX ADMIN — PRAVASTATIN SODIUM 10 MG: 10 TABLET ORAL at 08:04

## 2024-04-02 RX ADMIN — INSULIN ASPART 4 UNITS: 100 INJECTION, SOLUTION INTRAVENOUS; SUBCUTANEOUS at 08:04

## 2024-04-02 RX ADMIN — PANTOPRAZOLE SODIUM 40 MG: 40 INJECTION, POWDER, FOR SOLUTION INTRAVENOUS at 08:04

## 2024-04-02 RX ADMIN — HYDROMORPHONE HYDROCHLORIDE 0.5 MG: 1 INJECTION, SOLUTION INTRAMUSCULAR; INTRAVENOUS; SUBCUTANEOUS at 01:04

## 2024-04-02 RX ADMIN — INSULIN DETEMIR 10 UNITS: 100 INJECTION, SOLUTION SUBCUTANEOUS at 08:04

## 2024-04-02 RX ADMIN — INSULIN ASPART 8 UNITS: 100 INJECTION, SOLUTION INTRAVENOUS; SUBCUTANEOUS at 08:04

## 2024-04-02 NOTE — PLAN OF CARE
Problem: Adult Inpatient Plan of Care  Goal: Plan of Care Review  Outcome: Ongoing, Progressing  Flowsheets (Taken 4/2/2024 0645)  Plan of Care Reviewed With: patient  Goal: Optimal Comfort and Wellbeing  Outcome: Ongoing, Progressing  Intervention: Provide Person-Centered Care  Flowsheets (Taken 4/2/2024 0645)  Trust Relationship/Rapport:   care explained   questions encouraged   choices provided   reassurance provided   emotional support provided   thoughts/feelings acknowledged   empathic listening provided   questions answered     Problem: Diabetic Ketoacidosis  Goal: Fluid and Electrolyte Balance with Absence of Ketosis  Outcome: Ongoing, Progressing  Intervention: Monitor and Manage Ketoacidosis  Flowsheets (Taken 4/2/2024 0645)  Fluid/Electrolyte Management: intravenous fluid replacement initiated  Glycemic Management: blood glucose monitored     Problem: Diabetes Comorbidity  Goal: Blood Glucose Level Within Targeted Range  Outcome: Ongoing, Progressing  Intervention: Monitor and Manage Glycemia  Flowsheets (Taken 4/2/2024 0645)  Glycemic Management: blood glucose monitored     Problem: Fluid and Electrolyte Imbalance (Acute Kidney Injury/Impairment)  Goal: Fluid and Electrolyte Balance  Outcome: Ongoing, Progressing  Intervention: Monitor and Manage Fluid and Electrolyte Balance  Flowsheets (Taken 4/2/2024 0645)  Fluid/Electrolyte Management: intravenous fluid replacement initiated

## 2024-04-02 NOTE — DISCHARGE SUMMARY
[facility] - Telemetry [facility] Medicine  Discharge Summary      Patient Name: Andrzej Sinclair  MRN: [id number]  REKHA: [id number]  Patient Class: OP- Observation  Admission Date: 3/31/2024  Hospital Length of Stay: 0 days  Discharge Date and Time:  04/02/2024 2:30 PM  Attending Physician: Darshan Knight MD   Discharging Provider: Darshan Knight MD  Primary Care Provider: Viktoriya Jaeger NP  [facility] Medicine Team: [facility] Darshan Knight MD  Primary Care Team: [facility]    HPI:   Andrzej Sinclair is a 38 y.o. male with a PMHx of HTN, CKD3, GERD, DKA/HHS, and DM1 who presents to the ED with complaints of N/V and generalized abd pain x2 days. The patient endorses associated fatigue, headache, GRANT, dizziness, and chest pain with vomiting. Denies fever/chills. He reports compliance with his home insulin. He denies any recent dietary changes. The patient denies SOB at rest, cough, diarrhea, or dysuria. He endorses marijuana use but states he has not smoked in about a week.    In the ED, patient initially tachycardic otherwise vitals stable, afebrile. CBC unremarkable. K+ 5.3. Cr 2.0 (bl 1.4). Glucose 518. Tbili 1.1. Lipase WNL. Serum osm 335. Beta-hydroxybutyrate 2.9. BNP <10. Troponin <0.006. EKG shows SR with  R atrial enlargement and incomplete R bbb, 93 BPM. pH 7.32. CXR negative for acute process. UA pending. The patient received IV morphine 4mg IV, IV dilaudid 1mg IV, zofran, and was started on an insulin drip and continuous IVF.    * No surgery found *      Hospital Course:   Corrected 4/1.  Plan for discharge today 4/2 with adjustments to insulin regimen per recommendations of endocrinology.     Goals of Care Treatment Preferences:  Code Status: Full Code      Consults:   Consults (From admission, onward)          Status Ordering Provider     Inpatient consult to Endocrinology  Once        Provider:  (Not yet assigned)    Completed ANETA JOHNSTON     Inpatient consult to Registered  "Dietitian/Nutritionist  Once        Provider:  (Not yet assigned)    Completed ANETA JOHNSTON            Endocrine  * Hyperosmolar hyperglycemic state (HHS)  Reports compliance with home insulin stating he takes 10-15 of Tresiba daily and takes scheduled lispro "only if my glucose is high enough" if not will only take SSI  -corrected on 4/1  - Discharge Medication adjustments:  - insulin Tresiba 22u daily   - LYUMJEV KWIKPEN 8 unit three times daily with meals with sliding scale insulin  - continue to wear and use continous glucose monitor  - See endocrinology outpatient: you may qualify for an insulin pump.    Type 1 diabetes mellitus with complications  -Treatment as above for HHS      Final Active Diagnoses:    Diagnosis Date Noted POA    PRINCIPAL PROBLEM:  Hyperosmolar hyperglycemic state (HHS) [E11.00] 03/31/2024 Yes    Mixed hyperlipidemia [E78.2] 11/20/2023 Yes    Cannabinoid hyperemesis syndrome [R11.2, F12.90] 04/22/2021 Yes    Type 1 diabetes mellitus with complications [E10.8] 03/10/2021 Yes    Primary hypertension [I10] 03/04/2021 Yes      Problems Resolved During this Admission:    Diagnosis Date Noted Date Resolved POA    Abdominal pain [R10.9] 04/01/2024 04/01/2024 Yes    Hyperkalemia [E87.5] 11/10/2023 04/01/2024 Yes    Intractable vomiting with nausea [R11.2] 03/03/2021 04/01/2024 Yes    Acute renal failure superimposed on stage 3 chronic kidney disease [N17.9, N18.30] 03/01/2021 04/01/2024 Yes    Diabetic ketoacidosis without coma associated with type 1 diabetes mellitus [E10.10] 02/28/2021 04/01/2024 Yes       Discharged Condition: good    Disposition: Home or Self Care    Follow Up:   Follow-up Information       [facility] Fl Follow up.    Specialty: Endocrinology  Contact information:  [facility]  [facility] [address] [phone]  Additional information:  [facility] - Main Building, 6th Floor   Please park in [address] " Garage and take Clinic elevator                         Patient Instructions:      Ambulatory referral/consult to Diabetes Education   Standing Status: Future   Referral Priority: Routine Referral Type: Consultation   Referral Reason: Specialty Services Required   Requested Specialty: Diabetes   Number of Visits Requested: 1 Expiration Date: 04/02/25     Ambulatory referral/consult to Endocrinology   Standing Status: Future   Referral Priority: Routine Referral Type: Consultation   Requested Specialty: Endocrinology   Number of Visits Requested: 1     Diet diabetic     Notify your health care provider if you experience any of the following:  temperature >100.4     Notify your health care provider if you experience any of the following:  persistent nausea and vomiting or diarrhea     Notify your health care provider if you experience any of the following:  severe uncontrolled pain     Notify your health care provider if you experience any of the following:  redness, tenderness, or signs of infection (pain, swelling, redness, odor or green/yellow discharge around incision site)     Notify your health care provider if you experience any of the following:  difficulty breathing or increased cough     Notify your health care provider if you experience any of the following:  severe persistent headache     Notify your health care provider if you experience any of the following:  worsening rash     Notify your health care provider if you experience any of the following:  persistent dizziness, light-headedness, or visual disturbances     Notify your health care provider if you experience any of the following:  increased confusion or weakness     Activity as tolerated       Significant Diagnostic Studies: Labs: CMP   Recent Labs   Lab 03/31/24  2330 04/01/24  0442 04/01/24  0842 04/01/24  1200 04/01/24  1618 04/02/24  0334     140 146*   < > 144 142 139   K 4.6  4.6 3.9   < > 3.9 3.9 3.8     103 109   < > 108  "107 106   CO2 19*  19* 24   < > 27 27 24   *  510* 158*   < > 81 80 157*   BUN 33*  33* 31*   < > 25* 24* 19   CREATININE 2.0*  2.0* 1.8*   < > 1.5* 1.5* 1.4   CALCIUM 9.9  9.9 10.4   < > 9.6 9.5 8.7   PROT 7.6 7.2  --   --   --  5.7*   ALBUMIN 3.8 3.9  --   --   --  3.0*   BILITOT 0.9 0.9  --   --   --  1.1*   ALKPHOS 81 81  --   --   --  59   AST 23 17  --   --   --  19   ALT 27 23  --   --   --  18   ANIONGAP 18*  18* 13   < > 9 8 9    < > = values in this interval not displayed.    and CBC   Recent Labs   Lab 03/31/24  1906 03/31/24  1915 04/01/24  0442 04/02/24  0334   WBC 4.98  --  6.59 3.77*   HGB 16.9  --  16.3 13.3*   HCT 50.4   < > 47.4 39.0*     --  182 160    < > = values in this interval not displayed.       Pending Diagnostic Studies:       None           Medications:  Reconciled Home Medications:      Medication List        CHANGE how you take these medications      insulin degludec 100 unit/mL (3 mL) insulin pen  Commonly known as: TRESIBA FLEXTOUCH U-100  Inject 22 Units into the skin once daily. ADMINISTER 13 UNITS UNDER THE SKIN EVERY DAY  What changed: how much to take     LYUMJEV KWIKPEN U-100 INSULIN 100 unit/mL pen  Generic drug: insulin lispro-aabc  Inject 8 Units into the skin 3 (three) times daily with meals. Take additional sliding scale based on the blood sugars.  Maximum daily dose of 36 units.  What changed: how much to take            CONTINUE taking these medications      * BD ULTRA-FINE ROVERTO PEN NEEDLE 32 gauge x 5/32" Ndle  Generic drug: pen needle, diabetic  USE AS DIRECTED WITH INSULIN     * BD ULTRA-FINE ORIG PEN NEEDLE 29 gauge x 1/2" Ndle  Generic drug: pen needle, diabetic  Use to inject insulin into the skin.     blood sugar diagnostic Strp  3-4 times a day     blood-glucose meter kit  Use as instructed     DEXCOM G6 SENSOR Martha  Generic drug: blood-glucose sensor  1 each by Misc.(Non-Drug; Combo Route) route every 10 days.     * DEXCOM G6 TRANSMITTER " Martha  Generic drug: blood-glucose transmitter  1 each by Misc.(Non-Drug; Combo Route) route every 3 (three) months.     * DEXCOM G6 TRANSMITTER Martha  Generic drug: blood-glucose transmitter  1 Device by Misc.(Non-Drug; Combo Route) route 3 (three) times daily with meals.     dicyclomine 10 MG capsule  Commonly known as: BENTYL  Take 1 capsule (10 mg total) by mouth as needed.     lancets 31 gauge Misc  1 lancet  by Misc.(Non-Drug; Combo Route) route 2 (two) times daily.     lisinopriL 10 MG tablet  Take 1 tablet (10 mg total) by mouth once daily.     pravastatin 10 MG tablet  Commonly known as: PRAVACHOL  Take 5 mg once a day [half a pill daily]           * This list has 4 medication(s) that are the same as other medications prescribed for you. Read the directions carefully, and ask your doctor or other care provider to review them with you.                  Indwelling Lines/Drains at time of discharge:   Lines/Drains/Airways       None                   Time spent on the discharge of patient: 60 minutes         Darshan Knight MD  Department of Hospital Medicine  [facility] - Telemetry Stepdown

## 2024-04-02 NOTE — NURSING
Reviewed DC information with patient. Patient is aware of insulin dosing changes for long acting and short acting home insulins. Patient agrees to go to his follow-up diabetes education and endocrinology appointments. PIV and telemetry removed from patient. Patient in stable condition walked off the unit for discharge to meet his ride by the emergency room.

## 2024-04-02 NOTE — DISCHARGE INSTRUCTIONS
You were seen for diabetic ketoacidosis versus hyperglycemic hyperosmolar syndrome which is the result of your sugars getting too high.  Because your type 1 diabetic, you need to continually take both long-acting and short-acting insulin.  We have made adjustments to your medication doses based on the recommendations from endocrinology.  They should be included in your discharge summary and after visit summary.  Based on your complication, you may qualify for an insulin pump which will be discussed your next endocrinology appointment and provides a constant state of insulin based on your blood glucose monitoring.

## 2024-04-02 NOTE — SUBJECTIVE & OBJECTIVE
"Interval HPI:   Overnight events: Hypoglycemia overnight after dinner and so his levemir was held. This morning he is hyperglycemic. He is asymptomatic and has no complaints. He is checking his BG with dexcom now. Clarity is set up to interface with the endocrine clinic.  Eatin%  Nausea: No  Hypoglycemia and intervention: Yes  Fever: No  TPN and/or TF: No    /71 (BP Location: Left arm, Patient Position: Lying)   Pulse 79   Temp 98.6 °F (37 °C) (Oral)   Resp 19   Ht 6' 2" (1.88 m)   Wt 92.9 kg (204 lb 11.2 oz)   SpO2 100%   BMI 26.28 kg/m²     Labs Reviewed and Include    Recent Labs   Lab 24  0334   *   CALCIUM 8.7   ALBUMIN 3.0*   PROT 5.7*      K 3.8   CO2 24      BUN 19   CREATININE 1.4   ALKPHOS 59   ALT 18   AST 19   BILITOT 1.1*     Lab Results   Component Value Date    WBC 3.77 (L) 2024    HGB 13.3 (L) 2024    HCT 39.0 (L) 2024    MCV 88 2024     2024     No results for input(s): "TSH", "FREET4" in the last 168 hours.  Lab Results   Component Value Date    HGBA1C 10.3 (H) 2024       Nutritional status:   Body mass index is 26.28 kg/m².  Lab Results   Component Value Date    ALBUMIN 3.0 (L) 2024    ALBUMIN 3.9 2024    ALBUMIN 3.8 2024     No results found for: "PREALBUMIN"    Estimated Creatinine Clearance: 84 mL/min (based on SCr of 1.4 mg/dL).    Accu-Checks  Recent Labs     24  0743 24  0856 24  0947 24  1229 24  1700 24  1943 24  2149 24  0836 24  1241   POCTGLUCOSE 82 81 75 82 74 59* 64* 112* 314* 309*       Current Medications and/or Treatments Impacting Glycemic Control  Immunotherapy:    Immunosuppressants       None          Steroids:   Hormones (From admission, onward)      Start     Stop Route Frequency Ordered    24 2324  melatonin tablet 6 mg         -- Oral Nightly           Pressors:    Autonomic " Drugs (From admission, onward)      None          Hyperglycemia/Diabetes Medications:   Antihyperglycemics (From admission, onward)      Start     Stop Route Frequency Ordered    04/01/24 1130  insulin aspart U-100 pen 8 Units         -- SubQ 3 times daily with meals 04/01/24 1001    04/01/24 1058  insulin aspart U-100 pen 0-5 Units         -- SubQ Before meals & nightly PRN 04/01/24 1001    04/01/24 1015  insulin detemir U-100 (Levemir) pen 10 Units         -- SubQ 2 times daily 04/01/24 1001           37.3

## 2024-04-02 NOTE — PROGRESS NOTES
[facility] - Telemetry Stepdown  Endocrinology  Progress Note    Admit Date: 3/31/2024     Andrzej Sinclair is a 37 year old male with PMH significant for poorly controlled type 1 DM c/b frequent DKA, HTN, CKD3 and GERD who presented to the hospital with complaint of nausea, vomiting and abdominal pain for 2 days. He recently started working at [employer] and thinks that he may have gotten sick since he started working there. He denies polydipsia or polyuria. He reports that he is taking Toujeo 17 units daily and between 16 and 25 units of mealtime insulin based on his blood glucose. He reports using dexcom to check his blood sugar and occasional finger sticks for confirmation. He states that he has enough supplies at home.    He was last admitted for DKA in 2023. He was discharged with 22 units of Tresiba and 10 units of Lyumjev with meals. He followed up with Dr. Cazares in the outpatient office after that hospitalization but no changes were made to his insulin regimen. Patient states that he made these adjustments to his insulin regimen on his own.    In the ED, patient was tachycardic but otherwise vitals stable, Labs showed Glucose 518.BHOB 2.9 Serum osm 335. pH 7.32. The patient was treated with insulin infusion per DKA protocol and aggressive hydration. Acidosis has since resolved and anion gap is closed.        Regarding Type 1 DM     Diagnosed: Age 20  Last hemoglobin A1c: 10.3 24  Last seen with Endocrinology: 2023 with Dr. Cazares  Last Hospitalization for DKA: 2023  Home regimen: Tresiba 17 units daily, Lyumjev 7 units with meals         Interval HPI:   Overnight events: Hypoglycemia overnight after dinner and so his levemir was held. This morning he is hyperglycemic. He is asymptomatic and has no complaints. He is checking his BG with dexcom now. Clarity is set up to interface with the endocrine clinic.  Eatin%  Nausea: No  Hypoglycemia and intervention: Yes  Fever: No  TPN  "and/or TF: No    /71 (BP Location: Left arm, Patient Position: Lying)   Pulse 79   Temp 98.6 °F (37 °C) (Oral)   Resp 19   Ht 6' 2" (1.88 m)   Wt 92.9 kg (204 lb 11.2 oz)   SpO2 100%   BMI 26.28 kg/m²     Labs Reviewed and Include    Recent Labs   Lab 04/02/24  0334   *   CALCIUM 8.7   ALBUMIN 3.0*   PROT 5.7*      K 3.8   CO2 24      BUN 19   CREATININE 1.4   ALKPHOS 59   ALT 18   AST 19   BILITOT 1.1*     Lab Results   Component Value Date    WBC 3.77 (L) 04/02/2024    HGB 13.3 (L) 04/02/2024    HCT 39.0 (L) 04/02/2024    MCV 88 04/02/2024     04/02/2024     No results for input(s): "TSH", "FREET4" in the last 168 hours.  Lab Results   Component Value Date    HGBA1C 10.3 (H) 03/31/2024       Nutritional status:   Body mass index is 26.28 kg/m².  Lab Results   Component Value Date    ALBUMIN 3.0 (L) 04/02/2024    ALBUMIN 3.9 04/01/2024    ALBUMIN 3.8 03/31/2024     No results found for: "PREALBUMIN"    Estimated Creatinine Clearance: 84 mL/min (based on SCr of 1.4 mg/dL).    Accu-Checks  Recent Labs     04/01/24  0743 04/01/24  0856 04/01/24  0947 04/01/24  1229 04/01/24  1700 04/01/24  1943 04/01/24 2007 04/01/24  2149 04/02/24  0836 04/02/24  1241   POCTGLUCOSE 82 81 75 82 74 59* 64* 112* 314* 309*       Current Medications and/or Treatments Impacting Glycemic Control  Immunotherapy:    Immunosuppressants       None          Steroids:   Hormones (From admission, onward)      Start     Stop Route Frequency Ordered    03/31/24 2324  melatonin tablet 6 mg         -- Oral Nightly PRN 03/31/24 2225          Pressors:    Autonomic Drugs (From admission, onward)      None          Hyperglycemia/Diabetes Medications:   Antihyperglycemics (From admission, onward)      Start     Stop Route Frequency Ordered    04/01/24 1130  insulin aspart U-100 pen 8 Units         -- SubQ 3 times daily with meals 04/01/24 1001    04/01/24 1058  insulin aspart U-100 pen 0-5 Units         -- SubQ " Before meals & nightly PRN 04/01/24 1001    04/01/24 1015  insulin detemir U-100 (Levemir) pen 10 Units         -- SubQ 2 times daily 04/01/24 1001            ASSESSMENT and PLAN    Endocrine  Type 1 diabetes mellitus with complications  Key History and Diagnostic Findings  T1DM with multiple DKA admissions  Not entirely clear if he is monitoring his blood glucose appropriately at home or how he is making adjustments to his insulin. Suspect DKA etiology is due to missed insulin doses.  During last hospitalization he was well controlled on L15BID and [id number].  A1c 10.3 now is improved from previous but still indicates poor control    Plan  - To be discharged today, Recommend Tresiba 22 units in the evening and Lyumjev 8 units with meals plus low dose sliding scale at home.  - Needs diabetic education and discharge clinic appointments set up, submitted to staff  - He reports that he has all the supplies he needs at home including dexcom  - Will send in refill for isaiah Humphreys,   Endocrinology  [facility] - Telemetry Stepdown

## 2024-04-02 NOTE — NURSING
Patient alerted RN that his blood glucose was low. Accucheck showed cbg of 59. Pt asymptomatic. Pt given 16g glucose tablet. Will recheck blood glucose shortly.

## 2024-04-02 NOTE — NURSING
Patient ate 8g of the 16g glucose tablets provided after initial cbg of 59. Upon recheck, his blood glucose was 64. Patient was given a coke and encouraged to eat the rest of the glucose tablets. Upon recheck his blood glucose was 112. Patient instructed to alert RN if he feels that his glucose is dropping or if his Dexcom notifies him of a low blood sugar. Patient decided to refuse his scheduled evening Levemir due to his low blood sugar. Will continue to check glucose as needed.   no

## 2024-04-02 NOTE — ASSESSMENT & PLAN NOTE
"Reports compliance with home insulin stating he takes 10-15 of Tresiba daily and takes scheduled lispro "only if my glucose is high enough" if not will only take SSI  -corrected on 4/1  - Discharge Medication adjustments:  - insulin Tresiba 22u daily   - LYUMJEV KWIKPEN 8 unit three times daily with meals with sliding scale insulin  - continue to wear and use continous glucose monitor  - See endocrinology outpatient: you may qualify for an insulin pump.  "

## 2024-04-02 NOTE — PLAN OF CARE
[facility] - Telemetry Stepdown  Discharge Final Note    Primary Care Provider: Viktoriya Jaeger NP    Expected Discharge Date: 4/2/2024    Future Appointments   Date Time Provider Department Center   5/10/2024  8:00 AM Viktoriya Jaeger NP [facility]         Final Discharge Note (most recent)       Final Note - 04/02/24 1554          Final Note    Assessment Type Final Discharge Note     Anticipated Discharge Disposition Home or Self Care     What phone number can be called within the next 1-3 days to see how you are doing after discharge? [phone]        Post-Acute Status    Discharge Delays None known at this time                 Pt d/c today 04/02/2024 home w/ no needs. Pt wife provided pt transportation home.     PIETRO Sky   [facility]    Case Management Dept  [phone]      Important Message from Medicare             Contact Info       [facility]   Specialty: Endocrinology    [address]  [facility] [address]   Phone: [phone]       Next Steps: Follow up

## 2024-04-02 NOTE — PLAN OF CARE
Problem: Adult Inpatient Plan of Care  Goal: Plan of Care Review  Outcome: Ongoing, Progressing  Goal: Patient-Specific Goal (Individualized)  Outcome: Ongoing, Progressing  Goal: Absence of Hospital-Acquired Illness or Injury  Outcome: Ongoing, Progressing  Goal: Optimal Comfort and Wellbeing  Outcome: Ongoing, Progressing  Goal: Readiness for Transition of Care  Outcome: Ongoing, Progressing     Problem: Diabetic Ketoacidosis  Goal: Fluid and Electrolyte Balance with Absence of Ketosis  Outcome: Ongoing, Progressing     Problem: Diabetes Comorbidity  Goal: Blood Glucose Level Within Targeted Range  Outcome: Ongoing, Progressing     Problem: Fluid and Electrolyte Imbalance (Acute Kidney Injury/Impairment)  Goal: Fluid and Electrolyte Balance  Outcome: Ongoing, Progressing     Problem: Oral Intake Inadequate (Acute Kidney Injury/Impairment)  Goal: Optimal Nutrition Intake  Outcome: Ongoing, Progressing     Problem: Renal Function Impairment (Acute Kidney Injury/Impairment)  Goal: Effective Renal Function  Outcome: Ongoing, Progressing

## 2024-04-02 NOTE — ASSESSMENT & PLAN NOTE
Key History and Diagnostic Findings  T1DM with multiple DKA admissions  Not entirely clear if he is monitoring his blood glucose appropriately at home or how he is making adjustments to his insulin. Suspect DKA etiology is due to missed insulin doses.  During last hospitalization he was well controlled on L15BID and C0NCHUO.  A1c 10.3 now is improved from previous but still indicates poor control    Plan  - To be discharged today, Recommend Tresiba 22 units in the evening and Lyumjev 8 units with meals plus low dose sliding scale at home.  - Needs diabetic education and discharge clinic appointments set up, submitted to staff  - He reports that he has all the supplies he needs at home including dexcom  - Will send in refill for tresiba

## 2024-04-11 ENCOUNTER — PATIENT MESSAGE (OUTPATIENT)
Dept: ENDOCRINOLOGY | Facility: CLINIC | Age: 39
End: 2024-04-11
Payer: MEDICARE

## 2024-04-11 RX ORDER — DICYCLOMINE HYDROCHLORIDE 10 MG/1
CAPSULE ORAL
Qty: 15 CAPSULE | Refills: 0 | Status: SHIPPED | OUTPATIENT
Start: 2024-04-11

## 2024-05-06 ENCOUNTER — PATIENT MESSAGE (OUTPATIENT)
Dept: ENDOCRINOLOGY | Facility: CLINIC | Age: 39
End: 2024-05-06
Payer: MEDICARE

## 2024-05-10 ENCOUNTER — TELEPHONE (OUTPATIENT)
Dept: ENDOCRINOLOGY | Facility: CLINIC | Age: 39
End: 2024-05-10
Payer: MEDICARE

## 2024-05-22 ENCOUNTER — PATIENT MESSAGE (OUTPATIENT)
Dept: ENDOCRINOLOGY | Facility: CLINIC | Age: 39
End: 2024-05-22
Payer: MEDICARE

## 2024-05-22 DIAGNOSIS — E10.36 TYPE 1 DIABETES MELLITUS WITH DIABETIC CATARACT: ICD-10-CM

## 2024-05-22 RX ORDER — BLOOD-GLUCOSE TRANSMITTER
EACH MISCELLANEOUS
Qty: 1 EACH | Refills: 3 | Status: SHIPPED | OUTPATIENT
Start: 2024-05-22 | End: 2024-05-28 | Stop reason: SDUPTHER

## 2024-05-22 RX ORDER — SYRING-NEEDL,DISP,INSUL,0.3 ML 30 GX5/16"
SYRINGE, EMPTY DISPOSABLE MISCELLANEOUS
Qty: 200 EACH | Refills: 11 | Status: SHIPPED | OUTPATIENT
Start: 2024-05-22

## 2024-05-24 DIAGNOSIS — E10.36 TYPE 1 DIABETES MELLITUS WITH DIABETIC CATARACT: ICD-10-CM

## 2024-05-24 RX ORDER — SYRING-NEEDL,DISP,INSUL,0.3 ML 30 GX5/16"
SYRINGE, EMPTY DISPOSABLE MISCELLANEOUS
Qty: 500 EACH | Refills: 3 | Status: SHIPPED | OUTPATIENT
Start: 2024-05-24

## 2024-05-28 DIAGNOSIS — E10.36 TYPE 1 DIABETES MELLITUS WITH DIABETIC CATARACT: ICD-10-CM

## 2024-05-28 RX ORDER — BLOOD-GLUCOSE SENSOR
1 EACH MISCELLANEOUS
Qty: 3 EACH | Refills: 3 | Status: SHIPPED | OUTPATIENT
Start: 2024-05-28 | End: 2024-05-28

## 2024-05-28 RX ORDER — BLOOD-GLUCOSE SENSOR
1 EACH MISCELLANEOUS
Qty: 3 EACH | Refills: 3 | Status: SHIPPED | OUTPATIENT
Start: 2024-05-28 | End: 2025-05-28

## 2024-05-28 RX ORDER — BLOOD-GLUCOSE TRANSMITTER
EACH MISCELLANEOUS
Qty: 1 EACH | Refills: 1 | Status: SHIPPED | OUTPATIENT
Start: 2024-05-28

## 2024-05-28 RX ORDER — BLOOD-GLUCOSE SENSOR
1 EACH MISCELLANEOUS
Qty: 9 EACH | Refills: 0 | Status: SHIPPED | OUTPATIENT
Start: 2024-05-28 | End: 2024-05-28 | Stop reason: SDUPTHER

## 2024-05-29 ENCOUNTER — PATIENT MESSAGE (OUTPATIENT)
Dept: ADMINISTRATIVE | Facility: OTHER | Age: 39
End: 2024-05-29
Payer: MEDICARE

## 2024-05-29 ENCOUNTER — PATIENT MESSAGE (OUTPATIENT)
Dept: ENDOCRINOLOGY | Facility: CLINIC | Age: 39
End: 2024-05-29
Payer: MEDICARE

## 2024-05-29 ENCOUNTER — TELEPHONE (OUTPATIENT)
Dept: ENDOCRINOLOGY | Facility: CLINIC | Age: 39
End: 2024-05-29
Payer: MEDICARE

## 2024-05-30 ENCOUNTER — PATIENT MESSAGE (OUTPATIENT)
Dept: ENDOCRINOLOGY | Facility: CLINIC | Age: 39
End: 2024-05-30
Payer: MEDICARE

## 2024-06-03 ENCOUNTER — PATIENT MESSAGE (OUTPATIENT)
Dept: ENDOCRINOLOGY | Facility: CLINIC | Age: 39
End: 2024-06-03
Payer: MEDICARE

## 2024-06-03 RX ORDER — BLOOD-GLUCOSE SENSOR
EACH MISCELLANEOUS
Qty: 3 EACH | Refills: 11 | Status: SHIPPED | OUTPATIENT
Start: 2024-06-03

## 2024-06-03 RX ORDER — BLOOD-GLUCOSE SENSOR
EACH MISCELLANEOUS
Qty: 3 EACH | Refills: 11 | Status: SHIPPED | OUTPATIENT
Start: 2024-06-03 | End: 2024-06-03 | Stop reason: SDUPTHER

## 2024-06-18 ENCOUNTER — TELEPHONE (OUTPATIENT)
Dept: ENDOCRINOLOGY | Facility: CLINIC | Age: 39
End: 2024-06-18
Payer: MEDICARE

## 2024-06-18 NOTE — TELEPHONE ENCOUNTER
Called and spoke to patient. We have not received fax from patient.    ----- Message from Deonna Whiteside sent at 6/18/2024  2:26 PM CDT -----  Regarding: Medical Records  Contact: [phone]  Calling to request a call back from staff to confirm receipt of records faxed on this morning.Please call patient to confirm receipt.

## 2024-06-20 ENCOUNTER — PATIENT MESSAGE (OUTPATIENT)
Dept: ENDOCRINOLOGY | Facility: CLINIC | Age: 39
End: 2024-06-20
Payer: MEDICARE

## 2024-06-27 DIAGNOSIS — E10.36 TYPE 1 DIABETES MELLITUS WITH DIABETIC CATARACT: Primary | ICD-10-CM

## 2024-06-27 RX ORDER — DICYCLOMINE HYDROCHLORIDE 10 MG/1
10 CAPSULE ORAL
Qty: 15 CAPSULE | Refills: 0 | Status: SHIPPED | OUTPATIENT
Start: 2024-06-27

## 2024-06-28 DIAGNOSIS — E10.36 TYPE 1 DIABETES MELLITUS WITH DIABETIC CATARACT: ICD-10-CM

## 2024-06-28 RX ORDER — LISINOPRIL 10 MG/1
10 TABLET ORAL DAILY
Qty: 90 TABLET | Refills: 3 | Status: SHIPPED | OUTPATIENT
Start: 2024-06-28 | End: 2025-06-28

## 2024-07-11 DIAGNOSIS — E10.36 TYPE 1 DIABETES MELLITUS WITH DIABETIC CATARACT: ICD-10-CM

## 2024-07-11 RX ORDER — DICYCLOMINE HYDROCHLORIDE 10 MG/1
10 CAPSULE ORAL
Qty: 15 CAPSULE | Refills: 0 | Status: SHIPPED | OUTPATIENT
Start: 2024-07-11

## 2024-11-02 ENCOUNTER — HOSPITAL ENCOUNTER (INPATIENT)
Facility: HOSPITAL | Age: 39
LOS: 2 days | Discharge: HOME | DRG: 392 | End: 2024-11-05
Attending: STUDENT IN AN ORGANIZED HEALTH CARE EDUCATION/TRAINING PROGRAM | Admitting: INTERNAL MEDICINE
Payer: MEDICARE

## 2024-11-02 DIAGNOSIS — R10.9 ABDOMINAL PAIN: ICD-10-CM

## 2024-11-02 DIAGNOSIS — R07.9 CHEST PAIN: ICD-10-CM

## 2024-11-02 DIAGNOSIS — R11.2 INTRACTABLE NAUSEA AND VOMITING: ICD-10-CM

## 2024-11-02 DIAGNOSIS — I10 PRIMARY HYPERTENSION: ICD-10-CM

## 2024-11-02 DIAGNOSIS — E10.36 TYPE 1 DIABETES MELLITUS WITH DIABETIC CATARACT: ICD-10-CM

## 2024-11-02 DIAGNOSIS — E78.2 MIXED HYPERLIPIDEMIA: ICD-10-CM

## 2024-11-02 DIAGNOSIS — R10.13 EPIGASTRIC PAIN: ICD-10-CM

## 2024-11-02 DIAGNOSIS — E10.8 TYPE 1 DIABETES MELLITUS WITH COMPLICATIONS: Primary | ICD-10-CM

## 2024-11-02 PROBLEM — E11.10 LACTIC ACIDOSIS DUE TO DIABETES MELLITUS: Status: ACTIVE | Noted: 2024-11-02

## 2024-11-02 LAB
ALBUMIN SERPL BCP-MCNC: 3.8 G/DL (ref 3.5–5.2)
ALLENS TEST: ABNORMAL
ALP SERPL-CCNC: 82 U/L (ref 40–150)
ALT SERPL W/O P-5'-P-CCNC: 41 U/L (ref 10–44)
ANION GAP SERPL CALC-SCNC: 11 MMOL/L (ref 8–16)
AST SERPL-CCNC: 30 U/L (ref 10–40)
B-OH-BUTYR BLD STRIP-SCNC: 0.7 MMOL/L (ref 0–0.5)
BACTERIA #/AREA URNS AUTO: NORMAL /HPF
BASOPHILS # BLD AUTO: 0.03 K/UL (ref 0–0.2)
BASOPHILS NFR BLD: 0.6 % (ref 0–1.9)
BILIRUB SERPL-MCNC: 0.9 MG/DL (ref 0.1–1)
BILIRUB UR QL STRIP: NEGATIVE
BUN SERPL-MCNC: 20 MG/DL (ref 6–20)
BUN SERPL-MCNC: 23 MG/DL (ref 6–30)
CALCIUM SERPL-MCNC: 9.5 MG/DL (ref 8.7–10.5)
CHLORIDE SERPL-SCNC: 100 MMOL/L (ref 95–110)
CHLORIDE SERPL-SCNC: 98 MMOL/L (ref 95–110)
CLARITY UR REFRACT.AUTO: CLEAR
CO2 SERPL-SCNC: 24 MMOL/L (ref 23–29)
COLOR UR AUTO: COLORLESS
CREAT SERPL-MCNC: 1.7 MG/DL (ref 0.5–1.4)
CREAT SERPL-MCNC: 1.8 MG/DL (ref 0.5–1.4)
DIFFERENTIAL METHOD BLD: ABNORMAL
EOSINOPHIL # BLD AUTO: 0 K/UL (ref 0–0.5)
EOSINOPHIL NFR BLD: 0.6 % (ref 0–8)
ERYTHROCYTE [DISTWIDTH] IN BLOOD BY AUTOMATED COUNT: 12.6 % (ref 11.5–14.5)
EST. GFR  (NO RACE VARIABLE): 49.1 ML/MIN/1.73 M^2
ESTIMATED AVG GLUCOSE: 298 MG/DL (ref 68–131)
GLUCOSE SERPL-MCNC: 315 MG/DL (ref 70–110)
GLUCOSE SERPL-MCNC: 315 MG/DL (ref 70–110)
GLUCOSE UR QL STRIP: ABNORMAL
HBA1C MFR BLD: 12 % (ref 4–5.6)
HCO3 UR-SCNC: 26.6 MMOL/L (ref 24–28)
HCO3 UR-SCNC: 29.2 MMOL/L (ref 24–28)
HCT VFR BLD AUTO: 41.4 % (ref 40–54)
HCT VFR BLD CALC: 44 %PCV (ref 36–54)
HGB BLD-MCNC: 14.1 G/DL (ref 14–18)
HGB UR QL STRIP: ABNORMAL
IMM GRANULOCYTES # BLD AUTO: 0.01 K/UL (ref 0–0.04)
IMM GRANULOCYTES NFR BLD AUTO: 0.2 % (ref 0–0.5)
KETONES UR QL STRIP: ABNORMAL
LACTATE SERPL-SCNC: 1.7 MMOL/L (ref 0.5–2.2)
LDH SERPL L TO P-CCNC: 2.47 MMOL/L (ref 0.5–2.2)
LDH SERPL L TO P-CCNC: 3.12 MMOL/L (ref 0.5–2.2)
LEUKOCYTE ESTERASE UR QL STRIP: NEGATIVE
LIPASE SERPL-CCNC: 15 U/L (ref 4–60)
LYMPHOCYTES # BLD AUTO: 0.9 K/UL (ref 1–4.8)
LYMPHOCYTES NFR BLD: 18.3 % (ref 18–48)
MAGNESIUM SERPL-MCNC: 1.7 MG/DL (ref 1.6–2.6)
MCH RBC QN AUTO: 30 PG (ref 27–31)
MCHC RBC AUTO-ENTMCNC: 34.1 G/DL (ref 32–36)
MCV RBC AUTO: 88 FL (ref 82–98)
MICROSCOPIC COMMENT: NORMAL
MONOCYTES # BLD AUTO: 0.4 K/UL (ref 0.3–1)
MONOCYTES NFR BLD: 9.1 % (ref 4–15)
NEUTROPHILS # BLD AUTO: 3.4 K/UL (ref 1.8–7.7)
NEUTROPHILS NFR BLD: 71.2 % (ref 38–73)
NITRITE UR QL STRIP: NEGATIVE
NRBC BLD-RTO: 0 /100 WBC
OHS QRS DURATION: 106 MS
OHS QTC CALCULATION: 380 MS
PCO2 BLDA: 42.5 MMHG (ref 35–45)
PCO2 BLDA: 43.5 MMHG (ref 35–45)
PH SMN: 7.41 [PH] (ref 7.35–7.45)
PH SMN: 7.43 [PH] (ref 7.35–7.45)
PH UR STRIP: 6 [PH] (ref 5–8)
PLATELET # BLD AUTO: 148 K/UL (ref 150–450)
PMV BLD AUTO: 11.1 FL (ref 9.2–12.9)
PO2 BLDA: 31 MMHG (ref 40–60)
PO2 BLDA: 39 MMHG (ref 40–60)
POC BE: 2 MMOL/L
POC BE: 5 MMOL/L
POC IONIZED CALCIUM: 1.18 MMOL/L (ref 1.06–1.42)
POC SATURATED O2: 59 % (ref 95–100)
POC SATURATED O2: 75 % (ref 95–100)
POC TCO2 (MEASURED): 30 MMOL/L (ref 23–29)
POC TCO2: 28 MMOL/L (ref 24–29)
POC TCO2: 31 MMOL/L (ref 24–29)
POCT GLUCOSE: 149 MG/DL (ref 70–110)
POCT GLUCOSE: 253 MG/DL (ref 70–110)
POCT GLUCOSE: 320 MG/DL (ref 70–110)
POCT GLUCOSE: 332 MG/DL (ref 70–110)
POCT GLUCOSE: 423 MG/DL (ref 70–110)
POCT GLUCOSE: 435 MG/DL (ref 70–110)
POTASSIUM BLD-SCNC: 4 MMOL/L (ref 3.5–5.1)
POTASSIUM SERPL-SCNC: 3.8 MMOL/L (ref 3.5–5.1)
PROT SERPL-MCNC: 7 G/DL (ref 6–8.4)
PROT UR QL STRIP: ABNORMAL
PROVIDER CREDENTIALS: ABNORMAL
PROVIDER NOTIFIED: ABNORMAL
RBC # BLD AUTO: 4.7 M/UL (ref 4.6–6.2)
RBC #/AREA URNS AUTO: 2 /HPF (ref 0–4)
SAMPLE: ABNORMAL
SITE: ABNORMAL
SODIUM BLD-SCNC: 139 MMOL/L (ref 136–145)
SODIUM SERPL-SCNC: 135 MMOL/L (ref 136–145)
SP GR UR STRIP: >1.03 (ref 1–1.03)
SQUAMOUS #/AREA URNS AUTO: 0 /HPF
TIME NOTIFIED: 1352
URN SPEC COLLECT METH UR: ABNORMAL
VERBAL RESULT READBACK PERFORMED: YES
WBC # BLD AUTO: 4.71 K/UL (ref 3.9–12.7)
YEAST UR QL AUTO: NORMAL

## 2024-11-02 PROCEDURE — 80047 BASIC METABLC PNL IONIZED CA: CPT

## 2024-11-02 PROCEDURE — 96361 HYDRATE IV INFUSION ADD-ON: CPT

## 2024-11-02 PROCEDURE — 83690 ASSAY OF LIPASE: CPT

## 2024-11-02 PROCEDURE — G0378 HOSPITAL OBSERVATION PER HR: HCPCS

## 2024-11-02 PROCEDURE — 83605 ASSAY OF LACTIC ACID: CPT

## 2024-11-02 PROCEDURE — 63600175 PHARM REV CODE 636 W HCPCS: Performed by: NURSE PRACTITIONER

## 2024-11-02 PROCEDURE — 25000003 PHARM REV CODE 250

## 2024-11-02 PROCEDURE — 83036 HEMOGLOBIN GLYCOSYLATED A1C: CPT

## 2024-11-02 PROCEDURE — 82803 BLOOD GASES ANY COMBINATION: CPT

## 2024-11-02 PROCEDURE — 96366 THER/PROPH/DIAG IV INF ADDON: CPT

## 2024-11-02 PROCEDURE — 82010 KETONE BODYS QUAN: CPT

## 2024-11-02 PROCEDURE — 82962 GLUCOSE BLOOD TEST: CPT

## 2024-11-02 PROCEDURE — 96375 TX/PRO/DX INJ NEW DRUG ADDON: CPT

## 2024-11-02 PROCEDURE — 80053 COMPREHEN METABOLIC PANEL: CPT

## 2024-11-02 PROCEDURE — 99900035 HC TECH TIME PER 15 MIN (STAT)

## 2024-11-02 PROCEDURE — 93005 ELECTROCARDIOGRAM TRACING: CPT

## 2024-11-02 PROCEDURE — 25000003 PHARM REV CODE 250: Performed by: INTERNAL MEDICINE

## 2024-11-02 PROCEDURE — 83735 ASSAY OF MAGNESIUM: CPT

## 2024-11-02 PROCEDURE — 63600175 PHARM REV CODE 636 W HCPCS

## 2024-11-02 PROCEDURE — 96376 TX/PRO/DX INJ SAME DRUG ADON: CPT

## 2024-11-02 PROCEDURE — 63600175 PHARM REV CODE 636 W HCPCS: Performed by: INTERNAL MEDICINE

## 2024-11-02 PROCEDURE — 96372 THER/PROPH/DIAG INJ SC/IM: CPT | Performed by: NURSE PRACTITIONER

## 2024-11-02 PROCEDURE — 25500020 PHARM REV CODE 255: Performed by: STUDENT IN AN ORGANIZED HEALTH CARE EDUCATION/TRAINING PROGRAM

## 2024-11-02 PROCEDURE — 63600175 PHARM REV CODE 636 W HCPCS: Performed by: PHYSICIAN ASSISTANT

## 2024-11-02 PROCEDURE — 81001 URINALYSIS AUTO W/SCOPE: CPT

## 2024-11-02 PROCEDURE — 96365 THER/PROPH/DIAG IV INF INIT: CPT

## 2024-11-02 PROCEDURE — 83605 ASSAY OF LACTIC ACID: CPT | Performed by: INTERNAL MEDICINE

## 2024-11-02 PROCEDURE — 85025 COMPLETE CBC W/AUTO DIFF WBC: CPT

## 2024-11-02 PROCEDURE — 99285 EMERGENCY DEPT VISIT HI MDM: CPT | Mod: 25

## 2024-11-02 PROCEDURE — 93010 ELECTROCARDIOGRAM REPORT: CPT | Mod: ,,, | Performed by: INTERNAL MEDICINE

## 2024-11-02 PROCEDURE — 99214 OFFICE O/P EST MOD 30 MIN: CPT | Mod: ,,, | Performed by: NURSE PRACTITIONER

## 2024-11-02 PROCEDURE — 94761 N-INVAS EAR/PLS OXIMETRY MLT: CPT | Mod: XB

## 2024-11-02 RX ORDER — INSULIN ASPART 100 [IU]/ML
0-10 INJECTION, SOLUTION INTRAVENOUS; SUBCUTANEOUS
Status: DISCONTINUED | OUTPATIENT
Start: 2024-11-02 | End: 2024-11-05 | Stop reason: HOSPADM

## 2024-11-02 RX ORDER — MORPHINE SULFATE 2 MG/ML
2 INJECTION, SOLUTION INTRAMUSCULAR; INTRAVENOUS ONCE
Status: COMPLETED | OUTPATIENT
Start: 2024-11-02 | End: 2024-11-02

## 2024-11-02 RX ORDER — INSULIN ASPART 100 [IU]/ML
0-10 INJECTION, SOLUTION INTRAVENOUS; SUBCUTANEOUS
Status: DISCONTINUED | OUTPATIENT
Start: 2024-11-02 | End: 2024-11-02

## 2024-11-02 RX ORDER — MORPHINE SULFATE 4 MG/ML
4 INJECTION, SOLUTION INTRAMUSCULAR; INTRAVENOUS ONCE
Status: DISCONTINUED | OUTPATIENT
Start: 2024-11-02 | End: 2024-11-02

## 2024-11-02 RX ORDER — SYRING-NEEDL,DISP,INSUL,0.3 ML 29 G X1/2"
296 SYRINGE, EMPTY DISPOSABLE MISCELLANEOUS
Status: DISCONTINUED | OUTPATIENT
Start: 2024-11-02 | End: 2024-11-02

## 2024-11-02 RX ORDER — IBUPROFEN 200 MG
24 TABLET ORAL
Status: DISCONTINUED | OUTPATIENT
Start: 2024-11-02 | End: 2024-11-05 | Stop reason: HOSPADM

## 2024-11-02 RX ORDER — INSULIN ASPART 100 [IU]/ML
7 INJECTION, SOLUTION INTRAVENOUS; SUBCUTANEOUS
Status: DISCONTINUED | OUTPATIENT
Start: 2024-11-02 | End: 2024-11-04

## 2024-11-02 RX ORDER — METOCLOPRAMIDE HYDROCHLORIDE 5 MG/ML
5 INJECTION INTRAMUSCULAR; INTRAVENOUS ONCE
Status: DISCONTINUED | OUTPATIENT
Start: 2024-11-02 | End: 2024-11-02

## 2024-11-02 RX ORDER — TALC
6 POWDER (GRAM) TOPICAL NIGHTLY PRN
Status: DISCONTINUED | OUTPATIENT
Start: 2024-11-02 | End: 2024-11-05 | Stop reason: HOSPADM

## 2024-11-02 RX ORDER — ACETAMINOPHEN 325 MG/1
650 TABLET ORAL EVERY 4 HOURS PRN
Status: DISCONTINUED | OUTPATIENT
Start: 2024-11-02 | End: 2024-11-05 | Stop reason: HOSPADM

## 2024-11-02 RX ORDER — SODIUM CHLORIDE 9 MG/ML
INJECTION, SOLUTION INTRAVENOUS CONTINUOUS
Status: DISCONTINUED | OUTPATIENT
Start: 2024-11-02 | End: 2024-11-02

## 2024-11-02 RX ORDER — POLYETHYLENE GLYCOL 3350 17 G/17G
17 POWDER, FOR SOLUTION ORAL 2 TIMES DAILY PRN
Status: DISCONTINUED | OUTPATIENT
Start: 2024-11-02 | End: 2024-11-05 | Stop reason: HOSPADM

## 2024-11-02 RX ORDER — MORPHINE SULFATE 4 MG/ML
4 INJECTION, SOLUTION INTRAMUSCULAR; INTRAVENOUS
Status: COMPLETED | OUTPATIENT
Start: 2024-11-02 | End: 2024-11-02

## 2024-11-02 RX ORDER — NALOXONE HCL 0.4 MG/ML
0.02 VIAL (ML) INJECTION
Status: DISCONTINUED | OUTPATIENT
Start: 2024-11-02 | End: 2024-11-05 | Stop reason: HOSPADM

## 2024-11-02 RX ORDER — ALUMINUM HYDROXIDE, MAGNESIUM HYDROXIDE, AND SIMETHICONE 1200; 120; 1200 MG/30ML; MG/30ML; MG/30ML
30 SUSPENSION ORAL 4 TIMES DAILY PRN
Status: DISCONTINUED | OUTPATIENT
Start: 2024-11-02 | End: 2024-11-05 | Stop reason: HOSPADM

## 2024-11-02 RX ORDER — SIMETHICONE 80 MG
1 TABLET,CHEWABLE ORAL 4 TIMES DAILY PRN
Status: DISCONTINUED | OUTPATIENT
Start: 2024-11-02 | End: 2024-11-05 | Stop reason: HOSPADM

## 2024-11-02 RX ORDER — PRAVASTATIN SODIUM 10 MG/1
10 TABLET ORAL DAILY
Status: DISCONTINUED | OUTPATIENT
Start: 2024-11-02 | End: 2024-11-05 | Stop reason: HOSPADM

## 2024-11-02 RX ORDER — DROPERIDOL 2.5 MG/ML
1.25 INJECTION, SOLUTION INTRAMUSCULAR; INTRAVENOUS
Status: DISCONTINUED | OUTPATIENT
Start: 2024-11-02 | End: 2024-11-02

## 2024-11-02 RX ORDER — GLUCAGON 1 MG
1 KIT INJECTION
Status: DISCONTINUED | OUTPATIENT
Start: 2024-11-02 | End: 2024-11-05 | Stop reason: HOSPADM

## 2024-11-02 RX ORDER — LACTULOSE 10 G/15ML
20 SOLUTION ORAL 3 TIMES DAILY
Status: DISCONTINUED | OUTPATIENT
Start: 2024-11-02 | End: 2024-11-02

## 2024-11-02 RX ORDER — SODIUM CHLORIDE 0.9 % (FLUSH) 0.9 %
5 SYRINGE (ML) INJECTION
Status: DISCONTINUED | OUTPATIENT
Start: 2024-11-02 | End: 2024-11-05 | Stop reason: HOSPADM

## 2024-11-02 RX ORDER — IBUPROFEN 200 MG
16 TABLET ORAL
Status: DISCONTINUED | OUTPATIENT
Start: 2024-11-02 | End: 2024-11-05 | Stop reason: HOSPADM

## 2024-11-02 RX ORDER — MAGNESIUM SULFATE HEPTAHYDRATE 40 MG/ML
2 INJECTION, SOLUTION INTRAVENOUS ONCE
Status: COMPLETED | OUTPATIENT
Start: 2024-11-02 | End: 2024-11-02

## 2024-11-02 RX ORDER — INSULIN GLARGINE 100 [IU]/ML
20 INJECTION, SOLUTION SUBCUTANEOUS DAILY
Status: DISCONTINUED | OUTPATIENT
Start: 2024-11-02 | End: 2024-11-05 | Stop reason: HOSPADM

## 2024-11-02 RX ORDER — PROCHLORPERAZINE EDISYLATE 5 MG/ML
5 INJECTION INTRAMUSCULAR; INTRAVENOUS EVERY 6 HOURS PRN
Status: DISCONTINUED | OUTPATIENT
Start: 2024-11-02 | End: 2024-11-05 | Stop reason: HOSPADM

## 2024-11-02 RX ORDER — ONDANSETRON 8 MG/1
8 TABLET, ORALLY DISINTEGRATING ORAL EVERY 8 HOURS PRN
Status: DISCONTINUED | OUTPATIENT
Start: 2024-11-02 | End: 2024-11-05 | Stop reason: HOSPADM

## 2024-11-02 RX ORDER — DROPERIDOL 2.5 MG/ML
2.5 INJECTION, SOLUTION INTRAMUSCULAR; INTRAVENOUS
Status: COMPLETED | OUTPATIENT
Start: 2024-11-02 | End: 2024-11-02

## 2024-11-02 RX ORDER — MORPHINE SULFATE 2 MG/ML
6 INJECTION, SOLUTION INTRAMUSCULAR; INTRAVENOUS
Status: COMPLETED | OUTPATIENT
Start: 2024-11-02 | End: 2024-11-02

## 2024-11-02 RX ORDER — LISINOPRIL 10 MG/1
10 TABLET ORAL DAILY
Status: DISCONTINUED | OUTPATIENT
Start: 2024-11-02 | End: 2024-11-05 | Stop reason: HOSPADM

## 2024-11-02 RX ORDER — ONDANSETRON HYDROCHLORIDE 2 MG/ML
4 INJECTION, SOLUTION INTRAVENOUS
Status: COMPLETED | OUTPATIENT
Start: 2024-11-02 | End: 2024-11-02

## 2024-11-02 RX ORDER — PSEUDOEPHEDRINE/ACETAMINOPHEN 30MG-500MG
100 TABLET ORAL
Status: DISCONTINUED | OUTPATIENT
Start: 2024-11-02 | End: 2024-11-02

## 2024-11-02 RX ORDER — METOCLOPRAMIDE HYDROCHLORIDE 5 MG/ML
10 INJECTION INTRAMUSCULAR; INTRAVENOUS ONCE
Status: COMPLETED | OUTPATIENT
Start: 2024-11-02 | End: 2024-11-02

## 2024-11-02 RX ORDER — IPRATROPIUM BROMIDE AND ALBUTEROL SULFATE 2.5; .5 MG/3ML; MG/3ML
3 SOLUTION RESPIRATORY (INHALATION) EVERY 4 HOURS PRN
Status: DISCONTINUED | OUTPATIENT
Start: 2024-11-02 | End: 2024-11-05 | Stop reason: HOSPADM

## 2024-11-02 RX ORDER — ACETAMINOPHEN 500 MG
1000 TABLET ORAL EVERY 8 HOURS PRN
Status: DISCONTINUED | OUTPATIENT
Start: 2024-11-02 | End: 2024-11-05 | Stop reason: HOSPADM

## 2024-11-02 RX ADMIN — INSULIN ASPART 7 UNITS: 100 INJECTION, SOLUTION INTRAVENOUS; SUBCUTANEOUS at 10:11

## 2024-11-02 RX ADMIN — LACTULOSE 20 G: 20 SOLUTION ORAL at 10:11

## 2024-11-02 RX ADMIN — INSULIN HUMAN 5 UNITS: 100 INJECTION, SOLUTION PARENTERAL at 09:11

## 2024-11-02 RX ADMIN — SODIUM CHLORIDE: 9 INJECTION, SOLUTION INTRAVENOUS at 02:11

## 2024-11-02 RX ADMIN — SODIUM CHLORIDE, POTASSIUM CHLORIDE, SODIUM LACTATE AND CALCIUM CHLORIDE 1000 ML: 600; 310; 30; 20 INJECTION, SOLUTION INTRAVENOUS at 05:11

## 2024-11-02 RX ADMIN — INSULIN ASPART 7 UNITS: 100 INJECTION, SOLUTION INTRAVENOUS; SUBCUTANEOUS at 04:11

## 2024-11-02 RX ADMIN — IOHEXOL 100 ML: 350 INJECTION, SOLUTION INTRAVENOUS at 07:11

## 2024-11-02 RX ADMIN — LISINOPRIL 10 MG: 10 TABLET ORAL at 02:11

## 2024-11-02 RX ADMIN — METOCLOPRAMIDE 10 MG: 5 INJECTION, SOLUTION INTRAMUSCULAR; INTRAVENOUS at 10:11

## 2024-11-02 RX ADMIN — Medication 6 MG: at 09:11

## 2024-11-02 RX ADMIN — ONDANSETRON 8 MG: 8 TABLET, ORALLY DISINTEGRATING ORAL at 08:11

## 2024-11-02 RX ADMIN — LACTULOSE 20 G: 20 SOLUTION ORAL at 02:11

## 2024-11-02 RX ADMIN — ONDANSETRON 4 MG: 2 INJECTION INTRAMUSCULAR; INTRAVENOUS at 07:11

## 2024-11-02 RX ADMIN — PRAVASTATIN SODIUM 10 MG: 10 TABLET ORAL at 02:11

## 2024-11-02 RX ADMIN — INSULIN ASPART 5 UNITS: 100 INJECTION, SOLUTION INTRAVENOUS; SUBCUTANEOUS at 10:11

## 2024-11-02 RX ADMIN — MORPHINE SULFATE 4 MG: 4 INJECTION INTRAVENOUS at 05:11

## 2024-11-02 RX ADMIN — MAGNESIUM SULFATE IN WATER 2 G: 40 INJECTION, SOLUTION INTRAVENOUS at 08:11

## 2024-11-02 RX ADMIN — DROPERIDOL 2.5 MG: 2.5 INJECTION, SOLUTION INTRAMUSCULAR; INTRAVENOUS at 05:11

## 2024-11-02 RX ADMIN — MORPHINE SULFATE 2 MG: 2 INJECTION, SOLUTION INTRAMUSCULAR; INTRAVENOUS at 02:11

## 2024-11-02 RX ADMIN — MORPHINE SULFATE 6 MG: 2 INJECTION, SOLUTION INTRAMUSCULAR; INTRAVENOUS at 08:11

## 2024-11-02 RX ADMIN — POLYETHYLENE GLYCOL 3350 17 G: 17 POWDER, FOR SOLUTION ORAL at 09:11

## 2024-11-02 RX ADMIN — ACETAMINOPHEN 1000 MG: 500 TABLET ORAL at 09:11

## 2024-11-02 RX ADMIN — ALUMINUM HYDROXIDE, MAGNESIUM HYDROXIDE, AND SIMETHICONE 30 ML: 200; 200; 20 SUSPENSION ORAL at 08:11

## 2024-11-02 RX ADMIN — INSULIN GLARGINE 20 UNITS: 100 INJECTION, SOLUTION SUBCUTANEOUS at 10:11

## 2024-11-02 NOTE — SUBJECTIVE & OBJECTIVE
"Past Medical History:   Diagnosis Date    Diabetes mellitus     Diabetes mellitus type 1     Diagnosed at age 20       History reviewed. No pertinent surgical history.    Review of patient's allergies indicates:   Allergen Reactions    Lactose Other (See Comments)     Gas and moderate to sever abdominal pain       No current facility-administered medications on file prior to encounter.     Current Outpatient Medications on File Prior to Encounter   Medication Sig    blood sugar diagnostic Strp 3-4 times a day    blood-glucose meter kit Use as instructed    blood-glucose sensor (DEXCOM G6 SENSOR) Martha 1 each by Misc.(Non-Drug; Combo Route) route every 10 days.    blood-glucose sensor (DEXCOM G7 SENSOR) Martha Apply one sensor to the skin every 10 days    blood-glucose transmitter (DEXCOM G6 TRANSMITTER) Martha Use as instructed.    dicyclomine (BENTYL) 10 MG capsule TAKE 1 CAPSULE BY MOUTH AS NEEDED    insulin degludec (TRESIBA FLEXTOUCH U-100) 100 unit/mL (3 mL) insulin pen Inject 22 Units into the skin once daily.    insulin lispro-aabc (LYUMJEV KWIKPEN U-100 INSULIN) 100 unit/mL pen Inject 8 Units into the skin 3 (three) times daily with meals. Take additional sliding scale based on the blood sugars.  Maximum daily dose of 36 units.    lancets 31 gauge Misc 1 lancet  by Misc.(Non-Drug; Combo Route) route 2 (two) times daily.    lisinopriL 10 MG tablet Take 1 tablet (10 mg total) by mouth once daily.    pen needle, diabetic (BD ULTRA-FINE ROVERTO PEN NEEDLE) 32 gauge x 5/32" Ndle USE AS DIRECTED WITH INSULIN    pen needle, diabetic 29 gauge x 1/2" Ndle Use to inject insulin into the skin.    pravastatin (PRAVACHOL) 10 MG tablet Take 5 mg once a day [half a pill daily]    TRUEPLUS INSULIN 0.5 mL 30 gauge x 5/16" Syrg USE FOUR TIMES DAILY AS DIRECTED    TRUEPLUS INSULIN 0.5 mL 31 gauge x 5/16" Syrg USE FOUR TIMES DAILY AS DIRECTED     Family History       Problem Relation (Age of Onset)    Diabetes Mother    Other Mother, " Father          Tobacco Use    Smoking status: Former    Smokeless tobacco: Never   Substance and Sexual Activity    Alcohol use: Not Currently     Comment: socially    Drug use: No    Sexual activity: Not on file     Review of Systems   Constitutional:  Positive for appetite change. Negative for activity change, chills and fever.   HENT:  Negative for trouble swallowing.    Eyes:  Negative for photophobia and visual disturbance.   Respiratory:  Negative for chest tightness, shortness of breath and wheezing.    Cardiovascular:  Negative for chest pain, palpitations and leg swelling.   Gastrointestinal:  Positive for abdominal pain, constipation, nausea and vomiting. Negative for diarrhea.   Genitourinary:  Negative for dysuria, frequency, hematuria and urgency.   Musculoskeletal:  Negative for arthralgias, back pain and gait problem.   Skin:  Negative for color change and rash.   Neurological:  Negative for dizziness, syncope, weakness, light-headedness, numbness and headaches.   Psychiatric/Behavioral:  Negative for agitation and confusion. The patient is not nervous/anxious.      Objective:     Vital Signs (Most Recent):  Temp: 98.4 °F (36.9 °C) (11/02/24 1000)  Pulse: 95 (11/02/24 1407)  Resp: 16 (11/02/24 1407)  BP: (!) 141/103 (11/02/24 1407)  SpO2: 99 % (11/02/24 1407) Vital Signs (24h Range):  Temp:  [98.4 °F (36.9 °C)-98.8 °F (37.1 °C)] 98.4 °F (36.9 °C)  Pulse:  [73-95] 95  Resp:  [14-20] 16  SpO2:  [97 %-100 %] 99 %  BP: (119-180)/() 141/103     Weight: 92.5 kg (204 lb)  Body mass index is 26.19 kg/m².     Physical Exam  Vitals and nursing note reviewed.   Constitutional:       General: He is not in acute distress.     Appearance: He is well-developed.   HENT:      Head: Normocephalic and atraumatic.      Mouth/Throat:      Pharynx: No oropharyngeal exudate.   Eyes:      Conjunctiva/sclera: Conjunctivae normal.      Pupils: Pupils are equal, round, and reactive to light.   Cardiovascular:      Rate  and Rhythm: Normal rate and regular rhythm.      Heart sounds: Normal heart sounds.   Pulmonary:      Effort: Pulmonary effort is normal. No respiratory distress.      Breath sounds: Normal breath sounds. No wheezing.   Abdominal:      General: Bowel sounds are normal. There is no distension.      Palpations: Abdomen is soft.      Tenderness: There is no abdominal tenderness.   Musculoskeletal:         General: No tenderness. Normal range of motion.      Cervical back: Normal range of motion and neck supple.   Lymphadenopathy:      Cervical: No cervical adenopathy.   Skin:     General: Skin is warm and dry.      Capillary Refill: Capillary refill takes less than 2 seconds.      Findings: No rash.   Neurological:      Mental Status: He is alert and oriented to person, place, and time.      Cranial Nerves: No cranial nerve deficit.      Sensory: No sensory deficit.      Coordination: Coordination normal.   Psychiatric:         Behavior: Behavior normal.         Thought Content: Thought content normal.         Judgment: Judgment normal.              CRANIAL NERVES     CN III, IV, VI   Pupils are equal, round, and reactive to light.       Significant Labs: All pertinent labs within the past 24 hours have been reviewed.  A1C:   Recent Labs   Lab 11/02/24  0545   HGBA1C 12.0*     CBC:   Recent Labs   Lab 11/02/24  0545 11/02/24  0552   WBC 4.71  --    HGB 14.1  --    HCT 41.4 44   *  --      CMP:   Recent Labs   Lab 11/02/24  0545   *   K 3.8      CO2 24   *   BUN 20   CREATININE 1.8*   CALCIUM 9.5   PROT 7.0   ALBUMIN 3.8   BILITOT 0.9   ALKPHOS 82   AST 30   ALT 41   ANIONGAP 11     Urine Studies:   Recent Labs   Lab 11/02/24  1039   COLORU Colorless*   APPEARANCEUA Clear   PHUR 6.0   SPECGRAV >1.030*   PROTEINUA Trace*   GLUCUA 4+*   KETONESU 2+*   BILIRUBINUA Negative   OCCULTUA 1+*   NITRITE Negative   LEUKOCYTESUR Negative   RBCUA 2   BACTERIA None   SQUAMEPITHEL 0       Significant  Imaging: I have reviewed all pertinent imaging results/findings within the past 24 hours.  Imaging Results              CT Abdomen Pelvis With IV Contrast NO Oral Contrast (Final result)  Result time 11/02/24 07:38:32      Final result by Abdirizak Dill MD (11/02/24 07:38:32)                   Impression:      No acute findings in the abdomen or pelvis.    Moderate stool burden throughout the colon.    Additional details, as provided in the body of report.    Electronically signed by resident: Jordan Sorto  Date:    11/02/2024  Time:    07:12    Electronically signed by: Abdirizak Dill  Date:    11/02/2024  Time:    07:38               Narrative:    EXAMINATION:  CT ABDOMEN PELVIS WITH IV CONTRAST    CLINICAL HISTORY:  Abdominal pain, acute, nonlocalized;    TECHNIQUE:  Axial images of the abdomen and pelvis were acquired after the use of 100 cc [id number] IV contrast. No oral contrast was administered.  Coronal and sagittal reconstructions were also obtained    COMPARISON:  CT abdomen pelvis 09/08/2023.    FINDINGS:  Heart: Normal in size. No pericardial effusion. No significant calcific coronary atherosclerosis.    Lungs: Minimal subsegmental atelectasis.  No focal consolidation, pleural effusion or pneumothorax.    Hepatobiliary: Liver is normal size.  Normal contour.  No focal hepatic lesion.  No calcified gallstones. No pericholecystic fluid or gallbladder wall thickening.No intrahepatic or extrahepatic biliary ductal dilatation.    Pancreas: Mild prominence of the main pancreatic duct.  No mass or peripancreatic fat stranding.    Spleen: Unremarkable.    Adrenals: Unremarkable.    Kidneys/Ureters: Normal in size and location. Normal enhancement. No hydronephrosis or nephrolithiasis. No ureteral dilatation.    Bladder: No evidence of wall thickening.    Reproductive organs: No significant abnormality.    Peritoneum: No free air or free fluid.    Retroperitoneum: No pathologically enlarged abdominopelvic  lymph nodes.    Bowel/Mesentery: Stomach is decompressed which limits evaluation for mucosal thickening..  Small bowel is normal in caliber with no evidence of obstruction or inflammation. Moderate stool burden throughout the colon.  No evidence of large bowel obstruction or inflammation.  Appendix is visualized and unremarkable.    Abdominal wall:  Unremarkable.    Vasculature: No aneurysm. No significant calcific atherosclerosis.  Portal vasculature is patent.    Bones: No acute fracture. Mild degenerative changes of the spine and hip DJD.  No suspicious osseous lesions.                                       X-Ray Chest AP Portable (Final result)  Result time 11/02/24 06:13:10      Final result by Abdirizak Dill MD (11/02/24 06:13:10)                   Impression:      No convincing evidence of acute cardiopulmonary disease.      Electronically signed by: Abdirizak Dill  Date:    11/02/2024  Time:    06:13               Narrative:    EXAMINATION:  XR CHEST AP PORTABLE    CLINICAL HISTORY:  Chest Pain;    TECHNIQUE:  Single frontal view of the chest was performed.    COMPARISON:  Chest radiograph performed 03/31/2024, 19:36 hours.    FINDINGS:  Monitoring leads over the chest.    Cardiomediastinal contours appear grossly unchanged within normal limits.    Lungs appear essentially clear.    No definite pneumothorax or large volume pleural effusion.  No acute findings in the visualized abdomen.    Osseous and soft tissue structures appear without definite acute abnormality.

## 2024-11-02 NOTE — ASSESSMENT & PLAN NOTE
Patient's FSGs are not controlled on current hypoglycemics.   Last A1c reviewed-   Lab Results   Component Value Date    HGBA1C 12.0 (H) 11/02/2024     Most recent fingerstick glucose reviewed-   Recent Labs   Lab 11/02/24  0956 11/02/24  1047 11/02/24  1243   POCTGLUCOSE 435* 423* 332*     Current correctional scale  Medium  Maintain anti-hyperglycemic dose as follows-   Antihyperglycemics (From admission, onward)      Start     Stop Route Frequency Ordered    11/02/24 1130  insulin aspart U-100 pen 7 Units         -- SubQ 3 times daily with meals 11/02/24 0952    11/02/24 1100  insulin glargine U-100 (Lantus) pen 20 Units         -- SubQ Daily 11/02/24 0952    11/02/24 1052  insulin aspart U-100 pen 0-10 Units         -- SubQ Before meals & nightly PRN 11/02/24 0952           - Endocrine following and managing insulin regimen

## 2024-11-02 NOTE — ED NOTES
Assumed care of pt at this time. VSS, RR even and unlabored. Resting in bed comfortably. Safety protocols remain, will continue to monitor.     Patient identifiers verified and correct for Andrzej Sinclair    LOC: The patient is awake, alert and aware of environment with an appropriate affect, the patient is oriented x 4 and speaking appropriately.   APPEARANCE: Patient appears comfortable and in no acute distress, patient is clean and well groomed.  SKIN: The skin is warm and dry, color consistent with ethnicity, patient has normal skin turgor and moist mucus membranes, skin intact, no breakdown or bruising noted.   MUSCULOSKELETAL: Patient moving all extremities spontaneously, no swelling noted.  RESPIRATORY: Airway is open and patent, respirations are spontaneous, patient has a normal effort and rate, no accessory muscle use noted, pt placed on continuous pulse ox with O2 sats noted at 97% on room air.  CARDIAC: Pt placed on cardiac monitor. Patient has a normal rate and regular rhythm, no edema noted, capillary refill < 3 seconds.   GASTRO: Soft and non tender to palpation, no distention noted, normoactive bowel sounds present in all four quadrants. Pt states bowel movements have been regular. Endorses nausea and abd pain.  : Pt denies any pain or frequency with urination.  NEURO: Pt opens eyes spontaneously, behavior appropriate to situation, follows commands, facial expression symmetrical, bilateral hand grasp equal and even, purposeful motor response noted, normal sensation in all extremities when touched with a finger.

## 2024-11-02 NOTE — ED TRIAGE NOTES
Andrzej Sinclair, a 38 y.o. male presents to the ED w/ complaint of N/V and abdominal pain    Triage note:  Chief Complaint   Patient presents with    Abdominal Pain     N/V and abd cramping since yesterday. Hx T1DM -      Review of patient's allergies indicates:   Allergen Reactions    Lactose Other (See Comments)     Gas and moderate to sever abdominal pain     Past Medical History:   Diagnosis Date    Diabetes mellitus     Diabetes mellitus type 1     Diagnosed at age 20

## 2024-11-02 NOTE — ED PROVIDER NOTES
Encounter Date: 11/2/2024       History     Chief Complaint   Patient presents with    Abdominal Pain     N/V and abd cramping since yesterday. Hx T1DM -      A 38-year-old male with past medical history of type 1 diabetes on insulin that presents to emergency department with nausea and vomiting.  Patient states that he her work yesterday.  States that vomiting has progressed into severe abdominal pain.  On arrival patient is retching, grasping entire abdomen.  Denies fevers or chills, denies dysuria, denies polyuria.  Denies chest pain.  Describes vomit as nonbilious and nonbloody.      The history is provided by the patient and medical records.     Review of patient's allergies indicates:   Allergen Reactions    Lactose Other (See Comments)     Gas and moderate to sever abdominal pain     Past Medical History:   Diagnosis Date    Diabetes mellitus     Diabetes mellitus type 1     Diagnosed at age 20     History reviewed. No pertinent surgical history.  Family History   Problem Relation Name Age of Onset    Other Mother prediabetes         prediabetes    Diabetes Mother prediabetes     Other Father          patient does not know his fathers history     Social History     Tobacco Use    Smoking status: Former    Smokeless tobacco: Never   Substance Use Topics    Alcohol use: Not Currently     Comment: socially    Drug use: No     Review of Systems  ROS negative except as noted in HPI    Physical Exam     Initial Vitals [11/02/24 0514]   BP Pulse Resp Temp SpO2   (!) 164/119 95 20 98.8 °F (37.1 °C) 100 %      MAP       --         Physical Exam    Nursing note and vitals reviewed.  Constitutional: He is diaphoretic. He appears distressed.   HENT:   Head: Normocephalic.   Right Ear: External ear normal.   Left Ear: External ear normal. Mouth/Throat: Oropharynx is clear and moist.   Eyes: Conjunctivae are normal.   Cardiovascular:  Normal rate, regular rhythm, normal heart sounds and intact distal pulses.            Pulmonary/Chest: Breath sounds normal. No respiratory distress.   Abdominal: Abdomen is soft. There is abdominal tenderness (Diffuse tenderness throughout all abdomen).   Musculoskeletal:         General: No tenderness or edema. Normal range of motion.     Neurological: He is alert and oriented to person, place, and time. He has normal strength. No cranial nerve deficit or sensory deficit.   Skin: Skin is warm. Capillary refill takes less than 2 seconds.   Psychiatric: He has a normal mood and affect.         ED Course   Procedures  Labs Reviewed   CBC W/ AUTO DIFFERENTIAL - Abnormal       Result Value    WBC 4.71      RBC 4.70      Hemoglobin 14.1      Hematocrit 41.4      MCV 88      MCH 30.0      MCHC 34.1      RDW 12.6      Platelets 148 (*)     MPV 11.1      Immature Granulocytes 0.2      Gran # (ANC) 3.4      Immature Grans (Abs) 0.01      Lymph # 0.9 (*)     Mono # 0.4      Eos # 0.0      Baso # 0.03      nRBC 0      Gran % 71.2      Lymph % 18.3      Mono % 9.1      Eosinophil % 0.6      Basophil % 0.6      Differential Method Automated     COMPREHENSIVE METABOLIC PANEL - Abnormal    Sodium 135 (*)     Potassium 3.8      Chloride 100      CO2 24      Glucose 315 (*)     BUN 20      Creatinine 1.8 (*)     Calcium 9.5      Total Protein 7.0      Albumin 3.8      Total Bilirubin 0.9      Alkaline Phosphatase 82      AST 30      ALT 41      eGFR 49.1 (*)     Anion Gap 11     BETA - HYDROXYBUTYRATE, SERUM - Abnormal    Beta-Hydroxybutyrate 0.7 (*)    ISTAT PROCEDURE - Abnormal    POC Glucose 315 (*)     POC BUN 23      POC Creatinine 1.7 (*)     POC Sodium 139      POC Potassium 4.0      POC Chloride 98      POC TCO2 (MEASURED) 30 (*)     POC Ionized Calcium 1.18      POC Hematocrit 44      Sample AMALIA     ISTAT PROCEDURE - Abnormal    POC PH 7.405      POC PCO2 42.5      POC PO2 31 (*)     POC HCO3 26.6      POC BE 2      POC SATURATED O2 59      POC TCO2 28      Sample VENOUS      Site Other      Allens Test  N/A     ISTAT LACTATE - Abnormal    POC Lactate 2.47 (*)     Sample VENOUS      Site Other      Allens Test N/A     LIPASE    Lipase 15     MAGNESIUM    Magnesium 1.7     URINALYSIS, REFLEX TO URINE CULTURE   ISTAT CHEM8          Imaging Results              CT Abdomen Pelvis With IV Contrast NO Oral Contrast (In process)  Result time 11/02/24 07:31:34                     X-Ray Chest AP Portable (Final result)  Result time 11/02/24 06:13:10      Final result by Abdirizak Dill MD (11/02/24 06:13:10)                   Impression:      No convincing evidence of acute cardiopulmonary disease.      Electronically signed by: Abdirizak Dill  Date:    11/02/2024  Time:    06:13               Narrative:    EXAMINATION:  XR CHEST AP PORTABLE    CLINICAL HISTORY:  Chest Pain;    TECHNIQUE:  Single frontal view of the chest was performed.    COMPARISON:  Chest radiograph performed 03/31/2024, 19:36 hours.    FINDINGS:  Monitoring leads over the chest.    Cardiomediastinal contours appear grossly unchanged within normal limits.    Lungs appear essentially clear.    No definite pneumothorax or large volume pleural effusion.  No acute findings in the visualized abdomen.    Osseous and soft tissue structures appear without definite acute abnormality.                                       Medications   iohexoL (OMNIPAQUE 350) injection 100 mL (has no administration in time range)   magnesium sulfate 2g in water 50mL IVPB (premix) (has no administration in time range)   morphine injection 4 mg (4 mg Intravenous Given 11/2/24 0524)   droPERidol injection 2.5 mg (2.5 mg Intravenous Given 11/2/24 0524)   lactated ringers bolus 1,000 mL (1,000 mLs Intravenous New Bag 11/2/24 0547)   iohexoL (OMNIPAQUE 350) injection 100 mL (100 mLs Intravenous Given 11/2/24 0702)   ondansetron injection 4 mg (4 mg Intravenous Given 11/2/24 0735)     Medical Decision Making  A 38-year-old male with past medical history of type 1 diabetes on insulin  that presents to emergency department with nausea and vomiting.    On initial evaluation patient is retching, in acute distress, diaphoretic and ill-appearing.  He was less cooperative given severe symptoms.    Differential for patient's presentation includes intra-abdominal abscess, pancreatitis, BETH, nephrolithiasis.    See ED course below.    Not appear to be in DKA at this time.  Given severe abdominal pain we will proceed with CT abdomen and pelvis with IV contrast.    At time of sign-out patient was pending rest of metabolic workup as well as CT abdomen and pelvis.  Final disposition pending CT abdomen and pelvis and symptom resolution.    Amount and/or Complexity of Data Reviewed  Labs: ordered. Decision-making details documented in ED Course.  Radiology: ordered.  ECG/medicine tests:  Decision-making details documented in ED Course.    Risk  Prescription drug management.               ED Course as of 11/02/24 0736   Sat Nov 02, 2024   0612 POC Lactate(!): 2.47 [TK]   0612 Beta-Hydroxybutyrate(!): 0.7 [TK]   0613 WBC: 4.71 [TK]   0613 Hemoglobin: 14.1 [TK]   0613 Hematocrit: 41.4 [TK]   0613 Platelet Count(!): 148 [TK]   0613 EKG 12-lead  Independent interpretation demonstrates normal sinus rhythm, no ST elevations or depressions, QTC within normal limits. [TK]   0651 Anion Gap: 11 [TK]   0651 Glucose(!): 315 [TK]   0651 Potassium: 3.8 [TK]   0651 Sodium(!): 135 [TK]   0651 Creatinine(!): 1.8 [TK]   0651 BUN: 20 [TK]   0734 Lipase: 15 [TK]   0734 Magnesium : 1.7 [TK]      ED Course User Index  [TK] Leny Busby DO                             Clinical Impression:  Final diagnoses:  [R10.9] Abdominal pain                 Leny Busby DO  Resident  11/02/24 0754

## 2024-11-02 NOTE — ASSESSMENT & PLAN NOTE
BETH is likely due to pre-renal azotemia due to dehydration. Baseline creatinine is  1.5 . Most recent creatinine and eGFR are listed below.  Recent Labs     11/02/24  0545   CREATININE 1.8*   EGFRNORACEVR 49.1*      Plan  - BETH is stable  - Avoid nephrotoxins and renally dose meds for GFR listed above  - Monitor urine output, serial BMP, and adjust therapy as needed  - giving additional IVFs

## 2024-11-02 NOTE — SUBJECTIVE & OBJECTIVE
Interval HPI:   Overnight events: No acute events overnight. Patient in room ED 24/24. Blood glucose worsening. BG above goal on current insulin regimen (SSI, prandial, and basal insulin ). Steroid use- None.    Renal function- Abnormal - Creatinine 1.8   Vasopressors-  None       Endocrine will continue to follow and manage insulin orders inpatient.         Diet Clear liquid (no sugar)/Bariatric     Eating:   <25%  Nausea: No  Hypoglycemia and intervention: No  Fever: No  TPN and/or TF: No    PMH, PSH, FH, SH updated and reviewed     ROS:  Review of Systems   Constitutional:  Negative for unexpected weight change.   Eyes:  Negative for visual disturbance.   Respiratory:  Negative for cough.    Cardiovascular:  Negative for chest pain.   Gastrointestinal:  Negative for nausea and vomiting.   Endocrine: Negative for polydipsia and polyuria.   Musculoskeletal:  Negative for back pain.   Skin:  Negative for rash.   Neurological:  Negative for syncope.   Psychiatric/Behavioral:  Negative for agitation and dysphoric mood.        Current Medications and/or Treatments Impacting Glycemic Control  Immunotherapy:    Immunosuppressants       None          Steroids:   Hormones (From admission, onward)      Start     Stop Route Frequency Ordered    11/02/24 1032  melatonin tablet 6 mg         -- Oral Nightly PRN 11/02/24 0934          Pressors:    Autonomic Drugs (From admission, onward)      None          Hyperglycemia/Diabetes Medications:   Antihyperglycemics (From admission, onward)      Start     Stop Route Frequency Ordered    11/02/24 1130  insulin aspart U-100 pen 7 Units         -- SubQ 3 times daily with meals 11/02/24 0952    11/02/24 1100  insulin glargine U-100 (Lantus) pen 20 Units         -- SubQ Daily 11/02/24 0952    11/02/24 1052  insulin aspart U-100 pen 0-10 Units         -- SubQ Before meals & nightly PRN 11/02/24 0952             PHYSICAL EXAMINATION:  Vitals:    11/02/24 1000   BP: (!) 142/67   Pulse: 84    Resp: 15   Temp: 98.4 °F (36.9 °C)     Body mass index is 26.19 kg/m².     Physical Exam  Constitutional:       Appearance: He is well-developed.   HENT:      Head: Normocephalic.   Eyes:      Conjunctiva/sclera: Conjunctivae normal.   Pulmonary:      Effort: Pulmonary effort is normal.   Musculoskeletal:         General: Normal range of motion.   Skin:     General: Skin is warm.      Findings: No rash.   Neurological:      Mental Status: He is alert and oriented to person, place, and time.

## 2024-11-02 NOTE — ASSESSMENT & PLAN NOTE
- given 1L IVFs in ED, giving additional 1L  - npo for now, when able can start bariatric CLD  - better BG management, see below  - bowel regimen   - prn antiemetics

## 2024-11-02 NOTE — ASSESSMENT & PLAN NOTE
Endocrinology consulted for BG management.   BG goal 140-180    15% reduction in home regimen  - Lantus (Insulin Glargine) 20 units daily  - Novolog (Insulin Aspart) 7 units TIDWM and prn for BG excursions [facility] SSI (150/25)  - BG checks q4hr  - Hypoglycemia protocol in place    ** Please notify Endocrine for any change and/or advance in diet**  ** Please call Endocrine for any BG related issues **    Discharge Planning:   TBD. Please notify endocrinology prior to discharge.

## 2024-11-02 NOTE — PROVIDER PROGRESS NOTES - EMERGENCY DEPT.
"Encounter Date: 11/2/2024    ED Physician Progress Notes        ED STAFF PHYSICIAN HAND-OFF NOTE:  7:19 AM 11/2/2024  Andrzej Sinclair is a 38 y.o. male who presented to the ED on 11/2/2024 and has been managed by , who reports patient C/O AP. I assumed care of patient from off-going ED physician team at 7:19 AM pending CT A/P.    On my evaluation, Andrzej Sinclair resting but wakes up c/o of diffuse AP and reports 4 day hx of decrease PO, AP, and n/v  . Thus far, Andrzej Sinclair has received:  Medications   morphine injection 4 mg (4 mg Intravenous Given 11/2/24 0524)   droPERidol injection 2.5 mg (2.5 mg Intravenous Given 11/2/24 0524)   lactated ringers bolus 1,000 mL (1,000 mLs Intravenous New Bag 11/2/24 0547)   iohexoL (OMNIPAQUE 350) injection 100 mL (100 mLs Intravenous Given 11/2/24 0702)       On my exam, I appreciate:  BP (!) 180/86   Pulse 73   Temp 98.7 °F (37.1 °C) (Oral)   Resp 17   Ht 6' 2" (1.88 m)   Wt 92.5 kg (204 lb)   SpO2 99%   BMI 26.19 kg/m²   Constitutional:  In moderate distress  Abdominal: Soft, ND, diffusely tender.    ED Course as of 11/02/24 0735   Sat Nov 02, 2024   0612 POC Lactate(!): 2.47 [TK]   0612 Beta-Hydroxybutyrate(!): 0.7 [TK]   0613 WBC: 4.71 [TK]   0613 Hemoglobin: 14.1 [TK]   0613 Hematocrit: 41.4 [TK]   0613 Platelet Count(!): 148 [TK]   0613 EKG 12-lead  Independent interpretation demonstrates normal sinus rhythm, no ST elevations or depressions, QTC within normal limits. [TK]   0651 Anion Gap: 11 [TK]   0651 Glucose(!): 315 [TK]   0651 Potassium: 3.8 [TK]   0651 Sodium(!): 135 [TK]   0651 Creatinine(!): 1.8 [TK]   0651 BUN: 20 [TK]   0734 Lipase: 15 [TK]   0734 Magnesium : 1.7 [TK]      ED Course User Index  [TK] Leny Busby,        Disposition:  CT abdomen and pelvis unremarkable however patient persistently complaining of diffuse abdominal tenderness and having bilious vomiting.  Patient was given 5 unit insulin prior to being admitted to Hospital " Medicine for intractable nausea and vomiting.  I discussed and counseled Andrzej Sinclair regarding exam, results, diagnosis, treatment, and plan.  ______________________  Barbara Todd MD  Emergency Medicine Staff  7:19 AM 11/2/2024

## 2024-11-02 NOTE — PLAN OF CARE
Clinical Update    Abdominal Pain after oral Lactulose  VSS  Abdominal exam reassuring - soft, no guarding, slow but present BS x 4  CBG recheck pending  Repositioned patient for exam, improved when laying flat.   LA elevated, now >3.  IVF @ 250cc x 1L  Recheck LA at 1600, CBG q6h, NPO  IV Morphine 2mg x1  Lipase WNL  CT A/P collapsed stomach, moderate stool burden  If LA elevated again would consider new CT A/P  DEION updated to care plan - likely abdominal cramping 2/2 to lactulose.

## 2024-11-02 NOTE — H&P
[facility] - Emergency Dept  [facility] Medicine  History & Physical    Patient Name: Andrzej Sinclair  MRN: [id number]  Patient Class: OP- Observation  Admission Date: 11/2/2024  Attending Physician: Marty Madrigal MD   Primary Care Provider: Viktoriya Jaeger NP         Patient information was obtained from patient and ER records.     Subjective:     Principal Problem:Intractable nausea and vomiting    Chief Complaint:   Chief Complaint   Patient presents with    Abdominal Pain     N/V and abd cramping since yesterday. Hx T1DM -         HPI: Andrzej Sinclair is a 37 yo M with PMHx of T1DM, hx of DKA/HHS, HTN, HLD who presented to ED for intractable nausea and vomiting. Patient endorses severe abdominal pain that started 2 days ago. He has been nauseous with approximately 8 episodes of emesis/day for the past 2 days. He has had difficulty tolerating fluids. States that his BG has been very low to very high during this time as well, but unable to give specific numbers. He also states that he has not had a BM in 3 days. He typically has a BM every other day. He has a hx of marijuana use, but reports last use was 2 weeks ago. Denies fever, chills, chest pain, SOB, dysuria, headaches and LE swelling.    In ED: Afebrile. HDS. No leukocytosis. CMP with Na 135 and BETH with Cr 1.8 (baseline 1.5). BG 400s. A1c 12.0 Beta hydroxybutyrate 0.7. UA noninfectious, but with 4+ glucose and 2+ ketones. Lactic 2.47 >> 3.12. CXR clear. CT A/P only notable for moderate stool burden. Given droperidol, 5 units regular insulin, 1L LR, IV zofran, IV morphine 6 mg and morphine 4 mg. Endocrinology consulted. Per ED providers, patient visibly uncomfortable 2/2 abdominal pain. Placed in observation with HM for abdominal pain and intractable n/v.     Past Medical History:   Diagnosis Date    Diabetes mellitus     Diabetes mellitus type 1     Diagnosed at age 20       History reviewed. No pertinent surgical history.    Review of patient's  "allergies indicates:   Allergen Reactions    Lactose Other (See Comments)     Gas and moderate to sever abdominal pain       No current facility-administered medications on file prior to encounter.     Current Outpatient Medications on File Prior to Encounter   Medication Sig    blood sugar diagnostic Strp 3-4 times a day    blood-glucose meter kit Use as instructed    blood-glucose sensor (DEXCOM G6 SENSOR) Martha 1 each by Misc.(Non-Drug; Combo Route) route every 10 days.    blood-glucose sensor (DEXCOM G7 SENSOR) Martha Apply one sensor to the skin every 10 days    blood-glucose transmitter (DEXCOM G6 TRANSMITTER) Martha Use as instructed.    dicyclomine (BENTYL) 10 MG capsule TAKE 1 CAPSULE BY MOUTH AS NEEDED    insulin degludec (TRESIBA FLEXTOUCH U-100) 100 unit/mL (3 mL) insulin pen Inject 22 Units into the skin once daily.    insulin lispro-aabc (LYUMJEV KWIKPEN U-100 INSULIN) 100 unit/mL pen Inject 8 Units into the skin 3 (three) times daily with meals. Take additional sliding scale based on the blood sugars.  Maximum daily dose of 36 units.    lancets 31 gauge Misc 1 lancet  by Misc.(Non-Drug; Combo Route) route 2 (two) times daily.    lisinopriL 10 MG tablet Take 1 tablet (10 mg total) by mouth once daily.    pen needle, diabetic (BD ULTRA-FINE ROVERTO PEN NEEDLE) 32 gauge x 5/32" Ndle USE AS DIRECTED WITH INSULIN    pen needle, diabetic 29 gauge x 1/2" Ndle Use to inject insulin into the skin.    pravastatin (PRAVACHOL) 10 MG tablet Take 5 mg once a day [half a pill daily]    TRUEPLUS INSULIN 0.5 mL 30 gauge x 5/16" Syrg USE FOUR TIMES DAILY AS DIRECTED    TRUEPLUS INSULIN 0.5 mL 31 gauge x 5/16" Syrg USE FOUR TIMES DAILY AS DIRECTED     Family History       Problem Relation (Age of Onset)    Diabetes Mother    Other Mother, Father          Tobacco Use    Smoking status: Former    Smokeless tobacco: Never   Substance and Sexual Activity    Alcohol use: Not Currently     Comment: socially    Drug use: No    Sexual " activity: Not on file     Review of Systems   Constitutional:  Positive for appetite change. Negative for activity change, chills and fever.   HENT:  Negative for trouble swallowing.    Eyes:  Negative for photophobia and visual disturbance.   Respiratory:  Negative for chest tightness, shortness of breath and wheezing.    Cardiovascular:  Negative for chest pain, palpitations and leg swelling.   Gastrointestinal:  Positive for abdominal pain, constipation, nausea and vomiting. Negative for diarrhea.   Genitourinary:  Negative for dysuria, frequency, hematuria and urgency.   Musculoskeletal:  Negative for arthralgias, back pain and gait problem.   Skin:  Negative for color change and rash.   Neurological:  Negative for dizziness, syncope, weakness, light-headedness, numbness and headaches.   Psychiatric/Behavioral:  Negative for agitation and confusion. The patient is not nervous/anxious.      Objective:     Vital Signs (Most Recent):  Temp: 98.4 °F (36.9 °C) (11/02/24 1000)  Pulse: 95 (11/02/24 1407)  Resp: 16 (11/02/24 1407)  BP: (!) 141/103 (11/02/24 1407)  SpO2: 99 % (11/02/24 1407) Vital Signs (24h Range):  Temp:  [98.4 °F (36.9 °C)-98.8 °F (37.1 °C)] 98.4 °F (36.9 °C)  Pulse:  [73-95] 95  Resp:  [14-20] 16  SpO2:  [97 %-100 %] 99 %  BP: (119-180)/() 141/103     Weight: 92.5 kg (204 lb)  Body mass index is 26.19 kg/m².     Physical Exam  Vitals and nursing note reviewed.   Constitutional:       General: He is not in acute distress.     Appearance: He is well-developed.   HENT:      Head: Normocephalic and atraumatic.      Mouth/Throat:      Pharynx: No oropharyngeal exudate.   Eyes:      Conjunctiva/sclera: Conjunctivae normal.      Pupils: Pupils are equal, round, and reactive to light.   Cardiovascular:      Rate and Rhythm: Normal rate and regular rhythm.      Heart sounds: Normal heart sounds.   Pulmonary:      Effort: Pulmonary effort is normal. No respiratory distress.      Breath sounds: Normal  breath sounds. No wheezing.   Abdominal:      General: Bowel sounds are normal. There is no distension.      Palpations: Abdomen is soft.      Tenderness: There is no abdominal tenderness.   Musculoskeletal:         General: No tenderness. Normal range of motion.      Cervical back: Normal range of motion and neck supple.   Lymphadenopathy:      Cervical: No cervical adenopathy.   Skin:     General: Skin is warm and dry.      Capillary Refill: Capillary refill takes less than 2 seconds.      Findings: No rash.   Neurological:      Mental Status: He is alert and oriented to person, place, and time.      Cranial Nerves: No cranial nerve deficit.      Sensory: No sensory deficit.      Coordination: Coordination normal.   Psychiatric:         Behavior: Behavior normal.         Thought Content: Thought content normal.         Judgment: Judgment normal.              CRANIAL NERVES     CN III, IV, VI   Pupils are equal, round, and reactive to light.       Significant Labs: All pertinent labs within the past 24 hours have been reviewed.  A1C:   Recent Labs   Lab 11/02/24  0545   HGBA1C 12.0*     CBC:   Recent Labs   Lab 11/02/24 0545 11/02/24  0552   WBC 4.71  --    HGB 14.1  --    HCT 41.4 44   *  --      CMP:   Recent Labs   Lab 11/02/24  0545   *   K 3.8      CO2 24   *   BUN 20   CREATININE 1.8*   CALCIUM 9.5   PROT 7.0   ALBUMIN 3.8   BILITOT 0.9   ALKPHOS 82   AST 30   ALT 41   ANIONGAP 11     Urine Studies:   Recent Labs   Lab 11/02/24  1039   COLORU Colorless*   APPEARANCEUA Clear   PHUR 6.0   SPECGRAV >1.030*   PROTEINUA Trace*   GLUCUA 4+*   KETONESU 2+*   BILIRUBINUA Negative   OCCULTUA 1+*   NITRITE Negative   LEUKOCYTESUR Negative   RBCUA 2   BACTERIA None   SQUAMEPITHEL 0       Significant Imaging: I have reviewed all pertinent imaging results/findings within the past 24 hours.  Imaging Results              CT Abdomen Pelvis With IV Contrast NO Oral Contrast (Final result)  Result  time 11/02/24 07:38:32      Final result by Abdirizak Dill MD (11/02/24 07:38:32)                   Impression:      No acute findings in the abdomen or pelvis.    Moderate stool burden throughout the colon.    Additional details, as provided in the body of report.    Electronically signed by resident: Jordan Sorto  Date:    11/02/2024  Time:    07:12    Electronically signed by: Abdirizak Dill  Date:    11/02/2024  Time:    07:38               Narrative:    EXAMINATION:  CT ABDOMEN PELVIS WITH IV CONTRAST    CLINICAL HISTORY:  Abdominal pain, acute, nonlocalized;    TECHNIQUE:  Axial images of the abdomen and pelvis were acquired after the use of 100 cc [id number] IV contrast. No oral contrast was administered.  Coronal and sagittal reconstructions were also obtained    COMPARISON:  CT abdomen pelvis 09/08/2023.    FINDINGS:  Heart: Normal in size. No pericardial effusion. No significant calcific coronary atherosclerosis.    Lungs: Minimal subsegmental atelectasis.  No focal consolidation, pleural effusion or pneumothorax.    Hepatobiliary: Liver is normal size.  Normal contour.  No focal hepatic lesion.  No calcified gallstones. No pericholecystic fluid or gallbladder wall thickening.No intrahepatic or extrahepatic biliary ductal dilatation.    Pancreas: Mild prominence of the main pancreatic duct.  No mass or peripancreatic fat stranding.    Spleen: Unremarkable.    Adrenals: Unremarkable.    Kidneys/Ureters: Normal in size and location. Normal enhancement. No hydronephrosis or nephrolithiasis. No ureteral dilatation.    Bladder: No evidence of wall thickening.    Reproductive organs: No significant abnormality.    Peritoneum: No free air or free fluid.    Retroperitoneum: No pathologically enlarged abdominopelvic lymph nodes.    Bowel/Mesentery: Stomach is decompressed which limits evaluation for mucosal thickening..  Small bowel is normal in caliber with no evidence of obstruction or inflammation. Moderate  stool burden throughout the colon.  No evidence of large bowel obstruction or inflammation.  Appendix is visualized and unremarkable.    Abdominal wall:  Unremarkable.    Vasculature: No aneurysm. No significant calcific atherosclerosis.  Portal vasculature is patent.    Bones: No acute fracture. Mild degenerative changes of the spine and hip DJD.  No suspicious osseous lesions.                                       X-Ray Chest AP Portable (Final result)  Result time 11/02/24 06:13:10      Final result by Abdirizak Dill MD (11/02/24 06:13:10)                   Impression:      No convincing evidence of acute cardiopulmonary disease.      Electronically signed by: Abdirizak Dill  Date:    11/02/2024  Time:    06:13               Narrative:    EXAMINATION:  XR CHEST AP PORTABLE    CLINICAL HISTORY:  Chest Pain;    TECHNIQUE:  Single frontal view of the chest was performed.    COMPARISON:  Chest radiograph performed 03/31/2024, 19:36 hours.    FINDINGS:  Monitoring leads over the chest.    Cardiomediastinal contours appear grossly unchanged within normal limits.    Lungs appear essentially clear.    No definite pneumothorax or large volume pleural effusion.  No acute findings in the visualized abdomen.    Osseous and soft tissue structures appear without definite acute abnormality.                                    Assessment/Plan:     * Intractable nausea and vomiting  - given 1L IVFs in ED, giving additional 1L  - npo for now, when able can start bariatric CLD  - better BG management, see below  - bowel regimen   - prn antiemetics     Type 1 diabetes mellitus with complications  Patient's FSGs are not controlled on current hypoglycemics.   Last A1c reviewed-   Lab Results   Component Value Date    HGBA1C 12.0 (H) 11/02/2024     Most recent fingerstick glucose reviewed-   Recent Labs   Lab 11/02/24  0956 11/02/24  1047 11/02/24  1243   POCTGLUCOSE 435* 423* 332*     Current correctional scale  Medium  Maintain  anti-hyperglycemic dose as follows-   Antihyperglycemics (From admission, onward)      Start     Stop Route Frequency Ordered    11/02/24 1130  insulin aspart U-100 pen 7 Units         -- SubQ 3 times daily with meals 11/02/24 0952 11/02/24 1100  insulin glargine U-100 (Lantus) pen 20 Units         -- SubQ Daily 11/02/24 0952    11/02/24 1052  insulin aspart U-100 pen 0-10 Units         -- SubQ Before meals & nightly PRN 11/02/24 0952           - Endocrine following and managing insulin regimen     Lactic acidosis due to diabetes mellitus  - lactic 2.47 >> 3.12  - giving additional IVFs and repeat pending     Mixed hyperlipidemia  - continue statin     BETH (acute kidney injury)  BETH is likely due to pre-renal azotemia due to dehydration. Baseline creatinine is  1.5 . Most recent creatinine and eGFR are listed below.  Recent Labs     11/02/24  0545   CREATININE 1.8*   EGFRNORACEVR 49.1*      Plan  - BETH is stable  - Avoid nephrotoxins and renally dose meds for GFR listed above  - Monitor urine output, serial BMP, and adjust therapy as needed  - giving additional IVFs    Cannabinoid hyperemesis syndrome  - reports not using marijuana over the last 2 weeks    Primary hypertension  Patients blood pressure range in the last 24 hours was: BP  Min: 119/78  Max: 180/86.The patient's inpatient anti-hypertensive regimen is listed below:  Current Antihypertensives  lisinopriL tablet 10 mg, Daily, Oral    Plan  - BP is controlled, no changes needed to their regimen      VTE Risk Mitigation (From admission, onward)           Ordered     IP VTE LOW RISK PATIENT  Once         11/02/24 0934                         On 11/02/2024, patient should be placed in hospital observation services under my care in collaboration with Dr. Marty Madrigal.           Betina Torres PA-C  Department of Hospital Medicine  [facility] - Emergency Dept

## 2024-11-02 NOTE — ED NOTES
Telemetry Verification   Patient placed on Telemetry Box  Verified with War Room  Box # [id number]   Monitor Tech Onje   Rate 70   Rhythm NS

## 2024-11-02 NOTE — HPI
Andrzej Sinclair is a 39 yo M with PMHx of T1DM, hx of DKA/HHS, HTN, HLD who presented to ED for intractable nausea and vomiting. Patient endorses severe abdominal pain that started 2 days ago. He has been nauseous with approximately 8 episodes of emesis/day for the past 2 days. He has had difficulty tolerating fluids. States that his BG has been very low to very high during this time as well, but unable to give specific numbers. He also states that he has not had a BM in 3 days. He typically has a BM every other day. He has a hx of marijuana use, but reports last use was 2 weeks ago. Denies fever, chills, chest pain, SOB, dysuria, headaches and LE swelling.    In ED: Afebrile. HDS. No leukocytosis. CMP with Na 135 and BETH with Cr 1.8 (baseline 1.5). BG 400s. A1c 12.0 Beta hydroxybutyrate 0.7. UA noninfectious, but with 4+ glucose and 2+ ketones. Lactic 2.47 >> 3.12. CXR clear. CT A/P only notable for moderate stool burden. Given droperidol, 5 units regular insulin, 1L LR, IV zofran, IV morphine 6 mg and morphine 4 mg. Endocrinology consulted. Per ED providers, patient visibly uncomfortable 2/2 abdominal pain. Placed in observation with HM for abdominal pain and intractable n/v.

## 2024-11-02 NOTE — CONSULTS
[facility] - Emergency Dept  Endocrinology  Diabetes Consult Note    Consult Requested by: Marty Madrigal MD   Reason for admit: Intractable nausea and vomiting    HISTORY OF PRESENT ILLNESS:    Reason for Consult: Management of T1DM, Hyperglycemia      Diabetes diagnosis year: 20     Home Diabetes Medications: Leunjev 8 units + LDS SSI Tresiba 22 units h.s.     Sees NP Scobel- PATIENT IS CURRENTLY NOT USING OMNIPOD DUE TO BEING UNTRAINED ON THE DEVICE  Omnipod 5  Isf 50   Insulin to carb ratio 1:15  Basal 0.72     How often checking glucose at home? Dexcom   BG readings on regimen: 250's  Hypoglycemia on the regimen?  No  Missed doses on regimen?  No     Diabetes Complications include:     Hyperglycemia, Hypoglycemia , and Diabetic cataract , Frequent DKA     Complicating diabetes co morbidities:   CKD        HPI:38-year-old male with past medical history of type 1 diabetes on insulin that presents to emergency department with nausea and vomiting. Labs not concerning for DKA at this time. Endocrine consulted to manage hyperglycemia and type 1 diabetes in the inpatient setting.     Hemoglobin A1C   Date Value Ref Range Status   03/31/2024 10.3 (H) 4.0 - 5.6 % Final     Comment:     ADA Screening Guidelines:  5.7-6.4%  Consistent with prediabetes  >or=6.5%  Consistent with diabetes    High levels of fetal hemoglobin interfere with the HbA1C  assay. Heterozygous hemoglobin variants (HbS, HgC, etc)do  not significantly interfere with this assay.   However, presence of multiple variants may affect accuracy.     09/13/2023 12.8 (H) 4.0 - 5.6 % Final     Comment:     ADA Screening Guidelines:  5.7-6.4%  Consistent with prediabetes  >or=6.5%  Consistent with diabetes    High levels of fetal hemoglobin interfere with the HbA1C  assay. Heterozygous hemoglobin variants (HbS, HgC, etc)do  not significantly interfere with this assay.   However, presence of multiple variants may affect accuracy.     04/10/2023 8.5 (H) 4.0 - 5.6  % Final     Comment:     ADA Screening Guidelines:  5.7-6.4%  Consistent with prediabetes  >or=6.5%  Consistent with diabetes    High levels of fetal hemoglobin interfere with the HbA1C  assay. Heterozygous hemoglobin variants (HbS, HgC, etc)do  not significantly interfere with this assay.   However, presence of multiple variants may affect accuracy.               Interval HPI:   Overnight events: No acute events overnight. Patient in room ED 24/24. Blood glucose worsening. BG above goal on current insulin regimen (SSI, prandial, and basal insulin ). Steroid use- None.    Renal function- Abnormal - Creatinine 1.8   Vasopressors-  None       Endocrine will continue to follow and manage insulin orders inpatient.         Diet Clear liquid (no sugar)/Bariatric     Eating:   <25%  Nausea: No  Hypoglycemia and intervention: No  Fever: No  TPN and/or TF: No    PMH, PSH, FH, SH updated and reviewed     ROS:  Review of Systems   Constitutional:  Negative for unexpected weight change.   Eyes:  Negative for visual disturbance.   Respiratory:  Negative for cough.    Cardiovascular:  Negative for chest pain.   Gastrointestinal:  Negative for nausea and vomiting.   Endocrine: Negative for polydipsia and polyuria.   Musculoskeletal:  Negative for back pain.   Skin:  Negative for rash.   Neurological:  Negative for syncope.   Psychiatric/Behavioral:  Negative for agitation and dysphoric mood.        Current Medications and/or Treatments Impacting Glycemic Control  Immunotherapy:    Immunosuppressants       None          Steroids:   Hormones (From admission, onward)      Start     Stop Route Frequency Ordered    11/02/24 1032  melatonin tablet 6 mg         -- Oral Nightly PRN 11/02/24 0934          Pressors:    Autonomic Drugs (From admission, onward)      None          Hyperglycemia/Diabetes Medications:   Antihyperglycemics (From admission, onward)      Start     Stop Route Frequency Ordered    11/02/24 1130  insulin aspart U-100  "pen 7 Units         -- SubQ 3 times daily with meals 11/02/24 0952    11/02/24 1100  insulin glargine U-100 (Lantus) pen 20 Units         -- SubQ Daily 11/02/24 0952    11/02/24 1052  insulin aspart U-100 pen 0-10 Units         -- SubQ Before meals & nightly PRN 11/02/24 0952             PHYSICAL EXAMINATION:  Vitals:    11/02/24 1000   BP: (!) 142/67   Pulse: 84   Resp: 15   Temp: 98.4 °F (36.9 °C)     Body mass index is 26.19 kg/m².     Physical Exam  Constitutional:       Appearance: He is well-developed.   HENT:      Head: Normocephalic.   Eyes:      Conjunctiva/sclera: Conjunctivae normal.   Pulmonary:      Effort: Pulmonary effort is normal.   Musculoskeletal:         General: Normal range of motion.   Skin:     General: Skin is warm.      Findings: No rash.   Neurological:      Mental Status: He is alert and oriented to person, place, and time.            Labs Reviewed and Include   Recent Labs   Lab 11/02/24  0545   *   CALCIUM 9.5   ALBUMIN 3.8   PROT 7.0   *   K 3.8   CO2 24      BUN 20   CREATININE 1.8*   ALKPHOS 82   ALT 41   AST 30   BILITOT 0.9     Lab Results   Component Value Date    WBC 4.71 11/02/2024    HGB 14.1 11/02/2024    HCT 44 11/02/2024    MCV 88 11/02/2024     (L) 11/02/2024     No results for input(s): "TSH", "FREET4" in the last 168 hours.  Lab Results   Component Value Date    HGBA1C 10.3 (H) 03/31/2024       Nutritional status:   Body mass index is 26.19 kg/m².  Lab Results   Component Value Date    ALBUMIN 3.8 11/02/2024    ALBUMIN 3.0 (L) 04/02/2024    ALBUMIN 3.9 04/01/2024     No results found for: "PREALBUMIN"    Estimated Creatinine Clearance: 65.3 mL/min (A) (based on SCr of 1.8 mg/dL (H)).    Accu-Checks  Recent Labs     11/02/24  0956 11/02/24  1047   POCTGLUCOSE 435* 423*        ASSESSMENT and PLAN    Cardiac/Vascular  Primary hypertension  On an ACE-I per ADA guidelines.   Uncontrolled HTN can worsen insulin resistance.         Mixed " hyperlipidemia    On statin therapy per ADA guidelines.       Endocrine  Type 1 diabetes mellitus with complications  Endocrinology consulted for BG management.   BG goal 140-180    15% reduction in home regimen  - Lantus (Insulin Glargine) 20 units daily  - Novolog (Insulin Aspart) 7 units TIDWM and prn for BG excursions [facility] SSI (150/25)  - BG checks q4hr  - Hypoglycemia protocol in place    ** Please notify Endocrine for any change and/or advance in diet**  ** Please call Endocrine for any BG related issues **    Discharge Planning:   TBD. Please notify endocrinology prior to discharge.        GI  * Intractable nausea and vomiting  Managed per primary team  Avoid hypoglycemia            Plan discussed with patient, family, and RN at bedside.        Manuel Dill, DNP, FNP  Endocrinology  [facility] - Emergency Dept

## 2024-11-02 NOTE — ASSESSMENT & PLAN NOTE
Patients blood pressure range in the last 24 hours was: BP  Min: 119/78  Max: 180/86.The patient's inpatient anti-hypertensive regimen is listed below:  Current Antihypertensives  lisinopriL tablet 10 mg, Daily, Oral    Plan  - BP is controlled, no changes needed to their regimen

## 2024-11-03 LAB
ANION GAP SERPL CALC-SCNC: 9 MMOL/L (ref 8–16)
BUN SERPL-MCNC: 16 MG/DL (ref 6–20)
CALCIUM SERPL-MCNC: 9.8 MG/DL (ref 8.7–10.5)
CHLORIDE SERPL-SCNC: 110 MMOL/L (ref 95–110)
CO2 SERPL-SCNC: 26 MMOL/L (ref 23–29)
CREAT SERPL-MCNC: 1.4 MG/DL (ref 0.5–1.4)
ERYTHROCYTE [DISTWIDTH] IN BLOOD BY AUTOMATED COUNT: 12.9 % (ref 11.5–14.5)
EST. GFR  (NO RACE VARIABLE): >60 ML/MIN/1.73 M^2
ESTIMATED AVG GLUCOSE: 292 MG/DL (ref 68–131)
GLUCOSE SERPL-MCNC: 127 MG/DL (ref 70–110)
HBA1C MFR BLD: 11.8 % (ref 4–5.6)
HCT VFR BLD AUTO: 42.8 % (ref 40–54)
HGB BLD-MCNC: 14.9 G/DL (ref 14–18)
MAGNESIUM SERPL-MCNC: 2.1 MG/DL (ref 1.6–2.6)
MCH RBC QN AUTO: 30.8 PG (ref 27–31)
MCHC RBC AUTO-ENTMCNC: 34.8 G/DL (ref 32–36)
MCV RBC AUTO: 88 FL (ref 82–98)
PLATELET # BLD AUTO: 169 K/UL (ref 150–450)
PMV BLD AUTO: 11.2 FL (ref 9.2–12.9)
POCT GLUCOSE: 119 MG/DL (ref 70–110)
POCT GLUCOSE: 162 MG/DL (ref 70–110)
POCT GLUCOSE: 274 MG/DL (ref 70–110)
POCT GLUCOSE: 80 MG/DL (ref 70–110)
POTASSIUM SERPL-SCNC: 3.8 MMOL/L (ref 3.5–5.1)
RBC # BLD AUTO: 4.84 M/UL (ref 4.6–6.2)
SODIUM SERPL-SCNC: 145 MMOL/L (ref 136–145)
WBC # BLD AUTO: 4.46 K/UL (ref 3.9–12.7)

## 2024-11-03 PROCEDURE — 96375 TX/PRO/DX INJ NEW DRUG ADDON: CPT

## 2024-11-03 PROCEDURE — 96372 THER/PROPH/DIAG INJ SC/IM: CPT | Performed by: NURSE PRACTITIONER

## 2024-11-03 PROCEDURE — 96361 HYDRATE IV INFUSION ADD-ON: CPT

## 2024-11-03 PROCEDURE — 99232 SBSQ HOSP IP/OBS MODERATE 35: CPT | Mod: ,,, | Performed by: NURSE PRACTITIONER

## 2024-11-03 PROCEDURE — 80048 BASIC METABOLIC PNL TOTAL CA: CPT

## 2024-11-03 PROCEDURE — 85027 COMPLETE CBC AUTOMATED: CPT

## 2024-11-03 PROCEDURE — 63600175 PHARM REV CODE 636 W HCPCS: Performed by: NURSE PRACTITIONER

## 2024-11-03 PROCEDURE — 83036 HEMOGLOBIN GLYCOSYLATED A1C: CPT

## 2024-11-03 PROCEDURE — 83735 ASSAY OF MAGNESIUM: CPT

## 2024-11-03 PROCEDURE — 96376 TX/PRO/DX INJ SAME DRUG ADON: CPT

## 2024-11-03 PROCEDURE — 36415 COLL VENOUS BLD VENIPUNCTURE: CPT

## 2024-11-03 PROCEDURE — 21400001 HC TELEMETRY ROOM

## 2024-11-03 PROCEDURE — 25000003 PHARM REV CODE 250

## 2024-11-03 PROCEDURE — 63600175 PHARM REV CODE 636 W HCPCS

## 2024-11-03 RX ORDER — PANTOPRAZOLE SODIUM 40 MG/10ML
40 INJECTION, POWDER, LYOPHILIZED, FOR SOLUTION INTRAVENOUS 2 TIMES DAILY
Status: DISCONTINUED | OUTPATIENT
Start: 2024-11-03 | End: 2024-11-04

## 2024-11-03 RX ORDER — MORPHINE SULFATE 4 MG/ML
2 INJECTION, SOLUTION INTRAMUSCULAR; INTRAVENOUS ONCE
Status: COMPLETED | OUTPATIENT
Start: 2024-11-03 | End: 2024-11-03

## 2024-11-03 RX ORDER — PANTOPRAZOLE SODIUM 40 MG/10ML
40 INJECTION, POWDER, LYOPHILIZED, FOR SOLUTION INTRAVENOUS DAILY
Status: DISCONTINUED | OUTPATIENT
Start: 2024-11-03 | End: 2024-11-03

## 2024-11-03 RX ORDER — ALUMINUM HYDROXIDE, MAGNESIUM HYDROXIDE, AND SIMETHICONE 1200; 120; 1200 MG/30ML; MG/30ML; MG/30ML
30 SUSPENSION ORAL
Status: DISCONTINUED | OUTPATIENT
Start: 2024-11-03 | End: 2024-11-05 | Stop reason: HOSPADM

## 2024-11-03 RX ADMIN — MORPHINE SULFATE 2 MG: 4 INJECTION INTRAVENOUS at 11:11

## 2024-11-03 RX ADMIN — INSULIN GLARGINE 20 UNITS: 100 INJECTION, SOLUTION SUBCUTANEOUS at 08:11

## 2024-11-03 RX ADMIN — INSULIN ASPART 7 UNITS: 100 INJECTION, SOLUTION INTRAVENOUS; SUBCUTANEOUS at 08:11

## 2024-11-03 RX ADMIN — INSULIN ASPART 2 UNITS: 100 INJECTION, SOLUTION INTRAVENOUS; SUBCUTANEOUS at 12:11

## 2024-11-03 RX ADMIN — Medication 6 MG: at 08:11

## 2024-11-03 RX ADMIN — PANTOPRAZOLE SODIUM 40 MG: 40 INJECTION, POWDER, FOR SOLUTION INTRAVENOUS at 08:11

## 2024-11-03 RX ADMIN — PANTOPRAZOLE SODIUM 40 MG: 40 INJECTION, POWDER, FOR SOLUTION INTRAVENOUS at 10:11

## 2024-11-03 RX ADMIN — INSULIN ASPART 6 UNITS: 100 INJECTION, SOLUTION INTRAVENOUS; SUBCUTANEOUS at 08:11

## 2024-11-03 RX ADMIN — ALUMINUM HYDROXIDE, MAGNESIUM HYDROXIDE, AND SIMETHICONE 30 ML: 1200; 120; 1200 SUSPENSION ORAL at 10:11

## 2024-11-03 RX ADMIN — ALUMINUM HYDROXIDE, MAGNESIUM HYDROXIDE, AND SIMETHICONE 30 ML: 1200; 120; 1200 SUSPENSION ORAL at 08:11

## 2024-11-03 RX ADMIN — LISINOPRIL 10 MG: 10 TABLET ORAL at 08:11

## 2024-11-03 RX ADMIN — ALUMINUM HYDROXIDE, MAGNESIUM HYDROXIDE, AND SIMETHICONE 30 ML: 1200; 120; 1200 SUSPENSION ORAL at 04:11

## 2024-11-03 RX ADMIN — INSULIN ASPART 7 UNITS: 100 INJECTION, SOLUTION INTRAVENOUS; SUBCUTANEOUS at 11:11

## 2024-11-03 RX ADMIN — PRAVASTATIN SODIUM 10 MG: 10 TABLET ORAL at 08:11

## 2024-11-03 NOTE — ASSESSMENT & PLAN NOTE
Resolved  BETH is likely due to pre-renal azotemia due to dehydration. Baseline creatinine is  1.5 . Most recent creatinine and eGFR are listed below.  Recent Labs     11/02/24  0545 11/03/24  1231   CREATININE 1.8* 1.4   EGFRNORACEVR 49.1* >60.0        Plan  - BETH is resolved  - Avoid nephrotoxins and renally dose meds for GFR listed above  - Monitor urine output, serial BMP, and adjust therapy as needed

## 2024-11-03 NOTE — ASSESSMENT & PLAN NOTE
Patient's FSGs are not controlled on current hypoglycemics.   Last A1c reviewed-   Lab Results   Component Value Date    HGBA1C 11.8 (H) 11/03/2024     Most recent fingerstick glucose reviewed-   Recent Labs   Lab 11/02/24  2201 11/03/24  0738 11/03/24  1151 11/03/24  1602   POCTGLUCOSE 149* 274* 162* 80       Current correctional scale  Medium  Maintain anti-hyperglycemic dose as follows-   Antihyperglycemics (From admission, onward)    Start     Stop Route Frequency Ordered    11/02/24 1130  insulin aspart U-100 pen 7 Units         -- SubQ 3 times daily with meals 11/02/24 0952    11/02/24 1100  insulin glargine U-100 (Lantus) pen 20 Units         -- SubQ Daily 11/02/24 0952    11/02/24 1052  insulin aspart U-100 pen 0-10 Units         -- SubQ Before meals & nightly PRN 11/02/24 0952         - Endocrine following and managing insulin regimen

## 2024-11-03 NOTE — PROGRESS NOTES
[facility] [address] [place] [address] Medicine  Progress Note    Patient Name: Andrzej Sinclair  MRN: [id number]  Patient Class: IP- Inpatient   Admission Date: 11/2/2024  Length of Stay: 0 days  Attending Physician: Marty Madrigal MD  Primary Care Provider: Viktoriya Jaeger NP        Subjective:     Principal Problem:Intractable nausea and vomiting        HPI:  Andrzej Sinclair is a 37 yo M with PMHx of T1DM, hx of DKA/HHS, HTN, HLD who presented to ED for intractable nausea and vomiting. Patient endorses severe abdominal pain that started 2 days ago. He has been nauseous with approximately 8 episodes of emesis/day for the past 2 days. He has had difficulty tolerating fluids. States that his BG has been very low to very high during this time as well, but unable to give specific numbers. He also states that he has not had a BM in 3 days. He typically has a BM every other day. He has a hx of marijuana use, but reports last use was 2 weeks ago. Denies fever, chills, chest pain, SOB, dysuria, headaches and LE swelling.    In ED: Afebrile. HDS. No leukocytosis. CMP with Na 135 and BETH with Cr 1.8 (baseline 1.5). BG 400s. A1c 12.0 Beta hydroxybutyrate 0.7. UA noninfectious, but with 4+ glucose and 2+ ketones. Lactic 2.47 >> 3.12. CXR clear. CT A/P only notable for moderate stool burden. Given droperidol, 5 units regular insulin, 1L LR, IV zofran, IV morphine 6 mg and morphine 4 mg. Endocrinology consulted. Per ED providers, patient visibly uncomfortable 2/2 abdominal pain. Placed in observation with [facility] for abdominal pain and intractable n/v.     Overview/Hospital Course:  Andrzej Sinclair is a 37 yo M placed in admitted to HM for severe abdominal pain with n/v. BG not well controlled, but lower than previous admission. A1c 12. Endocrine following and adjusting insulin regimen. CT A/P negative for acute process except moderate stool burden. Given lactulose and patient had 2 BM. Tolerating small portions of bariatric CLD. Will  ADAT. Repeating CT today as he is still having episodes of severe pain. Exam reassuring. Presentation possibly 2/2 gastritis vs. Gastroparesis? H. Pylori pending. Mild BETH on admission, that resolved with IVFs.     Interval History: NAEON. VSSAF. BG better controlled today. This morning patient has 3/10 abdominal pain, but then suddenly developed 10/10 pain to the center of his abdomen. Given one dose of IV morphine 2 mg. Reports 4 episodes of emesis and 2 BM since yesterday. Tolerating small portions of CLD. Starting protonix and maalox. Will obtain repeat imaging to further evaluate his episodes of severe pain.     Review of Systems   Constitutional:  Positive for appetite change. Negative for chills and fever.   Respiratory:  Negative for chest tightness and shortness of breath.    Cardiovascular:  Negative for chest pain and leg swelling.   Gastrointestinal:  Positive for abdominal pain, nausea and vomiting.   Neurological:  Negative for dizziness and weakness.     Objective:     Vital Signs (Most Recent):  Temp: 98.7 °F (37.1 °C) (11/03/24 1725)  Pulse: 77 (11/03/24 1725)  Resp: 18 (11/03/24 1725)  BP: 122/70 (11/03/24 1725)  SpO2: 97 % (11/03/24 1725) Vital Signs (24h Range):  Temp:  [98 °F (36.7 °C)-98.8 °F (37.1 °C)] 98.7 °F (37.1 °C)  Pulse:  [69-84] 77  Resp:  [12-20] 18  SpO2:  [9 %-99 %] 97 %  BP: (105-133)/(58-82) 122/70     Weight: 92.5 kg (204 lb)  Body mass index is 26.19 kg/m².    Intake/Output Summary (Last 24 hours) at 11/3/2024 1749  Last data filed at 11/2/2024 2210  Gross per 24 hour   Intake 50 ml   Output 1 ml   Net 49 ml         Physical Exam  Vitals and nursing note reviewed.   Constitutional:       Appearance: He is well-developed.      Comments: Appears uncomfortable    Eyes:      Pupils: Pupils are equal, round, and reactive to light.   Cardiovascular:      Rate and Rhythm: Normal rate and regular rhythm.   Pulmonary:      Effort: Pulmonary effort is normal.      Breath sounds: Normal  breath sounds.   Abdominal:      Palpations: Abdomen is soft.      Tenderness: There is abdominal tenderness.   Musculoskeletal:         General: No tenderness.   Skin:     General: Skin is warm and dry.   Neurological:      Mental Status: He is alert and oriented to person, place, and time.   Psychiatric:         Behavior: Behavior normal.             Significant Labs: All pertinent labs within the past 24 hours have been reviewed.  BMP:   Recent Labs   Lab 11/03/24  1231   *      K 3.8      CO2 26   BUN 16   CREATININE 1.4   CALCIUM 9.8   MG 2.1     CBC:   Recent Labs   Lab 11/02/24  0545 11/02/24  0552 11/03/24  1231   WBC 4.71  --  4.46   HGB 14.1  --  14.9   HCT 41.4 44 42.8   *  --  169       Significant Imaging: I have reviewed all pertinent imaging results/findings within the past 24 hours.    Assessment/Plan:      * Intractable nausea and vomiting  - given 1L IVFs in ED, giving additional 1L  - bariatric CLD, ADAT  - better BG management, see below  - bowel regimen -- had 2 BM with lactulose  - start scheduled maalox and BID IV ppi  - prn morphine for severe pain   - H pylori pending   - repeat CT A/P pending  - prn antiemetics     Type 1 diabetes mellitus with complications  Patient's FSGs are not controlled on current hypoglycemics.   Last A1c reviewed-   Lab Results   Component Value Date    HGBA1C 11.8 (H) 11/03/2024     Most recent fingerstick glucose reviewed-   Recent Labs   Lab 11/02/24  2201 11/03/24  0738 11/03/24  1151 11/03/24  1602   POCTGLUCOSE 149* 274* 162* 80       Current correctional scale  Medium  Maintain anti-hyperglycemic dose as follows-   Antihyperglycemics (From admission, onward)      Start     Stop Route Frequency Ordered    11/02/24 1130  insulin aspart U-100 pen 7 Units         -- SubQ 3 times daily with meals 11/02/24 0952    11/02/24 1100  insulin glargine U-100 (Lantus) pen 20 Units         -- SubQ Daily 11/02/24 0952    11/02/24 1052  insulin  aspart U-100 pen 0-10 Units         -- SubQ Before meals & nightly PRN 11/02/24 0952           - Endocrine following and managing insulin regimen     Lactic acidosis due to diabetes mellitus  Resolved     Mixed hyperlipidemia  - continue statin     BETH (acute kidney injury)  Resolved  BETH is likely due to pre-renal azotemia due to dehydration. Baseline creatinine is  1.5 . Most recent creatinine and eGFR are listed below.  Recent Labs     11/02/24  0545 11/03/24  1231   CREATININE 1.8* 1.4   EGFRNORACEVR 49.1* >60.0        Plan  - BETH is resolved  - Avoid nephrotoxins and renally dose meds for GFR listed above  - Monitor urine output, serial BMP, and adjust therapy as needed    Cannabinoid hyperemesis syndrome  - reports not using marijuana over the last 2 weeks    Primary hypertension  Patients blood pressure range in the last 24 hours was: BP  Min: 119/78  Max: 180/86.The patient's inpatient anti-hypertensive regimen is listed below:  Current Antihypertensives  lisinopriL tablet 10 mg, Daily, Oral    Plan  - BP is controlled, no changes needed to their regimen      VTE Risk Mitigation (From admission, onward)           Ordered     IP VTE LOW RISK PATIENT  Once         11/02/24 0934                    Discharge Planning   PRECIOUS: 11/4/2024     Code Status: Full Code   Is the patient medically ready for discharge?:     Reason for patient still in hospital (select all that apply): Patient trending condition, Laboratory test, Treatment, and Imaging                     Betina Torres PA-C  Department of Hospital Medicine   [facility] 11[facility]

## 2024-11-03 NOTE — ASSESSMENT & PLAN NOTE
- given 1L IVFs in ED, giving additional 1L  - bariatric CLD, ADAT  - better BG management, see below  - bowel regimen -- had 2 BM with lactulose  - start scheduled maalox and BID IV ppi  - prn morphine for severe pain   - H pylori pending   - repeat CT A/P pending  - prn antiemetics

## 2024-11-03 NOTE — PLAN OF CARE
Problem: Adult Inpatient Plan of Care  Goal: Plan of Care Review  Outcome: Progressing  Goal: Patient-Specific Goal (Individualized)  Outcome: Progressing  Goal: Absence of Hospital-Acquired Illness or Injury  Outcome: Progressing  Goal: Optimal Comfort and Wellbeing  Outcome: Progressing  Goal: Readiness for Transition of Care  Outcome: Progressing     Problem: Infection  Goal: Absence of Infection Signs and Symptoms  Outcome: Progressing     Problem: Diabetes Comorbidity  Goal: Blood Glucose Level Within Targeted Range  Outcome: Progressing     Problem: Acute Kidney Injury/Impairment  Goal: Fluid and Electrolyte Balance  Outcome: Progressing  Goal: Improved Oral Intake  Outcome: Progressing  Goal: Effective Renal Function  Outcome: Progressing     Problem: Hypertension Acute  Goal: Blood Pressure Within Desired Range  Outcome: Progressing     Problem: Nausea and Vomiting  Goal: Nausea and Vomiting Relief  Outcome: Progressing

## 2024-11-03 NOTE — ASSESSMENT & PLAN NOTE
Endocrinology consulted for BG management.   BG goal 140-180    - Lantus (Insulin Glargine) 20 units daily  - Novolog (Insulin Aspart) 7 units TIDWM and prn for BG excursions [facility] SSI (150/25)  - BG checks q4hr  - Hypoglycemia protocol in place    ** Please notify Endocrine for any change and/or advance in diet**  ** Please call Endocrine for any BG related issues **    Discharge Planning:   TBD. Please notify endocrinology prior to discharge.

## 2024-11-03 NOTE — NURSING
Patient would only allow 1 stick. So no blood was obtained for am labs. I did request her to send someone else later this am/ notified NP

## 2024-11-03 NOTE — PROGRESS NOTES
[facility]  Endocrinology  Progress Note    Admit Date: 11/2/2024       Reason for Consult: Management of T1DM, Hyperglycemia      Diabetes diagnosis year: 19     Home Diabetes Medications: Leunjev 8 units + LDS SSI Tresiba 22 units h.s.     Sees NP Scobel- PATIENT IS CURRENTLY NOT USING OMNIPOD DUE TO BEING UNTRAINED ON THE DEVICE  Omnipod 5  Isf 50   Insulin to carb ratio 1:15  Basal 0.72     How often checking glucose at home? Dexcom   BG readings on regimen: 250's  Hypoglycemia on the regimen?  No  Missed doses on regimen?  No     Diabetes Complications include:     Hyperglycemia, Hypoglycemia , and Diabetic cataract , Frequent DKA     Complicating diabetes co morbidities:   CKD        HPI:38-year-old male with past medical history of type 1 diabetes on insulin that presents to emergency department with nausea and vomiting. Labs not concerning for DKA at this time. Endocrine consulted to manage hyperglycemia and type 1 diabetes in the inpatient setting.     Hemoglobin A1C   Date Value Ref Range Status   03/31/2024 10.3 (H) 4.0 - 5.6 % Final     Comment:     ADA Screening Guidelines:  5.7-6.4%  Consistent with prediabetes  >or=6.5%  Consistent with diabetes    High levels of fetal hemoglobin interfere with the HbA1C  assay. Heterozygous hemoglobin variants (HbS, HgC, etc)do  not significantly interfere with this assay.   However, presence of multiple variants may affect accuracy.     09/13/2023 12.8 (H) 4.0 - 5.6 % Final     Comment:     ADA Screening Guidelines:  5.7-6.4%  Consistent with prediabetes  >or=6.5%  Consistent with diabetes    High levels of fetal hemoglobin interfere with the HbA1C  assay. Heterozygous hemoglobin variants (HbS, HgC, etc)do  not significantly interfere with this assay.   However, presence of multiple variants may affect accuracy.     04/10/2023 8.5 (H) 4.0 - 5.6 % Final     Comment:     ADA Screening Guidelines:  5.7-6.4%  Consistent with prediabetes  >or=6.5%   "Consistent with diabetes    High levels of fetal hemoglobin interfere with the HbA1C  assay. Heterozygous hemoglobin variants (HbS, HgC, etc)do  not significantly interfere with this assay.   However, presence of multiple variants may affect accuracy.               Interval HPI:   Overnight events: No acute events overnight. Patient in room 724/724 A. Blood glucose stable. BG at goal on current insulin regimen (SSI, prandial, and basal insulin ). Steroid use- None.    Renal function- Abnormal - Creatinine 1.8   Vasopressors-  None       Endocrine will continue to follow and manage insulin orders inpatient.       Diet diabetic [place]; 2000 Calories (up to 75 gm per meal)     Eatin%  Nausea: No  Hypoglycemia and intervention: No  Fever: No  TPN and/or TF: No    /65 (BP Location: Left arm, Patient Position: Lying)   Pulse 81   Temp 98.7 °F (37.1 °C) (Oral)   Resp 18   Ht 6' 2" (1.88 m)   Wt 92.5 kg (204 lb)   SpO2 (!) 92%   BMI 26.19 kg/m²     Labs Reviewed and Include    No results for input(s): "GLU", "CALCIUM", "ALBUMIN", "PROT", "NA", "K", "CO2", "CL", "BUN", "CREATININE", "ALKPHOS", "ALT", "AST", "BILITOT" in the last 24 hours.  Lab Results   Component Value Date    WBC 4.71 2024    HGB 14.1 2024    HCT 44 2024    MCV 88 2024     (L) 2024     No results for input(s): "TSH", "FREET4" in the last 168 hours.  Lab Results   Component Value Date    HGBA1C 12.0 (H) 2024       Nutritional status:   Body mass index is 26.19 kg/m².  Lab Results   Component Value Date    ALBUMIN 3.8 2024    ALBUMIN 3.0 (L) 2024    ALBUMIN 3.9 2024     No results found for: "PREALBUMIN"    Estimated Creatinine Clearance: 65.3 mL/min (A) (based on SCr of 1.8 mg/dL (H)).    Accu-Checks  Recent Labs     24  0956 24  1047 24  1243 24  1628 24  1804 24  2201   POCTGLUCOSE 435* 423* 332* 320* 253* 149*       Current Medications " and/or Treatments Impacting Glycemic Control  Immunotherapy:    Immunosuppressants       None          Steroids:   Hormones (From admission, onward)      Start     Stop Route Frequency Ordered    11/02/24 1032  melatonin tablet 6 mg         -- Oral Nightly PRN 11/02/24 0934          Pressors:    Autonomic Drugs (From admission, onward)      None          Hyperglycemia/Diabetes Medications:   Antihyperglycemics (From admission, onward)      Start     Stop Route Frequency Ordered    11/02/24 1130  insulin aspart U-100 pen 7 Units         -- SubQ 3 times daily with meals 11/02/24 0952    11/02/24 1100  insulin glargine U-100 (Lantus) pen 20 Units         -- SubQ Daily 11/02/24 0952    11/02/24 1052  insulin aspart U-100 pen 0-10 Units         -- SubQ Before meals & nightly PRN 11/02/24 0952            ASSESSMENT and PLAN    Cardiac/Vascular  Primary hypertension  On an ACE-I per ADA guidelines.   Uncontrolled HTN can worsen insulin resistance.         Mixed hyperlipidemia    On statin therapy per ADA guidelines.       Endocrine  Type 1 diabetes mellitus with complications  Endocrinology consulted for BG management.   BG goal 140-180    - Lantus (Insulin Glargine) 20 units daily  - Novolog (Insulin Aspart) 7 units TIDWM and prn for BG excursions [facility] SSI (150/25)  - BG checks q4hr  - Hypoglycemia protocol in place    ** Please notify Endocrine for any change and/or advance in diet**  ** Please call Endocrine for any BG related issues **    Discharge Planning:   TBD. Please notify endocrinology prior to discharge.        GI  * Intractable nausea and vomiting  Managed per primary team  Avoid hypoglycemia             Manuel Dill, DNP, FNP  Endocrinology  [facility] - Observation 11[facility]

## 2024-11-03 NOTE — HOSPITAL COURSE
Andrzej Sinclair is a 39 yo M placed in admitted to  for severe abdominal pain with n/v. BG not well controlled, but lower than previous admission. A1c 12. Endocrine following and adjusting insulin regimen. Mild BETH on admission, that resolved with IVFs. CT A/P negative for acute process except moderate stool burden. Given lactulose and patient had 2 BM. Tolerating small portions of bariatric CLD. Will ADAT. Repeating CT as he was still having episodes of severe pain. Repeat CT without acute abnormalities noted. Presentation possibly 2/2 gastritis vs. Gastroparesis? H. Pylori pending. Due to continued severe pain without improvement with current medication regimen and little oral intake, GI consulted. GI performed EGD on 11/05 that showed normal esophagus, gastric body and antrum, but erythematous duodenopathy. Biopsies taken.  on avoiding nsaid use. Started ppi. Patient tolerating po fluids. Anxious to discharge so he could vote in presidential election. Referral placed for GI and endocrine follow up.  Plan and medication changes discussed with patient; agreeable to plan. ER precautions were given. All questions were answered to the patient's satisfaction and he was subsequently discharged.

## 2024-11-03 NOTE — SUBJECTIVE & OBJECTIVE
"Interval HPI:   Overnight events: No acute events overnight. Patient in room 724/724 A. Blood glucose stable. BG at goal on current insulin regimen (SSI, prandial, and basal insulin ). Steroid use- None.    Renal function- Abnormal - Creatinine 1.8   Vasopressors-  None       Endocrine will continue to follow and manage insulin orders inpatient.       Diet diabetic [place]; 2000 Calories (up to 75 gm per meal)     Eatin%  Nausea: No  Hypoglycemia and intervention: No  Fever: No  TPN and/or TF: No    /65 (BP Location: Left arm, Patient Position: Lying)   Pulse 81   Temp 98.7 °F (37.1 °C) (Oral)   Resp 18   Ht 6' 2" (1.88 m)   Wt 92.5 kg (204 lb)   SpO2 (!) 92%   BMI 26.19 kg/m²     Labs Reviewed and Include    No results for input(s): "GLU", "CALCIUM", "ALBUMIN", "PROT", "NA", "K", "CO2", "CL", "BUN", "CREATININE", "ALKPHOS", "ALT", "AST", "BILITOT" in the last 24 hours.  Lab Results   Component Value Date    WBC 4.71 2024    HGB 14.1 2024    HCT 44 2024    MCV 88 2024     (L) 2024     No results for input(s): "TSH", "FREET4" in the last 168 hours.  Lab Results   Component Value Date    HGBA1C 12.0 (H) 2024       Nutritional status:   Body mass index is 26.19 kg/m².  Lab Results   Component Value Date    ALBUMIN 3.8 2024    ALBUMIN 3.0 (L) 2024    ALBUMIN 3.9 2024     No results found for: "PREALBUMIN"    Estimated Creatinine Clearance: 65.3 mL/min (A) (based on SCr of 1.8 mg/dL (H)).    Accu-Checks  Recent Labs     24  0956 24  1047 24  1243 24  1628 24  1804 24  2201   POCTGLUCOSE 435* 423* 332* 320* 253* 149*       Current Medications and/or Treatments Impacting Glycemic Control  Immunotherapy:    Immunosuppressants       None          Steroids:   Hormones (From admission, onward)      Start     Stop Route Frequency Ordered    24 1032  melatonin tablet 6 mg         -- Oral Nightly PRN 24 " 0934          Pressors:    Autonomic Drugs (From admission, onward)      None          Hyperglycemia/Diabetes Medications:   Antihyperglycemics (From admission, onward)      Start     Stop Route Frequency Ordered    11/02/24 1130  insulin aspart U-100 pen 7 Units         -- SubQ 3 times daily with meals 11/02/24 0952 11/02/24 1100  insulin glargine U-100 (Lantus) pen 20 Units         -- SubQ Daily 11/02/24 0952    11/02/24 1052  insulin aspart U-100 pen 0-10 Units         -- SubQ Before meals & nightly PRN 11/02/24 0952

## 2024-11-03 NOTE — SUBJECTIVE & OBJECTIVE
Interval History: NAEON. VSSAF. BG better controlled today. This morning patient has 3/10 abdominal pain, but then suddenly developed 10/10 pain to the center of his abdomen. Given one dose of IV morphine 2 mg. Reports 4 episodes of emesis and 2 BM since yesterday. Tolerating small portions of CLD. Starting protonix and maalox. Will obtain repeat imaging to further evaluate his episodes of severe pain.     Review of Systems   Constitutional:  Positive for appetite change. Negative for chills and fever.   Respiratory:  Negative for chest tightness and shortness of breath.    Cardiovascular:  Negative for chest pain and leg swelling.   Gastrointestinal:  Positive for abdominal pain, nausea and vomiting.   Neurological:  Negative for dizziness and weakness.     Objective:     Vital Signs (Most Recent):  Temp: 98.7 °F (37.1 °C) (11/03/24 1725)  Pulse: 77 (11/03/24 1725)  Resp: 18 (11/03/24 1725)  BP: 122/70 (11/03/24 1725)  SpO2: 97 % (11/03/24 1725) Vital Signs (24h Range):  Temp:  [98 °F (36.7 °C)-98.8 °F (37.1 °C)] 98.7 °F (37.1 °C)  Pulse:  [69-84] 77  Resp:  [12-20] 18  SpO2:  [9 %-99 %] 97 %  BP: (105-133)/(58-82) 122/70     Weight: 92.5 kg (204 lb)  Body mass index is 26.19 kg/m².    Intake/Output Summary (Last 24 hours) at 11/3/2024 1749  Last data filed at 11/2/2024 2210  Gross per 24 hour   Intake 50 ml   Output 1 ml   Net 49 ml         Physical Exam  Vitals and nursing note reviewed.   Constitutional:       Appearance: He is well-developed.      Comments: Appears uncomfortable    Eyes:      Pupils: Pupils are equal, round, and reactive to light.   Cardiovascular:      Rate and Rhythm: Normal rate and regular rhythm.   Pulmonary:      Effort: Pulmonary effort is normal.      Breath sounds: Normal breath sounds.   Abdominal:      Palpations: Abdomen is soft.      Tenderness: There is abdominal tenderness.   Musculoskeletal:         General: No tenderness.   Skin:     General: Skin is warm and dry.    Neurological:      Mental Status: He is alert and oriented to person, place, and time.   Psychiatric:         Behavior: Behavior normal.             Significant Labs: All pertinent labs within the past 24 hours have been reviewed.  BMP:   Recent Labs   Lab 11/03/24  1231   *      K 3.8      CO2 26   BUN 16   CREATININE 1.4   CALCIUM 9.8   MG 2.1     CBC:   Recent Labs   Lab 11/02/24  0545 11/02/24  0552 11/03/24  1231   WBC 4.71  --  4.46   HGB 14.1  --  14.9   HCT 41.4 44 42.8   *  --  169       Significant Imaging: I have reviewed all pertinent imaging results/findings within the past 24 hours.

## 2024-11-03 NOTE — NURSING
"Pt with complaints of " My stomach is burning.". Rates as a 10 on 0-10 pain scale. Pt in fetal position. States that he did not eat because of stomach pain. CHRISTIANA Horowitz notified.   "

## 2024-11-04 LAB
AMPHET+METHAMPHET UR QL: NEGATIVE
ANION GAP SERPL CALC-SCNC: 13 MMOL/L (ref 8–16)
BARBITURATES UR QL SCN>200 NG/ML: NEGATIVE
BENZODIAZ UR QL SCN>200 NG/ML: NEGATIVE
BUN SERPL-MCNC: 18 MG/DL (ref 6–20)
BZE UR QL SCN: NEGATIVE
CALCIUM SERPL-MCNC: 9.6 MG/DL (ref 8.7–10.5)
CANNABINOIDS UR QL SCN: ABNORMAL
CHLORIDE SERPL-SCNC: 106 MMOL/L (ref 95–110)
CO2 SERPL-SCNC: 22 MMOL/L (ref 23–29)
CREAT SERPL-MCNC: 1.5 MG/DL (ref 0.5–1.4)
CREAT UR-MCNC: 240 MG/DL (ref 23–375)
ERYTHROCYTE [DISTWIDTH] IN BLOOD BY AUTOMATED COUNT: 13 % (ref 11.5–14.5)
EST. GFR  (NO RACE VARIABLE): >60 ML/MIN/1.73 M^2
ETHANOL UR-MCNC: <10 MG/DL
GLUCOSE SERPL-MCNC: 329 MG/DL (ref 70–110)
HCT VFR BLD AUTO: 43.8 % (ref 40–54)
HGB BLD-MCNC: 14.5 G/DL (ref 14–18)
LIPASE SERPL-CCNC: 8 U/L (ref 4–60)
MCH RBC QN AUTO: 29.7 PG (ref 27–31)
MCHC RBC AUTO-ENTMCNC: 33.1 G/DL (ref 32–36)
MCV RBC AUTO: 90 FL (ref 82–98)
METHADONE UR QL SCN>300 NG/ML: NEGATIVE
OPIATES UR QL SCN: ABNORMAL
PCP UR QL SCN>25 NG/ML: NEGATIVE
PLATELET # BLD AUTO: 111 K/UL (ref 150–450)
PMV BLD AUTO: 12.6 FL (ref 9.2–12.9)
POCT GLUCOSE: 123 MG/DL (ref 70–110)
POCT GLUCOSE: 217 MG/DL (ref 70–110)
POCT GLUCOSE: 314 MG/DL (ref 70–110)
POCT GLUCOSE: 68 MG/DL (ref 70–110)
POCT GLUCOSE: 69 MG/DL (ref 70–110)
POCT GLUCOSE: 70 MG/DL (ref 70–110)
POTASSIUM SERPL-SCNC: 5 MMOL/L (ref 3.5–5.1)
RBC # BLD AUTO: 4.88 M/UL (ref 4.6–6.2)
SODIUM SERPL-SCNC: 141 MMOL/L (ref 136–145)
TOXICOLOGY INFORMATION: ABNORMAL
WBC # BLD AUTO: 3.41 K/UL (ref 3.9–12.7)

## 2024-11-04 PROCEDURE — 80307 DRUG TEST PRSMV CHEM ANLYZR: CPT

## 2024-11-04 PROCEDURE — 36415 COLL VENOUS BLD VENIPUNCTURE: CPT

## 2024-11-04 PROCEDURE — 21400001 HC TELEMETRY ROOM

## 2024-11-04 PROCEDURE — 99223 1ST HOSP IP/OBS HIGH 75: CPT | Mod: GC,,, | Performed by: INTERNAL MEDICINE

## 2024-11-04 PROCEDURE — 63600175 PHARM REV CODE 636 W HCPCS: Performed by: NURSE PRACTITIONER

## 2024-11-04 PROCEDURE — 99232 SBSQ HOSP IP/OBS MODERATE 35: CPT | Mod: ,,, | Performed by: NURSE PRACTITIONER

## 2024-11-04 PROCEDURE — 86677 HELICOBACTER PYLORI ANTIBODY: CPT

## 2024-11-04 PROCEDURE — 63600175 PHARM REV CODE 636 W HCPCS

## 2024-11-04 PROCEDURE — 25000003 PHARM REV CODE 250

## 2024-11-04 PROCEDURE — 80048 BASIC METABOLIC PNL TOTAL CA: CPT

## 2024-11-04 PROCEDURE — 83690 ASSAY OF LIPASE: CPT

## 2024-11-04 PROCEDURE — 85027 COMPLETE CBC AUTOMATED: CPT

## 2024-11-04 RX ORDER — INSULIN ASPART 100 [IU]/ML
6 INJECTION, SOLUTION INTRAVENOUS; SUBCUTANEOUS
Status: DISCONTINUED | OUTPATIENT
Start: 2024-11-04 | End: 2024-11-05 | Stop reason: HOSPADM

## 2024-11-04 RX ORDER — PANTOPRAZOLE SODIUM 40 MG/10ML
80 INJECTION, POWDER, LYOPHILIZED, FOR SOLUTION INTRAVENOUS DAILY
Status: DISCONTINUED | OUTPATIENT
Start: 2024-11-04 | End: 2024-11-05

## 2024-11-04 RX ORDER — DICYCLOMINE HYDROCHLORIDE 10 MG/1
10 CAPSULE ORAL 4 TIMES DAILY
Status: DISCONTINUED | OUTPATIENT
Start: 2024-11-04 | End: 2024-11-04

## 2024-11-04 RX ORDER — DICYCLOMINE HYDROCHLORIDE 10 MG/1
20 CAPSULE ORAL 4 TIMES DAILY
Status: DISCONTINUED | OUTPATIENT
Start: 2024-11-04 | End: 2024-11-05 | Stop reason: HOSPADM

## 2024-11-04 RX ORDER — PANTOPRAZOLE SODIUM 40 MG/10ML
40 INJECTION, POWDER, LYOPHILIZED, FOR SOLUTION INTRAVENOUS 2 TIMES DAILY
Status: DISCONTINUED | OUTPATIENT
Start: 2024-11-05 | End: 2024-11-05 | Stop reason: HOSPADM

## 2024-11-04 RX ORDER — SUCRALFATE 1 G/10ML
1 SUSPENSION ORAL EVERY 6 HOURS
Status: DISCONTINUED | OUTPATIENT
Start: 2024-11-04 | End: 2024-11-05 | Stop reason: HOSPADM

## 2024-11-04 RX ADMIN — INSULIN ASPART 4 UNITS: 100 INJECTION, SOLUTION INTRAVENOUS; SUBCUTANEOUS at 12:11

## 2024-11-04 RX ADMIN — ALUMINUM HYDROXIDE, MAGNESIUM HYDROXIDE, AND SIMETHICONE 30 ML: 1200; 120; 1200 SUSPENSION ORAL at 09:11

## 2024-11-04 RX ADMIN — PRAVASTATIN SODIUM 10 MG: 10 TABLET ORAL at 08:11

## 2024-11-04 RX ADMIN — DICYCLOMINE HYDROCHLORIDE 20 MG: 10 CAPSULE ORAL at 09:11

## 2024-11-04 RX ADMIN — LISINOPRIL 10 MG: 10 TABLET ORAL at 08:11

## 2024-11-04 RX ADMIN — PANTOPRAZOLE SODIUM 80 MG: 40 INJECTION, POWDER, LYOPHILIZED, FOR SOLUTION INTRAVENOUS at 09:11

## 2024-11-04 RX ADMIN — ALUMINUM HYDROXIDE, MAGNESIUM HYDROXIDE, AND SIMETHICONE 30 ML: 1200; 120; 1200 SUSPENSION ORAL at 12:11

## 2024-11-04 RX ADMIN — INSULIN GLARGINE 20 UNITS: 100 INJECTION, SOLUTION SUBCUTANEOUS at 09:11

## 2024-11-04 RX ADMIN — PANTOPRAZOLE SODIUM 40 MG: 40 INJECTION, POWDER, FOR SOLUTION INTRAVENOUS at 08:11

## 2024-11-04 RX ADMIN — Medication 6 MG: at 10:11

## 2024-11-04 RX ADMIN — INSULIN ASPART 7 UNITS: 100 INJECTION, SOLUTION INTRAVENOUS; SUBCUTANEOUS at 09:11

## 2024-11-04 RX ADMIN — ALUMINUM HYDROXIDE, MAGNESIUM HYDROXIDE, AND SIMETHICONE 30 ML: 1200; 120; 1200 SUSPENSION ORAL at 05:11

## 2024-11-04 RX ADMIN — SUCRALFATE 1 G: 1 SUSPENSION ORAL at 10:11

## 2024-11-04 RX ADMIN — ALUMINUM HYDROXIDE, MAGNESIUM HYDROXIDE, AND SIMETHICONE 30 ML: 200; 200; 20 SUSPENSION ORAL at 09:11

## 2024-11-04 RX ADMIN — DICYCLOMINE HYDROCHLORIDE 10 MG: 10 CAPSULE ORAL at 09:11

## 2024-11-04 RX ADMIN — INSULIN ASPART 8 UNITS: 100 INJECTION, SOLUTION INTRAVENOUS; SUBCUTANEOUS at 09:11

## 2024-11-04 RX ADMIN — INSULIN ASPART 7 UNITS: 100 INJECTION, SOLUTION INTRAVENOUS; SUBCUTANEOUS at 12:11

## 2024-11-04 RX ADMIN — ONDANSETRON 8 MG: 8 TABLET, ORALLY DISINTEGRATING ORAL at 02:11

## 2024-11-04 RX ADMIN — ONDANSETRON 8 MG: 8 TABLET, ORALLY DISINTEGRATING ORAL at 04:11

## 2024-11-04 RX ADMIN — SUCRALFATE 1 G: 1 SUSPENSION ORAL at 05:11

## 2024-11-04 RX ADMIN — DICYCLOMINE HYDROCHLORIDE 10 MG: 10 CAPSULE ORAL at 12:11

## 2024-11-04 RX ADMIN — DICYCLOMINE HYDROCHLORIDE 20 MG: 10 CAPSULE ORAL at 05:11

## 2024-11-04 NOTE — PROGRESS NOTES
[facility]  Endocrinology  Progress Note    Admit Date: 11/2/2024       Reason for Consult: Management of T1DM, Hyperglycemia      Diabetes diagnosis year: 20     Home Diabetes Medications: Leunjev 8 units + LDS SSI Tresiba 22 units h.s.     Sees NP Scobel- PATIENT IS CURRENTLY NOT USING OMNIPOD DUE TO BEING UNTRAINED ON THE DEVICE  Omnipod 5  Isf 50   Insulin to carb ratio 1:15  Basal 0.72     How often checking glucose at home? Dexcom   BG readings on regimen: 250's  Hypoglycemia on the regimen?  No  Missed doses on regimen?  No     Diabetes Complications include:     Hyperglycemia, Hypoglycemia , and Diabetic cataract , Frequent DKA     Complicating diabetes co morbidities:   CKD        HPI:38-year-old male with past medical history of type 1 diabetes on insulin that presents to emergency department with nausea and vomiting. Labs not concerning for DKA at this time. Endocrine consulted to manage hyperglycemia and type 1 diabetes in the inpatient setting.     Hemoglobin A1C   Date Value Ref Range Status   03/31/2024 10.3 (H) 4.0 - 5.6 % Final     Comment:     ADA Screening Guidelines:  5.7-6.4%  Consistent with prediabetes  >or=6.5%  Consistent with diabetes    High levels of fetal hemoglobin interfere with the HbA1C  assay. Heterozygous hemoglobin variants (HbS, HgC, etc)do  not significantly interfere with this assay.   However, presence of multiple variants may affect accuracy.     09/13/2023 12.8 (H) 4.0 - 5.6 % Final     Comment:     ADA Screening Guidelines:  5.7-6.4%  Consistent with prediabetes  >or=6.5%  Consistent with diabetes    High levels of fetal hemoglobin interfere with the HbA1C  assay. Heterozygous hemoglobin variants (HbS, HgC, etc)do  not significantly interfere with this assay.   However, presence of multiple variants may affect accuracy.     04/10/2023 8.5 (H) 4.0 - 5.6 % Final     Comment:     ADA Screening Guidelines:  5.7-6.4%  Consistent with prediabetes  >or=6.5%   "Consistent with diabetes    High levels of fetal hemoglobin interfere with the HbA1C  assay. Heterozygous hemoglobin variants (HbS, HgC, etc)do  not significantly interfere with this assay.   However, presence of multiple variants may affect accuracy.               Interval HPI:   Overnight events: No acute events overnight. Patient in room 724/724 A. Blood glucose stable. BG at goal on current insulin regimen (SSI, prandial, and basal insulin ). Steroid use- None.    Renal function- Normal   Vasopressors-  None       Endocrine will continue to follow and manage insulin orders inpatient.       Diet Full Liquid Bariatric Regular     Eatin%  Nausea: No  Hypoglycemia and intervention: No  Fever: No  TPN and/or TF: No    /66 (BP Location: Left arm, Patient Position: Lying)   Pulse 72   Temp 98.6 °F (37 °C) (Oral)   Resp 20   Ht 6' 2" (1.88 m)   Wt 92.5 kg (204 lb)   SpO2 99%   BMI 26.19 kg/m²     Labs Reviewed and Include    Recent Labs   Lab 24  1231   *   CALCIUM 9.8      K 3.8   CO2 26      BUN 16   CREATININE 1.4     Lab Results   Component Value Date    WBC 3.41 (L) 2024    HGB 14.5 2024    HCT 43.8 2024    MCV 90 2024     (L) 2024     No results for input(s): "TSH", "FREET4" in the last 168 hours.  Lab Results   Component Value Date    HGBA1C 11.8 (H) 2024       Nutritional status:   Body mass index is 26.19 kg/m².  Lab Results   Component Value Date    ALBUMIN 3.8 2024    ALBUMIN 3.0 (L) 2024    ALBUMIN 3.9 2024     No results found for: "PREALBUMIN"    Estimated Creatinine Clearance: 84 mL/min (based on SCr of 1.4 mg/dL).    Accu-Checks  Recent Labs     24  0956 24  1047 24  1243 24  1628 24  1804 24  2201 24  0738 24  1151 24  1602 24   POCTGLUCOSE 435* 423* 332* 320* 253* 149* 274* 162* 80 119*       Current Medications and/or Treatments " Impacting Glycemic Control  Immunotherapy:    Immunosuppressants       None          Steroids:   Hormones (From admission, onward)      Start     Stop Route Frequency Ordered    11/02/24 1032  melatonin tablet 6 mg         -- Oral Nightly PRN 11/02/24 0934          Pressors:    Autonomic Drugs (From admission, onward)      None          Hyperglycemia/Diabetes Medications:   Antihyperglycemics (From admission, onward)      Start     Stop Route Frequency Ordered    11/02/24 1130  insulin aspart U-100 pen 7 Units         -- SubQ 3 times daily with meals 11/02/24 0952    11/02/24 1100  insulin glargine U-100 (Lantus) pen 20 Units         -- SubQ Daily 11/02/24 0952    11/02/24 1052  insulin aspart U-100 pen 0-10 Units         -- SubQ Before meals & nightly PRN 11/02/24 0952            ASSESSMENT and PLAN    Cardiac/Vascular  Primary hypertension  On an ACE-I per ADA guidelines.   Uncontrolled HTN can worsen insulin resistance.         Mixed hyperlipidemia    On statin therapy per ADA guidelines.       Endocrine  Type 1 diabetes mellitus with complications  Endocrinology consulted for BG management.   BG goal 140-180    - Lantus (Insulin Glargine) 20 units daily  - Novolog (Insulin Aspart) 7 units TIDWM and prn for BG excursions [facility] SSI (150/25)  - BG checks q4hr  - Hypoglycemia protocol in place    ** Please notify Endocrine for any change and/or advance in diet**  ** Please call Endocrine for any BG related issues **    Discharge Planning:   - Can d/c on previous MDI regimen  - Requested appointment in the insulin pump clinic with Dr. Maradiaga and to establish care.   - Send logs in 3 days for review and bring them to the appointment with Dr. Maradiaga.         GI  * Intractable nausea and vomiting  Managed per primary team  Avoid hypoglycemia             Manuel Dill, DNP, FNP  Endocrinology  [facility]

## 2024-11-04 NOTE — ASSESSMENT & PLAN NOTE
- given 1L IVFs in ED, giving additional 1L  - bariatric CLD, ADAT  - better BG management, see below  - bowel regimen -- had 2 BM with lactulose  - start scheduled maalox and BID IV ppi  - prn morphine for severe pain   - H pylori pending   - repeat CT A/P without acute abdominal abnormalities  - prn antiemetics   - GI consulted due to ongoing, unimproved symptoms with little oral intake

## 2024-11-04 NOTE — ASSESSMENT & PLAN NOTE
Patients blood pressure range in the last 24 hours was: BP  Min: 105/58  Max: 180/86.The patient's inpatient anti-hypertensive regimen is listed below:  Current Antihypertensives  lisinopriL tablet 10 mg, Daily, Oral    Plan  - BP is controlled, no changes needed to their regimen

## 2024-11-04 NOTE — SUBJECTIVE & OBJECTIVE
Interval History: Patient seen and assessed at bedside. Afebrile, VSS. Patient noted to be in severe pain during initial assessment and he asked to be re-evaluated at a later time. Pain slightly improved during following assessment, but still noted burning, severe pain in epigastric/LUQ. Minimal po intake due to pain. Repeat CT without acute findings. GI consult, appreciate recommendations.       Review of Systems   Constitutional:  Positive for appetite change. Negative for chills and fever.   Respiratory:  Negative for chest tightness and shortness of breath.    Cardiovascular:  Negative for chest pain and leg swelling.   Gastrointestinal:  Positive for abdominal pain. Negative for abdominal distention, constipation, diarrhea and vomiting.   Neurological:  Negative for dizziness and weakness.     Objective:     Vital Signs (Most Recent):  Temp: 98.7 °F (37.1 °C) (11/04/24 1152)  Pulse: 79 (11/04/24 1152)  Resp: 20 (11/04/24 1152)  BP: 118/65 (11/04/24 1152)  SpO2: 99 % (11/04/24 1152) Vital Signs (24h Range):  Temp:  [98 °F (36.7 °C)-98.7 °F (37.1 °C)] 98.7 °F (37.1 °C)  Pulse:  [72-84] 79  Resp:  [18-20] 20  SpO2:  [95 %-99 %] 99 %  BP: (118-131)/(60-79) 118/65     Weight: 92.5 kg (204 lb)  Body mass index is 26.19 kg/m².    Intake/Output Summary (Last 24 hours) at 11/4/2024 1224  Last data filed at 11/3/2024 2119  Gross per 24 hour   Intake 200 ml   Output --   Net 200 ml         Physical Exam  Vitals and nursing note reviewed.   Constitutional:       Appearance: He is well-developed.      Comments: Appears uncomfortable 2/2 pain   Eyes:      Pupils: Pupils are equal, round, and reactive to light.   Cardiovascular:      Rate and Rhythm: Normal rate and regular rhythm.   Pulmonary:      Effort: Pulmonary effort is normal.      Breath sounds: Normal breath sounds.   Abdominal:      Palpations: Abdomen is soft.      Tenderness: There is abdominal tenderness (epigastric/LUQ).   Musculoskeletal:         General: No  tenderness.   Skin:     General: Skin is warm and dry.   Neurological:      Mental Status: He is alert and oriented to person, place, and time.   Psychiatric:         Behavior: Behavior normal.             Significant Labs: All pertinent labs within the past 24 hours have been reviewed.    Significant Imaging: I have reviewed all pertinent imaging results/findings within the past 24 hours.

## 2024-11-04 NOTE — ASSESSMENT & PLAN NOTE
Resolved  BETH is likely due to pre-renal azotemia due to dehydration. Baseline creatinine is  1.5 . Most recent creatinine and eGFR are listed below.  Recent Labs     11/02/24  0545 11/03/24  1231 11/04/24  0912   CREATININE 1.8* 1.4 1.5*   EGFRNORACEVR 49.1* >60.0 >60.0        Plan  - BETH is resolved  - Avoid nephrotoxins and renally dose meds for GFR listed above  - Monitor urine output, serial BMP, and adjust therapy as needed

## 2024-11-04 NOTE — ASSESSMENT & PLAN NOTE
Endocrinology consulted for BG management.   BG goal 140-180    - Lantus (Insulin Glargine) 20 units daily  - Novolog (Insulin Aspart) 7 units TIDWM and prn for BG excursions [facility] SSI (150/25)  - BG checks q4hr  - Hypoglycemia protocol in place    ** Please notify Endocrine for any change and/or advance in diet**  ** Please call Endocrine for any BG related issues **    Discharge Planning:   - Can d/c on previous MDI regimen  - Requested appointment in the insulin pump clinic with Dr. Maradiaga and to establish care.   - Send logs in 3 days for review and bring them to the appointment with Dr. Maradiaga.

## 2024-11-04 NOTE — NURSING
Nurse called to room. Pt states BG is 69 via dexcom. BG taken by nurse. BG 70. Pt refused PO intervention. PA notified.

## 2024-11-04 NOTE — SUBJECTIVE & OBJECTIVE
Past Medical History:   Diagnosis Date    Diabetes mellitus     Diabetes mellitus type 1     Diagnosed at age 20       History reviewed. No pertinent surgical history.    Review of patient's allergies indicates:   Allergen Reactions    Lactose Other (See Comments)     Gas and moderate to sever abdominal pain     Family History       Problem Relation (Age of Onset)    Diabetes Mother    Other Mother, Father          Tobacco Use    Smoking status: Former    Smokeless tobacco: Never   Substance and Sexual Activity    Alcohol use: Not Currently     Comment: socially    Drug use: No    Sexual activity: Not on file     Review of Systems   Constitutional:  Positive for appetite change.   Gastrointestinal:  Positive for abdominal pain and diarrhea.   All other systems reviewed and are negative.    Objective:     Vital Signs (Most Recent):  Temp: 98.7 °F (37.1 °C) (11/04/24 1152)  Pulse: 79 (11/04/24 1152)  Resp: 20 (11/04/24 1152)  BP: 118/65 (11/04/24 1152)  SpO2: 99 % (11/04/24 1152) Vital Signs (24h Range):  Temp:  [98 °F (36.7 °C)-98.7 °F (37.1 °C)] 98.7 °F (37.1 °C)  Pulse:  [72-79] 79  Resp:  [18-20] 20  SpO2:  [95 %-99 %] 99 %  BP: (118-123)/(60-79) 118/65     Weight: 92.5 kg (204 lb) (11/02/24 0514)  Body mass index is 26.19 kg/m².      Intake/Output Summary (Last 24 hours) at 11/4/2024 1348  Last data filed at 11/3/2024 2119  Gross per 24 hour   Intake 200 ml   Output --   Net 200 ml       Lines/Drains/Airways       Peripheral Intravenous Line  Duration                  Peripheral IV - Single Lumen 11/02/24 0515 18 G Right Antecubital 2 days         Peripheral IV - Single Lumen 11/02/24 1657 20 G Left Antecubital 1 day                     Physical Exam  Vitals reviewed.   Constitutional:       Appearance: Normal appearance.      Comments: Writhing on the floor in pain   HENT:      Head: Normocephalic and atraumatic.      Mouth/Throat:      Pharynx: Oropharynx is clear.   Cardiovascular:      Heart sounds: Normal  heart sounds.   Pulmonary:      Effort: Pulmonary effort is normal.   Abdominal:      General: Abdomen is flat.      Palpations: Abdomen is soft.      Tenderness: There is abdominal tenderness in the epigastric area and left upper quadrant.   Musculoskeletal:      Cervical back: Neck supple.   Skin:     General: Skin is warm and dry.   Neurological:      Mental Status: He is alert and oriented to person, place, and time.          Significant Labs:  All pertinent lab results from the last 24 hours have been reviewed.  Recent Lab Results  (Last 5 results in the past 24 hours)        11/04/24  1150   11/04/24  0912   11/04/24  0858   11/04/24  0356   11/03/24 2021        Anion Gap   13             BUN   18             Calcium   9.6             Chloride   106             CO2   22             Creatinine   1.5             eGFR   >60.0             Glucose   329             Hematocrit       43.8         Hemoglobin       14.5         MCH       29.7         MCHC       33.1         MCV       90         MPV       12.6         Platelet Count       111         POCT Glucose 217     314     119       Potassium   5.0             RBC       4.88         RDW       13.0         Sodium   141             WBC       3.41                                Significant Imaging:  Imaging results within the past 24 hours have been reviewed.

## 2024-11-04 NOTE — ASSESSMENT & PLAN NOTE
Patient's FSGs are not controlled on current hypoglycemics.   Last A1c reviewed-   Lab Results   Component Value Date    HGBA1C 11.8 (H) 11/03/2024     Most recent fingerstick glucose reviewed-   Recent Labs   Lab 11/03/24  1602 11/03/24 2021 11/04/24  0858 11/04/24  1150   POCTGLUCOSE 80 119* 314* 217*       Current correctional scale  Medium  Maintain anti-hyperglycemic dose as follows-   Antihyperglycemics (From admission, onward)    Start     Stop Route Frequency Ordered    11/02/24 1130  insulin aspart U-100 pen 7 Units         -- SubQ 3 times daily with meals 11/02/24 0952    11/02/24 1100  insulin glargine U-100 (Lantus) pen 20 Units         -- SubQ Daily 11/02/24 0952    11/02/24 1052  insulin aspart U-100 pen 0-10 Units         -- SubQ Before meals & nightly PRN 11/02/24 0952         - Endocrine following and managing insulin regimen

## 2024-11-04 NOTE — PLAN OF CARE
[facility]  Initial Discharge Assessment       Primary Care Provider: Viktoriya Jaeger NP    Admission Diagnosis: Mixed hyperlipidemia [E78.2]  Primary hypertension [I10]  Abdominal pain [R10.9]  Chest pain [R07.9]  Intractable nausea and vomiting [R11.2]  Type 1 diabetes mellitus with complications [E10.8]    Admission Date: 11/2/2024  Expected Discharge Date: 11/5/2024         Payor: [facility] MCARE / Plan: [facility] DUAL COMPLETE HMO SNP / Product Type: Medicare Advantage /     Extended Emergency Contact Information  Primary Emergency Contact: TORIBIO OLSON   [facility]  Mobile Phone: [phone]  Relation: Spouse    Discharge Plan A: (P) Home with family  Discharge Plan B: (P) Home with family      "[id number] - [place], [place] - [address]  [address]  [place] [address]  Phone: [phone] Fax: [phone]    [id number] - [place], [place] - [address] AT [facility] [address] [facility] [address]  [address]  [place] [address]  Phone: [phone] Fax: [phone]    [id number] - [place], [place] - [address] AT [facility] [address] [facility] [address]  [address]  [place] [address]  Phone: [phone] Fax: [phone]      Initial Assessment (most recent)       Adult Discharge Assessment - 11/04/24 1110          Discharge Assessment    Assessment Type Discharge Planning Assessment (P)      Confirmed/corrected address, phone number and insurance Yes (P)      Confirmed Demographics Correct on Facesheet (P)      Source of Information patient (P)      Reason For Admission Intractable nausea and vomiting (P)      People in Home spouse (P)      Do you expect to return to your current living situation? Yes (P)      Prior to hospitilization cognitive status: Alert/Oriented (P)      Current cognitive  status: Alert/Oriented (P)      Equipment Currently Used at Home none (P)      Readmission within 30 days? No (P)      Patient currently being followed by outpatient case management? No (P)      Do you have prescription coverage? Yes (P)      Do you have any problems affording any of your prescribed medications? TBD (P)      Are you on dialysis? No (P)      Do you take coumadin? No (P)      Discharge Plan A Home with family (P)      Discharge Plan B Home with family (P)                             SW completed Discharge Planning Assessment with patient via bedside. Discharge planning booklet given to patient/family and whiteboard updated with PRECIOUS and phone #. All questions answered.    Patient reported that his wife will provide transportation upon discharge.     Patient reported that he live with his wife, and prior to hospitalization he was independent with his ADL's. Patient reported that he is not on dialysis and does not go to a Coumadin clinic.      Patient lives in an apartment complex with 0 steps to enter.     Discharge Plan A and Plan B have been determined by review of patient's clinical status, future medical and therapeutic needs, and coverage/benefits for post-acute care in coordination with multidisciplinary team members.      Halley Estrada LMSW  [facility]  Ext. [phone]   Normal superficial inspection and palpation of back and vertebral bodies.

## 2024-11-04 NOTE — SUBJECTIVE & OBJECTIVE
"Interval HPI:   Overnight events: No acute events overnight. Patient in room 724/724 A. Blood glucose stable. BG at goal on current insulin regimen (SSI, prandial, and basal insulin ). Steroid use- None.    Renal function- Normal   Vasopressors-  None       Endocrine will continue to follow and manage insulin orders inpatient.       Diet Full Liquid Bariatric Regular     Eatin%  Nausea: No  Hypoglycemia and intervention: No  Fever: No  TPN and/or TF: No    /66 (BP Location: Left arm, Patient Position: Lying)   Pulse 72   Temp 98.6 °F (37 °C) (Oral)   Resp 20   Ht 6' 2" (1.88 m)   Wt 92.5 kg (204 lb)   SpO2 99%   BMI 26.19 kg/m²     Labs Reviewed and Include    Recent Labs   Lab 24  1231   *   CALCIUM 9.8      K 3.8   CO2 26      BUN 16   CREATININE 1.4     Lab Results   Component Value Date    WBC 3.41 (L) 2024    HGB 14.5 2024    HCT 43.8 2024    MCV 90 2024     (L) 2024     No results for input(s): "TSH", "FREET4" in the last 168 hours.  Lab Results   Component Value Date    HGBA1C 11.8 (H) 2024       Nutritional status:   Body mass index is 26.19 kg/m².  Lab Results   Component Value Date    ALBUMIN 3.8 2024    ALBUMIN 3.0 (L) 2024    ALBUMIN 3.9 2024     No results found for: "PREALBUMIN"    Estimated Creatinine Clearance: 84 mL/min (based on SCr of 1.4 mg/dL).    Accu-Checks  Recent Labs     24  0956 24  1047 24  1243 24  1628 24  1804 24  2201 24  0738 24  1151 24  1602 24   POCTGLUCOSE 435* 423* 332* 320* 253* 149* 274* 162* 80 119*       Current Medications and/or Treatments Impacting Glycemic Control  Immunotherapy:    Immunosuppressants       None          Steroids:   Hormones (From admission, onward)      Start     Stop Route Frequency Ordered    24 1032  melatonin tablet 6 mg         -- Oral Nightly PRN 24 0954      "     Pressors:    Autonomic Drugs (From admission, onward)      None          Hyperglycemia/Diabetes Medications:   Antihyperglycemics (From admission, onward)      Start     Stop Route Frequency Ordered    11/02/24 1130  insulin aspart U-100 pen 7 Units         -- SubQ 3 times daily with meals 11/02/24 0952 11/02/24 1100  insulin glargine U-100 (Lantus) pen 20 Units         -- SubQ Daily 11/02/24 0952    11/02/24 1052  insulin aspart U-100 pen 0-10 Units         -- SubQ Before meals & nightly PRN 11/02/24 0952

## 2024-11-04 NOTE — CONSULTS
Food & Nutrition  Education    Diet Education: A1C  Time Spent: 10 minutes  Learners: Patient     Nutrition Education provided with handouts: Meal Planning Using the Plate Method, CHO Counting for People with Diabetes    Comments: Discussed importance of choosing healthy carbohydrates and to control the amount of carbohydrates you eat at each meal for blood sugar management. Reminded patient not to skip meals, and to consume at least 3 meals/day. Each meal should have at least 45-75g carbs with a protein. Reviewed limiting sugary beverages such as soda pop, fruit juice and sweetened iced teas. Food labels, CHO counting, and recommended CHO intake reviewed. Patient is aware of A1C = 11.8% and what the lab value means.     All questions and concerns answered. Dietitian's contact information provided.     Follow-Up: Yes    Please Re-consult as needed    Thanks!   Evette Ann, MS, RD, LDN

## 2024-11-04 NOTE — PROGRESS NOTES
[facility] [address] [place] [address] Medicine  Progress Note    Patient Name: Andrzej Sinclair  MRN: [id number]  Patient Class: IP- Inpatient   Admission Date: 11/2/2024  Length of Stay: 1 days  Attending Physician: Marty Madrigal MD  Primary Care Provider: Viktoriya Jaeger NP        Subjective:     Principal Problem:Intractable nausea and vomiting        HPI:  Andrzej Sinclair is a 37 yo M with PMHx of T1DM, hx of DKA/HHS, HTN, HLD who presented to ED for intractable nausea and vomiting. Patient endorses severe abdominal pain that started 2 days ago. He has been nauseous with approximately 8 episodes of emesis/day for the past 2 days. He has had difficulty tolerating fluids. States that his BG has been very low to very high during this time as well, but unable to give specific numbers. He also states that he has not had a BM in 3 days. He typically has a BM every other day. He has a hx of marijuana use, but reports last use was 2 weeks ago. Denies fever, chills, chest pain, SOB, dysuria, headaches and LE swelling.    In ED: Afebrile. HDS. No leukocytosis. CMP with Na 135 and BETH with Cr 1.8 (baseline 1.5). BG 400s. A1c 12.0 Beta hydroxybutyrate 0.7. UA noninfectious, but with 4+ glucose and 2+ ketones. Lactic 2.47 >> 3.12. CXR clear. CT A/P only notable for moderate stool burden. Given droperidol, 5 units regular insulin, 1L LR, IV zofran, IV morphine 6 mg and morphine 4 mg. Endocrinology consulted. Per ED providers, patient visibly uncomfortable 2/2 abdominal pain. Placed in observation with [facility] for abdominal pain and intractable n/v.     Overview/Hospital Course:  Andrzej Sinclair is a 37 yo M placed in admitted to [facility] for severe abdominal pain with n/v. BG not well controlled, but lower than previous admission. A1c 12. Endocrine following and adjusting insulin regimen. CT A/P negative for acute process except moderate stool burden. Given lactulose and patient had 2 BM. Tolerating small portions of bariatric CLD. Will  ADAT. Repeating CT today as he is still having episodes of severe pain. Repeat CT without acute abnormalities noted. Presentation possibly 2/2 gastritis vs. Gastroparesis? H. Pylori pending. Due to continued severe pain without improvement with current medication regimen and little oral intake, GI consulted, appreciate recommendations. Mild BETH on admission, that resolved with IVFs.     Interval History: Patient seen and assessed at bedside. Afebrile, VSS. Patient noted to be in severe pain during initial assessment and he asked to be re-evaluated at a later time. Pain slightly improved during following assessment, but still noted burning, severe pain in epigastric/LUQ. Minimal po intake due to pain. Repeat CT without acute findings. GI consult, appreciate recommendations.       Review of Systems   Constitutional:  Positive for appetite change. Negative for chills and fever.   Respiratory:  Negative for chest tightness and shortness of breath.    Cardiovascular:  Negative for chest pain and leg swelling.   Gastrointestinal:  Positive for abdominal pain. Negative for abdominal distention, constipation, diarrhea and vomiting.   Neurological:  Negative for dizziness and weakness.     Objective:     Vital Signs (Most Recent):  Temp: 98.7 °F (37.1 °C) (11/04/24 1152)  Pulse: 79 (11/04/24 1152)  Resp: 20 (11/04/24 1152)  BP: 118/65 (11/04/24 1152)  SpO2: 99 % (11/04/24 1152) Vital Signs (24h Range):  Temp:  [98 °F (36.7 °C)-98.7 °F (37.1 °C)] 98.7 °F (37.1 °C)  Pulse:  [72-84] 79  Resp:  [18-20] 20  SpO2:  [95 %-99 %] 99 %  BP: (118-131)/(60-79) 118/65     Weight: 92.5 kg (204 lb)  Body mass index is 26.19 kg/m².    Intake/Output Summary (Last 24 hours) at 11/4/2024 1224  Last data filed at 11/3/2024 2119  Gross per 24 hour   Intake 200 ml   Output --   Net 200 ml         Physical Exam  Vitals and nursing note reviewed.   Constitutional:       Appearance: He is well-developed.      Comments: Appears uncomfortable 2/2 pain    Eyes:      Pupils: Pupils are equal, round, and reactive to light.   Cardiovascular:      Rate and Rhythm: Normal rate and regular rhythm.   Pulmonary:      Effort: Pulmonary effort is normal.      Breath sounds: Normal breath sounds.   Abdominal:      Palpations: Abdomen is soft.      Tenderness: There is abdominal tenderness (epigastric/LUQ).   Musculoskeletal:         General: No tenderness.   Skin:     General: Skin is warm and dry.   Neurological:      Mental Status: He is alert and oriented to person, place, and time.   Psychiatric:         Behavior: Behavior normal.             Significant Labs: All pertinent labs within the past 24 hours have been reviewed.    Significant Imaging: I have reviewed all pertinent imaging results/findings within the past 24 hours.    Assessment/Plan:      * Intractable nausea and vomiting  - given 1L IVFs in ED, giving additional 1L  - bariatric CLD, ADAT  - better BG management, see below  - bowel regimen -- had 2 BM with lactulose  - start scheduled maalox and BID IV ppi  - prn morphine for severe pain   - H pylori pending   - repeat CT A/P without acute abdominal abnormalities  - prn antiemetics   - GI consulted due to ongoing, unimproved symptoms with little oral intake    Lactic acidosis due to diabetes mellitus  Resolved     Mixed hyperlipidemia  - continue statin     BETH (acute kidney injury)  Resolved  BETH is likely due to pre-renal azotemia due to dehydration. Baseline creatinine is  1.5 . Most recent creatinine and eGFR are listed below.  Recent Labs     11/02/24  0545 11/03/24  1231 11/04/24  0912   CREATININE 1.8* 1.4 1.5*   EGFRNORACEVR 49.1* >60.0 >60.0        Plan  - BETH is resolved  - Avoid nephrotoxins and renally dose meds for GFR listed above  - Monitor urine output, serial BMP, and adjust therapy as needed    Cannabinoid hyperemesis syndrome  - reports not using marijuana over the last 2 weeks    Type 1 diabetes mellitus with complications  Patient's FSGs  are not controlled on current hypoglycemics.   Last A1c reviewed-   Lab Results   Component Value Date    HGBA1C 11.8 (H) 11/03/2024     Most recent fingerstick glucose reviewed-   Recent Labs   Lab 11/03/24  1602 11/03/24 2021 11/04/24  0858 11/04/24  1150   POCTGLUCOSE 80 119* 314* 217*       Current correctional scale  Medium  Maintain anti-hyperglycemic dose as follows-   Antihyperglycemics (From admission, onward)      Start     Stop Route Frequency Ordered    11/02/24 1130  insulin aspart U-100 pen 7 Units         -- SubQ 3 times daily with meals 11/02/24 0952    11/02/24 1100  insulin glargine U-100 (Lantus) pen 20 Units         -- SubQ Daily 11/02/24 0952    11/02/24 1052  insulin aspart U-100 pen 0-10 Units         -- SubQ Before meals & nightly PRN 11/02/24 0952           - Endocrine following and managing insulin regimen     Primary hypertension  Patients blood pressure range in the last 24 hours was: BP  Min: 105/58  Max: 180/86.The patient's inpatient anti-hypertensive regimen is listed below:  Current Antihypertensives  lisinopriL tablet 10 mg, Daily, Oral    Plan  - BP is controlled, no changes needed to their regimen      VTE Risk Mitigation (From admission, onward)           Ordered     IP VTE LOW RISK PATIENT  Once         11/02/24 0934                    Discharge Planning   PRECIOUS: 11/5/2024     Code Status: Full Code   Is the patient medically ready for discharge?:     Reason for patient still in hospital (select all that apply): Trending condition, Treatment, Consult recommendations  Discharge Plan A: Home with family                  Annel Gold PA-C  Department of Hospital Medicine   [facility]

## 2024-11-04 NOTE — CONSULTS
[facility]  Gastroenterology  Consult Note    Patient Name: Andrzej Sinclair  MRN: [id number]  Admission Date: 11/2/2024  Hospital Length of Stay: 1 days  Code Status: Full Code   Attending Provider: Marty Madrigal MD   Consulting Provider: Celine Sosa MD  Primary Care Physician: Viktoriya Jaeger NP  Principal Problem:Intractable nausea and vomiting    Inpatient consult to Gastroenterology  Consult performed by: Celine Sosa MD  Consult ordered by: Annel Gold PA-C  Reason for consult: Abdominal pain        Subjective:     HPI:  Andrzej Sinclair is a 38 YOM with a PMH significant for T1DM, HTN and HLD that presents to ED with intractable nausea and vomiting associated with severe abdominal pain for the last few days. He has had multiple admissions for DKA/HHS. Initially, he was nauseous with 8 episodes of NBNB emesis per day for 2 days and decreased PO intake. His BG has been uncontrolled. He reports he didn't have a bowel movement during this time when he used to go every other day. He states his last bowel movement was this morning and was green diarrhea. He rates his abdominal pain 10/10 and localized primarily in the epigastrium and LUQ. He states the last time he used marijuana was 2 weeks ago. Last EGD showed gastritis and his last gastric emptying study in 2021 was normal.     In the ED, the patient was afebrile and hemodynamically stable. CBC was normal. CMP showed a milld BETH. CTAP showed moderate stool burden but otherwise negative for any acute process. He was given droperidol, zofran, and morphine. GI was consulted for worsening epigastric pain and decrease PO intake.     Past Medical History:   Diagnosis Date    Diabetes mellitus     Diabetes mellitus type 1     Diagnosed at age 20       History reviewed. No pertinent surgical history.    Review of patient's allergies indicates:   Allergen Reactions    Lactose Other (See Comments)     Gas and moderate to sever abdominal pain      Family History       Problem Relation (Age of Onset)    Diabetes Mother    Other Mother, Father          Tobacco Use    Smoking status: Former    Smokeless tobacco: Never   Substance and Sexual Activity    Alcohol use: Not Currently     Comment: socially    Drug use: No    Sexual activity: Not on file     Review of Systems   Constitutional:  Positive for appetite change.   Gastrointestinal:  Positive for abdominal pain and diarrhea.   All other systems reviewed and are negative.    Objective:     Vital Signs (Most Recent):  Temp: 98.7 °F (37.1 °C) (11/04/24 1152)  Pulse: 79 (11/04/24 1152)  Resp: 20 (11/04/24 1152)  BP: 118/65 (11/04/24 1152)  SpO2: 99 % (11/04/24 1152) Vital Signs (24h Range):  Temp:  [98 °F (36.7 °C)-98.7 °F (37.1 °C)] 98.7 °F (37.1 °C)  Pulse:  [72-79] 79  Resp:  [18-20] 20  SpO2:  [95 %-99 %] 99 %  BP: (118-123)/(60-79) 118/65     Weight: 92.5 kg (204 lb) (11/02/24 0514)  Body mass index is 26.19 kg/m².      Intake/Output Summary (Last 24 hours) at 11/4/2024 1348  Last data filed at 11/3/2024 2119  Gross per 24 hour   Intake 200 ml   Output --   Net 200 ml       Lines/Drains/Airways       Peripheral Intravenous Line  Duration                  Peripheral IV - Single Lumen 11/02/24 0515 18 G Right Antecubital 2 days         Peripheral IV - Single Lumen 11/02/24 1657 20 G Left Antecubital 1 day                     Physical Exam  Vitals reviewed.   Constitutional:       Appearance: Normal appearance.      Comments: Writhing on the floor in pain   HENT:      Head: Normocephalic and atraumatic.      Mouth/Throat:      Pharynx: Oropharynx is clear.   Cardiovascular:      Heart sounds: Normal heart sounds.   Pulmonary:      Effort: Pulmonary effort is normal.   Abdominal:      General: Abdomen is flat.      Palpations: Abdomen is soft.      Tenderness: There is abdominal tenderness in the epigastric area and left upper quadrant.   Musculoskeletal:      Cervical back: Neck supple.   Skin:      General: Skin is warm and dry.   Neurological:      Mental Status: He is alert and oriented to person, place, and time.          Significant Labs:  All pertinent lab results from the last 24 hours have been reviewed.  Recent Lab Results  (Last 5 results in the past 24 hours)        11/04/24  1150   11/04/24  0912   11/04/24  0858   11/04/24  0356   11/03/24  2021        Anion Gap   13             BUN   18             Calcium   9.6             Chloride   106             CO2   22             Creatinine   1.5             eGFR   >60.0             Glucose   329             Hematocrit       43.8         Hemoglobin       14.5         MCH       29.7         MCHC       33.1         MCV       90         MPV       12.6         Platelet Count       111         POCT Glucose 217     314     119       Potassium   5.0             RBC       4.88         RDW       13.0         Sodium   141             WBC       3.41                                Significant Imaging:  Imaging results within the past 24 hours have been reviewed.  Assessment/Plan:     GI  Abdominal pain  - CTAP without acute abnormality, some stool within colon without wall thickening, inflammation of appendix or gallbladder abnormalities.   - Previous EGD with gastritis, GES normal  - Unclear etiology of abdominal pain out of proportion to exam/imaging findings     - Plan for EGD tomorrow.  - Bolus IV protonix 80mg followed by 40mg BID  - Clear liquid diet today, NPO at midnight  - Obtain repeat lipase and UDS  - Discontinue all antiplatelet, anti-coagulation, and NSAID products unless contraindicated   - Replete electrolytes as needed, maintain K > 4 and Mg > 2   - Please correct any coagulopathy with platelets and FFP for goal of platelets >50K and INR <2.0  - Please notify GI team if there is any acute clinical changes         Thank you for your consult. I will follow-up with patient. Please contact us if you have any additional questions.    Celine Sosa  MD  Gastroenterology  [facility]

## 2024-11-04 NOTE — HPI
Andrzej Sinclair is a 38 YOM with a PMH significant for T1DM, HTN and HLD that presents to ED with intractable nausea and vomiting associated with severe abdominal pain for the last few days. He has had multiple admissions for DKA/HHS. Initially, he was nauseous with 8 episodes of NBNB emesis per day for 2 days and decreased PO intake. His BG has been uncontrolled. He reports he didn't have a bowel movement during this time when he used to go every other day. He states his last bowel movement was this morning and was green diarrhea. He rates his abdominal pain 10/10 and localized primarily in the epigastrium and LUQ. He states the last time he used marijuana was 2 weeks ago. Last EGD showed gastritis and his last gastric emptying study in 2021 was normal.     In the ED, the patient was afebrile and hemodynamically stable. CBC was normal. CMP showed a milld BETH. CTAP showed moderate stool burden but otherwise negative for any acute process. He was given droperidol, zofran, and morphine. GI was consulted for worsening epigastric pain and decrease PO intake.

## 2024-11-04 NOTE — ASSESSMENT & PLAN NOTE
- CTAP without acute abnormality, some stool within colon without wall thickening, inflammation of appendix or gallbladder abnormalities.   - Previous EGD with gastritis, GES normal  - Unclear etiology of abdominal pain out of proportion to exam/imaging findings     - Plan for EGD tomorrow.  - Bolus IV protonix 80mg followed by 40mg BID  - Clear liquid diet today, NPO at midnight  - Obtain repeat lipase and UDS  - Discontinue all antiplatelet, anti-coagulation, and NSAID products unless contraindicated   - Replete electrolytes as needed, maintain K > 4 and Mg > 2   - Please correct any coagulopathy with platelets and FFP for goal of platelets >50K and INR <2.0  - Please notify GI team if there is any acute clinical changes

## 2024-11-05 ENCOUNTER — ANESTHESIA (OUTPATIENT)
Dept: ENDOSCOPY | Facility: HOSPITAL | Age: 39
DRG: 392 | End: 2024-11-05
Payer: MEDICARE

## 2024-11-05 ENCOUNTER — TELEPHONE (OUTPATIENT)
Dept: GASTROENTEROLOGY | Facility: CLINIC | Age: 39
End: 2024-11-05

## 2024-11-05 ENCOUNTER — ANESTHESIA EVENT (OUTPATIENT)
Dept: ENDOSCOPY | Facility: HOSPITAL | Age: 39
DRG: 392 | End: 2024-11-05
Payer: MEDICARE

## 2024-11-05 ENCOUNTER — PATIENT MESSAGE (OUTPATIENT)
Dept: ENDOCRINOLOGY | Facility: HOSPITAL | Age: 39
End: 2024-11-05
Payer: MEDICARE

## 2024-11-05 VITALS
TEMPERATURE: 98 F | HEART RATE: 60 BPM | OXYGEN SATURATION: 99 % | WEIGHT: 204 LBS | HEIGHT: 74 IN | BODY MASS INDEX: 26.18 KG/M2 | DIASTOLIC BLOOD PRESSURE: 57 MMHG | SYSTOLIC BLOOD PRESSURE: 122 MMHG | RESPIRATION RATE: 18 BRPM

## 2024-11-05 LAB
ANION GAP SERPL CALC-SCNC: 12 MMOL/L (ref 8–16)
BUN SERPL-MCNC: 16 MG/DL (ref 6–20)
CALCIUM SERPL-MCNC: 9.4 MG/DL (ref 8.7–10.5)
CHLORIDE SERPL-SCNC: 108 MMOL/L (ref 95–110)
CO2 SERPL-SCNC: 21 MMOL/L (ref 23–29)
CREAT SERPL-MCNC: 1.5 MG/DL (ref 0.5–1.4)
ERYTHROCYTE [DISTWIDTH] IN BLOOD BY AUTOMATED COUNT: 12.8 % (ref 11.5–14.5)
EST. GFR  (NO RACE VARIABLE): >60 ML/MIN/1.73 M^2
GLUCOSE SERPL-MCNC: 150 MG/DL (ref 70–110)
H PYLORI IGG SERPL QL IA: NEGATIVE
HCT VFR BLD AUTO: 42.7 % (ref 40–54)
HGB BLD-MCNC: 15.2 G/DL (ref 14–18)
MAGNESIUM SERPL-MCNC: 1.9 MG/DL (ref 1.6–2.6)
MCH RBC QN AUTO: 30.9 PG (ref 27–31)
MCHC RBC AUTO-ENTMCNC: 35.6 G/DL (ref 32–36)
MCV RBC AUTO: 87 FL (ref 82–98)
PLATELET # BLD AUTO: 143 K/UL (ref 150–450)
PMV BLD AUTO: 12.1 FL (ref 9.2–12.9)
POCT GLUCOSE: 199 MG/DL (ref 70–110)
POCT GLUCOSE: 243 MG/DL (ref 70–110)
POCT GLUCOSE: 249 MG/DL (ref 70–110)
POTASSIUM SERPL-SCNC: 4.5 MMOL/L (ref 3.5–5.1)
RBC # BLD AUTO: 4.92 M/UL (ref 4.6–6.2)
SODIUM SERPL-SCNC: 141 MMOL/L (ref 136–145)
WBC # BLD AUTO: 4.04 K/UL (ref 3.9–12.7)

## 2024-11-05 PROCEDURE — 37000008 HC ANESTHESIA 1ST 15 MINUTES: Performed by: INTERNAL MEDICINE

## 2024-11-05 PROCEDURE — 0DB68ZX EXCISION OF STOMACH, VIA NATURAL OR ARTIFICIAL OPENING ENDOSCOPIC, DIAGNOSTIC: ICD-10-PCS | Performed by: INTERNAL MEDICINE

## 2024-11-05 PROCEDURE — 88305 TISSUE EXAM BY PATHOLOGIST: CPT | Mod: 26,,, | Performed by: PATHOLOGY

## 2024-11-05 PROCEDURE — 63600175 PHARM REV CODE 636 W HCPCS: Performed by: NURSE PRACTITIONER

## 2024-11-05 PROCEDURE — 99232 SBSQ HOSP IP/OBS MODERATE 35: CPT | Mod: ,,,

## 2024-11-05 PROCEDURE — 37000009 HC ANESTHESIA EA ADD 15 MINS: Performed by: INTERNAL MEDICINE

## 2024-11-05 PROCEDURE — 94761 N-INVAS EAR/PLS OXIMETRY MLT: CPT

## 2024-11-05 PROCEDURE — 0DB78ZX EXCISION OF STOMACH, PYLORUS, VIA NATURAL OR ARTIFICIAL OPENING ENDOSCOPIC, DIAGNOSTIC: ICD-10-PCS | Performed by: INTERNAL MEDICINE

## 2024-11-05 PROCEDURE — 25000003 PHARM REV CODE 250

## 2024-11-05 PROCEDURE — 63600175 PHARM REV CODE 636 W HCPCS: Performed by: NURSE ANESTHETIST, CERTIFIED REGISTERED

## 2024-11-05 PROCEDURE — 27201012 HC FORCEPS, HOT/COLD, DISP: Performed by: INTERNAL MEDICINE

## 2024-11-05 PROCEDURE — 63600175 PHARM REV CODE 636 W HCPCS

## 2024-11-05 PROCEDURE — 82962 GLUCOSE BLOOD TEST: CPT | Performed by: INTERNAL MEDICINE

## 2024-11-05 PROCEDURE — 36415 COLL VENOUS BLD VENIPUNCTURE: CPT

## 2024-11-05 PROCEDURE — 43239 EGD BIOPSY SINGLE/MULTIPLE: CPT | Performed by: INTERNAL MEDICINE

## 2024-11-05 PROCEDURE — 25000003 PHARM REV CODE 250: Performed by: NURSE ANESTHETIST, CERTIFIED REGISTERED

## 2024-11-05 PROCEDURE — 83735 ASSAY OF MAGNESIUM: CPT

## 2024-11-05 PROCEDURE — 0DB98ZX EXCISION OF DUODENUM, VIA NATURAL OR ARTIFICIAL OPENING ENDOSCOPIC, DIAGNOSTIC: ICD-10-PCS | Performed by: INTERNAL MEDICINE

## 2024-11-05 PROCEDURE — 80048 BASIC METABOLIC PNL TOTAL CA: CPT

## 2024-11-05 PROCEDURE — 88305 TISSUE EXAM BY PATHOLOGIST: CPT | Performed by: PATHOLOGY

## 2024-11-05 PROCEDURE — 85027 COMPLETE CBC AUTOMATED: CPT

## 2024-11-05 RX ORDER — GLUCAGON 1 MG
1 KIT INJECTION
Status: DISCONTINUED | OUTPATIENT
Start: 2024-11-05 | End: 2024-11-05 | Stop reason: HOSPADM

## 2024-11-05 RX ORDER — FENTANYL CITRATE 50 UG/ML
INJECTION, SOLUTION INTRAMUSCULAR; INTRAVENOUS
Status: DISCONTINUED | OUTPATIENT
Start: 2024-11-05 | End: 2024-11-05

## 2024-11-05 RX ORDER — ONDANSETRON 8 MG/1
8 TABLET, ORALLY DISINTEGRATING ORAL EVERY 8 HOURS PRN
Qty: 30 TABLET | Refills: 0 | Status: SHIPPED | OUTPATIENT
Start: 2024-11-05 | End: 2024-11-15

## 2024-11-05 RX ORDER — ALUMINUM HYDROXIDE, MAGNESIUM HYDROXIDE, AND SIMETHICONE 1200; 120; 1200 MG/30ML; MG/30ML; MG/30ML
30 SUSPENSION ORAL 4 TIMES DAILY PRN
Qty: 769 ML | Refills: 0 | Status: SHIPPED | OUTPATIENT
Start: 2024-11-05 | End: 2025-11-05

## 2024-11-05 RX ORDER — SODIUM CHLORIDE 0.9 % (FLUSH) 0.9 %
10 SYRINGE (ML) INJECTION
Status: DISCONTINUED | OUTPATIENT
Start: 2024-11-05 | End: 2024-11-05 | Stop reason: HOSPADM

## 2024-11-05 RX ORDER — PROPOFOL 10 MG/ML
VIAL (ML) INTRAVENOUS
Status: DISCONTINUED | OUTPATIENT
Start: 2024-11-05 | End: 2024-11-05

## 2024-11-05 RX ORDER — LIDOCAINE HYDROCHLORIDE 20 MG/ML
INJECTION INTRAVENOUS
Status: DISCONTINUED | OUTPATIENT
Start: 2024-11-05 | End: 2024-11-05

## 2024-11-05 RX ORDER — DICYCLOMINE HYDROCHLORIDE 10 MG/1
20 CAPSULE ORAL
Qty: 40 CAPSULE | Refills: 0 | Status: SHIPPED | OUTPATIENT
Start: 2024-11-05 | End: 2024-11-15

## 2024-11-05 RX ORDER — ONDANSETRON HYDROCHLORIDE 2 MG/ML
INJECTION, SOLUTION INTRAVENOUS
Status: DISCONTINUED | OUTPATIENT
Start: 2024-11-05 | End: 2024-11-05

## 2024-11-05 RX ORDER — PANTOPRAZOLE SODIUM 40 MG/1
40 TABLET, DELAYED RELEASE ORAL DAILY
Qty: 90 TABLET | Refills: 0 | Status: SHIPPED | OUTPATIENT
Start: 2024-11-05 | End: 2025-02-03

## 2024-11-05 RX ADMIN — LIDOCAINE HYDROCHLORIDE 80 MG: 20 INJECTION INTRAVENOUS at 09:11

## 2024-11-05 RX ADMIN — PROPOFOL 100 MG: 10 INJECTION, EMULSION INTRAVENOUS at 09:11

## 2024-11-05 RX ADMIN — INSULIN GLARGINE 20 UNITS: 100 INJECTION, SOLUTION SUBCUTANEOUS at 08:11

## 2024-11-05 RX ADMIN — ALUMINUM HYDROXIDE, MAGNESIUM HYDROXIDE, AND SIMETHICONE 30 ML: 1200; 120; 1200 SUSPENSION ORAL at 11:11

## 2024-11-05 RX ADMIN — SODIUM CHLORIDE: 0.9 INJECTION, SOLUTION INTRAVENOUS at 09:11

## 2024-11-05 RX ADMIN — FENTANYL CITRATE 25 MCG: 50 INJECTION, SOLUTION INTRAMUSCULAR; INTRAVENOUS at 09:11

## 2024-11-05 RX ADMIN — DICYCLOMINE HYDROCHLORIDE 20 MG: 10 CAPSULE ORAL at 12:11

## 2024-11-05 RX ADMIN — PROPOFOL 200 MCG/KG/MIN: 10 INJECTION, EMULSION INTRAVENOUS at 09:11

## 2024-11-05 RX ADMIN — INSULIN ASPART 4 UNITS: 100 INJECTION, SOLUTION INTRAVENOUS; SUBCUTANEOUS at 12:11

## 2024-11-05 RX ADMIN — FENTANYL CITRATE 50 MCG: 50 INJECTION, SOLUTION INTRAMUSCULAR; INTRAVENOUS at 09:11

## 2024-11-05 RX ADMIN — SUCRALFATE 1 G: 1 SUSPENSION ORAL at 12:11

## 2024-11-05 RX ADMIN — DICYCLOMINE HYDROCHLORIDE 20 MG: 10 CAPSULE ORAL at 08:11

## 2024-11-05 RX ADMIN — INSULIN ASPART 6 UNITS: 100 INJECTION, SOLUTION INTRAVENOUS; SUBCUTANEOUS at 12:11

## 2024-11-05 RX ADMIN — PANTOPRAZOLE SODIUM 40 MG: 40 INJECTION, POWDER, FOR SOLUTION INTRAVENOUS at 09:11

## 2024-11-05 RX ADMIN — ONDANSETRON 4 MG: 2 INJECTION INTRAMUSCULAR; INTRAVENOUS at 09:11

## 2024-11-05 NOTE — ASSESSMENT & PLAN NOTE
Endocrinology consulted for BG management.   BG goal 140-180    - Lantus (Insulin Glargine) 20 units daily  - Novolog (Insulin Aspart) 6 units TIDWM and prn for BG excursions [facility] (150/25)  - BG checks q4hr  - Hypoglycemia protocol in place    ** Please notify Endocrine for any change and/or advance in diet**  ** Please call Endocrine for any BG related issues **    Discharge Planning:   - Can d/c on previous MDI regimen  - Requested appointment in the insulin pump clinic with Dr. Maradiaga and to establish care.   - Send logs in 3 days for review and bring them to the appointment with Dr. Maradiaga.

## 2024-11-05 NOTE — ANESTHESIA PREPROCEDURE EVALUATION
[facility]  Anesthesia Pre-Operative Evaluation     Patient Name: Andrzej Sinclair  YOB: 1985  MRN: [id number]  [facility]: [id number]       Admit Date: 11/2/2024   Admit Team: [facility]  Hospital Day: 4  Date of Procedure: 11/5/2024  Anesthesia: General/MAC Procedure: Procedure(s) (LRB):  EGD (ESOPHAGOGASTRODUODENOSCOPY) (N/A)  Pre-Operative Diagnosis: Abdominal pain [R10.9]  Proceduralist:Surgeons and Role:     * Manny Troy MD - Primary  Code Status: Full Code   Advanced Directive: <no information>  Isolation Precautions: No active isolations  Capacity: Full capacity     SUBJECTIVE:   Andrzej Sinclair is a 38 y.o. male with below PMH presenting for above procedure(s).  Past Medical History:   Diagnosis Date    Diabetes mellitus     Diabetes mellitus type 1     Diagnosed at age 20        Andrzej Sinclair is a 38 YOM with a PMH significant for T1DM, HTN and HLD that presents to ED with intractable nausea and vomiting associated with severe abdominal pain for the last few days. He has had multiple admissions for DKA/HHS. Initially, he was nauseous with 8 episodes of NBNB emesis per day for 2 days and decreased PO intake. His BG has been uncontrolled. He reports he didn't have a bowel movement during this time when he used to go every other day. He states his last bowel movement was this morning and was green diarrhea. He rates his abdominal pain 10/10 and localized primarily in the epigastrium and LUQ. He states the last time he used marijuana was 2 weeks ago. Last EGD showed gastritis and his last gastric emptying study in 2021 was normal.     In the ED, the patient was afebrile and hemodynamically stable. CBC was normal. CMP showed a milld BETH. CTAP showed moderate stool burden but otherwise negative for any acute process. He was given droperidol, zofran, and morphine. GI was consulted for worsening epigastric pain and decrease PO intake.     Hospital LOS: 2 days  ICU LOS: Patient does  not have an ICU stay during this admission.    ALLERGIES:     Review of patient's allergies indicates:   Allergen Reactions    Lactose Other (See Comments)     Gas and moderate to sever abdominal pain     LDA:     Lines/Drains/Airways       Peripheral Intravenous Line  Duration                  Peripheral IV - Single Lumen 11/02/24 0515 18 G Right Antecubital 3 days         Peripheral IV - Single Lumen 11/02/24 1657 20 G Left Antecubital 2 days                   Anesthesia Evaluation     Patient summary reviewed    No history of anesthetic complications   Airway   Mallampati: III  TM distance: Normal  Neck ROM: Normal ROM  Dental    (+) Intact    Pulmonary    (-) COPD, asthma, shortness of breath  Cardiovascular   Exercise tolerance: good  (+) hypertension poorly controlled    Neuro/Psych    (+) neuromuscular disease, psychiatric history    GI/Hepatic/Renal    (+) liver disease, chronic renal disease    Endo/Other    (+) diabetes mellitus  Abdominal                    MEDICATIONS:     Current Outpatient Medications on File Prior to Encounter   Medication Sig Dispense Refill Last Dose/Taking    blood sugar diagnostic Strp 3-4 times a day 200 strip 4     blood-glucose meter kit Use as instructed 1 each 0     blood-glucose sensor (DEXCOM G6 SENSOR) Martha 1 each by Misc.(Non-Drug; Combo Route) route every 10 days. 3 each 3     blood-glucose sensor (DEXCOM G7 SENSOR) Martha Apply one sensor to the skin every 10 days 3 each 11     blood-glucose transmitter (DEXCOM G6 TRANSMITTER) Martha Use as instructed. 1 each 1     dicyclomine (BENTYL) 10 MG capsule TAKE 1 CAPSULE BY MOUTH AS NEEDED 15 capsule 0     insulin degludec (TRESIBA FLEXTOUCH U-100) 100 unit/mL (3 mL) insulin pen Inject 22 Units into the skin once daily. 19.8 mL 1     insulin lispro-aabc (LYUMJEV KWIKPEN U-100 INSULIN) 100 unit/mL pen Inject 8 Units into the skin 3 (three) times daily with meals. Take additional sliding scale based on the blood sugars.  Maximum  "daily dose of 36 units. 4 pen 4     lancets 31 gauge Misc 1 lancet  by Misc.(Non-Drug; Combo Route) route 2 (two) times daily. 200 each 4     lisinopriL 10 MG tablet Take 1 tablet (10 mg total) by mouth once daily. 90 tablet 3     pen needle, diabetic (BD ULTRA-FINE ROVERTO PEN NEEDLE) 32 gauge x 5/32" Ndle USE AS DIRECTED WITH INSULIN 100 each 0     pen needle, diabetic 29 gauge x 1/2" Ndle Use to inject insulin into the skin. 100 each 0     pravastatin (PRAVACHOL) 10 MG tablet Take 5 mg once a day [half a pill daily] 30 tablet 11     TRUEPLUS INSULIN 0.5 mL 30 gauge x 5/16" Syrg USE FOUR TIMES DAILY AS DIRECTED 500 each 3     TRUEPLUS INSULIN 0.5 mL 31 gauge x 5/16" Syrg USE FOUR TIMES DAILY AS DIRECTED 200 each 11       Inpatient Medications:  Antibiotics (From admission, onward)      None          VTE Risk Mitigation (From admission, onward)           Ordered     IP VTE LOW RISK PATIENT  Once         11/02/24 0934                   aluminum-magnesium hydroxide-simethicone  30 mL Oral QID (AC & HS)    dicyclomine  20 mg Oral QID    insulin aspart U-100  6 Units Subcutaneous TIDWM    insulin glargine U-100  20 Units Subcutaneous Daily    lisinopriL  10 mg Oral Daily    pantoprazole  40 mg Intravenous BID    pravastatin  10 mg Oral Daily    sucralfate  1 g Oral Q6H       Current Facility-Administered Medications   Medication Dose Route Frequency Provider Last Rate Last Admin    acetaminophen tablet 1,000 mg  1,000 mg Oral Q8H PRN Betina Torres PA-C   1,000 mg at 11/02/24 2152    acetaminophen tablet 650 mg  650 mg Oral Q4H PRN Betina Torres PA-C        albuterol-ipratropium 2.5 mg-0.5 mg/3 mL nebulizer solution 3 mL  3 mL Nebulization Q4H PRN Betina Torres PA-C        aluminum-magnesium hydroxide-simethicone 200-200-20 mg/5 mL suspension 30 mL  30 mL Oral QID PRN Betina Torres PA-C   30 mL at 11/04/24 2122    aluminum-magnesium hydroxide-simethicone 200-200-20 mg/5 mL suspension 30 mL  30 mL Oral QID " (AC & HS) Betina Torres PA-C   30 mL at 11/04/24 2123    dextrose 10% bolus 125 mL 125 mL  12.5 g Intravenous PRN Betina Torres PA-C        dextrose 10% bolus 250 mL 250 mL  25 g Intravenous PRN Betina Torres PA-C        dicyclomine capsule 20 mg  20 mg Oral QID Annel Gold PA-C   20 mg at 11/05/24 0827    glucagon (human recombinant) injection 1 mg  1 mg Intramuscular PRN Betina Torres PA-C        glucose chewable tablet 16 g  16 g Oral PRN Betina Torres PA-C        glucose chewable tablet 24 g  24 g Oral PRN Betina Torres PA-C        insulin aspart U-100 pen 0-10 Units  0-10 Units Subcutaneous QID (AC + HS) PRN Manuel Dill DNP, FNP   4 Units at 11/04/24 1210    insulin aspart U-100 pen 6 Units  6 Units Subcutaneous TIDWM Manuel Dill DNP, FNP        insulin glargine U-100 (Lantus) pen 20 Units  20 Units Subcutaneous Daily Manuel Dill DNP, FNP   20 Units at 11/05/24 0840    iohexoL (OMNIPAQUE 350) injection 100 mL  100 mL Intravenous ONCE PRN Leny Busby DO        lisinopriL tablet 10 mg  10 mg Oral Daily Betina Torres PA-C   10 mg at 11/04/24 0835    melatonin tablet 6 mg  6 mg Oral Nightly PRN Betina Torres PA-C   6 mg at 11/04/24 2209    naloxone 0.4 mg/mL injection 0.02 mg  0.02 mg Intravenous PRN Betina Torres PA-C        ondansetron disintegrating tablet 8 mg  8 mg Oral Q8H PRN Betina Torres PA-C   8 mg at 11/04/24 1410    pantoprazole injection 40 mg  40 mg Intravenous BID Annel Gold PA-JAYME   40 mg at 11/05/24 0900    polyethylene glycol packet 17 g  17 g Oral BID PRN Betina Torres PA-C   17 g at 11/02/24 2152    pravastatin tablet 10 mg  10 mg Oral Daily Betina Torres PA-C   10 mg at 11/04/24 0835    prochlorperazine injection Soln 5 mg  5 mg Intravenous Q6H PRN Beitna Torres PA-C        simethicone chewable tablet 80 mg  1 tablet Oral QID PRN Betina Torres PA-C        sodium chloride 0.9% flush 5 mL  5 mL Intravenous  Betina Raymond PA-C        sucralfate 100 mg/mL suspension 1 g  1 g Oral Q6H Annel Gold PA-C   1 g at 11/04/24 2209          History:     Active Hospital Problems    Diagnosis  POA    *Intractable nausea and vomiting [R11.2]  Yes    Lactic acidosis due to diabetes mellitus [E11.10]  Yes    Abdominal pain [R10.9]  Yes    Mixed hyperlipidemia [E78.2]  Yes    BETH (acute kidney injury) [N17.9]  Yes    Cannabinoid hyperemesis syndrome [R11.2, F12.90]  Yes    Type 1 diabetes mellitus with complications [E10.8]  Yes    Primary hypertension [I10]  Yes      Resolved Hospital Problems   No resolved problems to display.     Surgical History:    has no past surgical history on file.   Social History:    has no history on file for sexual activity.  reports that he has quit smoking. He has never used smokeless tobacco. He reports that he does not currently use alcohol. He reports that he does not use drugs.    Vitals:    11/04/24 2300 11/05/24 0409 11/05/24 0719 11/05/24 0902   BP:  121/69 117/74 133/84   BP Location:   Left arm    Patient Position:  Lying Lying Lying   Pulse:  69 77 68   Resp:  18 18 16   Temp:  36.8 °C (98.2 °F) 36.9 °C (98.4 °F) 36.6 °C (97.9 °F)   TempSrc:  Oral Oral Temporal   SpO2: 98% 98% 99% 99%   Weight:       Height:         Vital Signs Range (Last 24H):  Temp:  [36.6 °C (97.9 °F)-37.6 °C (99.6 °F)]   Pulse:  [68-91]   Resp:  [16-20]   BP: (117-133)/(58-84)   SpO2:  [98 %-99 %]     Body mass index is 26.19 kg/m².  Wt Readings from Last 4 Encounters:   11/02/24 92.5 kg (204 lb)   04/01/24 92.9 kg (204 lb 11.2 oz)   11/20/23 92 kg (202 lb 13.2 oz)   11/10/23 89.5 kg (197 lb 5 oz)        Intake/Output - Last 3 Shifts         11/03 0700 11/04 0659 11/04 0700 11/05 0659 11/05 0700 11/06 0659    P.O. 200      I.V. (mL/kg)       IV Piggyback       Total Intake(mL/kg) 200 (2.2)      Urine (mL/kg/hr)       Emesis/NG output       Total Output       Net +200             Urine Occurrence 2 x 3 x      Stool Occurrence  0 x           Lab Results   Component Value Date    WBC 4.04 11/05/2024    HGB 15.2 11/05/2024    HCT 42.7 11/05/2024     (L) 11/05/2024     11/05/2024    K 4.5 11/05/2024     11/05/2024    CREATININE 1.5 (H) 11/05/2024    BUN 16 11/05/2024    CO2 21 (L) 11/05/2024     (H) 11/05/2024    CALCIUM 9.4 11/05/2024    MG 1.9 11/05/2024    PHOS 2.9 04/02/2024    ALKPHOS 82 11/02/2024    ALT 41 11/02/2024    AST 30 11/02/2024    ALBUMIN 3.8 11/02/2024    INR 1.0 11/10/2023    HGBA1C 11.8 (H) 11/03/2024    LACTATE 1.7 11/02/2024    TROPONINI <0.006 03/31/2024    BNP <10 03/31/2024     Recent Results (from the past 12 hours)   POCT glucose    Collection Time: 11/04/24  9:18 PM   Result Value Ref Range    POCT Glucose 68 (L) 70 - 110 mg/dL   POCT glucose    Collection Time: 11/04/24 10:08 PM   Result Value Ref Range    POCT Glucose 123 (H) 70 - 110 mg/dL   Basic Metabolic Panel (BMP)    Collection Time: 11/05/24  4:52 AM   Result Value Ref Range    Sodium 141 136 - 145 mmol/L    Potassium 4.5 3.5 - 5.1 mmol/L    Chloride 108 95 - 110 mmol/L    CO2 21 (L) 23 - 29 mmol/L    Glucose 150 (H) 70 - 110 mg/dL    BUN 16 6 - 20 mg/dL    Creatinine 1.5 (H) 0.5 - 1.4 mg/dL    Calcium 9.4 8.7 - 10.5 mg/dL    Anion Gap 12 8 - 16 mmol/L    eGFR >60.0 >60 mL/min/1.73 m^2   Magnesium    Collection Time: 11/05/24  4:52 AM   Result Value Ref Range    Magnesium 1.9 1.6 - 2.6 mg/dL   CBC Without Differential    Collection Time: 11/05/24  4:52 AM   Result Value Ref Range    WBC 4.04 3.90 - 12.70 K/uL    RBC 4.92 4.60 - 6.20 M/uL    Hemoglobin 15.2 14.0 - 18.0 g/dL    Hematocrit 42.7 40.0 - 54.0 %    MCV 87 82 - 98 fL    MCH 30.9 27.0 - 31.0 pg    MCHC 35.6 32.0 - 36.0 g/dL    RDW 12.8 11.5 - 14.5 %    Platelets 143 (L) 150 - 450 K/uL    MPV 12.1 9.2 - 12.9 fL   POCT glucose    Collection Time: 11/05/24  7:20 AM   Result Value Ref Range    POCT Glucose 199 (H) 70 - 110 mg/dL     Recent Labs   Lab  11/02/24  1628 11/03/24  1231 11/04/24  0356 11/04/24  0912 11/05/24  0452   WBC  --  4.46 3.41*  --  4.04   HGB  --  14.9 14.5  --  15.2   HCT  --  42.8 43.8  --  42.7   PLT  --  169 111*  --  143*   NA  --  145  --  141 141   K  --  3.8  --  5.0 4.5   CREATININE  --  1.4  --  1.5* 1.5*   GLU  --  127*  --  329* 150*   LACTATE 1.7  --   --   --   --      No LMP for male patient.    EKG:   Results for orders placed or performed during the hospital encounter of 11/02/24   EKG 12-lead    Collection Time: 11/02/24  5:37 AM   Result Value Ref Range    QRS Duration 106 ms    OHS QTC Calculation 380 ms    Narrative    Test Reason : R10.9,    Vent. Rate : 076 BPM     Atrial Rate : 076 BPM     P-R Int : 132 ms          QRS Dur : 106 ms      QT Int : 338 ms       P-R-T Axes : 082 092 068 degrees     QTc Int : 380 ms    Normal sinus rhythm  Rightward axis  Incomplete right bundle branch block  Borderline Abnormal ECG  When compared with ECG of 31-MAR-2024 18:29,  No significant change was found  Confirmed by Boni MALLOY MD ([id number]) on 11/2/2024 10:49:24 AM    Referred By: [email or web address]   SELF           Confirmed By:Boni MALLOY MD           Pre-op Assessment    I have reviewed the Patient Summary Reports.    I have reviewed the NPO Status.   I have reviewed the Medications.     Review of Systems  Anesthesia Hx:   Denies Hx of Anesthetic complications              Denies Personal Hx of Anesthesia complications.                    Cardiovascular:  Exercise tolerance: good   Hypertension, poorly controlled                                          Pulmonary:    Denies COPD.  Denies Asthma.   Denies Shortness of breath.                  Renal/:  Chronic Renal Disease                Hepatic/GI:      Liver Disease,               Neurological:    Neuromuscular Disease,                                   Endocrine:  Diabetes           Psych:  Psychiatric History                  Physical Exam  General: Cooperative, Alert and  Oriented    Airway:  Mallampati: III / II  Mouth Opening: Normal  TM Distance: Normal  Tongue: Normal  Neck ROM: Normal ROM    Dental:  Intact        Anesthesia Plan  Type of Anesthesia, risks & benefits discussed:    Anesthesia Type: Gen ETT, Gen Natural Airway  Intra-op Monitoring Plan: Standard ASA Monitors  Post Op Pain Control Plan: multimodal analgesia  Informed Consent: Informed consent signed with the Patient and all parties understand the risks and agree with anesthesia plan.  All questions answered.   ASA Score: 3  Day of Surgery Review of History & Physical: H&P Update referred to the surgeon/provider.    Ready For Surgery From Anesthesia Perspective.     .

## 2024-11-05 NOTE — NURSING
IV x 2 removed.   Pt given discharge instruction/teaching.  Opportunity given to ask questions.   All questions that were asked have been answered. Understanding has been verbalized.   Discharge prescriptions have been delivered from Pharmacy per bedside delivery.   Pt informed to keep all follow up appts.   No complaints upon discharge.   Hospital pt escort has been declined.

## 2024-11-05 NOTE — SUBJECTIVE & OBJECTIVE
Interval History: NAEON. HDS. EGD today showing erythematous duodenopathy.     Review of Systems   Constitutional:  Positive for appetite change. Negative for chills and fever.   Respiratory:  Negative for chest tightness and shortness of breath.    Cardiovascular:  Negative for chest pain and leg swelling.   Gastrointestinal:  Positive for abdominal pain. Negative for abdominal distention, constipation, diarrhea and vomiting.   Neurological:  Negative for dizziness and weakness.     Objective:     Vital Signs (Most Recent):  Temp: 98.1 °F (36.7 °C) (11/05/24 0951)  Pulse: 60 (11/05/24 1015)  Resp: 10 (11/05/24 1015)  BP: 136/67 (11/05/24 1000)  SpO2: 100 % (11/05/24 1015) Vital Signs (24h Range):  Temp:  [97.9 °F (36.6 °C)-99.6 °F (37.6 °C)] 98.1 °F (36.7 °C)  Pulse:  [60-91] 60  Resp:  [10-20] 10  SpO2:  [98 %-100 %] 100 %  BP: (104-136)/(58-84) 136/67     Weight: 92.5 kg (204 lb)  Body mass index is 26.19 kg/m².    Intake/Output Summary (Last 24 hours) at 11/5/2024 1046  Last data filed at 11/5/2024 0949  Gross per 24 hour   Intake 250 ml   Output --   Net 250 ml         Physical Exam  Vitals and nursing note reviewed.   Constitutional:       Appearance: He is well-developed.      Comments: Appears uncomfortable 2/2 pain   Eyes:      Pupils: Pupils are equal, round, and reactive to light.   Cardiovascular:      Rate and Rhythm: Normal rate and regular rhythm.   Pulmonary:      Effort: Pulmonary effort is normal.      Breath sounds: Normal breath sounds.   Abdominal:      Palpations: Abdomen is soft.      Tenderness: There is abdominal tenderness (epigastric/LUQ).   Musculoskeletal:         General: No tenderness.   Skin:     General: Skin is warm and dry.   Neurological:      Mental Status: He is alert and oriented to person, place, and time.   Psychiatric:         Behavior: Behavior normal.             Significant Labs: All pertinent labs within the past 24 hours have been reviewed.  BMP:   Recent Labs   Lab  11/05/24  0452   *      K 4.5      CO2 21*   BUN 16   CREATININE 1.5*   CALCIUM 9.4   MG 1.9     CBC:   Recent Labs   Lab 11/03/24  1231 11/04/24  0356 11/05/24  0452   WBC 4.46 3.41* 4.04   HGB 14.9 14.5 15.2   HCT 42.8 43.8 42.7    111* 143*       Significant Imaging: I have reviewed all pertinent imaging results/findings within the past 24 hours.

## 2024-11-05 NOTE — PLAN OF CARE
[facility]  Discharge Final Note    Primary Care Provider: Viktoriya Jaeger NP    Expected Discharge Date: 11/5/2024    Patient discharged to home via personal transportation.     Patient's bedside nurse and patient notified of the above.    Discharge Plan A and Plan B have been determined by review of patient's clinical status, future medical and therapeutic needs, and coverage/benefits for post-acute care in coordination with multidisciplinary team members.        Final Discharge Note (most recent)       Final Note - 11/05/24 1636          Final Note    Assessment Type Final Discharge Note (P)      Anticipated Discharge Disposition Home or Self Care (P)         Post-Acute Status    Post-Acute Authorization Other (P)      Other Status No Post-Acute Service Needs (P)                      Important Message from Medicare             Contact Info       [facility] 4th Fl   Specialty: Gastroenterology    [address]  [place] [address]   Phone: [phone]       Next Steps: Follow up    Instructions: Message sent for nurse to call and schedule follow up appointment; however, if you do not hear from someone within 48 hours contact the number listed.    [facility] ENDO[facility]   Specialty: Endocrinology    [address] [facility] [address]   Phone: [phone]       Next Steps: Follow up    Instructions: Message sent for nurse to call and schedule follow up appointment; however, if you do not hear from someone within 48 hours contact the number listed.            No future appointments.    SW scheduled post-discharge follow-up appointment and information added to AVS.       Halley Estrada LMSW  [facility]  Ext. [phone]

## 2024-11-05 NOTE — PLAN OF CARE
CHW was unable to schedule pt appt,message was sent to pcp scheduling team to contact pt for a follow up appt.

## 2024-11-05 NOTE — NURSING
Patient rested quietly during the night and has been NPO since midnight. No acute distress noted. Call light in reach

## 2024-11-05 NOTE — SUBJECTIVE & OBJECTIVE
"Interval HPI:   No acute events overnight. Patient in room [facility] ENDO POOL ROOM 2ND *. Blood glucose stable. BG at and below goal on current insulin regimen (SSI, prandial, and basal insulin ). Steroid use- None.   Day of Surgery  Renal function-   Lab Results   Component Value Date    CREATININE 1.5 (H) 11/05/2024        Vasopressors-  None     Diet NPO     Eating:   NPO  Nausea: No  Hypoglycemia and intervention: Yes, BG below goal yesterday afternoon- novolog reduced. Pt refused treatment per nursing note. Resolved.   Fever: No  TPN and/or TF: No    /67   Pulse 60   Temp 98.1 °F (36.7 °C) (Temporal)   Resp 10   Ht 6' 2" (1.88 m)   Wt 92.5 kg (204 lb)   SpO2 100%   BMI 26.19 kg/m²     Labs Reviewed and Include    Recent Labs   Lab 11/05/24  0452   *   CALCIUM 9.4      K 4.5   CO2 21*      BUN 16   CREATININE 1.5*     Lab Results   Component Value Date    WBC 4.04 11/05/2024    HGB 15.2 11/05/2024    HCT 42.7 11/05/2024    MCV 87 11/05/2024     (L) 11/05/2024     No results for input(s): "TSH", "FREET4" in the last 168 hours.  Lab Results   Component Value Date    HGBA1C 11.8 (H) 11/03/2024       Nutritional status:   Body mass index is 26.19 kg/m².  Lab Results   Component Value Date    ALBUMIN 3.8 11/02/2024    ALBUMIN 3.0 (L) 04/02/2024    ALBUMIN 3.9 04/01/2024     No results found for: "PREALBUMIN"    Estimated Creatinine Clearance: 78.4 mL/min (A) (based on SCr of 1.5 mg/dL (H)).    Accu-Checks  Recent Labs     11/03/24  1151 11/03/24  1602 11/03/24  2021 11/04/24  0858 11/04/24  1150 11/04/24  1520 11/04/24  1547 11/04/24  2118 11/04/24  2208 11/05/24  0720   POCTGLUCOSE 162* 80 119* 314* 217* 70 69* 68* 123* 199*       Current Medications and/or Treatments Impacting Glycemic Control  Immunotherapy:    Immunosuppressants       None          Steroids:   Hormones (From admission, onward)      Start     Stop Route Frequency Ordered    11/02/24 1032  melatonin tablet 6 mg    "      -- Oral Nightly PRN 11/02/24 0934          Pressors:    Autonomic Drugs (From admission, onward)      None          Hyperglycemia/Diabetes Medications:   Antihyperglycemics (From admission, onward)      Start     Stop Route Frequency Ordered    11/04/24 1645  insulin aspart U-100 pen 6 Units         -- SubQ 3 times daily with meals 11/04/24 1612    11/02/24 1100  insulin glargine U-100 (Lantus) pen 20 Units         -- SubQ Daily 11/02/24 0952    11/02/24 1052  insulin aspart U-100 pen 0-10 Units         -- SubQ Before meals & nightly PRN 11/02/24 0952

## 2024-11-05 NOTE — ASSESSMENT & PLAN NOTE
- given 1L IVFs in ED, giving additional 1L  - bariatric CLD, ADAT  - better BG management, see below  - bowel regimen -- had 2 BM with lactulose  - start scheduled maalox, carafate and BID IV ppi  - prn morphine for severe pain   - H pylori pending   - repeat CT A/P without acute abdominal abnormalities  - prn antiemetics   - GI consulted   - EGD today with erythematous duodenopathy. Biopsies obtained

## 2024-11-05 NOTE — TRANSFER OF CARE
"Anesthesia Transfer of Care Note    Patient: Andrzej Sinclair    Procedure(s) Performed: Procedure(s) (LRB):  EGD (ESOPHAGOGASTRODUODENOSCOPY) (N/A)    Patient location: PACU    Anesthesia Type: general    Transport from OR: Transported from OR on 2-3 L/min O2 by NC with adequate spontaneous ventilation    Post pain: adequate analgesia    Post assessment: no apparent anesthetic complications and tolerated procedure well    Post vital signs: stable    Level of consciousness: sedated    Nausea/Vomiting: no nausea/vomiting    Complications: none    Transfer of care protocol was followed      Last vitals: Visit Vitals  /64   Pulse 80   Temp 36.7 °C (98.1 °F) (Temporal)   Resp 11   Ht 6' 2" (1.88 m)   Wt 92.5 kg (204 lb)   SpO2 100%   BMI 26.19 kg/m²     "

## 2024-11-05 NOTE — ANESTHESIA POSTPROCEDURE EVALUATION
Anesthesia Post Evaluation    Patient: Andrzej Sinclair    Procedure(s) Performed: Procedure(s) (LRB):  EGD (ESOPHAGOGASTRODUODENOSCOPY) (N/A)    Final Anesthesia Type: general      Patient location during evaluation: PACU  Patient participation: Yes- Able to Participate  Level of consciousness: awake and alert and oriented  Post-procedure vital signs: reviewed and stable  Pain management: adequate  Airway patency: patent    PONV status at discharge: No PONV  Anesthetic complications: no      Cardiovascular status: blood pressure returned to baseline  Respiratory status: unassisted and spontaneous ventilation  Hydration status: euvolemic  Follow-up not needed.              Vitals Value Taken Time   /78 11/05/24 1022   Temp 36.7 °C (98.1 °F) 11/05/24 0951   Pulse 63 11/05/24 1026   Resp 17 11/05/24 1026   SpO2 100 % 11/05/24 1026   Vitals shown include unfiled device data.      Event Time   Out of Recovery 10:32:00         Pain/Uma Score: Uma Score: 10 (11/5/2024 10:00 AM)

## 2024-11-05 NOTE — H&P
Short Stay Endoscopy History and Physical    PCP - Viktoriya Jaeger NP     Procedure - EGD  ASA - per anesthesia  Mallampati - per anesthesia  History of Anesthesia problems - no  Family history Anesthesia problems -  no   Plan of anesthesia - General    HPI:  This is a 38 y.o. male here for evaluation of : abdominal pain      ROS:  Constitutional: No fevers, chills, No weight loss  CV: No chest pain  Pulm: No cough, No shortness of breath  Ophtho: No vision changes  GI: see HPI  Derm: No rash    Medical History:  has a past medical history of Diabetes mellitus and Diabetes mellitus type 1.    Surgical History:  has no past surgical history on file.    Family History: family history includes Diabetes in his mother; Other in his father and mother.. Otherwise no colon cancer, inflammatory bowel disease, or GI malignancies.    Social History:  reports that he has quit smoking. He has never used smokeless tobacco. He reports that he does not currently use alcohol. He reports that he does not use drugs.    Review of patient's allergies indicates:   Allergen Reactions    Lactose Other (See Comments)     Gas and moderate to sever abdominal pain       Medications:   Medications Prior to Admission   Medication Sig Dispense Refill Last Dose/Taking    blood sugar diagnostic Strp 3-4 times a day 200 strip 4     blood-glucose meter kit Use as instructed 1 each 0     blood-glucose sensor (DEXCOM G6 SENSOR) Martha 1 each by Misc.(Non-Drug; Combo Route) route every 10 days. 3 each 3     blood-glucose sensor (DEXCOM G7 SENSOR) Martha Apply one sensor to the skin every 10 days 3 each 11     blood-glucose transmitter (DEXCOM G6 TRANSMITTER) Martha Use as instructed. 1 each 1     dicyclomine (BENTYL) 10 MG capsule TAKE 1 CAPSULE BY MOUTH AS NEEDED 15 capsule 0     insulin degludec (TRESIBA FLEXTOUCH U-100) 100 unit/mL (3 mL) insulin pen Inject 22 Units into the skin once daily. 19.8 mL 1     insulin lispro-aabc (LYUMJEV KWIKPEN U-100  "INSULIN) 100 unit/mL pen Inject 8 Units into the skin 3 (three) times daily with meals. Take additional sliding scale based on the blood sugars.  Maximum daily dose of 36 units. 4 pen 4     lancets 31 gauge Misc 1 lancet  by Misc.(Non-Drug; Combo Route) route 2 (two) times daily. 200 each 4     lisinopriL 10 MG tablet Take 1 tablet (10 mg total) by mouth once daily. 90 tablet 3     pen needle, diabetic (BD ULTRA-FINE ROVERTO PEN NEEDLE) 32 gauge x 5/32" Ndle USE AS DIRECTED WITH INSULIN 100 each 0     pen needle, diabetic 29 gauge x 1/2" Ndle Use to inject insulin into the skin. 100 each 0     pravastatin (PRAVACHOL) 10 MG tablet Take 5 mg once a day [half a pill daily] 30 tablet 11     TRUEPLUS INSULIN 0.5 mL 30 gauge x 5/16" Syrg USE FOUR TIMES DAILY AS DIRECTED 500 each 3     TRUEPLUS INSULIN 0.5 mL 31 gauge x 5/16" Syrg USE FOUR TIMES DAILY AS DIRECTED 200 each 11        Physical Exam:    Vital Signs:   Vitals:    11/05/24 0719   BP: 117/74   Pulse: 77   Resp: 18   Temp: 98.4 °F (36.9 °C)       Labs:  Lab Results   Component Value Date    WBC 4.04 11/05/2024    HGB 15.2 11/05/2024    HCT 42.7 11/05/2024     (L) 11/05/2024    CHOL 274 (H) 04/10/2023    TRIG 106 04/10/2023    HDL 80 (H) 04/10/2023    ALT 41 11/02/2024    AST 30 11/02/2024     11/05/2024    K 4.5 11/05/2024     11/05/2024    CREATININE 1.5 (H) 11/05/2024    BUN 16 11/05/2024    CO2 21 (L) 11/05/2024    TSH 1.043 09/13/2023    PSA 0.15 03/05/2021    INR 1.0 11/10/2023    HGBA1C 11.8 (H) 11/03/2024       I have explained the risks and benefits of endoscopy procedures to the patient including but not limited to bleeding, perforation, infection, and death.  The patient was asked if they understand and allowed to ask any further questions to their satisfaction.      Jose Maria Cobb MD    "

## 2024-11-05 NOTE — NURSING TRANSFER
Nursing Transfer Note      11/5/2024   10:32 AM      Reason patient is being transferred: post procedure     Transfer To: 724    Transfer via stretcher    Transfer with n/a    Transported by transport     Patient belongings transferred with patient:  in room     Chart send with patient: Yes    Notified: n/a     Patient reassessed at: 11/5/2024 1015

## 2024-11-05 NOTE — ASSESSMENT & PLAN NOTE
Patient's FSGs are not controlled on current hypoglycemics.   Last A1c reviewed-   Lab Results   Component Value Date    HGBA1C 11.8 (H) 11/03/2024     Most recent fingerstick glucose reviewed-   Recent Labs   Lab 11/04/24  1547 11/04/24  2118 11/04/24  2208 11/05/24  0720   POCTGLUCOSE 69* 68* 123* 199*       Current correctional scale  Medium  Maintain anti-hyperglycemic dose as follows-   Antihyperglycemics (From admission, onward)    Start     Stop Route Frequency Ordered    11/04/24 1645  insulin aspart U-100 pen 6 Units         -- SubQ 3 times daily with meals 11/04/24 1612    11/02/24 1100  insulin glargine U-100 (Lantus) pen 20 Units         -- SubQ Daily 11/02/24 0952    11/02/24 1052  insulin aspart U-100 pen 0-10 Units         -- SubQ Before meals & nightly PRN 11/02/24 0952         - Endocrine following and managing insulin regimen

## 2024-11-05 NOTE — PROGRESS NOTES
[facility]  Endocrinology  Progress Note    Admit Date: 11/2/2024       Reason for Consult: Management of T1DM, Hyperglycemia      Diabetes diagnosis year: 19     Home Diabetes Medications: Leunjev 8 units + LDS SSI Tresiba 22 units h.s.     Sees NP Scobel- PATIENT IS CURRENTLY NOT USING OMNIPOD DUE TO BEING UNTRAINED ON THE DEVICE  Omnipod 5  Isf 50   Insulin to carb ratio 1:15  Basal 0.72     How often checking glucose at home? Dexcom   BG readings on regimen: 250's  Hypoglycemia on the regimen?  No  Missed doses on regimen?  No     Diabetes Complications include:     Hyperglycemia, Hypoglycemia , and Diabetic cataract , Frequent DKA     Complicating diabetes co morbidities:   CKD        HPI:38-year-old male with past medical history of type 1 diabetes on insulin that presents to emergency department with nausea and vomiting. Labs not concerning for DKA at this time. Endocrine consulted to manage hyperglycemia and type 1 diabetes in the inpatient setting.     Hemoglobin A1C   Date Value Ref Range Status   03/31/2024 10.3 (H) 4.0 - 5.6 % Final     Comment:     ADA Screening Guidelines:  5.7-6.4%  Consistent with prediabetes  >or=6.5%  Consistent with diabetes    High levels of fetal hemoglobin interfere with the HbA1C  assay. Heterozygous hemoglobin variants (HbS, HgC, etc)do  not significantly interfere with this assay.   However, presence of multiple variants may affect accuracy.     09/13/2023 12.8 (H) 4.0 - 5.6 % Final     Comment:     ADA Screening Guidelines:  5.7-6.4%  Consistent with prediabetes  >or=6.5%  Consistent with diabetes    High levels of fetal hemoglobin interfere with the HbA1C  assay. Heterozygous hemoglobin variants (HbS, HgC, etc)do  not significantly interfere with this assay.   However, presence of multiple variants may affect accuracy.     04/10/2023 8.5 (H) 4.0 - 5.6 % Final     Comment:     ADA Screening Guidelines:  5.7-6.4%  Consistent with prediabetes  >or=6.5%   "Consistent with diabetes    High levels of fetal hemoglobin interfere with the HbA1C  assay. Heterozygous hemoglobin variants (HbS, HgC, etc)do  not significantly interfere with this assay.   However, presence of multiple variants may affect accuracy.               Interval HPI:   No acute events overnight. Patient in room [facility] ENDO POOL ROOM 2ND *. Blood glucose stable. BG at and below goal on current insulin regimen (SSI, prandial, and basal insulin ). Steroid use- None.   Day of Surgery  Renal function-   Lab Results   Component Value Date    CREATININE 1.5 (H) 11/05/2024        Vasopressors-  None     Diet NPO     Eating:   NPO  Nausea: No  Hypoglycemia and intervention: Yes, BG below goal yesterday afternoon- novolog reduced. Pt refused treatment per nursing note. Resolved.   Fever: No  TPN and/or TF: No    /67   Pulse 60   Temp 98.1 °F (36.7 °C) (Temporal)   Resp 10   Ht 6' 2" (1.88 m)   Wt 92.5 kg (204 lb)   SpO2 100%   BMI 26.19 kg/m²     Labs Reviewed and Include    Recent Labs   Lab 11/05/24  0452   *   CALCIUM 9.4      K 4.5   CO2 21*      BUN 16   CREATININE 1.5*     Lab Results   Component Value Date    WBC 4.04 11/05/2024    HGB 15.2 11/05/2024    HCT 42.7 11/05/2024    MCV 87 11/05/2024     (L) 11/05/2024     No results for input(s): "TSH", "FREET4" in the last 168 hours.  Lab Results   Component Value Date    HGBA1C 11.8 (H) 11/03/2024       Nutritional status:   Body mass index is 26.19 kg/m².  Lab Results   Component Value Date    ALBUMIN 3.8 11/02/2024    ALBUMIN 3.0 (L) 04/02/2024    ALBUMIN 3.9 04/01/2024     No results found for: "PREALBUMIN"    Estimated Creatinine Clearance: 78.4 mL/min (A) (based on SCr of 1.5 mg/dL (H)).    Accu-Checks  Recent Labs     11/03/24  1151 11/03/24  1602 11/03/24  2021 11/04/24  0858 11/04/24  1150 11/04/24  1520 11/04/24  1547 11/04/24  2118 11/04/24 2208 11/05/24  0720   POCTGLUCOSE 162* 80 119* 314* 217* 70 69* 68* 123* " 199*       Current Medications and/or Treatments Impacting Glycemic Control  Immunotherapy:    Immunosuppressants       None          Steroids:   Hormones (From admission, onward)      Start     Stop Route Frequency Ordered    11/02/24 1032  melatonin tablet 6 mg         -- Oral Nightly PRN 11/02/24 0934          Pressors:    Autonomic Drugs (From admission, onward)      None          Hyperglycemia/Diabetes Medications:   Antihyperglycemics (From admission, onward)      Start     Stop Route Frequency Ordered    11/04/24 1645  insulin aspart U-100 pen 6 Units         -- SubQ 3 times daily with meals 11/04/24 1612    11/02/24 1100  insulin glargine U-100 (Lantus) pen 20 Units         -- SubQ Daily 11/02/24 0952    11/02/24 1052  insulin aspart U-100 pen 0-10 Units         -- SubQ Before meals & nightly PRN 11/02/24 0952            ASSESSMENT and PLAN    Endocrine  Type 1 diabetes mellitus with complications  Endocrinology consulted for BG management.   BG goal 140-180    - Lantus (Insulin Glargine) 20 units daily  - Novolog (Insulin Aspart) 6 units TIDWM and prn for BG excursions [facility] SSI (150/25)  - BG checks q4hr  - Hypoglycemia protocol in place    ** Please notify Endocrine for any change and/or advance in diet**  ** Please call Endocrine for any BG related issues **    Discharge Planning:   - Can d/c on previous MDI regimen  - Requested appointment in the insulin pump clinic with Dr. Maradiaga and to establish care.   - Send logs in 3 days for review and bring them to the appointment with Dr. Maradiaga.         GI  * Intractable nausea and vomiting  Managed per primary team  Avoid hypoglycemia            Sharee Olson PA-C  Endocrinology  [facility]

## 2024-11-05 NOTE — PHYSICIAN QUERY
Due to the conflicting clinical picture, please clinically validate the diagnosis of BETH.   If validated, please provide additional clinical support for the diagnosis:     BETH is confirmed.  Please specify clinical criteria to support the diagnosis of BETH (specify):    As clearly documented patient had volume loss due to vomiting. Cr was above baseline and responsive to IVF with Cr 1.8 > 1.5.

## 2024-11-06 ENCOUNTER — TELEPHONE (OUTPATIENT)
Dept: ENDOCRINOLOGY | Facility: CLINIC | Age: 39
End: 2024-11-06
Payer: MEDICARE

## 2024-11-06 NOTE — TELEPHONE ENCOUNTER
Message  Received: Today  Jose Maria Cobb MD Totaro, Raeann, MA  Caller: Unspecified (Yesterday,  6:22 PM)  No he does not. We'll update him on the biopsy results when they return. Thanks Daisha

## 2024-11-06 NOTE — TELEPHONE ENCOUNTER
----- Message from Uzma sent at 11/5/2024  4:25 PM CST -----  Regarding: Scheduling Request        Name Of Caller:      Uzma stone/ [facility] Case Management      Contact Preference:   Pt's Cell #: [phone]       Nature of call:   Calling to schedule the pt for a Np appt from referral. I was unable to schedule it in [facility]. Please contact the pt at the number above.

## 2024-11-06 NOTE — PROVATION PATIENT INSTRUCTIONS
Discharge Summary/Instructions after an Endoscopic Procedure  Patient Name: Andrzej Sinclair  Patient MRN: [id number]  Patient YOB: 1985 Tuesday, November 5, 2024  Manny Troy MD  Dear patient,  As a result of recent federal legislation (The Federal Cures Act), you may   receive lab or pathology results from your procedure in your [facility]   account before your physician is able to contact you. Your physician or   their representative will relay the results to you with their   recommendations at their soonest availability.  Thank you,  RESTRICTIONS:  During your procedure today, you received medications for sedation.  These   medications may affect your judgment, balance and coordination.  Therefore,   for 24 hours, you have the following restrictions:   - DO NOT drive a car, operate machinery, make legal/financial decisions,   sign important papers or drink alcohol.    ACTIVITY:  Today: no heavy lifting, straining or running due to procedural   sedation/anesthesia.  The following day: return to full activity including work.  DIET:  Eat and drink normally unless instructed otherwise.     TREATMENT FOR COMMON SIDE EFFECTS:  - Mild abdominal pain, nausea, belching, bloating or excessive gas:  rest,   eat lightly and use a heating pad.  - Sore Throat: treat with throat lozenges and/or gargle with warm salt   water.  - Because air was used during the procedure, expelling large amounts of air   from your rectum or belching is normal.  - If a bowel prep was taken, you may not have a bowel movement for 1-3 days.    This is normal.  SYMPTOMS TO WATCH FOR AND REPORT TO YOUR PHYSICIAN:  1. Abdominal pain or bloating, other than gas cramps.  2. Chest pain.  3. Back pain.  4. Signs of infection such as: chills or fever occurring within 24 hours   after the procedure.  5. Rectal bleeding, which would show as bright red, maroon, or black stools.   (A tablespoon of blood from the rectum is not serious, especially  if   hemorrhoids are present.)  6. Vomiting.  7. Weakness or dizziness.  GO DIRECTLY TO THE NEAREST EMERGENCY ROOM IF YOU HAVE ANY OF THE FOLLOWING:      Difficulty breathing              Chills and/or fever over 101 F   Persistent vomiting and/or vomiting blood   Severe abdominal pain   Severe chest pain   Black, tarry stools   Bleeding- more than one tablespoon   Any other symptom or condition that you feel may need urgent attention  Your doctor recommends these additional instructions:  If any biopsies were taken, your doctors clinic will contact you in 1 to 2   weeks with any results.  - [facility].   - Advance diet as tolerated.   - Continue present medications.   - Await pathology results.  For questions, problems or results please call your physician - Manny Troy MD at Work:  [phone].  [facility], EMERGENCY ROOM PHONE NUMBER: [phone]  IF A COMPLICATION OR EMERGENCY SITUATION ARISES AND YOU ARE UNABLE TO REACH   YOUR PHYSICIAN - GO DIRECTLY TO THE EMERGENCY ROOM.  Manny Troy MD  11/5/2024 10:01:00 AM  This report has been verified and signed electronically.  Dear patient,  As a result of recent federal legislation (The Federal Cures Act), you may   receive lab or pathology results from your procedure in your [facility]   account before your physician is able to contact you. Your physician or   their representative will relay the results to you with their   recommendations at their soonest availability.  Thank you,  PROVATION

## 2024-11-06 NOTE — TELEPHONE ENCOUNTER
Spoke with patient about scheduling an appointment. Patient had a referral but he is already an existing patient of Viktoriya Jaeger. I was explaining to patient how her new scheduling is. Monday and Thursday  virtual ONLY. Tuesday and Wednesday In person 9a-2p and every Friday she is off. He assumed I said do he want an appointment this coming Thursday which I was making sure he wanted a virtual appointment. After that he hangs up the phone in my face. Please Advise.  
no

## 2024-11-06 NOTE — DISCHARGE SUMMARY
[facility] - Observation [address] [place] [address] Medicine  Discharge Summary      Patient Name: Andrzej Sinclair  MRN: [id number]  REKHA: [id number]  Patient Class: IP- Inpatient  Admission Date: 11/2/2024  Hospital Length of Stay: 2 days  Discharge Date and Time: 11/5/2024  3:15 PM  Attending Physician: Aubrie ferrari. providers found   Discharging Provider: Betina Torres PA-C  Primary Care Provider: Viktoriya Jaeger NP  Hospital Medicine Team: [facility] MED E Betina Torres PA-C  Primary Care Team: [facility] MED E    HPI:   Andrzej Sinclair is a 37 yo M with PMHx of T1DM, hx of DKA/HHS, HTN, HLD who presented to ED for intractable nausea and vomiting. Patient endorses severe abdominal pain that started 2 days ago. He has been nauseous with approximately 8 episodes of emesis/day for the past 2 days. He has had difficulty tolerating fluids. States that his BG has been very low to very high during this time as well, but unable to give specific numbers. He also states that he has not had a BM in 3 days. He typically has a BM every other day. He has a hx of marijuana use, but reports last use was 2 weeks ago. Denies fever, chills, chest pain, SOB, dysuria, headaches and LE swelling.    In ED: Afebrile. HDS. No leukocytosis. CMP with Na 135 and BETH with Cr 1.8 (baseline 1.5). BG 400s. A1c 12.0 Beta hydroxybutyrate 0.7. UA noninfectious, but with 4+ glucose and 2+ ketones. Lactic 2.47 >> 3.12. CXR clear. CT A/P only notable for moderate stool burden. Given droperidol, 5 units regular insulin, 1L LR, IV zofran, IV morphine 6 mg and morphine 4 mg. Endocrinology consulted. Per ED providers, patient visibly uncomfortable 2/2 abdominal pain. Placed in observation with HM for abdominal pain and intractable n/v.     Procedure(s) (LRB):  EGD (ESOPHAGOGASTRODUODENOSCOPY) (N/A)      Hospital Course:   Andrzej Sinclair is a 37 yo M placed in admitted to  for severe abdominal pain with n/v. BG not well controlled, but lower than previous admission. A1c  12. Endocrine following and adjusting insulin regimen. Mild BETH on admission, that resolved with IVFs. CT A/P negative for acute process except moderate stool burden. Given lactulose and patient had 2 BM. Tolerating small portions of bariatric CLD. Will ADAT. Repeating CT as he was still having episodes of severe pain. Repeat CT without acute abnormalities noted. Presentation possibly 2/2 gastritis vs. Gastroparesis? H. Pylori pending. Due to continued severe pain without improvement with current medication regimen and little oral intake, GI consulted. GI performed EGD on 11/05 that showed normal esophagus, gastric body and antrum, but erythematous duodenopathy. Biopsies taken.  on avoiding nsaid use. Started ppi. Patient tolerating po fluids. Anxious to discharge so he could vote in presidential election. Referral placed for GI and endocrine follow up.  Plan and medication changes discussed with patient; agreeable to plan. ER precautions were given. All questions were answered to the patient's satisfaction and he was subsequently discharged.      Goals of Care Treatment Preferences:  Code Status: Full Code    Physical Exam  Vitals and nursing note reviewed.   Constitutional:       Appearance: He is well-developed.   Eyes:      Pupils: Pupils are equal, round, and reactive to light.   Cardiovascular:      Rate and Rhythm: Normal rate and regular rhythm.   Pulmonary:      Effort: Pulmonary effort is normal.      Breath sounds: Normal breath sounds.   Abdominal:      Palpations: Abdomen is soft.      Tenderness: There is abdominal tenderness (epigastric/LUQ).   Musculoskeletal:         General: No tenderness.   Skin:     General: Skin is warm and dry.   Neurological:      Mental Status: He is alert and oriented to person, place, and time.   Psychiatric:         Behavior: Behavior normal.      SDOH Screening:  The patient was screened for utility difficulties, food insecurity, transport difficulties, housing  insecurity, and interpersonal safety and there were no concerns identified this admission.     Consults:   Consults (From admission, onward)          Status Ordering Provider     Inpatient consult to Gastroenterology  Once        Provider:  (Not yet assigned)    Completed ADELA MORTENSEN     Inpatient consult to Registered Dietitian/Nutritionist  Once        Provider:  (Not yet assigned)    Completed DAVE LAROSE     Inpatient consult to Endocrinology  Once        Provider:  (Not yet assigned)    Completed IAN GARCIA              Final Active Diagnoses:    Diagnosis Date Noted POA    PRINCIPAL PROBLEM:  Intractable nausea and vomiting [R11.2] 11/02/2024 Yes    Type 1 diabetes mellitus with complications [E10.8] 03/10/2021 Yes    Lactic acidosis due to diabetes mellitus [E11.10] 11/02/2024 Yes    Abdominal pain [R10.9] 04/01/2024 Yes    Mixed hyperlipidemia [E78.2] 11/20/2023 Yes    BETH (acute kidney injury) [N17.9] 11/10/2023 Yes    Cannabinoid hyperemesis syndrome [R11.2, F12.90] 04/22/2021 Yes    Primary hypertension [I10] 03/04/2021 Yes      Problems Resolved During this Admission:       Discharged Condition: fair    Disposition: Home or Self Care    Follow Up:   Follow-up Information       [facility] 4th Fl Follow up.    Specialty: Gastroenterology  Why: Message sent for nurse to call and schedule follow up appointment; however, if you do not hear from someone within 48 hours contact the number listed.  Contact information:  [address]el  [facility] [address] [phone]  Additional information:  [facility] - Main Building, 4th Floor   Please park in [facility] and take Atrium elevator             [facility] ENDOCRINOLOGY Follow up.    Specialty: Endocrinology  Why: Message sent for nurse to call and schedule follow up appointment; however, if you do not hear from someone within 48 hours contact the number listed.  Contact information:  [address]  [place] [address]  [phone]                         Patient Instructions:      Ambulatory referral/consult to Endocrinology   Standing Status: Future   Referral Priority: Routine Referral Type: Consultation   Requested Specialty: Endocrinology   Number of Visits Requested: 1     Ambulatory referral/consult to Gastroenterology   Standing Status: Future   Referral Priority: Routine Referral Type: Consultation   Referral Reason: Specialty Services Required   Requested Specialty: Gastroenterology   Number of Visits Requested: 1     Notify your health care provider if you experience any of the following:  persistent nausea and vomiting or diarrhea     Notify your health care provider if you experience any of the following:  severe uncontrolled pain     Activity as tolerated       Significant Diagnostic Studies: N/A    Pending Diagnostic Studies:       Procedure Component Value Units Date/Time    Specimen to Pathology, Surgery Gastrointestinal tract [[id number]] Collected: 11/05/24 0943    Order Status: Sent Lab Status: In process Updated: 11/05/24 1357    Specimen: Tissue            Medications:  Reconciled Home Medications:      Medication List        START taking these medications      aluminum-magnesium hydroxide-simethicone 200-200-20 mg/5 mL Susp  Commonly known as: MAALOX  Take 30 mLs by mouth 4 (four) times daily as needed (heartburn).     ondansetron 8 MG Tbdl  Commonly known as: ZOFRAN-ODT  Take 1 tablet (8 mg total) by mouth every 8 (eight) hours as needed (nausea/vomiting).     pantoprazole 40 MG tablet  Commonly known as: PROTONIX  Take 1 tablet (40 mg total) by mouth once daily.            CHANGE how you take these medications      dicyclomine 10 MG capsule  Commonly known as: BENTYL  Take 2 capsules (20 mg total) by mouth before meals and at bedtime as needed (abdominal cramping).  What changed:   how much to take  when to take this  reasons to take this            CONTINUE taking these  "medications      * BD ULTRA-FINE ROVERTO PEN NEEDLE 32 gauge x 5/32" Ndle  Generic drug: pen needle, diabetic  USE AS DIRECTED WITH INSULIN     * BD ULTRA-FINE ORIG PEN NEEDLE 29 gauge x 1/2" Ndle  Generic drug: pen needle, diabetic  Use to inject insulin into the skin.     blood sugar diagnostic Strp  3-4 times a day     blood-glucose meter kit  Use as instructed     DEXCOM G6 TRANSMITTER Martha  Generic drug: blood-glucose transmitter  Use as instructed.     * DEXCOM G7 SENSOR Martha  Generic drug: blood-glucose sensor  1 each by Misc.(Non-Drug; Combo Route) route every 10 days.     * DEXCOM G7 SENSOR Martha  Generic drug: blood-glucose sensor  Apply one sensor to the skin every 10 days     insulin degludec 100 unit/mL (3 mL) insulin pen  Commonly known as: TRESIBA FLEXTOUCH U-100  Inject 22 Units into the skin once daily.     lancets 31 gauge Misc  1 lancet  by Misc.(Non-Drug; Combo Route) route 2 (two) times daily.     lisinopriL 10 MG tablet  Take 1 tablet (10 mg total) by mouth once daily.     LYUMJEV KWIKPEN U-100 INSULIN 100 unit/mL pen  Generic drug: insulin lispro-aabc  Inject 8 Units into the skin 3 (three) times daily with meals. Take additional sliding scale based on the blood sugars.  Maximum daily dose of 36 units.     pravastatin 10 MG tablet  Commonly known as: PRAVACHOL  Take 5 mg once a day [half a pill daily]     * TRUEPLUS INSULIN 0.5 mL 31 gauge x 5/16" Syrg  Generic drug: insulin syringe-needle U-100  USE FOUR TIMES DAILY AS DIRECTED     * TRUEPLUS INSULIN 0.5 mL 30 gauge x 5/16" Syrg  Generic drug: insulin syringe-needle U-100  USE FOUR TIMES DAILY AS DIRECTED           * This list has 6 medication(s) that are the same as other medications prescribed for you. Read the directions carefully, and ask your doctor or other care provider to review them with you.                  Indwelling Lines/Drains at time of discharge:   Lines/Drains/Airways       None                   Time spent on the discharge of " patient: 35 minutes         Betina Torres PA-C  Department of Hospital Medicine  [facility] - Observation 11H

## 2024-11-07 LAB
FINAL PATHOLOGIC DIAGNOSIS: NORMAL
GROSS: NORMAL
Lab: NORMAL

## 2024-11-07 NOTE — ASSESSMENT & PLAN NOTE
Sodium 130 at consult time, corrected sodium would be [id number] (Blood glucose of more than 1800 gives correction factor of 2*17)  Risk of hyponatremia to change to hypernatremia quickly, pontine mylenosis > cerebral edema    Recommendations/Plan:  - 4.1 liters documented UOP in the last 24 hours with corrected sodium this morning 166-164 with free water deficit of ~9 liters  - would increase D5W infusion from 250 to 500 mL/hr  -avoid rapidly changing intracerebral osmolalities (no more than 6-8 mEq in 24 hours)   - Q2H BMP and Q4H osmolality every 4 hrs  - consider getting MRI of brain now to see if a rapid increase in his serum osmolality (before admit) already caused ODS.   - frequent neuroclinical assessment  - K replacement if less than 5   Statement Selected

## 2024-11-08 NOTE — PHYSICIAN QUERY
Please document your best medical opinion regarding the etiology of abdominal pain:  Todd all that apply    Erythematous duodenopathy

## 2024-11-14 ENCOUNTER — PATIENT MESSAGE (OUTPATIENT)
Dept: ADMINISTRATIVE | Facility: HOSPITAL | Age: 39
End: 2024-11-14
Payer: MEDICARE

## 2024-11-18 ENCOUNTER — PATIENT MESSAGE (OUTPATIENT)
Dept: ADMINISTRATIVE | Facility: HOSPITAL | Age: 39
End: 2024-11-18
Payer: MEDICARE

## 2024-12-16 ENCOUNTER — HOSPITAL ENCOUNTER (OUTPATIENT)
Facility: HOSPITAL | Age: 39
Discharge: HOME | End: 2024-12-17
Attending: EMERGENCY MEDICINE | Admitting: EMERGENCY MEDICINE
Payer: MEDICARE

## 2024-12-16 DIAGNOSIS — R11.2 NAUSEA AND VOMITING, UNSPECIFIED VOMITING TYPE: Primary | ICD-10-CM

## 2024-12-16 DIAGNOSIS — R10.9 ABDOMINAL PAIN: ICD-10-CM

## 2024-12-16 PROBLEM — E87.5 HYPERKALEMIA: Status: ACTIVE | Noted: 2023-09-12

## 2024-12-16 PROBLEM — A41.9 SEPSIS: Status: ACTIVE | Noted: 2023-09-11

## 2024-12-16 PROBLEM — E10.69 TYPE 1 DIABETES MELLITUS WITH HYPEROSMOLARITY, UNCONTROLLED: Status: ACTIVE | Noted: 2018-04-02

## 2024-12-16 PROBLEM — R11.15 CYCLICAL VOMITING SYNDROME: Status: ACTIVE | Noted: 2018-07-02

## 2024-12-16 PROBLEM — E87.0 TYPE 1 DIABETES MELLITUS WITH HYPEROSMOLARITY, UNCONTROLLED: Status: ACTIVE | Noted: 2018-04-02

## 2024-12-16 PROBLEM — E10.65 TYPE 1 DIABETES MELLITUS WITH HYPEROSMOLARITY, UNCONTROLLED: Status: ACTIVE | Noted: 2018-04-02

## 2024-12-16 LAB
ALBUMIN SERPL BCP-MCNC: 4.2 G/DL (ref 3.5–5.2)
ALLENS TEST: ABNORMAL
ALP SERPL-CCNC: 87 U/L (ref 40–150)
ALT SERPL W/O P-5'-P-CCNC: 26 U/L (ref 10–44)
ANION GAP SERPL CALC-SCNC: 13 MMOL/L (ref 8–16)
AST SERPL-CCNC: 29 U/L (ref 10–40)
B-OH-BUTYR BLD STRIP-SCNC: 1.4 MMOL/L (ref 0–0.5)
BACTERIA #/AREA URNS AUTO: ABNORMAL /HPF
BASOPHILS # BLD AUTO: 0.02 K/UL (ref 0–0.2)
BASOPHILS NFR BLD: 0.2 % (ref 0–1.9)
BILIRUB SERPL-MCNC: 0.6 MG/DL (ref 0.1–1)
BILIRUB UR QL STRIP: NEGATIVE
BUN SERPL-MCNC: 14 MG/DL (ref 6–30)
BUN SERPL-MCNC: 15 MG/DL (ref 6–20)
BUN SERPL-MCNC: 18 MG/DL (ref 6–30)
CALCIUM SERPL-MCNC: 9.5 MG/DL (ref 8.7–10.5)
CHLORIDE SERPL-SCNC: 101 MMOL/L (ref 95–110)
CHLORIDE SERPL-SCNC: 102 MMOL/L (ref 95–110)
CHLORIDE SERPL-SCNC: 103 MMOL/L (ref 95–110)
CLARITY UR REFRACT.AUTO: CLEAR
CO2 SERPL-SCNC: 25 MMOL/L (ref 23–29)
COLOR UR AUTO: YELLOW
CREAT SERPL-MCNC: 1 MG/DL (ref 0.5–1.4)
CREAT SERPL-MCNC: 1.1 MG/DL (ref 0.5–1.4)
CREAT SERPL-MCNC: 1.3 MG/DL (ref 0.5–1.4)
DIFFERENTIAL METHOD BLD: ABNORMAL
EOSINOPHIL # BLD AUTO: 0 K/UL (ref 0–0.5)
EOSINOPHIL NFR BLD: 0 % (ref 0–8)
ERYTHROCYTE [DISTWIDTH] IN BLOOD BY AUTOMATED COUNT: 12.8 % (ref 11.5–14.5)
EST. GFR  (NO RACE VARIABLE): >60 ML/MIN/1.73 M^2
GLUCOSE SERPL-MCNC: 266 MG/DL (ref 70–110)
GLUCOSE SERPL-MCNC: 285 MG/DL (ref 70–110)
GLUCOSE SERPL-MCNC: 308 MG/DL (ref 70–110)
GLUCOSE UR QL STRIP: ABNORMAL
HCO3 UR-SCNC: 27.1 MMOL/L (ref 24–28)
HCT VFR BLD AUTO: 38.9 % (ref 40–54)
HCT VFR BLD CALC: 39 %PCV (ref 36–54)
HCT VFR BLD CALC: 41 %PCV (ref 36–54)
HGB BLD-MCNC: 13.6 G/DL (ref 14–18)
HGB UR QL STRIP: ABNORMAL
HYALINE CASTS UR QL AUTO: 0 /LPF
IMM GRANULOCYTES # BLD AUTO: 0.04 K/UL (ref 0–0.04)
IMM GRANULOCYTES NFR BLD AUTO: 0.4 % (ref 0–0.5)
KETONES UR QL STRIP: ABNORMAL
LEUKOCYTE ESTERASE UR QL STRIP: NEGATIVE
LIPASE SERPL-CCNC: 12 U/L (ref 4–60)
LYMPHOCYTES # BLD AUTO: 0.6 K/UL (ref 1–4.8)
LYMPHOCYTES NFR BLD: 7 % (ref 18–48)
MCH RBC QN AUTO: 30.8 PG (ref 27–31)
MCHC RBC AUTO-ENTMCNC: 35 G/DL (ref 32–36)
MCV RBC AUTO: 88 FL (ref 82–98)
MICROSCOPIC COMMENT: ABNORMAL
MONOCYTES # BLD AUTO: 0.4 K/UL (ref 0.3–1)
MONOCYTES NFR BLD: 4.3 % (ref 4–15)
NEUTROPHILS # BLD AUTO: 8 K/UL (ref 1.8–7.7)
NEUTROPHILS NFR BLD: 88.1 % (ref 38–73)
NITRITE UR QL STRIP: NEGATIVE
NRBC BLD-RTO: 0 /100 WBC
PCO2 BLDA: 52 MMHG (ref 35–45)
PH SMN: 7.32 [PH] (ref 7.35–7.45)
PH UR STRIP: 6 [PH] (ref 5–8)
PLATELET # BLD AUTO: 174 K/UL (ref 150–450)
PMV BLD AUTO: 12 FL (ref 9.2–12.9)
PO2 BLDA: 40 MMHG (ref 40–60)
POC BE: 1 MMOL/L
POC IONIZED CALCIUM: 1.02 MMOL/L (ref 1.06–1.42)
POC IONIZED CALCIUM: 1.15 MMOL/L (ref 1.06–1.42)
POC SATURATED O2: 70 % (ref 95–100)
POC TCO2 (MEASURED): 24 MMOL/L (ref 23–29)
POC TCO2 (MEASURED): 27 MMOL/L (ref 23–29)
POC TCO2: 29 MMOL/L (ref 24–29)
POTASSIUM BLD-SCNC: 3.8 MMOL/L (ref 3.5–5.1)
POTASSIUM BLD-SCNC: 6.5 MMOL/L (ref 3.5–5.1)
POTASSIUM SERPL-SCNC: 4.6 MMOL/L (ref 3.5–5.1)
PROT SERPL-MCNC: 7.3 G/DL (ref 6–8.4)
PROT UR QL STRIP: ABNORMAL
RBC # BLD AUTO: 4.42 M/UL (ref 4.6–6.2)
RBC #/AREA URNS AUTO: 8 /HPF (ref 0–4)
SAMPLE: ABNORMAL
SITE: ABNORMAL
SODIUM BLD-SCNC: 135 MMOL/L (ref 136–145)
SODIUM BLD-SCNC: 139 MMOL/L (ref 136–145)
SODIUM SERPL-SCNC: 139 MMOL/L (ref 136–145)
SP GR UR STRIP: 1.02 (ref 1–1.03)
SQUAMOUS #/AREA URNS AUTO: 0 /HPF
URN SPEC COLLECT METH UR: ABNORMAL
WBC # BLD AUTO: 9.05 K/UL (ref 3.9–12.7)
WBC #/AREA URNS AUTO: 1 /HPF (ref 0–5)
YEAST UR QL AUTO: ABNORMAL

## 2024-12-16 PROCEDURE — 80047 BASIC METABLC PNL IONIZED CA: CPT

## 2024-12-16 PROCEDURE — 99285 EMERGENCY DEPT VISIT HI MDM: CPT | Mod: 25

## 2024-12-16 PROCEDURE — 99900035 HC TECH TIME PER 15 MIN (STAT)

## 2024-12-16 PROCEDURE — G0378 HOSPITAL OBSERVATION PER HR: HCPCS

## 2024-12-16 PROCEDURE — 63600175 PHARM REV CODE 636 W HCPCS

## 2024-12-16 PROCEDURE — 82962 GLUCOSE BLOOD TEST: CPT

## 2024-12-16 PROCEDURE — 82010 KETONE BODYS QUAN: CPT

## 2024-12-16 PROCEDURE — 80053 COMPREHEN METABOLIC PANEL: CPT | Performed by: EMERGENCY MEDICINE

## 2024-12-16 PROCEDURE — 96375 TX/PRO/DX INJ NEW DRUG ADDON: CPT

## 2024-12-16 PROCEDURE — 96374 THER/PROPH/DIAG INJ IV PUSH: CPT | Mod: 59

## 2024-12-16 PROCEDURE — 82803 BLOOD GASES ANY COMBINATION: CPT

## 2024-12-16 PROCEDURE — 85025 COMPLETE CBC W/AUTO DIFF WBC: CPT | Performed by: EMERGENCY MEDICINE

## 2024-12-16 PROCEDURE — 96361 HYDRATE IV INFUSION ADD-ON: CPT

## 2024-12-16 PROCEDURE — 81001 URINALYSIS AUTO W/SCOPE: CPT | Performed by: EMERGENCY MEDICINE

## 2024-12-16 PROCEDURE — 93010 ELECTROCARDIOGRAM REPORT: CPT | Mod: ,,, | Performed by: INTERNAL MEDICINE

## 2024-12-16 PROCEDURE — 93005 ELECTROCARDIOGRAM TRACING: CPT

## 2024-12-16 PROCEDURE — 83690 ASSAY OF LIPASE: CPT | Performed by: EMERGENCY MEDICINE

## 2024-12-16 RX ORDER — LISINOPRIL 10 MG/1
10 TABLET ORAL DAILY
Status: DISCONTINUED | OUTPATIENT
Start: 2024-12-17 | End: 2024-12-18 | Stop reason: HOSPADM

## 2024-12-16 RX ORDER — DIPHENHYDRAMINE HYDROCHLORIDE 50 MG/ML
12.5 INJECTION INTRAMUSCULAR; INTRAVENOUS EVERY 4 HOURS PRN
Status: DISCONTINUED | OUTPATIENT
Start: 2024-12-16 | End: 2024-12-18 | Stop reason: HOSPADM

## 2024-12-16 RX ORDER — ALUMINUM HYDROXIDE, MAGNESIUM HYDROXIDE, AND SIMETHICONE 1200; 120; 1200 MG/30ML; MG/30ML; MG/30ML
30 SUSPENSION ORAL 4 TIMES DAILY PRN
Status: DISCONTINUED | OUTPATIENT
Start: 2024-12-16 | End: 2024-12-18 | Stop reason: HOSPADM

## 2024-12-16 RX ORDER — INSULIN ASPART 100 [IU]/ML
0-5 INJECTION, SOLUTION INTRAVENOUS; SUBCUTANEOUS EVERY 6 HOURS PRN
Status: DISCONTINUED | OUTPATIENT
Start: 2024-12-16 | End: 2024-12-18 | Stop reason: HOSPADM

## 2024-12-16 RX ORDER — DIPHENHYDRAMINE HYDROCHLORIDE 50 MG/ML
12.5 INJECTION INTRAMUSCULAR; INTRAVENOUS
Status: COMPLETED | OUTPATIENT
Start: 2024-12-16 | End: 2024-12-16

## 2024-12-16 RX ORDER — KETOROLAC TROMETHAMINE 30 MG/ML
10 INJECTION, SOLUTION INTRAMUSCULAR; INTRAVENOUS EVERY 6 HOURS PRN
Status: DISCONTINUED | OUTPATIENT
Start: 2024-12-17 | End: 2024-12-17 | Stop reason: HOSPADM

## 2024-12-16 RX ORDER — GLUCAGON 1 MG
1 KIT INJECTION
Status: DISCONTINUED | OUTPATIENT
Start: 2024-12-16 | End: 2024-12-18 | Stop reason: HOSPADM

## 2024-12-16 RX ORDER — INSULIN ASPART 100 [IU]/ML
8 INJECTION, SOLUTION INTRAVENOUS; SUBCUTANEOUS
Status: DISCONTINUED | OUTPATIENT
Start: 2024-12-17 | End: 2024-12-17

## 2024-12-16 RX ORDER — MORPHINE SULFATE 2 MG/ML
6 INJECTION, SOLUTION INTRAMUSCULAR; INTRAVENOUS
Status: COMPLETED | OUTPATIENT
Start: 2024-12-16 | End: 2024-12-16

## 2024-12-16 RX ORDER — HALOPERIDOL 5 MG/ML
2.5 INJECTION INTRAMUSCULAR EVERY 4 HOURS PRN
Status: DISCONTINUED | OUTPATIENT
Start: 2024-12-16 | End: 2024-12-18 | Stop reason: HOSPADM

## 2024-12-16 RX ORDER — ONDANSETRON HYDROCHLORIDE 2 MG/ML
8 INJECTION, SOLUTION INTRAVENOUS
Status: COMPLETED | OUTPATIENT
Start: 2024-12-16 | End: 2024-12-16

## 2024-12-16 RX ORDER — PANTOPRAZOLE SODIUM 40 MG/1
40 TABLET, DELAYED RELEASE ORAL DAILY
Status: DISCONTINUED | OUTPATIENT
Start: 2024-12-17 | End: 2024-12-18 | Stop reason: HOSPADM

## 2024-12-16 RX ORDER — PROCHLORPERAZINE EDISYLATE 5 MG/ML
10 INJECTION INTRAMUSCULAR; INTRAVENOUS
Status: COMPLETED | OUTPATIENT
Start: 2024-12-16 | End: 2024-12-16

## 2024-12-16 RX ORDER — INSULIN GLARGINE 100 [IU]/ML
22 INJECTION, SOLUTION SUBCUTANEOUS DAILY
Status: DISCONTINUED | OUTPATIENT
Start: 2024-12-17 | End: 2024-12-18 | Stop reason: HOSPADM

## 2024-12-16 RX ORDER — HALOPERIDOL 5 MG/ML
5 INJECTION INTRAMUSCULAR
Status: COMPLETED | OUTPATIENT
Start: 2024-12-16 | End: 2024-12-16

## 2024-12-16 RX ORDER — ONDANSETRON HYDROCHLORIDE 2 MG/ML
4 INJECTION, SOLUTION INTRAVENOUS EVERY 6 HOURS PRN
Status: DISCONTINUED | OUTPATIENT
Start: 2024-12-16 | End: 2024-12-18 | Stop reason: HOSPADM

## 2024-12-16 RX ORDER — PANTOPRAZOLE SODIUM 40 MG/10ML
40 INJECTION, POWDER, LYOPHILIZED, FOR SOLUTION INTRAVENOUS
Status: COMPLETED | OUTPATIENT
Start: 2024-12-16 | End: 2024-12-16

## 2024-12-16 RX ORDER — HALOPERIDOL 5 MG/ML
5 INJECTION INTRAMUSCULAR
Status: DISCONTINUED | OUTPATIENT
Start: 2024-12-16 | End: 2024-12-16

## 2024-12-16 RX ADMIN — PROCHLORPERAZINE EDISYLATE 10 MG: 5 INJECTION INTRAMUSCULAR; INTRAVENOUS at 08:12

## 2024-12-16 RX ADMIN — MORPHINE SULFATE 6 MG: 2 INJECTION, SOLUTION INTRAMUSCULAR; INTRAVENOUS at 05:12

## 2024-12-16 RX ADMIN — DIPHENHYDRAMINE HYDROCHLORIDE 12.5 MG: 50 INJECTION, SOLUTION INTRAMUSCULAR; INTRAVENOUS at 09:12

## 2024-12-16 RX ADMIN — ONDANSETRON 8 MG: 2 INJECTION INTRAMUSCULAR; INTRAVENOUS at 05:12

## 2024-12-16 RX ADMIN — PANTOPRAZOLE SODIUM 40 MG: 40 INJECTION, POWDER, FOR SOLUTION INTRAVENOUS at 09:12

## 2024-12-16 RX ADMIN — SODIUM CHLORIDE, POTASSIUM CHLORIDE, SODIUM LACTATE AND CALCIUM CHLORIDE 1000 ML: 600; 310; 30; 20 INJECTION, SOLUTION INTRAVENOUS at 07:12

## 2024-12-16 RX ADMIN — HALOPERIDOL LACTATE 5 MG: 5 INJECTION, SOLUTION INTRAMUSCULAR at 09:12

## 2024-12-16 NOTE — Clinical Note
"Andrzej Thompsonchary" Yahir was seen and treated in our emergency department on 12/16/2024.  He may return to work on 12/19/2024.       If you have any questions or concerns, please don't hesitate to call.      Renaldo Lozano PA-C"

## 2024-12-16 NOTE — ED PROVIDER NOTES
Encounter Date: 12/16/2024       History     Chief Complaint   Patient presents with    Abdominal Pain     Nausea vomiting for past 3 hours.  Endorses abd pain.  Felt like he was hypoglycemic so he took glucagon.  CBG was 210 with EMS     Patient was a 38-year-old male with past medical history of type 1 diabetes that presents to emergency department with nausea and vomiting.  Per EMS report patient thought he had was hypoglycemic after vomiting for 3 hours.  And gave himself glucagon.  This made him feel worse with increased abdominal pain and increased vomiting.  History limited by current condition.  However denying chest pain or headache.  Emesis.  States that vomit is nonbilious nonbloody.  Pain is primarily in his epigastrium.    The history is provided by the patient and medical records.     Review of patient's allergies indicates:   Allergen Reactions    Lactose Other (See Comments)     Gas and moderate to sever abdominal pain     Past Medical History:   Diagnosis Date    Diabetes mellitus     Diabetes mellitus type 1     Diagnosed at age 20     Past Surgical History:   Procedure Laterality Date    ESOPHAGOGASTRODUODENOSCOPY N/A 11/5/2024    Procedure: EGD (ESOPHAGOGASTRODUODENOSCOPY);  Surgeon: Manny Troy MD;  Location: [address];  Service: Endoscopy;  Laterality: N/A;     Family History   Problem Relation Name Age of Onset    Other Mother prediabetes         prediabetes    Diabetes Mother prediabetes     Other Father          patient does not know his fathers history     Social History     Tobacco Use    Smoking status: Former    Smokeless tobacco: Never   Substance Use Topics    Alcohol use: Not Currently     Comment: socially    Drug use: No     Review of Systems  ROS negative except as noted in HPI     Physical Exam     Initial Vitals [12/16/24 1619]   BP Pulse Resp Temp SpO2   (!) 149/84 86 20 97.8 °F (36.6 °C) 100 %      MAP       --         Physical Exam    Constitutional: He is  diaphoretic. He appears distressed.   HENT:   Head: Normocephalic.   Right Ear: External ear normal.   Left Ear: External ear normal.   Nose: Nose normal. Mouth/Throat: Oropharynx is clear and moist.   Eyes: Conjunctivae are normal.   Cardiovascular:  Normal rate, regular rhythm, normal heart sounds and intact distal pulses.           Pulmonary/Chest: Breath sounds normal. No respiratory distress.   Abdominal: Abdomen is soft. He exhibits no distension. There is no abdominal tenderness.     Neurological: He is alert and oriented to person, place, and time. He has normal strength.   Skin: Capillary refill takes less than 2 seconds.   Psychiatric: He has a normal mood and affect.         ED Course   Procedures  Labs Reviewed   CBC W/ AUTO DIFFERENTIAL - Abnormal       Result Value    WBC 9.05      RBC 4.42 (*)     Hemoglobin 13.6 (*)     Hematocrit 38.9 (*)     MCV 88      MCH 30.8      MCHC 35.0      RDW 12.8      Platelets 174      MPV 12.0      Immature Granulocytes 0.4      Gran # (ANC) 8.0 (*)     Immature Grans (Abs) 0.04      Lymph # 0.6 (*)     Mono # 0.4      Eos # 0.0      Baso # 0.02      nRBC 0      Gran % 88.1 (*)     Lymph % 7.0 (*)     Mono % 4.3      Eosinophil % 0.0      Basophil % 0.2      Differential Method Automated     URINALYSIS, REFLEX TO URINE CULTURE - Abnormal    Specimen UA Urine, Clean Catch      Color, UA Yellow      Appearance, UA Clear      pH, UA 6.0      Specific Gravity, UA 1.020      Protein, UA 1+ (*)     Glucose, UA 4+ (*)     Ketones, UA 2+ (*)     Bilirubin (UA) Negative      Occult Blood UA 1+ (*)     Nitrite, UA Negative      Leukocytes, UA Negative      Narrative:     Specimen Source->Urine   BETA - HYDROXYBUTYRATE, SERUM - Abnormal    Beta-Hydroxybutyrate 1.4 (*)    COMPREHENSIVE METABOLIC PANEL - Abnormal    Sodium 139      Potassium 4.6      Chloride 101      CO2 25      Glucose 308 (*)     BUN 15      Creatinine 1.3      Calcium 9.5      Total Protein 7.3      Albumin 4.2       Total Bilirubin 0.6      Alkaline Phosphatase 87      AST 29      ALT 26      eGFR >60.0      Anion Gap 13     URINALYSIS MICROSCOPIC - Abnormal    RBC, UA 8 (*)     WBC, UA 1      Bacteria Rare      Yeast, UA None      Squam Epithel, UA 0      Hyaline Casts, UA 0      Microscopic Comment SEE COMMENT      Narrative:     Specimen Source->Urine   ISTAT PROCEDURE - Abnormal    POC Glucose 266 (*)     POC BUN 18      POC Creatinine 1.1      POC Sodium 135 (*)     POC Potassium 6.5 (*)     POC Chloride 103      POC TCO2 (MEASURED) 27      POC Ionized Calcium 1.02 (*)     POC Hematocrit 41      Sample AMALIA     ISTAT PROCEDURE - Abnormal    POC Glucose 285 (*)     POC BUN 14      POC Creatinine 1.0      POC Sodium 139      POC Potassium 3.8      POC Chloride 102      POC TCO2 (MEASURED) 24      POC Ionized Calcium 1.15      POC Hematocrit 39      Sample AMALIA     ISTAT PROCEDURE - Abnormal    POC PH 7.324 (*)     POC PCO2 52.0 (*)     POC PO2 40      POC HCO3 27.1      POC BE 1      POC SATURATED O2 70      POC TCO2 29      Sample VENOUS      Site Other      Allens Test N/A     LIPASE    Lipase 12     ISTAT CHEM8   ISTAT CHEM8   POCT GLUCOSE MONITORING CONTINUOUS          Imaging Results    None          Medications   ondansetron injection 8 mg (8 mg Intravenous Given 12/16/24 1739)   morphine injection 6 mg (6 mg Intravenous Given 12/16/24 1739)   lactated ringers bolus 1,000 mL (0 mLs Intravenous Stopped 12/16/24 2102)   prochlorperazine injection Soln 10 mg (10 mg Intravenous Given 12/16/24 2054)   haloperidol lactate injection 5 mg (5 mg Intravenous Given 12/16/24 2144)   diphenhydrAMINE injection 12.5 mg (12.5 mg Intravenous Given 12/16/24 2144)   pantoprazole injection 40 mg (40 mg Intravenous Given 12/16/24 2144)     Medical Decision Making  Patient was a 38-year-old male with past medical history of type 1 diabetes that presents to emergency department with nausea and vomiting.    On initial evaluation patient  was in significant distress, not cooperative with physical exam minimally responsive with history.  Vitals are within normal limits.  He was diaphoretic and goose bump skin.    Differential for patient's presentation includes DKA, euglycemic DKA, gastroparesis, pancreatitis, hyperkalemia, hyperkalemia.    See workup below.    Patient requiring multiple and frequent doses of antiemetics.  His abdominal pain relatively resolved however still having significant nausea and vomiting.  Given recent workup for gastroparesis that was relatively unremarkable cancelling the CT scan and and planning for observation with symptomatic treatment.  Given IV fluids as well as multiple doses of antiemetics.    Amount and/or Complexity of Data Reviewed  Labs: ordered. Decision-making details documented in ED Course.  ECG/medicine tests:  Decision-making details documented in ED Course.    Risk  OTC drugs.  Prescription drug management.               ED Course as of 12/16/24 2209   Mon Dec 16, 2024   1823 Beta-Hydroxybutyrate(!): 1.4  Mildly elevated [TK]   1823 CBC W/ AUTO DIFFERENTIAL(!)  CBC unremarkable, hemoglobin and platelets at current baseline.  No leukocytosis [TK]   1910 ISTAT PROCEDURE(!)  No evidence of DKA there was mild CO2 retention [TK]   1911 Lipase: 12  No pancreatitis [TK]   1911 Comprehensive metabolic panel(!)  Glucose is elevated however no anion gap acidosis [TK]   2118 Urinalysis, Reflex to Urine Culture Urine, Clean Catch(!)  Urinalysis with ketones, 4+ glucose consistent with his current diabetes with ketone production. [TK]   2118 On re-evaluation patient has persistent nausea and vomiting however abdominal pain has been resolved.  This time we will plan to continue treating for gastritis/gastroparesis.  We will plan to admit for supportive treatment. [TK]   2120 EKG 12-lead [TK]      ED Course User Index  [TK] Leny Busby DO                             Clinical Impression:  Final diagnoses:  [R10.9]  Abdominal pain  [R11.2] Nausea and vomiting, unspecified vomiting type (Primary)          ED Disposition Condition    Observation Stable                Leny Busby, DO  Resident  12/16/24 0141

## 2024-12-16 NOTE — ED NOTES
I-STAT Chem-8+ Results:   Value Reference Range   Sodium 139 136-145 mmol/L   Potassium  3.8 3.5-5.1 mmol/L   Chloride 102  mmol/L   Ionized Calcium 1.15 1.06-1.42 mmol/L   CO2 (measured) 24 23-29 mmol/L   Glucose 285  mg/dL   BUN 14 6-30 mg/dL   Creatinine 1.0 0.5-1.4 mg/dL   Hematocrit 39 36-54%

## 2024-12-16 NOTE — Clinical Note
Diagnosis: Nausea and vomiting, unspecified vomiting type [[id number]]   Future Attending Provider: BENITA FLORES [[id number]]   Is the patient being sent to ED Observation?: Yes

## 2024-12-16 NOTE — ED NOTES
I-STAT Chem-8+ Results:   Value Reference Range   Sodium 135 136-145 mmol/L   Potassium  6.5 3.5-5.1 mmol/L   Chloride 103  mmol/L   Ionized Calcium 1.02 1.06-1.42 mmol/L   CO2 (measured) 27 23-29 mmol/L   Glucose 266  mg/dL   BUN 18 6-30 mg/dL   Creatinine 1.1 0.5-1.4 mg/dL   Hematocrit 41 36-54%      Charge RN aware.

## 2024-12-17 VITALS
DIASTOLIC BLOOD PRESSURE: 79 MMHG | BODY MASS INDEX: 26.18 KG/M2 | TEMPERATURE: 98 F | OXYGEN SATURATION: 100 % | WEIGHT: 204 LBS | HEIGHT: 74 IN | HEART RATE: 65 BPM | RESPIRATION RATE: 15 BRPM | SYSTOLIC BLOOD PRESSURE: 140 MMHG

## 2024-12-17 LAB
ALBUMIN SERPL BCP-MCNC: 4 G/DL (ref 3.5–5.2)
ALP SERPL-CCNC: 80 U/L (ref 40–150)
ALT SERPL W/O P-5'-P-CCNC: 26 U/L (ref 10–44)
ANION GAP SERPL CALC-SCNC: 16 MMOL/L (ref 8–16)
AST SERPL-CCNC: 25 U/L (ref 10–40)
BASOPHILS # BLD AUTO: 0.01 K/UL (ref 0–0.2)
BASOPHILS NFR BLD: 0.1 % (ref 0–1.9)
BILIRUB SERPL-MCNC: 0.6 MG/DL (ref 0.1–1)
BUN SERPL-MCNC: 20 MG/DL (ref 6–20)
CALCIUM SERPL-MCNC: 9.7 MG/DL (ref 8.7–10.5)
CHLORIDE SERPL-SCNC: 103 MMOL/L (ref 95–110)
CO2 SERPL-SCNC: 22 MMOL/L (ref 23–29)
CREAT SERPL-MCNC: 1.5 MG/DL (ref 0.5–1.4)
DIFFERENTIAL METHOD BLD: ABNORMAL
EOSINOPHIL # BLD AUTO: 0 K/UL (ref 0–0.5)
EOSINOPHIL NFR BLD: 0 % (ref 0–8)
ERYTHROCYTE [DISTWIDTH] IN BLOOD BY AUTOMATED COUNT: 12.7 % (ref 11.5–14.5)
EST. GFR  (NO RACE VARIABLE): >60 ML/MIN/1.73 M^2
GLUCOSE SERPL-MCNC: 294 MG/DL (ref 70–110)
HCT VFR BLD AUTO: 37.6 % (ref 40–54)
HGB BLD-MCNC: 13 G/DL (ref 14–18)
IMM GRANULOCYTES # BLD AUTO: 0.01 K/UL (ref 0–0.04)
IMM GRANULOCYTES NFR BLD AUTO: 0.1 % (ref 0–0.5)
LYMPHOCYTES # BLD AUTO: 0.6 K/UL (ref 1–4.8)
LYMPHOCYTES NFR BLD: 8.3 % (ref 18–48)
MCH RBC QN AUTO: 30.7 PG (ref 27–31)
MCHC RBC AUTO-ENTMCNC: 34.6 G/DL (ref 32–36)
MCV RBC AUTO: 89 FL (ref 82–98)
MONOCYTES # BLD AUTO: 0.3 K/UL (ref 0.3–1)
MONOCYTES NFR BLD: 4.3 % (ref 4–15)
NEUTROPHILS # BLD AUTO: 6.5 K/UL (ref 1.8–7.7)
NEUTROPHILS NFR BLD: 87.2 % (ref 38–73)
NRBC BLD-RTO: 0 /100 WBC
OHS QRS DURATION: 112 MS
OHS QTC CALCULATION: 424 MS
PLATELET # BLD AUTO: 217 K/UL (ref 150–450)
PMV BLD AUTO: 11.8 FL (ref 9.2–12.9)
POCT GLUCOSE: 244 MG/DL (ref 70–110)
POCT GLUCOSE: 318 MG/DL (ref 70–110)
POCT GLUCOSE: 338 MG/DL (ref 70–110)
POCT GLUCOSE: 387 MG/DL (ref 70–110)
POCT GLUCOSE: 91 MG/DL (ref 70–110)
POTASSIUM SERPL-SCNC: 4.4 MMOL/L (ref 3.5–5.1)
PROT SERPL-MCNC: 7.1 G/DL (ref 6–8.4)
RBC # BLD AUTO: 4.24 M/UL (ref 4.6–6.2)
SODIUM SERPL-SCNC: 141 MMOL/L (ref 136–145)
WBC # BLD AUTO: 7.43 K/UL (ref 3.9–12.7)

## 2024-12-17 PROCEDURE — 85025 COMPLETE CBC W/AUTO DIFF WBC: CPT | Performed by: PHYSICIAN ASSISTANT

## 2024-12-17 PROCEDURE — 96372 THER/PROPH/DIAG INJ SC/IM: CPT | Performed by: PHYSICIAN ASSISTANT

## 2024-12-17 PROCEDURE — 25000003 PHARM REV CODE 250: Performed by: PHYSICIAN ASSISTANT

## 2024-12-17 PROCEDURE — 80053 COMPREHEN METABOLIC PANEL: CPT | Performed by: PHYSICIAN ASSISTANT

## 2024-12-17 PROCEDURE — 63600175 PHARM REV CODE 636 W HCPCS: Performed by: PHYSICIAN ASSISTANT

## 2024-12-17 PROCEDURE — 82962 GLUCOSE BLOOD TEST: CPT

## 2024-12-17 PROCEDURE — 25000003 PHARM REV CODE 250: Performed by: NURSE PRACTITIONER

## 2024-12-17 PROCEDURE — 96372 THER/PROPH/DIAG INJ SC/IM: CPT | Performed by: EMERGENCY MEDICINE

## 2024-12-17 PROCEDURE — G0378 HOSPITAL OBSERVATION PER HR: HCPCS

## 2024-12-17 PROCEDURE — 96361 HYDRATE IV INFUSION ADD-ON: CPT

## 2024-12-17 PROCEDURE — 63600175 PHARM REV CODE 636 W HCPCS: Performed by: EMERGENCY MEDICINE

## 2024-12-17 RX ORDER — INSULIN ASPART 100 [IU]/ML
8 INJECTION, SOLUTION INTRAVENOUS; SUBCUTANEOUS
Status: DISCONTINUED | OUTPATIENT
Start: 2024-12-17 | End: 2024-12-18 | Stop reason: HOSPADM

## 2024-12-17 RX ORDER — INSULIN ASPART 100 [IU]/ML
8 INJECTION, SOLUTION INTRAVENOUS; SUBCUTANEOUS
Status: COMPLETED | OUTPATIENT
Start: 2024-12-17 | End: 2024-12-17

## 2024-12-17 RX ORDER — LISINOPRIL 10 MG/1
10 TABLET ORAL
Status: COMPLETED | OUTPATIENT
Start: 2024-12-17 | End: 2024-12-17

## 2024-12-17 RX ORDER — INSULIN ASPART 100 [IU]/ML
8 INJECTION, SOLUTION INTRAVENOUS; SUBCUTANEOUS
Status: DISCONTINUED | OUTPATIENT
Start: 2024-12-17 | End: 2024-12-17

## 2024-12-17 RX ORDER — ONDANSETRON 4 MG/1
4 TABLET, ORALLY DISINTEGRATING ORAL EVERY 6 HOURS PRN
Qty: 15 TABLET | Refills: 0 | Status: SHIPPED | OUTPATIENT
Start: 2024-12-17

## 2024-12-17 RX ADMIN — LISINOPRIL 10 MG: 10 TABLET ORAL at 08:12

## 2024-12-17 RX ADMIN — INSULIN ASPART 2 UNITS: 100 INJECTION, SOLUTION INTRAVENOUS; SUBCUTANEOUS at 12:12

## 2024-12-17 RX ADMIN — SODIUM CHLORIDE 1000 ML: 9 INJECTION, SOLUTION INTRAVENOUS at 11:12

## 2024-12-17 RX ADMIN — INSULIN ASPART 8 UNITS: 100 INJECTION, SOLUTION INTRAVENOUS; SUBCUTANEOUS at 01:12

## 2024-12-17 RX ADMIN — INSULIN ASPART 8 UNITS: 100 INJECTION, SOLUTION INTRAVENOUS; SUBCUTANEOUS at 12:12

## 2024-12-17 RX ADMIN — INSULIN ASPART 4 UNITS: 100 INJECTION, SOLUTION INTRAVENOUS; SUBCUTANEOUS at 08:12

## 2024-12-17 RX ADMIN — INSULIN GLARGINE 22 UNITS: 100 INJECTION, SOLUTION SUBCUTANEOUS at 08:12

## 2024-12-17 RX ADMIN — INSULIN ASPART 8 UNITS: 100 INJECTION, SOLUTION INTRAVENOUS; SUBCUTANEOUS at 08:12

## 2024-12-17 RX ADMIN — LISINOPRIL 10 MG: 10 TABLET ORAL at 01:12

## 2024-12-17 RX ADMIN — INSULIN ASPART 4 UNITS: 100 INJECTION, SOLUTION INTRAVENOUS; SUBCUTANEOUS at 05:12

## 2024-12-17 RX ADMIN — PANTOPRAZOLE SODIUM 40 MG: 40 TABLET, DELAYED RELEASE ORAL at 08:12

## 2024-12-17 NOTE — PLAN OF CARE
[facility] - Emergency Dept  Initial Discharge Assessment       Primary Care Provider: Viktoriya Jaeger NP    Admission Diagnosis: Nausea and vomiting, unspecified vomiting type [R11.2]    Admission Date: 12/16/2024  Expected Discharge Date:     Pt stated he is independent with his ambulation and ADL's and does not require assistance or equipment.    Pt to d/c home with no needs when ready    Transition of Care Barriers: (P) None    Payor: [facility] MANAGED MCARE / Plan: [facility] DUAL COMPLETE HMO SNP / Product Type: Medicare Advantage /     Extended Emergency Contact Information  Primary Emergency Contact: TORIBIO OLSON   [facility]  Mobile Phone: [phone]  Relation: Spouse    Discharge Plan A: (P) Home  Discharge Plan B: (P) Home      [facility][id number] - [place] - [address] AT [facility]  [address]  [place] [address]  Phone: [phone] Fax: [phone]    [facility][id number] - [place] - [address] AT [facility]  [address]  [place] [address]  Phone: [phone] Fax: [phone]    [facility][id number] - [place], [place] - [address] AT [facility]  [address]  [place] [address]  Phone: [phone] Fax: [phone]      Initial Assessment (most recent)       Adult Discharge Assessment - 12/17/24 0819          Discharge Assessment    Assessment Type Discharge Planning Assessment (P)      Confirmed/corrected address, phone number and insurance Yes (P)      Confirmed Demographics Correct on Facesheet (P)      Source of Information patient (P)      People in Home spouse (P)      Facility Arrived From: home (P)      Do you expect to return to your current living situation? Yes (P)      Do you have help at home or someone to help you manage your care at home? No (P)      Prior to  hospitilization cognitive status: Alert/Oriented;No Deficits (P)      Current cognitive status: No Deficits;Alert/Oriented (P)      Walking or Climbing Stairs Difficulty no (P)      Dressing/Bathing Difficulty no (P)      Home Accessibility wheelchair accessible (P)      Home Layout Able to live on 1st floor (P)      Equipment Currently Used at Home none (P)      Readmission within 30 days? No (P)      Patient currently being followed by outpatient case management? No (P)      Do you currently have service(s) that help you manage your care at home? No (P)      Do you have any problems affording any of your prescribed medications? No (P)      Is the patient taking medications as prescribed? yes (P)      Who is going to help you get home at discharge? family/friends (P)      How do you get to doctors appointments? car, drives self (P)      Are you on dialysis? No (P)      Do you take coumadin? No (P)      Discharge Plan A Home (P)      Discharge Plan B Home (P)      DME Needed Upon Discharge  none (P)      Discharge Plan discussed with: Patient (P)      Transition of Care Barriers None (P)         Physical Activity    On average, how many days per week do you engage in moderate to strenuous exercise (like a brisk walk)? 0 days (P)      On average, how many minutes do you engage in exercise at this level? 0 min (P)         Financial Resource Strain    How hard is it for you to pay for the very basics like food, housing, medical care, and heating? Not very hard (P)         Housing Stability    In the last 12 months, was there a time when you were not able to pay the mortgage or rent on time? No (P)      At any time in the past 12 months, were you homeless or living in a shelter (including now)? No (P)         Transportation Needs    Has the lack of transportation kept you from medical appointments, meetings, work or from getting things needed for daily living? No (P)         Food Insecurity    Within the past 12 months,  you worried that your food would run out before you got the money to buy more. Never true (P)      Within the past 12 months, the food you bought just didn't last and you didn't have money to get more. Never true (P)         Stress    Do you feel stress - tense, restless, nervous, or anxious, or unable to sleep at night because your mind is troubled all the time - these days? Only a little (P)         Social Isolation    How often do you feel lonely or isolated from those around you?  Never (P)         Alcohol Use    Q1: How often do you have a drink containing alcohol? Monthly or less (P)      Q2: How many drinks containing alcohol do you have on a typical day when you are drinking? 1 or 2 (P)      Q3: How often do you have six or more drinks on one occasion? Never (P)         Utilities    In the past 12 months has the electric, gas, oil, or water company threatened to shut off services in your home? No (P)         Health Literacy    How often do you need to have someone help you when you read instructions, pamphlets, or other written material from your doctor or pharmacy? Rarely (P)         OTHER    Name(s) of People in Home spouse Bartololita (P)                       Nicol Kuo CD, MSW, LMSW, RSW   Case Management  [facility]  Email: [email or web address]

## 2024-12-17 NOTE — ED NOTES
Pt care assumed. Report received by JENNIFER ovalle. Pt lying in stretcher in low and locked position and side rails raised x2. Call light, pt's belongings, and bedside table within pt's reach. Pt on continuous cardiac monitoring, pulse oximetry, and BP cycling every 30 minutes. Pt in NAD and verbalized no needs at this

## 2024-12-17 NOTE — PROGRESS NOTES
ED Observation Unit  Progress Note      HPI   *** (copy from EDOU H/P)    Interval History   ***    PMHx   Past Medical History:   Diagnosis Date    Diabetes mellitus     Diabetes mellitus type 1     Diagnosed at age 20      Past Surgical History:   Procedure Laterality Date    ESOPHAGOGASTRODUODENOSCOPY N/A 11/5/2024    Procedure: EGD (ESOPHAGOGASTRODUODENOSCOPY);  Surgeon: Manny Troy MD;  Location: [address]);  Service: Endoscopy;  Laterality: N/A;        Family Hx   Family History   Problem Relation Name Age of Onset    Other Mother prediabetes         prediabetes    Diabetes Mother prediabetes     Other Father          patient does not know his fathers history        Social Hx   Social History     Socioeconomic History    Marital status:    Tobacco Use    Smoking status: Former    Smokeless tobacco: Never   Substance and Sexual Activity    Alcohol use: Not Currently     Comment: socially    Drug use: No     Social Drivers of Health     Financial Resource Strain: Low Risk  (12/17/2024)    Overall Financial Resource Strain (CARDIA)     Difficulty of Paying Living Expenses: Not very hard   Food Insecurity: No Food Insecurity (12/17/2024)    Hunger Vital Sign     Worried About Running Out of Food in the Last Year: Never true     Ran Out of Food in the Last Year: Never true   Transportation Needs: No Transportation Needs (12/17/2024)    TRANSPORTATION NEEDS     Transportation : No   Physical Activity: Inactive (12/17/2024)    Exercise Vital Sign     Days of Exercise per Week: 0 days     Minutes of Exercise per Session: 0 min   Stress: No Stress Concern Present (12/17/2024)    [employer] [place] [employer] - Occupational Stress Questionnaire     Feeling of Stress : Only a little   Housing Stability: Low Risk  (12/17/2024)    Housing Stability Vital Sign     Unable to Pay for Housing in the Last Year: No     Homeless in the Last Year: No        Vital Signs   Vitals:    12/17/24 0436  12/17/24 0511 12/17/24 0745 12/17/24 1615   BP:  121/73 (!) 119/56 (!) 144/75   BP Location:  Right arm Right arm    Patient Position:  Sitting Lying    Pulse:  106 87 83   Resp:  17 16 16   Temp: 98.9 °F (37.2 °C) 99 °F (37.2 °C) 98.4 °F (36.9 °C) 98.2 °F (36.8 °C)   TempSrc: Oral Oral Oral Oral   SpO2:  99% 99% 99%   Weight:       Height:            Review of Systems  ROS    Brief Physical Exam/Reassessment   Physical Exam    Labs/Imaging   Labs Reviewed   CBC W/ AUTO DIFFERENTIAL - Abnormal       Result Value    WBC 9.05      RBC 4.42 (*)     Hemoglobin 13.6 (*)     Hematocrit 38.9 (*)     MCV 88      MCH 30.8      MCHC 35.0      RDW 12.8      Platelets 174      MPV 12.0      Immature Granulocytes 0.4      Gran # (ANC) 8.0 (*)     Immature Grans (Abs) 0.04      Lymph # 0.6 (*)     Mono # 0.4      Eos # 0.0      Baso # 0.02      nRBC 0      Gran % 88.1 (*)     Lymph % 7.0 (*)     Mono % 4.3      Eosinophil % 0.0      Basophil % 0.2      Differential Method Automated     URINALYSIS, REFLEX TO URINE CULTURE - Abnormal    Specimen UA Urine, Clean Catch      Color, UA Yellow      Appearance, UA Clear      pH, UA 6.0      Specific Gravity, UA 1.020      Protein, UA 1+ (*)     Glucose, UA 4+ (*)     Ketones, UA 2+ (*)     Bilirubin (UA) Negative      Occult Blood UA 1+ (*)     Nitrite, UA Negative      Leukocytes, UA Negative      Narrative:     Specimen Source->Urine   BETA - HYDROXYBUTYRATE, SERUM - Abnormal    Beta-Hydroxybutyrate 1.4 (*)    COMPREHENSIVE METABOLIC PANEL - Abnormal    Sodium 139      Potassium 4.6      Chloride 101      CO2 25      Glucose 308 (*)     BUN 15      Creatinine 1.3      Calcium 9.5      Total Protein 7.3      Albumin 4.2      Total Bilirubin 0.6      Alkaline Phosphatase 87      AST 29      ALT 26      eGFR >60.0      Anion Gap 13     URINALYSIS MICROSCOPIC - Abnormal    RBC, UA 8 (*)     WBC, UA 1      Bacteria Rare      Yeast, UA None      Squam Epithel, UA 0      Hyaline Casts, UA 0       Microscopic Comment SEE COMMENT      Narrative:     Specimen Source->Urine   COMPREHENSIVE METABOLIC PANEL - Abnormal    Sodium 141      Potassium 4.4      Chloride 103      CO2 22 (*)     Glucose 294 (*)     BUN 20      Creatinine 1.5 (*)     Calcium 9.7      Total Protein 7.1      Albumin 4.0      Total Bilirubin 0.6      Alkaline Phosphatase 80      AST 25      ALT 26      eGFR >60.0      Anion Gap 16     CBC W/ AUTO DIFFERENTIAL - Abnormal    WBC 7.43      RBC 4.24 (*)     Hemoglobin 13.0 (*)     Hematocrit 37.6 (*)     MCV 89      MCH 30.7      MCHC 34.6      RDW 12.7      Platelets 217      MPV 11.8      Immature Granulocytes 0.1      Gran # (ANC) 6.5      Immature Grans (Abs) 0.01      Lymph # 0.6 (*)     Mono # 0.3      Eos # 0.0      Baso # 0.01      nRBC 0      Gran % 87.2 (*)     Lymph % 8.3 (*)     Mono % 4.3      Eosinophil % 0.0      Basophil % 0.1      Differential Method Automated     ISTAT PROCEDURE - Abnormal    POC Glucose 266 (*)     POC BUN 18      POC Creatinine 1.1      POC Sodium 135 (*)     POC Potassium 6.5 (*)     POC Chloride 103      POC TCO2 (MEASURED) 27      POC Ionized Calcium 1.02 (*)     POC Hematocrit 41      Sample AMALIA     ISTAT PROCEDURE - Abnormal    POC Glucose 285 (*)     POC BUN 14      POC Creatinine 1.0      POC Sodium 139      POC Potassium 3.8      POC Chloride 102      POC TCO2 (MEASURED) 24      POC Ionized Calcium 1.15      POC Hematocrit 39      Sample AMALIA     ISTAT PROCEDURE - Abnormal    POC PH 7.324 (*)     POC PCO2 52.0 (*)     POC PO2 40      POC HCO3 27.1      POC BE 1      POC SATURATED O2 70      POC TCO2 29      Sample VENOUS      Site Other      Allens Test N/A     POCT GLUCOSE - Abnormal    POCT Glucose 387 (*)    POCT GLUCOSE - Abnormal    POCT Glucose 318 (*)    POCT GLUCOSE - Abnormal    POCT Glucose 338 (*)    POCT GLUCOSE - Abnormal    POCT Glucose 244 (*)    LIPASE    Lipase 12     POCT GLUCOSE MONITORING CONTINUOUS   POCT GLUCOSE MONITORING  CONTINUOUS   POCT GLUCOSE MONITORING CONTINUOUS      Imaging Results    None          I reviewed all labs, imaging, ***EKGs.     Plan   ***     I have discussed this case with ***.

## 2024-12-17 NOTE — PROGRESS NOTES
ED Observation Unit  Progress Note      HPI   38-year-old male with past medical history of type 1 diabetes that presents to emergency department with nausea and vomiting. Per EMS report patient thought he had was hypoglycemic after vomiting for 3 hours. And gave himself glucagon. This made him feel worse with increased abdominal pain and increased vomiting. History limited by current condition. However denying chest pain or headache. Emesis. States that vomit is nonbilious nonbloody. Pain is primarily in his epigastrium.     Interval History   No additional episodes of emesis today. He is more awake on my assessment than previous reports of being lethargic after receiving Haldol and Benadryl in the ED. Repeat glucose this afternoon shows glucose 50. Juice provided and will continue. Advancing diet as tolerated.     PMHx   Past Medical History:   Diagnosis Date    Diabetes mellitus     Diabetes mellitus type 1     Diagnosed at age 20      Past Surgical History:   Procedure Laterality Date    ESOPHAGOGASTRODUODENOSCOPY N/A 11/5/2024    Procedure: EGD (ESOPHAGOGASTRODUODENOSCOPY);  Surgeon: Manny Troy MD;  Location: [address];  Service: Endoscopy;  Laterality: N/A;        Family Hx   Family History   Problem Relation Name Age of Onset    Other Mother prediabetes         prediabetes    Diabetes Mother prediabetes     Other Father          patient does not know his fathers history        Social Hx   Social History     Socioeconomic History    Marital status:    Tobacco Use    Smoking status: Former    Smokeless tobacco: Never   Substance and Sexual Activity    Alcohol use: Not Currently     Comment: socially    Drug use: No     Social Drivers of Health     Financial Resource Strain: Low Risk  (12/17/2024)    Overall Financial Resource Strain (CARDIA)     Difficulty of Paying Living Expenses: Not very hard   Food Insecurity: No Food Insecurity (12/17/2024)    Hunger Vital Sign     Worried About  Running Out of Food in the Last Year: Never true     Ran Out of Food in the Last Year: Never true   Transportation Needs: No Transportation Needs (12/17/2024)    TRANSPORTATION NEEDS     Transportation : No   Physical Activity: Inactive (12/17/2024)    Exercise Vital Sign     Days of Exercise per Week: 0 days     Minutes of Exercise per Session: 0 min   Stress: No Stress Concern Present (12/17/2024)    [employer] [place] [employer] - Occupational Stress Questionnaire     Feeling of Stress : Only a little   Housing Stability: Low Risk  (12/17/2024)    Housing Stability Vital Sign     Unable to Pay for Housing in the Last Year: No     Homeless in the Last Year: No        Vital Signs   Vitals:    12/17/24 0436 12/17/24 0511 12/17/24 0745 12/17/24 1615   BP:  121/73 (!) 119/56 (!) 144/75   BP Location:  Right arm Right arm    Patient Position:  Sitting Lying    Pulse:  106 87 83   Resp:  17 16 16   Temp: 98.9 °F (37.2 °C) 99 °F (37.2 °C) 98.4 °F (36.9 °C) 98.2 °F (36.8 °C)   TempSrc: Oral Oral Oral Oral   SpO2:  99% 99% 99%   Weight:       Height:            Review of Systems  Review of Systems   Constitutional:  Negative for fever.       Brief Physical Exam/Reassessment   Physical Exam  Constitutional:       General: He is not in acute distress.     Appearance: He is not diaphoretic.   HENT:      Head: Normocephalic and atraumatic.   Eyes:      Conjunctiva/sclera: Conjunctivae normal.      Pupils: Pupils are equal, round, and reactive to light.   Cardiovascular:      Rate and Rhythm: Normal rate and regular rhythm.      Heart sounds: Normal heart sounds.   Pulmonary:      Effort: Pulmonary effort is normal. No respiratory distress.      Breath sounds: Normal breath sounds. No wheezing or rales.   Abdominal:      General: Bowel sounds are normal. There is no distension.      Palpations: Abdomen is soft.      Tenderness: There is no abdominal tenderness.   Musculoskeletal:         General: No tenderness.  Normal range of motion.      Cervical back: Normal range of motion and neck supple.   Skin:     General: Skin is warm and dry.      Findings: No erythema or rash.   Neurological:      Mental Status: He is alert and oriented to person, place, and time.      Cranial Nerves: No cranial nerve deficit.         Labs/Imaging   Labs Reviewed   CBC W/ AUTO DIFFERENTIAL - Abnormal       Result Value    WBC 9.05      RBC 4.42 (*)     Hemoglobin 13.6 (*)     Hematocrit 38.9 (*)     MCV 88      MCH 30.8      MCHC 35.0      RDW 12.8      Platelets 174      MPV 12.0      Immature Granulocytes 0.4      Gran # (ANC) 8.0 (*)     Immature Grans (Abs) 0.04      Lymph # 0.6 (*)     Mono # 0.4      Eos # 0.0      Baso # 0.02      nRBC 0      Gran % 88.1 (*)     Lymph % 7.0 (*)     Mono % 4.3      Eosinophil % 0.0      Basophil % 0.2      Differential Method Automated     URINALYSIS, REFLEX TO URINE CULTURE - Abnormal    Specimen UA Urine, Clean Catch      Color, UA Yellow      Appearance, UA Clear      pH, UA 6.0      Specific Gravity, UA 1.020      Protein, UA 1+ (*)     Glucose, UA 4+ (*)     Ketones, UA 2+ (*)     Bilirubin (UA) Negative      Occult Blood UA 1+ (*)     Nitrite, UA Negative      Leukocytes, UA Negative      Narrative:     Specimen Source->Urine   BETA - HYDROXYBUTYRATE, SERUM - Abnormal    Beta-Hydroxybutyrate 1.4 (*)    COMPREHENSIVE METABOLIC PANEL - Abnormal    Sodium 139      Potassium 4.6      Chloride 101      CO2 25      Glucose 308 (*)     BUN 15      Creatinine 1.3      Calcium 9.5      Total Protein 7.3      Albumin 4.2      Total Bilirubin 0.6      Alkaline Phosphatase 87      AST 29      ALT 26      eGFR >60.0      Anion Gap 13     URINALYSIS MICROSCOPIC - Abnormal    RBC, UA 8 (*)     WBC, UA 1      Bacteria Rare      Yeast, UA None      Squam Epithel, UA 0      Hyaline Casts, UA 0      Microscopic Comment SEE COMMENT      Narrative:     Specimen Source->Urine   COMPREHENSIVE METABOLIC PANEL - Abnormal     Sodium 141      Potassium 4.4      Chloride 103      CO2 22 (*)     Glucose 294 (*)     BUN 20      Creatinine 1.5 (*)     Calcium 9.7      Total Protein 7.1      Albumin 4.0      Total Bilirubin 0.6      Alkaline Phosphatase 80      AST 25      ALT 26      eGFR >60.0      Anion Gap 16     CBC W/ AUTO DIFFERENTIAL - Abnormal    WBC 7.43      RBC 4.24 (*)     Hemoglobin 13.0 (*)     Hematocrit 37.6 (*)     MCV 89      MCH 30.7      MCHC 34.6      RDW 12.7      Platelets 217      MPV 11.8      Immature Granulocytes 0.1      Gran # (ANC) 6.5      Immature Grans (Abs) 0.01      Lymph # 0.6 (*)     Mono # 0.3      Eos # 0.0      Baso # 0.01      nRBC 0      Gran % 87.2 (*)     Lymph % 8.3 (*)     Mono % 4.3      Eosinophil % 0.0      Basophil % 0.1      Differential Method Automated     ISTAT PROCEDURE - Abnormal    POC Glucose 266 (*)     POC BUN 18      POC Creatinine 1.1      POC Sodium 135 (*)     POC Potassium 6.5 (*)     POC Chloride 103      POC TCO2 (MEASURED) 27      POC Ionized Calcium 1.02 (*)     POC Hematocrit 41      Sample AMALIA     ISTAT PROCEDURE - Abnormal    POC Glucose 285 (*)     POC BUN 14      POC Creatinine 1.0      POC Sodium 139      POC Potassium 3.8      POC Chloride 102      POC TCO2 (MEASURED) 24      POC Ionized Calcium 1.15      POC Hematocrit 39      Sample AMALIA     ISTAT PROCEDURE - Abnormal    POC PH 7.324 (*)     POC PCO2 52.0 (*)     POC PO2 40      POC HCO3 27.1      POC BE 1      POC SATURATED O2 70      POC TCO2 29      Sample VENOUS      Site Other      Allens Test N/A     POCT GLUCOSE - Abnormal    POCT Glucose 387 (*)    POCT GLUCOSE - Abnormal    POCT Glucose 318 (*)    POCT GLUCOSE - Abnormal    POCT Glucose 338 (*)    POCT GLUCOSE - Abnormal    POCT Glucose 244 (*)    LIPASE    Lipase 12     POCT GLUCOSE    POCT Glucose 91     POCT GLUCOSE MONITORING CONTINUOUS   POCT GLUCOSE MONITORING CONTINUOUS   POCT GLUCOSE MONITORING CONTINUOUS   POCT GLUCOSE MONITORING CONTINUOUS       Imaging Results    None          I reviewed all labs, imaging, EKGs.     Plan   Nausea and vomiting  T1DM  -No additional episodes of vomiting today. Will advance diet as tolerated  -PRN anti-emetics  -Hypoglycemic on CBG check this afternoon. Juice provided. Will continue to monitor  -Abdomen is soft and non-tender and do not feel abdominal imaging needed at this time. Will continue serial abdominal exams.     I have discussed this case with CARLOS ENRIQUE Bradshaw.

## 2024-12-17 NOTE — H&P
ED Observation Unit  History and Physical      I assumed care of this patient from the Main ED at onset of observation time, 22:06 on 12/16/2024.       History of Present Illness:   38-year-old male with past medical history of type 1 diabetes that presents to emergency department with nausea and vomiting.  Per EMS report patient thought he had was hypoglycemic after vomiting for 3 hours.  And gave himself glucagon.  This made him feel worse with increased abdominal pain and increased vomiting.  History limited by current condition.  However denying chest pain or headache.  Emesis.  States that vomit is nonbilious nonbloody.  Pain is primarily in his epigastrium.     I reviewed the ED Provider Note dated 12/16/2024 prior to my evaluation of this patient.  I reviewed all labs and imaging performed in the Main ED, prior to patient being placed in Observation. Patient was placed in the ED Observation Unit for Nausea and Vomiting.    PMHx   Past Medical History:   Diagnosis Date    Diabetes mellitus     Diabetes mellitus type 1     Diagnosed at age 20      Past Surgical History:   Procedure Laterality Date    ESOPHAGOGASTRODUODENOSCOPY N/A 11/5/2024    Procedure: EGD (ESOPHAGOGASTRODUODENOSCOPY);  Surgeon: Manny Troy MD;  Location: [address];  Service: Endoscopy;  Laterality: N/A;        Family Hx   Family History   Problem Relation Name Age of Onset    Other Mother prediabetes         prediabetes    Diabetes Mother prediabetes     Other Father          patient does not know his fathers history        Social Hx   Social History     Socioeconomic History    Marital status:    Tobacco Use    Smoking status: Former    Smokeless tobacco: Never   Substance and Sexual Activity    Alcohol use: Not Currently     Comment: socially    Drug use: No     Social Drivers of Health     Financial Resource Strain: Low Risk  (11/2/2024)    Overall Financial Resource Strain (CARDIA)     Difficulty of Paying Living  "Expenses: Not hard at all   Food Insecurity: No Food Insecurity (11/2/2024)    Hunger Vital Sign     Worried About Running Out of Food in the Last Year: Never true     Ran Out of Food in the Last Year: Never true   Transportation Needs: No Transportation Needs (11/2/2024)    TRANSPORTATION NEEDS     Transportation : No   Physical Activity: Insufficiently Active (5/8/2024)    Exercise Vital Sign     Days of Exercise per Week: 3 days     Minutes of Exercise per Session: 30 min   Stress: No Stress Concern Present (11/2/2024)    [employer] - Occupational Stress Questionnaire     Feeling of Stress : Not at all   Housing Stability: Low Risk  (11/2/2024)    Housing Stability Vital Sign     Unable to Pay for Housing in the Last Year: No     Homeless in the Last Year: No        Vital Signs   Vitals:    12/16/24 1619 12/16/24 1739 12/16/24 1855 12/16/24 2056   BP: (!) 149/84  (!) 156/74 (!) 180/80   BP Location: Right arm      Patient Position:   Lying Lying   Pulse: 86  76 76   Resp: 20 18  18   Temp: 97.8 °F (36.6 °C)      TempSrc: Oral      SpO2: 100%  99% 99%   Weight: 92.5 kg (204 lb)      Height: 6' 2" (1.88 m)           Review of Systems  Review of Systems   Constitutional:  Negative for fever.   Respiratory:  Negative for shortness of breath.    Cardiovascular:  Negative for chest pain.   Gastrointestinal:  Positive for abdominal pain and vomiting. Negative for diarrhea.   Genitourinary:  Negative for dysuria.       Physical Exam  Physical Exam  Vitals and nursing note reviewed.   Constitutional:       General: He is not in acute distress.     Appearance: He is not diaphoretic.   HENT:      Head: Normocephalic and atraumatic.   Eyes:      General: No scleral icterus.     Conjunctiva/sclera: Conjunctivae normal.   Cardiovascular:      Rate and Rhythm: Normal rate and regular rhythm.      Heart sounds: Normal heart sounds. No murmur heard.     No friction rub. No gallop.   Pulmonary:      " Effort: Pulmonary effort is normal. No respiratory distress.      Breath sounds: Normal breath sounds. No wheezing or rales.   Chest:      Chest wall: No tenderness.   Abdominal:      Tenderness: There is generalized abdominal tenderness.   Musculoskeletal:         General: Normal range of motion.      Cervical back: Normal range of motion and neck supple.   Skin:     General: Skin is warm and dry.   Neurological:      Mental Status: He is alert and oriented to person, place, and time.   Psychiatric:         Mood and Affect: Affect normal.         Medications:   Scheduled Meds:   [START ON 12/17/2024] insulin aspart U-100  8 Units Subcutaneous TIDWM    [START ON 12/17/2024] insulin glargine U-100  22 Units Subcutaneous Daily    [START ON 12/17/2024] lisinopriL  10 mg Oral Daily    [START ON 12/17/2024] pantoprazole  40 mg Oral Daily     Continuous Infusions:  PRN Meds:.  Current Facility-Administered Medications:     aluminum-magnesium hydroxide-simethicone, 30 mL, Oral, QID PRN    dextrose 10%, 12.5 g, Intravenous, PRN    dextrose 10%, 25 g, Intravenous, PRN    diphenhydrAMINE, 12.5 mg, Intravenous, Q4H PRN    glucagon (human recombinant), 1 mg, Intramuscular, PRN    haloperidol lactate, 2.5 mg, Intravenous, Q4H PRN    insulin aspart U-100, 0-5 Units, Subcutaneous, Q6H PRN    ondansetron, 4 mg, Intravenous, Q6H PRN      Assessment/Plan:  Will treat with antiemetics, recheck lab workup in the morning.  Low suspicion for surgical pathology but if pain worsens or concerning lab workup may require imaging.    Case was discussed with the ED provider.

## 2024-12-18 ENCOUNTER — PATIENT MESSAGE (OUTPATIENT)
Dept: ENDOCRINOLOGY | Facility: CLINIC | Age: 39
End: 2024-12-18
Payer: MEDICARE

## 2024-12-18 LAB — POCT GLUCOSE: 131 MG/DL (ref 70–110)

## 2024-12-18 NOTE — NURSING
Pt AAOX4. NAD noted. Vital signs stable.Discharge education reviewed. All questions answered. IVs removed. Pt transported to front to ride.

## 2024-12-18 NOTE — DISCHARGE INSTRUCTIONS
Hydration: It is crucial to stay hydrated. Sip clear fluids such as water, electrolyte solutions (e.g., oral rehydration solutions, sports drinks), and ginger ale. Avoid caffeinated and sugary drinks.    Diet: Once you are tolerating clear liquids okay, start with bland, easy-to-digest foods such as plain toast, crackers, rice, bananas, and applesauce. Gradually introduce more solid foods as tolerated.    Medications: Take anti-nausea medications as prescribed    Rest: Get plenty of rest to aid in recovery. Avoid strenuous activities until symptoms subside.    Follow-up: Follow-up with your primary care provider within the next week.     Return to the emergency room for severe abdominal pain, vomiting unrelieved with your anti-nausea medications, fever, or other concerning symptoms.

## 2024-12-18 NOTE — DISCHARGE SUMMARY
ED Observation Unit  Discharge Summary        History of Present Illness:    38-year-old male with past medical history of type 1 diabetes that presents to emergency department with nausea and vomiting. Per EMS report patient thought he had was hypoglycemic after vomiting for 3 hours. And gave himself glucagon. This made him feel worse with increased abdominal pain and increased vomiting. History limited by current condition. However denying chest pain or headache. Emesis. States that vomit is nonbilious nonbloody. Pain is primarily in his epigastrium.     Observation Course:    Placed in EDOU for intractable vomiting. Patient had improvement in his symptoms and tolerating liquid diet. Abdominal pain improved. Requesting to be discharged which I feel is appropriate.  Discharged with RX for Zofran. Advised PCP and Endocrinology follow-up. Patient expresses understanding and agreeable to the plan. Return to ED precautions given for new, worsening, or concerning symptoms.    Consultants:    None     Final Diagnosis:  Nausea and vomiting    Discharge Condition: Good    Disposition: Home or Self Care     Time spent on the discharge of the patient including review of hospital course with the patient. reviewing discharge medications and arranging follow-up care 35 minutes.  Patient was seen and examined on the date of discharge and determined to be suitable for discharge.    Follow Up:  No future appointments.

## 2024-12-19 ENCOUNTER — HOSPITAL ENCOUNTER (INPATIENT)
Facility: HOSPITAL | Age: 39
LOS: 1 days | Discharge: HOME | DRG: 074 | End: 2024-12-20
Attending: STUDENT IN AN ORGANIZED HEALTH CARE EDUCATION/TRAINING PROGRAM | Admitting: INTERNAL MEDICINE
Payer: MEDICARE

## 2024-12-19 DIAGNOSIS — F12.188 CANNABIS HYPEREMESIS SYNDROME CONCURRENT WITH AND DUE TO CANNABIS ABUSE: ICD-10-CM

## 2024-12-19 DIAGNOSIS — R10.84 GENERALIZED ABDOMINAL PAIN: Primary | ICD-10-CM

## 2024-12-19 DIAGNOSIS — R11.10 HYPEREMESIS: ICD-10-CM

## 2024-12-19 DIAGNOSIS — R07.9 CHEST PAIN: ICD-10-CM

## 2024-12-19 DIAGNOSIS — R11.2 NAUSEA AND VOMITING, UNSPECIFIED VOMITING TYPE: ICD-10-CM

## 2024-12-19 DIAGNOSIS — E11.69 TYPE 2 DIABETES MELLITUS WITH OTHER SPECIFIED COMPLICATION, UNSPECIFIED WHETHER LONG TERM INSULIN USE: ICD-10-CM

## 2024-12-19 LAB
ALBUMIN SERPL BCP-MCNC: 4.1 G/DL (ref 3.5–5.2)
ALP SERPL-CCNC: 75 U/L (ref 40–150)
ALT SERPL W/O P-5'-P-CCNC: 21 U/L (ref 10–44)
AMPHET+METHAMPHET UR QL: NEGATIVE
ANION GAP SERPL CALC-SCNC: 11 MMOL/L (ref 8–16)
AST SERPL-CCNC: 28 U/L (ref 10–40)
B-OH-BUTYR BLD STRIP-SCNC: 0.8 MMOL/L (ref 0–0.5)
BACTERIA #/AREA URNS HPF: ABNORMAL /HPF
BARBITURATES UR QL SCN>200 NG/ML: NEGATIVE
BASOPHILS # BLD AUTO: 0.03 K/UL (ref 0–0.2)
BASOPHILS NFR BLD: 0.7 % (ref 0–1.9)
BENZODIAZ UR QL SCN>200 NG/ML: NEGATIVE
BILIRUB SERPL-MCNC: 0.8 MG/DL (ref 0.1–1)
BILIRUB UR QL STRIP: NEGATIVE
BUN SERPL-MCNC: 15 MG/DL (ref 6–20)
BZE UR QL SCN: NEGATIVE
CALCIUM SERPL-MCNC: 9.3 MG/DL (ref 8.7–10.5)
CANNABINOIDS UR QL SCN: ABNORMAL
CHLORIDE SERPL-SCNC: 105 MMOL/L (ref 95–110)
CLARITY UR: CLEAR
CO2 SERPL-SCNC: 25 MMOL/L (ref 23–29)
COLOR UR: YELLOW
CREAT SERPL-MCNC: 1.4 MG/DL (ref 0.5–1.4)
CREAT UR-MCNC: 99.8 MG/DL (ref 23–375)
DIFFERENTIAL METHOD BLD: ABNORMAL
EOSINOPHIL # BLD AUTO: 0 K/UL (ref 0–0.5)
EOSINOPHIL NFR BLD: 0.2 % (ref 0–8)
ERYTHROCYTE [DISTWIDTH] IN BLOOD BY AUTOMATED COUNT: 12.9 % (ref 11.5–14.5)
EST. GFR  (NO RACE VARIABLE): >60 ML/MIN/1.73 M^2
GLUCOSE SERPL-MCNC: 222 MG/DL (ref 70–110)
GLUCOSE UR QL STRIP: ABNORMAL
HCT VFR BLD AUTO: 40.3 % (ref 40–54)
HGB BLD-MCNC: 13.8 G/DL (ref 14–18)
HGB UR QL STRIP: ABNORMAL
HYALINE CASTS #/AREA URNS LPF: 0 /LPF
IMM GRANULOCYTES # BLD AUTO: 0.01 K/UL (ref 0–0.04)
IMM GRANULOCYTES NFR BLD AUTO: 0.2 % (ref 0–0.5)
KETONES UR QL STRIP: ABNORMAL
LACTATE SERPL-SCNC: 1.4 MMOL/L (ref 0.5–2.2)
LEUKOCYTE ESTERASE UR QL STRIP: NEGATIVE
LIPASE SERPL-CCNC: 7 U/L (ref 4–60)
LYMPHOCYTES # BLD AUTO: 0.8 K/UL (ref 1–4.8)
LYMPHOCYTES NFR BLD: 18.3 % (ref 18–48)
MCH RBC QN AUTO: 30.4 PG (ref 27–31)
MCHC RBC AUTO-ENTMCNC: 34.2 G/DL (ref 32–36)
MCV RBC AUTO: 89 FL (ref 82–98)
METHADONE UR QL SCN>300 NG/ML: NEGATIVE
MICROSCOPIC COMMENT: ABNORMAL
MONOCYTES # BLD AUTO: 0.5 K/UL (ref 0.3–1)
MONOCYTES NFR BLD: 11.7 % (ref 4–15)
NEUTROPHILS # BLD AUTO: 2.9 K/UL (ref 1.8–7.7)
NEUTROPHILS NFR BLD: 68.9 % (ref 38–73)
NITRITE UR QL STRIP: NEGATIVE
NRBC BLD-RTO: 0 /100 WBC
OPIATES UR QL SCN: ABNORMAL
PCP UR QL SCN>25 NG/ML: NEGATIVE
PH UR STRIP: 8 [PH] (ref 5–8)
PLATELET # BLD AUTO: 192 K/UL (ref 150–450)
PMV BLD AUTO: 10.9 FL (ref 9.2–12.9)
POCT GLUCOSE: 346 MG/DL (ref 70–110)
POTASSIUM SERPL-SCNC: 4.4 MMOL/L (ref 3.5–5.1)
PROT SERPL-MCNC: 7 G/DL (ref 6–8.4)
PROT UR QL STRIP: ABNORMAL
RBC # BLD AUTO: 4.54 M/UL (ref 4.6–6.2)
RBC #/AREA URNS HPF: 8 /HPF (ref 0–4)
SODIUM SERPL-SCNC: 141 MMOL/L (ref 136–145)
SP GR UR STRIP: 1.01 (ref 1–1.03)
SQUAMOUS #/AREA URNS HPF: 1 /HPF
TOXICOLOGY INFORMATION: ABNORMAL
TROPONIN I SERPL DL<=0.01 NG/ML-MCNC: <0.006 NG/ML (ref 0–0.03)
URN SPEC COLLECT METH UR: ABNORMAL
UROBILINOGEN UR STRIP-ACNC: NEGATIVE EU/DL
WBC # BLD AUTO: 4.27 K/UL (ref 3.9–12.7)
WBC #/AREA URNS HPF: 1 /HPF (ref 0–5)

## 2024-12-19 PROCEDURE — 25000003 PHARM REV CODE 250

## 2024-12-19 PROCEDURE — 96374 THER/PROPH/DIAG INJ IV PUSH: CPT

## 2024-12-19 PROCEDURE — 93005 ELECTROCARDIOGRAM TRACING: CPT

## 2024-12-19 PROCEDURE — 63600175 PHARM REV CODE 636 W HCPCS

## 2024-12-19 PROCEDURE — 85025 COMPLETE CBC W/AUTO DIFF WBC: CPT

## 2024-12-19 PROCEDURE — 93010 ELECTROCARDIOGRAM REPORT: CPT | Mod: ,,, | Performed by: INTERNAL MEDICINE

## 2024-12-19 PROCEDURE — 99285 EMERGENCY DEPT VISIT HI MDM: CPT | Mod: 25

## 2024-12-19 PROCEDURE — 63600175 PHARM REV CODE 636 W HCPCS: Performed by: STUDENT IN AN ORGANIZED HEALTH CARE EDUCATION/TRAINING PROGRAM

## 2024-12-19 PROCEDURE — 25500020 PHARM REV CODE 255

## 2024-12-19 PROCEDURE — 96361 HYDRATE IV INFUSION ADD-ON: CPT

## 2024-12-19 PROCEDURE — 80053 COMPREHEN METABOLIC PANEL: CPT

## 2024-12-19 PROCEDURE — 80307 DRUG TEST PRSMV CHEM ANLYZR: CPT

## 2024-12-19 PROCEDURE — 83605 ASSAY OF LACTIC ACID: CPT

## 2024-12-19 PROCEDURE — 83690 ASSAY OF LIPASE: CPT

## 2024-12-19 PROCEDURE — G0378 HOSPITAL OBSERVATION PER HR: HCPCS

## 2024-12-19 PROCEDURE — 82010 KETONE BODYS QUAN: CPT

## 2024-12-19 PROCEDURE — 96375 TX/PRO/DX INJ NEW DRUG ADDON: CPT

## 2024-12-19 PROCEDURE — 96372 THER/PROPH/DIAG INJ SC/IM: CPT | Mod: 59

## 2024-12-19 PROCEDURE — 81000 URINALYSIS NONAUTO W/SCOPE: CPT | Mod: 59

## 2024-12-19 PROCEDURE — 84484 ASSAY OF TROPONIN QUANT: CPT

## 2024-12-19 PROCEDURE — 96372 THER/PROPH/DIAG INJ SC/IM: CPT

## 2024-12-19 RX ORDER — METOCLOPRAMIDE HYDROCHLORIDE 5 MG/ML
10 INJECTION INTRAMUSCULAR; INTRAVENOUS EVERY 6 HOURS PRN
Status: DISCONTINUED | OUTPATIENT
Start: 2024-12-19 | End: 2024-12-20 | Stop reason: HOSPADM

## 2024-12-19 RX ORDER — HYOSCYAMINE SULFATE 0.12 MG/1
0.12 TABLET SUBLINGUAL
Status: COMPLETED | OUTPATIENT
Start: 2024-12-19 | End: 2024-12-19

## 2024-12-19 RX ORDER — ONDANSETRON HYDROCHLORIDE 2 MG/ML
8 INJECTION, SOLUTION INTRAVENOUS
Status: COMPLETED | OUTPATIENT
Start: 2024-12-19 | End: 2024-12-19

## 2024-12-19 RX ORDER — SODIUM CHLORIDE 9 MG/ML
1000 INJECTION, SOLUTION INTRAVENOUS
Status: COMPLETED | OUTPATIENT
Start: 2024-12-19 | End: 2024-12-20

## 2024-12-19 RX ORDER — GLUCAGON 1 MG
1 KIT INJECTION
Status: DISCONTINUED | OUTPATIENT
Start: 2024-12-20 | End: 2024-12-20 | Stop reason: HOSPADM

## 2024-12-19 RX ORDER — NALOXONE HCL 0.4 MG/ML
0.02 VIAL (ML) INJECTION
Status: DISCONTINUED | OUTPATIENT
Start: 2024-12-19 | End: 2024-12-20 | Stop reason: HOSPADM

## 2024-12-19 RX ORDER — LIDOCAINE HYDROCHLORIDE 20 MG/ML
15 SOLUTION OROPHARYNGEAL ONCE
Status: COMPLETED | OUTPATIENT
Start: 2024-12-19 | End: 2024-12-19

## 2024-12-19 RX ORDER — PANTOPRAZOLE SODIUM 40 MG/1
40 TABLET, DELAYED RELEASE ORAL DAILY
Status: DISCONTINUED | OUTPATIENT
Start: 2024-12-20 | End: 2024-12-20 | Stop reason: HOSPADM

## 2024-12-19 RX ORDER — GLUCAGON 1 MG
1 KIT INJECTION
Status: DISCONTINUED | OUTPATIENT
Start: 2024-12-19 | End: 2024-12-20 | Stop reason: HOSPADM

## 2024-12-19 RX ORDER — ALUMINUM HYDROXIDE, MAGNESIUM HYDROXIDE, AND SIMETHICONE 1200; 120; 1200 MG/30ML; MG/30ML; MG/30ML
30 SUSPENSION ORAL ONCE
Status: COMPLETED | OUTPATIENT
Start: 2024-12-19 | End: 2024-12-19

## 2024-12-19 RX ORDER — INSULIN ASPART 100 [IU]/ML
0-10 INJECTION, SOLUTION INTRAVENOUS; SUBCUTANEOUS
Status: DISCONTINUED | OUTPATIENT
Start: 2024-12-19 | End: 2024-12-20 | Stop reason: HOSPADM

## 2024-12-19 RX ORDER — INSULIN ASPART 100 [IU]/ML
8 INJECTION, SOLUTION INTRAVENOUS; SUBCUTANEOUS
Status: DISCONTINUED | OUTPATIENT
Start: 2024-12-20 | End: 2024-12-20 | Stop reason: HOSPADM

## 2024-12-19 RX ORDER — ALUMINUM HYDROXIDE, MAGNESIUM HYDROXIDE, AND SIMETHICONE 1200; 120; 1200 MG/30ML; MG/30ML; MG/30ML
5 SUSPENSION ORAL
Status: DISCONTINUED | OUTPATIENT
Start: 2024-12-19 | End: 2024-12-19

## 2024-12-19 RX ORDER — SODIUM CHLORIDE 0.9 % (FLUSH) 0.9 %
10 SYRINGE (ML) INJECTION EVERY 12 HOURS PRN
Status: DISCONTINUED | OUTPATIENT
Start: 2024-12-19 | End: 2024-12-20 | Stop reason: HOSPADM

## 2024-12-19 RX ORDER — PRAVASTATIN SODIUM 10 MG/1
10 TABLET ORAL DAILY
Status: DISCONTINUED | OUTPATIENT
Start: 2024-12-20 | End: 2024-12-20 | Stop reason: HOSPADM

## 2024-12-19 RX ORDER — METOCLOPRAMIDE HYDROCHLORIDE 5 MG/ML
10 INJECTION INTRAMUSCULAR; INTRAVENOUS
Status: COMPLETED | OUTPATIENT
Start: 2024-12-19 | End: 2024-12-19

## 2024-12-19 RX ORDER — HALOPERIDOL 5 MG/ML
5 INJECTION INTRAMUSCULAR
Status: COMPLETED | OUTPATIENT
Start: 2024-12-19 | End: 2024-12-19

## 2024-12-19 RX ORDER — INSULIN GLARGINE 100 [IU]/ML
10 INJECTION, SOLUTION SUBCUTANEOUS DAILY
Status: DISCONTINUED | OUTPATIENT
Start: 2024-12-20 | End: 2024-12-20

## 2024-12-19 RX ORDER — ONDANSETRON HYDROCHLORIDE 2 MG/ML
4 INJECTION, SOLUTION INTRAVENOUS EVERY 8 HOURS PRN
Status: DISCONTINUED | OUTPATIENT
Start: 2024-12-19 | End: 2024-12-20 | Stop reason: HOSPADM

## 2024-12-19 RX ORDER — DIPHENHYDRAMINE HYDROCHLORIDE 50 MG/ML
25 INJECTION INTRAMUSCULAR; INTRAVENOUS
Status: COMPLETED | OUTPATIENT
Start: 2024-12-19 | End: 2024-12-19

## 2024-12-19 RX ORDER — ACETAMINOPHEN 325 MG/1
650 TABLET ORAL EVERY 4 HOURS PRN
Status: DISCONTINUED | OUTPATIENT
Start: 2024-12-19 | End: 2024-12-20 | Stop reason: HOSPADM

## 2024-12-19 RX ORDER — IBUPROFEN 200 MG
24 TABLET ORAL
Status: DISCONTINUED | OUTPATIENT
Start: 2024-12-19 | End: 2024-12-20 | Stop reason: HOSPADM

## 2024-12-19 RX ORDER — IBUPROFEN 200 MG
16 TABLET ORAL
Status: DISCONTINUED | OUTPATIENT
Start: 2024-12-19 | End: 2024-12-20 | Stop reason: HOSPADM

## 2024-12-19 RX ORDER — MORPHINE SULFATE 2 MG/ML
2 INJECTION, SOLUTION INTRAMUSCULAR; INTRAVENOUS
Status: COMPLETED | OUTPATIENT
Start: 2024-12-19 | End: 2024-12-19

## 2024-12-19 RX ADMIN — DIPHENHYDRAMINE HYDROCHLORIDE 25 MG: 50 INJECTION, SOLUTION INTRAMUSCULAR; INTRAVENOUS at 03:12

## 2024-12-19 RX ADMIN — INSULIN ASPART 4 UNITS: 100 INJECTION, SOLUTION INTRAVENOUS; SUBCUTANEOUS at 09:12

## 2024-12-19 RX ADMIN — ALUMINUM HYDROXIDE, MAGNESIUM HYDROXIDE, AND SIMETHICONE 30 ML: 200; 200; 20 SUSPENSION ORAL at 03:12

## 2024-12-19 RX ADMIN — HALOPERIDOL LACTATE 5 MG: 5 INJECTION, SOLUTION INTRAMUSCULAR at 03:12

## 2024-12-19 RX ADMIN — IOHEXOL 100 ML: 350 INJECTION, SOLUTION INTRAVENOUS at 03:12

## 2024-12-19 RX ADMIN — METOCLOPRAMIDE 10 MG: 5 INJECTION, SOLUTION INTRAMUSCULAR; INTRAVENOUS at 05:12

## 2024-12-19 RX ADMIN — LIDOCAINE HYDROCHLORIDE 15 ML: 20 SOLUTION ORAL at 03:12

## 2024-12-19 RX ADMIN — MORPHINE SULFATE 2 MG: 2 INJECTION, SOLUTION INTRAMUSCULAR; INTRAVENOUS at 02:12

## 2024-12-19 RX ADMIN — ONDANSETRON 8 MG: 2 INJECTION INTRAMUSCULAR; INTRAVENOUS at 02:12

## 2024-12-19 RX ADMIN — HYOSCYAMINE SULFATE 0.12 MG: 0.12 TABLET SUBLINGUAL at 02:12

## 2024-12-19 RX ADMIN — SODIUM CHLORIDE 1000 ML: 9 INJECTION, SOLUTION INTRAVENOUS at 03:12

## 2024-12-19 RX ADMIN — LORAZEPAM 1 MG: 2 INJECTION INTRAMUSCULAR; INTRAVENOUS at 07:12

## 2024-12-19 NOTE — ED NOTES
Per PA, PO challenge pt. Pt refused PO challenge d/t still being nauseous. Will attempt PO challenge later. PA notified. Pt sleeping in bed comfortably. Denies needs at this time. Call light within reach.

## 2024-12-19 NOTE — Clinical Note
Diagnosis: Generalized abdominal pain [717698]   Future Attending Provider: PAULINE GUAMAN [[id number]]   Special Needs:: No Special Needs [1]

## 2024-12-19 NOTE — ED NOTES
"Pt arrived to ED via EMS with c/o n/v and abdominal and chest pain. Pt reports constipation. Last bowel movement was on 12/17. [place] [facility] [place] was discharged on 12/18. Pt endorses 10/10 abdominal pain and stated, "I have to keep moving so that it doesn't hurt as much." Pt sitting on chair hunched over bed. Pt AAOx4, visibly in pain, but cooperative. Pt changed into gown and placed on automatic BP and pulse ox. Call light within reach.   "

## 2024-12-19 NOTE — ED NOTES
Pt resting comfortably in bed. Denies pain and needs at this time. Chest rising and falling. Call light within reach.

## 2024-12-19 NOTE — ED PROVIDER NOTES
Encounter Date: 12/19/2024       History     Chief Complaint   Patient presents with    Vomiting     Abdominal pain with N/V since 12/16. No fever reported. Presents awake, alert, oriented. No relief with Zofran given by EMS     Patient is a 39-year-old male with a past medical history of diabetes mellitus type 1 who presents to emergency room for diffuse abdominal pain, nausea, and vomiting.  Patient states that he was seen recently for similar symptoms, 12/16, however they returned as of this morning.  Feels similar to previous presentation.  Denies changes in urination.  Reports he has not had a bowel movement since 12/16.  No rectal bleeding.  No blood in vomit.  History limited, as patient is lying on the floor, groaning.    The history is provided by the patient. No  was used.     Review of patient's allergies indicates:   Allergen Reactions    Lactose Other (See Comments)     Gas and moderate to sever abdominal pain     Past Medical History:   Diagnosis Date    Diabetes mellitus     Diabetes mellitus type 1     Diagnosed at age 20     Past Surgical History:   Procedure Laterality Date    ESOPHAGOGASTRODUODENOSCOPY N/A 11/5/2024    Procedure: EGD (ESOPHAGOGASTRODUODENOSCOPY);  Surgeon: Manny Troy MD;  Location: [address];  Service: Endoscopy;  Laterality: N/A;     Family History   Problem Relation Name Age of Onset    Other Mother prediabetes         prediabetes    Diabetes Mother prediabetes     Other Father          patient does not know his fathers history     Social History     Tobacco Use    Smoking status: Former    Smokeless tobacco: Never   Substance Use Topics    Alcohol use: Not Currently     Comment: socially    Drug use: No     Review of Systems   Constitutional:  Negative for chills, diaphoresis, fatigue and fever.   HENT:  Negative for congestion, sore throat and trouble swallowing.    Respiratory:  Negative for cough and shortness of breath.   "  Cardiovascular:  Negative for chest pain and palpitations.   Gastrointestinal:  Positive for abdominal pain (diffuse), nausea and vomiting. Negative for blood in stool, constipation and diarrhea.   Genitourinary:  Negative for difficulty urinating, dysuria, frequency and hematuria.   Musculoskeletal:  Negative for back pain and myalgias.   Skin:  Negative for rash and wound.   Neurological:  Negative for weakness, light-headedness, numbness and headaches.       Physical Exam     Initial Vitals   BP Pulse Resp Temp SpO2   12/19/24 1452 12/19/24 1452 12/19/24 1432 12/19/24 1452 12/19/24 1452   (!) 140/96 84 (!) 22 98.2 °F (36.8 °C) 100 %      MAP       --                Physical Exam    Nursing note and vitals reviewed.  Constitutional: He appears well-developed. He is not diaphoretic. He appears distressed.   Patient lying on the floor, rocking back and forth, groaning.  Repeatedly saying "help me." Maintaining airway appropriately.  Able to speak in complete sentences.   HENT:   Right Ear: External ear normal.   Left Ear: External ear normal.   Eyes: Conjunctivae and EOM are normal.   Neck: Neck supple.   Normal range of motion.  Pulmonary/Chest: No respiratory distress.   Abdominal: Abdomen is soft. There is abdominal tenderness (diffuse). There is no rebound and no guarding.   Musculoskeletal:         General: No tenderness or edema. Normal range of motion.      Cervical back: Normal range of motion and neck supple.     Neurological: He is alert. GCS score is 15. GCS eye subscore is 4. GCS verbal subscore is 5. GCS motor subscore is 6.   Skin: Skin is warm.         ED Course   Procedures  Labs Reviewed   CBC W/ AUTO DIFFERENTIAL - Abnormal       Result Value    WBC 4.27      RBC 4.54 (*)     Hemoglobin 13.8 (*)     Hematocrit 40.3      MCV 89      MCH 30.4      MCHC 34.2      RDW 12.9      Platelets 192      MPV 10.9      Immature Granulocytes 0.2      Gran # (ANC) 2.9      Immature Grans (Abs) 0.01      Lymph # " 0.8 (*)     Mono # 0.5      Eos # 0.0      Baso # 0.03      nRBC 0      Gran % 68.9      Lymph % 18.3      Mono % 11.7      Eosinophil % 0.2      Basophil % 0.7      Differential Method Automated     COMPREHENSIVE METABOLIC PANEL - Abnormal    Sodium 141      Potassium 4.4      Chloride 105      CO2 25      Glucose 222 (*)     BUN 15      Creatinine 1.4      Calcium 9.3      Total Protein 7.0      Albumin 4.1      Total Bilirubin 0.8      Alkaline Phosphatase 75      AST 28      ALT 21      eGFR >60      Anion Gap 11     URINALYSIS, REFLEX TO URINE CULTURE - Abnormal    Specimen UA Urine, Clean Catch      Color, UA Yellow      Appearance, UA Clear      pH, UA 8.0      Specific Gravity, UA 1.015      Protein, UA 1+ (*)     Glucose, UA 2+ (*)     Ketones, UA Trace (*)     Bilirubin (UA) Negative      Occult Blood UA 1+ (*)     Nitrite, UA Negative      Urobilinogen, UA Negative      Leukocytes, UA Negative      Narrative:     Specimen Source->Urine   BETA - HYDROXYBUTYRATE, SERUM - Abnormal    Beta-Hydroxybutyrate 0.8 (*)    DRUG SCREEN PANEL, URINE EMERGENCY - Abnormal    Benzodiazepines Negative      Methadone metabolites Negative      Cocaine (Metab.) Negative      Opiate Scrn, Ur Presumptive Positive (*)     Barbiturate Screen, Ur Negative      Amphetamine Screen, Ur Negative      THC Presumptive Positive (*)     Phencyclidine Negative      Creatinine, Urine 99.8      Toxicology Information SEE COMMENT      Narrative:     Specimen Source->Urine   URINALYSIS MICROSCOPIC - Abnormal    RBC, UA 8 (*)     WBC, UA 1      Bacteria Rare      Squam Epithel, UA 1      Hyaline Casts, UA 0      Microscopic Comment SEE COMMENT      Narrative:     Specimen Source->Urine   POCT GLUCOSE - Abnormal    POCT Glucose 346 (*)    LIPASE    Lipase 7     TROPONIN I   TROPONIN I    Troponin I <0.006     LACTIC ACID, PLASMA   POCT GLUCOSE, HAND-HELD DEVICE        ECG Results              EKG 12-lead (In process)        Collection Time  Result Time QRS Duration OHS QTC Calculation    12/19/24 16:05:25 12/19/24 16:13:36 104 427                     In process by Interface, Lab In [facility] (12/19/24 16:13:41)                   Narrative:    Test Reason : R07.9,    Vent. Rate :  84 BPM     Atrial Rate :  84 BPM     P-R Int : 132 ms          QRS Dur : 104 ms      QT Int : 362 ms       P-R-T Axes :  84  91  79 degrees    QTcB Int : 427 ms    Normal sinus rhythm  Rightward axis  Incomplete right bundle branch block  Borderline Abnormal ECG  When compared with ECG of 16-Dec-2024 17:44,  No significant change was found    Referred By: AAAREFERRAL SELF           Confirmed By:                                   Imaging Results              CT Abdomen Pelvis With IV Contrast NO Oral Contrast (Final result)  Result time 12/19/24 16:27:07      Final result by Alec Hunt MD (12/19/24 16:27:07)                   Impression:      1. No findings to suggest bowel obstruction.  2. No findings to suggest obstructive uropathy.  3. Liquid content within the distal esophagus may reflect sequela of gastroesophageal reflux.  4. Please see above for several additional findings.      Electronically signed by: Alec Hunt MD  Date:    12/19/2024  Time:    16:27               Narrative:    EXAMINATION:  CT ABDOMEN PELVIS WITH IV CONTRAST    CLINICAL HISTORY:  Abdominal pain, acute, nonlocalized;    TECHNIQUE:  Low dose axial images, sagittal and coronal reformations were obtained from the lung bases to the pubic symphysis following the IV administration of 100 mL of Omnipaque 350 .  Oral contrast was not given.    COMPARISON:  CT 11/03/2024    FINDINGS:  Images of the lower thorax are remarkable for right basilar bandlike atelectasis or scarring.  There is fluid in the distal esophagus, correlation with any history of gastroesophageal reflux.    The liver, spleen, pancreas, gallbladder and adrenal glands are grossly unremarkable.  There is no biliary dilation or  ascites.  The stomach is nondistended, no significant gastric wall thickening.  The portal vein, splenic vein, SMV, celiac axis and SMA all are patent.  No significant abdominal lymphadenopathy.    The kidneys enhance symmetrically without hydronephrosis or nephrolithiasis.  The bilateral ureters are unable to be followed in their entirety to the urinary bladder, no definite calculi seen or secondary findings to suggest obstructive uropathy.  The urinary bladder is nondistended.  The prostate is prominent.    The large bowel is grossly unremarkable.  The terminal ileum and appendix are unremarkable.  The small bowel is grossly unremarkable.  There are a few scattered shotty periaortic, pericaval, and mesenteric lymph nodes.  No focal organized pelvic fluid collection.    No significant inguinal lymphadenopathy.                                       Medications   pantoprazole EC tablet 40 mg (has no administration in time range)   pravastatin tablet 10 mg (has no administration in time range)   sodium chloride 0.9% flush 10 mL (has no administration in time range)   naloxone 0.4 mg/mL injection 0.02 mg (has no administration in time range)   glucose chewable tablet 16 g (has no administration in time range)   glucose chewable tablet 24 g (has no administration in time range)   dextrose 50% injection 12.5 g (has no administration in time range)   dextrose 50% injection 25 g (has no administration in time range)   glucagon (human recombinant) injection 1 mg (has no administration in time range)   acetaminophen tablet 650 mg (has no administration in time range)   ondansetron injection 4 mg (has no administration in time range)   metoclopramide injection 10 mg (has no administration in time range)   insulin aspart U-100 pen 0-10 Units (4 Units Subcutaneous Given 12/19/24 2130)   insulin aspart U-100 pen 8 Units (has no administration in time range)   hyoscyamine SL tablet 0.125 mg (0.125 mg Sublingual Given 12/19/24  1449)   ondansetron injection 8 mg (8 mg Intravenous Given 12/19/24 1434)   morphine injection 2 mg (2 mg Intravenous Given 12/19/24 1432)   aluminum-magnesium hydroxide-simethicone 200-200-20 mg/5 mL suspension 30 mL (30 mLs Oral Given 12/19/24 1511)     And   LIDOcaine viscous HCl 2% oral solution 15 mL (15 mLs Oral Given 12/19/24 1511)   0.9% NaCl infusion (1,000 mLs Intravenous New Bag 12/19/24 1509)   haloperidol lactate injection 5 mg (5 mg Intramuscular Given 12/19/24 1519)   diphenhydrAMINE injection 25 mg (25 mg Intravenous Given 12/19/24 1518)   iohexoL (OMNIPAQUE 350) injection 100 mL (100 mLs Intravenous Given 12/19/24 1548)   metoclopramide injection 10 mg (10 mg Intravenous Given 12/19/24 1758)   LORazepam (ATIVAN) injection 1 mg (1 mg Intravenous Given 12/19/24 1944)     Medical Decision Making  Patient presents to emergency room for abdominal pain with nausea and vomiting.  Vital signs stable.  Physical exam as stated above.    Differential Diagnosis includes, but is not limited to AAA, aortic dissection, mesenteric ischemia, perforated viscous, MI/ACS, SBO/volvulus, incarcerated/strangulated hernia, intussusception, ileus, appendicitis, cholecystitis, cholangitis, diverticulitis, esophagitis, hepatitis, nephrolithiasis, pancreatitis, gastroenteritis, colitis, IBD/IBS, biliary colic, GERD, PUD, constipation, UTI/pyelonephritis, or  disorder.  Patient blood pressure stable. Abdominal exam with diffuse tenderness. Lab work unremarkable.  This is not pancreatitis or hepatitis.  Glucose slightly elevated.  However, no anion gap.  Beta hydroxybutyrate slightly improved from previous. CT unremarkable. Clinical presentation and physical exam most suggestive of reflux with likely cannabis hyperemesis.    Patient handed over to supervising physician at shift change. Pending PO challenge. Anticipate discharge if patient remains stable and is able to tolerate PO. He is resting comfortably and stable at this  time.     Problems Addressed:  Cannabis hyperemesis syndrome concurrent with and due to cannabis abuse: acute illness or injury  Chest pain: acute illness or injury  Generalized abdominal pain: acute illness or injury  Nausea and vomiting, unspecified vomiting type: acute illness or injury  Type 2 diabetes mellitus with other specified complication, unspecified whether long term insulin use: acute illness or injury    Amount and/or Complexity of Data Reviewed  External Data Reviewed: notes.     Details: It appears patient has has been seen multiple times for similar symptoms.  He was admitted in 11/2024 due to this.  CT at that time unremarkable.  Last seen in the emergency room on 12/16/2024.  Labs: ordered. Decision-making details documented in ED Course.  Radiology: ordered. Decision-making details documented in ED Course.  ECG/medicine tests: ordered.    Risk  OTC drugs.  Prescription drug management.  Risk Details: Comorbidities taken into consideration during the patient's evaluation and treatment include diabetes mellitus type 1.    Social determinants of health taken into consideration during development of our treatment plan include difficulty in obtaining follow-up, obtaining medications, health literacy, access to healthy options for preventative/conservative management, and/or support systems due to, but not limited to, transportation limitations, socioeconomic status, and environmental factors.               Attending Attestation:     Physician Attestation Statement for NP/PA:       Other NP/PA Attestation Additions:    History of Present Illness: I provided a face to face evaluation of this patient.    I discussed the patient's care with Advanced Practice Clinician.    I reviewed their note and agree with the history, physical, assessment and plan.   I agree with the [facility]'s management plan and take responsibility for the patient management.    This is an emergent evaluation of a patient who presented  "with nausea, vomiting, and generalized abdominal pain.  Multiple similar prior presentations to outside emergency departments.  Records reviewed from multiple outside emergency departments including ICU stays for DKA.      Medical Decision Making: Patient with intractable nausea and vomiting along with persistent abdominal pain despite negative workup and imaging.  Given the refractory nature of symptoms, I have discussed the case with Internal Medicine Service to continue patient's evaluation and management.             ED Course as of 12/19/24 2234   Thu Dec 19, 2024   1500 CBC W/ AUTO DIFFERENTIAL(!)  CBC relatively unremarkable.  No leukocytosis.  Hemoglobin slightly decreased to 13.8.  Platelet count within normal limits. [BJ]   1500 Beta-Hydroxybutyrate(!): 0.8  Beta hydroxybutyrate slightly elevated to 0.8.  This is improved from previous 3 days ago. [BJ]   1502 Patient given several medications about 30 minutes ago. He is groaning and screaming "help." Haldol ordered in addition to GI cocktail. [BJ]   1526 Comp. Metabolic Panel(!)  CMP relatively unremarkable.  Glucose elevated to 222.  Electrolytes within normal limits.  BUN and creatinine within normal limits.  LFTs within normal limits.  No anion gap. [BJ]   1526 Lipase: 7  Lipase within normal limits. [BJ]   1600 Urinalysis, Reflex to Urine Culture Urine, Clean Catch(!)  Urinalysis without leukocytes.  1+ occult blood.  Trace ketones. [BJ]   1600 Urinalysis Microscopic(!)  Microscopic urinalysis unremarkable.  Rare bacteria. [BJ]   1600 Patient resting comfortably in bed.  [BJ]   1614 Drug screen panel, emergency(!) [BJ]   1630 CT Abdomen Pelvis With IV Contrast NO Oral Contrast  Impression:     1. No findings to suggest bowel obstruction.  2. No findings to suggest obstructive uropathy.  3. Liquid content within the distal esophagus may reflect sequela of gastroesophageal reflux. [BJ]   1646 Will PO challenge.  [BJ]   1708 Patient care transferred to " Dr. Giang at shift change. I discussed the relevant history, physical exam, laboratory findings, radiological findings and anticipated disposition plan. On coming staff to formally complete visit disposition, review any pending laboratory/radiological results and to addend treatment plan as necessary. [BJ]   1746 Pain and nausea have returned. Retreating, and will reassess. [KB]   1846 Patient is still with ongoing pain, nausea, vomiting.  Despite multiple rounds of antiemetics and pain medicines in the emergency department.  I have discussed the case with the [facility] Internal Medicine Service who will come and evaluate for refractory symptoms.  They recommended trial of benzodiazepine for next round of treatment. [KB]      ED Course User Index  [BJ] Barbara Foster PA-C  [KB] Yong Giang MD                           Clinical Impression:  Final diagnoses:  [R07.9] Chest pain  [R10.84] Generalized abdominal pain (Primary)  [R11.2] Nausea and vomiting, unspecified vomiting type  [E11.69] Type 2 diabetes mellitus with other specified complication, unspecified whether long term insulin use  [F12.188] Cannabis hyperemesis syndrome concurrent with and due to cannabis abuse          ED Disposition Condition    Observation                This note was partially created using [employer] Voice Recognition software. Typographical and content errors may occur with this process. While efforts are made to detect and correct such errors, in some cases errors will persist. For this reason, wording in this document should be considered in the proper context and not strictly verbatim.        Barbara Foster PA-C  12/19/24 1536       Yong Giang MD  12/19/24 [id number]

## 2024-12-20 VITALS
OXYGEN SATURATION: 97 % | HEART RATE: 75 BPM | DIASTOLIC BLOOD PRESSURE: 82 MMHG | SYSTOLIC BLOOD PRESSURE: 131 MMHG | RESPIRATION RATE: 16 BRPM | TEMPERATURE: 98 F | HEIGHT: 74 IN | BODY MASS INDEX: 24.9 KG/M2 | WEIGHT: 194 LBS

## 2024-12-20 LAB
ALBUMIN SERPL BCP-MCNC: 4 G/DL (ref 3.5–5.2)
ALP SERPL-CCNC: 74 U/L (ref 40–150)
ALT SERPL W/O P-5'-P-CCNC: 23 U/L (ref 10–44)
ANION GAP SERPL CALC-SCNC: 12 MMOL/L (ref 8–16)
ANION GAP SERPL CALC-SCNC: 19 MMOL/L (ref 8–16)
ANION GAP SERPL CALC-SCNC: 5 MMOL/L (ref 8–16)
AST SERPL-CCNC: 24 U/L (ref 10–40)
B-OH-BUTYR BLD STRIP-SCNC: 3.2 MMOL/L (ref 0–0.5)
BASOPHILS # BLD AUTO: 0.01 K/UL (ref 0–0.2)
BASOPHILS NFR BLD: 0.2 % (ref 0–1.9)
BILIRUB SERPL-MCNC: 1 MG/DL (ref 0.1–1)
BUN SERPL-MCNC: 16 MG/DL (ref 6–20)
BUN SERPL-MCNC: 17 MG/DL (ref 6–20)
BUN SERPL-MCNC: 19 MG/DL (ref 6–20)
CALCIUM SERPL-MCNC: 9.2 MG/DL (ref 8.7–10.5)
CALCIUM SERPL-MCNC: 9.4 MG/DL (ref 8.7–10.5)
CALCIUM SERPL-MCNC: 9.7 MG/DL (ref 8.7–10.5)
CHLORIDE SERPL-SCNC: 105 MMOL/L (ref 95–110)
CHLORIDE SERPL-SCNC: 106 MMOL/L (ref 95–110)
CHLORIDE SERPL-SCNC: 106 MMOL/L (ref 95–110)
CO2 SERPL-SCNC: 18 MMOL/L (ref 23–29)
CO2 SERPL-SCNC: 25 MMOL/L (ref 23–29)
CO2 SERPL-SCNC: 31 MMOL/L (ref 23–29)
CREAT SERPL-MCNC: 1.6 MG/DL (ref 0.5–1.4)
CREAT SERPL-MCNC: 1.6 MG/DL (ref 0.5–1.4)
CREAT SERPL-MCNC: 1.7 MG/DL (ref 0.5–1.4)
DIFFERENTIAL METHOD BLD: ABNORMAL
EOSINOPHIL # BLD AUTO: 0 K/UL (ref 0–0.5)
EOSINOPHIL NFR BLD: 0 % (ref 0–8)
ERYTHROCYTE [DISTWIDTH] IN BLOOD BY AUTOMATED COUNT: 12.6 % (ref 11.5–14.5)
EST. GFR  (NO RACE VARIABLE): 52 ML/MIN/1.73 M^2
EST. GFR  (NO RACE VARIABLE): 56 ML/MIN/1.73 M^2
EST. GFR  (NO RACE VARIABLE): 56 ML/MIN/1.73 M^2
FIO2: 21 %
GLUCOSE SERPL-MCNC: 215 MG/DL (ref 70–110)
GLUCOSE SERPL-MCNC: 285 MG/DL (ref 70–110)
GLUCOSE SERPL-MCNC: 403 MG/DL (ref 70–110)
HCT VFR BLD AUTO: 40.1 % (ref 40–54)
HGB BLD-MCNC: 13.8 G/DL (ref 14–18)
IMM GRANULOCYTES # BLD AUTO: 0.02 K/UL (ref 0–0.04)
IMM GRANULOCYTES NFR BLD AUTO: 0.3 % (ref 0–0.5)
LACTATE SERPL-SCNC: 2 MMOL/L (ref 0.5–2.2)
LYMPHOCYTES # BLD AUTO: 0.7 K/UL (ref 1–4.8)
LYMPHOCYTES NFR BLD: 12.2 % (ref 18–48)
MAGNESIUM SERPL-MCNC: 1.8 MG/DL (ref 1.6–2.6)
MCH RBC QN AUTO: 30.8 PG (ref 27–31)
MCHC RBC AUTO-ENTMCNC: 34.4 G/DL (ref 32–36)
MCV RBC AUTO: 90 FL (ref 82–98)
MONOCYTES # BLD AUTO: 0.7 K/UL (ref 0.3–1)
MONOCYTES NFR BLD: 11.9 % (ref 4–15)
NEUTROPHILS # BLD AUTO: 4.5 K/UL (ref 1.8–7.7)
NEUTROPHILS NFR BLD: 75.4 % (ref 38–73)
NRBC BLD-RTO: 0 /100 WBC
PCO2 BLDA: 49.3 MMHG (ref 35–45)
PH SMN: 7.36 [PH] (ref 7.35–7.45)
PLATELET # BLD AUTO: 166 K/UL (ref 150–450)
PMV BLD AUTO: 11.8 FL (ref 9.2–12.9)
PO2 BLDA: 29.2 MMHG (ref 40–60)
POC BASE DEFICIT: 1.2 MMOL/L (ref -2–2)
POC HCO3: 27.5 MMOL/L (ref 24–28)
POC PERFORMED BY: ABNORMAL
POC SATURATED O2: 52.1 % (ref 95–100)
POCT GLUCOSE: 268 MG/DL (ref 70–110)
POCT GLUCOSE: 270 MG/DL (ref 70–110)
POCT GLUCOSE: 329 MG/DL (ref 70–110)
POCT GLUCOSE: 375 MG/DL (ref 70–110)
POCT GLUCOSE: 404 MG/DL (ref 70–110)
POTASSIUM SERPL-SCNC: 4.1 MMOL/L (ref 3.5–5.1)
POTASSIUM SERPL-SCNC: 4.2 MMOL/L (ref 3.5–5.1)
POTASSIUM SERPL-SCNC: 4.6 MMOL/L (ref 3.5–5.1)
PROT SERPL-MCNC: 7 G/DL (ref 6–8.4)
RBC # BLD AUTO: 4.48 M/UL (ref 4.6–6.2)
SODIUM SERPL-SCNC: 142 MMOL/L (ref 136–145)
SODIUM SERPL-SCNC: 142 MMOL/L (ref 136–145)
SODIUM SERPL-SCNC: 143 MMOL/L (ref 136–145)
SPECIMEN SOURCE: ABNORMAL
WBC # BLD AUTO: 5.99 K/UL (ref 3.9–12.7)

## 2024-12-20 PROCEDURE — 99900035 HC TECH TIME PER 15 MIN (STAT)

## 2024-12-20 PROCEDURE — 82010 KETONE BODYS QUAN: CPT | Performed by: INTERNAL MEDICINE

## 2024-12-20 PROCEDURE — 82803 BLOOD GASES ANY COMBINATION: CPT

## 2024-12-20 PROCEDURE — 80053 COMPREHEN METABOLIC PANEL: CPT

## 2024-12-20 PROCEDURE — 96360 HYDRATION IV INFUSION INIT: CPT | Mod: 59

## 2024-12-20 PROCEDURE — 25000003 PHARM REV CODE 250

## 2024-12-20 PROCEDURE — 85025 COMPLETE CBC W/AUTO DIFF WBC: CPT

## 2024-12-20 PROCEDURE — 82962 GLUCOSE BLOOD TEST: CPT

## 2024-12-20 PROCEDURE — 80048 BASIC METABOLIC PNL TOTAL CA: CPT | Mod: 91,XB

## 2024-12-20 PROCEDURE — 11000001 HC ACUTE MED/SURG PRIVATE ROOM

## 2024-12-20 PROCEDURE — 63600175 PHARM REV CODE 636 W HCPCS

## 2024-12-20 PROCEDURE — 96361 HYDRATE IV INFUSION ADD-ON: CPT

## 2024-12-20 PROCEDURE — 83735 ASSAY OF MAGNESIUM: CPT

## 2024-12-20 PROCEDURE — 83605 ASSAY OF LACTIC ACID: CPT

## 2024-12-20 PROCEDURE — 96374 THER/PROPH/DIAG INJ IV PUSH: CPT | Mod: 59

## 2024-12-20 PROCEDURE — 36415 COLL VENOUS BLD VENIPUNCTURE: CPT

## 2024-12-20 RX ORDER — INSULIN GLARGINE 100 [IU]/ML
10 INJECTION, SOLUTION SUBCUTANEOUS DAILY
Status: COMPLETED | OUTPATIENT
Start: 2024-12-20 | End: 2024-12-20

## 2024-12-20 RX ORDER — INSULIN GLARGINE 100 [IU]/ML
20 INJECTION, SOLUTION SUBCUTANEOUS DAILY
Status: DISCONTINUED | OUTPATIENT
Start: 2024-12-21 | End: 2024-12-20 | Stop reason: HOSPADM

## 2024-12-20 RX ORDER — PRAVASTATIN SODIUM 10 MG/1
10 TABLET ORAL DAILY
Qty: 90 TABLET | Refills: 3 | Status: SHIPPED | OUTPATIENT
Start: 2024-12-21 | End: 2024-12-20 | Stop reason: HOSPADM

## 2024-12-20 RX ADMIN — INSULIN GLARGINE 10 UNITS: 100 INJECTION, SOLUTION SUBCUTANEOUS at 10:12

## 2024-12-20 RX ADMIN — INSULIN GLARGINE 10 UNITS: 100 INJECTION, SOLUTION SUBCUTANEOUS at 09:12

## 2024-12-20 RX ADMIN — INSULIN ASPART 10 UNITS: 100 INJECTION, SOLUTION INTRAVENOUS; SUBCUTANEOUS at 05:12

## 2024-12-20 RX ADMIN — PANTOPRAZOLE SODIUM 40 MG: 40 TABLET, DELAYED RELEASE ORAL at 10:12

## 2024-12-20 RX ADMIN — INSULIN ASPART 6 UNITS: 100 INJECTION, SOLUTION INTRAVENOUS; SUBCUTANEOUS at 01:12

## 2024-12-20 RX ADMIN — INSULIN ASPART 6 UNITS: 100 INJECTION, SOLUTION INTRAVENOUS; SUBCUTANEOUS at 05:12

## 2024-12-20 RX ADMIN — METOCLOPRAMIDE 10 MG: 5 INJECTION, SOLUTION INTRAMUSCULAR; INTRAVENOUS at 01:12

## 2024-12-20 RX ADMIN — PRAVASTATIN SODIUM 10 MG: 10 TABLET ORAL at 10:12

## 2024-12-20 RX ADMIN — INSULIN ASPART 8 UNITS: 100 INJECTION, SOLUTION INTRAVENOUS; SUBCUTANEOUS at 05:12

## 2024-12-20 RX ADMIN — INSULIN ASPART 5 UNITS: 100 INJECTION, SOLUTION INTRAVENOUS; SUBCUTANEOUS at 02:12

## 2024-12-20 RX ADMIN — SODIUM CHLORIDE, POTASSIUM CHLORIDE, SODIUM LACTATE AND CALCIUM CHLORIDE 1000 ML: 600; 310; 30; 20 INJECTION, SOLUTION INTRAVENOUS at 11:12

## 2024-12-20 NOTE — ED NOTES
Pt sleeping comfortably in bedside. Chest rising and falling. Denies needs at this time. Call light within reach.

## 2024-12-20 NOTE — ED NOTES
Pt reports pain to right arm. Notable swelling around iv site with LR in progress.  Stopped LR and iv removed d/t infiltration.

## 2024-12-20 NOTE — H&P
U Medicine History and Physical  [facility]    Admitting Team: DILIP Attending: Linda   Resident: Samir Cali Intern: Senia Blair MD     Date of Admit: 12/19/2024    Chief Complaint and Duration     Abdominal pain    Subjective      History of Present Illness:  Andrzej Sinclair is a 39 y.o. male with PMHx of type 1 diabetes mellitus who presented to [facility] on 12/19/2024 for persistent abdominal pain.    The patient was in their usual state of health until December 16th when he was experiencing abdominal pain with subsequent vomiting episodes. Pt went to [facility] from dec 16th- 18th. Pt remains without any improvement despite hospitalization. Unable to tolerate any foods.    Pt reports diffuse abdominal pain, nausea, and vomiting. Patient states that he was seen recently for similar symptoms, 12/16, however they returned as of this morning. Feels similar to previous presentation. Pt states this has occurred 4 times in the past. Denies changes in urination. Reports he has not had a bowel movement since 12/16. No rectal bleeding. No blood in vomit.     Review of Systems:  ROS    All other systems reviewed and negative    Past Medical History:   Diabetes mellitus      Diabetes mellitus type 1       Diagnosed at age 20       Past Surgical History:  Past Surgical History:   Procedure Laterality Date    ESOPHAGOGASTRODUODENOSCOPY N/A 11/5/2024    Procedure: EGD (ESOPHAGOGASTRODUODENOSCOPY);  Surgeon: Manny Troy MD;  Location: [address];  Service: Endoscopy;  Laterality: N/A;       Allergies:  Review of patient's allergies indicates:   Allergen Reactions    Lactose Other (See Comments)     Gas and moderate to sever abdominal pain       Home Medications:  Prior to Admission medications    Medication Sig Start Date End Date Taking? Authorizing Provider   aluminum-magnesium hydroxide-simethicone (MAALOX) 200-200-20 mg/5 mL Susp Take 30 mLs by mouth 4 (four) times daily as  "needed (heartburn). 11/5/24 11/5/25  Betina Torres PA-C   blood sugar diagnostic Strp 3-4 times a day 11/21/23   Ayah Cazares MD   blood-glucose meter kit Use as instructed 11/21/23   Ayah Cazares MD   blood-glucose sensor (DEXCOM G6 SENSOR) Martha 1 each by Misc.(Non-Drug; Combo Route) route every 10 days. 5/28/24 5/28/25  Ayah Cazares MD   blood-glucose sensor (DEXCOM G7 SENSOR) Martha Apply one sensor to the skin every 10 days 6/3/24   Ayah Cazares MD   blood-glucose transmitter (DEXCOM G6 TRANSMITTER) Martha Use as instructed. 5/28/24   Ayah Cazares MD   insulin lispro-aabc (LYUMJEV KWIKPEN U-100 INSULIN) 100 unit/mL pen Inject 8 Units into the skin 3 (three) times daily with meals. Take additional sliding scale based on the blood sugars.  Maximum daily dose of 36 units. 4/2/24   Darshan Knight MD   lancets 31 gauge Misc 1 lancet  by Misc.(Non-Drug; Combo Route) route 2 (two) times daily. 1/4/24   Viktoriya Jaeger NP   lisinopriL 10 MG tablet Take 1 tablet (10 mg total) by mouth once daily. 6/28/24 6/28/25  Viktoriya Jaeger NP   ondansetron (ZOFRAN-ODT) 4 MG TbDL Take 1 tablet (4 mg total) by mouth every 6 (six) hours as needed. 12/17/24   Renaldo Lozano PA-C   pantoprazole (PROTONIX) 40 MG tablet Take 1 tablet (40 mg total) by mouth once daily. 11/5/24 2/3/25  Betina Torres PA-C   pen needle, diabetic (BD ULTRA-FINE ROVERTO PEN NEEDLE) 32 gauge x 5/32" Ndle USE AS DIRECTED WITH INSULIN 3/7/23   Supriya Peck MD   pen needle, diabetic 29 gauge x 1/2" Ndle Use to inject insulin into the skin. 11/17/23   Jerad Calvo MD   pravastatin (PRAVACHOL) 10 MG tablet Take 5 mg once a day [half a pill daily] 11/21/23   Ayah Cazares MD   TRUEPLUS INSULIN 0.5 mL 30 gauge x 5/16" Syrg USE FOUR TIMES DAILY AS DIRECTED 5/24/24   Ayah Cazares MD   TRUEPLUS INSULIN 0.5 mL 31 gauge x 5/16" Syrg USE FOUR TIMES DAILY AS DIRECTED 5/22/24   Ayah Cazares MD       Family History:  Family History   Problem " Relation Name Age of Onset    Other Mother prediabetes         prediabetes    Diabetes Mother prediabetes     Other Father          patient does not know his fathers history       Social History:  Tobacco:   Tobacco Use: Medium Risk (12/16/2024)    Patient History     Smoking Tobacco Use: Former     Smokeless Tobacco Use: Never     Passive Exposure: Not on file     EtOH:   Alcohol Use: Not At Risk (12/17/2024)    AUDIT-C     Frequency of Alcohol Consumption: Monthly or less     Average Number of Drinks: 1 or 2     Frequency of Binge Drinking: Never     Illicits:   Social History     Substance and Sexual Activity   Drug Use No      Occupation: Data Unavailable.      Health Maintaince :   Primary Care Physician: Viktoriya Jaeger NP    Immunizations:   Currently on File within the [facility]:   Most Recent Immunizations   Administered Date(s) Administered    COVID-19, MRNA, LN-S, PF (Pfizer) (Purple Cap) 10/18/2021    Influenza - Quadrivalent - PF *Preferred* (6 months and older) 09/18/2023    Influenza - Trivalent - Fluarix, Flulaval, Fluzone, Afluria - PF 11/03/2016    Pneumococcal Polysaccharide - 23 Valent 04/03/2018    Tdap 04/08/2022     TDap:   []  Influenza:  []  Pneumovax:  []  COVID-19:  []    Cancer Screening:  Colonoscopy:  []     Objective     Physical Examination:    Triage: BP: (!) 140/96  Pulse: 84  Temp: 98.2 °F (36.8 °C)  Resp: (!) 22           SpO2: 100 %  Exam: /63   Pulse 89   Temp 98.2 °F (36.8 °C) (Oral)   Resp 18   SpO2 99%   There is no height or weight on file to calculate BMI.   Physical Exam   Gen: NAD, normal appearance  HEENT: NC/AT, EOMI grossly, normal external ears bilaterally, normal external nose  Neck: trachea midline  CV: Regular rate, regular rhythm, no murmur appreciated today. 2+ radial pulses  Resp:   Normal work of breathing. Normal effort. Equal chest rise. No respiratory distress  Abd: Soft, nontender, nondistended, no rebound/guarding  MSK/Ext: No  clubbing, cyanosis, or edema. Moves all four extremities spontaneously.   Neuro: no gross neurological deficits noted, moving extremities spontaneously  Skin: Warm, dry, no rash     Laboratory:  Lab Results   Component Value Date    WBC 4.27 12/19/2024    HGB 13.8 (L) 12/19/2024    MCV 89 12/19/2024    MCH 30.4 12/19/2024    MCHC 34.2 12/19/2024    RDW 12.9 12/19/2024     12/19/2024    MPV 10.9 12/19/2024    PLTEST Clumped (A) 11/15/2021     Lab Results   Component Value Date    CREATININE 1.4 12/19/2024    BUN 15 12/19/2024     12/19/2024    K 4.4 12/19/2024     12/19/2024    CO2 25 12/19/2024       All laboratory data reviewed    Microbiology Data:  Microbiology Results (last 7 days)       ** No results found for the last 168 hours. **            EKG:  Results for orders placed or performed during the hospital encounter of 12/19/24   EKG 12-lead    Collection Time: 12/19/24  4:05 PM   Result Value Ref Range    QRS Duration 104 ms    [facility] QTC Calculation 427 ms    Narrative    Test Reason : R07.9,    Vent. Rate :  84 BPM     Atrial Rate :  84 BPM     P-R Int : 132 ms          QRS Dur : 104 ms      QT Int : 362 ms       P-R-T Axes :  84  91  79 degrees    QTcB Int : 427 ms    Normal sinus rhythm  Rightward axis  Incomplete right bundle branch block  Borderline Abnormal ECG  When compared with ECG of 16-Dec-2024 17:44,  No significant change was found    Referred By: AAAREFERRAL SELF           Confirmed By:         I have personally reviewed the above EKG    Radiology:  CT Abdomen Pelvis With IV Contrast NO Oral Contrast   Final Result      1. No findings to suggest bowel obstruction.   2. No findings to suggest obstructive uropathy.   3. Liquid content within the distal esophagus may reflect sequela of gastroesophageal reflux.   4. Please see above for several additional findings.         Electronically signed by: Alec Hunt MD   Date:    12/19/2024   Time:    16:27           I have personally  reviewed the above imaging    Assessment/Plan     Intractable Hyperemesis  Differentials include cannabinoid induced hyperemesis with THC present on drug screen, gastroparesis given severe uncontrolled type 1 diabetes mellitus however does not report classical symptoms of fullness, early satiety, and bloating. Per chart review had 2 normal CT abdomen in 11/2024. EGD on 11/5/2024 with normal esophagus,gastric body and antrum however with erythematous duodenopathy. H Pylori neg. Biopsy with pathology normal, no metaplasia, dysplasia or malignancy. Last gastric emptying study in 2021 was normal.   -received GI cocktail with benadryl, haldol, reglan and ativan   -Drug screen THC+, opiates + (unknown if due to Morphine given in ED or prior to arrival)  -CT abd pelvis evidence of GERD but otherwise unremarkable  -continue antiemetics, PPI and reglan  -consider gastric emptying study  -GI consult    Diabetes Mellitus Type 1  Not in a ketoacidotic state, no anion gap, no electrolyte derangements, beta hydroxyurate neg. Per chart review has hx of DKA April 2021 June 2021, Aug 2021, Nov 2021. Does not follow with an endocrinologist as of a year ago due to lack of insurance coverage. Reports he takes sliding scale and long acting Omnipod 20 units daily.   -Per chart review, last Hemoglobin A1C 11.8%  -glucose on admission 222, no anion gap  -SSI    Normocytic Anemia  -order iron studies  -CTM    HCM  -will need PCP followup for continuity of care  -will need endocrinology followup post discharge    Diet: NPO until tolerates diet  Code: full  Dispo: home when stable  Estimated LOS: 1-2 days    Senia Blair MD  [employer] Neurology PGY-I  [employer] Internal Medicine Service Team A

## 2024-12-20 NOTE — ED NOTES
Iv attempt x 1 per RN w/o success, Iv attempt x 2 per RN without success. Notified charge for U/S guided IV

## 2024-12-20 NOTE — DISCHARGE SUMMARY
[facility] Hospital Medicine Discharge Summary    Primary Team: [facility] Hospitalist Team A  Attending Physician: Linda  Resident: Samir Cali  Intern: Senia Blair MD    Date of Admit: 12/19/2024  Date of Discharge: 12/20/2024    Discharge to: Home  Condition: Stable    Discharge Diagnoses     Patient Active Problem List   Diagnosis    Ketosis    Primary hypertension    Hepatic steatosis    Enlarged prostate    Type 1 diabetes mellitus with complications    Cannabinoid hyperemesis syndrome    Adjustment disorder with anxiety    Costochondritis    Polyneuropathy due to type 1 diabetes mellitus    Moderate malnutrition    Type 1 diabetes mellitus with hyperglycemia    Elevated LFTs    Acute metabolic encephalopathy    BETH (acute kidney injury)    Metabolic acidosis    Mixed hyperlipidemia    Hyperosmolar hyperglycemic state (HHS)    Abdominal pain    Intractable nausea and vomiting    Lactic acidosis due to diabetes mellitus    Cyclical vomiting syndrome    Type 1 diabetes mellitus with hyperosmolarity, uncontrolled    Sepsis    Hyperkalemia    Hyperemesis       Consultants and Procedures     Consultants:   none    Procedures:   none    Imaging:  CT Abdomen Pelvis With IV Contrast NO Oral Contrast   Final Result      1. No findings to suggest bowel obstruction.   2. No findings to suggest obstructive uropathy.   3. Liquid content within the distal esophagus may reflect sequela of gastroesophageal reflux.   4. Please see above for several additional findings.         Electronically signed by: Alec Hunt MD   Date:    12/19/2024   Time:    16:27      NM Gastric Emptying    (Results Pending)        Brief History of Present Illness      Andrzej Sinclair is a 39 y.o. male with PMHx of type 1 diabetes mellitus who presented to [facility] on 12/19/2024 for persistent abdominal pain.     The patient was in their usual state of health until December 16th when he was experiencing abdominal pain with subsequent vomiting episodes. Pt  went to [facility] from dec 16th- 18th. Pt remains without any improvement despite hospitalization. Unable to tolerate any foods.     Pt reports diffuse abdominal pain, nausea, and vomiting. Patient states that he was seen recently for similar symptoms, 12/16, however they returned as of this morning. Feels similar to previous presentation. Pt states this has occurred 4 times in the past. Denies changes in urination. Reports he has not had a bowel movement since 12/16. No rectal bleeding. No blood in vomit.     For the full HPI please refer to the History & Physical from this admission.    Hospital Course By Problem with Pertinent Findings     Intractable Hyperemesis  Differentials include cannabinoid hyperemesis syndrome with THC present on drug screen, gastroparesis given severe uncontrolled type 1 diabetes mellitus however does not report classical symptoms of fullness, early satiety, and bloating. Per chart review had 2 normal CT abdomen in 11/2024. EGD on 11/5/2024 with normal esophagus,gastric body and antrum however with erythematous duodenopathy. H Pylori neg. Biopsy with pathology normal, no metaplasia, dysplasia or malignancy. Last gastric emptying study in 2021 was normal.   -received GI cocktail with benadryl, haldol, reglan and ativan   -Drug screen THC+, opiates + (unknown if due to Morphine given in ED or prior to arrival)  -CT abd pelvis evidence of GERD but otherwise unremarkable  -continue antiemetics, PPI and reglan  -consider gastric emptying study  -pt able to tolerate lunch prior to discharge     Uncontrolled Diabetes Mellitus Type 1  Not in a ketoacidotic state, no anion gap, no electrolyte derangements, beta hydroxyurate neg. Per chart review has hx of DKA April 2021 June 2021, Aug 2021, Nov 2021. Does not follow with an endocrinologist as of a year ago due to lack of insurance coverage. Reports he takes sliding scale and long acting lantus 20 units daily.  -Per chart review, last  "Hemoglobin A1C 11.8%  -glucose on admission 222, no anion gap  -SSI  -acidosis improvement on repeat BMP, no evidence of DKA     Normocytic Anemia  -order iron studies  -CTM     HCM  -will need PCP followup for continuity of care  -will need endocrinology followup post discharge    Discharge Medications        Medication List        CHANGE how you take these medications      BD ULTRA-FINE ORIG PEN NEEDLE 29 gauge x 1/2" Ndle  Generic drug: pen needle, diabetic  Use to inject insulin into the skin.  What changed: Another medication with the same name was removed. Continue taking this medication, and follow the directions you see here.     DEXCOM G7 SENSOR Martha  Generic drug: blood-glucose sensor  Apply one sensor to the skin every 10 days  What changed: Another medication with the same name was removed. Continue taking this medication, and follow the directions you see here.     TRUEPLUS INSULIN 0.5 mL 30 gauge x 5/16" Syrg  Generic drug: insulin syringe-needle U-100  USE FOUR TIMES DAILY AS DIRECTED  What changed: Another medication with the same name was removed. Continue taking this medication, and follow the directions you see here.            CONTINUE taking these medications      aluminum-magnesium hydroxide-simethicone 200-200-20 mg/5 mL Susp  Commonly known as: MAALOX  Take 30 mLs by mouth 4 (four) times daily as needed (heartburn).     blood sugar diagnostic Strp  3-4 times a day     blood-glucose meter kit  Use as instructed     lancets 31 gauge Misc  1 lancet  by Misc.(Non-Drug; Combo Route) route 2 (two) times daily.     lisinopriL 10 MG tablet  Take 1 tablet (10 mg total) by mouth once daily.     LYUMJEV KWIKPEN U-100 INSULIN 100 unit/mL pen  Generic drug: insulin lispro-aabc  Inject 8 Units into the skin 3 (three) times daily with meals. Take additional sliding scale based on the blood sugars.  Maximum daily dose of 36 units.     ondansetron 4 MG Tbdl  Commonly known as: ZOFRAN-ODT  Take 1 tablet (4 mg " total) by mouth every 6 (six) hours as needed.     pantoprazole 40 MG tablet  Commonly known as: PROTONIX  Take 1 tablet (40 mg total) by mouth once daily.            STOP taking these medications      DEXCOM G6 TRANSMITTER Martha  Generic drug: blood-glucose transmitter     pravastatin 10 MG tablet  Commonly known as: PRAVACHOL              Discharge Information:     Diet:  Diabetic, as tolerated    Physical Activity:  As tolerated             Instructions:  1. Take all medications as prescribed  2. Keep all follow-up appointments  3. Return to the hospital or call your primary care physicians if any worsening symptoms such as fever, chest pain, shortness of breath, return of symptoms, or any other concerns.    Follow-Up Appointments:  Ambulatory Referral to Endocrinology- reestablish care for uncontrolled Type 1 diabetes Mellitus  Ambulatory Referral to GI- diabetic gastroparesis workup with gastric emptying study   Ambulatory Referral to PCP- continuity of care of comorbid illnesses       Senia Blair MD  [facility] Neurology PGY-I  [facility] Internal Medicine Service

## 2024-12-20 NOTE — PROGRESS NOTES
[facility] Medicine Progress Note    Primary Team: hospitals Hospitalist Team A  Attending Physician: Alejandra Mckeon MD  Resident: Samir Cali  Intern: Senia Blair MD    Date of Admit: 2024     Chief Complaint:   Chief Complaint   Patient presents with    Vomiting     Abdominal pain with N/V since . No fever reported. Presents awake, alert, oriented. No relief with Zofran given by EMS       Subjective/Interval Events:      No acute events overnight. Patient resting comfortably in bed this morning. Patient is alert and conversant and not in acute distress. Patient is without complaints this morning. No PRN's including reglan or zofran needed overnight.     Objective    Objective   Last 24 Hour Vital Signs:  BP  Min: 130/70  Max: 140/96  Temp  Av.2 °F (36.8 °C)  Min: 98 °F (36.7 °C)  Max: 98.4 °F (36.9 °C)  Pulse  Av  Min: 84  Max: 90  Resp  Av.3  Min: 18  Max: 22  SpO2  Av.8 %  Min: 99 %  Max: 100 %    Intake/Output Summary (Last 24 hours) at 2024 0714  Last data filed at 2024 1736  Gross per 24 hour   Intake --   Output 120 ml   Net -120 ml       Physical Examination:  Physical Exam   Gen: NAD, normal appearance  HEENT: NC/AT, EOMI grossly, normal external ears bilaterally, normal external nose  Neck: trachea midline  CV: Regular rate, regular rhythm, no murmur appreciated today. 2+ radial pulses  Resp:   Normal work of breathing. Normal effort. Equal chest rise. No respiratory distress  Abd: Soft, nontender, nondistended, no rebound/guarding  MSK/Ext: No clubbing, cyanosis, or edema. Moves all four extremities spontaneously.   Neuro: no gross neurological deficits noted, moving extremities spontaneously  Skin: Warm, dry, no rash    Laboratory:  Recent Labs   Lab 24  1813 24  0424 24  1445 24  0443   WBC  --  7.43 4.27 5.99   HGB  --  13.0* 13.8* 13.8*   HCT  --  37.6* 40.3 40.1   PLT  --  217 192 166   MCV  --  89 89 90   RDW  --  12.7 12.9  12.6    141 141  --    K 4.6 4.4 4.4  --     103 105  --    CO2 25 22* 25  --    BUN 15 20 15  --    CREATININE 1.3 1.5* 1.4  --    * 294* 222*  --    PROT 7.3 7.1 7.0  --    ALBUMIN 4.2 4.0 4.1  --    BILITOT 0.6 0.6 0.8  --    AST 29 25 28  --    ALKPHOS 87 80 75  --    ALT 26 26 21  --        Microbiology:  Microbiology Results (last 7 days)       ** No results found for the last 168 hours. **             Imaging:  CT Abdomen Pelvis With IV Contrast NO Oral Contrast   Final Result      1. No findings to suggest bowel obstruction.   2. No findings to suggest obstructive uropathy.   3. Liquid content within the distal esophagus may reflect sequela of gastroesophageal reflux.   4. Please see above for several additional findings.         Electronically signed by: Alec Hunt MD   Date:    12/19/2024   Time:    16:27           Current Medications:     Scheduled:   insulin aspart U-100  8 Units Subcutaneous TIDWM    insulin glargine U-100  10 Units Subcutaneous Daily    pantoprazole  40 mg Oral Daily    pravastatin  10 mg Oral Daily         Infusions:       PRN:    Current Facility-Administered Medications:     acetaminophen, 650 mg, Oral, Q4H PRN    dextrose 50%, 12.5 g, Intravenous, PRN    dextrose 50%, 12.5 g, Intravenous, PRN    dextrose 50%, 25 g, Intravenous, PRN    glucagon (human recombinant), 1 mg, Intramuscular, PRN    glucagon (human recombinant), 1 mg, Intramuscular, PRN    glucose, 16 g, Oral, PRN    glucose, 24 g, Oral, PRN    insulin aspart U-100, 0-10 Units, Subcutaneous, QID (AC + HS) PRN    metoclopramide, 10 mg, Intravenous, Q6H PRN    naloxone, 0.02 mg, Intravenous, PRN    ondansetron, 4 mg, Intravenous, Q8H PRN    sodium chloride 0.9%, 10 mL, Intravenous, Q12H PRN       Assessment/Plan:     Intractable Hyperemesis  Differentials include cannabis hyperemesis syndrome with THC present on drug screen, gastroparesis given severe uncontrolled type 1 diabetes mellitus however does  not report classical symptoms of fullness, early satiety, and bloating. Per chart review had 2 normal CT abdomen in 11/2024. EGD on 11/5/2024 with normal esophagus,gastric body and antrum however with erythematous duodenopathy. H Pylori neg. Biopsy with pathology normal, no metaplasia, dysplasia or malignancy. Last gastric emptying study in 2021 was normal.   -received GI cocktail with benadryl, haldol, reglan and ativan   -Drug screen THC+, opiates + (unknown if due to Morphine given in ED or prior to arrival)  -CT abd pelvis evidence of GERD but otherwise unremarkable  -continue antiemetics, PPI and reglan  -consider gastric emptying study  -GI consult if worsening     Diabetes Mellitus Type 1  Not in a ketoacidotic state, no anion gap, no electrolyte derangements, beta hydroxyurate neg. Per chart review has hx of DKA April 2021 June 2021, Aug 2021, Nov 2021. Does not follow with an endocrinologist as of a year ago due to lack of insurance coverage. Reports he takes sliding scale and long acting Omnipod 20 units daily.   -Per chart review, last Hemoglobin A1C 11.8% in 11/2024  -glucose on admission 222, no anion gap; glucose ranges inpatient 300-400's  -SSI, lantus 10 units daily, aspart 8 units with meals; increase lantus  -will need endocrinologist as outpatient    Normocytic Anemia  -order iron studies  -CTM     HCM  -will need PCP followup for continuity of care  -will need endocrinology followup post discharge     Diet: NPO until tolerates diet  Code: full  Dispo: home when stable  Estimated LOS: 1-2 days    Senia Blair MD   [facility] Neurology PGY-1  [employer] Internal Medicine Team A    [facility] Medicine Hospitalist Pager numbers:   [facility] Hospitalist Medicine Team A (Linda/Olivier): [phone]

## 2024-12-20 NOTE — PROGRESS NOTES
VIRTUAL NURSE: Phoned into patient's room. Introduced as VN that will be working with floor nurse this shift. Educated the patient on VN's role in patient care. Plan of care reviewed with patient. Informed patient that staff will round on them every 2 hours but to use call light for any other needs they may have. Also informed of fall risk and fall precautions. Patient verbalized understanding. Opportunity given for questions and questions answered. Admission assessment questions completed.      12/20/24 1622   Admission   Initial VN Admission Questions Complete   Communication Issues? None   Shift   Virtual Nurse - Patient Verbalized Approval Of Other (See Comments)  (no vidyo access; admission questions completed via phone)   Safety/Activity   Safety Promotion/Fall Prevention Fall Risk reviewed with patient/family;instructed to call staff for mobility   Pain/Comfort/Sleep   Preferred Pain Scale number (Numeric Rating Pain Scale)   Pain Body Location abdomen   Pain Rating (0-10): Rest 2

## 2024-12-21 ENCOUNTER — PATIENT MESSAGE (OUTPATIENT)
Dept: ENDOCRINOLOGY | Facility: CLINIC | Age: 39
End: 2024-12-21
Payer: MEDICARE

## 2024-12-21 NOTE — PROGRESS NOTES
Discharge orders noted. Additional clinical references attached. Patient's discharge instructions given by bedside RN. Virtual nurse cued into room and reviewed discharge instructions. Education provided on new medication, diagnosis, and follow-up appointments. Teach back method used. Patient verbalized understanding. All questions answered. Transport to [facility] requested. Bedside nurse updated on patient status and transportation request.      12/20/24 4902   AVS Confirmation   Discharge instructions and AVS provided to and reviewed with patient and/or significant other. Yes

## 2024-12-23 LAB
OHS QRS DURATION: 104 MS
OHS QTC CALCULATION: 427 MS

## 2025-01-08 ENCOUNTER — OFFICE VISIT (OUTPATIENT)
Dept: ENDOCRINOLOGY | Facility: CLINIC | Age: 40
End: 2025-01-08
Payer: MEDICARE

## 2025-01-08 ENCOUNTER — TELEPHONE (OUTPATIENT)
Dept: ENDOCRINOLOGY | Facility: CLINIC | Age: 40
End: 2025-01-08

## 2025-01-08 DIAGNOSIS — E10.65 TYPE 1 DIABETES MELLITUS WITH HYPERGLYCEMIA: Primary | ICD-10-CM

## 2025-01-08 DIAGNOSIS — E78.2 MIXED HYPERLIPIDEMIA: ICD-10-CM

## 2025-01-08 RX ORDER — INSULIN DEGLUDEC 100 U/ML
22 INJECTION, SOLUTION SUBCUTANEOUS DAILY
Qty: 19.8 ML | Refills: 3 | Status: SHIPPED | OUTPATIENT
Start: 2025-01-08 | End: 2026-01-08

## 2025-01-08 RX ORDER — GLUCAGON 1 MG
1 VIAL (EA) INJECTION
Qty: 1 EACH | Refills: 1 | Status: SHIPPED | OUTPATIENT
Start: 2025-01-08 | End: 2026-01-08

## 2025-01-08 NOTE — ASSESSMENT & PLAN NOTE
Recent A1c was elevated at 11.8.  Has history of multiple DKA episodes.  Discussed importance of compliance to his diabetes management.   Dexcom clarity data not available for review    Change in his diabetes regimen:  Increase Tresiba to 22 units at bedtime  Lyumjev 8 units before meals with correction insulin 1:50, >150    Discussed goal A1c of 6.5-7%  Call if blood sugars are persistently less than 70 or greater than 200.     Continue Dexcom CGM.  Follow-up with the diabetes educator.  Consider insulin pump if insurance covers for it    Discussed importance of diet and lifestyle modifications for diabetes management  Hypoglycemia management discussed. Carry glucose tablets or snacks at all times.   Patient has glucagon injection.     Complications:  Follow up for regular diabetes eye exam  Daily self examination of feet.  Microalbumin: Monitor. On ACEI    Last A1c:   Lab Results   Component Value Date    HGBA1C 11.8 (H) 11/03/2024

## 2025-01-08 NOTE — PATIENT INSTRUCTIONS
Please get fasting labs in 8 weeks.    Change in your diabetes medications:   Increase Tresiba to 22 units at bedtime.  Continue Lyumjev 8 units with meals and take sliding scale based on the blood sugars as below:    Sliding Scale     150-199             give extra 1units of insulin to yourself.  200-249                             2 units  250-299                             3 units  300-349                             4 units  Above 350                         5 units       Check blood sugars before meals and bedtime.   Call if blood sugars are frequently less than 70 or above 200.    Referral to the diabetes educator.     Call if you need prescriptions for medications.  Carry glucose tablets or snacks with you at all times.     Follow-up in 6 months.

## 2025-01-08 NOTE — PROGRESS NOTES
"ENDOCRINOLOGY CLINIC    The patient location is: Work    Visit type: audiovisual    Face to Face time with patient: 15 mins    25 minutes of total time spent on the encounter, which includes face to face time and non-face to face time preparing to see the patient (eg, review of tests), Obtaining and/or reviewing separately obtained history, Documenting clinical information in the electronic or other health record, Independently interpreting results (not separately reported) and communicating results to the patient/family/caregiver, or Care coordination (not separately reported).     Each patient to whom he or she provides medical services by telemedicine is:  (1) informed of the relationship between the physician and patient and the respective role of any other health care provider with respect to management of the patient; and (2) notified that he or she may decline to receive medical services by telemedicine and may withdraw from such care at any time.    Notes:      Subjective:      Chief Complaint: Type 1 diabetes    HPI:   Andrzej Sinclair is a 39 y.o. male who presents for follow-up of type 1 diabetes. Patient was last seen in the clinic on 11/20/2023.    Diabetes Hx:  Diagnosed w/ DM:  Diagnosed at 20 years of age  Complications:   Retinopathy:  Patient says he was told there was a "spot" in his eye.    Last eye exam: in 2024 at OSH.  Report not available for review.  Neuropathy: No, denies foot ulcers  Nephropathy: Yes, has albuminuria  Cardiovascular:  Denies  Gastroparesis:  Denies  DKA/HHS:  Multiple episodes of DKA, last in 11/2023. Mild DKA in 12/2024. Per notes, frequent ER presentations for intractable N/V and abdominal pain related to cannabis use. Past gastric emptying study in 2021 was normal.     Severe Hypoglycemia:  Denies  Hypoglycemia unawareness:  Denies  Glucagon: Has glucagon injections  Hypoglycemic episodes:  Occasional hypoglycemia when he overcorrects.  Patient says he takes sometimes 10 " units if his blood sugars are high.    Current meds:   Tresiba 20 units at bedtime  Lyumjev 8 units with ISF 50, target 180    Ms. Scobemarsha had prescribed OmniPod 5 in 01/2023.  Patient says his insurance does not cover for education for insulin pump    Compliance with meds:  Reports compliance    Home glucose checks: Dexcom G7.   Reports using it regularly.  Updated blood sugar data not available for review.    Diet/Exercise:   3 meals a day, occasional snacks  Drinks adequate water.  Not very active    Diabetes education: 3/2021     Last A1c:   Lab Results   Component Value Date    HGBA1C 11.8 (H) 11/03/2024    HGBA1C 12.0 (H) 11/02/2024    HGBA1C 10.3 (H) 03/31/2024     Microalbumin:   Lab Results   Component Value Date    LABMICR 116.0 04/10/2023    CREATRANDUR 99.8 12/19/2024    MICALBCREAT 85.3 (H) 04/10/2023     Lab Results   Component Value Date    EGFRNORACEVR 56 (A) 12/20/2024    CREATININE 1.6 (H) 12/20/2024     Lipids:   Lab Results   Component Value Date    CHOL 274 (H) 04/10/2023    TRIG 106 04/10/2023    HDL 80 (H) 04/10/2023    LDLCALC 172.8 (H) 04/10/2023    CHOLHDL 29.2 04/10/2023     TSH:  Lab Results   Component Value Date    TSH 1.043 09/13/2023     Lab Results   Component Value Date    HGB 13.8 (L) 12/20/2024      Aspirin: No  Statins: No. Was on rosuvastatin and atorvastatin, had feet swelling and stopped it.   ACEI/ARB: Yes, on lisinopril  Fatty liver: Yes, per notes in 2021  HTN: Denies    History recurrent UTI/fungal infection: Denies    Polyuria/Polydipsia:  Denies    FHx of DM:  Denies    ROS: see HPI     Objective:     Physical Exam     There were no vitals taken for this visit.    Wt Readings from Last 3 Encounters:   12/20/24 88 kg (194 lb 0.1 oz)   12/16/24 92.5 kg (204 lb)   11/02/24 92.5 kg (204 lb)       Constitutional:  Pleasant,  in no acute distress.   HENT:   Eyes:    No scleral icterus.   Respiratory:   Effort normal   Neurological:  Normal speech      LABORATORY REVIEW:  See  HPI for other labs reviewed today      Chemistry        Component Value Date/Time     12/20/2024 1657    K 4.2 12/20/2024 1657     12/20/2024 1657    CO2 31 (H) 12/20/2024 1657    BUN 16 12/20/2024 1657    CREATININE 1.6 (H) 12/20/2024 1657     (H) 12/20/2024 1657        Component Value Date/Time    CALCIUM 9.2 12/20/2024 1657    ALKPHOS 74 12/20/2024 0443    AST 24 12/20/2024 0443    ALT 23 12/20/2024 0443    BILITOT 1.0 12/20/2024 0443    ESTGFRAFRICA >60.0 01/03/2022 1359    EGFRNONAA >60.0 01/03/2022 1359          Lab Results   Component Value Date    HGBA1C 11.8 (H) 11/03/2024    HGBA1C 12.0 (H) 11/02/2024    HGBA1C 10.3 (H) 03/31/2024     Other labs reviewed today in HPI    Assessment/Plan:           1. Type 1 diabetes mellitus with hyperglycemia  Assessment & Plan:    Recent A1c was elevated at 11.8.  Has history of multiple DKA episodes.  Discussed importance of compliance to his diabetes management.   Dexcom clarity data not available for review    Change in his diabetes regimen:  Increase Tresiba to 22 units at bedtime  Lyumjev 8 units before meals with correction insulin 1:50, >150    Discussed goal A1c of 6.5-7%  Call if blood sugars are persistently less than 70 or greater than 200.     Continue Dexcom CGM.  Follow-up with the diabetes educator.  Consider insulin pump if insurance covers for it    Discussed importance of diet and lifestyle modifications for diabetes management  Hypoglycemia management discussed. Carry glucose tablets or snacks at all times.   Patient has glucagon injection.     Complications:  Follow up for regular diabetes eye exam  Daily self examination of feet.  Microalbumin: Monitor. On ACEI    Last A1c:   Lab Results   Component Value Date    HGBA1C 11.8 (H) 11/03/2024             Orders:  -     Hemoglobin A1C; Future  -     Microalbumin/Creatinine Ratio, Urine; Future  -     Comprehensive Metabolic Panel; Future  -     TSH; Future  -     Ambulatory  referral/consult to Diabetes Education; Future; Expected date: 01/15/2025  -     insulin degludec (TRESIBA FLEXTOUCH U-100) 100 unit/mL (3 mL) insulin pen; Inject 22 Units into the skin once daily.  Dispense: 19.8 mL; Refill: 3  -     glucagon (GLUCAGON EMERGENCY KIT, HUMAN,) 1 mg SolR; Inject 1 mg into the muscle as needed (Hypoglycemia).  Dispense: 1 each; Refill: 1    2. Mixed hyperlipidemia  Assessment & Plan:    Reports swelling of his feet with rosuvastatin atorvastatin.  Repeat lipid panel.  Discussed starting an alternative statin or Zetia.  Discussed risk of ASCVD.      Orders:  -     Lipid Panel; Future       Follow up in about 6 months (around 7/8/2025).     Ayah Cazares MD

## 2025-01-08 NOTE — TELEPHONE ENCOUNTER
Called patient to schedule labs and follow up appointment with educator. Patient did not answer. Left voicemail with callback number.     ----- Message from Ayah Cazares MD sent at 1/8/2025  9:26 AM CST -----  Please schedule fasting labs in 8 weeks.  Please schedule follow-up with the diabetes educator.

## 2025-01-08 NOTE — ASSESSMENT & PLAN NOTE
Reports swelling of his feet with rosuvastatin atorvastatin.  Repeat lipid panel.  Discussed starting an alternative statin or Zetia.  Discussed risk of ASCVD.

## 2025-01-22 ENCOUNTER — PATIENT MESSAGE (OUTPATIENT)
Dept: ENDOCRINOLOGY | Facility: CLINIC | Age: 40
End: 2025-01-22
Payer: MEDICARE

## 2025-03-07 ENCOUNTER — TELEPHONE (OUTPATIENT)
Dept: GASTROENTEROLOGY | Facility: CLINIC | Age: 40
End: 2025-03-07
Payer: MEDICARE

## 2025-03-23 DIAGNOSIS — E10.36 TYPE 1 DIABETES MELLITUS WITH DIABETIC CATARACT: ICD-10-CM

## 2025-03-23 DIAGNOSIS — E10.65 TYPE 1 DIABETES MELLITUS WITH HYPERGLYCEMIA: ICD-10-CM

## 2025-03-24 ENCOUNTER — PATIENT MESSAGE (OUTPATIENT)
Dept: ENDOCRINOLOGY | Facility: CLINIC | Age: 40
End: 2025-03-24
Payer: MEDICARE

## 2025-03-24 RX ORDER — INSULIN DEGLUDEC 100 U/ML
22 INJECTION, SOLUTION SUBCUTANEOUS DAILY
Qty: 21 ML | Refills: 3 | Status: SHIPPED | OUTPATIENT
Start: 2025-03-24

## 2025-03-24 RX ORDER — SYRINGE,SAFETY WITH NEEDLE,1ML 25GX1"
SYRINGE (EA) MISCELLANEOUS
Qty: 500 EACH | Refills: 3 | Status: SHIPPED | OUTPATIENT
Start: 2025-03-24

## 2025-04-10 ENCOUNTER — HOSPITAL ENCOUNTER (INPATIENT)
Facility: HOSPITAL | Age: 40
LOS: 5 days | Discharge: LEFT BEFORE DISCHARGE | DRG: 637 | End: 2025-04-15
Attending: EMERGENCY MEDICINE | Admitting: INTERNAL MEDICINE
Payer: MEDICARE

## 2025-04-10 DIAGNOSIS — R73.9 HYPERGLYCEMIA: ICD-10-CM

## 2025-04-10 DIAGNOSIS — E11.10 DKA (DIABETIC KETOACIDOSIS): ICD-10-CM

## 2025-04-10 DIAGNOSIS — N17.9 AKI (ACUTE KIDNEY INJURY): Primary | ICD-10-CM

## 2025-04-10 DIAGNOSIS — E87.5 HYPERKALEMIA: ICD-10-CM

## 2025-04-10 DIAGNOSIS — R11.2 NAUSEA & VOMITING: ICD-10-CM

## 2025-04-10 DIAGNOSIS — E87.20 METABOLIC ACIDOSIS: ICD-10-CM

## 2025-04-10 LAB
ABSOLUTE EOSINOPHIL (OHS): 0 K/UL
ABSOLUTE MONOCYTE (OHS): 0.72 K/UL (ref 0.3–1)
ABSOLUTE NEUTROPHIL COUNT (OHS): 9.39 K/UL (ref 1.8–7.7)
ALBUMIN SERPL BCP-MCNC: 4.2 G/DL (ref 3.5–5.2)
ALBUMIN SERPL BCP-MCNC: 4.3 G/DL (ref 3.5–5.2)
ALP SERPL-CCNC: 105 UNIT/L (ref 40–150)
ALP SERPL-CCNC: 97 UNIT/L (ref 40–150)
ALT SERPL W/O P-5'-P-CCNC: 22 UNIT/L (ref 10–44)
ALT SERPL W/O P-5'-P-CCNC: 22 UNIT/L (ref 10–44)
AMPHET UR QL SCN: NEGATIVE
ANION GAP (OHS): 15 MMOL/L (ref 8–16)
ANION GAP (OHS): 16 MMOL/L (ref 8–16)
ANION GAP (OHS): 22 MMOL/L (ref 8–16)
ANION GAP (OHS): 26 MMOL/L (ref 8–16)
ANION GAP (OHS): 28 MMOL/L (ref 8–16)
AST SERPL-CCNC: 10 UNIT/L (ref 11–45)
AST SERPL-CCNC: 17 UNIT/L (ref 11–45)
B-OH-BUTYR BLD STRIP-SCNC: 5.8 MMOL/L
BACTERIA #/AREA URNS AUTO: NORMAL /HPF
BARBITURATE SCN PRESENT UR: NEGATIVE
BASOPHILS # BLD AUTO: 0.01 K/UL
BASOPHILS NFR BLD AUTO: 0.1 %
BENZODIAZ UR QL SCN: NEGATIVE
BILIRUB SERPL-MCNC: 0.8 MG/DL (ref 0.1–1)
BILIRUB SERPL-MCNC: 0.8 MG/DL (ref 0.1–1)
BILIRUB UR QL STRIP.AUTO: NEGATIVE
BIPAP: 0
BUN SERPL-MCNC: 52 MG/DL (ref 6–20)
BUN SERPL-MCNC: 54 MG/DL (ref 6–20)
BUN SERPL-MCNC: 56 MG/DL (ref 6–20)
BUN SERPL-MCNC: 58 MG/DL (ref 6–20)
BUN SERPL-MCNC: 58 MG/DL (ref 6–20)
BUN SERPL-MCNC: 66 MG/DL (ref 6–30)
CALCIUM SERPL-MCNC: 10 MG/DL (ref 8.7–10.5)
CALCIUM SERPL-MCNC: 10.3 MG/DL (ref 8.7–10.5)
CALCIUM SERPL-MCNC: 10.4 MG/DL (ref 8.7–10.5)
CALCIUM SERPL-MCNC: 10.5 MG/DL (ref 8.7–10.5)
CALCIUM SERPL-MCNC: 10.6 MG/DL (ref 8.7–10.5)
CANNABINOIDS UR QL SCN: NEGATIVE
CHLORIDE SERPL-SCNC: 106 MMOL/L (ref 95–110)
CHLORIDE SERPL-SCNC: 107 MMOL/L (ref 95–110)
CHLORIDE SERPL-SCNC: 111 MMOL/L (ref 95–110)
CHLORIDE SERPL-SCNC: 115 MMOL/L (ref 95–110)
CHLORIDE SERPL-SCNC: 119 MMOL/L (ref 95–110)
CHLORIDE SERPL-SCNC: 119 MMOL/L (ref 95–110)
CLARITY UR: CLEAR
CO2 SERPL-SCNC: 14 MMOL/L (ref 23–29)
CO2 SERPL-SCNC: 18 MMOL/L (ref 23–29)
CO2 SERPL-SCNC: 22 MMOL/L (ref 23–29)
CO2 SERPL-SCNC: 25 MMOL/L (ref 23–29)
CO2 SERPL-SCNC: 27 MMOL/L (ref 23–29)
COCAINE UR QL SCN: NEGATIVE
COLOR UR AUTO: COLORLESS
CORRECTED TEMPERATURE (PCO2): 40.2 MMHG
CORRECTED TEMPERATURE (PH): 7.34
CORRECTED TEMPERATURE (PO2): 73.6 MMHG
CREAT SERPL-MCNC: 2.1 MG/DL (ref 0.5–1.4)
CREAT SERPL-MCNC: 2.5 MG/DL (ref 0.5–1.4)
CREAT SERPL-MCNC: 2.6 MG/DL (ref 0.5–1.4)
CREAT SERPL-MCNC: 2.6 MG/DL (ref 0.5–1.4)
CREAT SERPL-MCNC: 2.7 MG/DL (ref 0.5–1.4)
CREAT SERPL-MCNC: 2.7 MG/DL (ref 0.5–1.4)
CREAT UR-MCNC: 28 MG/DL (ref 23–375)
CREAT UR-MCNC: 28 MG/DL (ref 23–375)
EAG (OHS): 349 MG/DL (ref 68–131)
ERYTHROCYTE [DISTWIDTH] IN BLOOD BY AUTOMATED COUNT: 12.3 % (ref 11.5–14.5)
ETHANOL UR-MCNC: <10 MG/DL
FIO2: 21 %
GFR SERPLBLD CREATININE-BSD FMLA CKD-EPI: 30 ML/MIN/1.73/M2
GFR SERPLBLD CREATININE-BSD FMLA CKD-EPI: 30 ML/MIN/1.73/M2
GFR SERPLBLD CREATININE-BSD FMLA CKD-EPI: 31 ML/MIN/1.73/M2
GFR SERPLBLD CREATININE-BSD FMLA CKD-EPI: 31 ML/MIN/1.73/M2
GFR SERPLBLD CREATININE-BSD FMLA CKD-EPI: 33 ML/MIN/1.73/M2
GLUCOSE SERPL-MCNC: 396 MG/DL (ref 70–110)
GLUCOSE SERPL-MCNC: 511 MG/DL (ref 70–110)
GLUCOSE SERPL-MCNC: 535 MG/DL (ref 70–110)
GLUCOSE SERPL-MCNC: 759 MG/DL (ref 70–110)
GLUCOSE SERPL-MCNC: 762 MG/DL (ref 70–110)
GLUCOSE SERPL-MCNC: 782 MG/DL (ref 70–110)
GLUCOSE UR QL STRIP: ABNORMAL
HBA1C MFR BLD: 13.8 % (ref 4–5.6)
HCT VFR BLD AUTO: 51.3 % (ref 40–54)
HCT VFR BLD CALC: 50 %PCV (ref 36–54)
HGB BLD-MCNC: 17.9 GM/DL (ref 14–18)
HGB UR QL STRIP: ABNORMAL
HOLD SPECIMEN: NORMAL
HYALINE CASTS UR QL AUTO: 0 /LPF (ref 0–1)
IMM GRANULOCYTES # BLD AUTO: 0.06 K/UL (ref 0–0.04)
IMM GRANULOCYTES NFR BLD AUTO: 0.6 % (ref 0–0.5)
KETONES UR QL STRIP: ABNORMAL
LACTATE SERPL-SCNC: 2.7 MMOL/L (ref 0.5–2.2)
LEUKOCYTE ESTERASE UR QL STRIP: NEGATIVE
LIPASE SERPL-CCNC: 7 U/L (ref 4–60)
LYMPHOCYTES # BLD AUTO: 0.65 K/UL (ref 1–4.8)
MAGNESIUM SERPL-MCNC: 3.1 MG/DL (ref 1.6–2.6)
MAGNESIUM SERPL-MCNC: 3.2 MG/DL (ref 1.6–2.6)
MAGNESIUM SERPL-MCNC: 3.3 MG/DL (ref 1.6–2.6)
MCH RBC QN AUTO: 30.9 PG (ref 27–31)
MCHC RBC AUTO-ENTMCNC: 34.9 G/DL (ref 32–36)
MCV RBC AUTO: 89 FL (ref 82–98)
METHADONE UR QL SCN: NEGATIVE
MICROSCOPIC COMMENT: NORMAL
NITRITE UR QL STRIP: NEGATIVE
NUCLEATED RBC (/100WBC) (OHS): 0 /100 WBC
OHS QRS DURATION: 92 MS
OHS QTC CALCULATION: 420 MS
OPIATES UR QL SCN: ABNORMAL
OSMOLALITY SERPL: 388 MOSM/KG (ref 280–300)
PCO2 BLDA: 40.2 MMHG
PCP UR QL: NEGATIVE
PH SMN: 7.34 [PH]
PH UR STRIP: 5 [PH]
PHOSPHATE SERPL-MCNC: 6.8 MG/DL (ref 2.7–4.5)
PLATELET # BLD AUTO: 230 K/UL (ref 150–450)
PMV BLD AUTO: 11.7 FL (ref 9.2–12.9)
PO2 BLDA: 73.6 MMHG
POC BASE DEFICIT: -3.8 MMOL/L
POC HCO3: 21.2 MMOL/L
POC IONIZED CALCIUM: 1.2 MMOL/L (ref 1.06–1.42)
POC PERFORMED BY: NORMAL
POC TCO2 (MEASURED): 30 MMOL/L (ref 23–29)
POC TEMPERATURE: 37 C
POCT GLUCOSE: 246 MG/DL (ref 70–110)
POCT GLUCOSE: 265 MG/DL (ref 70–110)
POCT GLUCOSE: 381 MG/DL (ref 70–110)
POCT GLUCOSE: >500 MG/DL (ref 70–110)
POCT GLUCOSE: >500 MG/DL (ref 70–110)
POTASSIUM BLD-SCNC: 4.8 MMOL/L (ref 3.5–5.1)
POTASSIUM SERPL-SCNC: 3.5 MMOL/L (ref 3.5–5.1)
POTASSIUM SERPL-SCNC: 4.5 MMOL/L (ref 3.5–5.1)
POTASSIUM SERPL-SCNC: 4.9 MMOL/L (ref 3.5–5.1)
POTASSIUM SERPL-SCNC: 6.1 MMOL/L (ref 3.5–5.1)
POTASSIUM SERPL-SCNC: 7.3 MMOL/L (ref 3.5–5.1)
PROT SERPL-MCNC: 7.8 GM/DL (ref 6–8.4)
PROT SERPL-MCNC: 8.4 GM/DL (ref 6–8.4)
PROT UR QL STRIP: NEGATIVE
RBC # BLD AUTO: 5.79 M/UL (ref 4.6–6.2)
RBC #/AREA URNS AUTO: 2 /HPF (ref 0–4)
RELATIVE EOSINOPHIL (OHS): 0 %
RELATIVE LYMPHOCYTE (OHS): 6 % (ref 18–48)
RELATIVE MONOCYTE (OHS): 6.6 % (ref 4–15)
RELATIVE NEUTROPHIL (OHS): 86.7 % (ref 38–73)
SAMPLE: ABNORMAL
SODIUM BLD-SCNC: 154 MMOL/L (ref 136–145)
SODIUM SERPL-SCNC: 149 MMOL/L (ref 136–145)
SODIUM SERPL-SCNC: 150 MMOL/L (ref 136–145)
SODIUM SERPL-SCNC: 155 MMOL/L (ref 136–145)
SODIUM SERPL-SCNC: 156 MMOL/L (ref 136–145)
SODIUM SERPL-SCNC: 161 MMOL/L (ref 136–145)
SODIUM UR-SCNC: 61 MMOL/L (ref 20–250)
SP GR UR STRIP: 1.01
SPECIMEN SOURCE: NORMAL
SQUAMOUS #/AREA URNS AUTO: 0 /HPF
UROBILINOGEN UR STRIP-ACNC: NEGATIVE EU/DL
WBC # BLD AUTO: 10.83 K/UL (ref 3.9–12.7)
WBC #/AREA URNS AUTO: 2 /HPF (ref 0–5)
WBC CLUMPS UR QL AUTO: NORMAL
YEAST UR QL AUTO: NORMAL /HPF

## 2025-04-10 PROCEDURE — 83690 ASSAY OF LIPASE: CPT | Performed by: STUDENT IN AN ORGANIZED HEALTH CARE EDUCATION/TRAINING PROGRAM

## 2025-04-10 PROCEDURE — 25000003 PHARM REV CODE 250: Performed by: PHYSICIAN ASSISTANT

## 2025-04-10 PROCEDURE — 84100 ASSAY OF PHOSPHORUS: CPT | Performed by: STUDENT IN AN ORGANIZED HEALTH CARE EDUCATION/TRAINING PROGRAM

## 2025-04-10 PROCEDURE — 63600175 PHARM REV CODE 636 W HCPCS: Performed by: PHYSICIAN ASSISTANT

## 2025-04-10 PROCEDURE — 83605 ASSAY OF LACTIC ACID: CPT | Performed by: PHYSICIAN ASSISTANT

## 2025-04-10 PROCEDURE — 85025 COMPLETE CBC W/AUTO DIFF WBC: CPT | Performed by: EMERGENCY MEDICINE

## 2025-04-10 PROCEDURE — 51798 US URINE CAPACITY MEASURE: CPT

## 2025-04-10 PROCEDURE — 80053 COMPREHEN METABOLIC PANEL: CPT | Performed by: EMERGENCY MEDICINE

## 2025-04-10 PROCEDURE — 83735 ASSAY OF MAGNESIUM: CPT | Performed by: STUDENT IN AN ORGANIZED HEALTH CARE EDUCATION/TRAINING PROGRAM

## 2025-04-10 PROCEDURE — 96375 TX/PRO/DX INJ NEW DRUG ADDON: CPT

## 2025-04-10 PROCEDURE — 80307 DRUG TEST PRSMV CHEM ANLYZR: CPT | Performed by: PHYSICIAN ASSISTANT

## 2025-04-10 PROCEDURE — 96361 HYDRATE IV INFUSION ADD-ON: CPT

## 2025-04-10 PROCEDURE — 63600175 PHARM REV CODE 636 W HCPCS: Performed by: INTERNAL MEDICINE

## 2025-04-10 PROCEDURE — 93010 ELECTROCARDIOGRAM REPORT: CPT | Mod: ,,, | Performed by: INTERNAL MEDICINE

## 2025-04-10 PROCEDURE — 99291 CRITICAL CARE FIRST HOUR: CPT

## 2025-04-10 PROCEDURE — 83036 HEMOGLOBIN GLYCOSYLATED A1C: CPT | Performed by: STUDENT IN AN ORGANIZED HEALTH CARE EDUCATION/TRAINING PROGRAM

## 2025-04-10 PROCEDURE — 82962 GLUCOSE BLOOD TEST: CPT

## 2025-04-10 PROCEDURE — 96366 THER/PROPH/DIAG IV INF ADDON: CPT

## 2025-04-10 PROCEDURE — 82570 ASSAY OF URINE CREATININE: CPT | Performed by: PHYSICIAN ASSISTANT

## 2025-04-10 PROCEDURE — 80053 COMPREHEN METABOLIC PANEL: CPT | Performed by: STUDENT IN AN ORGANIZED HEALTH CARE EDUCATION/TRAINING PROGRAM

## 2025-04-10 PROCEDURE — 93010 ELECTROCARDIOGRAM REPORT: CPT | Mod: ,,, | Performed by: STUDENT IN AN ORGANIZED HEALTH CARE EDUCATION/TRAINING PROGRAM

## 2025-04-10 PROCEDURE — 82010 KETONE BODYS QUAN: CPT | Performed by: EMERGENCY MEDICINE

## 2025-04-10 PROCEDURE — 83735 ASSAY OF MAGNESIUM: CPT | Performed by: PHYSICIAN ASSISTANT

## 2025-04-10 PROCEDURE — 25000003 PHARM REV CODE 250: Performed by: STUDENT IN AN ORGANIZED HEALTH CARE EDUCATION/TRAINING PROGRAM

## 2025-04-10 PROCEDURE — 99900035 HC TECH TIME PER 15 MIN (STAT)

## 2025-04-10 PROCEDURE — 99291 CRITICAL CARE FIRST HOUR: CPT | Mod: ,,, | Performed by: PHYSICIAN ASSISTANT

## 2025-04-10 PROCEDURE — 25000242 PHARM REV CODE 250 ALT 637 W/ HCPCS: Performed by: STUDENT IN AN ORGANIZED HEALTH CARE EDUCATION/TRAINING PROGRAM

## 2025-04-10 PROCEDURE — 25000003 PHARM REV CODE 250

## 2025-04-10 PROCEDURE — 84300 ASSAY OF URINE SODIUM: CPT | Performed by: PHYSICIAN ASSISTANT

## 2025-04-10 PROCEDURE — 81003 URINALYSIS AUTO W/O SCOPE: CPT | Performed by: EMERGENCY MEDICINE

## 2025-04-10 PROCEDURE — 96365 THER/PROPH/DIAG IV INF INIT: CPT

## 2025-04-10 PROCEDURE — 83930 ASSAY OF BLOOD OSMOLALITY: CPT | Performed by: PHYSICIAN ASSISTANT

## 2025-04-10 PROCEDURE — 94640 AIRWAY INHALATION TREATMENT: CPT

## 2025-04-10 PROCEDURE — 82310 ASSAY OF CALCIUM: CPT | Performed by: PHYSICIAN ASSISTANT

## 2025-04-10 PROCEDURE — 25000003 PHARM REV CODE 250: Performed by: EMERGENCY MEDICINE

## 2025-04-10 PROCEDURE — 63600175 PHARM REV CODE 636 W HCPCS: Performed by: STUDENT IN AN ORGANIZED HEALTH CARE EDUCATION/TRAINING PROGRAM

## 2025-04-10 PROCEDURE — 93005 ELECTROCARDIOGRAM TRACING: CPT

## 2025-04-10 PROCEDURE — 82803 BLOOD GASES ANY COMBINATION: CPT

## 2025-04-10 PROCEDURE — 20000000 HC ICU ROOM

## 2025-04-10 RX ORDER — CALCIUM GLUCONATE 20 MG/ML
1 INJECTION, SOLUTION INTRAVENOUS
Status: COMPLETED | OUTPATIENT
Start: 2025-04-10 | End: 2025-04-10

## 2025-04-10 RX ORDER — SODIUM CHLORIDE 9 MG/ML
1000 INJECTION, SOLUTION INTRAVENOUS CONTINUOUS
Status: DISCONTINUED | OUTPATIENT
Start: 2025-04-10 | End: 2025-04-10

## 2025-04-10 RX ORDER — ALBUTEROL SULFATE 2.5 MG/.5ML
10 SOLUTION RESPIRATORY (INHALATION)
Status: COMPLETED | OUTPATIENT
Start: 2025-04-10 | End: 2025-04-10

## 2025-04-10 RX ORDER — DEXTROSE MONOHYDRATE AND SODIUM CHLORIDE 5; .45 G/100ML; G/100ML
INJECTION, SOLUTION INTRAVENOUS CONTINUOUS PRN
Status: DISCONTINUED | OUTPATIENT
Start: 2025-04-10 | End: 2025-04-12

## 2025-04-10 RX ORDER — SODIUM CHLORIDE 450 MG/100ML
INJECTION, SOLUTION INTRAVENOUS CONTINUOUS
Status: DISCONTINUED | OUTPATIENT
Start: 2025-04-10 | End: 2025-04-12

## 2025-04-10 RX ORDER — SODIUM CHLORIDE, SODIUM LACTATE, POTASSIUM CHLORIDE, CALCIUM CHLORIDE 600; 310; 30; 20 MG/100ML; MG/100ML; MG/100ML; MG/100ML
INJECTION, SOLUTION INTRAVENOUS CONTINUOUS
Status: DISCONTINUED | OUTPATIENT
Start: 2025-04-10 | End: 2025-04-10

## 2025-04-10 RX ORDER — ONDANSETRON HYDROCHLORIDE 2 MG/ML
4 INJECTION, SOLUTION INTRAVENOUS EVERY 8 HOURS PRN
Status: DISCONTINUED | OUTPATIENT
Start: 2025-04-10 | End: 2025-04-13

## 2025-04-10 RX ORDER — PROMETHAZINE HYDROCHLORIDE 12.5 MG/1
25 TABLET ORAL EVERY 6 HOURS PRN
Status: DISCONTINUED | OUTPATIENT
Start: 2025-04-10 | End: 2025-04-15 | Stop reason: HOSPADM

## 2025-04-10 RX ORDER — SODIUM CHLORIDE 0.9 % (FLUSH) 0.9 %
10 SYRINGE (ML) INJECTION
Status: DISCONTINUED | OUTPATIENT
Start: 2025-04-10 | End: 2025-04-13

## 2025-04-10 RX ORDER — CALCIUM GLUCONATE 20 MG/ML
1 INJECTION, SOLUTION INTRAVENOUS EVERY 10 MIN PRN
Status: DISCONTINUED | OUTPATIENT
Start: 2025-04-10 | End: 2025-04-11

## 2025-04-10 RX ORDER — SODIUM CHLORIDE 0.9 % (FLUSH) 0.9 %
10 SYRINGE (ML) INJECTION
Status: DISCONTINUED | OUTPATIENT
Start: 2025-04-10 | End: 2025-04-15 | Stop reason: HOSPADM

## 2025-04-10 RX ORDER — MORPHINE SULFATE 4 MG/ML
4 INJECTION, SOLUTION INTRAMUSCULAR; INTRAVENOUS
Refills: 0 | Status: COMPLETED | OUTPATIENT
Start: 2025-04-10 | End: 2025-04-10

## 2025-04-10 RX ORDER — ONDANSETRON HYDROCHLORIDE 2 MG/ML
4 INJECTION, SOLUTION INTRAVENOUS
Status: COMPLETED | OUTPATIENT
Start: 2025-04-10 | End: 2025-04-10

## 2025-04-10 RX ORDER — HEPARIN SODIUM 5000 [USP'U]/ML
5000 INJECTION, SOLUTION INTRAVENOUS; SUBCUTANEOUS EVERY 8 HOURS
Status: DISCONTINUED | OUTPATIENT
Start: 2025-04-10 | End: 2025-04-13

## 2025-04-10 RX ORDER — DEXMEDETOMIDINE HYDROCHLORIDE 4 UG/ML
0-1.4 INJECTION, SOLUTION INTRAVENOUS CONTINUOUS
Status: DISCONTINUED | OUTPATIENT
Start: 2025-04-10 | End: 2025-04-12

## 2025-04-10 RX ORDER — FUROSEMIDE 10 MG/ML
60 INJECTION INTRAMUSCULAR; INTRAVENOUS
Status: COMPLETED | OUTPATIENT
Start: 2025-04-10 | End: 2025-04-10

## 2025-04-10 RX ADMIN — CALCIUM GLUCONATE 1 G: 20 INJECTION, SOLUTION INTRAVENOUS at 01:04

## 2025-04-10 RX ADMIN — INSULIN HUMAN 0.2 UNITS/KG/HR: 1 INJECTION, SOLUTION INTRAVENOUS at 09:04

## 2025-04-10 RX ADMIN — SODIUM CHLORIDE: 4.5 INJECTION, SOLUTION INTRAVENOUS at 11:04

## 2025-04-10 RX ADMIN — INSULIN HUMAN 0.1 UNITS/KG/HR: 1 INJECTION, SOLUTION INTRAVENOUS at 02:04

## 2025-04-10 RX ADMIN — SODIUM CHLORIDE, POTASSIUM CHLORIDE, SODIUM LACTATE AND CALCIUM CHLORIDE: 600; 310; 30; 20 INJECTION, SOLUTION INTRAVENOUS at 04:04

## 2025-04-10 RX ADMIN — SODIUM CHLORIDE 1000 ML: 0.9 INJECTION, SOLUTION INTRAVENOUS at 02:04

## 2025-04-10 RX ADMIN — SODIUM CHLORIDE 1000 ML: 0.9 INJECTION, SOLUTION INTRAVENOUS at 12:04

## 2025-04-10 RX ADMIN — ONDANSETRON 4 MG: 2 INJECTION INTRAMUSCULAR; INTRAVENOUS at 12:04

## 2025-04-10 RX ADMIN — DEXMEDETOMIDINE HYDROCHLORIDE 0.4 MCG/KG/HR: 4 INJECTION INTRAVENOUS at 09:04

## 2025-04-10 RX ADMIN — MORPHINE SULFATE 4 MG: 4 INJECTION INTRAVENOUS at 12:04

## 2025-04-10 RX ADMIN — SODIUM CHLORIDE, SODIUM LACTATE, POTASSIUM CHLORIDE, AND CALCIUM CHLORIDE 1000 ML: .6; .31; .03; .02 INJECTION, SOLUTION INTRAVENOUS at 05:04

## 2025-04-10 RX ADMIN — SODIUM ZIRCONIUM CYCLOSILICATE 10 G: 10 POWDER, FOR SUSPENSION ORAL at 01:04

## 2025-04-10 RX ADMIN — ALBUTEROL SULFATE 10 MG: 2.5 SOLUTION RESPIRATORY (INHALATION) at 01:04

## 2025-04-10 RX ADMIN — SODIUM CHLORIDE: 4.5 INJECTION, SOLUTION INTRAVENOUS at 05:04

## 2025-04-10 RX ADMIN — HEPARIN SODIUM 5000 UNITS: 5000 INJECTION INTRAVENOUS; SUBCUTANEOUS at 11:04

## 2025-04-10 RX ADMIN — SODIUM BICARBONATE: 84 INJECTION, SOLUTION INTRAVENOUS at 01:04

## 2025-04-10 RX ADMIN — FUROSEMIDE 60 MG: 10 INJECTION, SOLUTION INTRAMUSCULAR; INTRAVENOUS at 01:04

## 2025-04-10 RX ADMIN — SODIUM CHLORIDE 1000 ML: 9 INJECTION, SOLUTION INTRAVENOUS at 12:04

## 2025-04-10 NOTE — SUBJECTIVE & OBJECTIVE
Past Medical History:   Diagnosis Date    Diabetes mellitus     Diabetes mellitus type 1     Diagnosed at age 20       Past Surgical History:   Procedure Laterality Date    ESOPHAGOGASTRODUODENOSCOPY N/A 11/5/2024    Procedure: EGD (ESOPHAGOGASTRODUODENOSCOPY);  Surgeon: Manny Troy MD;  Location: [address]);  Service: Endoscopy;  Laterality: N/A;       Review of patient's allergies indicates:   Allergen Reactions    Lactose Other (See Comments)     Gas and moderate to sever abdominal pain       Family History       Problem Relation (Age of Onset)    Diabetes Mother    Other Mother, Father          Tobacco Use    Smoking status: Former    Smokeless tobacco: Never   Substance and Sexual Activity    Alcohol use: Not Currently     Comment: socially    Drug use: No    Sexual activity: Not on file      Review of Systems   Unable to perform ROS: Mental status change     Objective:     Vital Signs (Most Recent):  Temp: 98.9 °F (37.2 °C) (04/10/25 1042)  Pulse: (!) 133 (04/10/25 1418)  Resp: 17 (04/10/25 1327)  BP: (!) 157/71 (04/10/25 1418)  SpO2: 99 % (04/10/25 1402) Vital Signs (24h Range):  Temp:  [98.9 °F (37.2 °C)] 98.9 °F (37.2 °C)  Pulse:  [] 133  Resp:  [17-18] 17  SpO2:  [95 %-99 %] 99 %  BP: (140-157)/(71-98) 157/71   Weight: 88 kg (194 lb 0.1 oz)  Body mass index is 24.91 kg/m².      Intake/Output Summary (Last 24 hours) at 4/10/2025 1556  Last data filed at 4/10/2025 1345  Gross per 24 hour   Intake 2041.52 ml   Output --   Net 2041.52 ml          Physical Exam  Vitals reviewed.   Constitutional:       Appearance: He is well-developed.   HENT:      Head: Normocephalic and atraumatic.   Eyes:      Pupils: Pupils are equal, round, and reactive to light.   Cardiovascular:      Rate and Rhythm: Regular rhythm. Tachycardia present.      Heart sounds: Normal heart sounds.   Pulmonary:      Effort: Pulmonary effort is normal.      Breath sounds: Normal breath sounds.   Abdominal:      General:  Bowel sounds are normal.      Palpations: Abdomen is soft.      Tenderness: There is no abdominal tenderness.   Musculoskeletal:      Right lower leg: No edema.      Left lower leg: No edema.   Skin:     General: Skin is warm and dry.   Neurological:      Mental Status: He is lethargic.   Psychiatric:         Behavior: Behavior is uncooperative.            Vents:     Lines/Drains/Airways       Peripheral Intravenous Line  Duration                  Peripheral IV - Single Lumen 04/10/25 1205 22 G Left;Posterior Hand <1 day         Peripheral IV - Single Lumen 04/10/25 1307 20 G Posterior;Right Hand <1 day                  Significant Labs:    CBC/Anemia Profile:  Recent Labs   Lab 04/10/25  1148   WBC 10.83   HGB 17.9   HCT 51.3      MCV 89   RDW 12.3        Chemistries:  Recent Labs   Lab 04/10/25  1204 04/10/25  1314   * 150*   K 7.3* 6.1*    106   CO2 14* 18*   BUN 52* 58*   CREATININE 2.6* 2.7*   CALCIUM 10.6* 10.3   ALBUMIN 4.3 4.2   BILITOT 0.8 0.8   ALKPHOS 105 97   ALT 22 22   AST 17 10*   GLUCOSE 782* 762*   MG 3.2*  --    PHOS 6.8*  --        All pertinent labs within the past 24 hours have been reviewed.    Significant Imaging: I have reviewed all pertinent imaging results/findings within the past 24 hours.

## 2025-04-10 NOTE — ED TRIAGE NOTES
Pt. Is a 39 yr old -American male presenting to the ED with lethargy and unresponsiveness.  Pt. Is a non-compliant diabetic.

## 2025-04-10 NOTE — ASSESSMENT & PLAN NOTE
Suspect prerenal in setting of nausea and vomiting.   Baseline sCr 1.5, up to 2.7 on admission.     --volume resuscitation  --strict I/Os  --if no improvement with volume resuscitation, consider RP US and urine lytes.

## 2025-04-10 NOTE — CARE UPDATE
Updated wife, Marzena, via telephone regarding patient's clinical status. All questions answered and concerns addressed.      Aby Monk PA-C  Critical Care Medicine  4/10/2025   6:22 PM

## 2025-04-10 NOTE — ASSESSMENT & PLAN NOTE
Unable to obtain history of presentation. Hemoglobin A1c worsened from 11/2024. Suspect a component of medication non-adherence compounded by nausea/vomiting. Glucose >700 with beta hydroxybutyrate 5.8. ABG without acidosis but bicarb of 14. Received 2L of NS in the ED.     --DKA/HHS protocol in place  --insulin infusion  --LR infusion  --POCT glucose q1  --BMP q4  --follow up serum osmolality  --consider endocrine consult in the morning  --NPO

## 2025-04-10 NOTE — CONSULTS
Consult received. Patient to be admitted to the MICU. Full H&P to follow.    Aby Monk PA-C  Critical Care Medicine  4/10/2025   3:19 PM

## 2025-04-10 NOTE — ED PROVIDER NOTES
Encounter Date: 4/10/2025       History     Chief Complaint   Patient presents with    Hyperglycemia     CBG running near 400 for a few days. N/V and weak.      HPI    39-year-old male with history of hypertension, type 1 diabetes, HHS, hyperkalemia, cyclic vomiting syndrome, presenting for abdominal pain and nausea.      Patient endorses 10/10 generalized abdominal discomfort.  He reports his pain has been going on for 3 days.  Progressively worsening.  He has had over 10 episodes of nonbloody nonbilious emesis, subjective fevers and chills.  He also endorses polyuria but denies dysuria, diarrhea, constipation.  He denies chest pain, shortness of breath.  He appears fatigued and answers mostly in yes no questions but endorses he is Aox3 to person place and time.      He reports his BG has been in the 600s for the last few days.  He is unable to quantify how long his BG has been that high.     Review of patient's allergies indicates:   Allergen Reactions    Lactose Other (See Comments)     Gas and moderate to sever abdominal pain     Past Medical History:   Diagnosis Date    Diabetes mellitus     Diabetes mellitus type 1     Diagnosed at age 20     Past Surgical History:   Procedure Laterality Date    ESOPHAGOGASTRODUODENOSCOPY N/A 11/5/2024    Procedure: EGD (ESOPHAGOGASTRODUODENOSCOPY);  Surgeon: Manny Troy MD;  Location: [address]);  Service: Endoscopy;  Laterality: N/A;     Family History   Problem Relation Name Age of Onset    Other Mother prediabetes         prediabetes    Diabetes Mother prediabetes     Other Father          patient does not know his fathers history     Social History[1]  Review of Systems  See HPI for pertinent ROS.   Physical Exam     Initial Vitals [04/10/25 1042]   BP Pulse Resp Temp SpO2   (!) 140/98 110 18 98.9 °F (37.2 °C) 97 %      MAP       --         Physical Exam    Constitutional: He appears well-developed. He appears lethargic. He appears distressed.   HENT:    Head: Normocephalic and atraumatic.   Mucous membranes very dry   Eyes: Pupils are equal, round, and reactive to light. No scleral icterus.   Neck: No JVD present.   Normal range of motion.  Cardiovascular:  Regular rhythm.     Exam reveals no gallop and no friction rub.       No murmur heard.  Tachycardia    Pulmonary/Chest: Breath sounds normal. No stridor. He has no wheezes. He has no rhonchi. He has no rales.   Abdominal: Abdomen is soft. There is abdominal tenderness (generalized TTP). There is no rebound and no guarding.   Musculoskeletal:         General: No tenderness or edema.      Cervical back: Normal range of motion.     Neurological: He appears lethargic. GCS score is 15. GCS eye subscore is 4. GCS verbal subscore is 5. GCS motor subscore is 6.   AOx3   Skin: Skin is warm. Capillary refill takes less than 2 seconds.   Psychiatric: He has a normal mood and affect.         ED Course   Procedures  Labs Reviewed   COMPREHENSIVE METABOLIC PANEL - Abnormal       Result Value    Sodium 149 (*)     Potassium 7.3 (*)     Chloride 107      CO2 14 (*)     Glucose 782 (*)     BUN 52 (*)     Creatinine 2.6 (*)     Calcium 10.6 (*)     Protein Total 8.4      Albumin 4.3      Bilirubin Total 0.8            AST 17      ALT 22      Anion Gap 28 (*)     eGFR 31 (*)    BETA - HYDROXYBUTYRATE, SERUM - Abnormal    Beta-Hydroxybutyrate 5.8 (*)    CBC WITH DIFFERENTIAL - Abnormal    WBC 10.83      RBC 5.79      HGB 17.9      HCT 51.3      MCV 89      MCH 30.9      MCHC 34.9      RDW 12.3      Platelet Count 230      MPV 11.7      Nucleated RBC 0      Neut % 86.7 (*)     Lymph % 6.0 (*)     Mono % 6.6      Eos % 0.0      Basophil % 0.1      Imm Grans % 0.6 (*)     Neut # 9.39 (*)     Lymph # 0.65 (*)     Mono # 0.72      Eos # 0.00      Baso # 0.01      Imm Grans # 0.06 (*)    COMPREHENSIVE METABOLIC PANEL - Abnormal    Sodium 150 (*)     Potassium 6.1 (*)     Chloride 106      CO2 18 (*)     Glucose 762 (*)     BUN  58 (*)     Creatinine 2.7 (*)     Calcium 10.3      Protein Total 7.8      Albumin 4.2      Bilirubin Total 0.8      ALP 97      AST 10 (*)     ALT 22      Anion Gap 26 (*)     eGFR 30 (*)    MAGNESIUM - Abnormal    Magnesium  3.2 (*)    PHOSPHORUS - Abnormal    Phosphorus Level 6.8 (*)    HEMOGLOBIN A1C - Abnormal    Hemoglobin A1c 13.8 (*)     Estimated Average Glucose 349 (*)    POCT GLUCOSE - Abnormal    POCT Glucose >500 (*)    LIPASE - Normal    Lipase Level 7     CBC W/ AUTO DIFFERENTIAL    Narrative:     The following orders were created for panel order CBC auto differential.  Procedure                               Abnormality         Status                     ---------                               -----------         ------                     CBC with Differential[[id number]]       Abnormal            Final result                 Please view results for these tests on the individual orders.   EXTRA TUBES    Narrative:     The following orders were created for panel order EXTRA TUBES.  Procedure                               Abnormality         Status                     ---------                               -----------         ------                     Light Green Top Hold[[id number]]                            Final result                 Please view results for these tests on the individual orders.   LIGHT GREEN TOP HOLD    Extra Tube Hold for add-ons.     URINALYSIS, REFLEX TO URINE CULTURE   TOXICOLOGY SCREEN, URINE, RANDOM (COMPLIANCE)   LACTIC ACID, PLASMA   BASIC METABOLIC PANEL   MAGNESIUM   OSMOLALITY, SERUM   BASIC METABOLIC PANEL   MAGNESIUM   POCT GLUCOSE MONITORING CONTINUOUS   POCT GLUCOSE MONITORING CONTINUOUS        ECG Results              EKG 12-lead (Final result)        Collection Time Result Time QRS Duration [facility] QTC Calculation    04/10/25 12:25:29 04/10/25 14:12:24 92 420                     Final result by Interface, Lab In [facility] (04/10/25 14:12:30)                    Narrative:    Test Reason : R73.9,    Vent. Rate : 104 BPM     Atrial Rate : 104 BPM     P-R Int : 122 ms          QRS Dur :  92 ms      QT Int : 320 ms       P-R-T Axes :  87  92  75 degrees    QTcB Int : 420 ms    Sinus tachycardia  Right atrial enlargement  Rightward axis  Incomplete right bundle branch block  Abnormal ECG  When compared with ECG of 19-Dec-2024 16:05,  No significant change was found  Confirmed by Alejandro Davila ([id number]) on 4/10/2025 2:12:19 PM    Referred By: AAAREFERRAL SELF           Confirmed By: Alejandro Davila                                  Imaging Results              CT Head Without Contrast (Final result)  Result time 04/10/25 15:45:11      Final result by Oscar Curiel DO (04/10/25 15:45:11)                   Impression:      No acute intracranial findings as detailed above specifically without evidence for acute intracranial hemorrhage or sulcal effacement to suggest large territory recent infarction.    Further evaluation as warranted clinically.      Electronically signed by: Oscar Curiel DO  Date:    04/10/2025  Time:    15:45               Narrative:    EXAMINATION:  CT HEAD WITHOUT CONTRAST    CLINICAL HISTORY:  Delirium;    TECHNIQUE:  Multiple sequential 5 mm axial images of the head without contrast.  Coronal and sagittal reformatted imaging from the axial acquisition.    COMPARISON:  MRI 11/11/23    FINDINGS:  There is no evidence for acute intracranial hemorrhage or sulcal effacement.  The ventricles are normal in size without hydrocephalus.  There is no midline shift or mass effect.  Visualized paranasal sinuses and mastoid air cells are clear.                                       X-Ray Chest AP Portable (Final result)  Result time 04/10/25 14:58:37      Final result by Severo Landeros MD (04/10/25 14:58:37)                   Impression:      No acute cardiopulmonary finding identified on this single view.      Electronically signed by: Severo Landeros  "MD  Date:    04/10/2025  Time:    14:58               Narrative:    EXAMINATION:  XR CHEST AP PORTABLE    CLINICAL HISTORY:  Provided history is "hyperglycemia;  ".    TECHNIQUE:  One view of the chest.    COMPARISON:  11/02/2024.    FINDINGS:  Cardiac wires overlie the chest.  Cardiac silhouette is not enlarged.  No focal consolidation.  No sizable pleural effusion.  No pneumothorax.                                       Medications   sodium chloride 0.9% flush 10 mL (has no administration in time range)   dextrose 5 % and 0.45 % NaCl infusion (has no administration in time range)   insulin regular in 0.9 % NaCl 100 unit/100 mL (1 unit/mL) infusion (0.1 Units/kg/hr × 88 kg Intravenous New Bag 4/10/25 1400)   dextrose 50% injection 25 g (has no administration in time range)   dextrose 50% injection 12.5 g (has no administration in time range)   calcium gluconate 1 g in NS IVPB (premixed) (0 g Intravenous Stopped 4/10/25 1335)     And   calcium gluconate 1 g in NS IVPB (premixed) (has no administration in time range)   lactated ringers infusion ( Intravenous New Bag 4/10/25 1612)   sodium chloride 0.9% flush 10 mL (has no administration in time range)   heparin (porcine) injection 5,000 Units (has no administration in time range)   ondansetron injection 4 mg (has no administration in time range)   promethazine tablet 25 mg (has no administration in time range)   lactated ringers bolus 1,000 mL (has no administration in time range)   sodium chloride 0.9% bolus 1,000 mL 1,000 mL (0 mLs Intravenous Stopped 4/10/25 1344)   sodium chloride 0.9% bolus 1,000 mL 1,000 mL (0 mLs Intravenous Stopped 4/10/25 1345)   ondansetron injection 4 mg (4 mg Intravenous Given 4/10/25 1252)   morphine injection 4 mg (4 mg Intravenous Given 4/10/25 1251)   furosemide injection 60 mg (60 mg Intravenous Given 4/10/25 1325)   sodium zirconium cyclosilicate packet 10 g (10 g Oral Given 4/10/25 1326)   albuterol sulfate nebulizer solution 10 mg " (10 mg Nebulization Given 4/10/25 1327)     Medical Decision Making  Amount and/or Complexity of Data Reviewed  Labs: ordered.  Radiology: ordered.    Risk  Prescription drug management.  Decision regarding hospitalization.                                  EKG: sinus tach, rate 104, NAD, no QTc prolongation or ST segment elevation    39-year-old male described as above presenting for abdominal pain, nausea, vomiting, dehydration.  On arrival, vital signs are notable for tachycardia in the 110s.  He is lethargic with dry mucous membranes.  He has generalized abdominal tenderness to palpation.  Dry mucous membranes.  Point of care glucose greater than 500, glucose of 762, elevated creatinine of 2.7, hyperkalemia 7.3, anion gap of 27.  VBG notable for metabolic acidosis with a pH of 7.34, bicarb of 21.2.  No leukocytosis, no acute anemia.  Elevated serum ketones.  This is consistent with severe DKA      He was given 2 L of normal saline, morphine, Zofran.  Once initial CMP resulted, insulin drip started.  For hyperkalemia without associated T-wave changes, he was given IV calcium, bicarb, Lasix.  Given severe DKA requiring insulin drip infusion, BETH, patient was admitted to the medical ICU for further management.    Critical Care Date: 04/10/2025 Performed by: Samanta Nolasco MD Authorized by: Breezy Arzate III, MD  Total critical care time (exclusive of procedural time) : 32 minutes Critical care was necessary to treat or prevent imminent or life-threatening deterioration of the following conditions:  DKA The above critical care time is exclusive of time spent on other documented procedures.     Clinical Impression:  Final diagnoses:  [R73.9] Hyperglycemia  [E11.10] DKA (diabetic ketoacidosis)  [E87.5] Hyperkalemia  [N17.9] BETH (acute kidney injury) (Primary)  [E87.20] Metabolic acidosis          ED Disposition Condition    Admit                     [1]   Social History  Tobacco Use    Smoking status: Former     Smokeless tobacco: Never   Substance Use Topics    Alcohol use: Not Currently     Comment: socially    Drug use: No        Samanta Nolasco MD  Resident  04/10/25 [id number]

## 2025-04-10 NOTE — H&P
[facility] - Emergency Dept  Critical Care Medicine  History & Physical    Patient Name: Andrzej Sinclair  MRN: [id number]  Admission Date: 4/10/2025  Hospital Length of Stay: 0 days  Code Status: Full Code  Attending Physician: Renetta Vaughn MD   Primary Care Provider: Aubrie, Primary Doctor   Principal Problem: Hyperosmolar hyperglycemic state (HHS)    Subjective:     HPI:  Andrzej Sinclair is a 38 year old male with T1DM, hx of DKA/HHS, HTN who presented to [facility] ED 4/10/25 for hyperglycemia and N/V x 4 days. Patient was non participatory during examination so history obtained via chart review and from wife, Marzena. Wife states patient started having nausea and vomiting on Monday with very little PO intake over the last few days. Wife was monitoring patient's glucose via his dexcom and concerned with his elevated glucoses. Patient not willing to go to the hospital until wife called EMS and made him go. She reports insulin compliance. 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     Upon arrival to the ED, he was tachycardic and altered with labs consistent with DKA, BETH and hyperkalemia (K 7.3). He received calcium, albuterol, 2L IVF and started on insulin infusion. Critical care medicine consulted for DKA. Prior to my arrival, patient standing at the bedside using urinal with >1L of UOP off. After getting back into bed, patient no longer participatory in exam and would not answer any questions. Appeared confused or lethargic. Patient to be admitted to the MICU for closer monitoring.     Hospital/ICU Course:  No notes on file     Past Medical History:   Diagnosis Date    Diabetes mellitus     Diabetes mellitus type 1     Diagnosed at age 20       Past Surgical History:   Procedure Laterality Date    ESOPHAGOGASTRODUODENOSCOPY N/A 11/5/2024    Procedure: EGD (ESOPHAGOGASTRODUODENOSCOPY);  Surgeon: Manny Troy MD;  Location: [address];  Service: Endoscopy;   Laterality: N/A;       Review of patient's allergies indicates:   Allergen Reactions    Lactose Other (See Comments)     Gas and moderate to sever abdominal pain       Family History       Problem Relation (Age of Onset)    Diabetes Mother    Other Mother, Father          Tobacco Use    Smoking status: Former    Smokeless tobacco: Never   Substance and Sexual Activity    Alcohol use: Not Currently     Comment: socially    Drug use: No    Sexual activity: Not on file      Review of Systems   Unable to perform ROS: Mental status change     Objective:     Vital Signs (Most Recent):  Temp: 98.9 °F (37.2 °C) (04/10/25 1042)  Pulse: (!) 133 (04/10/25 1418)  Resp: 17 (04/10/25 1327)  BP: (!) 157/71 (04/10/25 1418)  SpO2: 99 % (04/10/25 1402) Vital Signs (24h Range):  Temp:  [98.9 °F (37.2 °C)] 98.9 °F (37.2 °C)  Pulse:  [] 133  Resp:  [17-18] 17  SpO2:  [95 %-99 %] 99 %  BP: (140-157)/(71-98) 157/71   Weight: 88 kg (194 lb 0.1 oz)  Body mass index is 24.91 kg/m².      Intake/Output Summary (Last 24 hours) at 4/10/2025 1556  Last data filed at 4/10/2025 1345  Gross per 24 hour   Intake 2041.52 ml   Output --   Net 2041.52 ml          Physical Exam  Vitals reviewed.   Constitutional:       Appearance: He is well-developed.   HENT:      Head: Normocephalic and atraumatic.   Eyes:      Pupils: Pupils are equal, round, and reactive to light.   Cardiovascular:      Rate and Rhythm: Regular rhythm. Tachycardia present.      Heart sounds: Normal heart sounds.   Pulmonary:      Effort: Pulmonary effort is normal.      Breath sounds: Normal breath sounds.   Abdominal:      General: Bowel sounds are normal.      Palpations: Abdomen is soft.      Tenderness: There is no abdominal tenderness.   Musculoskeletal:      Right lower leg: No edema.      Left lower leg: No edema.   Skin:     General: Skin is warm and dry.   Neurological:      Mental Status: He is lethargic.   Psychiatric:         Behavior: Behavior is uncooperative.             Vents:     Lines/Drains/Airways       Peripheral Intravenous Line  Duration                  Peripheral IV - Single Lumen 04/10/25 1205 22 G Left;Posterior Hand <1 day         Peripheral IV - Single Lumen 04/10/25 1307 20 G Posterior;Right Hand <1 day                  Significant Labs:    CBC/Anemia Profile:  Recent Labs   Lab 04/10/25  1148   WBC 10.83   HGB 17.9   HCT 51.3      MCV 89   RDW 12.3        Chemistries:  Recent Labs   Lab 04/10/25  1204 04/10/25  1314   * 150*   K 7.3* 6.1*    106   CO2 14* 18*   BUN 52* 58*   CREATININE 2.6* 2.7*   CALCIUM 10.6* 10.3   ALBUMIN 4.3 4.2   BILITOT 0.8 0.8   ALKPHOS 105 97   ALT 22 22   AST 17 10*   GLUCOSE 782* 762*   MG 3.2*  --    PHOS 6.8*  --        All pertinent labs within the past 24 hours have been reviewed.    Significant Imaging: I have reviewed all pertinent imaging results/findings within the past 24 hours.  Assessment/Plan:     Neuro  Acute metabolic encephalopathy  Suspect 2/2 HHS    --CT head pending  --DKA/HHS protocol in place  --neuro exams    Cardiac/Vascular  Primary hypertension  Holding home lisinopril    Renal/  Hyperkalemia  Received calcium and albuterol in the ED. Started on insulin infusion for HHS. Repeat BMP down trending.     --BMP q4  --shift as needed    BETH (acute kidney injury)  Suspect prerenal in setting of nausea and vomiting.   Baseline sCr 1.5, up to 2.7 on admission.     --volume resuscitation  --strict I/Os  --if no improvement with volume resuscitation, consider RP US and urine lytes.     Endocrine  Hyperosmolar hyperglycemic state (HHS)  Unable to obtain history of presentation. Hemoglobin A1c worsened from 11/2024. Suspect a component of medication non-adherence compounded by nausea/vomiting. Glucose >700 with beta hydroxybutyrate 5.8. ABG without acidosis but bicarb of 14. Received 2L of NS in the ED.     --DKA/HHS protocol in place  --insulin infusion  --LR infusion  --POCT glucose q1  --BMP  q4  --follow up serum osmolality  --consider endocrine consult in the morning  --NPO       Discussed with Dr. Vaughn.     Critical Care Time: 50 minutes  Critical secondary to Patient has a condition that poses threat to life and bodily function: DKA/HHS    Critical care was time spent personally by me on the following activities: development of treatment plan with patient or surrogate and bedside caregivers, discussions with consultants, evaluation of patient's response to treatment, examination of patient, ordering and performing treatments and interventions, ordering and review of laboratory studies, ordering and review of radiographic studies, pulse oximetry, re-evaluation of patient's condition. This critical care time did not overlap with that of any other provider or involve time for any procedures.     Aby Monk PA-C  Critical Care Medicine  [facility] - Emergency Dept

## 2025-04-10 NOTE — ED NOTES
Pt. Placed in soft restrains for fighting the staff, not keeping any clothes on, trying to get out of bed with IV's connected.

## 2025-04-10 NOTE — PROVIDER PROGRESS NOTES - EMERGENCY DEPT.
Encounter Date: 4/10/2025    ED Physician Progress Notes          Physician Note:   Signout Note  I received signout from the previous providers, Dr. Arzate and Dr. Nolasco.      Chief complaint:  Hyperglycemia     Per sign out and chart review:  Andrzej Sinclair is a 39 y.o. male, history of hypertension, type 1 diabetes, HHS, hyperkalemia, cyclic vomiting syndrome, presenting for abdominal pain and nausea.       Patient endorses 10/10 generalized abdominal discomfort.  He reports his pain has been going on for 3 days.  Progressively worsening.  He has had over 10 episodes of nonbloody nonbilious emesis, subjective fevers and chills.  He also endorses polyuria but denies dysuria, diarrhea, constipation.  He denies chest pain, shortness of breath.  He appears fatigued and answers mostly in yes no questions but endorses he is Aox3 to person place and time.       He reports his BG has been in the 600s for the last few days.  He is unable to quantify how long his BG has been that high.     During ED stay patient received:  Medications   sodium chloride 0.9% flush 10 mL (has no administration in time range)   0.9% NaCl infusion (1,000 mLs Intravenous New Bag 4/10/25 1415)   dextrose 5 % and 0.45 % NaCl infusion (has no administration in time range)   insulin regular in 0.9 % NaCl 100 unit/100 mL (1 unit/mL) infusion (0.1 Units/kg/hr × 88 kg Intravenous New Bag 4/10/25 1400)   dextrose 50% injection 25 g (has no administration in time range)   dextrose 50% injection 12.5 g (has no administration in time range)   calcium gluconate 1 g in NS IVPB (premixed) (0 g Intravenous Stopped 4/10/25 1335)     And   calcium gluconate 1 g in NS IVPB (premixed) (has no administration in time range)   sodium bicarbonate 150 mEq in D5W 1,000 mL infusion ( Intravenous New Bag 4/10/25 1356)   sodium chloride 0.9% bolus 1,000 mL 1,000 mL (0 mLs Intravenous Stopped 4/10/25 1344)   sodium chloride 0.9% bolus 1,000 mL 1,000 mL (0 mLs Intravenous  Stopped 4/10/25 1345)   ondansetron injection 4 mg (4 mg Intravenous Given 4/10/25 1252)   morphine injection 4 mg (4 mg Intravenous Given 4/10/25 1251)   furosemide injection 60 mg (60 mg Intravenous Given 4/10/25 1325)   sodium zirconium cyclosilicate packet 10 g (10 g Oral Given 4/10/25 1326)   albuterol sulfate nebulizer solution 10 mg (10 mg Nebulization Given 4/10/25 1327)        Pt signed out to me pending:  Patient did not respond to 10 units of insulin.  He was started on an insulin continuous drip.  I discussed his case with hospital medicine who agreed to evaluate and admit this patient.  Overall picture most concerning for hyperglycemic non DKA.     Update/ Disposition:  Admitted to hospital medicine     Patient, caregiver and/or family understands the plan and verbalized agreement. All questions answered.      Diagnostic Impression:    Hyperglycemia    Orly Hernandez MD  [facility], PGY2

## 2025-04-10 NOTE — FIRST PROVIDER EVALUATION
"Medical screening examination initiated.  I have conducted a focused provider triage encounter, findings are as follows:    Brief history of present illness:  38 yo M w/ hyperglycemia, n/v x 4 days    Vitals:    04/10/25 1042   BP: (!) 140/98   Pulse: 110   Resp: 18   Temp: 98.9 °F (37.2 °C)   TempSrc: Oral   SpO2: 97%   Weight: 88 kg (194 lb 0.1 oz)   Height: 6' 2" (1.88 m)       Pertinent physical exam:  tachycardic    Brief workup plan:  DKA order set    Preliminary workup initiated; this workup will be continued and followed by the physician or advanced practice provider that is assigned to the patient when roomed.  "

## 2025-04-10 NOTE — HPI
Andrzej Sinclair is a 38 year old male with T1DM, hx of DKA/HHS, HTN who presented to [facility] ED 4/10/25 for hyperglycemia and N/V x 4 days. Patient was non participatory during examination so history obtained via chart review and from wife, Marzena. Wife states patient started having nausea and vomiting on Monday with very little PO intake over the last few days. Wife was monitoring patient's glucose via his dexcom and concerned with his elevated glucoses. Patient not willing to go to the hospital until wife called EMS and made him go. She reports insulin compliance. 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     Upon arrival to the ED, he was tachycardic and altered with labs consistent with DKA, BETH and hyperkalemia (K 7.3). He received calcium, albuterol, 2L IVF and started on insulin infusion. Critical care medicine consulted for DKA. Prior to my arrival, patient standing at the bedside using urinal with >1L of UOP off. After getting back into bed, patient no longer participatory in exam and would not answer any questions. Appeared confused or lethargic. Patient to be admitted to the MICU for closer monitoring.

## 2025-04-10 NOTE — SIGNIFICANT EVENT
Repeat BMP with glucose 759, bicarb 22, anion gap 22 with sodium up to 155. Corrected sodium = 166. Hyperkalemia resolved. CT head negative for acute intracranial abnormalities.     --starting 1/2 NS at 200 cc/hr to assist with hypernatremia  --when glucose <200 then start D5 1/2 NS infusion  --urine lytes and RP ordered for minimally improving BETH    Discussed plan of care with RN.     Aby Monk PA-C  Critical Care Medicine  4/10/2025   5:05 PM

## 2025-04-10 NOTE — ASSESSMENT & PLAN NOTE
Received calcium and albuterol in the ED. Started on insulin infusion for HHS. Repeat BMP down trending.     --BMP q4  --shift as needed

## 2025-04-11 LAB
ABSOLUTE EOSINOPHIL (OHS): 0 K/UL
ABSOLUTE MONOCYTE (OHS): 1.03 K/UL (ref 0.3–1)
ABSOLUTE NEUTROPHIL COUNT (OHS): 10.07 K/UL (ref 1.8–7.7)
ALBUMIN SERPL BCP-MCNC: 3.7 G/DL (ref 3.5–5.2)
ALP SERPL-CCNC: 83 UNIT/L (ref 40–150)
ALT SERPL W/O P-5'-P-CCNC: 18 UNIT/L (ref 10–44)
ANION GAP (OHS): 11 MMOL/L (ref 8–16)
ANION GAP (OHS): 12 MMOL/L (ref 8–16)
ANION GAP (OHS): 13 MMOL/L (ref 8–16)
ANION GAP (OHS): 8 MMOL/L (ref 8–16)
AST SERPL-CCNC: 15 UNIT/L (ref 11–45)
BASOPHILS # BLD AUTO: 0.02 K/UL
BASOPHILS NFR BLD AUTO: 0.2 %
BILIRUB DIRECT SERPL-MCNC: 0.3 MG/DL (ref 0.1–0.3)
BILIRUB SERPL-MCNC: 0.9 MG/DL (ref 0.1–1)
BUN SERPL-MCNC: 60 MG/DL (ref 6–20)
BUN SERPL-MCNC: 63 MG/DL (ref 6–20)
BUN SERPL-MCNC: 63 MG/DL (ref 6–20)
BUN SERPL-MCNC: 64 MG/DL (ref 6–20)
BUN SERPL-MCNC: 64 MG/DL (ref 6–20)
BUN SERPL-MCNC: 71 MG/DL (ref 6–20)
CALCIUM SERPL-MCNC: 8.3 MG/DL (ref 8.7–10.5)
CALCIUM SERPL-MCNC: 8.8 MG/DL (ref 8.7–10.5)
CALCIUM SERPL-MCNC: 9.2 MG/DL (ref 8.7–10.5)
CALCIUM SERPL-MCNC: 9.3 MG/DL (ref 8.7–10.5)
CALCIUM SERPL-MCNC: 9.5 MG/DL (ref 8.7–10.5)
CALCIUM SERPL-MCNC: 9.6 MG/DL (ref 8.7–10.5)
CHLORIDE SERPL-SCNC: 115 MMOL/L (ref 95–110)
CHLORIDE SERPL-SCNC: 116 MMOL/L (ref 95–110)
CHLORIDE SERPL-SCNC: 117 MMOL/L (ref 95–110)
CHLORIDE SERPL-SCNC: 119 MMOL/L (ref 95–110)
CHLORIDE SERPL-SCNC: 120 MMOL/L (ref 95–110)
CHLORIDE SERPL-SCNC: 123 MMOL/L (ref 95–110)
CO2 SERPL-SCNC: 21 MMOL/L (ref 23–29)
CO2 SERPL-SCNC: 22 MMOL/L (ref 23–29)
CO2 SERPL-SCNC: 25 MMOL/L (ref 23–29)
CO2 SERPL-SCNC: 27 MMOL/L (ref 23–29)
CO2 SERPL-SCNC: 28 MMOL/L (ref 23–29)
CO2 SERPL-SCNC: 28 MMOL/L (ref 23–29)
CREAT SERPL-MCNC: 2.1 MG/DL (ref 0.5–1.4)
CREAT SERPL-MCNC: 2.5 MG/DL (ref 0.5–1.4)
CREAT SERPL-MCNC: 2.7 MG/DL (ref 0.5–1.4)
CREAT SERPL-MCNC: 2.7 MG/DL (ref 0.5–1.4)
CREAT SERPL-MCNC: 2.8 MG/DL (ref 0.5–1.4)
CREAT SERPL-MCNC: 2.9 MG/DL (ref 0.5–1.4)
ERYTHROCYTE [DISTWIDTH] IN BLOOD BY AUTOMATED COUNT: 12.3 % (ref 11.5–14.5)
GFR SERPLBLD CREATININE-BSD FMLA CKD-EPI: 28 ML/MIN/1.73/M2
GFR SERPLBLD CREATININE-BSD FMLA CKD-EPI: 29 ML/MIN/1.73/M2
GFR SERPLBLD CREATININE-BSD FMLA CKD-EPI: 30 ML/MIN/1.73/M2
GFR SERPLBLD CREATININE-BSD FMLA CKD-EPI: 30 ML/MIN/1.73/M2
GFR SERPLBLD CREATININE-BSD FMLA CKD-EPI: 33 ML/MIN/1.73/M2
GFR SERPLBLD CREATININE-BSD FMLA CKD-EPI: 41 ML/MIN/1.73/M2
GLUCOSE SERPL-MCNC: 169 MG/DL (ref 70–110)
GLUCOSE SERPL-MCNC: 375 MG/DL (ref 70–110)
GLUCOSE SERPL-MCNC: 403 MG/DL (ref 70–110)
GLUCOSE SERPL-MCNC: 441 MG/DL (ref 70–110)
GLUCOSE SERPL-MCNC: 469 MG/DL (ref 70–110)
GLUCOSE SERPL-MCNC: 523 MG/DL (ref 70–110)
HCT VFR BLD AUTO: 43.1 % (ref 40–54)
HGB BLD-MCNC: 14.6 GM/DL (ref 14–18)
IMM GRANULOCYTES # BLD AUTO: 0.06 K/UL (ref 0–0.04)
IMM GRANULOCYTES NFR BLD AUTO: 0.5 % (ref 0–0.5)
LYMPHOCYTES # BLD AUTO: 0.56 K/UL (ref 1–4.8)
MAGNESIUM SERPL-MCNC: 2.7 MG/DL (ref 1.6–2.6)
MAGNESIUM SERPL-MCNC: 2.7 MG/DL (ref 1.6–2.6)
MAGNESIUM SERPL-MCNC: 2.9 MG/DL (ref 1.6–2.6)
MAGNESIUM SERPL-MCNC: 3.1 MG/DL (ref 1.6–2.6)
MAGNESIUM SERPL-MCNC: 3.2 MG/DL (ref 1.6–2.6)
MCH RBC QN AUTO: 29.9 PG (ref 27–31)
MCHC RBC AUTO-ENTMCNC: 33.9 G/DL (ref 32–36)
MCV RBC AUTO: 88 FL (ref 82–98)
NUCLEATED RBC (/100WBC) (OHS): 0 /100 WBC
OHS QRS DURATION: 102 MS
OHS QTC CALCULATION: 423 MS
PHOSPHATE SERPL-MCNC: 3.7 MG/DL (ref 2.7–4.5)
PLATELET # BLD AUTO: 190 K/UL (ref 150–450)
PMV BLD AUTO: 11.4 FL (ref 9.2–12.9)
POCT GLUCOSE: 111 MG/DL (ref 70–110)
POCT GLUCOSE: 154 MG/DL (ref 70–110)
POCT GLUCOSE: 245 MG/DL (ref 70–110)
POCT GLUCOSE: 261 MG/DL (ref 70–110)
POCT GLUCOSE: 285 MG/DL (ref 70–110)
POCT GLUCOSE: 345 MG/DL (ref 70–110)
POCT GLUCOSE: 349 MG/DL (ref 70–110)
POCT GLUCOSE: 390 MG/DL (ref 70–110)
POCT GLUCOSE: 390 MG/DL (ref 70–110)
POCT GLUCOSE: 395 MG/DL (ref 70–110)
POCT GLUCOSE: 413 MG/DL (ref 70–110)
POCT GLUCOSE: 428 MG/DL (ref 70–110)
POCT GLUCOSE: 433 MG/DL (ref 70–110)
POCT GLUCOSE: 479 MG/DL (ref 70–110)
POCT GLUCOSE: 484 MG/DL (ref 70–110)
POCT GLUCOSE: 497 MG/DL (ref 70–110)
POCT GLUCOSE: 498 MG/DL (ref 70–110)
POCT GLUCOSE: 90 MG/DL (ref 70–110)
POCT GLUCOSE: 97 MG/DL (ref 70–110)
POCT GLUCOSE: >500 MG/DL (ref 70–110)
POTASSIUM SERPL-SCNC: 3.2 MMOL/L (ref 3.5–5.1)
POTASSIUM SERPL-SCNC: 3.8 MMOL/L (ref 3.5–5.1)
POTASSIUM SERPL-SCNC: 4 MMOL/L (ref 3.5–5.1)
POTASSIUM SERPL-SCNC: 4.5 MMOL/L (ref 3.5–5.1)
POTASSIUM SERPL-SCNC: 5 MMOL/L (ref 3.5–5.1)
POTASSIUM SERPL-SCNC: 5.5 MMOL/L (ref 3.5–5.1)
PROT SERPL-MCNC: 7 GM/DL (ref 6–8.4)
RBC # BLD AUTO: 4.88 M/UL (ref 4.6–6.2)
RELATIVE EOSINOPHIL (OHS): 0 %
RELATIVE LYMPHOCYTE (OHS): 4.8 % (ref 18–48)
RELATIVE MONOCYTE (OHS): 8.8 % (ref 4–15)
RELATIVE NEUTROPHIL (OHS): 85.7 % (ref 38–73)
SODIUM SERPL-SCNC: 152 MMOL/L (ref 136–145)
SODIUM SERPL-SCNC: 152 MMOL/L (ref 136–145)
SODIUM SERPL-SCNC: 154 MMOL/L (ref 136–145)
SODIUM SERPL-SCNC: 156 MMOL/L (ref 136–145)
SODIUM SERPL-SCNC: 156 MMOL/L (ref 136–145)
SODIUM SERPL-SCNC: 157 MMOL/L (ref 136–145)
WBC # BLD AUTO: 11.74 K/UL (ref 3.9–12.7)

## 2025-04-11 PROCEDURE — 25000003 PHARM REV CODE 250: Performed by: STUDENT IN AN ORGANIZED HEALTH CARE EDUCATION/TRAINING PROGRAM

## 2025-04-11 PROCEDURE — 85025 COMPLETE CBC W/AUTO DIFF WBC: CPT | Performed by: PHYSICIAN ASSISTANT

## 2025-04-11 PROCEDURE — 82310 ASSAY OF CALCIUM: CPT | Performed by: PHYSICIAN ASSISTANT

## 2025-04-11 PROCEDURE — S5010 5% DEXTROSE AND 0.45% SALINE: HCPCS | Performed by: STUDENT IN AN ORGANIZED HEALTH CARE EDUCATION/TRAINING PROGRAM

## 2025-04-11 PROCEDURE — 63600175 PHARM REV CODE 636 W HCPCS

## 2025-04-11 PROCEDURE — 51798 US URINE CAPACITY MEASURE: CPT

## 2025-04-11 PROCEDURE — 51702 INSERT TEMP BLADDER CATH: CPT

## 2025-04-11 PROCEDURE — 83735 ASSAY OF MAGNESIUM: CPT | Performed by: PHYSICIAN ASSISTANT

## 2025-04-11 PROCEDURE — 84100 ASSAY OF PHOSPHORUS: CPT | Performed by: PHYSICIAN ASSISTANT

## 2025-04-11 PROCEDURE — 25000003 PHARM REV CODE 250: Performed by: PHYSICIAN ASSISTANT

## 2025-04-11 PROCEDURE — 82435 ASSAY OF BLOOD CHLORIDE: CPT | Performed by: PHYSICIAN ASSISTANT

## 2025-04-11 PROCEDURE — 82248 BILIRUBIN DIRECT: CPT | Performed by: PHYSICIAN ASSISTANT

## 2025-04-11 PROCEDURE — 94761 N-INVAS EAR/PLS OXIMETRY MLT: CPT

## 2025-04-11 PROCEDURE — 99291 CRITICAL CARE FIRST HOUR: CPT | Mod: GC,,, | Performed by: INTERNAL MEDICINE

## 2025-04-11 PROCEDURE — 25000003 PHARM REV CODE 250

## 2025-04-11 PROCEDURE — 20000000 HC ICU ROOM

## 2025-04-11 PROCEDURE — 63600175 PHARM REV CODE 636 W HCPCS: Performed by: PHYSICIAN ASSISTANT

## 2025-04-11 RX ORDER — POTASSIUM CHLORIDE 7.45 MG/ML
10 INJECTION INTRAVENOUS
Status: DISPENSED | OUTPATIENT
Start: 2025-04-11 | End: 2025-04-11

## 2025-04-11 RX ADMIN — DEXMEDETOMIDINE HYDROCHLORIDE 1.1 MCG/KG/HR: 4 INJECTION INTRAVENOUS at 10:04

## 2025-04-11 RX ADMIN — SODIUM CHLORIDE: 4.5 INJECTION, SOLUTION INTRAVENOUS at 08:04

## 2025-04-11 RX ADMIN — INSULIN HUMAN 0.2 UNITS/KG/HR: 1 INJECTION, SOLUTION INTRAVENOUS at 02:04

## 2025-04-11 RX ADMIN — DEXMEDETOMIDINE HYDROCHLORIDE 0.7 MCG/KG/HR: 4 INJECTION INTRAVENOUS at 06:04

## 2025-04-11 RX ADMIN — HEPARIN SODIUM 5000 UNITS: 5000 INJECTION INTRAVENOUS; SUBCUTANEOUS at 02:04

## 2025-04-11 RX ADMIN — SODIUM CHLORIDE: 4.5 INJECTION, SOLUTION INTRAVENOUS at 05:04

## 2025-04-11 RX ADMIN — POTASSIUM CHLORIDE 10 MEQ: 7.46 INJECTION, SOLUTION INTRAVENOUS at 04:04

## 2025-04-11 RX ADMIN — HEPARIN SODIUM 5000 UNITS: 5000 INJECTION INTRAVENOUS; SUBCUTANEOUS at 10:04

## 2025-04-11 RX ADMIN — HEPARIN SODIUM 5000 UNITS: 5000 INJECTION INTRAVENOUS; SUBCUTANEOUS at 07:04

## 2025-04-11 RX ADMIN — POTASSIUM CHLORIDE 10 MEQ: 7.46 INJECTION, SOLUTION INTRAVENOUS at 03:04

## 2025-04-11 RX ADMIN — DEXTROSE AND SODIUM CHLORIDE: 5; 450 INJECTION, SOLUTION INTRAVENOUS at 08:04

## 2025-04-11 RX ADMIN — INSULIN HUMAN 0.2 UNITS/KG/HR: 1 INJECTION, SOLUTION INTRAVENOUS at 07:04

## 2025-04-11 NOTE — ASSESSMENT & PLAN NOTE
Suspect prerenal in setting of nausea and vomiting.   Baseline sCr 1.5, up to 2.7 on admission.     --volume resuscitation  --strict I/Os  -- not improving but not worsening.

## 2025-04-11 NOTE — SUBJECTIVE & OBJECTIVE
Interval History/Significant Events: no major issues overnight   Continued need for insulin GTT    Objective:     Vital Signs (Most Recent):  Temp: 97 °F (36.1 °C) (04/11/25 1100)  Pulse: (!) 57 (04/11/25 1400)  Resp: 15 (04/11/25 1400)  BP: (!) 103/56 (04/11/25 1400)  SpO2: 100 % (04/11/25 1400) Vital Signs (24h Range):  Temp:  [97 °F (36.1 °C)-98.6 °F (37 °C)] 97 °F (36.1 °C)  Pulse:  [] 57  Resp:  [11-33] 15  SpO2:  [92 %-100 %] 100 %  BP: ()/(56-86) 103/56   Weight: 88 kg (194 lb 0.1 oz)  Body mass index is 24.91 kg/m².      Intake/Output Summary (Last 24 hours) at 4/11/2025 1520  Last data filed at 4/11/2025 1400  Gross per 24 hour   Intake 4895.12 ml   Output 700 ml   Net 4195.12 ml          Physical Exam  Vitals reviewed.   Constitutional:       Appearance: He is well-developed.   HENT:      Head: Normocephalic and atraumatic.   Eyes:      Pupils: Pupils are equal, round, and reactive to light.   Cardiovascular:      Rate and Rhythm: Regular rhythm. Tachycardia present.      Heart sounds: Normal heart sounds.   Pulmonary:      Effort: Pulmonary effort is normal.      Breath sounds: Normal breath sounds.   Abdominal:      General: Bowel sounds are normal.      Palpations: Abdomen is soft.      Tenderness: There is no abdominal tenderness.   Musculoskeletal:      Right lower leg: No edema.      Left lower leg: No edema.   Skin:     General: Skin is warm and dry.   Neurological:      Mental Status: He is lethargic.   Psychiatric:         Behavior: Behavior is slowed.            Vents:     Lines/Drains/Airways       Drain  Duration                  Urethral Catheter 04/11/25 1028 <1 day              Peripheral Intravenous Line  Duration                  Peripheral IV - Single Lumen 04/10/25 1205 22 G Left;Posterior Hand 1 day         Peripheral IV - Double Lumen 04/11/25 0310 18 G Anterior;Right Upper Arm <1 day         Peripheral IV - Single Lumen 04/10/25 1943 20 G Left;Posterior Wrist <1 day                   Significant Labs:    CBC/Anemia Profile:  Recent Labs   Lab 04/10/25  1148 04/10/25  1850 04/11/25  0301   WBC 10.83  --  11.74   HGB 17.9  --  14.6   HCT 51.3 50 43.1     --  190   MCV 89  --  88   RDW 12.3  --  12.3        Chemistries:  Recent Labs   Lab 04/10/25  1204 04/10/25  1314 04/10/25  1610 04/11/25  0308 04/11/25  0456 04/11/25  0800 04/11/25  1131   * 150*   < > 157* 154* 156* 152*   K 7.3* 6.1*   < > 5.0 5.5* 4.0 4.5    106   < > 117* 115* 116* 120*   CO2 14* 18*   < > 28 28 27 21*   BUN 52* 58*   < > 60* 63* 63* 64*   CREATININE 2.6* 2.7*   < > 2.8* 2.9* 2.7* 2.5*   CALCIUM 10.6* 10.3   < > 9.6 9.3 9.5 8.3*   ALBUMIN 4.3 4.2  --  3.7  --   --   --    BILITOT 0.8 0.8  --  0.9  --   --   --    ALKPHOS 105 97  --  83  --   --   --    ALT 22 22  --  18  --   --   --    AST 17 10*  --  15  --   --   --    GLUCOSE 782* 762*   < > 375* 469* 403* 441*   MG 3.2*  --    < > 3.1*  --  3.2* 2.7*   PHOS 6.8*  --   --  3.7  --   --   --     < > = values in this interval not displayed.       All pertinent labs within the past 24 hours have been reviewed.    Significant Imaging:  I have reviewed all pertinent imaging results/findings within the past 24 hours.

## 2025-04-11 NOTE — ASSESSMENT & PLAN NOTE
Unable to obtain history of presentation. Hemoglobin A1c worsened from 11/2024. Suspect a component of medication non-adherence compounded by nausea/vomiting. Glucose >700 with beta hydroxybutyrate 5.8. ABG without acidosis but bicarb of 14. Received 2L of NS in the ED.     --DKA/HHS protocol in place  --insulin infusion  --LR infusion  --POCT glucose q1  --BMP q4  --follow up serum osmolality  --NPO except water and ice chips

## 2025-04-11 NOTE — PROGRESS NOTES
[facility] - Medical ICU  Critical Care Medicine  Progress Note    Patient Name: Andrzej Sinclair  MRN: [id number]  Admission Date: 4/10/2025  Hospital Length of Stay: 1 days  Code Status: Full Code  Attending Provider: Renetta Vaughn MD  Primary Care Provider: Aubrie, Primary Doctor   Principal Problem: Hyperosmolar hyperglycemic state (HHS)    Subjective:     HPI:  Andrzej Sinclair is a 38 year old male with T1DM, hx of DKA/HHS, HTN who presented to [facility] ED 4/10/25 for hyperglycemia and N/V x 4 days. Patient was non participatory during examination so history obtained via chart review and from wife, Marzena. Wife states patient started having nausea and vomiting on Monday with very little PO intake over the last few days. Wife was monitoring patient's glucose via his dexcom and concerned with his elevated glucoses. Patient not willing to go to the hospital until wife called EMS and made him go. She reports insulin compliance. 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     Upon arrival to the ED, he was tachycardic and altered with labs consistent with DKA, BETH and hyperkalemia (K 7.3). He received calcium, albuterol, 2L IVF and started on insulin infusion. Critical care medicine consulted for DKA. Prior to my arrival, patient standing at the bedside using urinal with >1L of UOP off. After getting back into bed, patient no longer participatory in exam and would not answer any questions. Appeared confused or lethargic. Patient to be admitted to the MICU for closer monitoring.     Hospital/ICU Course:  No notes on file    Interval History/Significant Events: no major issues overnight   Continued need for insulin GTT    Objective:     Vital Signs (Most Recent):  Temp: 97 °F (36.1 °C) (04/11/25 1100)  Pulse: (!) 57 (04/11/25 1400)  Resp: 15 (04/11/25 1400)  BP: (!) 103/56 (04/11/25 1400)  SpO2: 100 % (04/11/25 1400) Vital Signs (24h Range):  Temp:  [97 °F (36.1 °C)-98.6 °F (37  °C)] 97 °F (36.1 °C)  Pulse:  [] 57  Resp:  [11-33] 15  SpO2:  [92 %-100 %] 100 %  BP: ()/(56-86) 103/56   Weight: 88 kg (194 lb 0.1 oz)  Body mass index is 24.91 kg/m².      Intake/Output Summary (Last 24 hours) at 4/11/2025 1520  Last data filed at 4/11/2025 1400  Gross per 24 hour   Intake 4895.12 ml   Output 700 ml   Net 4195.12 ml          Physical Exam  Vitals reviewed.   Constitutional:       Appearance: He is well-developed.   HENT:      Head: Normocephalic and atraumatic.   Eyes:      Pupils: Pupils are equal, round, and reactive to light.   Cardiovascular:      Rate and Rhythm: Regular rhythm. Tachycardia present.      Heart sounds: Normal heart sounds.   Pulmonary:      Effort: Pulmonary effort is normal.      Breath sounds: Normal breath sounds.   Abdominal:      General: Bowel sounds are normal.      Palpations: Abdomen is soft.      Tenderness: There is no abdominal tenderness.   Musculoskeletal:      Right lower leg: No edema.      Left lower leg: No edema.   Skin:     General: Skin is warm and dry.   Neurological:      Mental Status: He is lethargic.   Psychiatric:         Behavior: Behavior is slowed.            Vents:     Lines/Drains/Airways       Drain  Duration                  Urethral Catheter 04/11/25 1028 <1 day              Peripheral Intravenous Line  Duration                  Peripheral IV - Single Lumen 04/10/25 1205 22 G Left;Posterior Hand 1 day         Peripheral IV - Double Lumen 04/11/25 0310 18 G Anterior;Right Upper Arm <1 day         Peripheral IV - Single Lumen 04/10/25 1943 20 G Left;Posterior Wrist <1 day                  Significant Labs:    CBC/Anemia Profile:  Recent Labs   Lab 04/10/25  1148 04/10/25  1850 04/11/25  0301   WBC 10.83  --  11.74   HGB 17.9  --  14.6   HCT 51.3 50 43.1     --  190   MCV 89  --  88   RDW 12.3  --  12.3        Chemistries:  Recent Labs   Lab 04/10/25  1204 04/10/25  1314 04/10/25  1610 04/11/25  0308 04/11/25  0456  04/11/25  0800 04/11/25  1131   * 150*   < > 157* 154* 156* 152*   K 7.3* 6.1*   < > 5.0 5.5* 4.0 4.5    106   < > 117* 115* 116* 120*   CO2 14* 18*   < > 28 28 27 21*   BUN 52* 58*   < > 60* 63* 63* 64*   CREATININE 2.6* 2.7*   < > 2.8* 2.9* 2.7* 2.5*   CALCIUM 10.6* 10.3   < > 9.6 9.3 9.5 8.3*   ALBUMIN 4.3 4.2  --  3.7  --   --   --    BILITOT 0.8 0.8  --  0.9  --   --   --    ALKPHOS 105 97  --  83  --   --   --    ALT 22 22  --  18  --   --   --    AST 17 10*  --  15  --   --   --    GLUCOSE 782* 762*   < > 375* 469* 403* 441*   MG 3.2*  --    < > 3.1*  --  3.2* 2.7*   PHOS 6.8*  --   --  3.7  --   --   --     < > = values in this interval not displayed.       All pertinent labs within the past 24 hours have been reviewed.    Significant Imaging:  I have reviewed all pertinent imaging results/findings within the past 24 hours.    ABG  Recent Labs   Lab 04/10/25  1216   PH 7.341   PO2 73.6   PCO2 40.2   HCO3 21.2     Assessment/Plan:     Neuro  Acute metabolic encephalopathy  Suspect 2/2 HHS    --CT head normal   --DKA/HHS protocol in place  --neuro exams    Cardiac/Vascular  Primary hypertension  Holding home lisinopril    Renal/  Hyperkalemia  Received calcium and albuterol in the ED. Started on insulin infusion for HHS. Repeat BMP down trending.     --BMP q4  --shift as needed    BETH (acute kidney injury)  Suspect prerenal in setting of nausea and vomiting.   Baseline sCr 1.5, up to 2.7 on admission.     --volume resuscitation  --strict I/Os  -- not improving but not worsening.     Endocrine  * Hyperosmolar hyperglycemic state (HHS)  Unable to obtain history of presentation. Hemoglobin A1c worsened from 11/2024. Suspect a component of medication non-adherence compounded by nausea/vomiting. Glucose >700 with beta hydroxybutyrate 5.8. ABG without acidosis but bicarb of 14. Received 2L of NS in the ED.     --DKA/HHS protocol in place  --insulin infusion  --LR infusion  --POCT glucose q1  --BMP  q4  --follow up serum osmolality  --NPO except water and ice chips    Critical Care Time: 35 minutes  Critical secondary to Patient has a condition that poses threat to life and bodily function: DKA      Critical care was time spent personally by me on the following activities: development of treatment plan with patient or surrogate and bedside caregivers, discussions with consultants, evaluation of patient's response to treatment, examination of patient, ordering and performing treatments and interventions, ordering and review of laboratory studies, ordering and review of radiographic studies, pulse oximetry, re-evaluation of patient's condition. This critical care time did not overlap with that of any other provider or involve time for any procedures.     Renetta Vaughn MD  Critical Care Medicine  [facility] - Medical ICU

## 2025-04-11 NOTE — PROGRESS NOTES
"[facility] - Medical ICU  Adult Nutrition  Progress Note    SUMMARY   Recommendations  When medically feasible, ADAT to carbohydrate controlled diet  Monitor labs, meds, weight, skin    Goals: Meet % een/epn by next RD f/u  Nutrition Goal Status: new  Communication of RD Recs: other (comment) (poc)    Nutrition Discharge Planning    Nutrition Discharge Planning: Therapeutic diet (comments)  Therapeutic diet (comments): carb controlled diet    Assessment and Plan    Nutrition Problem  Food and nutrition related knowledge deficient     Related to (etiology):   Lack of value from behavior change     Signs and Symptoms (as evidenced by):   's, A1c: 13.8, 4 inpatient diabetes diet educations documented in [id number]    Interventions/Recommendations (treatment strategy):  Collaboration with other providers  Modified diet- carbohydrate controlled    Nutrition Diagnosis Status:   New        Malnutrition Assessment  No current signs/symptoms of malnutrition.     Reason for Assessment    Reason For Assessment: identified at risk by screening criteria  Diagnosis: diabetes diagnosis/complications  General Information Comments: 38 year old male with T1DM, hx of DKA/HHS, HTN who presented to [facility] [place] ED 4/10/25 for hyperglycemia and N/V x 4 days.   RD assessment for elevated A2c (13.8). Pt's currently NPO. Yesterday "placed in soft restrains for fighting the staff, not keeping any clothes on, trying to get out of bed with IV's connected" per RN note. Glucose in 400's. -204# per chart review. 10#/4.9% wt loss x 4 months - not significant. No wounds noted. RD to follow up and monitor. - Provided DM diet education at follow up as appropriate (added to discharge EDUs).     Nutrition Related Social Determinants of Health: SDOH: None Identified  Food Insecurity: No Food Insecurity (12/20/2024)    Hunger Vital Sign     Worried About Running Out of Food in the Last Year: Never true     Ran Out of Food in the Last Year: Never " "true     Nutrition/Diet History    Spiritual, Cultural Beliefs, Congregation Practices, Values that Affect Care: no  Food Allergies: NKFA    Anthropometrics    Height: 6' 2" (188 cm)  Height (inches): 74 in  Height Method: Stated  Weight: 88 kg (194 lb 0.1 oz)  Weight (lb): 194.01 lb  Weight Method: Stated  Ideal Body Weight (IBW), Male: 190 lb  % Ideal Body Weight, Male (lb): 102.11 %  BMI (Calculated): 24.9  BMI Grade: 18.5-24.9 - normal       Lab/Procedures/Meds    Pertinent Labs Reviewed: reviewed  Pertinent Labs Comments: Na: 152 (H), BUN 64 (H), creatinine 2.5 (H), GFR 33 (L), Glu 441 (H), mag 2.7 (H)  Pertinent Medications Reviewed: reviewed  Pertinent Medications Comments: Heparin, precedex, insulin, NaCl    Estimated/Assessed Needs    Weight Used For Calorie Calculations: 88 kg (194 lb 0.1 oz)  Energy Calorie Requirements (kcal): 2242 (msj * 1.2)  Energy Need Method: Karnack-St Jeor     Weight Used For Protein Calculations: 88 kg (194 lb 0.1 oz)     Estimated Fluid Requirement Method: RDA Method  RDA Method (mL): 2242  CHO Requirement: 252-306 (45-55%)      Nutrition Prescription Ordered    Current Diet Order: NPO    Evaluation of Received Nutrient/Fluid Intake    I/O: +4.4 L since admit  Comments: LBM unknown  % Intake of Estimated Energy Needs: 0 - 25 %  % Meal Intake: NPO    Nutrition Risk    Level of Risk/Frequency of Follow-up: moderate - high (f/u 1-2x/week)     Monitor and Evaluation    Monitor and Evaluation: Diet order, Weight, Beliefs and attitudes, Electrolyte and renal panel, Gastrointestinal profile, Glucose/endocrine profile, Inflammatory profile, Lipid profile, Skin     Nutrition Follow-Up    RD Follow-up?: Yes      "

## 2025-04-11 NOTE — EICU
"Intervention Initiated From:  [facility] / EICU    Janene intervened regarding:  Rounding (Video assessment)  Virtual ICU Admission    Admit Date: 4/10/2025  LOS: 0  Code Status: Full Code   : 1985  Bed: 7089/7089 A:     Diagnosis: Hyperosmolar hyperglycemic state (HHS)    Patient  has a past medical history of Diabetes mellitus and Diabetes mellitus type 1.    Last VS: BP (!) 157/71   Pulse (!) 121   Temp 98.6 °F (37 °C) (Oral)   Resp 17   Ht 6' 2" (1.88 m)   Wt 88 kg (194 lb 0.1 oz)   SpO2 100%   BMI 24.91 kg/m²       VICU Review    VICU nurse assessment :  [place] completed, LDA documentation reconciliation completed, and VTE prophylaxis review    Nursing orders placed : IP FORTUNATO Peripheral IV Access          "

## 2025-04-11 NOTE — PLAN OF CARE
Problem: Adult Inpatient Plan of Care  Goal: Plan of Care Review  Outcome: Progressing  Goal: Absence of Hospital-Acquired Illness or Injury  Outcome: Progressing  Goal: Optimal Comfort and Wellbeing  Outcome: Progressing     Problem: Fall Injury Risk  Goal: Absence of Fall and Fall-Related Injury  Outcome: Progressing     Problem: Restraint, Nonviolent  Goal: Absence of Harm or Injury  Outcome: Progressing     Problem: Diabetes Comorbidity  Goal: Blood Glucose Level Within Targeted Range  Outcome: Progressing

## 2025-04-11 NOTE — PLAN OF CARE
Recommendations  When medically feasible, ADAT to carbohydrate controlled diet  Monitor labs, meds, weight, skin    Goals: Meet % een/epn by next RD f/u  Nutrition Goal Status: new  Communication of RD Recs: other (comment) (poc)

## 2025-04-12 LAB
ABSOLUTE EOSINOPHIL (OHS): 0.01 K/UL
ABSOLUTE MONOCYTE (OHS): 0.67 K/UL (ref 0.3–1)
ABSOLUTE NEUTROPHIL COUNT (OHS): 8.4 K/UL (ref 1.8–7.7)
ALBUMIN SERPL BCP-MCNC: 3.1 G/DL (ref 3.5–5.2)
ALP SERPL-CCNC: 68 UNIT/L (ref 40–150)
ALT SERPL W/O P-5'-P-CCNC: 15 UNIT/L (ref 10–44)
ANION GAP (OHS): 11 MMOL/L (ref 8–16)
ANION GAP (OHS): 4 MMOL/L (ref 8–16)
ANION GAP (OHS): 6 MMOL/L (ref 8–16)
ANION GAP (OHS): 8 MMOL/L (ref 8–16)
AST SERPL-CCNC: 23 UNIT/L (ref 11–45)
BASOPHILS # BLD AUTO: 0.01 K/UL
BASOPHILS NFR BLD AUTO: 0.1 %
BILIRUB DIRECT SERPL-MCNC: 0.2 MG/DL (ref 0.1–0.3)
BILIRUB SERPL-MCNC: 0.7 MG/DL (ref 0.1–1)
BUN SERPL-MCNC: 35 MG/DL (ref 6–20)
BUN SERPL-MCNC: 46 MG/DL (ref 6–20)
BUN SERPL-MCNC: 52 MG/DL (ref 6–20)
BUN SERPL-MCNC: 55 MG/DL (ref 6–20)
BUN SERPL-MCNC: 60 MG/DL (ref 6–20)
BUN SERPL-MCNC: 60 MG/DL (ref 6–20)
CALCIUM SERPL-MCNC: 8.6 MG/DL (ref 8.7–10.5)
CALCIUM SERPL-MCNC: 8.7 MG/DL (ref 8.7–10.5)
CALCIUM SERPL-MCNC: 8.8 MG/DL (ref 8.7–10.5)
CALCIUM SERPL-MCNC: 8.9 MG/DL (ref 8.7–10.5)
CALCIUM SERPL-MCNC: 9 MG/DL (ref 8.7–10.5)
CALCIUM SERPL-MCNC: 9.1 MG/DL (ref 8.7–10.5)
CHLORIDE SERPL-SCNC: 121 MMOL/L (ref 95–110)
CHLORIDE SERPL-SCNC: 124 MMOL/L (ref 95–110)
CHLORIDE SERPL-SCNC: 125 MMOL/L (ref 95–110)
CO2 SERPL-SCNC: 25 MMOL/L (ref 23–29)
CO2 SERPL-SCNC: 25 MMOL/L (ref 23–29)
CO2 SERPL-SCNC: 26 MMOL/L (ref 23–29)
CO2 SERPL-SCNC: 26 MMOL/L (ref 23–29)
CO2 SERPL-SCNC: 27 MMOL/L (ref 23–29)
CO2 SERPL-SCNC: 28 MMOL/L (ref 23–29)
CREAT SERPL-MCNC: 1.4 MG/DL (ref 0.5–1.4)
CREAT SERPL-MCNC: 1.7 MG/DL (ref 0.5–1.4)
CREAT SERPL-MCNC: 1.8 MG/DL (ref 0.5–1.4)
CREAT SERPL-MCNC: 1.8 MG/DL (ref 0.5–1.4)
CREAT SERPL-MCNC: 1.9 MG/DL (ref 0.5–1.4)
CREAT SERPL-MCNC: 2 MG/DL (ref 0.5–1.4)
ERYTHROCYTE [DISTWIDTH] IN BLOOD BY AUTOMATED COUNT: 12.8 % (ref 11.5–14.5)
GFR SERPLBLD CREATININE-BSD FMLA CKD-EPI: 43 ML/MIN/1.73/M2
GFR SERPLBLD CREATININE-BSD FMLA CKD-EPI: 46 ML/MIN/1.73/M2
GFR SERPLBLD CREATININE-BSD FMLA CKD-EPI: 49 ML/MIN/1.73/M2
GFR SERPLBLD CREATININE-BSD FMLA CKD-EPI: 49 ML/MIN/1.73/M2
GFR SERPLBLD CREATININE-BSD FMLA CKD-EPI: 52 ML/MIN/1.73/M2
GFR SERPLBLD CREATININE-BSD FMLA CKD-EPI: >60 ML/MIN/1.73/M2
GLUCOSE SERPL-MCNC: 115 MG/DL (ref 70–110)
GLUCOSE SERPL-MCNC: 147 MG/DL (ref 70–110)
GLUCOSE SERPL-MCNC: 164 MG/DL (ref 70–110)
GLUCOSE SERPL-MCNC: 170 MG/DL (ref 70–110)
GLUCOSE SERPL-MCNC: 90 MG/DL (ref 70–110)
GLUCOSE SERPL-MCNC: 91 MG/DL (ref 70–110)
HCT VFR BLD AUTO: 39.4 % (ref 40–54)
HGB BLD-MCNC: 13.2 GM/DL (ref 14–18)
IMM GRANULOCYTES # BLD AUTO: 0.06 K/UL (ref 0–0.04)
IMM GRANULOCYTES NFR BLD AUTO: 0.6 % (ref 0–0.5)
LYMPHOCYTES # BLD AUTO: 1.1 K/UL (ref 1–4.8)
MAGNESIUM SERPL-MCNC: 2.2 MG/DL (ref 1.6–2.6)
MAGNESIUM SERPL-MCNC: 2.3 MG/DL (ref 1.6–2.6)
MAGNESIUM SERPL-MCNC: 2.3 MG/DL (ref 1.6–2.6)
MAGNESIUM SERPL-MCNC: 2.4 MG/DL (ref 1.6–2.6)
MAGNESIUM SERPL-MCNC: 2.5 MG/DL (ref 1.6–2.6)
MAGNESIUM SERPL-MCNC: 2.7 MG/DL (ref 1.6–2.6)
MCH RBC QN AUTO: 30.2 PG (ref 27–31)
MCHC RBC AUTO-ENTMCNC: 33.5 G/DL (ref 32–36)
MCV RBC AUTO: 90 FL (ref 82–98)
NUCLEATED RBC (/100WBC) (OHS): 0 /100 WBC
PLATELET # BLD AUTO: 157 K/UL (ref 150–450)
PMV BLD AUTO: 11.1 FL (ref 9.2–12.9)
POCT GLUCOSE: 100 MG/DL (ref 70–110)
POCT GLUCOSE: 101 MG/DL (ref 70–110)
POCT GLUCOSE: 106 MG/DL (ref 70–110)
POCT GLUCOSE: 112 MG/DL (ref 70–110)
POCT GLUCOSE: 112 MG/DL (ref 70–110)
POCT GLUCOSE: 114 MG/DL (ref 70–110)
POCT GLUCOSE: 118 MG/DL (ref 70–110)
POCT GLUCOSE: 124 MG/DL (ref 70–110)
POCT GLUCOSE: 138 MG/DL (ref 70–110)
POCT GLUCOSE: 145 MG/DL (ref 70–110)
POCT GLUCOSE: 151 MG/DL (ref 70–110)
POCT GLUCOSE: 155 MG/DL (ref 70–110)
POCT GLUCOSE: 167 MG/DL (ref 70–110)
POCT GLUCOSE: 174 MG/DL (ref 70–110)
POCT GLUCOSE: 180 MG/DL (ref 70–110)
POCT GLUCOSE: 183 MG/DL (ref 70–110)
POCT GLUCOSE: 189 MG/DL (ref 70–110)
POCT GLUCOSE: 192 MG/DL (ref 70–110)
POCT GLUCOSE: 86 MG/DL (ref 70–110)
POCT GLUCOSE: 89 MG/DL (ref 70–110)
POCT GLUCOSE: 91 MG/DL (ref 70–110)
POCT GLUCOSE: 95 MG/DL (ref 70–110)
POCT GLUCOSE: 98 MG/DL (ref 70–110)
POTASSIUM SERPL-SCNC: 3.7 MMOL/L (ref 3.5–5.1)
POTASSIUM SERPL-SCNC: 4 MMOL/L (ref 3.5–5.1)
POTASSIUM SERPL-SCNC: 4.2 MMOL/L (ref 3.5–5.1)
POTASSIUM SERPL-SCNC: 4.3 MMOL/L (ref 3.5–5.1)
POTASSIUM SERPL-SCNC: 4.3 MMOL/L (ref 3.5–5.1)
POTASSIUM SERPL-SCNC: 4.4 MMOL/L (ref 3.5–5.1)
PROT SERPL-MCNC: 6 GM/DL (ref 6–8.4)
RBC # BLD AUTO: 4.37 M/UL (ref 4.6–6.2)
RELATIVE EOSINOPHIL (OHS): 0.1 %
RELATIVE LYMPHOCYTE (OHS): 10.7 % (ref 18–48)
RELATIVE MONOCYTE (OHS): 6.5 % (ref 4–15)
RELATIVE NEUTROPHIL (OHS): 82 % (ref 38–73)
SODIUM SERPL-SCNC: 155 MMOL/L (ref 136–145)
SODIUM SERPL-SCNC: 155 MMOL/L (ref 136–145)
SODIUM SERPL-SCNC: 157 MMOL/L (ref 136–145)
SODIUM SERPL-SCNC: 157 MMOL/L (ref 136–145)
SODIUM SERPL-SCNC: 158 MMOL/L (ref 136–145)
SODIUM SERPL-SCNC: 158 MMOL/L (ref 136–145)
WBC # BLD AUTO: 10.25 K/UL (ref 3.9–12.7)

## 2025-04-12 PROCEDURE — 94761 N-INVAS EAR/PLS OXIMETRY MLT: CPT

## 2025-04-12 PROCEDURE — 83735 ASSAY OF MAGNESIUM: CPT | Performed by: PHYSICIAN ASSISTANT

## 2025-04-12 PROCEDURE — 80053 COMPREHEN METABOLIC PANEL: CPT | Performed by: PHYSICIAN ASSISTANT

## 2025-04-12 PROCEDURE — 63600175 PHARM REV CODE 636 W HCPCS: Performed by: PHYSICIAN ASSISTANT

## 2025-04-12 PROCEDURE — 82248 BILIRUBIN DIRECT: CPT | Performed by: PHYSICIAN ASSISTANT

## 2025-04-12 PROCEDURE — 99291 CRITICAL CARE FIRST HOUR: CPT | Mod: ,,, | Performed by: INTERNAL MEDICINE

## 2025-04-12 PROCEDURE — 20000000 HC ICU ROOM

## 2025-04-12 PROCEDURE — 85025 COMPLETE CBC W/AUTO DIFF WBC: CPT | Performed by: PHYSICIAN ASSISTANT

## 2025-04-12 PROCEDURE — 84295 ASSAY OF SERUM SODIUM: CPT | Performed by: PHYSICIAN ASSISTANT

## 2025-04-12 PROCEDURE — 25000003 PHARM REV CODE 250: Performed by: PHYSICIAN ASSISTANT

## 2025-04-12 PROCEDURE — 25000003 PHARM REV CODE 250: Performed by: INTERNAL MEDICINE

## 2025-04-12 PROCEDURE — 25000003 PHARM REV CODE 250

## 2025-04-12 RX ORDER — HALOPERIDOL LACTATE 5 MG/ML
5 INJECTION, SOLUTION INTRAMUSCULAR EVERY 6 HOURS PRN
Status: DISCONTINUED | OUTPATIENT
Start: 2025-04-12 | End: 2025-04-13

## 2025-04-12 RX ORDER — OLANZAPINE 10 MG/2ML
5 INJECTION, POWDER, FOR SOLUTION INTRAMUSCULAR ONCE AS NEEDED
Status: DISCONTINUED | OUTPATIENT
Start: 2025-04-12 | End: 2025-04-13

## 2025-04-12 RX ORDER — DEXTROSE MONOHYDRATE 50 MG/ML
INJECTION, SOLUTION INTRAVENOUS CONTINUOUS
Status: DISCONTINUED | OUTPATIENT
Start: 2025-04-12 | End: 2025-04-13

## 2025-04-12 RX ORDER — MUPIROCIN 20 MG/G
OINTMENT TOPICAL 2 TIMES DAILY
Status: DISCONTINUED | OUTPATIENT
Start: 2025-04-12 | End: 2025-04-15 | Stop reason: HOSPADM

## 2025-04-12 RX ADMIN — DEXMEDETOMIDINE HYDROCHLORIDE 1 MCG/KG/HR: 4 INJECTION INTRAVENOUS at 03:04

## 2025-04-12 RX ADMIN — HALOPERIDOL LACTATE 5 MG: 5 INJECTION, SOLUTION INTRAMUSCULAR at 04:04

## 2025-04-12 RX ADMIN — DEXMEDETOMIDINE HYDROCHLORIDE 1.4 MCG/KG/HR: 4 INJECTION INTRAVENOUS at 07:04

## 2025-04-12 RX ADMIN — DEXTROSE MONOHYDRATE: 50 INJECTION, SOLUTION INTRAVENOUS at 10:04

## 2025-04-12 RX ADMIN — MUPIROCIN: 20 OINTMENT TOPICAL at 09:04

## 2025-04-12 RX ADMIN — HEPARIN SODIUM 5000 UNITS: 5000 INJECTION INTRAVENOUS; SUBCUTANEOUS at 03:04

## 2025-04-12 RX ADMIN — HEPARIN SODIUM 5000 UNITS: 5000 INJECTION INTRAVENOUS; SUBCUTANEOUS at 09:04

## 2025-04-12 NOTE — ASSESSMENT & PLAN NOTE
Unable to obtain history of presentation. Hemoglobin A1c worsened from 11/2024. Suspect a component of medication non-adherence compounded by nausea/vomiting. Glucose >700 with beta hydroxybutyrate 5.8. ABG without acidosis but bicarb of 14. Received 2L of NS in the ED.     --DKA/HHS protocol in place  --insulin infusion  --LR infusion  --POCT glucose q1  --BMP q4  --follow up serum osmolality  --NPO except water and ice chips    Patient with hypernatremia.  Free water defecit is 3.1 liters.  IVF changed to D5W at 100cc/hr and allow patient to drink water when able.

## 2025-04-12 NOTE — ASSESSMENT & PLAN NOTE
Suspect 2/2 HHS    --CT head normal   --DKA/HHS protocol in place  --neuro exams  --Utox positive for opiates.  No current signs of withdrawal although delerious.   --No history of psychiatric disorders.

## 2025-04-12 NOTE — SUBJECTIVE & OBJECTIVE
Interval History/Significant Events: Patient with delerium.  No psychiatric history.  Utox positive for opiates.  Hypernatremia    Review of Systems   Reason unable to perform ROS: Delerium.     Objective:     Vital Signs (Most Recent):  Temp: 97.4 °F (36.3 °C) (04/12/25 1100)  Pulse: (!) 53 (04/12/25 1200)  Resp: 12 (04/12/25 1200)  BP: 106/66 (04/12/25 1100)  SpO2: 100 % (04/12/25 1200) Vital Signs (24h Range):  Temp:  [93.7 °F (34.3 °C)-97.9 °F (36.6 °C)] 97.4 °F (36.3 °C)  Pulse:  [43-64] 53  Resp:  [10-26] 12  SpO2:  [97 %-100 %] 100 %  BP: ()/(50-66) 106/66   Weight: 88 kg (194 lb 0.1 oz)  Body mass index is 24.91 kg/m².      Intake/Output Summary (Last 24 hours) at 4/12/2025 1600  Last data filed at 4/12/2025 1200  Gross per 24 hour   Intake 3341.83 ml   Output 1590 ml   Net 1751.83 ml          Physical Exam  Constitutional:       Appearance: He is not toxic-appearing.   HENT:      Head: Normocephalic.      Mouth/Throat:      Mouth: Mucous membranes are dry.   Eyes:      Pupils: Pupils are equal, round, and reactive to light.   Cardiovascular:      Rate and Rhythm: Normal rate and regular rhythm.   Pulmonary:      Effort: Pulmonary effort is normal.      Breath sounds: Normal breath sounds.   Abdominal:      Comments: Mild epigastric tenderness.  Normoactive bowel sounds.  Abdomen is soft.    Musculoskeletal:         General: Normal range of motion.      Cervical back: Normal range of motion and neck supple.   Neurological:      General: No focal deficit present.      Mental Status: He is alert.      Comments: Moves all 4 extremities   Psychiatric:         Mood and Affect: Mood normal.         Behavior: Behavior normal.            Vents:     Lines/Drains/Airways       Drain  Duration                  Urethral Catheter 04/11/25 1028 1 day              Peripheral Intravenous Line  Duration                  Peripheral IV - Single Lumen 04/10/25 1205 22 G Left;Posterior Hand 2 days         Peripheral IV -  Double Lumen 04/11/25 0310 18 G Anterior;Right Upper Arm 1 day         Peripheral IV - Single Lumen 04/10/25 1902 20 G Anterior;Proximal;Right Forearm 1 day         Peripheral IV - Single Lumen 04/11/25 2048 22 G Posterior;Right Hand <1 day                  Significant Labs:    CBC/Anemia Profile:  Recent Labs   Lab 04/10/25  1850 04/11/25  0301 04/12/25  0340   WBC  --  11.74 10.25   HGB  --  14.6 13.2*   HCT 50 43.1 39.4*   PLT  --  190 157   MCV  --  88 90   RDW  --  12.3 12.8        Chemistries:  Recent Labs   Lab 04/11/25  0308 04/11/25  0456 04/12/25  0340 04/12/25  0856 04/12/25  1346   *   < > 157* 155* 158*   K 5.0   < > 4.3 4.4 4.3   *   < > 124* 125* 124*   CO2 28   < > 27 26 28   BUN 60*   < > 60* 55* 52*   CREATININE 2.8*   < > 2.0* 1.8* 1.8*   CALCIUM 9.6   < > 8.7 8.8 9.1   ALBUMIN 3.7  --  3.1*  --   --    BILITOT 0.9  --  0.7  --   --    ALKPHOS 83  --  68  --   --    ALT 18  --  15  --   --    AST 15  --  23  --   --    GLUCOSE 375*   < > 91 164* 115*   MG 3.1*   < > 2.5 2.3 2.4   PHOS 3.7  --   --   --   --     < > = values in this interval not displayed.       All pertinent labs within the past 24 hours have been reviewed.    Significant Imaging:  I have reviewed all pertinent imaging results/findings within the past 24 hours.

## 2025-04-12 NOTE — EICU
Virtual ICU Quality Rounds    Admit Date: 4/10/2025  Hospital Day: 2    ICU Day: 1d 16h    24H Vital Sign Range:  Temp:  [93.7 °F (34.3 °C)-97.9 °F (36.6 °C)]   Pulse:  [43-64]   Resp:  [10-26]   BP: ()/(50-66)   SpO2:  [97 %-100 %]     VICU Surveillance Screening    Daily review of  line necessity(optional): Urinary catheter in place            Concepcion Indications : Urinary retention    LDA reconciliation : Yes

## 2025-04-12 NOTE — ASSESSMENT & PLAN NOTE
Na 158, began D5W at 100cc/hour.  Allow patient to drink water when able.  Free water defecit is 3.1 liters.  Continue to monitor electrolytes q 4 hours.

## 2025-04-12 NOTE — PLAN OF CARE
Problem: Adult Inpatient Plan of Care  Goal: Plan of Care Review  Outcome: Progressing  Goal: Patient-Specific Goal (Individualized)  Outcome: Progressing  Goal: Absence of Hospital-Acquired Illness or Injury  Outcome: Progressing  Goal: Optimal Comfort and Wellbeing  Outcome: Progressing  Goal: Readiness for Transition of Care  Outcome: Progressing     MICU DAILY GOALS     Family/Goals of care/Code Status   Code Status: Full Code    24H Vital Sign Range  Temp:  [93.7 °F (34.3 °C)-97.3 °F (36.3 °C)]   Pulse:  [43-68]   Resp:  [10-33]   BP: ()/(52-70)   SpO2:  [97 %-100 %]      Shift Events (include procedures and significant events)   Pt remained on insulin gtt overnight, pt hypothermic requiring use of ia hugger, pt agitated/restless, needing frequent redirection, no other issues, continue to monitor    AWAKE RASS: Goal -    Actual - RASS (David Agitation-Sedation Scale): agitated    Restraint necessity: Removing medical devices   BREATHE SBT: NA    Coordinate A & B, analgesics/sedatives Pain: managed   SAT: NA   Delirium CAM-ICU:     Early(intubated/ Progressive (non-intubated) Mobility MOVE Screen (INTUBATED ONLY): NA    Activity: Activity Management: Rolling - L1   Feeding/Nutrition Diet order: Diet/Nutrition Received: NPO,     Thrombus DVT prophylaxis: VTE Core Measure: Pharmacological prophylaxis initiated/maintained   HOB Elevation Head of Bed (HOB) Positioning: HOB at 30 degrees   Ulcer Prophylaxis GI: yes   Glucose control managed Glycemic Management: blood glucose monitored, insulin infusion adjusted   Skin Skin assessment:     Sacrum intact/not altered? Yes  Heels intact/not altered? Yes  Surgical wound? No    CHECK ONE!   (no altered skin or altered skin) and sub boxes:  [x] No Altered Skin Integrity Present    []Prevention Measures Documented    [] Altered Skin Integrity Present or Discovered   [] LDA already present in EPIC, daily doc completed              [] LDA added if not already in  EPIC (describe/stage wound).               [] Wound Image Taken (required on admit,                   transfer/discharge and every Tuesday)    Wound Care Consulted? No   Bowel Function no issues    Indwelling Catheter Necessity      Urethral Catheter 04/11/25 1028-Reason for Continuing Urinary Catheterization: Urinary retention          De-escalation Antibiotics Yes        VS and assessment per flow sheet, patient progressing towards goals as tolerated, plan of care reviewed with [unfilled] and family, all concerns addressed, will continue to monitor.

## 2025-04-12 NOTE — PROGRESS NOTES
[facility] - Medical ICU  Critical Care Medicine  Progress Note    Patient Name: Andrzej Sinclair  MRN: [id number]  Admission Date: 4/10/2025  Hospital Length of Stay: 2 days  Code Status: Full Code  Attending Provider: Ira Stallworth MD  Primary Care Provider: Aubrie, Primary Doctor   Principal Problem: Hyperosmolar hyperglycemic state (HHS)    Subjective:     HPI:  Andrzej Sinclair is a 38 year old male with T1DM, hx of DKA/HHS, HTN who presented to [facility] ED 4/10/25 for hyperglycemia and N/V x 4 days. Patient was non participatory during examination so history obtained via chart review and from wife, Marzena. Wife states patient started having nausea and vomiting on Monday with very little PO intake over the last few days. Wife was monitoring patient's glucose via his dexcom and concerned with his elevated glucoses. Patient not willing to go to the hospital until wife called EMS and made him go. She reports insulin compliance. 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     Upon arrival to the ED, he was tachycardic and altered with labs consistent with DKA, BETH and hyperkalemia (K 7.3). He received calcium, albuterol, 2L IVF and started on insulin infusion. Critical care medicine consulted for DKA. Prior to my arrival, patient standing at the bedside using urinal with >1L of UOP off. After getting back into bed, patient no longer participatory in exam and would not answer any questions. Appeared confused or lethargic. Patient to be admitted to the MICU for closer monitoring.     Hospital/ICU Course:  No notes on file    Interval History/Significant Events: Patient with delerium.  No psychiatric history.  Utox positive for opiates.  Hypernatremia    Review of Systems   Reason unable to perform ROS: Delerium.     Objective:     Vital Signs (Most Recent):  Temp: 97.4 °F (36.3 °C) (04/12/25 1100)  Pulse: (!) 53 (04/12/25 1200)  Resp: 12 (04/12/25 1200)  BP: 106/66 (04/12/25  1100)  SpO2: 100 % (04/12/25 1200) Vital Signs (24h Range):  Temp:  [93.7 °F (34.3 °C)-97.9 °F (36.6 °C)] 97.4 °F (36.3 °C)  Pulse:  [43-64] 53  Resp:  [10-26] 12  SpO2:  [97 %-100 %] 100 %  BP: ()/(50-66) 106/66   Weight: 88 kg (194 lb 0.1 oz)  Body mass index is 24.91 kg/m².      Intake/Output Summary (Last 24 hours) at 4/12/2025 1600  Last data filed at 4/12/2025 1200  Gross per 24 hour   Intake 3341.83 ml   Output 1590 ml   Net 1751.83 ml          Physical Exam  Constitutional:       Appearance: He is not toxic-appearing.   HENT:      Head: Normocephalic.      Mouth/Throat:      Mouth: Mucous membranes are dry.   Eyes:      Pupils: Pupils are equal, round, and reactive to light.   Cardiovascular:      Rate and Rhythm: Normal rate and regular rhythm.   Pulmonary:      Effort: Pulmonary effort is normal.      Breath sounds: Normal breath sounds.   Abdominal:      Comments: Mild epigastric tenderness.  Normoactive bowel sounds.  Abdomen is soft.    Musculoskeletal:         General: Normal range of motion.      Cervical back: Normal range of motion and neck supple.   Neurological:      General: No focal deficit present.      Mental Status: He is alert.      Comments: Moves all 4 extremities   Psychiatric:         Mood and Affect: Mood normal.         Behavior: Behavior normal.            Vents:     Lines/Drains/Airways       Drain  Duration                  Urethral Catheter 04/11/25 1028 1 day              Peripheral Intravenous Line  Duration                  Peripheral IV - Single Lumen 04/10/25 1205 22 G Left;Posterior Hand 2 days         Peripheral IV - Double Lumen 04/11/25 0310 18 G Anterior;Right Upper Arm 1 day         Peripheral IV - Single Lumen 04/10/25 1902 20 G Anterior;Proximal;Right Forearm 1 day         Peripheral IV - Single Lumen 04/11/25 2048 22 G Posterior;Right Hand <1 day                  Significant Labs:    CBC/Anemia Profile:  Recent Labs   Lab 04/10/25  1850 04/11/25  0301  04/12/25  0340   WBC  --  11.74 10.25   HGB  --  14.6 13.2*   HCT 50 43.1 39.4*   PLT  --  190 157   MCV  --  88 90   RDW  --  12.3 12.8        Chemistries:  Recent Labs   Lab 04/11/25  0308 04/11/25  0456 04/12/25  0340 04/12/25  0856 04/12/25  1346   *   < > 157* 155* 158*   K 5.0   < > 4.3 4.4 4.3   *   < > 124* 125* 124*   CO2 28   < > 27 26 28   BUN 60*   < > 60* 55* 52*   CREATININE 2.8*   < > 2.0* 1.8* 1.8*   CALCIUM 9.6   < > 8.7 8.8 9.1   ALBUMIN 3.7  --  3.1*  --   --    BILITOT 0.9  --  0.7  --   --    ALKPHOS 83  --  68  --   --    ALT 18  --  15  --   --    AST 15  --  23  --   --    GLUCOSE 375*   < > 91 164* 115*   MG 3.1*   < > 2.5 2.3 2.4   PHOS 3.7  --   --   --   --     < > = values in this interval not displayed.       All pertinent labs within the past 24 hours have been reviewed.    Significant Imaging:  I have reviewed all pertinent imaging results/findings within the past 24 hours.    ABG  Recent Labs   Lab 04/10/25  1216   PH 7.341   PO2 73.6   PCO2 40.2   HCO3 21.2     Assessment/Plan:     Neuro  Acute metabolic encephalopathy  Suspect 2/2 HHS    --CT head normal   --DKA/HHS protocol in place  --neuro exams  --Utox positive for opiates.  No current signs of withdrawal although delerious.   --No history of psychiatric disorders.     Cardiac/Vascular  Primary hypertension  Holding home lisinopril    Renal/  Hyperkalemia  Received calcium and albuterol in the ED. Started on insulin infusion for HHS. Repeat BMP down trending.     --BMP q4  --resolved.     BETH (acute kidney injury)  Suspect prerenal in setting of nausea and vomiting.   Baseline sCr 1.5, up to 2.7 on admission.     --volume resuscitation  --strict I/Os  -- Creat now 1.8  --Begin free water for hypernatremia.    Hypernatremia  Na 158, began D5W at 100cc/hour.  Allow patient to drink water when able.  Free water defecit is 3.1 liters.  Continue to monitor electrolytes q 4 hours.     Endocrine  * Hyperosmolar  hyperglycemic state (HHS)  Unable to obtain history of presentation. Hemoglobin A1c worsened from 11/2024. Suspect a component of medication non-adherence compounded by nausea/vomiting. Glucose >700 with beta hydroxybutyrate 5.8. ABG without acidosis but bicarb of 14. Received 2L of NS in the ED.     --DKA/HHS protocol in place  --insulin infusion  --LR infusion  --POCT glucose q1  --BMP q4  --follow up serum osmolality  --NPO except water and ice chips    Patient with hypernatremia.  Free water defecit is 3.1 liters.  IVF changed to D5W at 100cc/hr and allow patient to drink water when able.        Critical Care Daily Checklist:    A: Awake: RASS Goal/Actual Goal:    Actual:     B: Spontaneous Breathing Trial Performed?     C: SAT & SBT Coordinated?  Na/                      D: Delirium: CAM-ICU  Positive   E: Early Mobility Performed? Yes   F: Feeding Goal: Goals: Meet % een/epn by next RD f/u  Status: Nutrition Goal Status: new   Current Diet Order   Procedures    Diet NPO Except for: Oral Water (Please provide water to patient and allow him to drink), Other (Comment)     Except for::   Oral Water              Please provide water to patient and allow him to drink     Except for::   Other (Comment)      AS: Analgesia/Sedation None, DC precedex   T: Thromboembolic Prophylaxis Heparin   H: HOB > 300 Yes   U: Stress Ulcer Prophylaxis (if needed) na   G: Glucose Control Insulin ggt   B: Bowel Function Stool Occurrence: 0   I: Indwelling Catheter (Lines & Concepcion) Necessity Unable to void independently    D: De-escalation of Antimicrobials/Pharmacotherapies N/a    Plan for the day/ETD Reorient to situation, start diet when able,  Begin sq insulin    Code Status:  Family/Goals of Care: Full Code       Critical Care Time: 60 minutes  Critical secondary to Patient has a condition that poses threat to life and bodily function: Acute Renal Failure and DKA      Critical care was time spent personally by me on the  following activities: development of treatment plan with patient or surrogate and bedside caregivers, discussions with consultants, evaluation of patient's response to treatment, examination of patient, ordering and performing treatments and interventions, ordering and review of laboratory studies, ordering and review of radiographic studies, pulse oximetry, re-evaluation of patient's condition. This critical care time did not overlap with that of any other provider or involve time for any procedures.     Ira Stallworth MD  Critical Care Medicine  [facility]

## 2025-04-12 NOTE — ASSESSMENT & PLAN NOTE
Suspect prerenal in setting of nausea and vomiting.   Baseline sCr 1.5, up to 2.7 on admission.     --volume resuscitation  --strict I/Os  -- Creat now 1.8  --Begin free water for hypernatremia.

## 2025-04-12 NOTE — ASSESSMENT & PLAN NOTE
Received calcium and albuterol in the ED. Started on insulin infusion for HHS. Repeat BMP down trending.     --BMP q4  --resolved.

## 2025-04-12 NOTE — EICU
VIC Night Rounds Checklist  24H Vital Sign Range:  Temp:  [97 °F (36.1 °C)-97.6 °F (36.4 °C)]   Pulse:  [46-79]   Resp:  [11-33]   BP: ()/(56-71)   SpO2:  [96 %-100 %]         Nursing orders placed : IP FORTUNATO Peripheral IV Access

## 2025-04-13 PROBLEM — E87.6 HYPOKALEMIA: Status: ACTIVE | Noted: 2025-04-13

## 2025-04-13 PROBLEM — D69.6 THROMBOCYTOPENIA: Status: ACTIVE | Noted: 2025-04-13

## 2025-04-13 PROBLEM — D64.9 ANEMIA: Status: ACTIVE | Noted: 2025-04-13

## 2025-04-13 LAB
ABSOLUTE EOSINOPHIL (OHS): 0.01 K/UL
ABSOLUTE MONOCYTE (OHS): 0.48 K/UL (ref 0.3–1)
ABSOLUTE NEUTROPHIL COUNT (OHS): 4.18 K/UL (ref 1.8–7.7)
ALBUMIN SERPL BCP-MCNC: 3.2 G/DL (ref 3.5–5.2)
ALBUMIN SERPL BCP-MCNC: 3.5 G/DL (ref 3.5–5.2)
ALP SERPL-CCNC: 71 UNIT/L (ref 40–150)
ALP SERPL-CCNC: 82 UNIT/L (ref 40–150)
ALT SERPL W/O P-5'-P-CCNC: 31 UNIT/L (ref 10–44)
ALT SERPL W/O P-5'-P-CCNC: 38 UNIT/L (ref 10–44)
ANION GAP (OHS): 11 MMOL/L (ref 8–16)
ANION GAP (OHS): 14 MMOL/L (ref 8–16)
ANION GAP (OHS): 8 MMOL/L (ref 8–16)
AST SERPL-CCNC: 53 UNIT/L (ref 11–45)
AST SERPL-CCNC: 53 UNIT/L (ref 11–45)
BASOPHILS # BLD AUTO: 0.01 K/UL
BASOPHILS NFR BLD AUTO: 0.2 %
BILIRUB DIRECT SERPL-MCNC: 0.3 MG/DL (ref 0.1–0.3)
BILIRUB SERPL-MCNC: 0.8 MG/DL (ref 0.1–1)
BILIRUB SERPL-MCNC: 1.1 MG/DL (ref 0.1–1)
BUN SERPL-MCNC: 22 MG/DL (ref 6–20)
BUN SERPL-MCNC: 25 MG/DL (ref 6–20)
BUN SERPL-MCNC: 31 MG/DL (ref 6–20)
CALCIUM SERPL-MCNC: 8.7 MG/DL (ref 8.7–10.5)
CALCIUM SERPL-MCNC: 9 MG/DL (ref 8.7–10.5)
CALCIUM SERPL-MCNC: 9.3 MG/DL (ref 8.7–10.5)
CHLORIDE SERPL-SCNC: 115 MMOL/L (ref 95–110)
CHLORIDE SERPL-SCNC: 120 MMOL/L (ref 95–110)
CHLORIDE SERPL-SCNC: 121 MMOL/L (ref 95–110)
CO2 SERPL-SCNC: 24 MMOL/L (ref 23–29)
CO2 SERPL-SCNC: 24 MMOL/L (ref 23–29)
CO2 SERPL-SCNC: 26 MMOL/L (ref 23–29)
CREAT SERPL-MCNC: 1.3 MG/DL (ref 0.5–1.4)
CREAT SERPL-MCNC: 1.3 MG/DL (ref 0.5–1.4)
CREAT SERPL-MCNC: 1.5 MG/DL (ref 0.5–1.4)
ERYTHROCYTE [DISTWIDTH] IN BLOOD BY AUTOMATED COUNT: 13 % (ref 11.5–14.5)
GFR SERPLBLD CREATININE-BSD FMLA CKD-EPI: >60 ML/MIN/1.73/M2
GLUCOSE SERPL-MCNC: 131 MG/DL (ref 70–110)
GLUCOSE SERPL-MCNC: 222 MG/DL (ref 70–110)
GLUCOSE SERPL-MCNC: 311 MG/DL (ref 70–110)
HCT VFR BLD AUTO: 40.7 % (ref 40–54)
HGB BLD-MCNC: 13.3 GM/DL (ref 14–18)
IMM GRANULOCYTES # BLD AUTO: 0.03 K/UL (ref 0–0.04)
IMM GRANULOCYTES NFR BLD AUTO: 0.5 % (ref 0–0.5)
LACTATE SERPL-SCNC: 1.6 MMOL/L (ref 0.5–2.2)
LYMPHOCYTES # BLD AUTO: 0.88 K/UL (ref 1–4.8)
MAGNESIUM SERPL-MCNC: 1.9 MG/DL (ref 1.6–2.6)
MAGNESIUM SERPL-MCNC: 2 MG/DL (ref 1.6–2.6)
MAGNESIUM SERPL-MCNC: 2 MG/DL (ref 1.6–2.6)
MAGNESIUM SERPL-MCNC: 2.2 MG/DL (ref 1.6–2.6)
MAGNESIUM SERPL-MCNC: 2.2 MG/DL (ref 1.6–2.6)
MCH RBC QN AUTO: 30 PG (ref 27–31)
MCHC RBC AUTO-ENTMCNC: 32.7 G/DL (ref 32–36)
MCV RBC AUTO: 92 FL (ref 82–98)
NUCLEATED RBC (/100WBC) (OHS): 0 /100 WBC
PLATELET # BLD AUTO: 133 K/UL (ref 150–450)
PMV BLD AUTO: 11.6 FL (ref 9.2–12.9)
POCT GLUCOSE: 137 MG/DL (ref 70–110)
POCT GLUCOSE: 148 MG/DL (ref 70–110)
POCT GLUCOSE: 148 MG/DL (ref 70–110)
POCT GLUCOSE: 199 MG/DL (ref 70–110)
POCT GLUCOSE: 208 MG/DL (ref 70–110)
POCT GLUCOSE: 272 MG/DL (ref 70–110)
POCT GLUCOSE: 320 MG/DL (ref 70–110)
POCT GLUCOSE: 382 MG/DL (ref 70–110)
POCT GLUCOSE: 97 MG/DL (ref 70–110)
POTASSIUM SERPL-SCNC: 3.3 MMOL/L (ref 3.5–5.1)
POTASSIUM SERPL-SCNC: 4.1 MMOL/L (ref 3.5–5.1)
POTASSIUM SERPL-SCNC: 4.5 MMOL/L (ref 3.5–5.1)
PROT SERPL-MCNC: 6 GM/DL (ref 6–8.4)
PROT SERPL-MCNC: 6.8 GM/DL (ref 6–8.4)
RBC # BLD AUTO: 4.44 M/UL (ref 4.6–6.2)
RELATIVE EOSINOPHIL (OHS): 0.2 %
RELATIVE LYMPHOCYTE (OHS): 15.7 % (ref 18–48)
RELATIVE MONOCYTE (OHS): 8.6 % (ref 4–15)
RELATIVE NEUTROPHIL (OHS): 74.8 % (ref 38–73)
SODIUM SERPL-SCNC: 153 MMOL/L (ref 136–145)
SODIUM SERPL-SCNC: 155 MMOL/L (ref 136–145)
SODIUM SERPL-SCNC: 155 MMOL/L (ref 136–145)
WBC # BLD AUTO: 5.59 K/UL (ref 3.9–12.7)

## 2025-04-13 PROCEDURE — 25000003 PHARM REV CODE 250: Performed by: INTERNAL MEDICINE

## 2025-04-13 PROCEDURE — 25000003 PHARM REV CODE 250

## 2025-04-13 PROCEDURE — 63600175 PHARM REV CODE 636 W HCPCS

## 2025-04-13 PROCEDURE — 20600001 HC STEP DOWN PRIVATE ROOM

## 2025-04-13 PROCEDURE — 63600175 PHARM REV CODE 636 W HCPCS: Performed by: PHYSICIAN ASSISTANT

## 2025-04-13 PROCEDURE — 80053 COMPREHEN METABOLIC PANEL: CPT | Performed by: INTERNAL MEDICINE

## 2025-04-13 PROCEDURE — 93005 ELECTROCARDIOGRAM TRACING: CPT

## 2025-04-13 PROCEDURE — 83735 ASSAY OF MAGNESIUM: CPT | Performed by: PHYSICIAN ASSISTANT

## 2025-04-13 PROCEDURE — 80053 COMPREHEN METABOLIC PANEL: CPT | Performed by: PHYSICIAN ASSISTANT

## 2025-04-13 PROCEDURE — 93010 ELECTROCARDIOGRAM REPORT: CPT | Mod: ,,, | Performed by: INTERNAL MEDICINE

## 2025-04-13 PROCEDURE — 94761 N-INVAS EAR/PLS OXIMETRY MLT: CPT

## 2025-04-13 PROCEDURE — 85025 COMPLETE CBC W/AUTO DIFF WBC: CPT | Performed by: PHYSICIAN ASSISTANT

## 2025-04-13 PROCEDURE — 99291 CRITICAL CARE FIRST HOUR: CPT | Mod: ,,, | Performed by: INTERNAL MEDICINE

## 2025-04-13 PROCEDURE — 82248 BILIRUBIN DIRECT: CPT | Performed by: PHYSICIAN ASSISTANT

## 2025-04-13 PROCEDURE — 63600175 PHARM REV CODE 636 W HCPCS: Performed by: INTERNAL MEDICINE

## 2025-04-13 PROCEDURE — 83605 ASSAY OF LACTIC ACID: CPT

## 2025-04-13 RX ORDER — INSULIN GLARGINE 100 [IU]/ML
10 INJECTION, SOLUTION SUBCUTANEOUS 2 TIMES DAILY
Status: DISCONTINUED | OUTPATIENT
Start: 2025-04-13 | End: 2025-04-14

## 2025-04-13 RX ORDER — ONDANSETRON HYDROCHLORIDE 2 MG/ML
4 INJECTION, SOLUTION INTRAVENOUS EVERY 6 HOURS PRN
Status: DISCONTINUED | OUTPATIENT
Start: 2025-04-13 | End: 2025-04-15 | Stop reason: HOSPADM

## 2025-04-13 RX ORDER — ENOXAPARIN SODIUM 100 MG/ML
40 INJECTION SUBCUTANEOUS EVERY 24 HOURS
Status: DISCONTINUED | OUTPATIENT
Start: 2025-04-13 | End: 2025-04-15 | Stop reason: HOSPADM

## 2025-04-13 RX ORDER — GLUCAGON 1 MG
1 KIT INJECTION
Status: DISCONTINUED | OUTPATIENT
Start: 2025-04-13 | End: 2025-04-13

## 2025-04-13 RX ORDER — INSULIN ASPART 100 [IU]/ML
0-10 INJECTION, SOLUTION INTRAVENOUS; SUBCUTANEOUS
Status: DISCONTINUED | OUTPATIENT
Start: 2025-04-13 | End: 2025-04-13

## 2025-04-13 RX ORDER — POLYETHYLENE GLYCOL 3350 17 G/17G
17 POWDER, FOR SOLUTION ORAL DAILY
Status: DISCONTINUED | OUTPATIENT
Start: 2025-04-13 | End: 2025-04-13

## 2025-04-13 RX ORDER — AMOXICILLIN 250 MG
1 CAPSULE ORAL DAILY
Status: DISCONTINUED | OUTPATIENT
Start: 2025-04-13 | End: 2025-04-15 | Stop reason: HOSPADM

## 2025-04-13 RX ORDER — IBUPROFEN 200 MG
24 TABLET ORAL
Status: DISCONTINUED | OUTPATIENT
Start: 2025-04-13 | End: 2025-04-13

## 2025-04-13 RX ORDER — IBUPROFEN 200 MG
16 TABLET ORAL
Status: DISCONTINUED | OUTPATIENT
Start: 2025-04-13 | End: 2025-04-13

## 2025-04-13 RX ORDER — INSULIN ASPART 100 [IU]/ML
5 INJECTION, SOLUTION INTRAVENOUS; SUBCUTANEOUS
Status: DISCONTINUED | OUTPATIENT
Start: 2025-04-13 | End: 2025-04-13

## 2025-04-13 RX ADMIN — INSULIN HUMAN 2 UNITS/HR: 1 INJECTION, SOLUTION INTRAVENOUS at 11:04

## 2025-04-13 RX ADMIN — INSULIN ASPART 2 UNITS: 100 INJECTION, SOLUTION INTRAVENOUS; SUBCUTANEOUS at 12:04

## 2025-04-13 RX ADMIN — MUPIROCIN: 20 OINTMENT TOPICAL at 10:04

## 2025-04-13 RX ADMIN — POTASSIUM BICARBONATE 20 MEQ: 391 TABLET, EFFERVESCENT ORAL at 03:04

## 2025-04-13 RX ADMIN — MUPIROCIN: 20 OINTMENT TOPICAL at 08:04

## 2025-04-13 RX ADMIN — PROMETHAZINE HYDROCHLORIDE 25 MG: 25 INJECTION INTRAMUSCULAR; INTRAVENOUS at 10:04

## 2025-04-13 RX ADMIN — INSULIN ASPART 4 UNITS: 100 INJECTION, SOLUTION INTRAVENOUS; SUBCUTANEOUS at 08:04

## 2025-04-13 RX ADMIN — ONDANSETRON 4 MG: 2 INJECTION INTRAMUSCULAR; INTRAVENOUS at 03:04

## 2025-04-13 RX ADMIN — PROMETHAZINE HYDROCHLORIDE 12.5 MG: 25 INJECTION INTRAMUSCULAR; INTRAVENOUS at 05:04

## 2025-04-13 RX ADMIN — ENOXAPARIN SODIUM 40 MG: 40 INJECTION SUBCUTANEOUS at 05:04

## 2025-04-13 RX ADMIN — INSULIN ASPART 5 UNITS: 100 INJECTION, SOLUTION INTRAVENOUS; SUBCUTANEOUS at 12:04

## 2025-04-13 RX ADMIN — INSULIN GLARGINE 10 UNITS: 100 INJECTION, SOLUTION SUBCUTANEOUS at 03:04

## 2025-04-13 RX ADMIN — INSULIN ASPART 5 UNITS: 100 INJECTION, SOLUTION INTRAVENOUS; SUBCUTANEOUS at 08:04

## 2025-04-13 RX ADMIN — HEPARIN SODIUM 5000 UNITS: 5000 INJECTION INTRAVENOUS; SUBCUTANEOUS at 06:04

## 2025-04-13 NOTE — EICU
MADY Night Rounds Checklist  24H Vital Sign Range:  Temp:  [95.5 °F (35.3 °C)-98.2 °F (36.8 °C)]   Pulse:  [53-72]   Resp:  [10-29]   BP: ()/(50-66)   SpO2:  [97 %-100 %]     Video rounds

## 2025-04-13 NOTE — ASSESSMENT & PLAN NOTE
Suspect prerenal in setting of nausea and vomiting.   Baseline sCr 1.5, up to 2.7 on admission. Now 1.3      Encourage free water for hypernatremia.

## 2025-04-13 NOTE — ASSESSMENT & PLAN NOTE
Suspect 2/2 HHS    --CT head normal   --DKA/HHS protocol in place    Patient reports this is typical when has DKA, now resolved.

## 2025-04-13 NOTE — ASSESSMENT & PLAN NOTE
Hemoglobin A1c worsened from 11/2024. Followed by endocrine outpatient.  Glucose >700 with beta hydroxybutyrate 5.8. ABG without acidosis but bicarb of 14. Received 2L of NS in the ED.     --DKA/HHS protocol in place now transitioned to Lantus 10 U BID and aspart with meals with SSI    Stable to floor

## 2025-04-13 NOTE — HPI
Per CCM:  Andrzej Sinclair is a 38 year old male with T1DM, hx of DKA/HHS, HTN who [place] [facility] [place]10[place]25 for hyperglycemia and N/V x 4 days. Patient was non participatory during examination so history obtained via chart review and from wife, Marzena. Wife states patient started having nausea and vomiting on Monday with very little PO intake over the last few days. Wife was monitoring patient's glucose via his dexcom and concerned with his elevated glucoses. Patient not willing to go to the hospital until wife called EMS and made him go. She reports insulin compliance. 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      Upon arrival to the ED, he was tachycardic and altered with labs consistent with DKA, BETH and hyperkalemia (K 7.3). He received calcium, albuterol, 2L IVF and started on insulin infusion. Critical care medicine consulted for DKA. Prior to my arrival, patient standing at the bedside using urinal with >1L of UOP off. After getting back into bed, patient no longer participatory in exam and would not answer any questions. Appeared confused or lethargic. Patient to be admitted to the MICU for closer monitoring.

## 2025-04-13 NOTE — PLAN OF CARE
MICU DAILY GOALS     Family/Goals of care/Code Status   Code Status: Full Code    24H Vital Sign Range  Temp:  [93.7 °F (34.3 °C)-98.2 °F (36.8 °C)]   Pulse:  [43-72]   Resp:  [10-29]   BP: ()/(50-66)   SpO2:  [97 %-100 %]      Shift Events (include procedures and significant events)   No acute events throughout shift. Mentation waxing and waning throughout shift with periods of agitation. Non-redirectable. PRNs given. Combative towards nursing staff. CC3 aware.     AWAKE RASS: Goal -    Actual - RASS (David Agitation-Sedation Scale): drowsy    Restraint necessity: Treatment interference   BREATHE SBT: Not intubated    Coordinate A & B, analgesics/sedatives Pain: managed   SAT: Not intubated   Delirium CAM-ICU:     Early(intubated/ Progressive (non-intubated) Mobility MOVE Screen (INTUBATED ONLY): Not intubated    Activity: Activity Management: Rolling - L1   Feeding/Nutrition Diet order: Diet/Nutrition Received: NPO,     Thrombus DVT prophylaxis: VTE Core Measure: Pharmacological prophylaxis initiated/maintained   HOB Elevation Head of Bed (HOB) Positioning: HOB at 30 degrees   Ulcer Prophylaxis GI: yes   Glucose control managed Glycemic Management: blood glucose monitored   Skin Skin assessment:     Sacrum intact/not altered? Yes  Heels intact/not altered? Yes  Surgical wound? No    CHECK ONE!   (no altered skin or altered skin) and sub boxes:  [x] No Altered Skin Integrity Present    [x]Prevention Measures Documented    [] Altered Skin Integrity Present or Discovered   [] LDA already present in EPIC, daily doc completed              [] LDA added if not already in EPIC (describe/stage wound).               [] Wound Image Taken (required on admit,                   transfer/discharge and every Tuesday)    Wound Care Consulted? No   Bowel Function no issues    Indwelling Catheter Necessity [REMOVED]      Urethral Catheter 04/11/25 1028-Reason for Continuing Urinary Catheterization: Urinary retention           De-escalation Antibiotics No        VS and assessment per flow sheet, patient progressing towards goals as tolerated, plan of care reviewed with  patient and family , all concerns addressed, will continue to monitor.

## 2025-04-13 NOTE — SUBJECTIVE & OBJECTIVE
Interval History/Significant Events: Doing well off of insulin ggt.  Subq insulin transition overnight.      Review of Systems    States he is on gabapentin for chronic pain of legs, unknown dose.    Objective:     Vital Signs (Most Recent):  Temp: 98.7 °F (37.1 °C) (04/13/25 0300)  Pulse: 67 (04/13/25 0900)  Resp: 13 (04/13/25 0900)  BP: (!) 148/71 (04/13/25 0900)  SpO2: 100 % (04/13/25 0900) Vital Signs (24h Range):  Temp:  [98.1 °F (36.7 °C)-98.9 °F (37.2 °C)] 98.7 °F (37.1 °C)  Pulse:  [58-75] 67  Resp:  [10-33] 13  SpO2:  [99 %-100 %] 100 %  BP: (109-148)/(58-71) 148/71   Weight: 88 kg (194 lb 0.1 oz)  Body mass index is 24.91 kg/m².      Intake/Output Summary (Last 24 hours) at 4/13/2025 1328  Last data filed at 4/13/2025 0000  Gross per 24 hour   Intake 530.28 ml   Output 880 ml   Net -349.72 ml          Physical Exam  Constitutional:       Appearance: Normal appearance.   HENT:      Head: Normocephalic and atraumatic.      Nose: Nose normal.      Mouth/Throat:      Mouth: Mucous membranes are dry.   Eyes:      Pupils: Pupils are equal, round, and reactive to light.   Cardiovascular:      Rate and Rhythm: Normal rate and regular rhythm.   Pulmonary:      Effort: Pulmonary effort is normal.      Breath sounds: Normal breath sounds.   Abdominal:      General: Abdomen is flat. Bowel sounds are normal.      Palpations: Abdomen is soft.   Musculoskeletal:         General: No swelling.      Cervical back: Normal range of motion and neck supple.   Skin:     General: Skin is warm and dry.   Neurological:      General: No focal deficit present.      Mental Status: He is alert and oriented to person, place, and time.            Vents:     Lines/Drains/Airways       Peripheral Intravenous Line  Duration                  Peripheral IV - Single Lumen 04/10/25 1205 22 G Left;Posterior Hand 3 days         Peripheral IV - Double Lumen 04/11/25 0310 18 G Anterior;Right Upper Arm 2 days         Peripheral IV - Single Lumen  04/10/25 1902 20 G Anterior;Proximal;Right Forearm 2 days         Peripheral IV - Single Lumen 04/11/25 2048 22 G Posterior;Right Hand 1 day         Peripheral IV - Single Lumen 04/12/25 1600 20 G 1 1/4 in Anterior;Left;Proximal Forearm <1 day                  Significant Labs:    CBC/Anemia Profile:  Recent Labs   Lab 04/12/25  0340 04/13/25  0431   WBC 10.25 5.59   HGB 13.2* 13.3*   HCT 39.4* 40.7    133*   MCV 90 92   RDW 12.8 13.0        Chemistries:  Recent Labs   Lab 04/12/25  0340 04/12/25  0856 04/12/25  2140 04/13/25  0100 04/13/25  0431 04/13/25  0833   *   < > 155* 155*  --  155*   K 4.3   < > 3.7 3.3*  --  4.5   *   < > 124* 121*  --  120*   CO2 27   < > 25 26  --  24   BUN 60*   < > 35* 31*  --  25*   CREATININE 2.0*   < > 1.4 1.3  --  1.3   CALCIUM 8.7   < > 8.6* 8.7  --  9.0   ALBUMIN 3.1*  --   --   --  3.2*  --    BILITOT 0.7  --   --   --  0.8  --    ALKPHOS 68  --   --   --  71  --    ALT 15  --   --   --  31  --    AST 23  --   --   --  53*  --    GLUCOSE 91   < > 170* 131*  --  222*   MG 2.5   < > 2.2 2.2 2.2 2.0    < > = values in this interval not displayed.       None    Significant Imaging:  I have reviewed all pertinent imaging results/findings within the past 24 hours.

## 2025-04-13 NOTE — HOSPITAL COURSE
Patient now awake, oriented and tearful.  States that he becomes delirious when he has DKA/HHS.  Doing well.  Has an endocrinologist.

## 2025-04-13 NOTE — HOSPITAL COURSE
Andrzej Sinclair is a 38yo M w/ T1DM, hx of DKA/HHS admitted to MICU 4/10 with HHS, metabolic encephalopathy, BETH (sOSM 388, LA 2.7, A1c 13.8, BHB 5.8, Na 161, Cr 2.5). He was started on DKA protocol w/ insulin infusion and received IVF. CT head negative for acute intracranial abnormalities. RP US unremarkable for hydronephrosis. Utox noted to be +opiates.  LA w/o any fills. A precedex gtt was initiated 4/11. BETH w/ gradual improvement. He was taken off precedex drip 4/12 in the morning. He was transitioned from insulin drip to subq insulin early morning of 4/13. He was deemed stable for step-down to  and mentation was noted to be improved.     He stepped down to hospital medicine on 04/15. Calculated free water deficit 6.8 liter.  He was initiated on dextrose drip and insulin drip to address profound hypernatremia.  At bedside, he is awake, alert, oriented to person place and time.  He expresses desire to discharge, citing that he will continue to hydrate at home (does not like the hospital water and is in general tired of being in the hospital).  Advised patient that do not believe he is medically ready for discharge home in the setting of elevated blood glucose levels and hypernatremia which can not be monitored closely at home; risk for osmotic demyelination syndrome and cerebral edema also discussed.  Patient expressed understanding of these risks of discharging prior to medical readiness and is accepting of them.  He did not want to wait for insulin recommendations from endocrinology team.  He was encouraged to follow-up closely with his primary care provider.  He discharged AMA.    Vitals:    04/15/25 0309 04/15/25 0518 04/15/25 0728 04/15/25 0900   BP:  (!) 154/84 138/74    BP Location:       Patient Position:   Lying    Pulse: 98 75 76    Resp:  17 16    Temp:  97.9 °F (36.6 °C) 97.7 °F (36.5 °C)    TempSrc:  Oral Oral    SpO2:  99% 98% 98%   Weight:       Height:         Physical Exam - limited due to  left AMA  Constitutional:       Appearance: Normal appearance.   HENT:      Head: Normocephalic and atraumatic.      Mouth/Throat:      Mouth: Mucous membranes are dry.   Eyes:      No scleral icterus  Cardiovascular:      Rate and Rhythm: Normal rate.  Pulmonary:      Effort: Pulmonary effort is normal.   Neurological:      Mental Status: He is alert and oriented to person, place, and time.

## 2025-04-13 NOTE — PROGRESS NOTES
[facility] - Medical [facility]  Critical Care Medicine  Progress Note    Patient Name: Andrzej Sinclair  MRN: [id number]  Admission Date: 4/10/2025  Hospital Length of Stay: 3 days  Code Status: Full Code  Attending Provider: Ira Stallworth MD  Primary Care Provider: Aubrie, Primary Doctor   Principal Problem: Hyperosmolar hyperglycemic state (HHS)    Subjective:     HPI:  Andrzej Sinclair is a 38 year old male with T1DM, hx of DKA/HHS, HTN who presented to [facility] ED 4/10/25 for hyperglycemia and N/V x 4 days. Patient was non participatory during examination so history obtained via chart review and from wife, Marzena. Wife states patient started having nausea and vomiting on Monday with very little PO intake over the last few days. Wife was monitoring patient's glucose via his dexcom and concerned with his elevated glucoses. Patient not willing to go to the hospital until wife called EMS and made him go. She reports insulin compliance. 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     Upon arrival to the ED, he was tachycardic and altered with labs consistent with DKA, BETH and hyperkalemia (K 7.3). He received calcium, albuterol, 2L IVF and started on insulin infusion. Critical care medicine consulted for DKA. Prior to my arrival, patient standing at the bedside using urinal with >1L of UOP off. After getting back into bed, patient no longer participatory in exam and would not answer any questions. Appeared confused or lethargic. Patient to be admitted to the MICU for closer monitoring.     Hospital/ICU Course:  Patient now awake, oriented and tearful.  States that he becomes delirious when he has DKA/HHS.  Doing well.  Has an endocrinologist.        Interval History/Significant Events: Doing well off of insulin ggt.  Subq insulin transition overnight.      Review of Systems    States he is on gabapentin for chronic pain of legs, unknown dose.    Objective:     Vital Signs (Most  Recent):  Temp: 98.7 °F (37.1 °C) (04/13/25 0300)  Pulse: 67 (04/13/25 0900)  Resp: 13 (04/13/25 0900)  BP: (!) 148/71 (04/13/25 0900)  SpO2: 100 % (04/13/25 0900) Vital Signs (24h Range):  Temp:  [98.1 °F (36.7 °C)-98.9 °F (37.2 °C)] 98.7 °F (37.1 °C)  Pulse:  [58-75] 67  Resp:  [10-33] 13  SpO2:  [99 %-100 %] 100 %  BP: (109-148)/(58-71) 148/71   Weight: 88 kg (194 lb 0.1 oz)  Body mass index is 24.91 kg/m².      Intake/Output Summary (Last 24 hours) at 4/13/2025 1328  Last data filed at 4/13/2025 0000  Gross per 24 hour   Intake 530.28 ml   Output 880 ml   Net -349.72 ml          Physical Exam  Constitutional:       Appearance: Normal appearance.   HENT:      Head: Normocephalic and atraumatic.      Nose: Nose normal.      Mouth/Throat:      Mouth: Mucous membranes are dry.   Eyes:      Pupils: Pupils are equal, round, and reactive to light.   Cardiovascular:      Rate and Rhythm: Normal rate and regular rhythm.   Pulmonary:      Effort: Pulmonary effort is normal.      Breath sounds: Normal breath sounds.   Abdominal:      General: Abdomen is flat. Bowel sounds are normal.      Palpations: Abdomen is soft.   Musculoskeletal:         General: No swelling.      Cervical back: Normal range of motion and neck supple.   Skin:     General: Skin is warm and dry.   Neurological:      General: No focal deficit present.      Mental Status: He is alert and oriented to person, place, and time.            Vents:     Lines/Drains/Airways       Peripheral Intravenous Line  Duration                  Peripheral IV - Single Lumen 04/10/25 1205 22 G Left;Posterior Hand 3 days         Peripheral IV - Double Lumen 04/11/25 0310 18 G Anterior;Right Upper Arm 2 days         Peripheral IV - Single Lumen 04/10/25 1902 20 G Anterior;Proximal;Right Forearm 2 days         Peripheral IV - Single Lumen 04/11/25 2048 22 G Posterior;Right Hand 1 day         Peripheral IV - Single Lumen 04/12/25 1600 20 G 1 1/4 in Anterior;Left;Proximal Forearm  <1 day                  Significant Labs:    CBC/Anemia Profile:  Recent Labs   Lab 04/12/25  0340 04/13/25  0431   WBC 10.25 5.59   HGB 13.2* 13.3*   HCT 39.4* 40.7    133*   MCV 90 92   RDW 12.8 13.0        Chemistries:  Recent Labs   Lab 04/12/25  0340 04/12/25  0856 04/12/25  2140 04/13/25  0100 04/13/25  0431 04/13/25  0833   *   < > 155* 155*  --  155*   K 4.3   < > 3.7 3.3*  --  4.5   *   < > 124* 121*  --  120*   CO2 27   < > 25 26  --  24   BUN 60*   < > 35* 31*  --  25*   CREATININE 2.0*   < > 1.4 1.3  --  1.3   CALCIUM 8.7   < > 8.6* 8.7  --  9.0   ALBUMIN 3.1*  --   --   --  3.2*  --    BILITOT 0.7  --   --   --  0.8  --    ALKPHOS 68  --   --   --  71  --    ALT 15  --   --   --  31  --    AST 23  --   --   --  53*  --    GLUCOSE 91   < > 170* 131*  --  222*   MG 2.5   < > 2.2 2.2 2.2 2.0    < > = values in this interval not displayed.       None    Significant Imaging:  I have reviewed all pertinent imaging results/findings within the past 24 hours.    ABG  Recent Labs   Lab 04/10/25  1216   PH 7.341   PO2 73.6   PCO2 40.2   HCO3 21.2     Assessment/Plan:     Neuro  Acute metabolic encephalopathy  Suspect 2/2 [facility]    --CT head normal   --DKA/[facility] protocol in place    Patient reports this is typical when has DKA, now resolved.     Cardiac/Vascular  Primary hypertension  Holding home lisinopril  Creat now 1.3  May restart once on floor.     Renal/  Hyperkalemia  Associated with DKA, resolved.  Monitor daily  One episode of hypokalemia, replace as needed.     BETH (acute kidney injury)  Suspect prerenal in setting of nausea and vomiting.   Baseline sCr 1.5, up to 2.7 on admission. Now 1.3      Encourage free water for hypernatremia.    Hypernatremia  Na 155, improved  Encouraged to drink water  Continue to monitor electrolytes daily     Endocrine  * Hyperosmolar hyperglycemic state (HHS)  Hemoglobin A1c worsened from 11/2024. Followed by endocrine outpatient.  Glucose >700 with beta  hydroxybutyrate 5.8. ABG without acidosis but bicarb of 14. Received 2L of NS in the ED.     --DKA/HHS protocol in place now transitioned to Lantus 10 U BID and aspart with meals with SSI    Stable to floor            Critical Care Daily Checklist:    A: Awake: RASS Goal/Actual Goal:    Actual:     B: Spontaneous Breathing Trial Performed?     C: SAT & SBT Coordinated?                        D: Delirium: CAM-ICU  Negative, resolved   E: Early Mobility Performed? Yes   F: Feeding Goal: Goals: Meet % een/epn by next RD f/u  Status: Nutrition Goal Status: new   Current Diet Order   Procedures    Diet Consistent Carbohydrate 2000 Calories (up to 75 gm per meal)     Total calories / carbs::   2000 Calories (up to 75 gm per meal)      AS: Analgesia/Sedation Restart gabapentin when on medicine floor   T: Thromboembolic Prophylaxis Creat now normal  stop heaparin, begin lovenox 40   H: HOB > 300 Yes   U: Stress Ulcer Prophylaxis (if needed) eating   G: Glucose Control SSI   B: Bowel Function Stool Occurrence: 0   I: Indwelling Catheter (Lines & Concepcion) Necessity N/a   D: De-escalation of Antimicrobials/Pharmacotherapies N/a    Plan for the day/ETD Transfer to floor    Code Status:  Family/Goals of Care: Full Code       Critical Care Time: 30 minutes  Critical secondary to Patient has a condition that poses threat to life and bodily function: Acute Renal Failure and DKA and delerium      Critical care was time spent personally by me on the following activities: development of treatment plan with patient or surrogate and bedside caregivers, discussions with consultants, evaluation of patient's response to treatment, examination of patient, ordering and performing treatments and interventions, ordering and review of laboratory studies, ordering and review of radiographic studies, pulse oximetry, re-evaluation of patient's condition. This critical care time did not overlap with that of any other provider or involve time  for any procedures.     Ira Stallworth MD  Critical Care Medicine  [facility] - Medical ICU

## 2025-04-13 NOTE — SUBJECTIVE & OBJECTIVE
Interval History:      Review of Systems    Objective:    Temp: 98.9 °F (37.2 °C) (04/13/25 1100)  Pulse: 65 (04/13/25 1500)  Resp: 17 (04/13/25 1300)  BP: (!) 140/82 (04/13/25 1500)  SpO2: 98 % (04/13/25 1500)    Weight: 88 kg (194 lb 0.1 oz) (04/10/25 1042)    Body mass index is 24.91 kg/m².      Intake/Output Summary (Last 24 hours) at 4/13/2025 1646  Last data filed at 4/13/2025 0000  Gross per 24 hour   Intake 224.89 ml   Output 800 ml   Net -575.11 ml       Physical Exam    Significant Labs: All pertinent labs within the past 24 hours have been reviewed.    Recent Results (from the past 24 hours)   POCT glucose    Collection Time: 04/12/25  5:20 PM   Result Value Ref Range    POCT Glucose 180 (H) 70 - 110 mg/dL   POCT glucose    Collection Time: 04/12/25  6:12 PM   Result Value Ref Range    POCT Glucose 167 (H) 70 - 110 mg/dL   POCT glucose    Collection Time: 04/12/25  7:43 PM   Result Value Ref Range    POCT Glucose 192 (H) 70 - 110 mg/dL   POCT glucose    Collection Time: 04/12/25  8:05 PM   Result Value Ref Range    POCT Glucose 189 (H) 70 - 110 mg/dL   POCT glucose    Collection Time: 04/12/25  9:39 PM   Result Value Ref Range    POCT Glucose 174 (H) 70 - 110 mg/dL   Basic metabolic panel    Collection Time: 04/12/25  9:40 PM   Result Value Ref Range    Sodium 155 (H) 136 - 145 mmol/L    Potassium 3.7 3.5 - 5.1 mmol/L    Chloride 124 (H) 95 - 110 mmol/L    CO2 25 23 - 29 mmol/L    Glucose 170 (H) 70 - 110 mg/dL    BUN 35 (H) 6 - 20 mg/dL    Creatinine 1.4 0.5 - 1.4 mg/dL    Calcium 8.6 (L) 8.7 - 10.5 mg/dL    Anion Gap 6 (L) 8 - 16 mmol/L    eGFR >60 >60 mL/min/1.73/m2   Magnesium    Collection Time: 04/12/25  9:40 PM   Result Value Ref Range    Magnesium  2.2 1.6 - 2.6 mg/dL   POCT glucose    Collection Time: 04/12/25 11:09 PM   Result Value Ref Range    POCT Glucose 118 (H) 70 - 110 mg/dL   Basic metabolic panel    Collection Time: 04/13/25  1:00 AM   Result Value Ref Range    Sodium 155 (H) 136 - 145  mmol/L    Potassium 3.3 (L) 3.5 - 5.1 mmol/L    Chloride 121 (H) 95 - 110 mmol/L    CO2 26 23 - 29 mmol/L    Glucose 131 (H) 70 - 110 mg/dL    BUN 31 (H) 6 - 20 mg/dL    Creatinine 1.3 0.5 - 1.4 mg/dL    Calcium 8.7 8.7 - 10.5 mg/dL    Anion Gap 8 8 - 16 mmol/L    eGFR >60 >60 mL/min/1.73/m2   Magnesium    Collection Time: 04/13/25  1:00 AM   Result Value Ref Range    Magnesium  2.2 1.6 - 2.6 mg/dL   POCT glucose    Collection Time: 04/13/25  1:00 AM   Result Value Ref Range    POCT Glucose 148 (H) 70 - 110 mg/dL   POCT glucose    Collection Time: 04/13/25  2:07 AM   Result Value Ref Range    POCT Glucose 97 70 - 110 mg/dL   POCT glucose    Collection Time: 04/13/25  3:16 AM   Result Value Ref Range    POCT Glucose 137 (H) 70 - 110 mg/dL   Hepatic function panel    Collection Time: 04/13/25  4:31 AM   Result Value Ref Range    Protein Total 6.0 6.0 - 8.4 gm/dL    Albumin 3.2 (L) 3.5 - 5.2 g/dL    Bilirubin Total 0.8 0.1 - 1.0 mg/dL    Bilirubin Direct 0.3 0.1 - 0.3 mg/dL    ALP 71 40 - 150 unit/L    AST 53 (H) 11 - 45 unit/L    ALT 31 10 - 44 unit/L   Magnesium    Collection Time: 04/13/25  4:31 AM   Result Value Ref Range    Magnesium  2.2 1.6 - 2.6 mg/dL   CBC with Differential    Collection Time: 04/13/25  4:31 AM   Result Value Ref Range    WBC 5.59 3.90 - 12.70 K/uL    RBC 4.44 (L) 4.60 - 6.20 M/uL    HGB 13.3 (L) 14.0 - 18.0 gm/dL    HCT 40.7 40.0 - 54.0 %    MCV 92 82 - 98 fL    MCH 30.0 27.0 - 31.0 pg    MCHC 32.7 32.0 - 36.0 g/dL    RDW 13.0 11.5 - 14.5 %    Platelet Count 133 (L) 150 - 450 K/uL    MPV 11.6 9.2 - 12.9 fL    Nucleated RBC 0 <=0 /100 WBC    Neut % 74.8 (H) 38 - 73 %    Lymph % 15.7 (L) 18 - 48 %    Mono % 8.6 4 - 15 %    Eos % 0.2 <=8 %    Basophil % 0.2 <=1.9 %    Imm Grans % 0.5 0.0 - 0.5 %    Neut # 4.18 1.8 - 7.7 K/uL    Lymph # 0.88 (L) 1 - 4.8 K/uL    Mono # 0.48 0.3 - 1 K/uL    Eos # 0.01 <=0.5 K/uL    Baso # 0.01 <=0.2 K/uL    Imm Grans # 0.03 0.00 - 0.04 K/uL   POCT glucose     Collection Time: 04/13/25  8:30 AM   Result Value Ref Range    POCT Glucose 208 (H) 70 - 110 mg/dL   Magnesium    Collection Time: 04/13/25  8:33 AM   Result Value Ref Range    Magnesium  2.0 1.6 - 2.6 mg/dL   Basic metabolic panel    Collection Time: 04/13/25  8:33 AM   Result Value Ref Range    Sodium 155 (H) 136 - 145 mmol/L    Potassium 4.5 3.5 - 5.1 mmol/L    Chloride 120 (H) 95 - 110 mmol/L    CO2 24 23 - 29 mmol/L    Glucose 222 (H) 70 - 110 mg/dL    BUN 25 (H) 6 - 20 mg/dL    Creatinine 1.3 0.5 - 1.4 mg/dL    Calcium 9.0 8.7 - 10.5 mg/dL    Anion Gap 11 8 - 16 mmol/L    eGFR >60 >60 mL/min/1.73/m2   POCT glucose    Collection Time: 04/13/25 12:12 PM   Result Value Ref Range    POCT Glucose 199 (H) 70 - 110 mg/dL   Magnesium    Collection Time: 04/13/25  1:22 PM   Result Value Ref Range    Magnesium  1.9 1.6 - 2.6 mg/dL       Significant Imaging: I have reviewed all pertinent imaging results/findings within the past 24 hours.    Imaging Results              US Retroperitoneal Complete (Final result)  Result time 04/11/25 08:39:27      Final result by Matt Can Jr., MD (04/11/25 08:39:27)                   Impression:      No hydronephrosis.    Bladder is distended.    Electronically signed by resident: Jordan Sorto  Date:    04/11/2025  Time:    04:40    Electronically signed by: Matt Killian Jr  Date:    04/11/2025  Time:    08:39               Narrative:    EXAMINATION:  US RETROPERITONEAL COMPLETE    CLINICAL HISTORY:  BETH    TECHNIQUE:  Ultrasound of the kidneys and urinary bladder was performed including color flow and Doppler evaluation of the kidneys.    COMPARISON:  CT abdomen pelvis 12/19/2024    FINDINGS:  Right kidney: The right kidney measures 11.3 cm. No cortical thinning. No loss of corticomedullary distinction. Resistive index measures 0.65.  No mass. No renal stone. No hydronephrosis.    Left kidney: The left kidney measures 11.6 cm. No cortical thinning. No loss of  corticomedullary distinction. Resistive index measures 0.64.  No mass. No renal stone. No hydronephrosis.    Splenic resistive index measures 0.56.    The bladder is distended at the time of scanning.                        Preliminary result by Jordan Sorto MD (04/11/25 06:26:02)                   Impression:      No hydronephrosis.    Bladder is distended.  Recommend correlation for symptoms of outlet obstruction.    Electronically signed by resident: Jordan Sorto  Date:    04/11/2025  Time:    04:40                 Narrative:    EXAMINATION:  US RETROPERITONEAL COMPLETE    CLINICAL HISTORY:  BETH;    TECHNIQUE:  Ultrasound of the kidneys and urinary bladder was performed including color flow and Doppler evaluation of the kidneys.    COMPARISON:  CT abdomen pelvis 12/19/2024    FINDINGS:  Right kidney: The right kidney measures 11.3 cm. No cortical thinning. No loss of corticomedullary distinction. Resistive index measures 0.65.  No mass. No renal stone. No hydronephrosis.    Left kidney: The left kidney measures 11.6 cm. No cortical thinning. No loss of corticomedullary distinction. Resistive index measures 0.64.  No mass. No renal stone. No hydronephrosis.    Splenic resistive index measures 0.56.    The bladder is distended at the time of scanning.                                       CT Head Without Contrast (Final result)  Result time 04/10/25 15:45:11      Final result by Oscar Curiel DO (04/10/25 15:45:11)                   Impression:      No acute intracranial findings as detailed above specifically without evidence for acute intracranial hemorrhage or sulcal effacement to suggest large territory recent infarction.    Further evaluation as warranted clinically.      Electronically signed by: Oscar Curiel DO  Date:    04/10/2025  Time:    15:45               Narrative:    EXAMINATION:  CT HEAD WITHOUT CONTRAST    CLINICAL HISTORY:  Delirium;    TECHNIQUE:  Multiple sequential 5 mm axial  "images of the head without contrast.  Coronal and sagittal reformatted imaging from the axial acquisition.    COMPARISON:  MRI 11/11/23    FINDINGS:  There is no evidence for acute intracranial hemorrhage or sulcal effacement.  The ventricles are normal in size without hydrocephalus.  There is no midline shift or mass effect.  Visualized paranasal sinuses and mastoid air cells are clear.                                       X-Ray Chest AP Portable (Final result)  Result time 04/10/25 14:58:37      Final result by Severo Landeros MD (04/10/25 14:58:37)                   Impression:      No acute cardiopulmonary finding identified on this single view.      Electronically signed by: Severo Landeros MD  Date:    04/10/2025  Time:    14:58               Narrative:    EXAMINATION:  XR CHEST AP PORTABLE    CLINICAL HISTORY:  Provided history is "hyperglycemia;  ".    TECHNIQUE:  One view of the chest.    COMPARISON:  11/02/2024.    FINDINGS:  Cardiac wires overlie the chest.  Cardiac silhouette is not enlarged.  No focal consolidation.  No sizable pleural effusion.  No pneumothorax.                                      "

## 2025-04-14 LAB
ABSOLUTE EOSINOPHIL (OHS): 0 K/UL
ABSOLUTE MONOCYTE (OHS): 0.15 K/UL (ref 0.3–1)
ABSOLUTE NEUTROPHIL COUNT (OHS): 4.14 K/UL (ref 1.8–7.7)
ALBUMIN SERPL BCP-MCNC: 3.5 G/DL (ref 3.5–5.2)
ALLENS TEST: ABNORMAL
ALP SERPL-CCNC: 93 UNIT/L (ref 40–150)
ALT SERPL W/O P-5'-P-CCNC: 37 UNIT/L (ref 10–44)
ANION GAP (OHS): 11 MMOL/L (ref 8–16)
ANION GAP (OHS): 11 MMOL/L (ref 8–16)
ANION GAP (OHS): 8 MMOL/L (ref 8–16)
ANION GAP (OHS): 8 MMOL/L (ref 8–16)
ANION GAP (OHS): 9 MMOL/L (ref 8–16)
AST SERPL-CCNC: 42 UNIT/L (ref 11–45)
BASOPHILS # BLD AUTO: 0.01 K/UL
BASOPHILS NFR BLD AUTO: 0.2 %
BILIRUB DIRECT SERPL-MCNC: 0.2 MG/DL (ref 0.1–0.3)
BILIRUB SERPL-MCNC: 0.8 MG/DL (ref 0.1–1)
BUN SERPL-MCNC: 22 MG/DL (ref 6–20)
BUN SERPL-MCNC: 23 MG/DL (ref 6–20)
BUN SERPL-MCNC: 27 MG/DL (ref 6–20)
CALCIUM SERPL-MCNC: 7.5 MG/DL (ref 8.7–10.5)
CALCIUM SERPL-MCNC: 9.1 MG/DL (ref 8.7–10.5)
CALCIUM SERPL-MCNC: 9.4 MG/DL (ref 8.7–10.5)
CALCIUM SERPL-MCNC: 9.5 MG/DL (ref 8.7–10.5)
CALCIUM SERPL-MCNC: 9.7 MG/DL (ref 8.7–10.5)
CHLORIDE SERPL-SCNC: 118 MMOL/L (ref 95–110)
CHLORIDE SERPL-SCNC: 119 MMOL/L (ref 95–110)
CHLORIDE SERPL-SCNC: 120 MMOL/L (ref 95–110)
CHLORIDE SERPL-SCNC: 121 MMOL/L (ref 95–110)
CHLORIDE SERPL-SCNC: 125 MMOL/L (ref 95–110)
CO2 SERPL-SCNC: 23 MMOL/L (ref 23–29)
CO2 SERPL-SCNC: 26 MMOL/L (ref 23–29)
CO2 SERPL-SCNC: 28 MMOL/L (ref 23–29)
CO2 SERPL-SCNC: 28 MMOL/L (ref 23–29)
CO2 SERPL-SCNC: 30 MMOL/L (ref 23–29)
CREAT SERPL-MCNC: 1.1 MG/DL (ref 0.5–1.4)
CREAT SERPL-MCNC: 1.3 MG/DL (ref 0.5–1.4)
CREAT SERPL-MCNC: 1.5 MG/DL (ref 0.5–1.4)
CREAT SERPL-MCNC: 1.5 MG/DL (ref 0.5–1.4)
CREAT SERPL-MCNC: 1.6 MG/DL (ref 0.5–1.4)
DELSYS: ABNORMAL
ERYTHROCYTE [DISTWIDTH] IN BLOOD BY AUTOMATED COUNT: 12.8 % (ref 11.5–14.5)
FIO2: 21
GFR SERPLBLD CREATININE-BSD FMLA CKD-EPI: 56 ML/MIN/1.73/M2
GFR SERPLBLD CREATININE-BSD FMLA CKD-EPI: >60 ML/MIN/1.73/M2
GLUCOSE SERPL-MCNC: 126 MG/DL (ref 70–110)
GLUCOSE SERPL-MCNC: 227 MG/DL (ref 70–110)
GLUCOSE SERPL-MCNC: 265 MG/DL (ref 70–110)
GLUCOSE SERPL-MCNC: 282 MG/DL (ref 70–110)
GLUCOSE SERPL-MCNC: 297 MG/DL (ref 70–110)
HCO3 UR-SCNC: 32.8 MMOL/L (ref 24–28)
HCT VFR BLD AUTO: 43.5 % (ref 40–54)
HCT VFR BLD CALC: 42 %PCV (ref 36–54)
HGB BLD-MCNC: 14.6 GM/DL (ref 14–18)
IMM GRANULOCYTES # BLD AUTO: 0.02 K/UL (ref 0–0.04)
IMM GRANULOCYTES NFR BLD AUTO: 0.4 % (ref 0–0.5)
LYMPHOCYTES # BLD AUTO: 0.38 K/UL (ref 1–4.8)
MAGNESIUM SERPL-MCNC: 1.8 MG/DL (ref 1.6–2.6)
MAGNESIUM SERPL-MCNC: 2.3 MG/DL (ref 1.6–2.6)
MAGNESIUM SERPL-MCNC: 3.2 MG/DL (ref 1.6–2.6)
MCH RBC QN AUTO: 30.5 PG (ref 27–31)
MCHC RBC AUTO-ENTMCNC: 33.6 G/DL (ref 32–36)
MCV RBC AUTO: 91 FL (ref 82–98)
MODE: ABNORMAL
NUCLEATED RBC (/100WBC) (OHS): 0 /100 WBC
OHS QRS DURATION: 96 MS
OHS QTC CALCULATION: 396 MS
PCO2 BLDA: 45.2 MMHG (ref 35–45)
PH SMN: 7.47 [PH] (ref 7.35–7.45)
PLATELET # BLD AUTO: 132 K/UL (ref 150–450)
PMV BLD AUTO: 11.3 FL (ref 9.2–12.9)
PO2 BLDA: 71 MMHG (ref 40–60)
POC BE: 9 MMOL/L
POC IONIZED CALCIUM: 1.28 MMOL/L (ref 1.06–1.42)
POC SATURATED O2: 95 % (ref 95–100)
POC TCO2: 34 MMOL/L (ref 24–29)
POCT GLUCOSE: 117 MG/DL (ref 70–110)
POCT GLUCOSE: 141 MG/DL (ref 70–110)
POCT GLUCOSE: 177 MG/DL (ref 70–110)
POCT GLUCOSE: 237 MG/DL (ref 70–110)
POCT GLUCOSE: 251 MG/DL (ref 70–110)
POCT GLUCOSE: 275 MG/DL (ref 70–110)
POCT GLUCOSE: 283 MG/DL (ref 70–110)
POCT GLUCOSE: 295 MG/DL (ref 70–110)
POCT GLUCOSE: 316 MG/DL (ref 70–110)
POCT GLUCOSE: 336 MG/DL (ref 70–110)
POCT GLUCOSE: 382 MG/DL (ref 70–110)
POCT GLUCOSE: 89 MG/DL (ref 70–110)
POCT GLUCOSE: 90 MG/DL (ref 70–110)
POTASSIUM BLD-SCNC: 3.9 MMOL/L (ref 3.5–5.1)
POTASSIUM SERPL-SCNC: 3.6 MMOL/L (ref 3.5–5.1)
POTASSIUM SERPL-SCNC: 4.2 MMOL/L (ref 3.5–5.1)
POTASSIUM SERPL-SCNC: 4.2 MMOL/L (ref 3.5–5.1)
POTASSIUM SERPL-SCNC: 4.5 MMOL/L (ref 3.5–5.1)
POTASSIUM SERPL-SCNC: 4.7 MMOL/L (ref 3.5–5.1)
PROT SERPL-MCNC: 6.7 GM/DL (ref 6–8.4)
RBC # BLD AUTO: 4.79 M/UL (ref 4.6–6.2)
RELATIVE EOSINOPHIL (OHS): 0 %
RELATIVE LYMPHOCYTE (OHS): 8.1 % (ref 18–48)
RELATIVE MONOCYTE (OHS): 3.2 % (ref 4–15)
RELATIVE NEUTROPHIL (OHS): 88.1 % (ref 38–73)
SAMPLE: ABNORMAL
SITE: ABNORMAL
SODIUM BLD-SCNC: 157 MMOL/L (ref 136–145)
SODIUM SERPL-SCNC: 156 MMOL/L (ref 136–145)
SODIUM SERPL-SCNC: 156 MMOL/L (ref 136–145)
SODIUM SERPL-SCNC: 157 MMOL/L (ref 136–145)
SODIUM SERPL-SCNC: 158 MMOL/L (ref 136–145)
SODIUM SERPL-SCNC: 158 MMOL/L (ref 136–145)
WBC # BLD AUTO: 4.7 K/UL (ref 3.9–12.7)

## 2025-04-14 PROCEDURE — 63600175 PHARM REV CODE 636 W HCPCS

## 2025-04-14 PROCEDURE — 25000003 PHARM REV CODE 250: Performed by: INTERNAL MEDICINE

## 2025-04-14 PROCEDURE — 63600175 PHARM REV CODE 636 W HCPCS: Performed by: INTERNAL MEDICINE

## 2025-04-14 PROCEDURE — 80053 COMPREHEN METABOLIC PANEL: CPT

## 2025-04-14 PROCEDURE — 25000003 PHARM REV CODE 250

## 2025-04-14 PROCEDURE — 94761 N-INVAS EAR/PLS OXIMETRY MLT: CPT

## 2025-04-14 PROCEDURE — 82310 ASSAY OF CALCIUM: CPT

## 2025-04-14 PROCEDURE — 83735 ASSAY OF MAGNESIUM: CPT | Performed by: INTERNAL MEDICINE

## 2025-04-14 PROCEDURE — 83735 ASSAY OF MAGNESIUM: CPT | Performed by: PHYSICIAN ASSISTANT

## 2025-04-14 PROCEDURE — 36415 COLL VENOUS BLD VENIPUNCTURE: CPT

## 2025-04-14 PROCEDURE — 99223 1ST HOSP IP/OBS HIGH 75: CPT | Mod: ,,,

## 2025-04-14 PROCEDURE — 20600001 HC STEP DOWN PRIVATE ROOM

## 2025-04-14 PROCEDURE — 85025 COMPLETE CBC W/AUTO DIFF WBC: CPT | Performed by: PHYSICIAN ASSISTANT

## 2025-04-14 PROCEDURE — 99900035 HC TECH TIME PER 15 MIN (STAT)

## 2025-04-14 PROCEDURE — 82248 BILIRUBIN DIRECT: CPT | Performed by: PHYSICIAN ASSISTANT

## 2025-04-14 RX ORDER — INSULIN ASPART 100 [IU]/ML
6 INJECTION, SOLUTION INTRAVENOUS; SUBCUTANEOUS ONCE
Status: DISCONTINUED | OUTPATIENT
Start: 2025-04-14 | End: 2025-04-14

## 2025-04-14 RX ORDER — GLUCAGON 1 MG
1 KIT INJECTION
Status: DISCONTINUED | OUTPATIENT
Start: 2025-04-14 | End: 2025-04-15

## 2025-04-14 RX ORDER — MAGNESIUM SULFATE HEPTAHYDRATE 40 MG/ML
2 INJECTION, SOLUTION INTRAVENOUS ONCE
Status: COMPLETED | OUTPATIENT
Start: 2025-04-14 | End: 2025-04-14

## 2025-04-14 RX ORDER — IBUPROFEN 200 MG
16 TABLET ORAL
Status: DISCONTINUED | OUTPATIENT
Start: 2025-04-14 | End: 2025-04-15

## 2025-04-14 RX ORDER — INSULIN ASPART 100 [IU]/ML
6 INJECTION, SOLUTION INTRAVENOUS; SUBCUTANEOUS
Status: DISCONTINUED | OUTPATIENT
Start: 2025-04-14 | End: 2025-04-15

## 2025-04-14 RX ORDER — IBUPROFEN 200 MG
24 TABLET ORAL
Status: DISCONTINUED | OUTPATIENT
Start: 2025-04-14 | End: 2025-04-15

## 2025-04-14 RX ORDER — HYDROMORPHONE HYDROCHLORIDE 2 MG/ML
0.5 INJECTION, SOLUTION INTRAMUSCULAR; INTRAVENOUS; SUBCUTANEOUS ONCE AS NEEDED
Status: COMPLETED | OUTPATIENT
Start: 2025-04-14 | End: 2025-04-14

## 2025-04-14 RX ORDER — INSULIN GLARGINE 100 [IU]/ML
20 INJECTION, SOLUTION SUBCUTANEOUS DAILY
Status: DISCONTINUED | OUTPATIENT
Start: 2025-04-15 | End: 2025-04-15

## 2025-04-14 RX ORDER — INSULIN ASPART 100 [IU]/ML
0-5 INJECTION, SOLUTION INTRAVENOUS; SUBCUTANEOUS
Status: DISCONTINUED | OUTPATIENT
Start: 2025-04-14 | End: 2025-04-15

## 2025-04-14 RX ORDER — INSULIN GLARGINE 100 [IU]/ML
10 INJECTION, SOLUTION SUBCUTANEOUS ONCE
Status: COMPLETED | OUTPATIENT
Start: 2025-04-14 | End: 2025-04-14

## 2025-04-14 RX ORDER — POTASSIUM CHLORIDE 7.45 MG/ML
10 INJECTION INTRAVENOUS
Status: DISCONTINUED | OUTPATIENT
Start: 2025-04-14 | End: 2025-04-14

## 2025-04-14 RX ADMIN — MUPIROCIN: 20 OINTMENT TOPICAL at 09:04

## 2025-04-14 RX ADMIN — MAGNESIUM SULFATE HEPTAHYDRATE 2 G: 40 INJECTION, SOLUTION INTRAVENOUS at 05:04

## 2025-04-14 RX ADMIN — INSULIN GLARGINE 10 UNITS: 100 INJECTION, SOLUTION SUBCUTANEOUS at 08:04

## 2025-04-14 RX ADMIN — SODIUM CHLORIDE, POTASSIUM CHLORIDE, SODIUM LACTATE AND CALCIUM CHLORIDE 500 ML: 600; 310; 30; 20 INJECTION, SOLUTION INTRAVENOUS at 05:04

## 2025-04-14 RX ADMIN — INSULIN ASPART 3 UNITS: 100 INJECTION, SOLUTION INTRAVENOUS; SUBCUTANEOUS at 05:04

## 2025-04-14 RX ADMIN — INSULIN GLARGINE 10 UNITS: 100 INJECTION, SOLUTION SUBCUTANEOUS at 09:04

## 2025-04-14 RX ADMIN — MUPIROCIN: 20 OINTMENT TOPICAL at 08:04

## 2025-04-14 RX ADMIN — HYDROMORPHONE HYDROCHLORIDE 0.5 MG: 2 INJECTION, SOLUTION INTRAMUSCULAR; INTRAVENOUS; SUBCUTANEOUS at 06:04

## 2025-04-14 RX ADMIN — INSULIN ASPART 1 UNITS: 100 INJECTION, SOLUTION INTRAVENOUS; SUBCUTANEOUS at 09:04

## 2025-04-14 RX ADMIN — INSULIN ASPART 3 UNITS: 100 INJECTION, SOLUTION INTRAVENOUS; SUBCUTANEOUS at 11:04

## 2025-04-14 RX ADMIN — SENNOSIDES AND DOCUSATE SODIUM 1 TABLET: 50; 8.6 TABLET ORAL at 09:04

## 2025-04-14 RX ADMIN — ENOXAPARIN SODIUM 40 MG: 40 INJECTION SUBCUTANEOUS at 04:04

## 2025-04-14 NOTE — HPI
Reason for Consult: Management of T1DM, Hyperglycemia     Diabetes diagnosis year: 19     Home Diabetes Medications: Lyumjev 8 units + LDS SSI Tresiba 22 units h.s.        How often checking glucose at home? Dexcom   BG readings on regimen: 250's  Hypoglycemia on the regimen?  No  Missed doses on regimen?  No     Diabetes Complications include:     Hyperglycemia, Hypoglycemia , and Diabetic cataract , Frequent DKA     Complicating diabetes co morbidities:   CKD    HPI:   Patient is a 39 y.o. male with T1DM, hx of DKA/HHS, HTN who [place] [facility] [place] for hyperglycemia and N/V x 4 days. Upon arrival to the ED, he was tachycardic and altered with labs consistent with DKA, BETH and hyperkalemia (K 7.3). He received calcium, albuterol, 2L IVF and started on insulin infusion. Patient has since been transitioned off of insulin infusion and onto SQ basal insulin. Endocrine consulted for BG management.

## 2025-04-14 NOTE — ASSESSMENT & PLAN NOTE
BG goal: 140-180    - Lantus 20 units QD  - Novolog 6 units TIDWM  - LDC SSI (150/50)   - POCT Glucose before meals, at bedtime and at 2 am  - Hypoglycemia protocol in place      ** Please notify Endocrine for any change and/or advance in diet**  ** Please call Endocrine for any BG related issues **     Discharge Planning:   TBD. Please notify endocrinology prior to discharge.

## 2025-04-14 NOTE — EICU
MADY Night Rounds Checklist  24H Vital Sign Range:  Temp:  [98.6 °F (37 °C)-98.9 °F (37.2 °C)]   Pulse:  [64-85]   Resp:  [11-35]   BP: (121-152)/(57-83)   SpO2:  [94 %-100 %]     Video rounds

## 2025-04-14 NOTE — CONSULTS
"[facility] - Medical ICU  Endocrinology  Diabetes Consult Note    Consult Requested by: Zackery Barnes MD   Reason for admit: Hyperosmolar hyperglycemic state (HHS)    HISTORY OF PRESENT ILLNESS:  Reason for Consult: Management of T1DM, Hyperglycemia     Diabetes diagnosis year: 19     Home Diabetes Medications: Lyumjev 7 units + LDS SSI Tresiba 7 units h.s. --> stated he self-decreased basal dose to 7 units because his BG was "under control"        How often checking glucose at home? Dexcom   BG readings on regimen: 250's  Hypoglycemia on the regimen?  No  Missed doses on regimen?  No     Diabetes Complications include:     Hyperglycemia, Hypoglycemia , and Diabetic cataract , Frequent DKA     Complicating diabetes co morbidities:   CKD    HPI:   Patient is a 39 y.o. male with T1DM, hx of DKA/HHS, HTN who presented to [facility] ED 4/10/25 for hyperglycemia and N/V x 4 days. Upon arrival to the ED, he was tachycardic and altered with labs consistent with DKA, BETH and hyperkalemia (K 7.3). He received calcium, albuterol, 2L IVF and started on insulin infusion. Patient has since been transitioned off of insulin infusion and onto SQ basal insulin. Endocrine consulted for BG management.         Interval HPI:   No acute events overnight. Patient in room 7089/7089 A. Blood glucose variable. BG at, above, and below goal on current insulin regimen (SSI and basal). Steroid use- None.      Renal function-   Lab Results   Component Value Date    CREATININE 1.3 04/14/2025        Vasopressors-  None     Diet Consistent Carbohydrate 2000 Calories (up to 75 gm per meal)     Eating:   <25%  Nausea: Yes  Hypoglycemia and intervention: No  Fever: No  TPN and/or TF: No    PMH, PSH, FH, SH updated and reviewed     ROS:    Review of Systems   Gastrointestinal:  Positive for abdominal pain, nausea and vomiting. Negative for constipation and diarrhea.   Endocrine: Negative for polydipsia and polyuria.       Current Medications and/or " "Treatments Impacting Glycemic Control  Immunotherapy:    Immunosuppressants       None          Steroids:   Hormones (From admission, onward)      None          Pressors:    Autonomic Drugs (From admission, onward)      None          Hyperglycemia/Diabetes Medications:   Antihyperglycemics (From admission, onward)      Start     Stop Route Frequency Ordered    04/15/25 0900  insulin glargine U-100 (Lantus) pen 20 Units         -- SubQ Daily 04/14/25 0905    04/14/25 1130  insulin aspart U-100 pen 6 Units         -- SubQ 3 times daily with meals 04/14/25 0906 04/14/25 1006  insulin aspart U-100 pen 0-5 Units         -- SubQ Before meals, nightly and at 0200 PRN 04/14/25 0906    04/14/25 0908  insulin aspart U-100 pen 6 Units         -- SubQ Once 04/14/25 0908             PHYSICAL EXAMINATION:  Vitals:    04/14/25 1000   BP: (!) 151/74   Pulse: 96   Resp:    Temp:      Body mass index is 24.91 kg/m².     Physical Exam  Constitutional:       General: He is not in acute distress.     Appearance: Normal appearance. He is not ill-appearing.   HENT:      Head: Normocephalic and atraumatic.      Right Ear: External ear normal.      Left Ear: External ear normal.      Nose: Nose normal.   Pulmonary:      Effort: Pulmonary effort is normal. No respiratory distress.   Neurological:      Mental Status: He is alert.   Psychiatric:         Mood and Affect: Mood normal.         Behavior: Behavior normal.            Labs Reviewed and Include   Recent Labs   Lab 04/14/25  0339 04/14/25  0852   CALCIUM 9.7 9.1   ALBUMIN 3.5  --    * 158*   K 4.2 4.5   CO2 30* 26   * 121*   BUN 27* 22*   CREATININE 1.5* 1.3   ALKPHOS 93  --    ALT 37  --    AST 42  --    BILITOT 0.8  --      Lab Results   Component Value Date    WBC 4.70 04/14/2025    HGB 14.6 04/14/2025    HCT 42 04/14/2025    MCV 91 04/14/2025     (L) 04/14/2025     No results for input(s): "TSH", "FREET4" in the last 168 hours.  Lab Results   Component Value " "Date    HGBA1C 13.8 (H) 04/10/2025       Nutritional status:   Body mass index is 24.91 kg/m².  Lab Results   Component Value Date    ALBUMIN 3.5 04/14/2025    ALBUMIN 3.5 04/13/2025    ALBUMIN 3.2 (L) 04/13/2025     No results found for: "PREALBUMIN"    Estimated Creatinine Clearance: 89.6 mL/min (based on SCr of 1.3 mg/dL).    Accu-Checks  Recent Labs     04/14/25  0016 04/14/25  0103 04/14/25  0204 04/14/25  0308 04/14/25  0425 04/14/25  0606 04/14/25  0719 04/14/25  0748 04/14/25  0851 04/14/25  0947   POCTGLUCOSE 382* 336* 316* 283* 237* 117* 90 89 141* 177*        ASSESSMENT and PLAN    Cardiac/Vascular  Primary hypertension    On ACEI per ADA guidelines     Endocrine  * Hyperosmolar hyperglycemic state (HHS)  Managed per primary team  Transitioned to SQ regimen       Type 1 diabetes mellitus with hyperglycemia  BG goal: 140-180    - Lantus 20 units QD  - Novolog 6 units TIDWM  - LDC SSI (150/50)   - POCT Glucose before meals, at bedtime and at 2 am  - Hypoglycemia protocol in place      ** Please notify Endocrine for any change and/or advance in diet**  ** Please call Endocrine for any BG related issues **     Discharge Planning:   TBD. Please notify endocrinology prior to discharge.            Plan discussed with patient, family, and RN at bedside.     Sharee Olson PA-C  Endocrinology  [facility] - Medical ICU  "

## 2025-04-14 NOTE — SUBJECTIVE & OBJECTIVE
Interval HPI:   No acute events overnight. Patient in room 7089/7089 A. Blood glucose variable. BG at, above, and below goal on current insulin regimen (SSI and basal). Steroid use- None.      Renal function-   Lab Results   Component Value Date    CREATININE 1.3 04/14/2025        Vasopressors-  None     Diet Consistent Carbohydrate 2000 Calories (up to 75 gm per meal)     Eating:   <25%  Nausea: Yes  Hypoglycemia and intervention: No  Fever: No  TPN and/or TF: No    PMH, PSH, FH, SH updated and reviewed     ROS:    Review of Systems   Gastrointestinal:  Positive for abdominal pain, nausea and vomiting. Negative for constipation and diarrhea.   Endocrine: Negative for polydipsia and polyuria.       Current Medications and/or Treatments Impacting Glycemic Control  Immunotherapy:    Immunosuppressants       None          Steroids:   Hormones (From admission, onward)      None          Pressors:    Autonomic Drugs (From admission, onward)      None          Hyperglycemia/Diabetes Medications:   Antihyperglycemics (From admission, onward)      Start     Stop Route Frequency Ordered    04/15/25 0900  insulin glargine U-100 (Lantus) pen 20 Units         -- SubQ Daily 04/14/25 0905    04/14/25 1130  insulin aspart U-100 pen 6 Units         -- SubQ 3 times daily with meals 04/14/25 0906    04/14/25 1006  insulin aspart U-100 pen 0-5 Units         -- SubQ Before meals, nightly and at 0200 PRN 04/14/25 0906    04/14/25 0908  insulin aspart U-100 pen 6 Units         -- SubQ Once 04/14/25 0908             PHYSICAL EXAMINATION:  Vitals:    04/14/25 1000   BP: (!) 151/74   Pulse: 96   Resp:    Temp:      Body mass index is 24.91 kg/m².     Physical Exam  Constitutional:       General: He is not in acute distress.     Appearance: Normal appearance. He is not ill-appearing.   HENT:      Head: Normocephalic and atraumatic.      Right Ear: External ear normal.      Left Ear: External ear normal.      Nose: Nose normal.   Pulmonary:       Effort: Pulmonary effort is normal. No respiratory distress.   Neurological:      Mental Status: He is alert.   Psychiatric:         Mood and Affect: Mood normal.         Behavior: Behavior normal.

## 2025-04-14 NOTE — NURSING
Pt c/o nausea this afternoon; Zofran IV given as per prn order; symptoms unrelieved;Phenergan IVPB also given; pt began vomiting small amount of brown liquid; c/o abdominal pain and nausea; attempt made to administer stool softeners;pt refused;unable to tolerate any oral meds; Gwendolyn/JOHN made aware; Labs ordered; stepdown orders cancelled.

## 2025-04-15 ENCOUNTER — HOSPITAL ENCOUNTER (INPATIENT)
Facility: HOSPITAL | Age: 40
LOS: 3 days | Discharge: HOME | DRG: 394 | End: 2025-04-18
Attending: STUDENT IN AN ORGANIZED HEALTH CARE EDUCATION/TRAINING PROGRAM | Admitting: STUDENT IN AN ORGANIZED HEALTH CARE EDUCATION/TRAINING PROGRAM
Payer: MEDICARE

## 2025-04-15 VITALS
SYSTOLIC BLOOD PRESSURE: 138 MMHG | HEART RATE: 76 BPM | WEIGHT: 194 LBS | HEIGHT: 74 IN | TEMPERATURE: 98 F | RESPIRATION RATE: 16 BRPM | OXYGEN SATURATION: 98 % | DIASTOLIC BLOOD PRESSURE: 74 MMHG | BODY MASS INDEX: 24.9 KG/M2

## 2025-04-15 DIAGNOSIS — R11.2 NAUSEA & VOMITING: ICD-10-CM

## 2025-04-15 DIAGNOSIS — E87.0 HYPERNATREMIA: ICD-10-CM

## 2025-04-15 DIAGNOSIS — E10.8 TYPE 1 DIABETES MELLITUS WITH COMPLICATIONS: Primary | ICD-10-CM

## 2025-04-15 DIAGNOSIS — R11.15 CYCLICAL VOMITING SYNDROME: ICD-10-CM

## 2025-04-15 DIAGNOSIS — R07.9 CHEST PAIN: ICD-10-CM

## 2025-04-15 DIAGNOSIS — I10 PRIMARY HYPERTENSION: Chronic | ICD-10-CM

## 2025-04-15 PROBLEM — R10.9 ABDOMINAL PAIN: Status: RESOLVED | Noted: 2024-04-01 | Resolved: 2025-04-15

## 2025-04-15 PROBLEM — E10.42 POLYNEUROPATHY DUE TO TYPE 1 DIABETES MELLITUS: Chronic | Status: ACTIVE | Noted: 2018-07-31

## 2025-04-15 PROBLEM — A41.9 SEPSIS: Status: RESOLVED | Noted: 2023-09-11 | Resolved: 2025-04-15

## 2025-04-15 PROBLEM — E87.5 HYPERKALEMIA: Status: RESOLVED | Noted: 2023-09-12 | Resolved: 2025-04-15

## 2025-04-15 PROBLEM — E11.10 LACTIC ACIDOSIS DUE TO DIABETES MELLITUS: Status: RESOLVED | Noted: 2024-11-02 | Resolved: 2025-04-15

## 2025-04-15 PROBLEM — M54.50 LOW BACK PAIN: Status: ACTIVE | Noted: 2025-04-15

## 2025-04-15 PROBLEM — E88.89 KETOSIS: Status: RESOLVED | Noted: 2021-03-03 | Resolved: 2025-04-15

## 2025-04-15 PROBLEM — E87.20 METABOLIC ACIDOSIS: Status: RESOLVED | Noted: 2023-11-10 | Resolved: 2025-04-15

## 2025-04-15 PROBLEM — N17.9 AKI (ACUTE KIDNEY INJURY): Status: RESOLVED | Noted: 2023-11-10 | Resolved: 2025-04-15

## 2025-04-15 PROBLEM — E78.2 MIXED HYPERLIPIDEMIA: Chronic | Status: ACTIVE | Noted: 2023-11-20

## 2025-04-15 PROBLEM — E10.65 TYPE 1 DIABETES MELLITUS WITH HYPERGLYCEMIA: Chronic | Status: ACTIVE | Noted: 2023-09-07

## 2025-04-15 PROBLEM — E87.6 HYPOKALEMIA: Status: RESOLVED | Noted: 2025-04-13 | Resolved: 2025-04-15

## 2025-04-15 LAB
ABSOLUTE EOSINOPHIL (OHS): 0 K/UL
ABSOLUTE EOSINOPHIL (OHS): 0.01 K/UL
ABSOLUTE MONOCYTE (OHS): 0.57 K/UL (ref 0.3–1)
ABSOLUTE MONOCYTE (OHS): 0.58 K/UL (ref 0.3–1)
ABSOLUTE NEUTROPHIL COUNT (OHS): 4.38 K/UL (ref 1.8–7.7)
ABSOLUTE NEUTROPHIL COUNT (OHS): 5.22 K/UL (ref 1.8–7.7)
ALBUMIN SERPL BCP-MCNC: 3 G/DL (ref 3.5–5.2)
ALBUMIN SERPL BCP-MCNC: 3.3 G/DL (ref 3.5–5.2)
ALLENS TEST: ABNORMAL
ALP SERPL-CCNC: 75 UNIT/L (ref 40–150)
ALP SERPL-CCNC: 80 UNIT/L (ref 40–150)
ALT SERPL W/O P-5'-P-CCNC: 26 UNIT/L (ref 10–44)
ALT SERPL W/O P-5'-P-CCNC: 28 UNIT/L (ref 10–44)
AMPHET UR QL SCN: NEGATIVE
ANION GAP (OHS): 11 MMOL/L (ref 8–16)
ANION GAP (OHS): 9 MMOL/L (ref 8–16)
ANION GAP (OHS): 9 MMOL/L (ref 8–16)
AST SERPL-CCNC: 19 UNIT/L (ref 11–45)
AST SERPL-CCNC: 20 UNIT/L (ref 11–45)
B-OH-BUTYR BLD STRIP-SCNC: 0.7 MMOL/L
BACTERIA #/AREA URNS AUTO: ABNORMAL /HPF
BARBITURATE SCN PRESENT UR: NEGATIVE
BASOPHILS # BLD AUTO: 0 K/UL
BASOPHILS # BLD AUTO: 0 K/UL
BASOPHILS NFR BLD AUTO: 0 %
BASOPHILS NFR BLD AUTO: 0 %
BENZODIAZ UR QL SCN: NEGATIVE
BILIRUB DIRECT SERPL-MCNC: 0.3 MG/DL (ref 0.1–0.3)
BILIRUB SERPL-MCNC: 0.8 MG/DL (ref 0.1–1)
BILIRUB SERPL-MCNC: 0.8 MG/DL (ref 0.1–1)
BILIRUB UR QL STRIP.AUTO: NEGATIVE
BUN SERPL-MCNC: 24 MG/DL (ref 6–20)
BUN SERPL-MCNC: 25 MG/DL (ref 6–30)
BUN SERPL-MCNC: 27 MG/DL (ref 6–20)
BUN SERPL-MCNC: 27 MG/DL (ref 6–20)
CALCIUM SERPL-MCNC: 9 MG/DL (ref 8.7–10.5)
CALCIUM SERPL-MCNC: 9.2 MG/DL (ref 8.7–10.5)
CALCIUM SERPL-MCNC: 9.5 MG/DL (ref 8.7–10.5)
CANNABINOIDS UR QL SCN: ABNORMAL
CHLORIDE SERPL-SCNC: 116 MMOL/L (ref 95–110)
CHLORIDE SERPL-SCNC: 118 MMOL/L (ref 95–110)
CLARITY UR: CLEAR
CO2 SERPL-SCNC: 25 MMOL/L (ref 23–29)
CO2 SERPL-SCNC: 26 MMOL/L (ref 23–29)
CO2 SERPL-SCNC: 30 MMOL/L (ref 23–29)
COCAINE UR QL SCN: NEGATIVE
COLOR UR AUTO: YELLOW
CREAT SERPL-MCNC: 1.4 MG/DL (ref 0.5–1.4)
CREAT SERPL-MCNC: 1.5 MG/DL (ref 0.5–1.4)
CREAT SERPL-MCNC: 1.5 MG/DL (ref 0.5–1.4)
CREAT SERPL-MCNC: 1.6 MG/DL (ref 0.5–1.4)
CREAT UR-MCNC: 185 MG/DL (ref 23–375)
ERYTHROCYTE [DISTWIDTH] IN BLOOD BY AUTOMATED COUNT: 12.8 % (ref 11.5–14.5)
ERYTHROCYTE [DISTWIDTH] IN BLOOD BY AUTOMATED COUNT: 12.9 % (ref 11.5–14.5)
ETHANOL UR-MCNC: <10 MG/DL
GFR SERPLBLD CREATININE-BSD FMLA CKD-EPI: >60 ML/MIN/1.73/M2
GLUCOSE SERPL-MCNC: 265 MG/DL (ref 70–110)
GLUCOSE SERPL-MCNC: 283 MG/DL (ref 70–110)
GLUCOSE SERPL-MCNC: 351 MG/DL (ref 70–110)
GLUCOSE SERPL-MCNC: 392 MG/DL (ref 70–110)
GLUCOSE UR QL STRIP: ABNORMAL
HCO3 UR-SCNC: 31.6 MMOL/L (ref 24–28)
HCT VFR BLD AUTO: 43.5 % (ref 40–54)
HCT VFR BLD AUTO: 44.7 % (ref 40–54)
HCT VFR BLD CALC: 44 %PCV (ref 36–54)
HGB BLD-MCNC: 13.9 GM/DL (ref 14–18)
HGB BLD-MCNC: 14.6 GM/DL (ref 14–18)
HGB UR QL STRIP: ABNORMAL
HOLD SPECIMEN: NORMAL
HYALINE CASTS UR QL AUTO: 5 /LPF (ref 0–1)
IMM GRANULOCYTES # BLD AUTO: 0.02 K/UL (ref 0–0.04)
IMM GRANULOCYTES # BLD AUTO: 0.03 K/UL (ref 0–0.04)
IMM GRANULOCYTES NFR BLD AUTO: 0.3 % (ref 0–0.5)
IMM GRANULOCYTES NFR BLD AUTO: 0.5 % (ref 0–0.5)
KETONES UR QL STRIP: ABNORMAL
LEUKOCYTE ESTERASE UR QL STRIP: ABNORMAL
LIPASE SERPL-CCNC: 6 U/L (ref 4–60)
LYMPHOCYTES # BLD AUTO: 0.58 K/UL (ref 1–4.8)
LYMPHOCYTES # BLD AUTO: 0.75 K/UL (ref 1–4.8)
MAGNESIUM SERPL-MCNC: 2.3 MG/DL (ref 1.6–2.6)
MAGNESIUM SERPL-MCNC: 2.3 MG/DL (ref 1.6–2.6)
MCH RBC QN AUTO: 29.6 PG (ref 27–31)
MCH RBC QN AUTO: 30.5 PG (ref 27–31)
MCHC RBC AUTO-ENTMCNC: 32 G/DL (ref 32–36)
MCHC RBC AUTO-ENTMCNC: 32.7 G/DL (ref 32–36)
MCV RBC AUTO: 93 FL (ref 82–98)
MCV RBC AUTO: 93 FL (ref 82–98)
METHADONE UR QL SCN: NEGATIVE
MICROSCOPIC COMMENT: ABNORMAL
NITRITE UR QL STRIP: NEGATIVE
NUCLEATED RBC (/100WBC) (OHS): 0 /100 WBC
NUCLEATED RBC (/100WBC) (OHS): 0 /100 WBC
OPIATES UR QL SCN: NEGATIVE
PCO2 BLDA: 43.7 MMHG (ref 35–45)
PCP UR QL: NEGATIVE
PH SMN: 7.47 [PH] (ref 7.35–7.45)
PH UR STRIP: 6 [PH]
PLATELET # BLD AUTO: 125 K/UL (ref 150–450)
PLATELET # BLD AUTO: 150 K/UL (ref 150–450)
PMV BLD AUTO: 11.7 FL (ref 9.2–12.9)
PMV BLD AUTO: 12.2 FL (ref 9.2–12.9)
PO2 BLDA: 38 MMHG (ref 40–60)
POC BE: 8 MMOL/L
POC IONIZED CALCIUM: 1.19 MMOL/L (ref 1.06–1.42)
POC SATURATED O2: 74 % (ref 95–100)
POC TCO2 (MEASURED): 28 MMOL/L (ref 23–29)
POC TCO2: 33 MMOL/L (ref 24–29)
POCT GLUCOSE: 290 MG/DL (ref 70–110)
POCT GLUCOSE: 368 MG/DL (ref 70–110)
POCT GLUCOSE: 384 MG/DL (ref 70–110)
POTASSIUM BLD-SCNC: 4.2 MMOL/L (ref 3.5–5.1)
POTASSIUM SERPL-SCNC: 4.1 MMOL/L (ref 3.5–5.1)
POTASSIUM SERPL-SCNC: 4.3 MMOL/L (ref 3.5–5.1)
POTASSIUM SERPL-SCNC: 4.9 MMOL/L (ref 3.5–5.1)
PROT SERPL-MCNC: 6 GM/DL (ref 6–8.4)
PROT SERPL-MCNC: 6.7 GM/DL (ref 6–8.4)
PROT UR QL STRIP: ABNORMAL
RBC # BLD AUTO: 4.7 M/UL (ref 4.6–6.2)
RBC # BLD AUTO: 4.79 M/UL (ref 4.6–6.2)
RBC #/AREA URNS AUTO: 5 /HPF (ref 0–4)
RELATIVE EOSINOPHIL (OHS): 0 %
RELATIVE EOSINOPHIL (OHS): 0.2 %
RELATIVE LYMPHOCYTE (OHS): 13 % (ref 18–48)
RELATIVE LYMPHOCYTE (OHS): 9.1 % (ref 18–48)
RELATIVE MONOCYTE (OHS): 10.1 % (ref 4–15)
RELATIVE MONOCYTE (OHS): 8.9 % (ref 4–15)
RELATIVE NEUTROPHIL (OHS): 76.2 % (ref 38–73)
RELATIVE NEUTROPHIL (OHS): 81.7 % (ref 38–73)
SAMPLE: ABNORMAL
SAMPLE: ABNORMAL
SITE: ABNORMAL
SODIUM BLD-SCNC: 154 MMOL/L (ref 136–145)
SODIUM SERPL-SCNC: 152 MMOL/L (ref 136–145)
SODIUM SERPL-SCNC: 153 MMOL/L (ref 136–145)
SODIUM SERPL-SCNC: 155 MMOL/L (ref 136–145)
SP GR UR STRIP: 1.02
SQUAMOUS #/AREA URNS AUTO: 1 /HPF
UROBILINOGEN UR STRIP-ACNC: NEGATIVE EU/DL
WBC # BLD AUTO: 5.75 K/UL (ref 3.9–12.7)
WBC # BLD AUTO: 6.39 K/UL (ref 3.9–12.7)
WBC #/AREA URNS AUTO: 21 /HPF (ref 0–5)
YEAST UR QL AUTO: ABNORMAL /HPF

## 2025-04-15 PROCEDURE — 82248 BILIRUBIN DIRECT: CPT | Performed by: PHYSICIAN ASSISTANT

## 2025-04-15 PROCEDURE — 25000003 PHARM REV CODE 250: Performed by: INTERNAL MEDICINE

## 2025-04-15 PROCEDURE — 99900035 HC TECH TIME PER 15 MIN (STAT)

## 2025-04-15 PROCEDURE — 80307 DRUG TEST PRSMV CHEM ANLYZR: CPT | Performed by: HOSPITALIST

## 2025-04-15 PROCEDURE — 63600175 PHARM REV CODE 636 W HCPCS

## 2025-04-15 PROCEDURE — 25000003 PHARM REV CODE 250

## 2025-04-15 PROCEDURE — 36415 COLL VENOUS BLD VENIPUNCTURE: CPT

## 2025-04-15 PROCEDURE — 80053 COMPREHEN METABOLIC PANEL: CPT

## 2025-04-15 PROCEDURE — 83735 ASSAY OF MAGNESIUM: CPT | Performed by: PHYSICIAN ASSISTANT

## 2025-04-15 PROCEDURE — 82010 KETONE BODYS QUAN: CPT | Performed by: EMERGENCY MEDICINE

## 2025-04-15 PROCEDURE — 82803 BLOOD GASES ANY COMBINATION: CPT

## 2025-04-15 PROCEDURE — 25000003 PHARM REV CODE 250: Performed by: HOSPITALIST

## 2025-04-15 PROCEDURE — 87086 URINE CULTURE/COLONY COUNT: CPT | Performed by: EMERGENCY MEDICINE

## 2025-04-15 PROCEDURE — 96361 HYDRATE IV INFUSION ADD-ON: CPT

## 2025-04-15 PROCEDURE — 83690 ASSAY OF LIPASE: CPT | Performed by: HOSPITALIST

## 2025-04-15 PROCEDURE — 12000002 HC ACUTE/MED SURGE SEMI-PRIVATE ROOM

## 2025-04-15 PROCEDURE — 94761 N-INVAS EAR/PLS OXIMETRY MLT: CPT | Mod: XB

## 2025-04-15 PROCEDURE — 36415 COLL VENOUS BLD VENIPUNCTURE: CPT | Performed by: PHYSICIAN ASSISTANT

## 2025-04-15 PROCEDURE — 82565 ASSAY OF CREATININE: CPT

## 2025-04-15 PROCEDURE — 85025 COMPLETE CBC W/AUTO DIFF WBC: CPT | Performed by: EMERGENCY MEDICINE

## 2025-04-15 PROCEDURE — 85025 COMPLETE CBC W/AUTO DIFF WBC: CPT | Performed by: PHYSICIAN ASSISTANT

## 2025-04-15 PROCEDURE — 93010 ELECTROCARDIOGRAM REPORT: CPT | Mod: ,,, | Performed by: INTERNAL MEDICINE

## 2025-04-15 PROCEDURE — 63600175 PHARM REV CODE 636 W HCPCS: Performed by: HOSPITALIST

## 2025-04-15 PROCEDURE — 93005 ELECTROCARDIOGRAM TRACING: CPT

## 2025-04-15 PROCEDURE — 63600175 PHARM REV CODE 636 W HCPCS: Performed by: STUDENT IN AN ORGANIZED HEALTH CARE EDUCATION/TRAINING PROGRAM

## 2025-04-15 PROCEDURE — 96374 THER/PROPH/DIAG INJ IV PUSH: CPT

## 2025-04-15 PROCEDURE — 81001 URINALYSIS AUTO W/SCOPE: CPT | Performed by: EMERGENCY MEDICINE

## 2025-04-15 PROCEDURE — 80048 BASIC METABOLIC PNL TOTAL CA: CPT | Mod: XB

## 2025-04-15 PROCEDURE — 99291 CRITICAL CARE FIRST HOUR: CPT

## 2025-04-15 RX ORDER — IBUPROFEN 200 MG
24 TABLET ORAL
Status: DISCONTINUED | OUTPATIENT
Start: 2025-04-15 | End: 2025-04-16

## 2025-04-15 RX ORDER — ACETAMINOPHEN 325 MG/1
650 TABLET ORAL EVERY 4 HOURS PRN
Status: DISCONTINUED | OUTPATIENT
Start: 2025-04-15 | End: 2025-04-18 | Stop reason: HOSPADM

## 2025-04-15 RX ORDER — INSULIN GLARGINE 100 [IU]/ML
6 INJECTION, SOLUTION SUBCUTANEOUS ONCE
Status: COMPLETED | OUTPATIENT
Start: 2025-04-15 | End: 2025-04-15

## 2025-04-15 RX ORDER — IBUPROFEN 200 MG
24 TABLET ORAL
Status: DISCONTINUED | OUTPATIENT
Start: 2025-04-15 | End: 2025-04-15 | Stop reason: HOSPADM

## 2025-04-15 RX ORDER — AMOXICILLIN 250 MG
1 CAPSULE ORAL 2 TIMES DAILY PRN
Status: DISCONTINUED | OUTPATIENT
Start: 2025-04-15 | End: 2025-04-18 | Stop reason: HOSPADM

## 2025-04-15 RX ORDER — ONDANSETRON HYDROCHLORIDE 2 MG/ML
4 INJECTION, SOLUTION INTRAVENOUS EVERY 6 HOURS PRN
Status: DISCONTINUED | OUTPATIENT
Start: 2025-04-15 | End: 2025-04-18 | Stop reason: HOSPADM

## 2025-04-15 RX ORDER — DEXTROSE MONOHYDRATE 50 MG/ML
INJECTION, SOLUTION INTRAVENOUS CONTINUOUS
Status: DISCONTINUED | OUTPATIENT
Start: 2025-04-15 | End: 2025-04-15 | Stop reason: HOSPADM

## 2025-04-15 RX ORDER — INSULIN ASPART 100 [IU]/ML
8 INJECTION, SOLUTION INTRAVENOUS; SUBCUTANEOUS
Status: DISCONTINUED | OUTPATIENT
Start: 2025-04-15 | End: 2025-04-15 | Stop reason: HOSPADM

## 2025-04-15 RX ORDER — IBUPROFEN 200 MG
16 TABLET ORAL
Status: DISCONTINUED | OUTPATIENT
Start: 2025-04-15 | End: 2025-04-15 | Stop reason: HOSPADM

## 2025-04-15 RX ORDER — POLYETHYLENE GLYCOL 3350 17 G/17G
17 POWDER, FOR SOLUTION ORAL DAILY PRN
Status: DISCONTINUED | OUTPATIENT
Start: 2025-04-15 | End: 2025-04-18 | Stop reason: HOSPADM

## 2025-04-15 RX ORDER — TALC
6 POWDER (GRAM) TOPICAL NIGHTLY PRN
Status: DISCONTINUED | OUTPATIENT
Start: 2025-04-15 | End: 2025-04-18 | Stop reason: HOSPADM

## 2025-04-15 RX ORDER — SODIUM CHLORIDE 0.9 % (FLUSH) 0.9 %
10 SYRINGE (ML) INJECTION EVERY 6 HOURS PRN
Status: DISCONTINUED | OUTPATIENT
Start: 2025-04-15 | End: 2025-04-18 | Stop reason: HOSPADM

## 2025-04-15 RX ORDER — INSULIN ASPART 100 [IU]/ML
0-5 INJECTION, SOLUTION INTRAVENOUS; SUBCUTANEOUS
Status: DISCONTINUED | OUTPATIENT
Start: 2025-04-15 | End: 2025-04-16

## 2025-04-15 RX ORDER — SODIUM CHLORIDE 450 MG/100ML
INJECTION, SOLUTION INTRAVENOUS CONTINUOUS
Status: DISCONTINUED | OUTPATIENT
Start: 2025-04-15 | End: 2025-04-16

## 2025-04-15 RX ORDER — GLUCAGON 1 MG
1 KIT INJECTION
Status: DISCONTINUED | OUTPATIENT
Start: 2025-04-15 | End: 2025-04-15 | Stop reason: HOSPADM

## 2025-04-15 RX ORDER — ONDANSETRON HYDROCHLORIDE 2 MG/ML
4 INJECTION, SOLUTION INTRAVENOUS
Status: COMPLETED | OUTPATIENT
Start: 2025-04-15 | End: 2025-04-15

## 2025-04-15 RX ORDER — SUMATRIPTAN 6 MG/.5ML
6 INJECTION, SOLUTION SUBCUTANEOUS ONCE
Status: COMPLETED | OUTPATIENT
Start: 2025-04-16 | End: 2025-04-15

## 2025-04-15 RX ORDER — ENOXAPARIN SODIUM 100 MG/ML
40 INJECTION SUBCUTANEOUS EVERY 24 HOURS
Status: DISCONTINUED | OUTPATIENT
Start: 2025-04-16 | End: 2025-04-18 | Stop reason: HOSPADM

## 2025-04-15 RX ORDER — GLUCAGON 1 MG
1 KIT INJECTION
Status: DISCONTINUED | OUTPATIENT
Start: 2025-04-15 | End: 2025-04-16

## 2025-04-15 RX ORDER — ALUMINUM HYDROXIDE, MAGNESIUM HYDROXIDE, AND SIMETHICONE 1200; 120; 1200 MG/30ML; MG/30ML; MG/30ML
30 SUSPENSION ORAL 4 TIMES DAILY PRN
Status: DISCONTINUED | OUTPATIENT
Start: 2025-04-15 | End: 2025-04-18 | Stop reason: HOSPADM

## 2025-04-15 RX ORDER — INSULIN ASPART 100 [IU]/ML
0-10 INJECTION, SOLUTION INTRAVENOUS; SUBCUTANEOUS EVERY 4 HOURS PRN
Status: DISCONTINUED | OUTPATIENT
Start: 2025-04-15 | End: 2025-04-15 | Stop reason: HOSPADM

## 2025-04-15 RX ORDER — INSULIN ASPART 100 [IU]/ML
8 INJECTION, SOLUTION INTRAVENOUS; SUBCUTANEOUS
Status: DISCONTINUED | OUTPATIENT
Start: 2025-04-15 | End: 2025-04-15

## 2025-04-15 RX ORDER — IBUPROFEN 200 MG
16 TABLET ORAL
Status: DISCONTINUED | OUTPATIENT
Start: 2025-04-15 | End: 2025-04-16

## 2025-04-15 RX ORDER — GABAPENTIN 100 MG/1
200 CAPSULE ORAL 2 TIMES DAILY
Status: DISCONTINUED | OUTPATIENT
Start: 2025-04-15 | End: 2025-04-18 | Stop reason: HOSPADM

## 2025-04-15 RX ORDER — NALOXONE HCL 0.4 MG/ML
0.02 VIAL (ML) INJECTION
Status: DISCONTINUED | OUTPATIENT
Start: 2025-04-15 | End: 2025-04-18 | Stop reason: HOSPADM

## 2025-04-15 RX ORDER — ACETAMINOPHEN 500 MG
1000 TABLET ORAL ONCE
Status: COMPLETED | OUTPATIENT
Start: 2025-04-16 | End: 2025-04-15

## 2025-04-15 RX ORDER — PROCHLORPERAZINE EDISYLATE 5 MG/ML
5 INJECTION INTRAMUSCULAR; INTRAVENOUS EVERY 6 HOURS PRN
Status: DISCONTINUED | OUTPATIENT
Start: 2025-04-15 | End: 2025-04-18 | Stop reason: HOSPADM

## 2025-04-15 RX ADMIN — INSULIN ASPART 5 UNITS: 100 INJECTION, SOLUTION INTRAVENOUS; SUBCUTANEOUS at 08:04

## 2025-04-15 RX ADMIN — INSULIN ASPART 1 UNITS: 100 INJECTION, SOLUTION INTRAVENOUS; SUBCUTANEOUS at 10:04

## 2025-04-15 RX ADMIN — INSULIN ASPART 6 UNITS: 100 INJECTION, SOLUTION INTRAVENOUS; SUBCUTANEOUS at 06:04

## 2025-04-15 RX ADMIN — SUMATRIPTAN 6 MG: 6 INJECTION, SOLUTION SUBCUTANEOUS at 11:04

## 2025-04-15 RX ADMIN — DEXTROSE MONOHYDRATE: 50 INJECTION, SOLUTION INTRAVENOUS at 01:04

## 2025-04-15 RX ADMIN — SODIUM CHLORIDE, POTASSIUM CHLORIDE, SODIUM LACTATE AND CALCIUM CHLORIDE 1000 ML: 600; 310; 30; 20 INJECTION, SOLUTION INTRAVENOUS at 07:04

## 2025-04-15 RX ADMIN — SENNOSIDES AND DOCUSATE SODIUM 1 TABLET: 50; 8.6 TABLET ORAL at 08:04

## 2025-04-15 RX ADMIN — ACETAMINOPHEN 1000 MG: 500 TABLET ORAL at 11:04

## 2025-04-15 RX ADMIN — INSULIN GLARGINE 20 UNITS: 100 INJECTION, SOLUTION SUBCUTANEOUS at 08:04

## 2025-04-15 RX ADMIN — GABAPENTIN 200 MG: 100 CAPSULE ORAL at 11:04

## 2025-04-15 RX ADMIN — SODIUM CHLORIDE: 450 INJECTION, SOLUTION INTRAVENOUS at 09:04

## 2025-04-15 RX ADMIN — ONDANSETRON 4 MG: 2 INJECTION INTRAMUSCULAR; INTRAVENOUS at 07:04

## 2025-04-15 RX ADMIN — MUPIROCIN: 20 OINTMENT TOPICAL at 08:04

## 2025-04-15 RX ADMIN — INSULIN GLARGINE 6 UNITS: 100 INJECTION, SOLUTION SUBCUTANEOUS at 09:04

## 2025-04-15 NOTE — PLAN OF CARE
Problem: Adult Inpatient Plan of Care  Goal: Plan of Care Review  Outcome: Progressing  Goal: Patient-Specific Goal (Individualized)  Outcome: Progressing  Goal: Absence of Hospital-Acquired Illness or Injury  Outcome: Progressing  Goal: Optimal Comfort and Wellbeing  Outcome: Progressing  Goal: Readiness for Transition of Care  Outcome: Progressing     Problem: Fall Injury Risk  Goal: Absence of Fall and Fall-Related Injury  Outcome: Progressing     Problem: Restraint, Nonviolent  Goal: Absence of Harm or Injury  Outcome: Progressing     Problem: Diabetes Comorbidity  Goal: Blood Glucose Level Within Targeted Range  Outcome: Progressing     Problem: Sepsis/Septic Shock  Goal: Optimal Coping  Outcome: Progressing  Goal: Absence of Bleeding  Outcome: Progressing  Goal: Blood Glucose Level Within Targeted Range  Outcome: Progressing  Goal: Absence of Infection Signs and Symptoms  Outcome: Progressing  Goal: Optimal Nutrition Intake  Outcome: Progressing     Problem: Acute Kidney Injury/Impairment  Goal: Fluid and Electrolyte Balance  Outcome: Progressing  Goal: Improved Oral Intake  Outcome: Progressing  Goal: Effective Renal Function  Outcome: Progressing     Problem: Infection  Goal: Absence of Infection Signs and Symptoms  Outcome: Progressing     Problem: Delirium  Goal: Optimal Coping  Outcome: Progressing  Goal: Improved Behavioral Control  Outcome: Progressing  Goal: Improved Attention and Thought Clarity  Outcome: Progressing  Goal: Improved Sleep  Outcome: Progressing     Problem: Skin Injury Risk Increased  Goal: Skin Health and Integrity  Outcome: Progressing

## 2025-04-15 NOTE — SUBJECTIVE & OBJECTIVE
Interval History/Significant Events: Restarted and stopped gtt overnight. Endocrine consulted.     Review of Systems    States he is on gabapentin for chronic pain of legs, unknown dose.    Objective:     Vital Signs (Most Recent):  Temp: 98.1 °F (36.7 °C) (04/14/25 1940)  Pulse: 82 (04/14/25 2041)  Resp: 18 (04/14/25 1940)  BP: (!) 171/93 (04/14/25 1940)  SpO2: 96 % (04/14/25 1940) Vital Signs (24h Range):  Temp:  [98.1 °F (36.7 °C)-98.9 °F (37.2 °C)] 98.1 °F (36.7 °C)  Pulse:  [] 82  Resp:  [12-28] 18  SpO2:  [96 %-99 %] 96 %  BP: (117-187)/() 171/93   Weight: 88 kg (194 lb 0.1 oz)  Body mass index is 24.91 kg/m².      Intake/Output Summary (Last 24 hours) at 4/14/2025 2058  Last data filed at 4/14/2025 1706  Gross per 24 hour   Intake 107.74 ml   Output 800 ml   Net -692.26 ml          Physical Exam  Constitutional:       Appearance: Normal appearance.   HENT:      Head: Normocephalic and atraumatic.      Nose: Nose normal.      Mouth/Throat:      Mouth: Mucous membranes are dry.   Eyes:      Pupils: Pupils are equal, round, and reactive to light.   Cardiovascular:      Rate and Rhythm: Normal rate and regular rhythm.   Pulmonary:      Effort: Pulmonary effort is normal.      Breath sounds: Normal breath sounds.   Abdominal:      General: Abdomen is flat. Bowel sounds are normal.      Palpations: Abdomen is soft.   Musculoskeletal:         General: No swelling.      Cervical back: Normal range of motion and neck supple.   Skin:     General: Skin is warm and dry.   Neurological:      General: No focal deficit present.      Mental Status: He is alert and oriented to person, place, and time.            Vents:     Lines/Drains/Airways       Peripheral Intravenous Line  Duration                  Peripheral IV - Double Lumen 04/11/25 0310 18 G Anterior;Right Upper Arm 3 days         Peripheral IV - Single Lumen 04/11/25 2048 22 G Posterior;Right Hand 3 days         Peripheral IV - Single Lumen 04/14/25 0600  20 G Anterior;Right Forearm <1 day                  Significant Labs:    CBC/Anemia Profile:  Recent Labs   Lab 04/13/25 0431 04/14/25 0339 04/14/25 0455   WBC 5.59 4.70  --    HGB 13.3* 14.6  --    HCT 40.7 43.5 42   * 132*  --    MCV 92 91  --    RDW 13.0 12.8  --         Chemistries:  Recent Labs   Lab 04/13/25 0431 04/13/25 0833 04/13/25  2000 04/14/25 0339 04/14/25 0852 04/14/25 1339 04/14/25  1559   NA  --    < > 153* 157* 158* 156* 156*   K  --    < > 4.1 4.2 4.5 3.6 4.7   CL  --    < > 115* 118* 121* 125* 120*   CO2  --    < > 24 30* 26 23 28   BUN  --    < > 22* 27* 22* 23* 27*   CREATININE  --    < > 1.5* 1.5* 1.3 1.1 1.5*   CALCIUM  --    < > 9.3 9.7 9.1 7.5* 9.4   ALBUMIN 3.2*  --  3.5 3.5  --   --   --    BILITOT 0.8  --  1.1* 0.8  --   --   --    ALKPHOS 71  --  82 93  --   --   --    ALT 31  --  38 37  --   --   --    AST 53*  --  53* 42  --   --   --    GLUCOSE  --    < > 311* 282* 126* 227* 265*   MG 2.2   < > 2.0 2.3 2.3 1.8 2.3    < > = values in this interval not displayed.       None    Significant Imaging:  I have reviewed all pertinent imaging results/findings within the past 24 hours.

## 2025-04-15 NOTE — PROGRESS NOTES
[facility] - Telemetry Stepdown  Critical Care Medicine  Progress Note    Patient Name: Andrzej Sinclair  MRN: [id number]  Admission Date: 4/10/2025  Hospital Length of Stay: 4 days  Code Status: Full Code  Attending Provider: Abigail Nash MD  Primary Care Provider: Aubrie, Primary Doctor   Principal Problem: Hyperosmolar hyperglycemic state (HHS)    Subjective:     HPI:  Andrzej Sinclair is a 38 year old male with T1DM, hx of DKA/HHS, HTN who presented to [facility] ED 4/10/25 for hyperglycemia and N/V x 4 days. Patient was non participatory during examination so history obtained via chart review and from wife, Marzena. Wife states patient started having nausea and vomiting on Monday with very little PO intake over the last few days. Wife was monitoring patient's glucose via his dexcom and concerned with his elevated glucoses. Patient not willing to go to the hospital until wife called EMS and made him go. She reports insulin compliance. 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     Upon arrival to the ED, he was tachycardic and altered with labs consistent with DKA, BETH and hyperkalemia (K 7.3). He received calcium, albuterol, 2L IVF and started on insulin infusion. Critical care medicine consulted for DKA. Prior to my arrival, patient standing at the bedside using urinal with >1L of UOP off. After getting back into bed, patient no longer participatory in exam and would not answer any questions. Appeared confused or lethargic. Patient to be admitted to the MICU for closer monitoring.     Hospital/ICU Course:  Patient now awake, oriented and tearful.  States that he becomes delirious when he has DKA/HHS.  Doing well.  Has an endocrinologist.        Interval History/Significant Events: Restarted and stopped gtt overnight. Endocrine consulted.     Review of Systems    States he is on gabapentin for chronic pain of legs, unknown dose.    Objective:     Vital Signs (Most  Recent):  Temp: 98.1 °F (36.7 °C) (04/14/25 1940)  Pulse: 82 (04/14/25 2041)  Resp: 18 (04/14/25 1940)  BP: (!) 171/93 (04/14/25 1940)  SpO2: 96 % (04/14/25 1940) Vital Signs (24h Range):  Temp:  [98.1 °F (36.7 °C)-98.9 °F (37.2 °C)] 98.1 °F (36.7 °C)  Pulse:  [] 82  Resp:  [12-28] 18  SpO2:  [96 %-99 %] 96 %  BP: (117-187)/() 171/93   Weight: 88 kg (194 lb 0.1 oz)  Body mass index is 24.91 kg/m².      Intake/Output Summary (Last 24 hours) at 4/14/2025 2058  Last data filed at 4/14/2025 1706  Gross per 24 hour   Intake 107.74 ml   Output 800 ml   Net -692.26 ml          Physical Exam  Constitutional:       Appearance: Normal appearance.   HENT:      Head: Normocephalic and atraumatic.      Nose: Nose normal.      Mouth/Throat:      Mouth: Mucous membranes are dry.   Eyes:      Pupils: Pupils are equal, round, and reactive to light.   Cardiovascular:      Rate and Rhythm: Normal rate and regular rhythm.   Pulmonary:      Effort: Pulmonary effort is normal.      Breath sounds: Normal breath sounds.   Abdominal:      General: Abdomen is flat. Bowel sounds are normal.      Palpations: Abdomen is soft.   Musculoskeletal:         General: No swelling.      Cervical back: Normal range of motion and neck supple.   Skin:     General: Skin is warm and dry.   Neurological:      General: No focal deficit present.      Mental Status: He is alert and oriented to person, place, and time.            Vents:     Lines/Drains/Airways       Peripheral Intravenous Line  Duration                  Peripheral IV - Double Lumen 04/11/25 0310 18 G Anterior;Right Upper Arm 3 days         Peripheral IV - Single Lumen 04/11/25 2048 22 G Posterior;Right Hand 3 days         Peripheral IV - Single Lumen 04/14/25 0600 20 G Anterior;Right Forearm <1 day                  Significant Labs:    CBC/Anemia Profile:  Recent Labs   Lab 04/13/25  0431 04/14/25  0339 04/14/25  0455   WBC 5.59 4.70  --    HGB 13.3* 14.6  --    HCT 40.7 43.5 42    * 132*  --    MCV 92 91  --    RDW 13.0 12.8  --         Chemistries:  Recent Labs   Lab 04/13/25  0431 04/13/25  0833 04/13/25 2000 04/14/25  0339 04/14/25  0852 04/14/25  1339 04/14/25  1559   NA  --    < > 153* 157* 158* 156* 156*   K  --    < > 4.1 4.2 4.5 3.6 4.7   CL  --    < > 115* 118* 121* 125* 120*   CO2  --    < > 24 30* 26 23 28   BUN  --    < > 22* 27* 22* 23* 27*   CREATININE  --    < > 1.5* 1.5* 1.3 1.1 1.5*   CALCIUM  --    < > 9.3 9.7 9.1 7.5* 9.4   ALBUMIN 3.2*  --  3.5 3.5  --   --   --    BILITOT 0.8  --  1.1* 0.8  --   --   --    ALKPHOS 71  --  82 93  --   --   --    ALT 31  --  38 37  --   --   --    AST 53*  --  53* 42  --   --   --    GLUCOSE  --    < > 311* 282* 126* 227* 265*   MG 2.2   < > 2.0 2.3 2.3 1.8 2.3    < > = values in this interval not displayed.       None    Significant Imaging:  I have reviewed all pertinent imaging results/findings within the past 24 hours.    ABG  Recent Labs   Lab 04/14/25  0455   PH 7.469*   PO2 71*   PCO2 45.2*   HCO3 32.8*   BE 9*     Assessment/Plan:     Neuro  Acute metabolic encephalopathy  Suspect 2/2 [facility]    --CT head normal   --DKA/[facility] protocol in place    Patient reports this is typical when has DKA, now resolved.     Cardiac/Vascular  Primary hypertension  Holding home lisinopril  Creat now 1.3  May restart once on floor.     Renal/  Hyperkalemia  Associated with DKA, resolved.  Monitor daily  One episode of hypokalemia, replace as needed.     BETH (acute kidney injury)  Suspect prerenal in setting of nausea and vomiting.   Baseline sCr 1.5, up to 2.7 on admission. Now 1.3      Encourage free water for hypernatremia.    Hypernatremia  Na 155, improved  Encouraged to drink water  Continue to monitor electrolytes daily     Endocrine  * Hyperosmolar hyperglycemic state (HHS)  Hemoglobin A1c worsened from 11/2024. Followed by endocrine outpatient.  Glucose >700 with beta hydroxybutyrate 5.8. ABG without acidosis but bicarb of 14. Received  2L of NS in the ED.     --DKA/HHS protocol in place now transitioned to Lantus 10 U BID and aspart with meals with SSI    Stable to floor            Zackery Barnes MD  Critical Care Medicine  [facility] - Telemetry Stepdown

## 2025-04-15 NOTE — DISCHARGE SUMMARY
[facility] - Telemetry [facility] Medicine  Discharge Summary      Patient Name: Andrzej Sinclair  MRN: [id number]  REKHA: [id number]  Patient Class: IP- Inpatient  Admission Date: 4/10/2025  Hospital Length of Stay: 5 days  Discharge Date and Time: 4/15/2025 10:58 AM  Attending Physician: Aubrie att. providers found   Discharging Provider: Abigail Nash MD  Primary Care Provider: Aubrie Primary Doctor  [facility] Medicine Team: [facility] HOSP MED U Abigail Nash MD  Primary Care Team: [facility] HOSP MED U    HPI:   Per CCM:  Andrzej Sinclair is a 38 year old male with T1DM, hx of DKA/HHS, HTN who presented to [facility] ED 4/10/25 for hyperglycemia and N/V x 4 days. Patient was non participatory during examination so history obtained via chart review and from wife, Marzena. Wife states patient started having nausea and vomiting on Monday with very little PO intake over the last few days. Wife was monitoring patient's glucose via his dexcom and concerned with his elevated glucoses. Patient not willing to go to the hospital until wife called EMS and made him go. She reports insulin compliance. 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      Upon arrival to the ED, he was tachycardic and altered with labs consistent with DKA, BETH and hyperkalemia (K 7.3). He received calcium, albuterol, 2L IVF and started on insulin infusion. Critical care medicine consulted for DKA. Prior to my arrival, patient standing at the bedside using urinal with >1L of UOP off. After getting back into bed, patient no longer participatory in exam and would not answer any questions. Appeared confused or lethargic. Patient to be admitted to the MICU for closer monitoring.     * No surgery found *      Hospital Course:   Andrzej Sinclair is a 38yo M w/ T1DM, hx of DKA/HHS admitted to MICU 4/10 with HHS, metabolic encephalopathy, BETH (sOSM 388, LA 2.7, A1c 13.8, BHB 5.8, Na 161, Cr 2.5). He was started on DKA protocol w/  insulin infusion and received IVF. CT head negative for acute intracranial abnormalities. RP US unremarkable for hydronephrosis. Utox noted to be +opiates.  LA w/o any fills. A precedex gtt was initiated 4/11. BETH w/ gradual improvement. He was taken off precedex drip 4/12 in the morning. He was transitioned from insulin drip to subq insulin early morning of 4/13. He was deemed stable for step-down to  and mentation was noted to be improved.     He stepped down to hospital medicine on 04/15. Calculated free water deficit 6.8 liter.  He was initiated on dextrose drip and insulin drip to address profound hypernatremia.  At bedside, he is awake, alert, oriented to person place and time.  He expresses desire to discharge, citing that he will continue to hydrate at home (does not like the hospital water and is in general tired of being in the hospital).  Advised patient that do not believe he is medically ready for discharge home in the setting of elevated blood glucose levels and hypernatremia which can not be monitored closely at home; risk for osmotic demyelination syndrome and cerebral edema also discussed.  Patient expressed understanding of these risks of discharging prior to medical readiness and is accepting of them.  He did not want to wait for insulin recommendations from endocrinology team.  He was encouraged to follow-up closely with his primary care provider.  He discharged AMA.    Vitals:    04/15/25 0309 04/15/25 0518 04/15/25 0728 04/15/25 0900   BP:  (!) 154/84 138/74    BP Location:       Patient Position:   Lying    Pulse: 98 75 76    Resp:  17 16    Temp:  97.9 °F (36.6 °C) 97.7 °F (36.5 °C)    TempSrc:  Oral Oral    SpO2:  99% 98% 98%   Weight:       Height:         Physical Exam - limited due to left AMA  Constitutional:       Appearance: Normal appearance.   HENT:      Head: Normocephalic and atraumatic.      Mouth/Throat:      Mouth: Mucous membranes are dry.   Eyes:      No scleral  icterus  Cardiovascular:      Rate and Rhythm: Normal rate.  Pulmonary:      Effort: Pulmonary effort is normal.   Neurological:      Mental Status: He is alert and oriented to person, place, and time.          Goals of Care Treatment Preferences:  Code Status: Full Code         Consults:   Consults (From admission, onward)          Status Ordering Provider     Inpatient consult to Midline team  Once        Provider:  (Not yet assigned)    Completed ANDRÉS BUNCH     Inpatient consult to Endocrinology  Once        Provider:  (Not yet assigned)    Completed DA VALIENTE     Inpatient consult to Critical Care Medicine  Once        Provider:  (Not yet assigned)    Completed SOFIE MORRIS            Final Active Diagnoses:    Diagnosis Date Noted POA    PRINCIPAL PROBLEM:  Hyperosmolar hyperglycemic state (HHS) [E11.00] 03/31/2024 Yes    Thrombocytopenia [D69.6] 04/13/2025 Yes    Anemia [D64.9] 04/13/2025 Yes    Mixed hyperlipidemia [E78.2] 11/20/2023 Yes     Chronic    BETH (acute kidney injury) [N17.9] 11/10/2023 Yes    Acute metabolic encephalopathy [G93.41] 09/15/2023 Yes    Hypernatremia [E87.0] 09/07/2023 Yes    Type 1 diabetes mellitus with hyperglycemia [E10.65] 09/07/2023 Yes     Chronic    Primary hypertension [I10] 03/04/2021 Yes     Chronic      Problems Resolved During this Admission:    Diagnosis Date Noted Date Resolved POA    Hypokalemia [E87.6] 04/13/2025 04/15/2025 Yes    Hyperkalemia [E87.5] 09/12/2023 04/15/2025 Yes       Discharged Condition: against medical advice    Disposition: Left Against Medical Adv*    Follow Up:    Patient Instructions:   No discharge procedures on file.    Significant Diagnostic Studies: Labs: CMP   Recent Labs   Lab 04/13/25 2000 04/14/25  0339 04/14/25  0852 04/14/25  1959 04/15/25  0500 04/15/25  0936   * 157*   < > 158* 153* 152*   K 4.1 4.2   < > 4.2 4.3 4.9   * 118*   < > 119* 118* 116*   CO2 24 30*   < > 28 26 25   BUN 22* 27*   < > 27* 27* 27*    CREATININE 1.5* 1.5*   < > 1.6* 1.5* 1.5*   CALCIUM 9.3 9.7   < > 9.5 9.0 9.2   ALBUMIN 3.5 3.5  --   --  3.0*  --    BILITOT 1.1* 0.8  --   --  0.8  --    ALKPHOS 82 93  --   --  75  --    AST 53* 42  --   --  19  --    ALT 38 37  --   --  26  --    ANIONGAP 14 9   < > 11 9 11    < > = values in this interval not displayed.    and CBC   Recent Labs   Lab 04/14/25  0339 04/14/25  0455 04/15/25  0500   WBC 4.70  --  5.75   HGB 14.6  --  13.9*   HCT 43.5   < > 43.5   *  --  125*    < > = values in this interval not displayed.     Microbiology: Blood Culture   Lab Results   Component Value Date    LABBLOO No growth after 5 days. 11/10/2023    LABBLOO No growth after 5 days. 11/10/2023       Pending Diagnostic Studies:       Procedure Component Value Units Date/Time    Basic metabolic panel [[id number]] Collected: 04/14/25 2353    Order Status: Sent Lab Status: No result     Specimen: Blood     Magnesium [[id number]] Collected: 04/14/25 2353    Order Status: Sent Lab Status: No result     Specimen: Blood     Magnesium [[id number]] Collected: 04/14/25 0852    Order Status: Sent Lab Status: No result     Specimen: Blood            Medications:  None - left AMA prior to medical reconciliation    Indwelling Lines/Drains at time of discharge:   Lines/Drains/Airways       None                   Time spent on the discharge of patient: 60 minutes         Abigail Nash MD  Department of Hospital Medicine  [facility] - Telemetry Stepdown

## 2025-04-15 NOTE — CARE UPDATE
Care Update:     Unable to see patient prior to him leaving AMA. BG excursion noted this morning likely secondary to D5W drip for hypernatremia. Ordered the transition drip but not started due to patient leaving AMA     Steroid use- None.   Renal function-   Lab Results   Component Value Date    CREATININE 1.5 (H) 04/15/2025        No diet orders on file     POCT Glucose   Date Value Ref Range Status   04/15/2025 368 (H) 70 - 110 mg/dL Final   04/15/2025 384 (H) 70 - 110 mg/dL Final   04/14/2025 275 (H) 70 - 110 mg/dL Final   04/14/2025 295 (H) 70 - 110 mg/dL Final   04/14/2025 251 (H) 70 - 110 mg/dL Final   04/14/2025 177 (H) 70 - 110 mg/dL Final   04/14/2025 141 (H) 70 - 110 mg/dL Final   04/14/2025 89 70 - 110 mg/dL Final   04/14/2025 90 70 - 110 mg/dL Final   04/14/2025 117 (H) 70 - 110 mg/dL Final   04/14/2025 237 (H) 70 - 110 mg/dL Final   04/14/2025 283 (H) 70 - 110 mg/dL Final   04/14/2025 316 (H) 70 - 110 mg/dL Final   04/14/2025 336 (H) 70 - 110 mg/dL Final   04/14/2025 382 (H) 70 - 110 mg/dL Final   04/13/2025 382 (H) 70 - 110 mg/dL Final   04/13/2025 320 (H) 70 - 110 mg/dL Final   04/13/2025 272 (H) 70 - 110 mg/dL Final   04/13/2025 148 (H) 70 - 110 mg/dL Final   04/13/2025 199 (H) 70 - 110 mg/dL Final   04/13/2025 208 (H) 70 - 110 mg/dL Final   04/13/2025 137 (H) 70 - 110 mg/dL Final   04/13/2025 97 70 - 110 mg/dL Final   04/13/2025 148 (H) 70 - 110 mg/dL Final   04/12/2025 118 (H) 70 - 110 mg/dL Final   04/12/2025 174 (H) 70 - 110 mg/dL Final   04/12/2025 189 (H) 70 - 110 mg/dL Final   04/12/2025 192 (H) 70 - 110 mg/dL Final   04/12/2025 167 (H) 70 - 110 mg/dL Final   04/12/2025 180 (H) 70 - 110 mg/dL Final   04/12/2025 183 (H) 70 - 110 mg/dL Final   04/12/2025 138 (H) 70 - 110 mg/dL Final     Lab Results   Component Value Date    HGBA1C 13.8 (H) 04/10/2025         Home Medications   Diabetes Medications              glucagon (GLUCAGON EMERGENCY KIT, HUMAN,) 1 mg SolR Inject 1 mg into the muscle as  needed (Hypoglycemia).    insulin degludec (TRESIBA FLEXTOUCH U-100) 100 unit/mL (3 mL) insulin pen Inject 22 Units into the skin once daily.    insulin lispro-aabc (LYUMJEV KWIKPEN U-100 INSULIN) 100 unit/mL pen Inject 8 Units into the skin 3 (three) times daily with meals. Take additional sliding scale based on the blood sugars.  Maximum daily dose of 36 units.            Patient should resume previous regimen. Unable to provide recs for D/C at this time since patient left prior to endocrine seeing the patient.       Manuel Dill DNP, FNP-C  Department of Endocrinology  Inpatient Glycemic Management

## 2025-04-15 NOTE — ED TRIAGE NOTES
Pt left AMA from the floor this morning after a 5 day admission. Pt was admitted for low sodium. Pt states he left because they would not let him drink or eat anything and his body was craving water. Pt states he tried to drink water but he doesn't like the taste. Pt c/o weakness, vomiting, and severe thirst.

## 2025-04-15 NOTE — CARE UPDATE
Patient with persistent hypernatremia Na 158. Calculated free water deficit 6.8 liter. No excessive GI or renal fluid loss. Will start on D5W @ 90 cc/hr x 71 hours with goal correction of Na 6-8 meq/day. Frequent serum sodium check ordered for close monitoring of Na and avoid overcorrection.     Joshua Olivares DO  Staff Physician, Hospital Medicine.

## 2025-04-15 NOTE — FIRST PROVIDER EVALUATION
"Medical screening examination initiated.  I have conducted a focused provider triage encounter, findings are as follows:    Brief history of present illness:  39-year-old male with a history of hypertension, type 1 diabetes, HHS, hyperlipidemia, cyclic vomiting syndrome and history of DKA presenting with persistent nausea and vomiting, dehydration.     Vitals:    04/15/25 1748   BP: 130/84   BP Location: Right arm   Pulse: 101   Resp: 18   Temp: 98.7 °F (37.1 °C)   TempSrc: Oral   SpO2: 100%   Weight: 88 kg (194 lb 0.2 oz)   Height: 6' 2" (1.88 m)       Pertinent physical exam:  Dry skin, dry mucous membranes, tachycardic rate, nontoxic-appearing    Brief workup plan:  DKA labs, ECG, UA, IV fluids    Preliminary workup initiated; this workup will be continued and followed by the physician or advanced practice provider that is assigned to the patient when roomed.    Himanshu Garnett DO, ALEX  Emergency Staff Physician   Dept of Emergency Medicine   [facility]  Spectralink: [id number]        Disclaimer: This note has been generated using voice-recognition software. There may be typographical errors that have been missed during proof-reading.    "

## 2025-04-15 NOTE — ED NOTES
I-STAT Chem-8+ Results:   Value Reference Range   Sodium 154 136-145 mmol/L   Potassium  4.2 3.5-5.1 mmol/L   Chloride 116  mmol/L   Ionized Calcium 1.19 1.06-1.42 mmol/L   CO2 (measured) 28 23-29 mmol/L   Glucose 283  mg/dL   BUN 25 6-30 mg/dL   Creatinine 1.6 0.5-1.4 mg/dL   Hematocrit 44 36-54%

## 2025-04-15 NOTE — NURSING
Pt left AMA. Abigail Nash MD aware. Pt educated why leaving the hospital at this time is unsafe. Pt still wishes to go home. AMA paperwork signed and put in chart. IV access  and telemetry monitor removed. Pt left unit independently to meet wife downstairs.

## 2025-04-15 NOTE — ED PROVIDER NOTES
Encounter Date: 4/15/2025       History     Chief Complaint   Patient presents with    Vomiting     Dc this morning for same issue after 5 day admission.  Endorses nausea and vomiting refused treatment with EMS     39-year-old male with a history of type 1 diabetes a recent hospital admission for DKA and severe hypernatremia from which he left AMA earlier this morning presents to the emergency department for repeat evaluation after he and persistent nausea and vomiting since leaving the hospital earlier today.  He states he tried to keep water down but was unable to due to his significant nausea.  He denies any other symptoms at this time.  He has not taken any insulin since he has been home, and he has not been able to check his sugar because of need to replace his Dexcom.  He is willing to be readmitted.        Review of patient's allergies indicates:   Allergen Reactions    Lactose Other (See Comments)     Gas and moderate to sever abdominal pain     Past Medical History:   Diagnosis Date    Diabetes mellitus     Diabetes mellitus type 1     Diagnosed at age 20     Past Surgical History:   Procedure Laterality Date    ESOPHAGOGASTRODUODENOSCOPY N/A 11/5/2024    Procedure: EGD (ESOPHAGOGASTRODUODENOSCOPY);  Surgeon: Manny Troy MD;  Location: [address];  Service: Endoscopy;  Laterality: N/A;     Family History   Problem Relation Name Age of Onset    Other Mother prediabetes         prediabetes    Diabetes Mother prediabetes     Other Father          patient does not know his fathers history     Social History[1]  Review of Systems   Constitutional:  Negative for fever.   HENT:  Negative for sore throat.    Respiratory:  Negative for shortness of breath.    Cardiovascular:  Negative for chest pain.   Gastrointestinal:  Positive for nausea and vomiting.   Genitourinary:  Negative for dysuria.   Musculoskeletal:  Negative for back pain.   Skin:  Negative for rash.   Neurological:  Negative for  weakness.   Hematological:  Does not bruise/bleed easily.       Physical Exam     Initial Vitals [04/15/25 1748]   BP Pulse Resp Temp SpO2   130/84 101 18 98.7 °F (37.1 °C) 100 %      MAP       --         Physical Exam    Nursing note and vitals reviewed.  Constitutional: He appears well-developed and well-nourished. He appears distressed.   Ill-appearing, bent over, actively retching.   HENT:   Head: Normocephalic and atraumatic.   Eyes: EOM are normal. Pupils are equal, round, and reactive to light.   Neck: Neck supple.   Normal range of motion.  Cardiovascular:  Normal rate and regular rhythm.           Pulmonary/Chest: Breath sounds normal. No respiratory distress.   Abdominal: Abdomen is soft. He exhibits no distension. There is no abdominal tenderness. There is no rebound.   Musculoskeletal:         General: No tenderness or edema. Normal range of motion.      Cervical back: Normal range of motion and neck supple.     Neurological: He is alert and oriented to person, place, and time. No cranial nerve deficit.   Skin: Skin is warm and dry.         ED Course   Procedures  Labs Reviewed   COMPREHENSIVE METABOLIC PANEL - Abnormal       Result Value    Sodium 155 (*)     Potassium 4.1      Chloride 116 (*)     CO2 30 (*)     Glucose 265 (*)     BUN 24 (*)     Creatinine 1.4      Calcium 9.5      Protein Total 6.7      Albumin 3.3 (*)     Bilirubin Total 0.8      ALP 80      AST 20      ALT 28      Anion Gap 9      eGFR >60     BETA - HYDROXYBUTYRATE, SERUM - Abnormal    Beta-Hydroxybutyrate 0.7 (*)    URINALYSIS, REFLEX TO URINE CULTURE - Abnormal    Color, UA Yellow      Appearance, UA Clear      pH, UA 6.0      Spec Grav UA 1.020      Protein, UA 1+ (*)     Glucose, UA 2+ (*)     Ketones, UA Trace (*)     Bilirubin, UA Negative      Blood, UA 1+ (*)     Nitrites, UA Negative      Urobilinogen, UA Negative      Leukocyte Esterase, UA 1+ (*)    CBC WITH DIFFERENTIAL - Abnormal    WBC 6.39      RBC 4.79      HGB  "14.6      HCT 44.7      MCV 93      MCH 30.5      MCHC 32.7      RDW 12.8      Platelet Count 150      MPV 11.7      Nucleated RBC 0      Neut % 81.7 (*)     Lymph % 9.1 (*)     Mono % 8.9      Eos % 0.0      Basophil % 0.0      Imm Grans % 0.3      Neut # 5.22      Lymph # 0.58 (*)     Mono # 0.57      Eos # 0.00      Baso # 0.00      Imm Grans # 0.02     TOXICOLOGY SCREEN, URINE, RANDOM (COMPLIANCE) - Abnormal    Alcohol, Urine <10      Benzodiazepine, Urine Negative      Methadone, Urine Negative      Cocaine, Urine Negative      Opiates, Urine Negative      Barbiturates, Urine Negative      Amphetamines, Urine Negative      THC Presumptive Positive (*)     Phencyclidine, Urine Negative      Urine Creatinine 185.0      Narrative:     This screen includes the following classes of drugs at the listed cut-off:     Benzodiazepines:        200 ng/ml   Methadone:              300 ng/ml   Cocaine metabolite:     300 ng/ml   Opiates:                300 ng/ml   Barbiturates:           200 ng/ml   Amphetamines:           1000 ng/ml   Marijuana metabs (THC): 50 ng/ml   Phencyclidine (PCP):    25 ng/ml     This is a screening test. If results do not correlate with clinical presentation, then a confirmatory send out test is advised.    This report is intended for use in clinical monitoring and management of patients. It is not intended for use in employment related drug testing."   URINALYSIS MICROSCOPIC - Abnormal    RBC, UA 5 (*)     WBC, UA 21 (*)     Bacteria, UA Many (*)     Yeast, UA Rare (*)     Squamous Epithelial Cells, UA 1      Hyaline Casts, UA 5 (*)     Microscopic Comment       RENAL FUNCTION PANEL - Abnormal    Sodium 155 (*)     Potassium 3.7      Chloride 119 (*)     CO2 27      Glucose 173 (*)     BUN 24 (*)     Creatinine 1.4      Calcium 9.1      Albumin 3.0 (*)     Phosphorus Level 3.3      Anion Gap 9      eGFR >60     CBC WITH DIFFERENTIAL - Abnormal    WBC 4.91      RBC 4.38 (*)     HGB 12.9 (*)     " HCT 41.0      MCV 94      MCH 29.5      MCHC 31.5 (*)     RDW 12.8      Platelet Count 118 (*)     MPV 12.2      Nucleated RBC 0      Neut % 69.3      Lymph % 18.7      Mono % 11.2      Eos % 0.2      Basophil % 0.2      Imm Grans % 0.4      Neut # 3.40      Lymph # 0.92 (*)     Mono # 0.55      Eos # 0.01      Baso # 0.01      Imm Grans # 0.02     BASIC METABOLIC PANEL - Abnormal    Sodium 149 (*)     Potassium 2.9 (*)     Chloride 118 (*)     CO2 20 (*)     Glucose 163 (*)     BUN 19      Creatinine 1.1      Calcium 7.2 (*)     Anion Gap 11      eGFR >60     ISTAT PROCEDURE - Abnormal    POC Glucose 283 (*)     POC BUN 25      POC Creatinine 1.6 (*)     POC Sodium 154 (*)     POC Potassium 4.2      POC Chloride 116 (*)     POC TCO2 (MEASURED) 28      POC Ionized Calcium 1.19      POC Hematocrit 44      Sample AMALIA     ISTAT PROCEDURE - Abnormal    POC PH 7.467 (*)     POC PCO2 43.7      POC PO2 38 (*)     POC HCO3 31.6 (*)     POC BE 8 (*)     POC SATURATED O2 74      POC TCO2 33 (*)     Sample VENOUS      Site Other      Allens Test N/A     POCT GLUCOSE - Abnormal    POCT Glucose 290 (*)    POCT GLUCOSE - Abnormal    POCT Glucose 122 (*)    LIPASE - Normal    Lipase Level 6     MAGNESIUM - Normal    Magnesium  2.1     GRAM STAIN    GRAM STAIN No WBCs      GRAM STAIN Moderate Gram positive cocci     CULTURE, URINE   CBC W/ AUTO DIFFERENTIAL    Narrative:     The following orders were created for panel order CBC auto differential.  Procedure                               Abnormality         Status                     ---------                               -----------         ------                     CBC with Differential[[id number]]       Abnormal            Final result                 Please view results for these tests on the individual orders.   GREY TOP URINE HOLD    Extra Tube Hold for add-ons.     CBC W/ AUTO DIFFERENTIAL    Narrative:     The following orders were created for panel order CBC Auto  Differential.  Procedure                               Abnormality         Status                     ---------                               -----------         ------                     CBC with Differential[[id number]]       Abnormal            Final result                 Please view results for these tests on the individual orders.   BASIC METABOLIC PANEL   POCT GLUCOSE    POCT Glucose 96     POCT GLUCOSE MONITORING CONTINUOUS     EKG Readings: (Independently Interpreted)   EKG shows normal sinus rhythm rate 95.  Normal axis, normal intervals, LVH, incomplete right bundle-branch block.  No acute ST or T-wave abnormalities.     ECG Results              EKG 12-lead (Final result)        Collection Time Result Time QRS Duration OHS QTC Calculation    04/15/25 18:52:34 04/16/25 10:07:14 92 429                     Final result by Interface, Lab In [facility] (04/16/25 10:07:21)                   Narrative:    Test Reason : R11.2,    Vent. Rate :  95 BPM     Atrial Rate :  95 BPM     P-R Int : 116 ms          QRS Dur :  92 ms      QT Int : 342 ms       P-R-T Axes :  81  77  70 degrees    QTcB Int : 429 ms    Normal sinus rhythm  Possible Left atrial enlargement  Right ventricular conduction delay  LVH ( Sokolow-Crook , Brigantine product )  Abnormal ECG  When compared with ECG of 13-Apr-2025 21:21,  T wave amplitude has decreased in Lateral leads  Confirmed by Abdelrahman Gay ([id number]) on 4/16/2025 10:07:10 AM    Referred By: AAAREFERRAL SELF           Confirmed By: Abdelrahman Gay                                  Imaging Results    None          Medications   glucose chewable tablet 16 g (has no administration in time range)   glucose chewable tablet 24 g (has no administration in time range)   dextrose 50% injection 25 g (has no administration in time range)   glucagon (human recombinant) injection 1 mg (has no administration in time range)   ondansetron injection 4 mg (4 mg Intravenous Given 4/16/25 1116)   prochlorperazine  injection Soln 5 mg (5 mg Intravenous Given 4/16/25 1430)   aluminum-magnesium hydroxide-simethicone 200-200-20 mg/5 mL suspension 30 mL (has no administration in time range)   enoxaparin injection 40 mg (has no administration in time range)   sodium chloride 0.9% flush 10 mL (has no administration in time range)   melatonin tablet 6 mg (has no administration in time range)   polyethylene glycol packet 17 g (has no administration in time range)   senna-docusate 8.6-50 mg per tablet 1 tablet (has no administration in time range)   acetaminophen tablet 650 mg (has no administration in time range)   naloxone 0.4 mg/mL injection 0.02 mg (has no administration in time range)   gabapentin capsule 200 mg (200 mg Oral Given 4/16/25 0841)   insulin aspart U-100 pen 0-10 Units (4 Units Subcutaneous Given 4/16/25 1604)   famotidine (PF) injection 20 mg (20 mg Intravenous Given 4/16/25 0841)   D5W infusion ( Intravenous Rate/Dose Change 4/16/25 1445)   dicyclomine capsule 10 mg (10 mg Oral Given 4/16/25 1249)   potassium chloride 10 mEq in 100 mL IVPB (10 mEq Intravenous New Bag 4/16/25 1547)   acetaminophen tablet 1,000 mg (has no administration in time range)   methocarbamoL tablet 500 mg (500 mg Oral Given 4/16/25 1430)   hydrALAZINE tablet 25 mg (has no administration in time range)   lactated ringers bolus 1,000 mL (0 mLs Intravenous Stopped 4/15/25 2017)   ondansetron injection 4 mg (4 mg Intravenous Given 4/15/25 1916)   insulin glargine U-100 (Lantus) pen 6 Units (6 Units Subcutaneous Given 4/15/25 2159)   SUMAtriptan succinate injection 6 mg (6 mg Subcutaneous Given 4/15/25 2353)   acetaminophen tablet 1,000 mg (1,000 mg Oral Given 4/15/25 2353)   morphine injection 2 mg (2 mg Intravenous Given 4/16/25 1003)     Medical Decision Making  39-year-old male with a history of type 1 diabetes a recent hospital admission for DKA and severe hypernatremia from which he left AMA earlier this morning presents to the emergency  department for repeat evaluation after he and persistent nausea and vomiting since leaving the hospital earlier today.  His initial vital signs are stable and he is afebrile.  On initial exam he is unwell appearing, actively retching but without vomiting.  Broad workup to evaluate for possible emergency DKA was obtained.  He is still significantly hypernatremic with a corrected sodium of 158, glucose in the high 200s, though no anion gap and bhb is only marginally elevated.  Given lactated Ringer's initially to help correct for some hypovolemia, and admitted back to step-down unit under Hospital Medicine for further treatment.    Risk  Prescription drug management.  Decision regarding hospitalization.                                      Clinical Impression:  Final diagnoses:  [R11.2] Nausea & vomiting  [E87.0] Hypernatremia          ED Disposition Condition    Admit                     [1]   Social History  Tobacco Use    Smoking status: Former    Smokeless tobacco: Never   Substance Use Topics    Alcohol use: Not Currently     Comment: socially    Drug use: No        Abdelrahman Chiu MD  04/16/25 [id number]

## 2025-04-15 NOTE — CONSULTS
[facility] VASCULAR ACCESS NOTE       Bed:8070/8070 A    NIAS consulted for Midline insertion in real time using ultrasound guidance for blood draws.    Notified nurse and charge nurse that midlines are not placed for blood draws.      Patient currently has a working PIV per patient's nurse.    Re consult NIAS when patient has complications with PIV access.      Chelle Asher RN

## 2025-04-15 NOTE — PLAN OF CARE
[facility] - Telemetry Stepdown  Discharge Final Note    Primary Care Provider: No, Primary Doctor    Expected Discharge Date: 4/15/2025    Final Discharge Note (most recent)       Final Note - 04/15/25 1255          Final Note    Assessment Type Final Discharge Note     Anticipated Discharge Disposition Left Against Medical Advice                     Important Message from Medicare           Pt left AMA.    Ang Coronado LMSW    [facility]  [phone]

## 2025-04-16 PROBLEM — R82.81 PYURIA: Status: ACTIVE | Noted: 2025-04-16

## 2025-04-16 PROBLEM — F12.90 MARIJUANA USE: Status: ACTIVE | Noted: 2025-04-16

## 2025-04-16 PROBLEM — E87.6 HYPOKALEMIA: Status: ACTIVE | Noted: 2025-04-16

## 2025-04-16 LAB
ABSOLUTE EOSINOPHIL (OHS): 0.01 K/UL
ABSOLUTE MONOCYTE (OHS): 0.55 K/UL (ref 0.3–1)
ABSOLUTE NEUTROPHIL COUNT (OHS): 3.4 K/UL (ref 1.8–7.7)
ALBUMIN SERPL BCP-MCNC: 3 G/DL (ref 3.5–5.2)
ANION GAP (OHS): 11 MMOL/L (ref 8–16)
ANION GAP (OHS): 11 MMOL/L (ref 8–16)
ANION GAP (OHS): 9 MMOL/L (ref 8–16)
BASOPHILS # BLD AUTO: 0.01 K/UL
BASOPHILS NFR BLD AUTO: 0.2 %
BUN SERPL-MCNC: 19 MG/DL (ref 6–20)
BUN SERPL-MCNC: 20 MG/DL (ref 6–20)
BUN SERPL-MCNC: 24 MG/DL (ref 6–20)
CALCIUM SERPL-MCNC: 7.2 MG/DL (ref 8.7–10.5)
CALCIUM SERPL-MCNC: 8.8 MG/DL (ref 8.7–10.5)
CALCIUM SERPL-MCNC: 9.1 MG/DL (ref 8.7–10.5)
CHLORIDE SERPL-SCNC: 111 MMOL/L (ref 95–110)
CHLORIDE SERPL-SCNC: 118 MMOL/L (ref 95–110)
CHLORIDE SERPL-SCNC: 119 MMOL/L (ref 95–110)
CO2 SERPL-SCNC: 20 MMOL/L (ref 23–29)
CO2 SERPL-SCNC: 27 MMOL/L (ref 23–29)
CO2 SERPL-SCNC: 27 MMOL/L (ref 23–29)
CREAT SERPL-MCNC: 1.1 MG/DL (ref 0.5–1.4)
CREAT SERPL-MCNC: 1.4 MG/DL (ref 0.5–1.4)
CREAT SERPL-MCNC: 1.4 MG/DL (ref 0.5–1.4)
ERYTHROCYTE [DISTWIDTH] IN BLOOD BY AUTOMATED COUNT: 12.8 % (ref 11.5–14.5)
GFR SERPLBLD CREATININE-BSD FMLA CKD-EPI: >60 ML/MIN/1.73/M2
GLUCOSE SERPL-MCNC: 163 MG/DL (ref 70–110)
GLUCOSE SERPL-MCNC: 173 MG/DL (ref 70–110)
GLUCOSE SERPL-MCNC: 209 MG/DL (ref 70–110)
GRAM STN SPEC: NORMAL
GRAM STN SPEC: NORMAL
HCT VFR BLD AUTO: 41 % (ref 40–54)
HGB BLD-MCNC: 12.9 GM/DL (ref 14–18)
IMM GRANULOCYTES # BLD AUTO: 0.02 K/UL (ref 0–0.04)
IMM GRANULOCYTES NFR BLD AUTO: 0.4 % (ref 0–0.5)
LYMPHOCYTES # BLD AUTO: 0.92 K/UL (ref 1–4.8)
MAGNESIUM SERPL-MCNC: 2.1 MG/DL (ref 1.6–2.6)
MCH RBC QN AUTO: 29.5 PG (ref 27–31)
MCHC RBC AUTO-ENTMCNC: 31.5 G/DL (ref 32–36)
MCV RBC AUTO: 94 FL (ref 82–98)
NUCLEATED RBC (/100WBC) (OHS): 0 /100 WBC
OHS QRS DURATION: 92 MS
OHS QTC CALCULATION: 429 MS
PHOSPHATE SERPL-MCNC: 3.3 MG/DL (ref 2.7–4.5)
PLATELET # BLD AUTO: 118 K/UL (ref 150–450)
PMV BLD AUTO: 12.2 FL (ref 9.2–12.9)
POCT GLUCOSE: 101 MG/DL (ref 70–110)
POCT GLUCOSE: 119 MG/DL (ref 70–110)
POCT GLUCOSE: 122 MG/DL (ref 70–110)
POCT GLUCOSE: 146 MG/DL (ref 70–110)
POCT GLUCOSE: 208 MG/DL (ref 70–110)
POCT GLUCOSE: 255 MG/DL (ref 70–110)
POCT GLUCOSE: 96 MG/DL (ref 70–110)
POTASSIUM SERPL-SCNC: 2.9 MMOL/L (ref 3.5–5.1)
POTASSIUM SERPL-SCNC: 3.7 MMOL/L (ref 3.5–5.1)
POTASSIUM SERPL-SCNC: 4.4 MMOL/L (ref 3.5–5.1)
RBC # BLD AUTO: 4.38 M/UL (ref 4.6–6.2)
RELATIVE EOSINOPHIL (OHS): 0.2 %
RELATIVE LYMPHOCYTE (OHS): 18.7 % (ref 18–48)
RELATIVE MONOCYTE (OHS): 11.2 % (ref 4–15)
RELATIVE NEUTROPHIL (OHS): 69.3 % (ref 38–73)
SODIUM SERPL-SCNC: 149 MMOL/L (ref 136–145)
SODIUM SERPL-SCNC: 149 MMOL/L (ref 136–145)
SODIUM SERPL-SCNC: 155 MMOL/L (ref 136–145)
WBC # BLD AUTO: 4.91 K/UL (ref 3.9–12.7)

## 2025-04-16 PROCEDURE — 25000003 PHARM REV CODE 250: Performed by: NURSE PRACTITIONER

## 2025-04-16 PROCEDURE — 25000003 PHARM REV CODE 250: Performed by: STUDENT IN AN ORGANIZED HEALTH CARE EDUCATION/TRAINING PROGRAM

## 2025-04-16 PROCEDURE — 20600001 HC STEP DOWN PRIVATE ROOM

## 2025-04-16 PROCEDURE — 80069 RENAL FUNCTION PANEL: CPT | Performed by: HOSPITALIST

## 2025-04-16 PROCEDURE — 25000003 PHARM REV CODE 250: Performed by: HOSPITALIST

## 2025-04-16 PROCEDURE — 99222 1ST HOSP IP/OBS MODERATE 55: CPT | Mod: ,,, | Performed by: NURSE PRACTITIONER

## 2025-04-16 PROCEDURE — 85025 COMPLETE CBC W/AUTO DIFF WBC: CPT | Performed by: HOSPITALIST

## 2025-04-16 PROCEDURE — 63600175 PHARM REV CODE 636 W HCPCS: Performed by: STUDENT IN AN ORGANIZED HEALTH CARE EDUCATION/TRAINING PROGRAM

## 2025-04-16 PROCEDURE — 87205 SMEAR GRAM STAIN: CPT | Performed by: HOSPITALIST

## 2025-04-16 PROCEDURE — 63600175 PHARM REV CODE 636 W HCPCS: Performed by: HOSPITALIST

## 2025-04-16 PROCEDURE — 83735 ASSAY OF MAGNESIUM: CPT | Performed by: HOSPITALIST

## 2025-04-16 PROCEDURE — 80048 BASIC METABOLIC PNL TOTAL CA: CPT | Performed by: HOSPITALIST

## 2025-04-16 PROCEDURE — 63600175 PHARM REV CODE 636 W HCPCS: Performed by: NURSE PRACTITIONER

## 2025-04-16 PROCEDURE — 36415 COLL VENOUS BLD VENIPUNCTURE: CPT | Performed by: HOSPITALIST

## 2025-04-16 RX ORDER — CAPSAICIN 0.03 G/100G
CREAM TOPICAL 2 TIMES DAILY
Status: DISCONTINUED | OUTPATIENT
Start: 2025-04-16 | End: 2025-04-18 | Stop reason: HOSPADM

## 2025-04-16 RX ORDER — FAMOTIDINE 10 MG/ML
20 INJECTION, SOLUTION INTRAVENOUS 2 TIMES DAILY
Status: DISCONTINUED | OUTPATIENT
Start: 2025-04-16 | End: 2025-04-17

## 2025-04-16 RX ORDER — MORPHINE SULFATE 2 MG/ML
2 INJECTION, SOLUTION INTRAMUSCULAR; INTRAVENOUS ONCE
Status: COMPLETED | OUTPATIENT
Start: 2025-04-16 | End: 2025-04-16

## 2025-04-16 RX ORDER — POTASSIUM CHLORIDE 7.45 MG/ML
10 INJECTION INTRAVENOUS
Status: COMPLETED | OUTPATIENT
Start: 2025-04-16 | End: 2025-04-16

## 2025-04-16 RX ORDER — DICYCLOMINE HYDROCHLORIDE 10 MG/1
10 CAPSULE ORAL 4 TIMES DAILY
Status: DISCONTINUED | OUTPATIENT
Start: 2025-04-16 | End: 2025-04-18 | Stop reason: HOSPADM

## 2025-04-16 RX ORDER — INSULIN ASPART 100 [IU]/ML
0-10 INJECTION, SOLUTION INTRAVENOUS; SUBCUTANEOUS
Status: DISCONTINUED | OUTPATIENT
Start: 2025-04-16 | End: 2025-04-16

## 2025-04-16 RX ORDER — INSULIN GLARGINE 100 [IU]/ML
22 INJECTION, SOLUTION SUBCUTANEOUS DAILY
Status: DISCONTINUED | OUTPATIENT
Start: 2025-04-16 | End: 2025-04-16

## 2025-04-16 RX ORDER — IBUPROFEN 200 MG
24 TABLET ORAL
Status: DISCONTINUED | OUTPATIENT
Start: 2025-04-16 | End: 2025-04-17

## 2025-04-16 RX ORDER — INSULIN ASPART 100 [IU]/ML
0-10 INJECTION, SOLUTION INTRAVENOUS; SUBCUTANEOUS EVERY 4 HOURS PRN
Status: DISCONTINUED | OUTPATIENT
Start: 2025-04-16 | End: 2025-04-17

## 2025-04-16 RX ORDER — HALOPERIDOL LACTATE 5 MG/ML
5 INJECTION, SOLUTION INTRAMUSCULAR ONCE
Status: COMPLETED | OUTPATIENT
Start: 2025-04-16 | End: 2025-04-16

## 2025-04-16 RX ORDER — DEXTROSE MONOHYDRATE 50 MG/ML
INJECTION, SOLUTION INTRAVENOUS CONTINUOUS
Status: DISCONTINUED | OUTPATIENT
Start: 2025-04-16 | End: 2025-04-16

## 2025-04-16 RX ORDER — METHOCARBAMOL 500 MG/1
500 TABLET, FILM COATED ORAL 4 TIMES DAILY
Status: DISCONTINUED | OUTPATIENT
Start: 2025-04-16 | End: 2025-04-18 | Stop reason: HOSPADM

## 2025-04-16 RX ORDER — ACETAMINOPHEN 500 MG
1000 TABLET ORAL EVERY 8 HOURS
Status: DISCONTINUED | OUTPATIENT
Start: 2025-04-16 | End: 2025-04-17

## 2025-04-16 RX ORDER — GLUCAGON 1 MG
1 KIT INJECTION
Status: DISCONTINUED | OUTPATIENT
Start: 2025-04-16 | End: 2025-04-17

## 2025-04-16 RX ORDER — HYDRALAZINE HYDROCHLORIDE 25 MG/1
25 TABLET, FILM COATED ORAL ONCE
Status: DISCONTINUED | OUTPATIENT
Start: 2025-04-16 | End: 2025-04-16

## 2025-04-16 RX ORDER — IBUPROFEN 200 MG
16 TABLET ORAL
Status: DISCONTINUED | OUTPATIENT
Start: 2025-04-16 | End: 2025-04-17

## 2025-04-16 RX ADMIN — ONDANSETRON 4 MG: 2 INJECTION INTRAMUSCULAR; INTRAVENOUS at 11:04

## 2025-04-16 RX ADMIN — POTASSIUM CHLORIDE 10 MEQ: 7.46 INJECTION, SOLUTION INTRAVENOUS at 04:04

## 2025-04-16 RX ADMIN — DICYCLOMINE HYDROCHLORIDE 10 MG: 10 CAPSULE ORAL at 10:04

## 2025-04-16 RX ADMIN — POTASSIUM CHLORIDE 10 MEQ: 7.46 INJECTION, SOLUTION INTRAVENOUS at 02:04

## 2025-04-16 RX ADMIN — DICYCLOMINE HYDROCHLORIDE 10 MG: 10 CAPSULE ORAL at 05:04

## 2025-04-16 RX ADMIN — INSULIN ASPART 4 UNITS: 100 INJECTION, SOLUTION INTRAVENOUS; SUBCUTANEOUS at 04:04

## 2025-04-16 RX ADMIN — DEXTROSE MONOHYDRATE: 50 INJECTION, SOLUTION INTRAVENOUS at 08:04

## 2025-04-16 RX ADMIN — INSULIN ASPART 6 UNITS: 100 INJECTION, SOLUTION INTRAVENOUS; SUBCUTANEOUS at 04:04

## 2025-04-16 RX ADMIN — PROCHLORPERAZINE EDISYLATE 5 MG: 5 INJECTION INTRAMUSCULAR; INTRAVENOUS at 02:04

## 2025-04-16 RX ADMIN — GABAPENTIN 200 MG: 100 CAPSULE ORAL at 08:04

## 2025-04-16 RX ADMIN — SODIUM CHLORIDE: 450 INJECTION, SOLUTION INTRAVENOUS at 08:04

## 2025-04-16 RX ADMIN — DOCUSATE SODIUM 50MG AND SENNOSIDES 8.6MG 1 TABLET: 8.6; 5 TABLET, FILM COATED ORAL at 06:04

## 2025-04-16 RX ADMIN — CAPSAICIN: 0.25 CREAM TOPICAL at 10:04

## 2025-04-16 RX ADMIN — HALOPERIDOL LACTATE 5 MG: 5 INJECTION, SOLUTION INTRAMUSCULAR at 05:04

## 2025-04-16 RX ADMIN — METHOCARBAMOL 500 MG: 500 TABLET ORAL at 02:04

## 2025-04-16 RX ADMIN — INSULIN HUMAN 1 UNITS/HR: 1 INJECTION, SOLUTION INTRAVENOUS at 09:04

## 2025-04-16 RX ADMIN — POLYETHYLENE GLYCOL 3350 17 G: 17 POWDER, FOR SOLUTION ORAL at 06:04

## 2025-04-16 RX ADMIN — MORPHINE SULFATE 2 MG: 2 INJECTION, SOLUTION INTRAMUSCULAR; INTRAVENOUS at 10:04

## 2025-04-16 RX ADMIN — DICYCLOMINE HYDROCHLORIDE 10 MG: 10 CAPSULE ORAL at 12:04

## 2025-04-16 RX ADMIN — DICYCLOMINE HYDROCHLORIDE 10 MG: 10 CAPSULE ORAL at 11:04

## 2025-04-16 RX ADMIN — GABAPENTIN 200 MG: 100 CAPSULE ORAL at 10:04

## 2025-04-16 RX ADMIN — METHOCARBAMOL 500 MG: 500 TABLET ORAL at 10:04

## 2025-04-16 RX ADMIN — POTASSIUM CHLORIDE 10 MEQ: 7.46 INJECTION, SOLUTION INTRAVENOUS at 03:04

## 2025-04-16 RX ADMIN — FAMOTIDINE 20 MG: 10 INJECTION, SOLUTION INTRAVENOUS at 08:04

## 2025-04-16 RX ADMIN — FAMOTIDINE 20 MG: 10 INJECTION, SOLUTION INTRAVENOUS at 10:04

## 2025-04-16 RX ADMIN — ACETAMINOPHEN 1000 MG: 500 TABLET ORAL at 10:04

## 2025-04-16 NOTE — H&P
[facility] - Emergency Dept  [facility] Medicine  History & Physical    Patient Name: Andrzej Sinclair  MRN: [id number]  Patient Class: IP- Inpatient  Admission Date: 4/15/2025  Attending Physician: Abigail Nash MD [facility]  Admitting Physician: Lionel Mann MD  Primary Care Provider: [facility] Primary Doctor    Patient information was obtained from patient, past medical records, and ER records.     Subjective:     Principal Problem:Cyclical vomiting syndrome    Chief Complaint:   Chief Complaint   Patient presents with    Vomiting     Dc this morning for same issue after 5 day admission.  Endorses nausea and vomiting refused treatment with EMS        HPI: 39-year-old man with type 1 diabetes, recent hospital admission for DKA and BETH who left the admission AMA this morning returns to the emergency department with complaints of ongoing nausea and vomiting and inability to tolerate liquids or regular diet.  He reports leaving the hospital around 10:00 a.m. yesterday and after going home he thought he felt better and that he could continue to hydrate on his own in order to resolve his dehydration and hypernatremia he reports with his 1st sips of water his nausea & epigastric pain returned.     He has no hematemesis, has not taken any insulin since earlier today.  On review of chart he has cyclic vomiting syndrome and cannabinoid hyperemesis syndrome listed as past problems.  Add-On urine toxicology today shows THC present, patient reports smoking marijuana in the interim.  I discussed with patient this either could be a cause or exacerbating factor of his symptoms.    In the ED w/u he was found with alkalotic pH on VBG, no acute acidosis and mild elevation of serum ketones.  He is referred for re-admission for management of cyclic vomiting & dehydration with hypernatremia.     ED Medications: zofran 4mg IV x 1,  1L LR iv x 1,       Past Medical History:   Diagnosis Date    Diabetes mellitus     Diabetes mellitus type  1     Diagnosed at age 20       Past Surgical History:   Procedure Laterality Date    ESOPHAGOGASTRODUODENOSCOPY N/A 11/5/2024    Procedure: EGD (ESOPHAGOGASTRODUODENOSCOPY);  Surgeon: Manny Troy MD;  Location: [address];  Service: Endoscopy;  Laterality: N/A;       Review of patient's allergies indicates:   Allergen Reactions    Lactose Other (See Comments)     Gas and moderate to sever abdominal pain       Current Facility-Administered Medications on File Prior to Encounter   Medication    [DISCONTINUED] D5W infusion    [DISCONTINUED] dextrose 50% injection 12.5 g    [DISCONTINUED] dextrose 50% injection 12.5 g    [DISCONTINUED] dextrose 50% injection 25 g    [DISCONTINUED] dextrose 50% injection 25 g    [DISCONTINUED] enoxaparin injection 40 mg    [DISCONTINUED] glucagon (human recombinant) injection 1 mg    [DISCONTINUED] glucagon (human recombinant) injection 1 mg    [DISCONTINUED] glucose chewable tablet 16 g    [DISCONTINUED] glucose chewable tablet 16 g    [DISCONTINUED] glucose chewable tablet 24 g    [DISCONTINUED] glucose chewable tablet 24 g    [DISCONTINUED] insulin aspart U-100 pen 0-10 Units    [DISCONTINUED] insulin aspart U-100 pen 0-5 Units    [DISCONTINUED] insulin aspart U-100 pen 6 Units    [DISCONTINUED] insulin aspart U-100 pen 8 Units    [DISCONTINUED] insulin aspart U-100 pen 8 Units    [DISCONTINUED] insulin glargine U-100 (Lantus) pen 20 Units    [DISCONTINUED] insulin regular in 0.9 % NaCl 100 unit/100 mL (1 unit/mL) infusion    [DISCONTINUED] mupirocin 2 % ointment    [DISCONTINUED] ondansetron injection 4 mg    [DISCONTINUED] promethazine tablet 25 mg    [DISCONTINUED] senna-docusate 8.6-50 mg per tablet 1 tablet    [DISCONTINUED] sodium chloride 0.9% flush 10 mL     Current Outpatient Medications on File Prior to Encounter   Medication Sig    aluminum-magnesium hydroxide-simethicone (MAALOX) 200-200-20 mg/5 mL Susp Take 30 mLs by mouth 4 (four) times daily as needed  "(heartburn).    blood sugar diagnostic Strp 3-4 times a day    blood-glucose meter kit Use as instructed    blood-glucose sensor (DEXCOM G7 SENSOR) Martha Apply one sensor to the skin every 10 days    glucagon (GLUCAGON EMERGENCY KIT, HUMAN,) 1 mg SolR Inject 1 mg into the muscle as needed (Hypoglycemia).    insulin degludec (TRESIBA FLEXTOUCH U-100) 100 unit/mL (3 mL) insulin pen Inject 22 Units into the skin once daily.    insulin lispro-aabc (LYUMJEV KWIKPEN U-100 INSULIN) 100 unit/mL pen Inject 8 Units into the skin 3 (three) times daily with meals. Take additional sliding scale based on the blood sugars.  Maximum daily dose of 36 units.    insulin syringe-needle U-100 (TRUEPLUS INSULIN) 0.5 mL 30 gauge x 5/16" Syrg USE FOUR TIMES DAILY AS DIRECTED    lancets 31 gauge Misc 1 lancet  by Misc.(Non-Drug; Combo Route) route 2 (two) times daily.    lisinopriL 10 MG tablet Take 1 tablet (10 mg total) by mouth once daily.    ondansetron (ZOFRAN-ODT) 4 MG TbDL Take 1 tablet (4 mg total) by mouth every 6 (six) hours as needed.    pantoprazole (PROTONIX) 40 MG tablet Take 1 tablet (40 mg total) by mouth once daily.    pen needle, diabetic 29 gauge x 1/2" Ndle Use to inject insulin into the skin.     Family History       Problem Relation (Age of Onset)    Diabetes Mother    Other Mother, Father          Tobacco Use    Smoking status: Former    Smokeless tobacco: Never   Substance and Sexual Activity    Alcohol use: Not Currently     Comment: socially    Drug use: No    Sexual activity: Yes     Partners: Female     Review of Systems   Constitutional:  Positive for fatigue.   Musculoskeletal:  Positive for back pain (low).     Objective:     Vital Signs (Most Recent):  Temp: 99.7 °F (37.6 °C) (04/15/25 2330)  Pulse: 89 (04/15/25 2330)  Resp: 18 (04/15/25 1748)  BP: 135/89 (04/15/25 2330)  SpO2: 98 % (04/15/25 2330) Vital Signs (24h Range):  Temp:  [97.7 °F (36.5 °C)-99.9 °F (37.7 °C)] 99.7 °F (37.6 °C)  Pulse:  [] " 89  Resp:  [16-18] 18  SpO2:  [93 %-100 %] 98 %  BP: (130-154)/(74-93) 135/89     Weight: 88 kg (194 lb 0.2 oz)  Body mass index is 24.91 kg/m².     Physical Exam  Vitals and nursing note reviewed.   Constitutional:       General: He is not in acute distress.  HENT:      Mouth/Throat:      Mouth: Mucous membranes are moist.      Pharynx: Oropharynx is clear. No posterior oropharyngeal erythema.   Eyes:      General: No scleral icterus.     Pupils: Pupils are equal, round, and reactive to light.   Cardiovascular:      Rate and Rhythm: Normal rate and regular rhythm.      Pulses: Normal pulses.   Pulmonary:      Effort: Pulmonary effort is normal. No respiratory distress.      Breath sounds: No wheezing.   Abdominal:      General: Abdomen is flat. There is no distension.      Palpations: Abdomen is soft.      Tenderness: There is abdominal tenderness in the epigastric area. There is no guarding or rebound.   Skin:     General: Skin is warm and dry.      Comments: Multiple tattoos   Neurological:      General: No focal deficit present.      Mental Status: He is alert.              CRANIAL NERVES     CN III, IV, VI   Pupils are equal, round, and reactive to light.       Significant Labs: All pertinent labs within the past 24 hours have been reviewed.  CBC:   Recent Labs   Lab 04/14/25  0339 04/14/25  0455 04/15/25  0500 04/15/25  1836 04/15/25  1842   WBC 4.70  --  5.75 6.39  --    HGB 14.6  --  13.9* 14.6  --    HCT 43.5   < > 43.5 44.7 44   *  --  125* 150  --     < > = values in this interval not displayed.     CMP:   Recent Labs   Lab 04/14/25  0339 04/14/25  0852 04/15/25  0500 04/15/25  0936 04/15/25  1836   *   < > 153* 152* 155*   K 4.2   < > 4.3 4.9 4.1   *   < > 118* 116* 116*   CO2 30*   < > 26 25 30*   BUN 27*   < > 27* 27* 24*   CREATININE 1.5*   < > 1.5* 1.5* 1.4   CALCIUM 9.7   < > 9.0 9.2 9.5   ALBUMIN 3.5  --  3.0*  --  3.3*   BILITOT 0.8  --  0.8  --  0.8   ALKPHOS 93  --  75  --  80  "  AST 42  --  19  --  20   ALT 37  --  26  --  28   ANIONGAP 9   < > 9 11 9    < > = values in this interval not displayed.     Cardiac Markers: No results for input(s): "CKMB", "MYOGLOBIN", "BNP", "TROPISTAT" in the last 48 hours.  Urine Studies:   Recent Labs   Lab 04/15/25  2002   COLORU Yellow   APPEARANCEUA Clear   PHUR 6.0   SPECGRAV 1.020   PROTEINUA 1+*   GLUCUA 2+*   BILIRUBINUA Negative   OCCULTUA 1+*   NITRITE Negative   UROBILINOGEN Negative   LEUKOCYTESUR 1+*   RBCUA 5*   WBCUA 21*   BACTERIA Many*   HYALINECASTS 5*       Significant Imaging: I have reviewed all pertinent imaging results/findings within the past 24 hours.      Assessment/Plan:     Assessment & Plan  Cyclical vomiting syndrome  Marijuana use  -admit to [facility]  -bariatric clear diet  -PRN zofran and compazine IV   -Given past history of similar admissions, give 1 dose of Sumatriptan SQ 6mg as abortive agent for cyclic vomiting syndrome, (may give 2 total doses in 24hrs,  max 6 doses/week)    -Utox is positive for THC, patient admits to smoking marijuana, he reported he had stopped or decreased use after being told it was contributing to vomiting symptoms but thought he could try small amount w/o incurring harm.  I have advised full cessation to avoid similar side effects.   - If above regimen is not working - consider use of Topical Capsaicin thin layer to abdomen BID if patient can tolerate or while inpatient only use of Clonazepam ODT scheduled.          Hypernatremia  Hypernatremia is likely due to Dehydration. The patient's most recent sodium results are listed below.  Recent Labs     04/15/25  0500 04/15/25  0936 04/15/25  1836   * 152* 155*     Plan  - Aim to correct the sodium by 8-10mEq in 24 hours.   - Plan to correct their hypernatremia with Select IV fluids: Half-Normal saline   at a rate of 130 ml/hr. By calculation 0.3-0.4 meq/hr correction.   - Will plan to trend the patient's sodium: Every 6 hours  - The " patient's hypernatremia is stable        Polyneuropathy due to type 1 diabetes mellitus  Patient reporting on exam that when checking for peripheral edema in legs he has numbness sensation in he right ankle/foot.     Type 1 diabetes mellitus with hyperglycemia  Patient's FSGs are uncontrolled due to hyperglycemia on current medication regimen.  Last A1c reviewed-   Lab Results   Component Value Date    HGBA1C 13.8 (H) 04/10/2025     Most recent fingerstick glucose reviewed-   Recent Labs   Lab 04/15/25  0643 04/15/25  0808 04/15/25  2158   POCTGLUCOSE 384* 368* 290*     Current correctional scale  Medium  Maintain anti-hyperglycemic dose as follows-   Antihyperglycemics (From admission, onward)      Start     Stop Route Frequency Ordered    04/16/25 0900  insulin glargine U-100 (Lantus) pen 22 Units         -- SubQ Daily 04/16/25 0038    04/16/25 0128  insulin aspart U-100 pen 0-10 Units         -- SubQ Before meals & nightly PRN 04/16/25 0028          Patient self reported his A1c as 9% but latest value is 13, while DKA is resolved his current insulin regimen will need adjustment while nausea/vomiting symptoms improve.  Re-consult endocrinology.     If patient develops refractory hyperglycemia - he may require repeat initiation of insulin infuison  Primary hypertension  Lisinopril 10mg is on home medication list, patient reports he read about side effects that are higher risk for  americans, and reports taking the medication only twice a week.   I discussed with patient, the benefits are likely not to be experienced at this self-dosing.   Will hold the lisinopril for now - and would requesting oncoming team consider alternate therapy or patient can discuss with primary prescriber re: other drug class.     Of note patient does have 1+ protein on UA sample today, an ARB is likely next ideal drug class.        Low back pain  Reports recent use of gabapentin in hospital, reviewed last hospital encounter and did  not see any.  With his report of numbness in right foot and also now A1c of 13, will start 200mg BID and titrate frequency/dose as patient tolerates.     Pyuria  -new finding opposed to last admission.  He denies any fevers, does report cold intolerance, no dysuria complaints.  There is yeast on UA.  Patient does not have a camacho catheter.  At this time will not start any empiric therapy for acute cystitis.   Order Add-On Gram Stain for urine and f/u results.       VTE Risk Mitigation (From admission, onward)           Ordered     enoxaparin injection 40 mg  Every 24 hours         04/15/25 2230     IP VTE LOW RISK PATIENT  Once         04/16/25 0045     Place sequential compression device  Until discontinued         04/16/25 0045                          Lionel Mann MD  Department of Hospital Medicine  [facility] - Emergency Dept

## 2025-04-16 NOTE — HPI
"Reason for Consult: Management of T1DM, Hyperglycemia      Diabetes diagnosis year: 20     Home Diabetes Medications: Lyumjev 7 units + LDS SSI Tresiba 7 units h.s. --> stated he self-decreased basal dose to 7 units because his BG was "under control"        Sees CARLOS ENRIQUE Chavezobel- PATIENT IS CURRENTLY NOT USING OMNIPOD DUE TO BEING UNTRAINED ON THE DEVICE  Omnipod 5  Isf 50   Insulin to carb ratio 1:15  Basal 0.72     How often checking glucose at home? Dexcom   BG readings on regimen: 250's  Hypoglycemia on the regimen?  No  Missed doses on regimen?  No     Diabetes Complications include:     Hyperglycemia, Hypoglycemia , and Diabetic cataract , Frequent DKA     Complicating diabetes co morbidities:   CKD        HPI: 39-year-old man with type 1 diabetes, recent hospital admission for DKA and BETH who left the admission AMA on 04/16/2025 returns to the emergency department with complaints of ongoing nausea and vomiting and inability to tolerate liquids or regular diet. Endocrine consulted to manage hyperglycemia and type 1 diabetes.      Lab Results   Component Value Date    HGBA1C 13.8 (H) 04/10/2025       "

## 2025-04-16 NOTE — ASSESSMENT & PLAN NOTE
-continue bariatric clear diet  -PRN zofran and compazine IV   -Given past history of similar admissions, give 1 dose of Sumatriptan SQ 6mg as abortive agent for cyclic vomiting syndrome, (may give 2 total doses in 24hrs,  max 6 doses/week)    -Utox is positive for THC, patient admits to smoking marijuana, he reported he had stopped or decreased use after being told it was contributing to vomiting symptoms but thought he could try small amount w/o incurring harm. He was advised full cessation to avoid similar side effects.   - If above regimen is not working - consider use of Topical Capsaicin thin layer to abdomen BID if patient can tolerate or while inpatient only use of Clonazepam ODT scheduled.

## 2025-04-16 NOTE — ASSESSMENT & PLAN NOTE
Hypernatremia is likely due to Dehydration. The patient's most recent sodium results are listed below.  Recent Labs     04/15/25  0500 04/15/25  0936 04/15/25  1836   * 152* 155*     Plan  - Aim to correct the sodium by 8-10mEq in 24 hours.   - Plan to correct their hypernatremia with Select IV fluids: Half-Normal saline  at a rate of 130 ml/hr. By calculation 0.3-0.4 meq/hr correction.   - Will plan to trend the patient's sodium: Every 6 hours  - The patient's hypernatremia is stable

## 2025-04-16 NOTE — ASSESSMENT & PLAN NOTE
Reports recent use of gabapentin in hospital, reviewed last hospital encounter and did not see any.  With his report of numbness in right foot and also now A1c of 13, will start 200mg BID and titrate frequency/dose as patient tolerates.

## 2025-04-16 NOTE — ASSESSMENT & PLAN NOTE
-new finding opposed to last admission.  He denies any fevers, does report cold intolerance, no dysuria complaints.  There is yeast on UA.  Patient does not have a camacho catheter.  At this time will not start any empiric therapy for acute cystitis.   -  Order Add-On Gram Stain for urine with moderate Gram-positive cocci, no WBCs

## 2025-04-16 NOTE — SUBJECTIVE & OBJECTIVE
Interval HPI:   Overnight events: No acute events overnight. Patient in room OBS10/EDOU10. Blood glucose stable. BG at goal on current insulin regimen (SSI, prandial, and basal insulin ). Steroid use- None.   Of note, patient is on D5W to treat hypernatremia.   Renal function- Normal   Vasopressors-  None     Of note, the patient is in the fetal position on the ground during the exam. Requesting additional pain medications including a B52 (Benadryl, Haldol, and Ativan)   Endocrine will continue to follow and manage insulin orders inpatient.       Diet Bariatric Clear Liquid Thin; Standard Tray     Eating:   <25%  Nausea: Yes  Hypoglycemia and intervention: No  Fever: No  TPN and/or TF: No    PMH, PSH, FH, SH updated and reviewed     ROS:  Review of Systems   Constitutional:  Negative for unexpected weight change.   Eyes:  Negative for visual disturbance.   Respiratory:  Negative for cough.    Cardiovascular:  Negative for chest pain.   Gastrointestinal:  Positive for abdominal pain and nausea. Negative for vomiting.   Endocrine: Negative for polydipsia and polyuria.   Musculoskeletal:  Positive for back pain.   Skin:  Negative for rash.   Neurological:  Negative for syncope.   Psychiatric/Behavioral:  Positive for agitation. Negative for dysphoric mood.        Current Medications and/or Treatments Impacting Glycemic Control  Immunotherapy:    Immunosuppressants       None          Steroids:   Hormones (From admission, onward)      Start     Stop Route Frequency Ordered    04/15/25 2331  melatonin tablet 6 mg         -- Oral Nightly PRN 04/15/25 2235          Pressors:    Autonomic Drugs (From admission, onward)      None          Hyperglycemia/Diabetes Medications:   Antihyperglycemics (From admission, onward)      Start     Stop Route Frequency Ordered    04/16/25 0128  insulin aspart U-100 pen 0-10 Units         -- SubQ Before meals & nightly PRN 04/16/25 0028             PHYSICAL EXAMINATION:  Vitals:    04/16/25  1005   BP: (!) 161/87   Pulse: 70   Resp: 20   Temp:      Body mass index is 24.91 kg/m².     Physical Exam  Constitutional:       Appearance: He is well-developed.   HENT:      Head: Normocephalic.   Eyes:      Conjunctiva/sclera: Conjunctivae normal.   Pulmonary:      Effort: Pulmonary effort is normal.   Musculoskeletal:         General: Normal range of motion.   Skin:     General: Skin is warm.      Findings: No rash.   Neurological:      Mental Status: He is alert and oriented to person, place, and time.

## 2025-04-16 NOTE — ASSESSMENT & PLAN NOTE
BG goal: 140-180  *Will consider starting a transition drip, if BG excursion noted while on the D5W drip. As of now the patient's BG remains WNL.   - Lantus 20 units QD  - Novolog 6 units TIDWM  - LDC SSI (150/50)   - POCT Glucose before meals, at bedtime and at 2 am  - Hypoglycemia protocol in place      ** Please notify Endocrine for any change and/or advance in diet**  ** Please call Endocrine for any BG related issues **     Discharge Planning:   TBD. Please notify endocrinology prior to discharge.

## 2025-04-16 NOTE — ED NOTES
Telemetry Verification   Patient placed on Telemetry Box  Verified with War Room  Box # [id number]   Monitor Tech War room   Rate 81   Rhythm Normal sinus

## 2025-04-16 NOTE — ASSESSMENT & PLAN NOTE
-new finding opposed to last admission.  He denies any fevers, does report cold intolerance, no dysuria complaints.  There is yeast on UA.  Patient does not have a camacho catheter.  At this time will not start any empiric therapy for acute cystitis.   Order Add-On Gram Stain for urine and f/u results.

## 2025-04-16 NOTE — CONSULTS
"[facility]  Endocrinology  Diabetes Consult Note    Consult Requested by: Abigail Nash MD   Reason for admit: Cyclical vomiting syndrome    HISTORY OF PRESENT ILLNESS:  Reason for Consult: Management of T1DM, Hyperglycemia      Diabetes diagnosis year: 20     Home Diabetes Medications: Lyumjev 7 units + LDS SSI Tresiba 7 units h.s. --> stated he self-decreased basal dose to 7 units because his BG was "under control"        Sees NP Scobel- PATIENT IS CURRENTLY NOT USING OMNIPOD DUE TO BEING UNTRAINED ON THE DEVICE  Omnipod 5  Isf 50   Insulin to carb ratio 1:15  Basal 0.72     How often checking glucose at home? Dexcom   BG readings on regimen: 250's  Hypoglycemia on the regimen?  No  Missed doses on regimen?  No     Diabetes Complications include:     Hyperglycemia, Hypoglycemia , and Diabetic cataract , Frequent DKA     Complicating diabetes co morbidities:   CKD        HPI: 39-year-old man with type 1 diabetes, recent hospital admission for DKA and BETH who left the admission AMA on 04/16/2025 returns to the emergency department with complaints of ongoing nausea and vomiting and inability to tolerate liquids or regular diet. Endocrine consulted to manage hyperglycemia and type 1 diabetes.      Lab Results   Component Value Date    HGBA1C 13.8 (H) 04/10/2025         Interval HPI:   Overnight events: No acute events overnight. Patient in room OBS10/EDOU10. Blood glucose stable. BG at goal on current insulin regimen (SSI, prandial, and basal insulin ). Steroid use- None.   Of note, patient is on D5W to treat hypernatremia.   Renal function- Normal   Vasopressors-  None     Of note, the patient is in the fetal position on the ground during the exam. Requesting additional pain medications including a B52 (Benadryl, Haldol, and Ativan)   Endocrine will continue to follow and manage insulin orders inpatient.       Diet Bariatric Clear Liquid Thin; Standard Tray     Eating:   <25%  Nausea: " Yes  Hypoglycemia and intervention: No  Fever: No  TPN and/or TF: No    PMH, PSH, FH, SH updated and reviewed     ROS:  Review of Systems   Constitutional:  Negative for unexpected weight change.   Eyes:  Negative for visual disturbance.   Respiratory:  Negative for cough.    Cardiovascular:  Negative for chest pain.   Gastrointestinal:  Positive for abdominal pain and nausea. Negative for vomiting.   Endocrine: Negative for polydipsia and polyuria.   Musculoskeletal:  Positive for back pain.   Skin:  Negative for rash.   Neurological:  Negative for syncope.   Psychiatric/Behavioral:  Positive for agitation. Negative for dysphoric mood.        Current Medications and/or Treatments Impacting Glycemic Control  Immunotherapy:    Immunosuppressants       None          Steroids:   Hormones (From admission, onward)      Start     Stop Route Frequency Ordered    04/15/25 2331  melatonin tablet 6 mg         -- Oral Nightly PRN 04/15/25 2235          Pressors:    Autonomic Drugs (From admission, onward)      None          Hyperglycemia/Diabetes Medications:   Antihyperglycemics (From admission, onward)      Start     Stop Route Frequency Ordered    04/16/25 0128  insulin aspart U-100 pen 0-10 Units         -- SubQ Before meals & nightly PRN 04/16/25 0028             PHYSICAL EXAMINATION:  Vitals:    04/16/25 1005   BP: (!) 161/87   Pulse: 70   Resp: 20   Temp:      Body mass index is 24.91 kg/m².     Physical Exam  Constitutional:       Appearance: He is well-developed.   HENT:      Head: Normocephalic.   Eyes:      Conjunctiva/sclera: Conjunctivae normal.   Pulmonary:      Effort: Pulmonary effort is normal.   Musculoskeletal:         General: Normal range of motion.   Skin:     General: Skin is warm.      Findings: No rash.   Neurological:      Mental Status: He is alert and oriented to person, place, and time.            Labs Reviewed and Include   Recent Labs   Lab 04/15/25  1836 04/16/25  0352   CALCIUM 9.5 9.1  "  ALBUMIN 3.3* 3.0*   * 155*   K 4.1 3.7   CO2 30* 27   * 119*   BUN 24* 24*   CREATININE 1.4 1.4   ALKPHOS 80  --    ALT 28  --    AST 20  --    BILITOT 0.8  --      Lab Results   Component Value Date    WBC 4.91 04/16/2025    HGB 12.9 (L) 04/16/2025    HCT 41.0 04/16/2025    MCV 94 04/16/2025     (L) 04/16/2025     No results for input(s): "TSH", "FREET4" in the last 168 hours.  Lab Results   Component Value Date    HGBA1C 13.8 (H) 04/10/2025       Nutritional status:   Body mass index is 24.91 kg/m².  Lab Results   Component Value Date    ALBUMIN 3.0 (L) 04/16/2025    ALBUMIN 3.3 (L) 04/15/2025    ALBUMIN 3.0 (L) 04/15/2025     No results found for: "PREALBUMIN"    Estimated Creatinine Clearance: 83.2 mL/min (based on SCr of 1.4 mg/dL).    Accu-Checks  Recent Labs     04/14/25  0851 04/14/25  0947 04/14/25  1150 04/14/25  1728 04/14/25  2109 04/15/25  0643 04/15/25  0808 04/15/25  2158 04/16/25  0831 04/16/25  1057   POCTGLUCOSE 141* 177* 251* 295* 275* 384* 368* 290* 96 122*        ASSESSMENT and PLAN    Cardiac/Vascular  Primary hypertension  On an ACE-I per ADA guidelines.   Uncontrolled HTN can worsen insulin resistance.         Renal/  Hypernatremia  Currently utilizing D5W drip.   Will evaluate impact of therapy on glucose control and may consider starting transition drip if needed.       Endocrine  Type 1 diabetes mellitus with complications  BG goal: 140-180  *Will consider starting a transition drip, if BG excursion noted while on the D5W drip. As of now the patient's BG remains WNL.   - Lantus 20 units QD  - Novolog 6 units TIDWM  - LDC SSI (150/50)   - POCT Glucose before meals, at bedtime and at 2 am  - Hypoglycemia protocol in place      ** Please notify Endocrine for any change and/or advance in diet**  ** Please call Endocrine for any BG related issues **     Discharge Planning:   TBD. Please notify endocrinology prior to discharge.        GI  * Cyclical vomiting " syndrome  Patient states his stomach emptying test was normal and does not have gastroparesis. Labs not indicative of a DKA cause.             Plan discussed with patient, family, and RN at bedside.        Manuel Dill, JOHN, FNP  Endocrinology  [facility]

## 2025-04-16 NOTE — ASSESSMENT & PLAN NOTE
Patients blood pressure range in the last 24 hours was: BP  Min: 86/52  Max: 187/90.The patient's inpatient anti-hypertensive regimen is listed below:  Current Antihypertensives  hydrALAZINE tablet 25 mg, Once, Oral    Plan  - BP is uncontrolled, will adjust as follows:  Suspect elevation in part due to pain, will address, and attempt Ace v arb initiation in setting of diabetes after discussing with patient

## 2025-04-16 NOTE — HPI
Andrzej Sinclair is a 39 year old Black man with former smoking, diabetes mellitus type 1 diagnosed at age 20 with polyneuropathy, history of diabetic ketoacidosis (DKA) and hyperosmolar hyperglycemic state (HHS) with delirium, hypertension, hyperlipidemia, history of gastritis on 11/5/2024, history of cyclic vomiting syndrome, history of cannabinoid hyperemesis syndrome. He lives in [place], [place]. He is . 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              He was hospitalized at [facility] from 4/10/2025 to 4/15/2025 for HHS, acute kidney injury, hypernatremia, and hyperkalemia. He was stepped down from the ICU on 4/15/2025. He left against medical advice around 10:00 that day.   He returned to [facility] Emergency Department after going home and getting nausea and epigastric pain when trying to drink water.    In the emergency department, urine toxicology was positive for marijuana. He reported smoking marijuana. Cannabinoid hyperemesis syndrome was discussed with him. Venous blood gas showed alkalosis. Serum ketones were mildly elevated. He was admitted to Hospital Medicine Team [facility]

## 2025-04-16 NOTE — SUBJECTIVE & OBJECTIVE
Interval History:  Patient seen and examined at bedside.    No acute events overnight.  However, remains with nausea, abdominal pain.  Denies emesis.  Patient updated regarding care plan.  Discussed with endocrinology concurrent dextrose and insulin drip to address hypernatremia; previously no improvement with alternative fluids.      Review of Systems   Constitutional:  Positive for appetite change. Negative for chills, diaphoresis and fever.   HENT:  Negative for congestion, sore throat and trouble swallowing.    Respiratory:  Negative for cough, shortness of breath and wheezing.    Cardiovascular:  Negative for chest pain, palpitations and leg swelling.   Gastrointestinal:  Positive for abdominal pain and nausea. Negative for diarrhea.   Genitourinary:  Negative for dysuria.   Musculoskeletal:  Negative for gait problem.   Skin:  Negative for wound.   Neurological:  Negative for dizziness, light-headedness and headaches.   Psychiatric/Behavioral:  Negative for confusion and decreased concentration.        Objective:    Temp: 98 °F (36.7 °C) (04/16/25 1519)  Pulse: 67 (04/16/25 1519)  Resp: 20 (04/16/25 1519)  BP: (!) 185/79 (04/16/25 1519)  SpO2: 98 % (04/16/25 1519)    Weight: 88 kg (194 lb 0.1 oz) (04/16/25 1403)    Body mass index is 24.91 kg/m².      Intake/Output Summary (Last 24 hours) at 4/16/2025 1656  Last data filed at 4/16/2025 0832  Gross per 24 hour   Intake 1196.79 ml   Output --   Net 1196.79 ml       Physical Exam  Vitals and nursing note reviewed.   Constitutional:       Appearance: He is not diaphoretic.   HENT:      Head: Normocephalic and atraumatic.      Mouth/Throat:      Mouth: Mucous membranes are dry.   Eyes:      General: No scleral icterus.  Cardiovascular:      Rate and Rhythm: Normal rate and regular rhythm.      Pulses: Normal pulses.   Pulmonary:      Effort: Pulmonary effort is normal. No respiratory distress.      Breath sounds: No wheezing.   Abdominal:      General: Abdomen is  flat. There is no distension.      Palpations: Abdomen is soft.      Tenderness: There is abdominal tenderness in the epigastric area. There is no guarding or rebound.   Musculoskeletal:      Cervical back: Normal range of motion.      Right lower leg: No edema.      Left lower leg: No edema.   Skin:     General: Skin is warm and dry.      Comments: Multiple tattoos   Neurological:      General: No focal deficit present.      Mental Status: He is alert.   Psychiatric:         Behavior: Behavior normal.         Significant Labs: All pertinent labs within the past 24 hours have been reviewed.    Recent Results (from the past 24 hours)   Comprehensive metabolic panel    Collection Time: 04/15/25  6:36 PM   Result Value Ref Range    Sodium 155 (H) 136 - 145 mmol/L    Potassium 4.1 3.5 - 5.1 mmol/L    Chloride 116 (H) 95 - 110 mmol/L    CO2 30 (H) 23 - 29 mmol/L    Glucose 265 (H) 70 - 110 mg/dL    BUN 24 (H) 6 - 20 mg/dL    Creatinine 1.4 0.5 - 1.4 mg/dL    Calcium 9.5 8.7 - 10.5 mg/dL    Protein Total 6.7 6.0 - 8.4 gm/dL    Albumin 3.3 (L) 3.5 - 5.2 g/dL    Bilirubin Total 0.8 0.1 - 1.0 mg/dL    ALP 80 40 - 150 unit/L    AST 20 11 - 45 unit/L    ALT 28 10 - 44 unit/L    Anion Gap 9 8 - 16 mmol/L    eGFR >60 >60 mL/min/1.73/m2   Beta-Hydroxybutyrate, Serum    Collection Time: 04/15/25  6:36 PM   Result Value Ref Range    Beta-Hydroxybutyrate 0.7 (H) <=0.5 mmol/L   CBC with Differential    Collection Time: 04/15/25  6:36 PM   Result Value Ref Range    WBC 6.39 3.90 - 12.70 K/uL    RBC 4.79 4.60 - 6.20 M/uL    HGB 14.6 14.0 - 18.0 gm/dL    HCT 44.7 40.0 - 54.0 %    MCV 93 82 - 98 fL    MCH 30.5 27.0 - 31.0 pg    MCHC 32.7 32.0 - 36.0 g/dL    RDW 12.8 11.5 - 14.5 %    Platelet Count 150 150 - 450 K/uL    MPV 11.7 9.2 - 12.9 fL    Nucleated RBC 0 <=0 /100 WBC    Neut % 81.7 (H) 38 - 73 %    Lymph % 9.1 (L) 18 - 48 %    Mono % 8.9 4 - 15 %    Eos % 0.0 <=8 %    Basophil % 0.0 <=1.9 %    Imm Grans % 0.3 0.0 - 0.5 %    Neut #  5.22 1.8 - 7.7 K/uL    Lymph # 0.58 (L) 1 - 4.8 K/uL    Mono # 0.57 0.3 - 1 K/uL    Eos # 0.00 <=0.5 K/uL    Baso # 0.00 <=0.2 K/uL    Imm Grans # 0.02 0.00 - 0.04 K/uL   Lipase    Collection Time: 04/15/25  6:36 PM   Result Value Ref Range    Lipase Level 6 4 - 60 U/L   ISTAT PROCEDURE    Collection Time: 04/15/25  6:42 PM   Result Value Ref Range    POC Glucose 283 (H) 70 - 110 mg/dL    POC BUN 25 6 - 30 mg/dL    POC Creatinine 1.6 (H) 0.5 - 1.4 mg/dL    POC Sodium 154 (H) 136 - 145 mmol/L    POC Potassium 4.2 3.5 - 5.1 mmol/L    POC Chloride 116 (H) 95 - 110 mmol/L    POC TCO2 (MEASURED) 28 23 - 29 mmol/L    POC Ionized Calcium 1.19 1.06 - 1.42 mmol/L    POC Hematocrit 44 36 - 54 %PCV    Sample AMALIA    ISTAT PROCEDURE    Collection Time: 04/15/25  6:44 PM   Result Value Ref Range    POC PH 7.467 (H) 7.35 - 7.45    POC PCO2 43.7 35 - 45 mmHg    POC PO2 38 (L) 40 - 60 mmHg    POC HCO3 31.6 (H) 24 - 28 mmol/L    POC BE 8 (H) -2 to 2 mmol/L    POC SATURATED O2 74 95 - 100 %    POC TCO2 33 (H) 24 - 29 mmol/L    Sample VENOUS     Site Other     Allens Test N/A    EKG 12-lead    Collection Time: 04/15/25  6:52 PM   Result Value Ref Range    QRS Duration 92 ms    OHS QTC Calculation 429 ms   Urinalysis, Reflex to Urine Culture Urine, Clean Catch    Collection Time: 04/15/25  8:02 PM    Specimen: Urine, Clean Catch   Result Value Ref Range    Color, UA Yellow Straw, Aleyda, Yellow, Light-Orange    Appearance, UA Clear Clear    pH, UA 6.0 5.0 - 8.0    Spec Grav UA 1.020 1.005 - 1.030    Protein, UA 1+ (A) Negative    Glucose, UA 2+ (A) Negative    Ketones, UA Trace (A) Negative    Bilirubin, UA Negative Negative    Blood, UA 1+ (A) Negative    Nitrites, UA Negative Negative    Urobilinogen, UA Negative <2.0 EU/dL    Leukocyte Esterase, UA 1+ (A) Negative   GREY TOP URINE HOLD    Collection Time: 04/15/25  8:02 PM   Result Value Ref Range    Extra Tube Hold for add-ons.    Toxicology screen, urine    Collection Time: 04/15/25   8:02 PM   Result Value Ref Range    Alcohol, Urine <10 <10 mg/dL    Benzodiazepine, Urine Negative Negative    Methadone, Urine Negative Negative    Cocaine, Urine Negative Negative    Opiates, Urine Negative Negative    Barbiturates, Urine Negative Negative    Amphetamines, Urine Negative Negative    THC Presumptive Positive (A) Negative    Phencyclidine, Urine Negative Negative    Urine Creatinine 185.0 23.0 - 375.0 mg/dL   Urinalysis Microscopic    Collection Time: 04/15/25  8:02 PM   Result Value Ref Range    RBC, UA 5 (H) 0 - 4 /HPF    WBC, UA 21 (H) 0 - 5 /HPF    Bacteria, UA Many (A) None, Rare, Occasional /HPF    Yeast, UA Rare (A) None /HPF    Squamous Epithelial Cells, UA 1 /HPF    Hyaline Casts, UA 5 (H) 0 - 1 /LPF    Microscopic Comment     POCT glucose    Collection Time: 04/15/25  9:58 PM   Result Value Ref Range    POCT Glucose 290 (H) 70 - 110 mg/dL   Gram stain    Collection Time: 04/16/25  3:52 AM    Specimen: Urine, Clean Catch   Result Value Ref Range    GRAM STAIN No WBCs     GRAM STAIN Moderate Gram positive cocci    Renal Function Panel    Collection Time: 04/16/25  3:52 AM   Result Value Ref Range    Sodium 155 (H) 136 - 145 mmol/L    Potassium 3.7 3.5 - 5.1 mmol/L    Chloride 119 (H) 95 - 110 mmol/L    CO2 27 23 - 29 mmol/L    Glucose 173 (H) 70 - 110 mg/dL    BUN 24 (H) 6 - 20 mg/dL    Creatinine 1.4 0.5 - 1.4 mg/dL    Calcium 9.1 8.7 - 10.5 mg/dL    Albumin 3.0 (L) 3.5 - 5.2 g/dL    Phosphorus Level 3.3 2.7 - 4.5 mg/dL    Anion Gap 9 8 - 16 mmol/L    eGFR >60 >60 mL/min/1.73/m2   Magnesium    Collection Time: 04/16/25  3:52 AM   Result Value Ref Range    Magnesium  2.1 1.6 - 2.6 mg/dL   CBC with Differential    Collection Time: 04/16/25  3:52 AM   Result Value Ref Range    WBC 4.91 3.90 - 12.70 K/uL    RBC 4.38 (L) 4.60 - 6.20 M/uL    HGB 12.9 (L) 14.0 - 18.0 gm/dL    HCT 41.0 40.0 - 54.0 %    MCV 94 82 - 98 fL    MCH 29.5 27.0 - 31.0 pg    MCHC 31.5 (L) 32.0 - 36.0 g/dL    RDW 12.8 11.5  - 14.5 %    Platelet Count 118 (L) 150 - 450 K/uL    MPV 12.2 9.2 - 12.9 fL    Nucleated RBC 0 <=0 /100 WBC    Neut % 69.3 38 - 73 %    Lymph % 18.7 18 - 48 %    Mono % 11.2 4 - 15 %    Eos % 0.2 <=8 %    Basophil % 0.2 <=1.9 %    Imm Grans % 0.4 0.0 - 0.5 %    Neut # 3.40 1.8 - 7.7 K/uL    Lymph # 0.92 (L) 1 - 4.8 K/uL    Mono # 0.55 0.3 - 1 K/uL    Eos # 0.01 <=0.5 K/uL    Baso # 0.01 <=0.2 K/uL    Imm Grans # 0.02 0.00 - 0.04 K/uL   POCT glucose    Collection Time: 04/16/25  8:31 AM   Result Value Ref Range    POCT Glucose 96 70 - 110 mg/dL   POCT glucose    Collection Time: 04/16/25 10:57 AM   Result Value Ref Range    POCT Glucose 122 (H) 70 - 110 mg/dL   Basic Metabolic Panel    Collection Time: 04/16/25 11:07 AM   Result Value Ref Range    Sodium 149 (H) 136 - 145 mmol/L    Potassium 2.9 (L) 3.5 - 5.1 mmol/L    Chloride 118 (H) 95 - 110 mmol/L    CO2 20 (L) 23 - 29 mmol/L    Glucose 163 (H) 70 - 110 mg/dL    BUN 19 6 - 20 mg/dL    Creatinine 1.1 0.5 - 1.4 mg/dL    Calcium 7.2 (L) 8.7 - 10.5 mg/dL    Anion Gap 11 8 - 16 mmol/L    eGFR >60 >60 mL/min/1.73/m2   POCT glucose    Collection Time: 04/16/25  2:12 PM   Result Value Ref Range    POCT Glucose 208 (H) 70 - 110 mg/dL   POCT glucose    Collection Time: 04/16/25  4:48 PM   Result Value Ref Range    POCT Glucose 255 (H) 70 - 110 mg/dL       Significant Imaging: I have reviewed all pertinent imaging results/findings within the past 24 hours.    Imaging Results    None

## 2025-04-16 NOTE — ASSESSMENT & PLAN NOTE
Patient reporting on exam that when checking for peripheral edema in legs he has numbness sensation in he right ankle/foot.

## 2025-04-16 NOTE — ASSESSMENT & PLAN NOTE
Hypernatremia is likely due to Dehydration. The patient's most recent sodium results are listed below.  Recent Labs     04/15/25  1836 04/16/25  0352 04/16/25  1107   * 155* 149*     Plan  - Aim to correct the sodium by 8-10mEq in 24 hours.   - Plan to correct their hypernatremia with Select IV fluids: d5w at a rate of 50 ml/hr.   - Will plan to trend the patient's sodium: Every 6 hours  - The patient's hypernatremia is improving

## 2025-04-16 NOTE — ED NOTES
Received report and assumed care of patient at this time. Pt presents to ED w/ c/o nausea and vomiting. Patient is AAOx4 with a calm affect and aware of environment. Airway is open and patent, respirations are spontaneous, normal effort and rate noted. Pt denies chest pain at this time. Skin warm and dry. Movement to all extremities noted. Bed placed in low position, side rails up x 2, call light is within reach of patient. Explanation of care provided to patient. Patient offers no complaints at this time. Awaiting further MD orders and bed assignment, POC continues.

## 2025-04-16 NOTE — ASSESSMENT & PLAN NOTE
Patient's FSGs are uncontrolled due to hyperglycemia on current medication regimen.  Last A1c reviewed-   Lab Results   Component Value Date    HGBA1C 13.8 (H) 04/10/2025     Most recent fingerstick glucose reviewed-   Recent Labs   Lab 04/16/25  0831 04/16/25  1057 04/16/25  1412 04/16/25  1648   POCTGLUCOSE 96 122* 208* 255*     Current correctional scale  Medium  Maintain anti-hyperglycemic dose as follows-   Antihyperglycemics (From admission, onward)      Start     Stop Route Frequency Ordered    04/16/25 1745  insulin regular in 0.9 % NaCl 100 unit/100 mL (1 unit/mL) infusion        Question:  Enter initial dose (Units/hr):  Answer:  1    -- IV Continuous 04/16/25 1640    04/16/25 1739  insulin aspart U-100 pen 0-10 Units         -- SubQ Every 4 hours PRN 04/16/25 1640          Patient self reported his A1c as 9% but latest value is 13, while DKA is resolved his current insulin regimen will need adjustment while nausea/vomiting symptoms improve.  Re-consulted endocrinology.     If patient develops refractory hyperglycemia - he may require repeat initiation of insulin infuison

## 2025-04-16 NOTE — ASSESSMENT & PLAN NOTE
Patient's most recent potassium results are listed below.   Recent Labs     04/15/25  1836 04/16/25  0352 04/16/25  1107   K 4.1 3.7 2.9*     Plan  - Replete potassium per protocol  - Monitor potassium Every 6 hours  - Patient's hypokalemia is worsening. Will continue current treatment  - IV repletion in setting of nausea

## 2025-04-16 NOTE — ASSESSMENT & PLAN NOTE
Patient's FSGs are uncontrolled due to hyperglycemia on current medication regimen.  Last A1c reviewed-   Lab Results   Component Value Date    HGBA1C 13.8 (H) 04/10/2025     Most recent fingerstick glucose reviewed-   Recent Labs   Lab 04/15/25  0643 04/15/25  0808 04/15/25  2158   POCTGLUCOSE 384* 368* 290*     Current correctional scale  Medium  Maintain anti-hyperglycemic dose as follows-   Antihyperglycemics (From admission, onward)      Start     Stop Route Frequency Ordered    04/16/25 0900  insulin glargine U-100 (Lantus) pen 22 Units         -- SubQ Daily 04/16/25 0038    04/16/25 0128  insulin aspart U-100 pen 0-10 Units         -- SubQ Before meals & nightly PRN 04/16/25 0028          Patient self reported his A1c as 9% but latest value is 13, while DKA is resolved his current insulin regimen will need adjustment while nausea/vomiting symptoms improve.  Re-consult endocrinology.     If patient develops refractory hyperglycemia - he may require repeat initiation of insulin infuison

## 2025-04-16 NOTE — ASSESSMENT & PLAN NOTE
-admit to [facility]  -bariatric clear diet  -PRN zofran and compazine IV   -Given past history of similar admissions, give 1 dose of Sumatriptan SQ 6mg as abortive agent for cyclic vomiting syndrome, (may give 2 total doses in 24hrs,  max 6 doses/week)    -Utox is positive for THC, patient admits to smoking marijuana, he reported he had stopped or decreased use after being told it was contributing to vomiting symptoms but thought he could try small amount w/o incurring harm.  I have advised full cessation to avoid similar side effects.   - If above regimen is not working - consider use of Topical Capsaicin thin layer to abdomen BID if patient can tolerate or while inpatient only use of Clonazepam ODT scheduled.

## 2025-04-16 NOTE — ASSESSMENT & PLAN NOTE
Currently utilizing D5W drip.   Will evaluate impact of therapy on glucose control and may consider starting transition drip if needed.

## 2025-04-16 NOTE — NURSING
Patient BP was elevated this evening (185/79). MD notified. MD placed medication order for hydralazine. Patient refused hydralazine including his bentyl, robaxin, and enoxaparin. Patient stated his stomach is hurting.

## 2025-04-16 NOTE — ED NOTES
Assumed care for pt after recieving report from JENNIFER Epps. Pt. resting in bed in NAD, RR e/u. Pt. offered bathroom assistance and denies need at this time. Explanation of care/wait provided. Pt verbalizes no needs at this time. Bed in low, locked position with rails up and call bell in reach. Pt's white board updated with today's care team and plan.

## 2025-04-16 NOTE — SUBJECTIVE & OBJECTIVE
Past Medical History:   Diagnosis Date    Diabetes mellitus     Diabetes mellitus type 1     Diagnosed at age 20       Past Surgical History:   Procedure Laterality Date    ESOPHAGOGASTRODUODENOSCOPY N/A 11/5/2024    Procedure: EGD (ESOPHAGOGASTRODUODENOSCOPY);  Surgeon: Manny Troy MD;  Location: [address];  Service: Endoscopy;  Laterality: N/A;       Review of patient's allergies indicates:   Allergen Reactions    Lactose Other (See Comments)     Gas and moderate to sever abdominal pain       Current Facility-Administered Medications on File Prior to Encounter   Medication    [DISCONTINUED] D5W infusion    [DISCONTINUED] dextrose 50% injection 12.5 g    [DISCONTINUED] dextrose 50% injection 12.5 g    [DISCONTINUED] dextrose 50% injection 25 g    [DISCONTINUED] dextrose 50% injection 25 g    [DISCONTINUED] enoxaparin injection 40 mg    [DISCONTINUED] glucagon (human recombinant) injection 1 mg    [DISCONTINUED] glucagon (human recombinant) injection 1 mg    [DISCONTINUED] glucose chewable tablet 16 g    [DISCONTINUED] glucose chewable tablet 16 g    [DISCONTINUED] glucose chewable tablet 24 g    [DISCONTINUED] glucose chewable tablet 24 g    [DISCONTINUED] insulin aspart U-100 pen 0-10 Units    [DISCONTINUED] insulin aspart U-100 pen 0-5 Units    [DISCONTINUED] insulin aspart U-100 pen 6 Units    [DISCONTINUED] insulin aspart U-100 pen 8 Units    [DISCONTINUED] insulin aspart U-100 pen 8 Units    [DISCONTINUED] insulin glargine U-100 (Lantus) pen 20 Units    [DISCONTINUED] insulin regular in 0.9 % NaCl 100 unit/100 mL (1 unit/mL) infusion    [DISCONTINUED] mupirocin 2 % ointment    [DISCONTINUED] ondansetron injection 4 mg    [DISCONTINUED] promethazine tablet 25 mg    [DISCONTINUED] senna-docusate 8.6-50 mg per tablet 1 tablet    [DISCONTINUED] sodium chloride 0.9% flush 10 mL     Current Outpatient Medications on File Prior to Encounter   Medication Sig    aluminum-magnesium hydroxide-simethicone  "(MAALOX) 200-200-20 mg/5 mL Susp Take 30 mLs by mouth 4 (four) times daily as needed (heartburn).    blood sugar diagnostic Strp 3-4 times a day    blood-glucose meter kit Use as instructed    blood-glucose sensor (DEXCOM G7 SENSOR) Martha Apply one sensor to the skin every 10 days    glucagon (GLUCAGON EMERGENCY KIT, HUMAN,) 1 mg SolR Inject 1 mg into the muscle as needed (Hypoglycemia).    insulin degludec (TRESIBA FLEXTOUCH U-100) 100 unit/mL (3 mL) insulin pen Inject 22 Units into the skin once daily.    insulin lispro-aabc (LYUMJEV KWIKPEN U-100 INSULIN) 100 unit/mL pen Inject 8 Units into the skin 3 (three) times daily with meals. Take additional sliding scale based on the blood sugars.  Maximum daily dose of 36 units.    insulin syringe-needle U-100 (TRUEPLUS INSULIN) 0.5 mL 30 gauge x 5/16" Syrg USE FOUR TIMES DAILY AS DIRECTED    lancets 31 gauge Misc 1 lancet  by Misc.(Non-Drug; Combo Route) route 2 (two) times daily.    lisinopriL 10 MG tablet Take 1 tablet (10 mg total) by mouth once daily.    ondansetron (ZOFRAN-ODT) 4 MG TbDL Take 1 tablet (4 mg total) by mouth every 6 (six) hours as needed.    pantoprazole (PROTONIX) 40 MG tablet Take 1 tablet (40 mg total) by mouth once daily.    pen needle, diabetic 29 gauge x 1/2" Ndle Use to inject insulin into the skin.     Family History       Problem Relation (Age of Onset)    Diabetes Mother    Other Mother, Father          Tobacco Use    Smoking status: Former    Smokeless tobacco: Never   Substance and Sexual Activity    Alcohol use: Not Currently     Comment: socially    Drug use: No    Sexual activity: Yes     Partners: Female     Review of Systems   Constitutional:  Positive for fatigue.   Musculoskeletal:  Positive for back pain (low).     Objective:     Vital Signs (Most Recent):  Temp: 99.7 °F (37.6 °C) (04/15/25 2330)  Pulse: 89 (04/15/25 2330)  Resp: 18 (04/15/25 1748)  BP: 135/89 (04/15/25 2330)  SpO2: 98 % (04/15/25 2330) Vital Signs (24h " Range):  Temp:  [97.7 °F (36.5 °C)-99.9 °F (37.7 °C)] 99.7 °F (37.6 °C)  Pulse:  [] 89  Resp:  [16-18] 18  SpO2:  [93 %-100 %] 98 %  BP: (130-154)/(74-93) 135/89     Weight: 88 kg (194 lb 0.2 oz)  Body mass index is 24.91 kg/m².     Physical Exam  Vitals and nursing note reviewed.   Constitutional:       General: He is not in acute distress.  HENT:      Mouth/Throat:      Mouth: Mucous membranes are moist.      Pharynx: Oropharynx is clear. No posterior oropharyngeal erythema.   Eyes:      General: No scleral icterus.     Pupils: Pupils are equal, round, and reactive to light.   Cardiovascular:      Rate and Rhythm: Normal rate and regular rhythm.      Pulses: Normal pulses.   Pulmonary:      Effort: Pulmonary effort is normal. No respiratory distress.      Breath sounds: No wheezing.   Abdominal:      General: Abdomen is flat. There is no distension.      Palpations: Abdomen is soft.      Tenderness: There is abdominal tenderness in the epigastric area. There is no guarding or rebound.   Skin:     General: Skin is warm and dry.      Comments: Multiple tattoos   Neurological:      General: No focal deficit present.      Mental Status: He is alert.              CRANIAL NERVES     CN III, IV, VI   Pupils are equal, round, and reactive to light.       Significant Labs: All pertinent labs within the past 24 hours have been reviewed.  CBC:   Recent Labs   Lab 04/14/25  0339 04/14/25  0455 04/15/25  0500 04/15/25  1836 04/15/25  1842   WBC 4.70  --  5.75 6.39  --    HGB 14.6  --  13.9* 14.6  --    HCT 43.5   < > 43.5 44.7 44   *  --  125* 150  --     < > = values in this interval not displayed.     CMP:   Recent Labs   Lab 04/14/25  0339 04/14/25  0852 04/15/25  0500 04/15/25  0936 04/15/25  1836   *   < > 153* 152* 155*   K 4.2   < > 4.3 4.9 4.1   *   < > 118* 116* 116*   CO2 30*   < > 26 25 30*   BUN 27*   < > 27* 27* 24*   CREATININE 1.5*   < > 1.5* 1.5* 1.4   CALCIUM 9.7   < > 9.0 9.2 9.5  "  ALBUMIN 3.5  --  3.0*  --  3.3*   BILITOT 0.8  --  0.8  --  0.8   ALKPHOS 93  --  75  --  80   AST 42  --  19  --  20   ALT 37  --  26  --  28   ANIONGAP 9   < > 9 11 9    < > = values in this interval not displayed.     Cardiac Markers: No results for input(s): "CKMB", "MYOGLOBIN", "BNP", "TROPISTAT" in the last 48 hours.  Urine Studies:   Recent Labs   Lab 04/15/25  2002   COLORU Yellow   APPEARANCEUA Clear   PHUR 6.0   SPECGRAV 1.020   PROTEINUA 1+*   GLUCUA 2+*   BILIRUBINUA Negative   OCCULTUA 1+*   NITRITE Negative   UROBILINOGEN Negative   LEUKOCYTESUR 1+*   RBCUA 5*   WBCUA 21*   BACTERIA Many*   HYALINECASTS 5*       Significant Imaging: I have reviewed all pertinent imaging results/findings within the past 24 hours.      "

## 2025-04-16 NOTE — ASSESSMENT & PLAN NOTE
Patient states his stomach emptying test was normal and does not have gastroparesis. Labs not indicative of a DKA cause.

## 2025-04-16 NOTE — ASSESSMENT & PLAN NOTE
Lisinopril 10mg is on home medication list, patient reports he read about side effects that are higher risk for  americans, and reports taking the medication only twice a week.   I discussed with patient, the benefits are likely not to be experienced at this self-dosing.   Will hold the lisinopril for now - and would requesting oncoming team consider alternate therapy or patient can discuss with primary prescriber re: other drug class.     Of note patient does have 1+ protein on UA sample today, an ARB is likely next ideal drug class.

## 2025-04-16 NOTE — PROGRESS NOTES
[facility] Medicine  Progress Note    Patient Name: Andrzej Sinclair  MRN: [id number]  Patient Class: IP- Inpatient   Admission Date: 4/15/2025  Length of Stay: 1 days  Attending Physician: Abigail Nash MD  Primary Care Provider: Aubrie, Primary Doctor        Subjective     Principal Problem:Cyclical vomiting syndrome        HPI:  39-year-old man with type 1 diabetes, recent hospital admission for DKA and BETH who left the admission AMA this morning returns to the emergency department with complaints of ongoing nausea and vomiting and inability to tolerate liquids or regular diet.  He reports leaving the hospital around 10:00 a.m. yesterday and after going home he thought he felt better and that he could continue to hydrate on his own in order to resolve his dehydration and hypernatremia he reports with his 1st sips of water his nausea & epigastric pain returned.     He has no hematemesis, has not taken any insulin since earlier today.  On review of chart he has cyclic vomiting syndrome and cannabinoid hyperemesis syndrome listed as past problems.  Add-On urine toxicology today shows THC present, patient reports smoking marijuana in the interim.  I discussed with patient this either could be a cause or exacerbating factor of his symptoms.    In the ED w/u he was found with alkalotic pH on VBG, no acute acidosis and mild elevation of serum ketones.  He is referred for re-admission for management of cyclic vomiting & dehydration with hypernatremia.     ED Medications: zofran 4mg IV x 1,  1L LR iv x 1,       Overview/Hospital Course:  No notes on file    Interval History:  Patient seen and examined at bedside.    No acute events overnight.  However, remains with nausea, abdominal pain.  Denies emesis.  Patient updated regarding care plan.  Discussed with endocrinology concurrent dextrose and insulin drip to address hypernatremia; previously no improvement with alternative fluids.      Review of Systems    Constitutional:  Positive for appetite change. Negative for chills, diaphoresis and fever.   HENT:  Negative for congestion, sore throat and trouble swallowing.    Respiratory:  Negative for cough, shortness of breath and wheezing.    Cardiovascular:  Negative for chest pain, palpitations and leg swelling.   Gastrointestinal:  Positive for abdominal pain and nausea. Negative for diarrhea.   Genitourinary:  Negative for dysuria.   Musculoskeletal:  Negative for gait problem.   Skin:  Negative for wound.   Neurological:  Negative for dizziness, light-headedness and headaches.   Psychiatric/Behavioral:  Negative for confusion and decreased concentration.        Objective:    Temp: 98 °F (36.7 °C) (04/16/25 1519)  Pulse: 67 (04/16/25 1519)  Resp: 20 (04/16/25 1519)  BP: (!) 185/79 (04/16/25 1519)  SpO2: 98 % (04/16/25 1519)    Weight: 88 kg (194 lb 0.1 oz) (04/16/25 1403)    Body mass index is 24.91 kg/m².      Intake/Output Summary (Last 24 hours) at 4/16/2025 1656  Last data filed at 4/16/2025 0832  Gross per 24 hour   Intake 1196.79 ml   Output --   Net 1196.79 ml       Physical Exam  Vitals and nursing note reviewed.   Constitutional:       Appearance: He is not diaphoretic.   HENT:      Head: Normocephalic and atraumatic.      Mouth/Throat:      Mouth: Mucous membranes are dry.   Eyes:      General: No scleral icterus.  Cardiovascular:      Rate and Rhythm: Normal rate and regular rhythm.      Pulses: Normal pulses.   Pulmonary:      Effort: Pulmonary effort is normal. No respiratory distress.      Breath sounds: No wheezing.   Abdominal:      General: Abdomen is flat. There is no distension.      Palpations: Abdomen is soft.      Tenderness: There is abdominal tenderness in the epigastric area. There is no guarding or rebound.   Musculoskeletal:      Cervical back: Normal range of motion.      Right lower leg: No edema.      Left lower leg: No edema.   Skin:     General: Skin is warm and dry.      Comments:  Multiple tattoos   Neurological:      General: No focal deficit present.      Mental Status: He is alert.   Psychiatric:         Behavior: Behavior normal.         Significant Labs: All pertinent labs within the past 24 hours have been reviewed.    Recent Results (from the past 24 hours)   Comprehensive metabolic panel    Collection Time: 04/15/25  6:36 PM   Result Value Ref Range    Sodium 155 (H) 136 - 145 mmol/L    Potassium 4.1 3.5 - 5.1 mmol/L    Chloride 116 (H) 95 - 110 mmol/L    CO2 30 (H) 23 - 29 mmol/L    Glucose 265 (H) 70 - 110 mg/dL    BUN 24 (H) 6 - 20 mg/dL    Creatinine 1.4 0.5 - 1.4 mg/dL    Calcium 9.5 8.7 - 10.5 mg/dL    Protein Total 6.7 6.0 - 8.4 gm/dL    Albumin 3.3 (L) 3.5 - 5.2 g/dL    Bilirubin Total 0.8 0.1 - 1.0 mg/dL    ALP 80 40 - 150 unit/L    AST 20 11 - 45 unit/L    ALT 28 10 - 44 unit/L    Anion Gap 9 8 - 16 mmol/L    eGFR >60 >60 mL/min/1.73/m2   Beta-Hydroxybutyrate, Serum    Collection Time: 04/15/25  6:36 PM   Result Value Ref Range    Beta-Hydroxybutyrate 0.7 (H) <=0.5 mmol/L   CBC with Differential    Collection Time: 04/15/25  6:36 PM   Result Value Ref Range    WBC 6.39 3.90 - 12.70 K/uL    RBC 4.79 4.60 - 6.20 M/uL    HGB 14.6 14.0 - 18.0 gm/dL    HCT 44.7 40.0 - 54.0 %    MCV 93 82 - 98 fL    MCH 30.5 27.0 - 31.0 pg    MCHC 32.7 32.0 - 36.0 g/dL    RDW 12.8 11.5 - 14.5 %    Platelet Count 150 150 - 450 K/uL    MPV 11.7 9.2 - 12.9 fL    Nucleated RBC 0 <=0 /100 WBC    Neut % 81.7 (H) 38 - 73 %    Lymph % 9.1 (L) 18 - 48 %    Mono % 8.9 4 - 15 %    Eos % 0.0 <=8 %    Basophil % 0.0 <=1.9 %    Imm Grans % 0.3 0.0 - 0.5 %    Neut # 5.22 1.8 - 7.7 K/uL    Lymph # 0.58 (L) 1 - 4.8 K/uL    Mono # 0.57 0.3 - 1 K/uL    Eos # 0.00 <=0.5 K/uL    Baso # 0.00 <=0.2 K/uL    Imm Grans # 0.02 0.00 - 0.04 K/uL   Lipase    Collection Time: 04/15/25  6:36 PM   Result Value Ref Range    Lipase Level 6 4 - 60 U/L   ISTAT PROCEDURE    Collection Time: 04/15/25  6:42 PM   Result Value Ref Range     POC Glucose 283 (H) 70 - 110 mg/dL    POC BUN 25 6 - 30 mg/dL    POC Creatinine 1.6 (H) 0.5 - 1.4 mg/dL    POC Sodium 154 (H) 136 - 145 mmol/L    POC Potassium 4.2 3.5 - 5.1 mmol/L    POC Chloride 116 (H) 95 - 110 mmol/L    POC TCO2 (MEASURED) 28 23 - 29 mmol/L    POC Ionized Calcium 1.19 1.06 - 1.42 mmol/L    POC Hematocrit 44 36 - 54 %PCV    Sample AMALIA    ISTAT PROCEDURE    Collection Time: 04/15/25  6:44 PM   Result Value Ref Range    POC PH 7.467 (H) 7.35 - 7.45    POC PCO2 43.7 35 - 45 mmHg    POC PO2 38 (L) 40 - 60 mmHg    POC HCO3 31.6 (H) 24 - 28 mmol/L    POC BE 8 (H) -2 to 2 mmol/L    POC SATURATED O2 74 95 - 100 %    POC TCO2 33 (H) 24 - 29 mmol/L    Sample VENOUS     Site Other     Allens Test N/A    EKG 12-lead    Collection Time: 04/15/25  6:52 PM   Result Value Ref Range    QRS Duration 92 ms    [facility] QTC Calculation 429 ms   Urinalysis, Reflex to Urine Culture Urine, Clean Catch    Collection Time: 04/15/25  8:02 PM    Specimen: Urine, Clean Catch   Result Value Ref Range    Color, UA Yellow Straw, Aleyda, Yellow, Light-Orange    Appearance, UA Clear Clear    pH, UA 6.0 5.0 - 8.0    Spec Grav UA 1.020 1.005 - 1.030    Protein, UA 1+ (A) Negative    Glucose, UA 2+ (A) Negative    Ketones, UA Trace (A) Negative    Bilirubin, UA Negative Negative    Blood, UA 1+ (A) Negative    Nitrites, UA Negative Negative    Urobilinogen, UA Negative <2.0 EU/dL    Leukocyte Esterase, UA 1+ (A) Negative   GREY TOP URINE HOLD    Collection Time: 04/15/25  8:02 PM   Result Value Ref Range    Extra Tube Hold for add-ons.    Toxicology screen, urine    Collection Time: 04/15/25  8:02 PM   Result Value Ref Range    Alcohol, Urine <10 <10 mg/dL    Benzodiazepine, Urine Negative Negative    Methadone, Urine Negative Negative    Cocaine, Urine Negative Negative    Opiates, Urine Negative Negative    Barbiturates, Urine Negative Negative    Amphetamines, Urine Negative Negative    THC Presumptive Positive (A) Negative     Phencyclidine, Urine Negative Negative    Urine Creatinine 185.0 23.0 - 375.0 mg/dL   Urinalysis Microscopic    Collection Time: 04/15/25  8:02 PM   Result Value Ref Range    RBC, UA 5 (H) 0 - 4 /HPF    WBC, UA 21 (H) 0 - 5 /HPF    Bacteria, UA Many (A) None, Rare, Occasional /HPF    Yeast, UA Rare (A) None /HPF    Squamous Epithelial Cells, UA 1 /HPF    Hyaline Casts, UA 5 (H) 0 - 1 /LPF    Microscopic Comment     POCT glucose    Collection Time: 04/15/25  9:58 PM   Result Value Ref Range    POCT Glucose 290 (H) 70 - 110 mg/dL   Gram stain    Collection Time: 04/16/25  3:52 AM    Specimen: Urine, Clean Catch   Result Value Ref Range    GRAM STAIN No WBCs     GRAM STAIN Moderate Gram positive cocci    Renal Function Panel    Collection Time: 04/16/25  3:52 AM   Result Value Ref Range    Sodium 155 (H) 136 - 145 mmol/L    Potassium 3.7 3.5 - 5.1 mmol/L    Chloride 119 (H) 95 - 110 mmol/L    CO2 27 23 - 29 mmol/L    Glucose 173 (H) 70 - 110 mg/dL    BUN 24 (H) 6 - 20 mg/dL    Creatinine 1.4 0.5 - 1.4 mg/dL    Calcium 9.1 8.7 - 10.5 mg/dL    Albumin 3.0 (L) 3.5 - 5.2 g/dL    Phosphorus Level 3.3 2.7 - 4.5 mg/dL    Anion Gap 9 8 - 16 mmol/L    eGFR >60 >60 mL/min/1.73/m2   Magnesium    Collection Time: 04/16/25  3:52 AM   Result Value Ref Range    Magnesium  2.1 1.6 - 2.6 mg/dL   CBC with Differential    Collection Time: 04/16/25  3:52 AM   Result Value Ref Range    WBC 4.91 3.90 - 12.70 K/uL    RBC 4.38 (L) 4.60 - 6.20 M/uL    HGB 12.9 (L) 14.0 - 18.0 gm/dL    HCT 41.0 40.0 - 54.0 %    MCV 94 82 - 98 fL    MCH 29.5 27.0 - 31.0 pg    MCHC 31.5 (L) 32.0 - 36.0 g/dL    RDW 12.8 11.5 - 14.5 %    Platelet Count 118 (L) 150 - 450 K/uL    MPV 12.2 9.2 - 12.9 fL    Nucleated RBC 0 <=0 /100 WBC    Neut % 69.3 38 - 73 %    Lymph % 18.7 18 - 48 %    Mono % 11.2 4 - 15 %    Eos % 0.2 <=8 %    Basophil % 0.2 <=1.9 %    Imm Grans % 0.4 0.0 - 0.5 %    Neut # 3.40 1.8 - 7.7 K/uL    Lymph # 0.92 (L) 1 - 4.8 K/uL    Mono # 0.55 0.3 - 1  K/uL    Eos # 0.01 <=0.5 K/uL    Baso # 0.01 <=0.2 K/uL    Imm Grans # 0.02 0.00 - 0.04 K/uL   POCT glucose    Collection Time: 04/16/25  8:31 AM   Result Value Ref Range    POCT Glucose 96 70 - 110 mg/dL   POCT glucose    Collection Time: 04/16/25 10:57 AM   Result Value Ref Range    POCT Glucose 122 (H) 70 - 110 mg/dL   Basic Metabolic Panel    Collection Time: 04/16/25 11:07 AM   Result Value Ref Range    Sodium 149 (H) 136 - 145 mmol/L    Potassium 2.9 (L) 3.5 - 5.1 mmol/L    Chloride 118 (H) 95 - 110 mmol/L    CO2 20 (L) 23 - 29 mmol/L    Glucose 163 (H) 70 - 110 mg/dL    BUN 19 6 - 20 mg/dL    Creatinine 1.1 0.5 - 1.4 mg/dL    Calcium 7.2 (L) 8.7 - 10.5 mg/dL    Anion Gap 11 8 - 16 mmol/L    eGFR >60 >60 mL/min/1.73/m2   POCT glucose    Collection Time: 04/16/25  2:12 PM   Result Value Ref Range    POCT Glucose 208 (H) 70 - 110 mg/dL   POCT glucose    Collection Time: 04/16/25  4:48 PM   Result Value Ref Range    POCT Glucose 255 (H) 70 - 110 mg/dL       Significant Imaging: I have reviewed all pertinent imaging results/findings within the past 24 hours.    Imaging Results    None             Assessment & Plan  Cyclical vomiting syndrome  Marijuana use  -continue bariatric clear diet  -PRN zofran and compazine IV   -Given past history of similar admissions, give 1 dose of Sumatriptan SQ 6mg as abortive agent for cyclic vomiting syndrome, (may give 2 total doses in 24hrs,  max 6 doses/week)    -Utox is positive for THC, patient admits to smoking marijuana, he reported he had stopped or decreased use after being told it was contributing to vomiting symptoms but thought he could try small amount w/o incurring harm. He was advised full cessation to avoid similar side effects.   - If above regimen is not working - consider use of Topical Capsaicin thin layer to abdomen BID if patient can tolerate or while inpatient only use of Clonazepam ODT scheduled.          Hypernatremia  Hypernatremia is likely due to  Dehydration. The patient's most recent sodium results are listed below.  Recent Labs     04/15/25  1836 04/16/25  0352 04/16/25  1107   * 155* 149*     Plan  - Aim to correct the sodium by 8-10mEq in 24 hours.   - Plan to correct their hypernatremia with Select IV fluids: d5w  at a rate of 50 ml/hr.   - Will plan to trend the patient's sodium: Every 6 hours  - The patient's hypernatremia is improving        Polyneuropathy due to type 1 diabetes mellitus  Patient reporting on exam that when checking for peripheral edema in legs he has numbness sensation in he right ankle/foot.     Type 1 diabetes mellitus with hyperglycemia  Patient's FSGs are uncontrolled due to hyperglycemia on current medication regimen.  Last A1c reviewed-   Lab Results   Component Value Date    HGBA1C 13.8 (H) 04/10/2025     Most recent fingerstick glucose reviewed-   Recent Labs   Lab 04/16/25  0831 04/16/25  1057 04/16/25  1412 04/16/25  1648   POCTGLUCOSE 96 122* 208* 255*     Current correctional scale  Medium  Maintain anti-hyperglycemic dose as follows-   Antihyperglycemics (From admission, onward)      Start     Stop Route Frequency Ordered    04/16/25 1745  insulin regular in 0.9 % NaCl 100 unit/100 mL (1 unit/mL) infusion        Question:  Enter initial dose (Units/hr):  Answer:  1    -- IV Continuous 04/16/25 1640    04/16/25 1739  insulin aspart U-100 pen 0-10 Units         -- SubQ Every 4 hours PRN 04/16/25 1640          Patient self reported his A1c as 9% but latest value is 13, while DKA is resolved his current insulin regimen will need adjustment while nausea/vomiting symptoms improve.  Re-consulted endocrinology.     If patient develops refractory hyperglycemia - he may require repeat initiation of insulin infuison  Primary hypertension  Patients blood pressure range in the last 24 hours was: BP  Min: 86/52  Max: 187/90.The patient's inpatient anti-hypertensive regimen is listed below:  Current  Antihypertensives  hydrALAZINE tablet 25 mg, Once, Oral    Plan  - BP is uncontrolled, will adjust as follows:  Suspect elevation in part due to pain, will address, and attempt Ace v arb initiation in setting of diabetes after discussing with patient      Low back pain  Reports recent use of gabapentin in hospital, reviewed last hospital encounter and did not see any.  With his report of numbness in right foot and also now A1c of 13, will start 200mg BID and titrate frequency/dose as patient tolerates.     Pyuria  -new finding opposed to last admission.  He denies any fevers, does report cold intolerance, no dysuria complaints.  There is yeast on UA.  Patient does not have a camacho catheter.  At this time will not start any empiric therapy for acute cystitis.   -  Order Add-On Gram Stain for urine with moderate Gram-positive cocci, no WBCs    Hypokalemia  Patient's most recent potassium results are listed below.   Recent Labs     04/15/25  1836 04/16/25  0352 04/16/25  1107   K 4.1 3.7 2.9*     Plan  - Replete potassium per protocol  - Monitor potassium Every 6 hours  - Patient's hypokalemia is worsening. Will continue current treatment  - IV repletion in setting of nausea    VTE Risk Mitigation (From admission, onward)           Ordered     enoxaparin injection 40 mg  Every 24 hours         04/15/25 2230     IP VTE LOW RISK PATIENT  Once         04/16/25 0045     Place sequential compression device  Until discontinued         04/16/25 0045                    Discharge Planning   PRECIOUS: 4/18/2025     Code Status: Full Code   Medical Readiness for Discharge Date:                            Abigail Nash MD  Department of Hospital Medicine   [facility]

## 2025-04-17 PROBLEM — F12.90 MARIJUANA USE: Chronic | Status: ACTIVE | Noted: 2025-04-16

## 2025-04-17 PROBLEM — G93.41 ACUTE METABOLIC ENCEPHALOPATHY: Status: RESOLVED | Noted: 2023-09-15 | Resolved: 2025-04-17

## 2025-04-17 PROBLEM — E87.6 HYPOKALEMIA: Status: RESOLVED | Noted: 2025-04-16 | Resolved: 2025-04-17

## 2025-04-17 PROBLEM — F12.90 CANNABINOID HYPEREMESIS SYNDROME: Chronic | Status: ACTIVE | Noted: 2021-04-22

## 2025-04-17 PROBLEM — E10.8 TYPE 1 DIABETES MELLITUS WITH COMPLICATIONS: Chronic | Status: ACTIVE | Noted: 2021-03-10

## 2025-04-17 PROBLEM — R11.15 CYCLICAL VOMITING SYNDROME: Chronic | Status: ACTIVE | Noted: 2018-07-02

## 2025-04-17 PROBLEM — R11.2 CANNABINOID HYPEREMESIS SYNDROME: Chronic | Status: ACTIVE | Noted: 2021-04-22

## 2025-04-17 LAB
ABSOLUTE EOSINOPHIL (OHS): 0.05 K/UL
ABSOLUTE MONOCYTE (OHS): 0.55 K/UL (ref 0.3–1)
ABSOLUTE NEUTROPHIL COUNT (OHS): 1.97 K/UL (ref 1.8–7.7)
ALBUMIN SERPL BCP-MCNC: 2.8 G/DL (ref 3.5–5.2)
ANION GAP (OHS): 10 MMOL/L (ref 8–16)
ANION GAP (OHS): 8 MMOL/L (ref 8–16)
ANION GAP (OHS): 9 MMOL/L (ref 8–16)
BACTERIA UR CULT: ABNORMAL
BASOPHILS # BLD AUTO: 0 K/UL
BASOPHILS NFR BLD AUTO: 0 %
BUN SERPL-MCNC: 17 MG/DL (ref 6–20)
BUN SERPL-MCNC: 17 MG/DL (ref 6–20)
BUN SERPL-MCNC: 18 MG/DL (ref 6–20)
CALCIUM SERPL-MCNC: 8.6 MG/DL (ref 8.7–10.5)
CALCIUM SERPL-MCNC: 8.7 MG/DL (ref 8.7–10.5)
CALCIUM SERPL-MCNC: 8.7 MG/DL (ref 8.7–10.5)
CHLORIDE SERPL-SCNC: 113 MMOL/L (ref 95–110)
CHLORIDE SERPL-SCNC: 117 MMOL/L (ref 95–110)
CHLORIDE SERPL-SCNC: 117 MMOL/L (ref 95–110)
CO2 SERPL-SCNC: 23 MMOL/L (ref 23–29)
CO2 SERPL-SCNC: 24 MMOL/L (ref 23–29)
CO2 SERPL-SCNC: 27 MMOL/L (ref 23–29)
CREAT SERPL-MCNC: 1.2 MG/DL (ref 0.5–1.4)
CREAT SERPL-MCNC: 1.2 MG/DL (ref 0.5–1.4)
CREAT SERPL-MCNC: 1.4 MG/DL (ref 0.5–1.4)
ERYTHROCYTE [DISTWIDTH] IN BLOOD BY AUTOMATED COUNT: 12.1 % (ref 11.5–14.5)
GFR SERPLBLD CREATININE-BSD FMLA CKD-EPI: >60 ML/MIN/1.73/M2
GLUCOSE SERPL-MCNC: 145 MG/DL (ref 70–110)
GLUCOSE SERPL-MCNC: 69 MG/DL (ref 70–110)
GLUCOSE SERPL-MCNC: 70 MG/DL (ref 70–110)
HCT VFR BLD AUTO: 38 % (ref 40–54)
HGB BLD-MCNC: 12.1 GM/DL (ref 14–18)
IMM GRANULOCYTES # BLD AUTO: 0.02 K/UL (ref 0–0.04)
IMM GRANULOCYTES NFR BLD AUTO: 0.5 % (ref 0–0.5)
LYMPHOCYTES # BLD AUTO: 1.61 K/UL (ref 1–4.8)
MAGNESIUM SERPL-MCNC: 1.8 MG/DL (ref 1.6–2.6)
MCH RBC QN AUTO: 29.3 PG (ref 27–31)
MCHC RBC AUTO-ENTMCNC: 31.8 G/DL (ref 32–36)
MCV RBC AUTO: 92 FL (ref 82–98)
NUCLEATED RBC (/100WBC) (OHS): 0 /100 WBC
PHOSPHATE SERPL-MCNC: 3.5 MG/DL (ref 2.7–4.5)
PLATELET # BLD AUTO: 100 K/UL (ref 150–450)
PMV BLD AUTO: 12.3 FL (ref 9.2–12.9)
POCT GLUCOSE: 104 MG/DL (ref 70–110)
POCT GLUCOSE: 151 MG/DL (ref 70–110)
POCT GLUCOSE: 207 MG/DL (ref 70–110)
POCT GLUCOSE: 238 MG/DL (ref 70–110)
POCT GLUCOSE: 59 MG/DL (ref 70–110)
POCT GLUCOSE: 70 MG/DL (ref 70–110)
POCT GLUCOSE: 79 MG/DL (ref 70–110)
POCT GLUCOSE: 80 MG/DL (ref 70–110)
POCT GLUCOSE: 84 MG/DL (ref 70–110)
POCT GLUCOSE: 89 MG/DL (ref 70–110)
POCT GLUCOSE: 99 MG/DL (ref 70–110)
POTASSIUM SERPL-SCNC: 3.6 MMOL/L (ref 3.5–5.1)
POTASSIUM SERPL-SCNC: 3.6 MMOL/L (ref 3.5–5.1)
POTASSIUM SERPL-SCNC: 3.7 MMOL/L (ref 3.5–5.1)
RBC # BLD AUTO: 4.13 M/UL (ref 4.6–6.2)
RELATIVE EOSINOPHIL (OHS): 1.2 %
RELATIVE LYMPHOCYTE (OHS): 38.3 % (ref 18–48)
RELATIVE MONOCYTE (OHS): 13.1 % (ref 4–15)
RELATIVE NEUTROPHIL (OHS): 46.9 % (ref 38–73)
SODIUM SERPL-SCNC: 149 MMOL/L (ref 136–145)
SODIUM SERPL-SCNC: 149 MMOL/L (ref 136–145)
SODIUM SERPL-SCNC: 150 MMOL/L (ref 136–145)
WBC # BLD AUTO: 4.2 K/UL (ref 3.9–12.7)

## 2025-04-17 PROCEDURE — 99232 SBSQ HOSP IP/OBS MODERATE 35: CPT | Mod: ,,, | Performed by: NURSE PRACTITIONER

## 2025-04-17 PROCEDURE — 25000003 PHARM REV CODE 250: Performed by: HOSPITALIST

## 2025-04-17 PROCEDURE — 36415 COLL VENOUS BLD VENIPUNCTURE: CPT | Performed by: HOSPITALIST

## 2025-04-17 PROCEDURE — 63600175 PHARM REV CODE 636 W HCPCS: Performed by: HOSPITALIST

## 2025-04-17 PROCEDURE — 85025 COMPLETE CBC W/AUTO DIFF WBC: CPT | Performed by: HOSPITALIST

## 2025-04-17 PROCEDURE — 25000003 PHARM REV CODE 250: Performed by: STUDENT IN AN ORGANIZED HEALTH CARE EDUCATION/TRAINING PROGRAM

## 2025-04-17 PROCEDURE — 83735 ASSAY OF MAGNESIUM: CPT | Performed by: HOSPITALIST

## 2025-04-17 PROCEDURE — 80048 BASIC METABOLIC PNL TOTAL CA: CPT | Performed by: HOSPITALIST

## 2025-04-17 PROCEDURE — 84295 ASSAY OF SERUM SODIUM: CPT | Performed by: HOSPITALIST

## 2025-04-17 PROCEDURE — 20600001 HC STEP DOWN PRIVATE ROOM

## 2025-04-17 RX ORDER — SODIUM CHLORIDE, SODIUM LACTATE, POTASSIUM CHLORIDE, CALCIUM CHLORIDE 600; 310; 30; 20 MG/100ML; MG/100ML; MG/100ML; MG/100ML
INJECTION, SOLUTION INTRAVENOUS CONTINUOUS
Status: ACTIVE | OUTPATIENT
Start: 2025-04-17 | End: 2025-04-18

## 2025-04-17 RX ORDER — INSULIN GLARGINE 100 [IU]/ML
15 INJECTION, SOLUTION SUBCUTANEOUS DAILY
Status: DISCONTINUED | OUTPATIENT
Start: 2025-04-17 | End: 2025-04-18 | Stop reason: HOSPADM

## 2025-04-17 RX ORDER — ONDANSETRON 4 MG/1
4 TABLET, ORALLY DISINTEGRATING ORAL EVERY 6 HOURS PRN
Qty: 10 TABLET | Refills: 0 | Status: SHIPPED | OUTPATIENT
Start: 2025-04-17 | End: 2025-04-24

## 2025-04-17 RX ORDER — INSULIN ASPART 100 [IU]/ML
5 INJECTION, SOLUTION INTRAVENOUS; SUBCUTANEOUS
Status: DISCONTINUED | OUTPATIENT
Start: 2025-04-17 | End: 2025-04-18 | Stop reason: HOSPADM

## 2025-04-17 RX ORDER — ACETAMINOPHEN 500 MG
1000 TABLET ORAL EVERY 8 HOURS PRN
Status: DISCONTINUED | OUTPATIENT
Start: 2025-04-17 | End: 2025-04-18 | Stop reason: HOSPADM

## 2025-04-17 RX ORDER — GLUCAGON 1 MG
1 KIT INJECTION
Status: DISCONTINUED | OUTPATIENT
Start: 2025-04-17 | End: 2025-04-18 | Stop reason: HOSPADM

## 2025-04-17 RX ORDER — FAMOTIDINE 20 MG/1
20 TABLET, FILM COATED ORAL 2 TIMES DAILY
Status: DISCONTINUED | OUTPATIENT
Start: 2025-04-17 | End: 2025-04-18 | Stop reason: HOSPADM

## 2025-04-17 RX ORDER — INSULIN ASPART 100 [IU]/ML
0-10 INJECTION, SOLUTION INTRAVENOUS; SUBCUTANEOUS
Status: DISCONTINUED | OUTPATIENT
Start: 2025-04-17 | End: 2025-04-18 | Stop reason: HOSPADM

## 2025-04-17 RX ORDER — IBUPROFEN 200 MG
16 TABLET ORAL
Status: DISCONTINUED | OUTPATIENT
Start: 2025-04-17 | End: 2025-04-18 | Stop reason: HOSPADM

## 2025-04-17 RX ORDER — IBUPROFEN 200 MG
24 TABLET ORAL
Status: DISCONTINUED | OUTPATIENT
Start: 2025-04-17 | End: 2025-04-18 | Stop reason: HOSPADM

## 2025-04-17 RX ADMIN — METHOCARBAMOL 500 MG: 500 TABLET ORAL at 05:04

## 2025-04-17 RX ADMIN — INSULIN GLARGINE 15 UNITS: 100 INJECTION, SOLUTION SUBCUTANEOUS at 09:04

## 2025-04-17 RX ADMIN — INSULIN ASPART 5 UNITS: 100 INJECTION, SOLUTION INTRAVENOUS; SUBCUTANEOUS at 08:04

## 2025-04-17 RX ADMIN — FAMOTIDINE 20 MG: 10 INJECTION, SOLUTION INTRAVENOUS at 08:04

## 2025-04-17 RX ADMIN — Medication 16 G: at 06:04

## 2025-04-17 RX ADMIN — PROCHLORPERAZINE EDISYLATE 5 MG: 5 INJECTION INTRAMUSCULAR; INTRAVENOUS at 11:04

## 2025-04-17 RX ADMIN — CAPSAICIN: 0.25 CREAM TOPICAL at 08:04

## 2025-04-17 RX ADMIN — FAMOTIDINE 20 MG: 20 TABLET, FILM COATED ORAL at 08:04

## 2025-04-17 RX ADMIN — DICYCLOMINE HYDROCHLORIDE 10 MG: 10 CAPSULE ORAL at 05:04

## 2025-04-17 RX ADMIN — DICYCLOMINE HYDROCHLORIDE 10 MG: 10 CAPSULE ORAL at 08:04

## 2025-04-17 RX ADMIN — INSULIN ASPART 4 UNITS: 100 INJECTION, SOLUTION INTRAVENOUS; SUBCUTANEOUS at 12:04

## 2025-04-17 RX ADMIN — INSULIN ASPART 5 UNITS: 100 INJECTION, SOLUTION INTRAVENOUS; SUBCUTANEOUS at 05:04

## 2025-04-17 RX ADMIN — METHOCARBAMOL 500 MG: 500 TABLET ORAL at 02:04

## 2025-04-17 RX ADMIN — SODIUM CHLORIDE, SODIUM LACTATE, POTASSIUM CHLORIDE, AND CALCIUM CHLORIDE: 600; 310; 30; 20 INJECTION, SOLUTION INTRAVENOUS at 11:04

## 2025-04-17 RX ADMIN — GABAPENTIN 200 MG: 100 CAPSULE ORAL at 08:04

## 2025-04-17 RX ADMIN — INSULIN ASPART 5 UNITS: 100 INJECTION, SOLUTION INTRAVENOUS; SUBCUTANEOUS at 12:04

## 2025-04-17 RX ADMIN — ACETAMINOPHEN 1000 MG: 500 TABLET ORAL at 06:04

## 2025-04-17 RX ADMIN — ENOXAPARIN SODIUM 40 MG: 40 INJECTION SUBCUTANEOUS at 05:04

## 2025-04-17 RX ADMIN — METHOCARBAMOL 500 MG: 500 TABLET ORAL at 08:04

## 2025-04-17 RX ADMIN — DICYCLOMINE HYDROCHLORIDE 10 MG: 10 CAPSULE ORAL at 02:04

## 2025-04-17 NOTE — PLAN OF CARE
Problem: Adult Inpatient Plan of Care  Goal: Plan of Care Review  Outcome: Met  Goal: Patient-Specific Goal (Individualized)  Outcome: Met  Goal: Absence of Hospital-Acquired Illness or Injury  Outcome: Met  Goal: Optimal Comfort and Wellbeing  Outcome: Met  Goal: Readiness for Transition of Care  Outcome: Met     Problem: Infection  Goal: Absence of Infection Signs and Symptoms  Outcome: Met     Problem: Diabetes Comorbidity  Goal: Blood Glucose Level Within Targeted Range  Outcome: Met     Problem: Fall Injury Risk  Goal: Absence of Fall and Fall-Related Injury  Outcome: Met

## 2025-04-17 NOTE — PROGRESS NOTES
"[facility] - Telemetry Stepdown  Endocrinology  Progress Note    Admit Date: 4/15/2025     Reason for Consult: Management of T1DM, Hyperglycemia      Diabetes diagnosis year: 20     Home Diabetes Medications: Lyumjev 7 units + LDS SSI Tresiba 7 units h.s. --> stated he self-decreased basal dose to 7 units because his BG was "under control"        Sees NP Scobel- PATIENT IS CURRENTLY NOT USING OMNIPOD DUE TO BEING UNTRAINED ON THE DEVICE  Omnipod 5  Isf 50   Insulin to carb ratio 1:15  Basal 0.72     How often checking glucose at home? Dexcom   BG readings on regimen: 250's  Hypoglycemia on the regimen?  No  Missed doses on regimen?  No     Diabetes Complications include:     Hyperglycemia, Hypoglycemia , and Diabetic cataract , Frequent DKA     Complicating diabetes co morbidities:   CKD        HPI: 39-year-old man with type 1 diabetes, recent hospital admission for DKA and BETH who left the admission AMA on 2025 returns to the emergency department with complaints of ongoing nausea and vomiting and inability to tolerate liquids or regular diet. Endocrine consulted to manage hyperglycemia and type 1 diabetes.      Lab Results   Component Value Date    HGBA1C 13.8 (H) 04/10/2025         Interval HPI:   Overnight events: No acute events overnight. Patient in room 8097/8097 A. Blood glucose stable. BG at goal on current insulin regimen (SSI, prandial, and basal insulin ). Steroid use- None.    Renal function- Normal   Vasopressors-  None       Endocrine will continue to follow and manage insulin orders inpatient.         Diet Consistent Carbohydrate 2000 Calories (up to 75 gm per meal)  Diet diabetic     Eatin%  Nausea: No  Hypoglycemia and intervention: No  Fever: No  TPN and/or TF: No      /79 (BP Location: Right arm, Patient Position: Lying)   Pulse 84   Temp 98.7 °F (37.1 °C) (Oral)   Resp 18   Ht 6' 2" (1.88 m)   Wt 88 kg (194 lb 0.1 oz)   SpO2 97%   BMI 24.91 kg/m²     Labs Reviewed and " "Include    Recent Labs   Lab 04/17/25  0418   CALCIUM 8.7  8.7   ALBUMIN 2.8*   *  150*   K 3.6  3.7   CO2 24  23   *  117*   BUN 17  17   CREATININE 1.2  1.2     Lab Results   Component Value Date    WBC 4.20 04/17/2025    HGB 12.1 (L) 04/17/2025    HCT 38.0 (L) 04/17/2025    MCV 92 04/17/2025     (L) 04/17/2025     No results for input(s): "TSH", "FREET4" in the last 168 hours.  Lab Results   Component Value Date    HGBA1C 13.8 (H) 04/10/2025       Nutritional status:   Body mass index is 24.91 kg/m².  Lab Results   Component Value Date    ALBUMIN 2.8 (L) 04/17/2025    ALBUMIN 3.0 (L) 04/16/2025    ALBUMIN 3.3 (L) 04/15/2025     No results found for: "PREALBUMIN"    Estimated Creatinine Clearance: 97 mL/min (based on SCr of 1.2 mg/dL).    Accu-Checks  Recent Labs     04/16/25  1918 04/16/25  2024 04/16/25  2236 04/17/25  0000 04/17/25  0125 04/17/25  0201 04/17/25  0258 04/17/25  0334 04/17/25  0614 04/17/25  0657   POCTGLUCOSE 146* 119* 101 104 84 89 79 70 59* 99       Current Medications and/or Treatments Impacting Glycemic Control  Immunotherapy:    Immunosuppressants       None          Steroids:   Hormones (From admission, onward)      Start     Stop Route Frequency Ordered    04/15/25 2331  melatonin tablet 6 mg         -- Oral Nightly PRN 04/15/25 2235          Pressors:    Autonomic Drugs (From admission, onward)      None          Hyperglycemia/Diabetes Medications:   Antihyperglycemics (From admission, onward)      Start     Stop Route Frequency Ordered    04/17/25 0900  insulin glargine U-100 (Lantus) pen 15 Units         -- SubQ Daily 04/17/25 0344    04/17/25 0715  insulin aspart U-100 pen 5 Units         -- SubQ 3 times daily with meals 04/17/25 0344    04/17/25 0444  insulin aspart U-100 pen 0-10 Units         -- SubQ Before meals & nightly PRN 04/17/25 0344            ASSESSMENT and PLAN    Cardiac/Vascular  Primary hypertension  On an ACE-I per ADA guidelines. "   Uncontrolled HTN can worsen insulin resistance.         Renal/  Hypernatremia  Currently utilizing D5W drip.   Will evaluate impact of therapy on glucose control and may consider starting transition drip if needed.       Endocrine  Type 1 diabetes mellitus with complications  BG goal: 140-180    - Lantus 15 units QD  - Novolog 5 units TIDWM  - LDC SSI (150/50)   - POCT Glucose before meals, at bedtime and at 2 am  - Hypoglycemia protocol in place      ** Please notify Endocrine for any change and/or advance in diet**  ** Please call Endocrine for any BG related issues **     Discharge Planning:   TBD. Please notify endocrinology prior to discharge.        GI  * Cyclical vomiting syndrome  Patient states his stomach emptying test was normal and does not have gastroparesis. Labs not indicative of a DKA cause.              Manuel Dill, DNP, FNP  Endocrinology  [facility] - Telemetry Stepdown

## 2025-04-17 NOTE — PLAN OF CARE
Date: 04/17/2025      Patient Name: Andrzej Sinclair  YOB: 1985  [address]  [facility] [address]          [facility] Medicine Dept.  [facility] [address] [place] [id number] [phone] after hours  [phone] fax Andrzej Sinclair has been hospitalized at the [facility] since 4/15/2025.  Please excuse the patient from duties.  Patient may return on 4/18/2025.  No restrictions.     Please contact me if you have any questions.                  __________________________  Flako Penaloza MD  04/17/2025

## 2025-04-17 NOTE — ASSESSMENT & PLAN NOTE
Patients blood pressure range in the last 24 hours was: BP  Min: 86/52  Max: 187/90.The patient's inpatient anti-hypertensive regimen is listed below:  Current Antihypertensives       Plan  - BP is uncontrolled, will adjust as follows:  Suspect elevation in part due to pain, will address, and attempt Ace v arb initiation in setting of diabetes after discussing with patient

## 2025-04-17 NOTE — CARE UPDATE
Glucose in 70s despite insulin gtt turned off 3 hours ago. No N/V or abdominal pain. No anion gap based on last labs. Will D/C insulin gtt and start on sq basal prandial insulin given h/o DM1. Will start on diabetic diet.     Joshua Olivares DO  Staff Physician, Hospital Medicine.

## 2025-04-17 NOTE — ASSESSMENT & PLAN NOTE
Patient's FSGs are uncontrolled due to hyperglycemia on current medication regimen.  Last A1c reviewed-   Lab Results   Component Value Date    HGBA1C 13.8 (H) 04/10/2025     Most recent fingerstick glucose reviewed-   Recent Labs   Lab 04/17/25  0258 04/17/25  0334 04/17/25  0614 04/17/25  0657   POCTGLUCOSE 79 70 59* 99     Current correctional scale  Medium  Maintain anti-hyperglycemic dose as follows-   Antihyperglycemics (From admission, onward)      Start     Stop Route Frequency Ordered    04/17/25 0900  insulin glargine U-100 (Lantus) pen 15 Units         -- SubQ Daily 04/17/25 0344    04/17/25 0715  insulin aspart U-100 pen 5 Units         -- SubQ 3 times daily with meals 04/17/25 0344    04/17/25 0444  insulin aspart U-100 pen 0-10 Units         -- SubQ Before meals & nightly PRN 04/17/25 0344          Patient self reported his A1c as 9% but latest value is 13, while DKA is resolved his current insulin regimen will need adjustment while nausea/vomiting symptoms improve.  Re-consulted endocrinology.     If patient develops refractory hyperglycemia - he may require repeat initiation of insulin infuison

## 2025-04-17 NOTE — SUBJECTIVE & OBJECTIVE
Interval History: Ready to go home earlier this morning. Now symptoms have returned.     Review of Systems   Constitutional:  Negative for chills and fever.   Gastrointestinal:  Positive for nausea and vomiting.     Objective:     Vital Signs (Most Recent):  Temp: 98.7 °F (37.1 °C) (04/17/25 0835)  Pulse: 84 (04/17/25 0835)  Resp: 18 (04/17/25 0835)  BP: 121/79 (04/17/25 0835)  SpO2: 97 % (04/17/25 0835) Vital Signs (24h Range):  Temp:  [97.9 °F (36.6 °C)-99 °F (37.2 °C)] 98.7 °F (37.1 °C)  Pulse:  [59-98] 84  Resp:  [16-20] 18  SpO2:  [97 %-99 %] 97 %  BP: (115-185)/(68-94) 121/79     Weight: 88 kg (194 lb 0.1 oz)  Body mass index is 24.91 kg/m².  No intake or output data in the 24 hours ending 04/17/25 0857      Physical Exam  Vitals and nursing note reviewed.   Constitutional:       General: He is not in acute distress.     Appearance: He is well-developed and normal weight. He is not diaphoretic.      Interventions: He is not intubated.  Pulmonary:      Effort: Pulmonary effort is normal. No accessory muscle usage or respiratory distress. He is not intubated.   Skin:     General: Skin is warm and dry.      Coloration: Skin is not jaundiced or pale.   Neurological:      Mental Status: He is alert and oriented to person, place, and time. Mental status is at baseline.      Motor: No seizure activity.   Psychiatric:         Attention and Perception: Attention normal.         Mood and Affect: Mood and affect normal.         Behavior: Behavior is cooperative.               Significant Labs: All pertinent labs within the past 24 hours have been reviewed.    Significant Imaging: I have reviewed all pertinent imaging results/findings within the past 24 hours.

## 2025-04-17 NOTE — PROGRESS NOTES
Discharge instructions reviewed with patient and understanding verbalized.  Patient to follow up with PCP.

## 2025-04-17 NOTE — ASSESSMENT & PLAN NOTE
-new finding opposed to last admission.  He denies any fevers, does report cold intolerance, no dysuria complaints.  There is yeast on UA.  Patient does not have a camacho catheter.  At this time will not start any empiric therapy for acute cystitis.   -urine culture grew >[id number] cfu/mL Staphylococcus epidermidis, which is found on the skin of the penis, not a typical cause of UTI  -repeat UA and treat as cause of symptoms if pyuria and bacteriuria are still present

## 2025-04-17 NOTE — PROGRESS NOTES
[facility] - Telemetry [facility] Medicine  Progress Note    Patient Name: Andrzej Sinclair  MRN: [id number]  Patient Class: IP- Inpatient   Admission Date: 4/15/2025  Length of Stay: 2 days  Attending Physician: Flako Penaloza MD  Primary Care Provider: [facility]        Subjective     Principal Problem:Cyclical vomiting syndrome        HPI:  Andrzej Sinclair is a 39 year old Black man with former smoking, diabetes mellitus type 1 diagnosed at age 20 with polyneuropathy, history of diabetic ketoacidosis (DKA) and hyperosmolar hyperglycemic state (HHS) with delirium, hypertension, hyperlipidemia, history of gastritis on 11/5/2024, history of cyclic vomiting syndrome, history of cannabinoid hyperemesis syndrome. He lives in [place], [place]. He is . 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              He was hospitalized at [facility] from 4/10/2025 to 4/15/2025 for HHS, acute kidney injury, hypernatremia, and hyperkalemia. He was stepped down from the ICU on 4/15/2025. He left against medical advice around 10:00 that day.   He returned to [facility] Emergency Department after going home and getting nausea and epigastric pain when trying to drink water.    In the emergency department, urine toxicology was positive for marijuana. He reported smoking marijuana. Cannabinoid hyperemesis syndrome was discussed with him. Venous blood gas showed alkalosis. Serum ketones were mildly elevated. He was admitted to Hospital Medicine Team U.       Overview/Hospital Course:  Nausea, vomiting, and abdominal pain subsided. Insulin drip was transitioned to bolus insulin. He was started on gabapentin. He felt ready to go home on 4/17/2025. He was prescribed 10 tablets of ondansetron in case nausea recurred. Later in the day, before leaving, symptoms returned.     Interval History: Ready to go home earlier this morning. Now symptoms have returned.      Review of Systems   Constitutional:  Negative for chills and fever.   Gastrointestinal:  Positive for nausea and vomiting.     Objective:     Vital Signs (Most Recent):  Temp: 98.7 °F (37.1 °C) (04/17/25 0835)  Pulse: 84 (04/17/25 0835)  Resp: 18 (04/17/25 0835)  BP: 121/79 (04/17/25 0835)  SpO2: 97 % (04/17/25 0835) Vital Signs (24h Range):  Temp:  [97.9 °F (36.6 °C)-99 °F (37.2 °C)] 98.7 °F (37.1 °C)  Pulse:  [59-98] 84  Resp:  [16-20] 18  SpO2:  [97 %-99 %] 97 %  BP: (115-185)/(68-94) 121/79     Weight: 88 kg (194 lb 0.1 oz)  Body mass index is 24.91 kg/m².  No intake or output data in the 24 hours ending 04/17/25 0857      Physical Exam  Vitals and nursing note reviewed.   Constitutional:       General: He is not in acute distress.     Appearance: He is well-developed and normal weight. He is not diaphoretic.      Interventions: He is not intubated.  Pulmonary:      Effort: Pulmonary effort is normal. No accessory muscle usage or respiratory distress. He is not intubated.   Skin:     General: Skin is warm and dry.      Coloration: Skin is not jaundiced or pale.   Neurological:      Mental Status: He is alert and oriented to person, place, and time. Mental status is at baseline.      Motor: No seizure activity.   Psychiatric:         Attention and Perception: Attention normal.         Mood and Affect: Mood and affect normal.         Behavior: Behavior is cooperative.               Significant Labs: All pertinent labs within the past 24 hours have been reviewed.    Significant Imaging: I have reviewed all pertinent imaging results/findings within the past 24 hours.      Assessment & Plan  Cyclical vomiting syndrome  Marijuana use  Cannabinoid hyperemesis syndrome   Recent marijuana use. Symptoms intermittent. Antiemetics prn. Advance diet. Prescribe ondansetron.  Utox is positive for THC, patient admits to smoking marijuana, he reported he had stopped or decreased use after being told it was contributing to  vomiting symptoms but thought he could try small amount w/o incurring harm. He was advised full cessation to avoid similar side effects.           Hypernatremia  Hypernatremia is likely due to Dehydration. The patient's most recent sodium results are listed below.  Recent Labs     04/16/25  1637 04/17/25  0418 04/17/25  0839   * 149*  150* 149*     Plan  - Aim to correct the sodium by 8-10mEq in 24 hours.   - Will plan to trend the patient's sodium: Every 6 hours  - The patient's hypernatremia is improving  - Give  cc/hr x 16 hours. Continue monitoring labs.    Polyneuropathy due to type 1 diabetes mellitus  Patient reporting on exam that when checking for peripheral edema in legs he has numbness sensation in he right ankle/foot.     Type 1 diabetes mellitus with hyperglycemia  Patient's FSGs are uncontrolled due to hyperglycemia on current medication regimen.  Last A1c reviewed-   Lab Results   Component Value Date    HGBA1C 13.8 (H) 04/10/2025     Most recent fingerstick glucose reviewed-   Recent Labs   Lab 04/17/25  0258 04/17/25  0334 04/17/25  0614 04/17/25  0657   POCTGLUCOSE 79 70 59* 99     Current correctional scale  Medium  Maintain anti-hyperglycemic dose as follows-   Antihyperglycemics (From admission, onward)      Start     Stop Route Frequency Ordered    04/17/25 0900  insulin glargine U-100 (Lantus) pen 15 Units         -- SubQ Daily 04/17/25 0344    04/17/25 0715  insulin aspart U-100 pen 5 Units         -- SubQ 3 times daily with meals 04/17/25 0344    04/17/25 0444  insulin aspart U-100 pen 0-10 Units         -- SubQ Before meals & nightly PRN 04/17/25 0344          Patient self reported his A1c as 9% but latest value is 13, while DKA is resolved his current insulin regimen will need adjustment while nausea/vomiting symptoms improve.  Re-consulted endocrinology.     If patient develops refractory hyperglycemia - he may require repeat initiation of insulin infuison  Primary  hypertension  Patients blood pressure range in the last 24 hours was: BP  Min: 86/52  Max: 187/90.The patient's inpatient anti-hypertensive regimen is listed below:  Current Antihypertensives       Plan  - BP is uncontrolled, will adjust as follows:  Suspect elevation in part due to pain, will address, and attempt Ace v arb initiation in setting of diabetes after discussing with patient      Low back pain  Reports recent use of gabapentin in hospital, reviewed last hospital encounter and did not see any.  With his report of numbness in right foot and also now A1c of 13, started gabapentin 200 mg BID and titrate frequency/dose as patient tolerates.     Pyuria  -new finding opposed to last admission.  He denies any fevers, does report cold intolerance, no dysuria complaints.  There is yeast on UA.  Patient does not have a camacho catheter.  At this time will not start any empiric therapy for acute cystitis.   -urine culture grew >141726 cfu/mL Staphylococcus epidermidis, which is found on the skin of the penis, not a typical cause of UTI  -repeat UA and treat as cause of symptoms if pyuria and bacteriuria are still present  Type 1 diabetes mellitus with complications      Cannabinoid hyperemesis syndrome      VTE Risk Mitigation (From admission, onward)           Ordered     enoxaparin injection 40 mg  Every 24 hours         04/15/25 2230     IP VTE LOW RISK PATIENT  Once         04/16/25 0045     Place sequential compression device  Until discontinued         04/16/25 0045                    Discharge Planning   PRECIOUS: 4/17/2025     Code Status: Full Code   Medical Readiness for Discharge Date: 4/17/2025                           Flako Penaloza MD  Department of Hospital Medicine   [facility] - Telemetry Stepdown

## 2025-04-17 NOTE — SUBJECTIVE & OBJECTIVE
"Interval HPI:   Overnight events: No acute events overnight. Patient in room 8097/8097 A. Blood glucose stable. BG at goal on current insulin regimen (SSI, prandial, and basal insulin ). Steroid use- None.    Renal function- Normal   Vasopressors-  None       Endocrine will continue to follow and manage insulin orders inpatient.         Diet Consistent Carbohydrate 2000 Calories (up to 75 gm per meal)  Diet diabetic     Eatin%  Nausea: No  Hypoglycemia and intervention: No  Fever: No  TPN and/or TF: No      /79 (BP Location: Right arm, Patient Position: Lying)   Pulse 84   Temp 98.7 °F (37.1 °C) (Oral)   Resp 18   Ht 6' 2" (1.88 m)   Wt 88 kg (194 lb 0.1 oz)   SpO2 97%   BMI 24.91 kg/m²     Labs Reviewed and Include    Recent Labs   Lab 25  0418   CALCIUM 8.7  8.7   ALBUMIN 2.8*   *  150*   K 3.6  3.7   CO2 24  23   *  117*   BUN 17  17   CREATININE 1.2  1.2     Lab Results   Component Value Date    WBC 4.20 2025    HGB 12.1 (L) 2025    HCT 38.0 (L) 2025    MCV 92 2025     (L) 2025     No results for input(s): "TSH", "FREET4" in the last 168 hours.  Lab Results   Component Value Date    HGBA1C 13.8 (H) 04/10/2025       Nutritional status:   Body mass index is 24.91 kg/m².  Lab Results   Component Value Date    ALBUMIN 2.8 (L) 2025    ALBUMIN 3.0 (L) 2025    ALBUMIN 3.3 (L) 04/15/2025     No results found for: "PREALBUMIN"    Estimated Creatinine Clearance: 97 mL/min (based on SCr of 1.2 mg/dL).    Accu-Checks  Recent Labs     25  1918 25  2236 25  0000 25  0125 25  0201 25  0258 25  0334 25  0614 25  0657   POCTGLUCOSE 146* 119* 101 104 84 89 79 70 59* 99       Current Medications and/or Treatments Impacting Glycemic Control  Immunotherapy:    Immunosuppressants       None          Steroids:   Hormones (From admission, onward)      Start     Stop Route " Frequency Ordered    04/15/25 2331  melatonin tablet 6 mg         -- Oral Nightly PRN 04/15/25 2235          Pressors:    Autonomic Drugs (From admission, onward)      None          Hyperglycemia/Diabetes Medications:   Antihyperglycemics (From admission, onward)      Start     Stop Route Frequency Ordered    04/17/25 0900  insulin glargine U-100 (Lantus) pen 15 Units         -- SubQ Daily 04/17/25 0344    04/17/25 0715  insulin aspart U-100 pen 5 Units         -- SubQ 3 times daily with meals 04/17/25 0344    04/17/25 0444  insulin aspart U-100 pen 0-10 Units         -- SubQ Before meals & nightly PRN 04/17/25 0344

## 2025-04-17 NOTE — ASSESSMENT & PLAN NOTE
Hypernatremia is likely due to Dehydration. The patient's most recent sodium results are listed below.  Recent Labs     04/16/25  1637 04/17/25  0418 04/17/25  0839   * 149*  150* 149*     Plan  - Aim to correct the sodium by 8-10mEq in 24 hours.   - Will plan to trend the patient's sodium: Every 6 hours  - The patient's hypernatremia is improving  - Give  cc/hr x 16 hours. Continue monitoring labs.

## 2025-04-17 NOTE — ASSESSMENT & PLAN NOTE
Reports recent use of gabapentin in hospital, reviewed last hospital encounter and did not see any.  With his report of numbness in right foot and also now A1c of 13, started gabapentin 200 mg BID and titrate frequency/dose as patient tolerates.

## 2025-04-17 NOTE — DISCHARGE SUMMARY
[facility] - Telemetry [facility] Medicine  Discharge Summary      Patient Name: Andrzej Sinclair  MRN: [id number]  REKHA: [id number]  Patient Class: IP- Inpatient  Admission Date: 4/15/2025  Hospital Length of Stay: 3 days  Discharge Date and Time: 4/18/2025 11:43 AM  Attending Physician: Flako Penaloza MD   Discharging Provider: Flako Penaloza MD  Primary Care Provider: Aubrie Primary Doctor  [facility] Medicine Team: [facility] HOSP MED U Flako Penaloza MD  Primary Care Team: [facility] MED     HPI:   Andrzej Sinclair is a 39 year old Black man with former smoking, diabetes mellitus type 1 diagnosed at age 20 with polyneuropathy, history of diabetic ketoacidosis (DKA) and hyperosmolar hyperglycemic state (HHS) with delirium, hypertension, hyperlipidemia, history of gastritis on 11/5/2024, history of cyclic vomiting syndrome, history of cannabinoid hyperemesis syndrome. He lives in [place], [place]. He is . 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              He was hospitalized at [facility] from 4/10/2025 to 4/15/2025 for HHS, acute kidney injury, hypernatremia, and hyperkalemia. He was stepped down from the ICU on 4/15/2025. He left against medical advice around 10:00 that day.   He returned to [facility] Emergency Department after going home and getting nausea and epigastric pain when trying to drink water.    In the emergency department, urine toxicology was positive for marijuana. He reported smoking marijuana. Cannabinoid hyperemesis syndrome was discussed with him. Venous blood gas showed alkalosis. Serum ketones were mildly elevated. He was admitted to Hospital Medicine Team U.       Hospital Course:   He was put on scheduled acetaminophen, topical capsaicin, methocarbamol, and dicyclomine. He was given ondansetron and prochlorperazine as needed. Nausea, vomiting, and abdominal pain subsided. Insulin drip was transitioned to bolus insulin.  He was started on gabapentin. He felt ready to go home on 4/17/2025. He was prescribed 10 tablets of ondansetron in case nausea recurred. Later in the day, before leaving, symptoms returned. Sodium was improving but still high at 149 mmol/L. He was given a dose of prochlorperazine and 1 liter of lactated Ringer's solution over 16 hours. Diet was advanced from full liquid to diabetic. He did not require any nausea medication afterward. The next morning, he felt ready to go home.     Goals of Care Treatment Preferences:  Code Status: Full Code      SDOH Screening:  The patient was unable to be screened for utility difficulties, food insecurity, transport difficulties, housing insecurity, and interpersonal safety, so no concerns could be identified this admission.     Consults:   Consults (From admission, onward)          Status Ordering Provider     Inpatient consult to Endocrinology  Once        Provider:  (Not yet assigned)    Completed HARINDER CERVANTES          Physical Exam  Vitals and nursing note reviewed.   Constitutional:       General: He is not in acute distress.     Appearance: He is well-developed. He is normal weight. He is not diaphoretic.      Interventions: He is not intubated.  Pulmonary:      Effort: Pulmonary effort is normal. No accessory muscle usage or respiratory distress. He is not intubated.   Skin:     General: Skin is warm and dry.      Findings: No rash.   Neurological:      Mental Status: He is alert. Mental status is at baseline.      Motor: No atrophy or seizure activity.   Psychiatric:         Mood and Affect: Mood and affect normal.         Behavior: Behavior is not agitated, aggressive, hyperactive or combative.     Final Active Diagnoses:    Diagnosis Date Noted POA    PRINCIPAL PROBLEM:  Cyclical vomiting syndrome [R11.15] 07/02/2018 Yes     Chronic    Pyuria [R82.81] 04/16/2025 Yes    Marijuana use [F12.90] 04/16/2025 Yes     Chronic    Low back pain [M54.50] 04/15/2025 Yes     Type 1 diabetes mellitus with hyperglycemia [E10.65] 09/07/2023 Yes     Chronic    Hypernatremia [E87.0] 09/07/2023 Yes    Cannabinoid hyperemesis syndrome [R11.2, F12.90] 04/22/2021 Yes     Chronic    Type 1 diabetes mellitus with complications [E10.8] 03/10/2021 Yes     Chronic    Primary hypertension [I10] 03/04/2021 Yes     Chronic    Polyneuropathy due to type 1 diabetes mellitus [E10.42] 07/31/2018 Yes     Chronic      Problems Resolved During this Admission:    Diagnosis Date Noted Date Resolved POA    Hypokalemia [E87.6] 04/16/2025 04/17/2025 No       Discharged Condition: good    Disposition: Home or Self Care    Follow Up:   Follow-up Information       [facility] Follow up on 4/22/2025.    Why: Established 's hospital f/u visit @ 1:00 pm. Please bring discharge summary, ID, insurance card, and medication list.  Contact information:  [address] [place] [address] [phone]                         Patient Instructions:      Diet diabetic     Notify your health care provider if you experience any of the following:  persistent nausea and vomiting or diarrhea     Activity as tolerated       Significant Diagnostic Studies: none    Medications:  Reconciled Home Medications:      Medication List        CHANGE how you take these medications      aluminum-magnesium hydroxide-simethicone 200-200-20 mg/5 mL Susp  Commonly known as: MAALOX  Take 30 mLs by mouth 4 (four) times daily as needed (heartburn).  What changed: when to take this     DEXCOM G7 SENSOR Martha  Generic drug: blood-glucose sensor  Apply one sensor to the skin every 10 days  What changed: additional instructions     insulin degludec 100 unit/mL (3 mL) insulin pen  Commonly known as: TRESIBA FLEXTOUCH U-100  Inject 22 Units into the skin once daily.  What changed: how much to take     LYUMJEV KWIKPEN U-100 INSULIN 100 unit/mL pen  Generic drug: insulin lispro-aabc  Inject 8 Units into the skin 3 (three) times daily  "with meals. Take additional sliding scale based on the blood sugars.  Maximum daily dose of 36 units.  What changed: how much to take     ondansetron 4 MG Tbdl  Commonly known as: ZOFRAN-ODT  Take 1 tablet (4 mg total) by mouth every 6 (six) hours as needed (nausea).  What changed: reasons to take this            CONTINUE taking these medications      BD ULTRA-FINE ORIG PEN NEEDLE 29 gauge x 1/2" Ndle  Generic drug: pen needle, diabetic  Use to inject insulin into the skin.     blood sugar diagnostic Strp  3-4 times a day     blood-glucose meter kit  Use as instructed     GLUCAGON EMERGENCY KIT (HUMAN) 1 mg injection  Generic drug: glucagon  Inject 1 mg into the muscle as needed (Hypoglycemia).     insulin syringe-needle U-100 0.5 mL 30 gauge x 5/16" Syrg  Commonly known as: TRUEPLUS INSULIN  USE FOUR TIMES DAILY AS DIRECTED     lancets 31 gauge Misc  1 lancet  by Misc.(Non-Drug; Combo Route) route 2 (two) times daily.     lisinopriL 10 MG tablet  Take 1 tablet (10 mg total) by mouth once daily.     pantoprazole 40 MG tablet  Commonly known as: PROTONIX  Take 1 tablet (40 mg total) by mouth once daily.              Indwelling Lines/Drains at time of discharge: none      Time spent on the discharge of patient: 35 minutes         Flako Penaloza MD  Department of Hospital Medicine  [facility] - Telemetry Stepdown  "

## 2025-04-17 NOTE — ASSESSMENT & PLAN NOTE
BG goal: 140-180    - Lantus 15 units QD  - Novolog 5 units TIDWM  - LDC SSI (150/50)   - POCT Glucose before meals, at bedtime and at 2 am  - Hypoglycemia protocol in place      ** Please notify Endocrine for any change and/or advance in diet**  ** Please call Endocrine for any BG related issues **     Discharge Planning:   TBD. Please notify endocrinology prior to discharge.

## 2025-04-17 NOTE — NURSING
Patient complaining of nausea and lightheadedness. MD notified.     MD postponed discharge pending labs. Care ongoing.

## 2025-04-17 NOTE — PHARMACY MED REC
"Admission Medication History     The home medication history was taken by Jose Ayala.    You may go to "Admission" then "Reconcile Home Medications" tabs to review and/or act upon these items.     The home medication list has been updated by the Pharmacy department.   Please read ALL comments highlighted in yellow.   Please address this information as you see fit.    Feel free to contact us if you have any questions or require assistance.      Medications listed below were obtained from: Patient/family and Analytic software- [employer] Medications   Medication Sig    aluminum-magnesium hydroxide-simethicone (MAALOX) 200-200-20 mg/5 mL Susp Take 30 mLs by mouth 4 (four) times daily as needed (heartburn). (Patient taking differently: Take 30 mLs by mouth as needed (heartburn).        blood sugar diagnostic Strp 3-4 times a day      blood-glucose meter kit Use as instructed      blood-glucose sensor (DEXCOM G7 SENSOR) Martha Apply one sensor to the skin every 7 days.        glucagon (GLUCAGON EMERGENCY KIT, HUMAN,) 1 mg SolR Inject 1 mg into the muscle as needed (Hypoglycemia).        insulin degludec (TRESIBA FLEXTOUCH U-100) 100 unit/mL (3 mL) insulin pen Inject 7 Units into the skin once daily.          insulin lispro-aabc (LYUMJEV KWIKPEN U-100 INSULIN) 100 unit/mL pen Inject 7 Units into the skin 3 (three) times daily with meals. Take additional sliding scale based on the blood sugars.  Maximum daily dose of 36 units.      insulin syringe-needle U-100 (TRUEPLUS INSULIN) 0.5 mL 30 gauge x 5/16" Syrg USE FOUR TIMES DAILY AS DIRECTED          lancets 31 gauge Misc 1 lancet  by Misc.(Non-Drug; Combo Route) route 2 (two) times daily.      lisinopriL 10 MG tablet Take 1 tablet (10 mg total) by mouth once daily.      pantoprazole (PROTONIX) 40 MG tablet Take 1 tablet (40 mg total) by mouth once daily.        pen needle, diabetic 29 gauge x 1/2" Ndle Use to inject insulin into the skin.        [DISCONTINUED] ondansetron " (ZOFRAN-ODT) 4 MG TbDL Take 1 tablet (4 mg total) by mouth every 6 (six) hours as needed. REORDERED             Jose Ayala  EXT [id number]                 .

## 2025-04-17 NOTE — HOSPITAL COURSE
He was put on scheduled acetaminophen, topical capsaicin, methocarbamol, and dicyclomine. He was given ondansetron and prochlorperazine as needed. Nausea, vomiting, and abdominal pain subsided. Insulin drip was transitioned to bolus insulin. He was started on gabapentin. He felt ready to go home on 4/17/2025. He was prescribed 10 tablets of ondansetron in case nausea recurred. Later in the day, before leaving, symptoms returned. Sodium was improving but still high at 149 mmol/L. He was given a dose of prochlorperazine and 1 liter of lactated Ringer's solution over 16 hours. Diet was advanced from full liquid to diabetic. He did not require any nausea medication afterward. The next morning, he felt ready to go home.

## 2025-04-17 NOTE — ASSESSMENT & PLAN NOTE
Cannabinoid hyperemesis syndrome   Recent marijuana use. Symptoms intermittent. Antiemetics prn. Advance diet. Prescribe ondansetron.  Utox is positive for THC, patient admits to smoking marijuana, he reported he had stopped or decreased use after being told it was contributing to vomiting symptoms but thought he could try small amount w/o incurring harm. He was advised full cessation to avoid similar side effects.

## 2025-04-18 VITALS
BODY MASS INDEX: 25.18 KG/M2 | RESPIRATION RATE: 19 BRPM | DIASTOLIC BLOOD PRESSURE: 76 MMHG | HEIGHT: 74 IN | OXYGEN SATURATION: 99 % | TEMPERATURE: 98 F | WEIGHT: 196.19 LBS | HEART RATE: 70 BPM | SYSTOLIC BLOOD PRESSURE: 132 MMHG

## 2025-04-18 LAB
ALBUMIN SERPL BCP-MCNC: 3 G/DL (ref 3.5–5.2)
ANION GAP (OHS): 9 MMOL/L (ref 8–16)
BACTERIA #/AREA URNS AUTO: ABNORMAL /HPF
BILIRUB UR QL STRIP.AUTO: NEGATIVE
BUN SERPL-MCNC: 17 MG/DL (ref 6–20)
CALCIUM SERPL-MCNC: 8.8 MG/DL (ref 8.7–10.5)
CHLORIDE SERPL-SCNC: 112 MMOL/L (ref 95–110)
CLARITY UR: CLEAR
CO2 SERPL-SCNC: 27 MMOL/L (ref 23–29)
COLOR UR AUTO: YELLOW
CREAT SERPL-MCNC: 1.4 MG/DL (ref 0.5–1.4)
GFR SERPLBLD CREATININE-BSD FMLA CKD-EPI: >60 ML/MIN/1.73/M2
GLUCOSE SERPL-MCNC: 125 MG/DL (ref 70–110)
GLUCOSE UR QL STRIP: NEGATIVE
HGB UR QL STRIP: ABNORMAL
HOLD SPECIMEN: NORMAL
KETONES UR QL STRIP: ABNORMAL
LEUKOCYTE ESTERASE UR QL STRIP: ABNORMAL
MAGNESIUM SERPL-MCNC: 1.7 MG/DL (ref 1.6–2.6)
MICROSCOPIC COMMENT: ABNORMAL
NITRITE UR QL STRIP: POSITIVE
PH UR STRIP: 6 [PH]
PHOSPHATE SERPL-MCNC: 2.8 MG/DL (ref 2.7–4.5)
POCT GLUCOSE: 136 MG/DL (ref 70–110)
POCT GLUCOSE: 164 MG/DL (ref 70–110)
POTASSIUM SERPL-SCNC: 3.7 MMOL/L (ref 3.5–5.1)
PROT UR QL STRIP: ABNORMAL
RBC #/AREA URNS AUTO: <1 /HPF (ref 0–4)
SODIUM SERPL-SCNC: 148 MMOL/L (ref 136–145)
SP GR UR STRIP: 1.02
SQUAMOUS #/AREA URNS AUTO: <1 /HPF
UROBILINOGEN UR STRIP-ACNC: ABNORMAL EU/DL
WBC #/AREA URNS AUTO: 9 /HPF (ref 0–5)

## 2025-04-18 PROCEDURE — 63600175 PHARM REV CODE 636 W HCPCS: Performed by: HOSPITALIST

## 2025-04-18 PROCEDURE — 25000003 PHARM REV CODE 250: Performed by: HOSPITALIST

## 2025-04-18 PROCEDURE — 83735 ASSAY OF MAGNESIUM: CPT | Performed by: HOSPITALIST

## 2025-04-18 PROCEDURE — 80069 RENAL FUNCTION PANEL: CPT | Performed by: HOSPITALIST

## 2025-04-18 PROCEDURE — 36415 COLL VENOUS BLD VENIPUNCTURE: CPT | Performed by: HOSPITALIST

## 2025-04-18 PROCEDURE — 81003 URINALYSIS AUTO W/O SCOPE: CPT | Performed by: HOSPITALIST

## 2025-04-18 PROCEDURE — 25000003 PHARM REV CODE 250: Performed by: STUDENT IN AN ORGANIZED HEALTH CARE EDUCATION/TRAINING PROGRAM

## 2025-04-18 RX ADMIN — INSULIN GLARGINE 15 UNITS: 100 INJECTION, SOLUTION SUBCUTANEOUS at 08:04

## 2025-04-18 RX ADMIN — DICYCLOMINE HYDROCHLORIDE 10 MG: 10 CAPSULE ORAL at 08:04

## 2025-04-18 RX ADMIN — METHOCARBAMOL 500 MG: 500 TABLET ORAL at 08:04

## 2025-04-18 RX ADMIN — GABAPENTIN 200 MG: 100 CAPSULE ORAL at 08:04

## 2025-04-18 RX ADMIN — FAMOTIDINE 20 MG: 20 TABLET, FILM COATED ORAL at 08:04

## 2025-04-18 RX ADMIN — INSULIN ASPART 5 UNITS: 100 INJECTION, SOLUTION INTRAVENOUS; SUBCUTANEOUS at 08:04

## 2025-04-18 RX ADMIN — INSULIN ASPART 2 UNITS: 100 INJECTION, SOLUTION INTRAVENOUS; SUBCUTANEOUS at 08:04

## 2025-04-18 RX ADMIN — CAPSAICIN: 0.25 CREAM TOPICAL at 08:04

## 2025-04-18 NOTE — PLAN OF CARE
Patient AAOx4, able to make needs known to staff. Indep. generalized weakness noted. No episodes of nausea and vomiting. Plan of care reviewed. No apparent distress noted. Safety maintained.       Problem: Pain Acute  Goal: Optimal Pain Control and Function  Outcome: Progressing     Problem: Fatigue  Goal: Improved Activity Tolerance  Outcome: Progressing

## 2025-04-18 NOTE — PLAN OF CARE
Date: 04/18/2025      Patient Name: Andrzej Sinclair  YOB: 1985  [address]  [place] [address]          [facility] Medicine Dept.  [facility] [address] [place] [id number] [phone] after hours  [phone] fax Andrzej Sinclair has been hospitalized at the [facility] since 4/15/2025.  Please excuse the patient from duties.  Patient may return on 4/19/2025 (or whenever the next scheduled work day is if off for "[employer]  No restrictions.     Please contact me if you have any questions.                  __________________________  Flako Penaloza MD  04/18/2025

## 2025-04-18 NOTE — PLAN OF CARE
04/18/25 1031   Readmission   Was this a planned readmission? No   Why were you hospitalized in the last 30 days? HHS   Why were you readmitted? New medical problem   When you left the hospital how did you feel? Patient left AMA   When you left the hospital where did you go? Home with Family   Did patient/caregiver refused recommended DC plan? Yes   Tell me about what happened between when you left the hospital and the day you returned. declined   When did you start not feeling well? a few days ago   Did you try to manage your symptoms your self? No   Did you call anyone? No   Did you try to see or did see a doctor or nurse before you came? No   Did you have  a follow-up appointment on discharge? No         Discharge Plan A and Plan B have been determined by review of patient's clinical status, future medical and therapeutic needs, and coverage/benefits for post-acute care in coordination with multidisciplinary team members.      Eunice Torres RN     [phone]

## 2025-04-18 NOTE — PLAN OF CARE
[facility] - Telemetry Stepdown  Initial Discharge Assessment       Primary Care Provider: Aubrie, Primary Doctor    Admission Diagnosis: Cyclical vomiting syndrome [R11.15]  Hypernatremia [E87.0]  Primary hypertension [I10]  Nausea & vomiting [R11.2]  Chest pain [R07.9]  Type 1 diabetes mellitus with complications [E10.8]    Admission Date: 4/15/2025  Expected Discharge Date: 4/18/2025    Transition of Care Barriers: None    Payor: [facility] MANAGED MCARE / Plan: [facility] DUAL COMPLETE HMO SNP / Product Type: Medicare Advantage /     Extended Emergency Contact Information  Primary Emergency Contact: MARZENA OLSON   [facility]  Mobile Phone: [phone]  Relation: Spouse    Discharge Plan A: Home with family  Discharge Plan B: Home with family      [id number] - [place], [place] - [address] AT [facility] [address]  [address]  [place] [address]  Phone: [phone] Fax: [phone]    [id number] - [place], [place] - [address] AT [facility] [address] [facility]  [address]  [place] [address]  Phone: [phone] Fax: [phone]    [id number] - [place], [place] - [address] AT [facility] [address] [facility] [address]  [address]  [place] [address]  Phone: [phone] Fax: [phone]    CM met with patient in room for Discharge Planning Assessment.  Patient able to answer questions.  Per patient, he lives with Marzena Olson (spouse) [phone]  in a 1st floor apartment with 0 step(s) to enter.   Per patient, he was independent with ADLS and used no DME for ambulation. Per patient, he is not on dialysis, and does not take Coumadin. PCP(new [facility] PCP to be set up) and pharmacy verified. Patient does not have home health, and has been hospitalized in past 30 days.  Patient will make his own Lyft ride home upon discharge.   Discharge Planning  discussed.  All questions addressed.  CM will follow for needs.    Discharge Plan A and Plan B have been determined by review of patient's clinical status, future medical and therapeutic needs, and coverage/benefits for post-acute care in coordination with multidisciplinary team members.    Initial Assessment (most recent)       Adult Discharge Assessment - 04/18/25 1026          Discharge Assessment    Assessment Type Discharge Planning Assessment     Confirmed/corrected address, phone number and insurance Yes     Confirmed Demographics Correct on Facesheet     Source of Information patient     Does patient/caregiver understand observation status Yes     Communicated PRECIOUS with patient/caregiver Yes     Reason For Admission Cyclical vomiting syndrome     People in Home spouse     Facility Arrived From: Home     Do you expect to return to your current living situation? Yes     Do you have help at home or someone to help you manage your care at home? Yes     Who are your caregiver(s) and their phone number(s)? Marzena Jones (spouse) [phone]     Prior to hospitilization cognitive status: Alert/Oriented     Current cognitive status: Alert/Oriented     Walking or Climbing Stairs Difficulty no     Dressing/Bathing Difficulty no     Home Layout Able to live on 1st floor     Equipment Currently Used at Home none     Readmission within 30 days? Yes     Patient currently being followed by outpatient case management? No     Do you currently have service(s) that help you manage your care at home? No     Do you take prescription medications? Yes     Do you have prescription coverage? Yes     Coverage [facility] DUAL COMPLETE HMO SNP     Do you have any problems affording any of your prescribed medications? TBD     Is the patient taking medications as prescribed? yes     Who is going to help you get home at discharge? patient will take his own Lyft ride home     How do you get to doctors  appointments? car, drives self     Are you on dialysis? No     Do you take coumadin? No     Discharge Plan A Home with family     Discharge Plan B Home with family     DME Needed Upon Discharge  none     Discharge Plan discussed with: Patient     Transition of Care Barriers None        Physical Activity    On average, how many days per week do you engage in moderate to strenuous exercise (like a brisk walk)? 0 days     On average, how many minutes do you engage in exercise at this level? 0 min        Financial Resource Strain    How hard is it for you to pay for the very basics like food, housing, medical care, and heating? Somewhat hard        Housing Stability    In the last 12 months, was there a time when you were not able to pay the mortgage or rent on time? No     At any time in the past 12 months, were you homeless or living in a shelter (including now)? No        Transportation Needs    In the past 12 months, has lack of transportation kept you from medical appointments or from getting medications? No     In the past 12 months, has lack of transportation kept you from meetings, work, or from getting things needed for daily living? No        Food Insecurity    Within the past 12 months, you worried that your food would run out before you got the money to buy more. Sometimes true     Within the past 12 months, the food you bought just didn't last and you didn't have money to get more. Sometimes true        Stress    Do you feel stress - tense, restless, nervous, or anxious, or unable to sleep at night because your mind is troubled all the time - these days? To some extent        Social Isolation    How often do you feel lonely or isolated from those around you?  Rarely        Alcohol Use    Q1: How often do you have a drink containing alcohol? Monthly or less     Q2: How many drinks containing alcohol do you have on a typical day when you are drinking? 1 or 2     Q3: How often do you have six or more drinks on  one occasion? Never        Utilities    In the past 12 months has the electric, gas, oil, or water company threatened to shut off services in your home? No        Health Literacy    How often do you need to have someone help you when you read instructions, pamphlets, or other written material from your doctor or pharmacy? Never        OTHER    Name(s) of People in Home Marzena Jones (spouse) [phone]                     Readmission Assessment (most recent)       Readmission Assessment - 04/18/25 1031          Readmission    Was this a planned readmission? No     Why were you hospitalized in the last 30 days? HHS     Why were you readmitted? New medical problem     When you left the hospital how did you feel? Patient left AMA     When you left the hospital where did you go? Home with Family     Did patient/caregiver refused recommended DC plan? Yes     Tell me about what happened between when you left the hospital and the day you returned. declined     When did you start not feeling well? a few days ago     Did you try to manage your symptoms your self? No     Did you call anyone? No     Did you try to see or did see a doctor or nurse before you came? No     Did you have  a follow-up appointment on discharge? No                    Eunice Torres RN     [phone]

## 2025-04-18 NOTE — CARE UPDATE
Care Update:     No acute events overnight. Patient in room 8097/8097 A. Blood glucose stable on current inpatient regimen.No hypoglycemia noted over the past 24-hours. Endocrine will continue to follow and manage insulin orders inpatient.       Steroid use- None.   Renal function-   Lab Results   Component Value Date    CREATININE 1.4 04/18/2025        Diet diabetic  Diet Consistent Carbohydrate 2000 Calories (up to 75 gm per meal)     POCT Glucose   Date Value Ref Range Status   04/17/2025 80 70 - 110 mg/dL Final   04/17/2025 136 (H) 70 - 110 mg/dL Final   04/17/2025 238 (H) 70 - 110 mg/dL Final   04/17/2025 207 (H) 70 - 110 mg/dL Final   04/17/2025 151 (H) 70 - 110 mg/dL Final   04/17/2025 99 70 - 110 mg/dL Final   04/17/2025 59 (L) 70 - 110 mg/dL Final   04/17/2025 70 70 - 110 mg/dL Final   04/17/2025 79 70 - 110 mg/dL Final   04/17/2025 89 70 - 110 mg/dL Final   04/17/2025 84 70 - 110 mg/dL Final   04/17/2025 104 70 - 110 mg/dL Final   04/16/2025 101 70 - 110 mg/dL Final   04/16/2025 119 (H) 70 - 110 mg/dL Final   04/16/2025 146 (H) 70 - 110 mg/dL Final   04/16/2025 255 (H) 70 - 110 mg/dL Final   04/16/2025 208 (H) 70 - 110 mg/dL Final   04/16/2025 122 (H) 70 - 110 mg/dL Final   04/16/2025 96 70 - 110 mg/dL Final   04/15/2025 290 (H) 70 - 110 mg/dL Final     Lab Results   Component Value Date    HGBA1C 13.8 (H) 04/10/2025         Home Medications   Diabetes Medications              glucagon (GLUCAGON EMERGENCY KIT, HUMAN,) 1 mg SolR Inject 1 mg into the muscle as needed (Hypoglycemia).    insulin degludec (TRESIBA FLEXTOUCH U-100) 100 unit/mL (3 mL) insulin pen Inject 22 Units into the skin once daily.    insulin lispro-aabc (LYUMJEV KWIKPEN U-100 INSULIN) 100 unit/mL pen Inject 8 Units into the skin 3 (three) times daily with meals. Take additional sliding scale based on the blood sugars.  Maximum daily dose of 36 units.          Endocrine  Type 1 diabetes mellitus with complications  BG goal: 140-180     -  Lantus 15 units QD  - Novolog 5 units TIDWM  - LDC SSI (150/50)   - POCT Glucose before meals, at bedtime and at 2 am  - Hypoglycemia protocol in place      ** Please notify Endocrine for any change and/or advance in diet**  ** Please call Endocrine for any BG related issues **     Discharge Planning:   Can likely resume home regimen at discharge. Recommend patient follow-up in the endocrine clinic to discuss options for optimizing blood glucose control. Patient requiring less insulin here due to poorer appetite on current admission. Discussed with patient on rounds yesterday that once appetite improves, can resume previous regimen, but for now, Lantus 15 and Novolog 5 units with meals plus SSI should aid in maintain blood glucose control.     Manuel Dill DNP, FNP-C  Department of Endocrinology  Inpatient Glycemic Management

## 2025-04-18 NOTE — PLAN OF CARE
FADUMO sent message to [facility] location to call patient with available appointment schedule was booked.    FADUMO Paul  [phone]

## 2025-04-21 NOTE — PLAN OF CARE
[facility] - Telemetry Stepdown  Discharge Final Note    Primary Care Provider: Aubrie, Primary Doctor    Expected Discharge Date: 4/18/2025    Patient discharged 4/18/2025 home with no needs. Family provided transport home.      [facility]    [address] [address]  [phone]      Next Steps: Follow up on 4/22/2025   Instructions: Established pt's hospital f/u visit @ 1:00 pm. Please bring discharge summary, ID, insurance card, and medication list.         Final Discharge Note (most recent)       Final Note - 04/21/25 0900          Final Note    Assessment Type Final Discharge Note     Anticipated Discharge Disposition Home or Self Care     Hospital Resources/Appts/Education Provided Appointments scheduled and added to AVS        Post-Acute Status    Coverage [facility] DUAL COMPLETE HMO SNP     Discharge Delays None known at this time                     Important Message from Medicare             Contact Info       [facility]    [address] [address]  [phone]       Next Steps: Follow up on 4/22/2025    Instructions: Established pt's hospital f/u visit @ 1:00 pm. Please bring discharge summary, ID, insurance card, and medication list.          Eunice Torres RN     [phone]

## 2025-04-22 ENCOUNTER — HOSPITAL ENCOUNTER (INPATIENT)
Facility: HOSPITAL | Age: 40
LOS: 2 days | Discharge: HOME | DRG: 637 | End: 2025-04-24
Attending: STUDENT IN AN ORGANIZED HEALTH CARE EDUCATION/TRAINING PROGRAM | Admitting: INTERNAL MEDICINE
Payer: MEDICARE

## 2025-04-22 DIAGNOSIS — R94.31 QT PROLONGATION: ICD-10-CM

## 2025-04-22 DIAGNOSIS — E10.65 TYPE 1 DIABETES MELLITUS WITH HYPERGLYCEMIA: ICD-10-CM

## 2025-04-22 DIAGNOSIS — E86.0 DEHYDRATION: ICD-10-CM

## 2025-04-22 DIAGNOSIS — E87.5 HYPERKALEMIA: ICD-10-CM

## 2025-04-22 DIAGNOSIS — R53.1 WEAKNESS: ICD-10-CM

## 2025-04-22 DIAGNOSIS — E10.10 TYPE 1 DIABETES MELLITUS WITH KETOACIDOSIS WITHOUT COMA: Primary | ICD-10-CM

## 2025-04-22 DIAGNOSIS — E87.20 ACIDOSIS: ICD-10-CM

## 2025-04-22 DIAGNOSIS — E10.29 TYPE 1 DIABETES MELLITUS WITH MICROALBUMINURIA: ICD-10-CM

## 2025-04-22 DIAGNOSIS — E10.10 DIABETIC KETOACIDOSIS WITHOUT COMA ASSOCIATED WITH TYPE 1 DIABETES MELLITUS: ICD-10-CM

## 2025-04-22 DIAGNOSIS — R80.9 TYPE 1 DIABETES MELLITUS WITH MICROALBUMINURIA: ICD-10-CM

## 2025-04-22 DIAGNOSIS — R73.9 HYPERGLYCEMIA: ICD-10-CM

## 2025-04-22 DIAGNOSIS — E11.10 DKA (DIABETIC KETOACIDOSIS): ICD-10-CM

## 2025-04-22 LAB
ABSOLUTE EOSINOPHIL (OHS): 0 K/UL
ABSOLUTE MONOCYTE (OHS): 0.47 K/UL (ref 0.3–1)
ABSOLUTE NEUTROPHIL COUNT (OHS): 11.48 K/UL (ref 1.8–7.7)
ADENOVIRUS: NOT DETECTED
ALBUMIN SERPL BCP-MCNC: 3 G/DL (ref 3.5–5.2)
ALP SERPL-CCNC: 144 UNIT/L (ref 40–150)
ALT SERPL W/O P-5'-P-CCNC: 54 UNIT/L (ref 10–44)
ANION GAP (OHS): 24 MMOL/L (ref 8–16)
ANION GAP (OHS): 28 MMOL/L (ref 8–16)
ANION GAP (OHS): 28 MMOL/L (ref 8–16)
ANION GAP (OHS): 30 MMOL/L (ref 8–16)
AST SERPL-CCNC: 77 UNIT/L (ref 11–45)
B-OH-BUTYR BLD STRIP-SCNC: 7 MMOL/L
BACTERIA #/AREA URNS AUTO: NORMAL /HPF
BASOPHILS # BLD AUTO: 0.01 K/UL
BASOPHILS NFR BLD AUTO: 0.1 %
BILIRUB SERPL-MCNC: 0.5 MG/DL (ref 0.1–1)
BILIRUB UR QL STRIP.AUTO: NEGATIVE
BIPAP: 0
BIPAP: 0
BNP SERPL-MCNC: <10 PG/ML (ref 0–99)
BORDETELLA PARAPERTUSSIS (IS1001): NOT DETECTED
BORDETELLA PERTUSSIS (PTXP): NOT DETECTED
BUN SERPL-MCNC: 40 MG/DL (ref 6–20)
BUN SERPL-MCNC: 40 MG/DL (ref 6–20)
BUN SERPL-MCNC: 43 MG/DL (ref 6–20)
BUN SERPL-MCNC: 43 MG/DL (ref 6–20)
CALCIUM SERPL-MCNC: 9.1 MG/DL (ref 8.7–10.5)
CALCIUM SERPL-MCNC: 9.2 MG/DL (ref 8.7–10.5)
CALCIUM SERPL-MCNC: 9.3 MG/DL (ref 8.7–10.5)
CALCIUM SERPL-MCNC: 9.4 MG/DL (ref 8.7–10.5)
CHLAMYDIA PNEUMONIAE: NOT DETECTED
CHLORIDE SERPL-SCNC: 104 MMOL/L (ref 95–110)
CHLORIDE SERPL-SCNC: 109 MMOL/L (ref 95–110)
CHLORIDE SERPL-SCNC: 110 MMOL/L (ref 95–110)
CHLORIDE SERPL-SCNC: 113 MMOL/L (ref 95–110)
CK SERPL-CCNC: 97 U/L (ref 20–200)
CLARITY UR: CLEAR
CO2 SERPL-SCNC: 11 MMOL/L (ref 23–29)
CO2 SERPL-SCNC: 13 MMOL/L (ref 23–29)
CO2 SERPL-SCNC: 14 MMOL/L (ref 23–29)
CO2 SERPL-SCNC: 9 MMOL/L (ref 23–29)
COLOR UR AUTO: COLORLESS
CORONAVIRUS 229E, COMMON COLD VIRUS: NOT DETECTED
CORONAVIRUS HKU1, COMMON COLD VIRUS: NOT DETECTED
CORONAVIRUS NL63, COMMON COLD VIRUS: NOT DETECTED
CORONAVIRUS OC43, COMMON COLD VIRUS: NOT DETECTED
CORRECTED TEMPERATURE (PCO2): 26.8 MMHG
CORRECTED TEMPERATURE (PCO2): 26.9 MMHG
CORRECTED TEMPERATURE (PH): 7.26
CORRECTED TEMPERATURE (PH): 7.26
CORRECTED TEMPERATURE (PO2): 60.3 MMHG
CORRECTED TEMPERATURE (PO2): 64.5 MMHG
CREAT SERPL-MCNC: 1.9 MG/DL (ref 0.5–1.4)
CREAT SERPL-MCNC: 2 MG/DL (ref 0.5–1.4)
ERYTHROCYTE [DISTWIDTH] IN BLOOD BY AUTOMATED COUNT: 11.8 % (ref 11.5–14.5)
ETHANOL SERPL-MCNC: <10 MG/DL
FIO2: 21 %
FIO2: 21 %
FLUBV RNA NPH QL NAA+NON-PROBE: NOT DETECTED
GFR SERPLBLD CREATININE-BSD FMLA CKD-EPI: 43 ML/MIN/1.73/M2
GFR SERPLBLD CREATININE-BSD FMLA CKD-EPI: 46 ML/MIN/1.73/M2
GLUCOSE SERPL-MCNC: 636 MG/DL (ref 70–110)
GLUCOSE SERPL-MCNC: 691 MG/DL (ref 70–110)
GLUCOSE SERPL-MCNC: 712 MG/DL (ref 70–110)
GLUCOSE SERPL-MCNC: 761 MG/DL (ref 70–110)
GLUCOSE UR QL STRIP: ABNORMAL
HCT VFR BLD AUTO: 37.7 % (ref 40–54)
HCT VFR BLD CALC: 37.7 %
HCV AB SERPL QL IA: NORMAL
HGB BLD-MCNC: 12.7 GM/DL (ref 14–18)
HGB UR QL STRIP: NEGATIVE
HIV 1+2 AB+HIV1 P24 AG SERPL QL IA: NORMAL
HOLD SPECIMEN: NORMAL
HOLD SPECIMEN: NORMAL
HPIV1 RNA NPH QL NAA+NON-PROBE: NOT DETECTED
HPIV2 RNA NPH QL NAA+NON-PROBE: NOT DETECTED
HPIV3 RNA NPH QL NAA+NON-PROBE: NOT DETECTED
HPIV4 RNA NPH QL NAA+NON-PROBE: NOT DETECTED
HUMAN METAPNEUMOVIRUS: NOT DETECTED
HYALINE CASTS UR QL AUTO: 0 /LPF (ref 0–1)
IMM GRANULOCYTES # BLD AUTO: 0.08 K/UL (ref 0–0.04)
IMM GRANULOCYTES NFR BLD AUTO: 0.6 % (ref 0–0.5)
INFLUENZA A: NOT DETECTED
KETONES UR QL STRIP: ABNORMAL
LACTATE SERPL-SCNC: 4.6 MMOL/L (ref 0.5–2.2)
LDH SERPL L TO P-CCNC: 7.5 MMOL/L (ref 0.5–2.2)
LEUKOCYTE ESTERASE UR QL STRIP: NEGATIVE
LIPASE SERPL-CCNC: 10 U/L (ref 4–60)
LYMPHOCYTES # BLD AUTO: 0.34 K/UL (ref 1–4.8)
MCH RBC QN AUTO: 31 PG (ref 27–31)
MCHC RBC AUTO-ENTMCNC: 33.7 G/DL (ref 32–36)
MCV RBC AUTO: 92 FL (ref 82–98)
MICROSCOPIC COMMENT: NORMAL
MYCOPLASMA PNEUMONIAE: NOT DETECTED
NITRITE UR QL STRIP: NEGATIVE
NUCLEATED RBC (/100WBC) (OHS): 0 /100 WBC
OSMOLALITY SERPL: 375 MOSM/KG (ref 280–300)
PCO2 BLDA: 26.8 MMHG
PCO2 BLDA: 26.9 MMHG
PH SMN: 7.26 [PH]
PH SMN: 7.26 [PH]
PH UR STRIP: 5 [PH]
PHOSPHATE SERPL-MCNC: 5.2 MG/DL (ref 2.7–4.5)
PLATELET # BLD AUTO: 228 K/UL (ref 150–450)
PLATELET BLD QL SMEAR: NORMAL
PMV BLD AUTO: 11.7 FL (ref 9.2–12.9)
PO2 BLDA: 60.3 MMHG
PO2 BLDA: 64.5 MMHG
POC BASE DEFICIT: -13.4 MMOL/L
POC BASE DEFICIT: -13.5 MMOL/L
POC HCO3: 14 MMOL/L
POC HCO3: 14 MMOL/L
POC IONIZED CALCIUM: 1.18 MMOL/L (ref 1.06–1.42)
POC PERFORMED BY: ABNORMAL
POC PERFORMED BY: NORMAL
POC TEMPERATURE: 37 C
POC TEMPERATURE: 37 C
POCT GLUCOSE: >500 MG/DL (ref 70–110)
POTASSIUM BLD-SCNC: 4.8 MMOL/L (ref 3.5–5.1)
POTASSIUM SERPL-SCNC: 4.7 MMOL/L (ref 3.5–5.1)
POTASSIUM SERPL-SCNC: 4.8 MMOL/L (ref 3.5–5.1)
POTASSIUM SERPL-SCNC: 5.1 MMOL/L (ref 3.5–5.1)
POTASSIUM SERPL-SCNC: 6.3 MMOL/L (ref 3.5–5.1)
PROT SERPL-MCNC: 6.3 GM/DL (ref 6–8.4)
PROT UR QL STRIP: NEGATIVE
RBC # BLD AUTO: 4.1 M/UL (ref 4.6–6.2)
RBC #/AREA URNS AUTO: <1 /HPF (ref 0–4)
RELATIVE EOSINOPHIL (OHS): 0 %
RELATIVE LYMPHOCYTE (OHS): 2.7 % (ref 18–48)
RELATIVE MONOCYTE (OHS): 3.8 % (ref 4–15)
RELATIVE NEUTROPHIL (OHS): 92.8 % (ref 38–73)
RESPIRATORY INFECTION PANEL SOURCE: NORMAL
RSV RNA NPH QL NAA+NON-PROBE: NOT DETECTED
RV+EV RNA NPH QL NAA+NON-PROBE: NOT DETECTED
SARS-COV-2 RNA RESP QL NAA+PROBE: NOT DETECTED
SODIUM BLD-SCNC: 153 MMOL/L (ref 136–145)
SODIUM SERPL-SCNC: 143 MMOL/L (ref 136–145)
SODIUM SERPL-SCNC: 148 MMOL/L (ref 136–145)
SODIUM SERPL-SCNC: 151 MMOL/L (ref 136–145)
SODIUM SERPL-SCNC: 151 MMOL/L (ref 136–145)
SP GR UR STRIP: 1.02
SPECIMEN SOURCE: ABNORMAL
SPECIMEN SOURCE: NORMAL
TROPONIN I SERPL HS-MCNC: 6 NG/L
UROBILINOGEN UR STRIP-ACNC: NEGATIVE EU/DL
WBC # BLD AUTO: 12.38 K/UL (ref 3.9–12.7)
WBC #/AREA URNS AUTO: <1 /HPF (ref 0–5)
YEAST UR QL AUTO: NORMAL /HPF

## 2025-04-22 PROCEDURE — 94644 CONT INHLJ TX 1ST HOUR: CPT

## 2025-04-22 PROCEDURE — 96365 THER/PROPH/DIAG IV INF INIT: CPT

## 2025-04-22 PROCEDURE — 83605 ASSAY OF LACTIC ACID: CPT

## 2025-04-22 PROCEDURE — 63600175 PHARM REV CODE 636 W HCPCS: Performed by: NURSE PRACTITIONER

## 2025-04-22 PROCEDURE — 82077 ASSAY SPEC XCP UR&BREATH IA: CPT | Performed by: NURSE PRACTITIONER

## 2025-04-22 PROCEDURE — 81003 URINALYSIS AUTO W/O SCOPE: CPT | Performed by: NURSE PRACTITIONER

## 2025-04-22 PROCEDURE — 84484 ASSAY OF TROPONIN QUANT: CPT

## 2025-04-22 PROCEDURE — 82803 BLOOD GASES ANY COMBINATION: CPT

## 2025-04-22 PROCEDURE — 99900035 HC TECH TIME PER 15 MIN (STAT)

## 2025-04-22 PROCEDURE — 83880 ASSAY OF NATRIURETIC PEPTIDE: CPT

## 2025-04-22 PROCEDURE — 96375 TX/PRO/DX INJ NEW DRUG ADDON: CPT

## 2025-04-22 PROCEDURE — 99291 CRITICAL CARE FIRST HOUR: CPT

## 2025-04-22 PROCEDURE — 83930 ASSAY OF BLOOD OSMOLALITY: CPT | Performed by: STUDENT IN AN ORGANIZED HEALTH CARE EDUCATION/TRAINING PROGRAM

## 2025-04-22 PROCEDURE — 25000242 PHARM REV CODE 250 ALT 637 W/ HCPCS: Performed by: NURSE PRACTITIONER

## 2025-04-22 PROCEDURE — 86803 HEPATITIS C AB TEST: CPT | Performed by: PHYSICIAN ASSISTANT

## 2025-04-22 PROCEDURE — 63600175 PHARM REV CODE 636 W HCPCS: Performed by: STUDENT IN AN ORGANIZED HEALTH CARE EDUCATION/TRAINING PROGRAM

## 2025-04-22 PROCEDURE — 85025 COMPLETE CBC W/AUTO DIFF WBC: CPT | Performed by: NURSE PRACTITIONER

## 2025-04-22 PROCEDURE — 84132 ASSAY OF SERUM POTASSIUM: CPT

## 2025-04-22 PROCEDURE — 96374 THER/PROPH/DIAG INJ IV PUSH: CPT | Mod: 59

## 2025-04-22 PROCEDURE — 0202U NFCT DS 22 TRGT SARS-COV-2: CPT

## 2025-04-22 PROCEDURE — 96361 HYDRATE IV INFUSION ADD-ON: CPT

## 2025-04-22 PROCEDURE — 82550 ASSAY OF CK (CPK): CPT | Performed by: NURSE PRACTITIONER

## 2025-04-22 PROCEDURE — 25000003 PHARM REV CODE 250

## 2025-04-22 PROCEDURE — 20000000 HC ICU ROOM

## 2025-04-22 PROCEDURE — 83690 ASSAY OF LIPASE: CPT

## 2025-04-22 PROCEDURE — 84295 ASSAY OF SERUM SODIUM: CPT

## 2025-04-22 PROCEDURE — 93005 ELECTROCARDIOGRAM TRACING: CPT

## 2025-04-22 PROCEDURE — 87389 HIV-1 AG W/HIV-1&-2 AB AG IA: CPT | Performed by: PHYSICIAN ASSISTANT

## 2025-04-22 PROCEDURE — 82962 GLUCOSE BLOOD TEST: CPT

## 2025-04-22 PROCEDURE — 94761 N-INVAS EAR/PLS OXIMETRY MLT: CPT | Mod: XB

## 2025-04-22 PROCEDURE — 82010 KETONE BODYS QUAN: CPT | Performed by: NURSE PRACTITIONER

## 2025-04-22 PROCEDURE — 25000003 PHARM REV CODE 250: Performed by: NURSE PRACTITIONER

## 2025-04-22 PROCEDURE — 84100 ASSAY OF PHOSPHORUS: CPT | Performed by: NURSE PRACTITIONER

## 2025-04-22 PROCEDURE — 80307 DRUG TEST PRSMV CHEM ANLYZR: CPT

## 2025-04-22 PROCEDURE — 85014 HEMATOCRIT: CPT

## 2025-04-22 PROCEDURE — 82040 ASSAY OF SERUM ALBUMIN: CPT | Performed by: NURSE PRACTITIONER

## 2025-04-22 PROCEDURE — 93010 ELECTROCARDIOGRAM REPORT: CPT | Mod: ,,, | Performed by: INTERNAL MEDICINE

## 2025-04-22 PROCEDURE — 63600175 PHARM REV CODE 636 W HCPCS

## 2025-04-22 RX ORDER — ALBUTEROL SULFATE 2.5 MG/.5ML
15 SOLUTION RESPIRATORY (INHALATION)
Status: COMPLETED | OUTPATIENT
Start: 2025-04-22 | End: 2025-04-22

## 2025-04-22 RX ORDER — DROPERIDOL 2.5 MG/ML
2.5 INJECTION, SOLUTION INTRAMUSCULAR; INTRAVENOUS
Status: COMPLETED | OUTPATIENT
Start: 2025-04-22 | End: 2025-04-22

## 2025-04-22 RX ORDER — SODIUM CHLORIDE 9 MG/ML
1000 INJECTION, SOLUTION INTRAVENOUS CONTINUOUS
Status: DISCONTINUED | OUTPATIENT
Start: 2025-04-22 | End: 2025-04-22

## 2025-04-22 RX ORDER — ONDANSETRON HYDROCHLORIDE 2 MG/ML
4 INJECTION, SOLUTION INTRAVENOUS EVERY 6 HOURS PRN
Status: DISCONTINUED | OUTPATIENT
Start: 2025-04-22 | End: 2025-04-24 | Stop reason: HOSPADM

## 2025-04-22 RX ORDER — SODIUM CHLORIDE 0.9 % (FLUSH) 0.9 %
10 SYRINGE (ML) INJECTION
Status: DISCONTINUED | OUTPATIENT
Start: 2025-04-22 | End: 2025-04-24 | Stop reason: HOSPADM

## 2025-04-22 RX ORDER — INSULIN GLARGINE 100 [IU]/ML
22 INJECTION, SOLUTION SUBCUTANEOUS DAILY
Status: DISCONTINUED | OUTPATIENT
Start: 2025-04-22 | End: 2025-04-22

## 2025-04-22 RX ORDER — LISINOPRIL 10 MG/1
10 TABLET ORAL DAILY
Status: DISCONTINUED | OUTPATIENT
Start: 2025-04-23 | End: 2025-04-22

## 2025-04-22 RX ORDER — SODIUM CHLORIDE 0.9 % (FLUSH) 0.9 %
10 SYRINGE (ML) INJECTION
Status: DISCONTINUED | OUTPATIENT
Start: 2025-04-22 | End: 2025-04-22

## 2025-04-22 RX ORDER — INDOMETHACIN 25 MG/1
50 CAPSULE ORAL
Status: COMPLETED | OUTPATIENT
Start: 2025-04-22 | End: 2025-04-22

## 2025-04-22 RX ORDER — DIPHENHYDRAMINE HYDROCHLORIDE 50 MG/ML
12.5 INJECTION, SOLUTION INTRAMUSCULAR; INTRAVENOUS
Status: COMPLETED | OUTPATIENT
Start: 2025-04-22 | End: 2025-04-22

## 2025-04-22 RX ORDER — CALCIUM GLUCONATE 20 MG/ML
1 INJECTION, SOLUTION INTRAVENOUS
Status: COMPLETED | OUTPATIENT
Start: 2025-04-22 | End: 2025-04-22

## 2025-04-22 RX ORDER — HEPARIN SODIUM 5000 [USP'U]/ML
5000 INJECTION, SOLUTION INTRAVENOUS; SUBCUTANEOUS EVERY 8 HOURS
Status: DISCONTINUED | OUTPATIENT
Start: 2025-04-22 | End: 2025-04-24 | Stop reason: HOSPADM

## 2025-04-22 RX ORDER — DEXTROSE MONOHYDRATE AND SODIUM CHLORIDE 5; .45 G/100ML; G/100ML
INJECTION, SOLUTION INTRAVENOUS CONTINUOUS PRN
Status: DISCONTINUED | OUTPATIENT
Start: 2025-04-22 | End: 2025-04-22

## 2025-04-22 RX ORDER — INSULIN GLARGINE 100 [IU]/ML
22 INJECTION, SOLUTION SUBCUTANEOUS NIGHTLY
Status: DISCONTINUED | OUTPATIENT
Start: 2025-04-22 | End: 2025-04-23

## 2025-04-22 RX ORDER — DEXTROSE MONOHYDRATE AND SODIUM CHLORIDE 5; .45 G/100ML; G/100ML
INJECTION, SOLUTION INTRAVENOUS CONTINUOUS PRN
Status: DISCONTINUED | OUTPATIENT
Start: 2025-04-22 | End: 2025-04-23

## 2025-04-22 RX ORDER — PANTOPRAZOLE SODIUM 40 MG/1
40 TABLET, DELAYED RELEASE ORAL DAILY
Status: DISCONTINUED | OUTPATIENT
Start: 2025-04-23 | End: 2025-04-23

## 2025-04-22 RX ORDER — SODIUM CHLORIDE, SODIUM LACTATE, POTASSIUM CHLORIDE, CALCIUM CHLORIDE 600; 310; 30; 20 MG/100ML; MG/100ML; MG/100ML; MG/100ML
INJECTION, SOLUTION INTRAVENOUS CONTINUOUS
Status: DISCONTINUED | OUTPATIENT
Start: 2025-04-22 | End: 2025-04-23

## 2025-04-22 RX ADMIN — SODIUM CHLORIDE, POTASSIUM CHLORIDE, SODIUM LACTATE AND CALCIUM CHLORIDE 1000 ML: 600; 310; 30; 20 INJECTION, SOLUTION INTRAVENOUS at 09:04

## 2025-04-22 RX ADMIN — INSULIN GLARGINE 22 UNITS: 100 INJECTION, SOLUTION SUBCUTANEOUS at 11:04

## 2025-04-22 RX ADMIN — CALCIUM GLUCONATE 1 G: 20 INJECTION, SOLUTION INTRAVENOUS at 06:04

## 2025-04-22 RX ADMIN — INSULIN HUMAN 0.1 UNITS/KG/HR: 1 INJECTION, SOLUTION INTRAVENOUS at 09:04

## 2025-04-22 RX ADMIN — HEPARIN SODIUM 5000 UNITS: 5000 INJECTION INTRAVENOUS; SUBCUTANEOUS at 09:04

## 2025-04-22 RX ADMIN — INSULIN HUMAN 8 UNITS: 100 INJECTION, SOLUTION PARENTERAL at 07:04

## 2025-04-22 RX ADMIN — SODIUM BICARBONATE 50 MEQ: 84 INJECTION, SOLUTION INTRAVENOUS at 06:04

## 2025-04-22 RX ADMIN — DIPHENHYDRAMINE HYDROCHLORIDE 12.5 MG: 50 INJECTION, SOLUTION INTRAMUSCULAR; INTRAVENOUS at 05:04

## 2025-04-22 RX ADMIN — ALBUTEROL SULFATE 15 MG: 2.5 SOLUTION RESPIRATORY (INHALATION) at 06:04

## 2025-04-22 RX ADMIN — SODIUM ZIRCONIUM CYCLOSILICATE 10 G: 10 POWDER, FOR SUSPENSION ORAL at 06:04

## 2025-04-22 RX ADMIN — DROPERIDOL 2.5 MG: 2.5 INJECTION, SOLUTION INTRAMUSCULAR; INTRAVENOUS at 06:04

## 2025-04-22 RX ADMIN — SODIUM CHLORIDE 1000 ML: 9 INJECTION, SOLUTION INTRAVENOUS at 04:04

## 2025-04-22 RX ADMIN — SODIUM CHLORIDE, POTASSIUM CHLORIDE, SODIUM LACTATE AND CALCIUM CHLORIDE 1000 ML: 600; 310; 30; 20 INJECTION, SOLUTION INTRAVENOUS at 07:04

## 2025-04-22 NOTE — LETTER
April 24, 2025         [address]  [facility] [address]  Phone: [phone]  Fax: [phone]       Patient: Andrzej Sinclair   YOB: 1985  Date of Visit: 04/24/2025    To Whom It May Concern:    Ambika Sinclair  was at [facility] on 04/24/2025. The patient may return to work on Monday, April 28, 2025 with no restrictions. If you have any questions or concerns, or if I can be of further assistance, please do not hesitate to contact me.    Sincerely,    Nevaeh Acuña MD

## 2025-04-22 NOTE — ED PROVIDER NOTES
Source of History:  Patient, Wife, chart    Chief complaint:  Weakness (Ems pt from home. Pt c/o increased urgency/urination, severe weakness x 1 day. Pt hx DM1, reports taking insulin today. Aaox4. EMS cbg reading HIGH. )      HPI:  Andrzej Sinclair is a 39 y.o. male has a PMH of DB1, Cyclical vomiting, and HTN presenting for weakness, fatigue, abdominal pain, and hyperglycemia.  Patient was recently seen and admitted for similar complaints.  Patient was discharged on Sunday.  Patient states that day he drank some whiskey bottle wine with his wife and began vomiting again.  Patient has not been taking his diabetic medications and has only been able to tolerate water since that time due to abdominal pain.  Patient is a poor historian so we spoke to his wife, Eileen.  She states that when he becomes ill he does not take his insulin and has not taken it in days.    This is the extent to the patients complaints today here in the emergency department.    ROS: As per HPI and below:    Constitutional:  Positive for generalized weakness.  No fever.  No chills.  Eyes: No visual changes.  ENT: No sore throat. No ear pain    Cardiovascular: No chest pain.  Respiratory: No shortness of breath.  GI: Positive for abdominal pain, nausea, vomiting, and constipation.  Genitourinary:  Positive for urinary frequency.  Positive for urinary urgency.  Neurologic: No headache. No focal weakness.  No numbness.  MSK: no back pain.  Integument: No rashes or lesions.  Hematologic: No easy bruising.  Endocrine: Positive for increased thirst.  Positive for increased urination.    Review of patient's allergies indicates:   Allergen Reactions    Lactose Other (See Comments)     Gas and moderate to sever abdominal pain       PMH:  As per HPI and below:  Past Medical History:   Diagnosis Date    Diabetes mellitus     Diabetes mellitus type 1     Diagnosed at age 20     Past Surgical History:   Procedure Laterality Date     "ESOPHAGOGASTRODUODENOSCOPY N/A 11/5/2024    Procedure: EGD (ESOPHAGOGASTRODUODENOSCOPY);  Surgeon: Manny Troy MD;  Location: [facility] ([address] [place] [address]);  Service: Endoscopy;  Laterality: N/A;       Social History[1]    Physical Exam:    BP (!) 140/69   Pulse (!) 129   Temp 98.9 °F (37.2 °C) (Oral)   Resp 14   Ht 6' 2" (1.88 m)   Wt 89 kg (196 lb 3.4 oz)   SpO2 100%   BMI 25.19 kg/m²     Nursing note and vital signs reviewed.  Tachycardic on initial exam.  Constitutional:  Distressed due to abdominal pain and weakness.  Nontoxic  Eyes: No conjunctival injection.Extraocular muscles are intact.  ENT: Oropharynx clear.  Normal phonation.  Mucous membranes pink but dry.  Cardiovascular:  Rapid rate with regular rhythm.  Peaked T-waves noted. No murmurs. No gallops. No rubs  Respiratory: Clear to auscultation bilaterally.  Good air movement.  No wheezes.  No rhonchi. No rales. No accessory muscle use..  Abdomen: Soft.  Not distended.  Generalized tenderness to palpation.  Voluntary guarding.  No rebound.   Musculoskeletal: Good range of motion all joints.  No deformities.  Neck supple.  No meningismus.  Skin: No rashes seen.  Decreased skin turgor.  No abrasions.  No ecchymoses.  Neurologic: Motor intact.  Sensation intact.  No ataxia. No focal neurological deficits.  Psych:  Withdrawn.  Flat affect.    MDM    Emergent evaluation of a 40 yo male presenting for abdominal pain, nausea.  On exam pt is A&Ox3. VSS. Nonfebrile and nontoxic appearing.  Breath sounds clear bilaterally.  Abdomen tender to touch and nondistended. Pt guarding his abdomen.   BS WNL.     History Acquisition   Additional history was acquired from other historians.  Wife Eileen  The patient's list of active medical problems, social history, medications, and allergies as documented per RN staff has been reviewed.     Differential Diagnoses   Based on available information and the initial assessment, the working differential diagnoses considered " during this evaluation include but are not limited to HHS, DKA, enteritis, gastritis.    I will get CBC, CMP, BHB, UA,       LABS     Labs Reviewed   COMPREHENSIVE METABOLIC PANEL - Abnormal       Result Value    Sodium 143      Potassium 6.3 (*)     Chloride 104      CO2 11 (*)     Glucose 761 (*)     BUN 43 (*)     Creatinine 2.0 (*)     Calcium 9.1      Protein Total 6.3      Albumin 3.0 (*)     Bilirubin Total 0.5            AST 77 (*)     ALT 54 (*)     Anion Gap 28 (*)     eGFR 43 (*)    BETA - HYDROXYBUTYRATE, SERUM - Abnormal    Beta-Hydroxybutyrate 7.0 (*)    URINALYSIS, REFLEX TO URINE CULTURE - Abnormal    Color, UA Colorless (*)     Appearance, UA Clear      pH, UA 5.0      Spec Grav UA 1.020      Protein, UA Negative      Glucose, UA 4+ (*)     Ketones, UA 3+ (*)     Bilirubin, UA Negative      Blood, UA Negative      Nitrites, UA Negative      Urobilinogen, UA Negative      Leukocyte Esterase, UA Negative     CBC WITH DIFFERENTIAL - Abnormal    WBC 12.38      RBC 4.10 (*)     HGB 12.7 (*)     HCT 37.7 (*)     MCV 92      MCH 31.0      MCHC 33.7      RDW 11.8      Platelet Count 228      MPV 11.7      Nucleated RBC 0      Neut % 92.8 (*)     Lymph % 2.7 (*)     Mono % 3.8 (*)     Eos % 0.0      Basophil % 0.1      Imm Grans % 0.6 (*)     Neut # 11.48 (*)     Lymph # 0.34 (*)     Mono # 0.47      Eos # 0.00      Baso # 0.01      Imm Grans # 0.08 (*)    PHOSPHORUS - Abnormal    Phosphorus Level 5.2 (*)    BASIC METABOLIC PANEL - Abnormal    Sodium 148 (*)     Potassium 4.7      Chloride 109      CO2 9 (*)     Glucose 712 (*)     BUN 40 (*)     Creatinine 2.0 (*)     Calcium 9.2      Anion Gap 30 (*)     eGFR 43 (*)    POCT GLUCOSE - Abnormal    POCT Glucose >500 (*)    POCT GLUCOSE - Abnormal    POCT Glucose >500 (*)    POCT GLUCOSE - Abnormal    POCT Glucose >500 (*)    HEPATITIS C ANTIBODY - Normal    Hep C Ab Interp Non-Reactive     HIV 1 / 2 ANTIBODY - Normal    HIV 1/2 Ag/Ab Non-Reactive      PLATELET REVIEW - Normal    Platelet Estimate Appears Normal     CK - Normal    CPK 97     ALCOHOL,MEDICAL (ETHANOL) - Normal    Alcohol, Serum <10     RESPIRATORY INFECTION PANEL (PCR), NASOPHARYNGEAL   CBC W/ AUTO DIFFERENTIAL    Narrative:     The following orders were created for panel order CBC auto differential.  Procedure                               Abnormality         Status                     ---------                               -----------         ------                     CBC with Differential[[id number]]       Abnormal            Final result                 Please view results for these tests on the individual orders.   GREY TOP URINE HOLD    Extra Tube Hold for add-ons.     URINALYSIS MICROSCOPIC    RBC, UA <1      WBC, UA <1      Bacteria, UA None      Yeast, UA None      Hyaline Casts, UA 0      Microscopic Comment       HEP C VIRUS HOLD SPECIMEN   OSMOLALITY, SERUM   BASIC METABOLIC PANEL   LACTIC ACID, PLASMA   LIPASE   TROPONIN I HIGH SENSITIVITY   B-TYPE NATRIURETIC PEPTIDE   PROTIME-INR   APTT   POCT GLUCOSE MONITORING CONTINUOUS   POCT GLUCOSE MONITORING CONTINUOUS         Imaging     Imaging Results              X-Ray Chest AP Portable (Final result)  Result time 04/22/25 18:38:41      Final result by Alec Hunt MD (04/22/25 18:38:41)                   Impression:      1. No acute cardiopulmonary process.      Electronically signed by: Alec Hunt MD  Date:    04/22/2025  Time:    18:38               Narrative:    EXAMINATION:  XR CHEST AP PORTABLE    CLINICAL HISTORY:  hyperglycemia;    TECHNIQUE:  Single frontal view of the chest was performed.    COMPARISON:  04/10/2025    FINDINGS:  The cardiomediastinal silhouette is not enlarged.  There is no pleural effusion.  The trachea is midline.  The lungs are symmetrically expanded bilaterally without evidence of acute parenchymal process. No large focal consolidation seen.  There is no pneumothorax.  The osseous structures  are unremarkable.                                      A radiology report was available for my review at the time of the encounter.    EKG   EKG Readings: (Independently Interpreted)   Initial Reading: No STEMI. Previous EKG: Compared with most recent EKG Previous EKG Date: 4/15/25. Rhythm: Normal Sinus Rhythm. Heart Rate: 99. Axis: Right Axis Deviation.   My independent interpretation    No STEMI  Normal sinus rhythm  Rightward axis  Incomplete right bundle branch block  Peaked T-waves  Rate of 99       Additional Consideration   All available testing was considered during the course of this workup.  Comorbidities taken into consideration during the patient's evaluation and treatment include weight, age.    Social determinants of health were taken into consideration during development of our treatment plan.    Medications   sodium chloride 0.9% flush 10 mL (has no administration in time range)   heparin (porcine) injection 5,000 Units (has no administration in time range)   sodium chloride 0.9% flush 10 mL (has no administration in time range)   0.9% NaCl infusion (has no administration in time range)   dextrose 5 % and 0.45 % NaCl infusion (has no administration in time range)   dextrose 50% injection 25 g (has no administration in time range)   dextrose 50% injection 12.5 g (has no administration in time range)   insulin regular in 0.9 % NaCl 100 unit/100 mL (1 unit/mL) infusion (has no administration in time range)   lactated ringers bolus 1,000 mL (has no administration in time range)   sodium chloride 0.9% bolus 1,000 mL 1,000 mL (0 mLs Intravenous Stopped 4/22/25 1828)   droPERidol injection 2.5 mg (2.5 mg Intravenous Given 4/22/25 1806)   diphenhydrAMINE injection 12.5 mg (12.5 mg Intravenous Given 4/22/25 1743)   albuterol sulfate nebulizer solution 15 mg (15 mg Nebulization Given 4/22/25 1814)   sodium zirconium cyclosilicate packet 10 g (10 g Oral Given 4/22/25 1828)   calcium gluconate 1 g in NS IVPB  (premixed) (0 g Intravenous Stopped 4/22/25 1856)   sodium bicarbonate solution 50 mEq (50 mEq Intravenous Given 4/22/25 1828)   lactated ringers bolus 1,000 mL (0 mLs Intravenous Stopped 4/22/25 2028)   insulin regular injection 8 Units 0.08 mL (8 Units Intravenous Given 4/22/25 1926)      ED Course as of 04/22/25 2057   Tue Apr 22, 2025   1803 EKG 12-lead  Independently interpreted shows normal sinus rhythm, rate 99, peak T-waves, no STEMI [BD]   1815 CBC unremarkable.  No leukocytosis noted.  H&H stable at 12.7 and 37.7.  CMP shows a potassium of 6.3.  EKG does show peaked T-waves.  Glucose of 761.  BUN of 43 with a creatinine of 2.0.  Anion gap of 28.  Beta hydroxybutyrate 7.0.  UA shows no acute infectious process.  Phosphorus of 5.2.  IV fluids, insulin and potassium shift ordered. [RZ]   1830 Discussed case with ICU.  Consult placed.  Awaiting recommendations. [RZ]   2038 ICU at bedside to evaluate patient.  Awaiting recommendations.   [RZ]   2045 ICU to admit patient.  Insulin drip ordered.  Awaiting bed assignment [RZ]   2048 Critical Care:   Critical Care Times:   Direct Patient Care (initial evaluation, reassessments, and time considering the case)................................................................20 minutes.   Additional History from reviewing old medical records or taking additional history from the family, EMS, PCP, etc......................10 minutes.   Ordering, Reviewing, and Interpreting Diagnostic Studies.............................................................................................................10 minutes.   Documentation................................................................................................................................................................................10 minutes.   ==============================================================  · Total Critical Care Time - exclusive of procedural time: 50  minutes.  ==============================================================  Critical care was necessary to treat or prevent imminent or life-threatening deterioration of the following conditions:  Hyperkalemia, DKA, dehydration.   Critical care was time spent personally by me on the following activities: obtaining history from patient or relative, examination of patient, review of x-rays / CT sent with the patient, ordering lab, x-rays, and/or EKG, development of treatment plan with patient or relative, ordering and performing treatments and interventions, interpretation of cardiac measurements and re-evaluation of patient's conition.   Critical Care Condition: potentially life-threatening  [RZ]      ED Course User Index  [BD] Lino Gutiérrez MD  [RZ] Marybeth Ybarra NP             CLINICAL IMPRESSION  1. Type 1 diabetes mellitus with ketoacidosis without coma    2. Hyperglycemia    3. Hyperkalemia    4. Weakness    5. Dehydration    6. Acidosis         ED Disposition Condition    Admit           This note was created using dictation software.  This program may occasionally mistype words and phrases.           [1]   Social History  Tobacco Use    Smoking status: Former    Smokeless tobacco: Never   Substance Use Topics    Alcohol use: Not Currently     Comment: socially    Drug use: No        Marybeth Ybarra NP  04/22/25 2057

## 2025-04-22 NOTE — ED TRIAGE NOTES
Andrzej Sinclair, a 39 y.o. male presents to the ED w/ complaint of weakness. Pt arrives to ED via EMS with co weakness. Per EMS pt increasinly weak, having increased urgency/urination. Hx of t1DM    Triage note:  Chief Complaint   Patient presents with    Weakness     Ems pt from home. Pt c/o increased urgency/urination, severe weakness x 1 day. Pt hx DM1, reports taking insulin today. Aaox4. EMS cbg reading HIGH.      Review of patient's allergies indicates:   Allergen Reactions    Lactose Other (See Comments)     Gas and moderate to sever abdominal pain     Past Medical History:   Diagnosis Date    Diabetes mellitus     Diabetes mellitus type 1     Diagnosed at age 20

## 2025-04-23 LAB
ABSOLUTE EOSINOPHIL (OHS): 0 K/UL
ABSOLUTE MONOCYTE (OHS): 1.13 K/UL (ref 0.3–1)
ABSOLUTE NEUTROPHIL COUNT (OHS): 10.91 K/UL (ref 1.8–7.7)
ALBUMIN SERPL BCP-MCNC: 3.2 G/DL (ref 3.5–5.2)
ALP SERPL-CCNC: 118 UNIT/L (ref 40–150)
ALT SERPL W/O P-5'-P-CCNC: 45 UNIT/L (ref 10–44)
AMPHET UR QL SCN: NEGATIVE
ANION GAP (OHS): 10 MMOL/L (ref 8–16)
ANION GAP (OHS): 11 MMOL/L (ref 8–16)
ANION GAP (OHS): 12 MMOL/L (ref 8–16)
ANION GAP (OHS): 8 MMOL/L (ref 8–16)
ANION GAP (OHS): 8 MMOL/L (ref 8–16)
AST SERPL-CCNC: 31 UNIT/L (ref 11–45)
AV AREA BY CONTINUOUS VTI: 2.1 CM2
AV INDEX (PROSTH): 0.68
AV LVOT MEAN GRADIENT: 1 MMHG
AV LVOT PEAK GRADIENT: 2 MMHG
AV MEAN GRADIENT: 5 MMHG
AV PEAK GRADIENT: 7 MMHG
AV VALVE AREA BY VELOCITY RATIO: 1.9 CM²
AV VALVE AREA: 2.1 CM2
AV VELOCITY RATIO: 0.62
BARBITURATE SCN PRESENT UR: NEGATIVE
BASOPHILS # BLD AUTO: 0.01 K/UL
BASOPHILS NFR BLD AUTO: 0.1 %
BENZODIAZ UR QL SCN: NEGATIVE
BILIRUB SERPL-MCNC: 0.4 MG/DL (ref 0.1–1)
BSA FOR ECHO PROCEDURE: 1.98 M2
BUN SERPL-MCNC: 22 MG/DL (ref 6–20)
BUN SERPL-MCNC: 26 MG/DL (ref 6–20)
BUN SERPL-MCNC: 28 MG/DL (ref 6–20)
BUN SERPL-MCNC: 30 MG/DL (ref 6–20)
BUN SERPL-MCNC: 34 MG/DL (ref 6–20)
CALCIUM SERPL-MCNC: 9 MG/DL (ref 8.7–10.5)
CALCIUM SERPL-MCNC: 9.1 MG/DL (ref 8.7–10.5)
CALCIUM SERPL-MCNC: 9.1 MG/DL (ref 8.7–10.5)
CALCIUM SERPL-MCNC: 9.2 MG/DL (ref 8.7–10.5)
CALCIUM SERPL-MCNC: 9.5 MG/DL (ref 8.7–10.5)
CANNABINOIDS UR QL SCN: NEGATIVE
CHLORIDE SERPL-SCNC: 120 MMOL/L (ref 95–110)
CHLORIDE SERPL-SCNC: 120 MMOL/L (ref 95–110)
CHLORIDE SERPL-SCNC: 122 MMOL/L (ref 95–110)
CO2 SERPL-SCNC: 25 MMOL/L (ref 23–29)
CO2 SERPL-SCNC: 25 MMOL/L (ref 23–29)
CO2 SERPL-SCNC: 26 MMOL/L (ref 23–29)
CO2 SERPL-SCNC: 28 MMOL/L (ref 23–29)
CO2 SERPL-SCNC: 29 MMOL/L (ref 23–29)
COCAINE UR QL SCN: NEGATIVE
CREAT SERPL-MCNC: 1.3 MG/DL (ref 0.5–1.4)
CREAT SERPL-MCNC: 1.4 MG/DL (ref 0.5–1.4)
CREAT SERPL-MCNC: 1.6 MG/DL (ref 0.5–1.4)
CREAT UR-MCNC: 17 MG/DL (ref 23–375)
CV ECHO LV RWT: 0.45 CM
DOP CALC AO PEAK VEL: 1.3 M/S
DOP CALC AO VTI: 17.3 CM
DOP CALC LVOT AREA: 3.1 CM2
DOP CALC LVOT DIAMETER: 2 CM
DOP CALC LVOT PEAK VEL: 0.8 M/S
DOP CALC LVOT STROKE VOLUME: 37.1 CM3
DOP CALCLVOT PEAK VEL VTI: 11.8 CM
ECHO EF ESTIMATED: 58 %
ECHO LV POSTERIOR WALL: 1 CM (ref 0.6–1.1)
ERYTHROCYTE [DISTWIDTH] IN BLOOD BY AUTOMATED COUNT: 11.5 % (ref 11.5–14.5)
ETHANOL UR-MCNC: <10 MG/DL
FRACTIONAL SHORTENING: 29.5 % (ref 28–44)
GFR SERPLBLD CREATININE-BSD FMLA CKD-EPI: 56 ML/MIN/1.73/M2
GFR SERPLBLD CREATININE-BSD FMLA CKD-EPI: >60 ML/MIN/1.73/M2
GLUCOSE SERPL-MCNC: 110 MG/DL (ref 70–110)
GLUCOSE SERPL-MCNC: 139 MG/DL (ref 70–110)
GLUCOSE SERPL-MCNC: 231 MG/DL (ref 70–110)
GLUCOSE SERPL-MCNC: 239 MG/DL (ref 70–110)
GLUCOSE SERPL-MCNC: 252 MG/DL (ref 70–110)
HCT VFR BLD AUTO: 34.3 % (ref 40–54)
HGB BLD-MCNC: 11.5 GM/DL (ref 14–18)
IMM GRANULOCYTES # BLD AUTO: 0.06 K/UL (ref 0–0.04)
IMM GRANULOCYTES NFR BLD AUTO: 0.5 % (ref 0–0.5)
INTERVENTRICULAR SEPTUM: 0.9 CM (ref 0.6–1.1)
LA MAJOR: 5 CM
LA MINOR: 4.1 CM
LA WIDTH: 3.6 CM
LACTATE SERPL-SCNC: 1.2 MMOL/L (ref 0.5–2.2)
LACTATE SERPL-SCNC: 1.2 MMOL/L (ref 0.5–2.2)
LACTATE SERPL-SCNC: 1.8 MMOL/L (ref 0.5–2.2)
LACTATE SERPL-SCNC: 2.4 MMOL/L (ref 0.5–2.2)
LEFT ATRIUM SIZE: 2.4 CM
LEFT ATRIUM VOLUME INDEX: 16 ML/M2
LEFT ATRIUM VOLUME: 33 CM3
LEFT INTERNAL DIMENSION IN SYSTOLE: 3.1 CM (ref 2.1–4)
LEFT VENTRICLE DIASTOLIC VOLUME INDEX: 42.79 ML/M2
LEFT VENTRICLE DIASTOLIC VOLUME: 86 ML
LEFT VENTRICLE MASS INDEX: 68.5 G/M2
LEFT VENTRICLE SYSTOLIC VOLUME INDEX: 17.9 ML/M2
LEFT VENTRICLE SYSTOLIC VOLUME: 36 ML
LEFT VENTRICULAR INTERNAL DIMENSION IN DIASTOLE: 4.4 CM (ref 3.5–6)
LEFT VENTRICULAR MASS: 137.8 G
LYMPHOCYTES # BLD AUTO: 0.63 K/UL (ref 1–4.8)
MAGNESIUM SERPL-MCNC: 2.3 MG/DL (ref 1.6–2.6)
MCH RBC QN AUTO: 29.1 PG (ref 27–31)
MCHC RBC AUTO-ENTMCNC: 33.5 G/DL (ref 32–36)
MCV RBC AUTO: 87 FL (ref 82–98)
METHADONE UR QL SCN: NEGATIVE
MV VALVE AREA BY PLANIMETRY: 4.23 CM2
MV VALVE AREA BY PLANIMETRY: 4.42 CM2
NUCLEATED RBC (/100WBC) (OHS): 0 /100 WBC
OHS CV RV/LV RATIO: 0.75 CM
OHS QRS DURATION: 94 MS
OHS QTC CALCULATION: 433 MS
OPIATES UR QL SCN: NEGATIVE
PCP UR QL: NEGATIVE
PHOSPHATE SERPL-MCNC: 1.3 MG/DL (ref 2.7–4.5)
PISA TR MAX VEL: 2.5 M/S
PLATELET # BLD AUTO: 219 K/UL (ref 150–450)
PMV BLD AUTO: 11.1 FL (ref 9.2–12.9)
POCT GLUCOSE: 106 MG/DL (ref 70–110)
POCT GLUCOSE: 106 MG/DL (ref 70–110)
POCT GLUCOSE: 118 MG/DL (ref 70–110)
POCT GLUCOSE: 141 MG/DL (ref 70–110)
POCT GLUCOSE: 143 MG/DL (ref 70–110)
POCT GLUCOSE: 151 MG/DL (ref 70–110)
POCT GLUCOSE: 168 MG/DL (ref 70–110)
POCT GLUCOSE: 168 MG/DL (ref 70–110)
POCT GLUCOSE: 193 MG/DL (ref 70–110)
POCT GLUCOSE: 198 MG/DL (ref 70–110)
POCT GLUCOSE: 217 MG/DL (ref 70–110)
POCT GLUCOSE: 220 MG/DL (ref 70–110)
POCT GLUCOSE: 301 MG/DL (ref 70–110)
POCT GLUCOSE: 399 MG/DL (ref 70–110)
POCT GLUCOSE: 485 MG/DL (ref 70–110)
POCT GLUCOSE: 97 MG/DL (ref 70–110)
POCT GLUCOSE: >500 MG/DL (ref 70–110)
POCT GLUCOSE: >500 MG/DL (ref 70–110)
POTASSIUM SERPL-SCNC: 3.7 MMOL/L (ref 3.5–5.1)
POTASSIUM SERPL-SCNC: 3.7 MMOL/L (ref 3.5–5.1)
POTASSIUM SERPL-SCNC: 3.8 MMOL/L (ref 3.5–5.1)
POTASSIUM SERPL-SCNC: 4 MMOL/L (ref 3.5–5.1)
POTASSIUM SERPL-SCNC: 4.7 MMOL/L (ref 3.5–5.1)
PROT SERPL-MCNC: 6.4 GM/DL (ref 6–8.4)
RA MAJOR: 3.85 CM
RA PRESSURE ESTIMATED: 3 MMHG
RA WIDTH: 3.78 CM
RBC # BLD AUTO: 3.95 M/UL (ref 4.6–6.2)
RELATIVE EOSINOPHIL (OHS): 0 %
RELATIVE LYMPHOCYTE (OHS): 4.9 % (ref 18–48)
RELATIVE MONOCYTE (OHS): 8.9 % (ref 4–15)
RELATIVE NEUTROPHIL (OHS): 85.6 % (ref 38–73)
RIGHT VENTRICLE DIASTOLIC BASEL DIMENSION: 3.3 CM
RV TB RVSP: 6 MMHG
SINUS: 3.5 CM
SODIUM SERPL-SCNC: 156 MMOL/L (ref 136–145)
SODIUM SERPL-SCNC: 156 MMOL/L (ref 136–145)
SODIUM SERPL-SCNC: 158 MMOL/L (ref 136–145)
SODIUM SERPL-SCNC: 159 MMOL/L (ref 136–145)
SODIUM SERPL-SCNC: 159 MMOL/L (ref 136–145)
STJ: 3 CM
TDI LATERAL: 0.09 M/S
TDI SEPTAL: 0.1 M/S
TDI: 0.1 M/S
TRICUSPID ANNULAR PLANE SYSTOLIC EXCURSION: 2.7 CM
TV PEAK GRADIENT: 25 MMHG
TV REST PULMONARY ARTERY PRESSURE: 28 MMHG
WBC # BLD AUTO: 12.74 K/UL (ref 3.9–12.7)
Z-SCORE OF LEFT VENTRICULAR DIMENSION IN END DIASTOLE: -2.92
Z-SCORE OF LEFT VENTRICULAR DIMENSION IN END SYSTOLE: -1.21

## 2025-04-23 PROCEDURE — 83735 ASSAY OF MAGNESIUM: CPT

## 2025-04-23 PROCEDURE — 82310 ASSAY OF CALCIUM: CPT

## 2025-04-23 PROCEDURE — 83605 ASSAY OF LACTIC ACID: CPT

## 2025-04-23 PROCEDURE — 63600175 PHARM REV CODE 636 W HCPCS

## 2025-04-23 PROCEDURE — 99292 CRITICAL CARE ADDL 30 MIN: CPT | Mod: ,,,

## 2025-04-23 PROCEDURE — 63600175 PHARM REV CODE 636 W HCPCS: Performed by: STUDENT IN AN ORGANIZED HEALTH CARE EDUCATION/TRAINING PROGRAM

## 2025-04-23 PROCEDURE — 25000003 PHARM REV CODE 250

## 2025-04-23 PROCEDURE — 85025 COMPLETE CBC W/AUTO DIFF WBC: CPT

## 2025-04-23 PROCEDURE — 84100 ASSAY OF PHOSPHORUS: CPT

## 2025-04-23 PROCEDURE — 20000000 HC ICU ROOM

## 2025-04-23 PROCEDURE — 99291 CRITICAL CARE FIRST HOUR: CPT | Mod: ,,,

## 2025-04-23 PROCEDURE — 94761 N-INVAS EAR/PLS OXIMETRY MLT: CPT

## 2025-04-23 PROCEDURE — S5010 5% DEXTROSE AND 0.45% SALINE: HCPCS

## 2025-04-23 PROCEDURE — 80053 COMPREHEN METABOLIC PANEL: CPT

## 2025-04-23 RX ORDER — OLANZAPINE 10 MG/2ML
10 INJECTION, POWDER, FOR SOLUTION INTRAMUSCULAR ONCE AS NEEDED
Status: DISCONTINUED | OUTPATIENT
Start: 2025-04-23 | End: 2025-04-24 | Stop reason: HOSPADM

## 2025-04-23 RX ORDER — IBUPROFEN 200 MG
16 TABLET ORAL
Status: DISCONTINUED | OUTPATIENT
Start: 2025-04-23 | End: 2025-04-24 | Stop reason: HOSPADM

## 2025-04-23 RX ORDER — HYDRALAZINE HYDROCHLORIDE 20 MG/ML
10 INJECTION INTRAMUSCULAR; INTRAVENOUS EVERY 6 HOURS PRN
Status: DISCONTINUED | OUTPATIENT
Start: 2025-04-23 | End: 2025-04-24 | Stop reason: HOSPADM

## 2025-04-23 RX ORDER — DEXTROSE MONOHYDRATE 50 MG/ML
INJECTION, SOLUTION INTRAVENOUS CONTINUOUS
Status: DISCONTINUED | OUTPATIENT
Start: 2025-04-23 | End: 2025-04-23

## 2025-04-23 RX ORDER — GLUCAGON 1 MG
1 KIT INJECTION
Status: DISCONTINUED | OUTPATIENT
Start: 2025-04-23 | End: 2025-04-24 | Stop reason: HOSPADM

## 2025-04-23 RX ORDER — DROPERIDOL 2.5 MG/ML
2.5 INJECTION, SOLUTION INTRAMUSCULAR; INTRAVENOUS ONCE
Status: COMPLETED | OUTPATIENT
Start: 2025-04-23 | End: 2025-04-23

## 2025-04-23 RX ORDER — OLANZAPINE 10 MG/2ML
2.5 INJECTION, POWDER, FOR SOLUTION INTRAMUSCULAR ONCE AS NEEDED
Status: DISCONTINUED | OUTPATIENT
Start: 2025-04-23 | End: 2025-04-23

## 2025-04-23 RX ORDER — INSULIN ASPART 100 [IU]/ML
3 INJECTION, SOLUTION INTRAVENOUS; SUBCUTANEOUS
Status: DISCONTINUED | OUTPATIENT
Start: 2025-04-23 | End: 2025-04-24 | Stop reason: HOSPADM

## 2025-04-23 RX ORDER — INSULIN GLARGINE 100 [IU]/ML
15 INJECTION, SOLUTION SUBCUTANEOUS NIGHTLY
Status: DISCONTINUED | OUTPATIENT
Start: 2025-04-23 | End: 2025-04-24 | Stop reason: HOSPADM

## 2025-04-23 RX ORDER — INSULIN ASPART 100 [IU]/ML
0-5 INJECTION, SOLUTION INTRAVENOUS; SUBCUTANEOUS
Status: DISCONTINUED | OUTPATIENT
Start: 2025-04-23 | End: 2025-04-24 | Stop reason: HOSPADM

## 2025-04-23 RX ORDER — DROPERIDOL 2.5 MG/ML
1.25 INJECTION, SOLUTION INTRAMUSCULAR; INTRAVENOUS ONCE
Status: COMPLETED | OUTPATIENT
Start: 2025-04-23 | End: 2025-04-23

## 2025-04-23 RX ORDER — OLANZAPINE 10 MG/2ML
5 INJECTION, POWDER, FOR SOLUTION INTRAMUSCULAR ONCE
Status: COMPLETED | OUTPATIENT
Start: 2025-04-23 | End: 2025-04-23

## 2025-04-23 RX ORDER — IBUPROFEN 200 MG
24 TABLET ORAL
Status: DISCONTINUED | OUTPATIENT
Start: 2025-04-23 | End: 2025-04-24 | Stop reason: HOSPADM

## 2025-04-23 RX ADMIN — SODIUM CHLORIDE, POTASSIUM CHLORIDE, SODIUM LACTATE AND CALCIUM CHLORIDE: 600; 310; 30; 20 INJECTION, SOLUTION INTRAVENOUS at 12:04

## 2025-04-23 RX ADMIN — OLANZAPINE 5 MG: 10 INJECTION, POWDER, FOR SOLUTION INTRAMUSCULAR at 11:04

## 2025-04-23 RX ADMIN — HEPARIN SODIUM 5000 UNITS: 5000 INJECTION INTRAVENOUS; SUBCUTANEOUS at 04:04

## 2025-04-23 RX ADMIN — PANTOPRAZOLE SODIUM 40 MG: 40 TABLET, DELAYED RELEASE ORAL at 09:04

## 2025-04-23 RX ADMIN — OLANZAPINE 2.5 MG: 10 INJECTION, POWDER, FOR SOLUTION INTRAMUSCULAR at 05:04

## 2025-04-23 RX ADMIN — INSULIN HUMAN 0.2 UNITS/KG/HR: 1 INJECTION, SOLUTION INTRAVENOUS at 02:04

## 2025-04-23 RX ADMIN — DEXTROSE AND SODIUM CHLORIDE: 5; 450 INJECTION, SOLUTION INTRAVENOUS at 04:04

## 2025-04-23 RX ADMIN — POTASSIUM PHOSPHATE, MONOBASIC AND POTASSIUM PHOSPHATE, DIBASIC 30 MMOL: 224; 236 INJECTION, SOLUTION, CONCENTRATE INTRAVENOUS at 09:04

## 2025-04-23 RX ADMIN — INSULIN GLARGINE 15 UNITS: 100 INJECTION, SOLUTION SUBCUTANEOUS at 04:04

## 2025-04-23 RX ADMIN — DROPERIDOL 2.5 MG: 2.5 INJECTION, SOLUTION INTRAMUSCULAR; INTRAVENOUS at 09:04

## 2025-04-23 RX ADMIN — HEPARIN SODIUM 5000 UNITS: 5000 INJECTION INTRAVENOUS; SUBCUTANEOUS at 09:04

## 2025-04-23 RX ADMIN — DEXTROSE MONOHYDRATE: 50 INJECTION, SOLUTION INTRAVENOUS at 07:04

## 2025-04-23 RX ADMIN — DROPERIDOL 1.25 MG: 2.5 INJECTION, SOLUTION INTRAMUSCULAR; INTRAVENOUS at 07:04

## 2025-04-23 RX ADMIN — HEPARIN SODIUM 5000 UNITS: 5000 INJECTION INTRAVENOUS; SUBCUTANEOUS at 05:04

## 2025-04-23 RX ADMIN — INSULIN ASPART 3 UNITS: 100 INJECTION, SOLUTION INTRAVENOUS; SUBCUTANEOUS at 05:04

## 2025-04-23 NOTE — EICU
"Virtual ICU Admission    Admit Date: 2025  LOS: 1  Code Status: Full Code   : 1985  Bed: 7075/7075 A:     Diagnosis: DKA (diabetic ketoacidosis)    Patient  has a past medical history of Diabetes mellitus and Diabetes mellitus type 1.    Last VS: BP (!) 149/89 (BP Location: Right arm, Patient Position: Lying)   Pulse (!) 112   Temp 98.7 °F (37.1 °C) (Oral)   Resp 18   Ht 6' 2" (1.88 m)   Wt 75.5 kg (166 lb 7.2 oz)   SpO2 97%   BMI 21.37 kg/m²       VICU Review    VICU nurse assessment :  [place] completed, LDA documentation reconciliation completed, Skin care/wounds LDA reconciliation, Sign and held orders reviewed/released, and VTE prophylaxis review    No skin breakdown visualized              "

## 2025-04-23 NOTE — HPI
"Mr. Sinclair is a 38 yo gentleman who presents to [facility] with chief complaint of polyuria, abdominal pain, and generalized weakness x1 day. Patient has a known medical history of T1DM with home insulin regimen of lyumjev 7 units TIDWM, Tresiba 7 units PM, and low dose sliding scale. Patient this morning on exam was somnolent and only answered questions with a thumbs up to me, no family was at bedside to obtain history. Patient provided a thumbs up when asked if he is feeling well with no abdominal pain, otherwise patient was unable to answer any other questions including his insulin regimen at home and whether he has been taking the medication. Review of other care providers reveals that patient had reported "he went home and drank some alcohol and began vomiting again. States has not been compliant with diabetic medications and has only been able to tolerate water since having ongoing abd pain. The ED spoke with the wife who stated that he has not been eating, drinking, or taking insulin "for days."    On initial presentation to the ED patient was found to have DKA on labs including blood glucose of 761 mg/dL, bircarb of 11, anion gap of 28, and beta hydroxybutarate of 7. Patient was administered a one time dose of lantus 22 units at 11:40 pm and placed on DKA protocol. This morning patient's labs indicate he is out of DKA although unable to assess whether patient will be able to tolerate diet at this time.   "

## 2025-04-23 NOTE — ASSESSMENT & PLAN NOTE
Known history of T1DM, sees Dr. Cazares outpatient, last clinic visit on 1/2025, recommeded regimen at that time included Tresiba 22 units HS, Lyumjev 8 units TIDWM, and low dose sliding scale  CGM reviewed from available dates of Mar 30-Apr 12, patient's average blood glucose is 352 mg/dL and patient is in range only 5% with 95% over range and a SD above goal at 72 mg/dL  Patient was prescribed OmniPod 5 in 01/2023.  Patient reported his insurance does not cover for education for insulin pump at that time.     -Will assess insulin needs during this hospitalization for discharge recommendations  -Patient would benefit from insulin pump for best overall control of blood glucose.

## 2025-04-23 NOTE — PLAN OF CARE
[facility] - Medical ICU  Initial Discharge Assessment       Primary Care Provider: No, Primary Doctor    Admission Diagnosis: Acidosis [E87.20]  Dehydration [E86.0]  Hyperkalemia [E87.5]  Weakness [R53.1]  DKA (diabetic ketoacidosis) [E11.10]  Hyperglycemia [R73.9]  QT prolongation [R94.31]  Type 1 diabetes mellitus with ketoacidosis without coma [E10.10]    Admission Date: 4/22/2025  Expected Discharge Date: 4/24/2025         Payor: [facility] MCARE / Plan: [facility] DUAL COMPLETE HMO SNP / Product Type: Medicare Advantage /     Extended Emergency Contact Information  Primary Emergency Contact: TORIBIO OLSON   [facility]  Mobile Phone: [phone]  Relation: Spouse    Discharge Plan A: (P) Home with family  Discharge Plan B: (P) Home with family      [id number] - [place], [place] - [address] AT [facility] & [facility]  [address]  [place] [address]  Phone: [phone] Fax: [phone]    [id number] - [place], [place] - [address] AT [facility] & [facility]  [address]  [place] [address]  Phone: [phone] Fax: [phone]    [id number] - [place], [place] - [address] AT [facility] [address]  [address]  [place] [address]  Phone: [phone] Fax: [phone]      Initial Assessment (most recent)       Adult Discharge Assessment - 04/23/25 1521          Discharge Assessment    Assessment Type Discharge Planning Assessment (P)      Confirmed/corrected address, phone number and insurance Yes (P)      Confirmed Demographics Correct on Facesheet (P)      Source of Information family (P)      Reason For Admission Diabetic ketoacidosis without coma associated with type 1 diabetes mellitus (P)      People in Home spouse (P)      Do you expect to return to your current living situation? Yes (P)      Prior to  hospitilization cognitive status: Unable to Assess (P)      Current cognitive status: Unable to Assess (P)      Equipment Currently Used at Home none (P)      Readmission within 30 days? Yes (P)      Patient currently being followed by outpatient case management? No (P)      Do you take prescription medications? Yes (P)      Do you have prescription coverage? Yes (P)      Do you have any problems affording any of your prescribed medications? TBD (P)      Are you on dialysis? No (P)      Do you take coumadin? No (P)      Discharge Plan A Home with family (P)      Discharge Plan B Home with family (P)                         SW completed Discharge Planning Assessment with patient's wife, Marzena via telephone. Discharge planning booklet given to patient/family and whiteboard updated with PRECIOUS and phone #. All questions answered.    Marzena reported that patient will need assistance with transportation upon discharge.     Marzena reported that her and patient live together, and prior to hospitalization patient was independent with his ADL's. Marzena reported patient is not on dialysis and does not go to a Coumadin clinic.      Patient lives in an apartment complex with 0 steps to enter.     Discharge Plan A and Plan B have been determined by review of patient's clinical status, future medical and therapeutic needs, and coverage/benefits for post-acute care in coordination with multidisciplinary team members.      Halley Estrada LMSW  [facility]  Ext. [phone]

## 2025-04-23 NOTE — ASSESSMENT & PLAN NOTE
Creat on admit 2.0. Baseline ~1.4. Likely in the setting of poor PO intake x multiple days. Received 3L crystalloid while in the ED.    - Additional 1L LR given (total of 4L)  - Daily CMP  - Avoid nephrotoxic agents  - Renally dose medications  - Strict I/Os

## 2025-04-23 NOTE — CONSULTS
Patient seen and evaluated by critical care medicine. To be admitted to ICU for further management. Full H&P to follow.     NIKIA Montano-BC  Pulmonary Critical Care  04/22/2025

## 2025-04-23 NOTE — ASSESSMENT & PLAN NOTE
Admitted after midnight.    36 yo man with asthma, HIV who was transferred from [place] abdominal pain and acute appendicitis s/p appendectomy 7/7.  Lactate was elevated, bolused and trending. Blood cultures pending.  Patient seen at bedside and chart was reviewed. Please see Dr. Perla's note for further details.   Takes Lisinopril at home.     - Holding in the setting of BETH; resume as appropriate  - Inpatient goal SBP <180

## 2025-04-23 NOTE — ASSESSMENT & PLAN NOTE
K 6.3 on admit. Shifted with calcium gluconate, insulin, albuterol, bicarb while in the ED. Repeat BMP down-trending.    - BMP q2h  - Shift as needed  - Continuous telemetry

## 2025-04-23 NOTE — ASSESSMENT & PLAN NOTE
History of. Not currently complaining of nausea/vomiting. QTC on .    - Zofran PRN  - PRN EKG for QTC monitoring if requiring multiple prolonging medications  - Follow up urine tox

## 2025-04-23 NOTE — ASSESSMENT & PLAN NOTE
"Initial presentation to ED included polyuria and generalized weakness, labs indicative of DKA with beta hydroxybutarate level of 7 and anion gap of 28 with CO2 of 11 and  mg/dL  Patient has history of multiple DKA admissions in the past, most recently earlier this month  Unsure of etiology however suspect either poor compliance or under dosing of insulin specifically basal dose, it appears patient was seen by Dr. Cazares on 1/2025 and was recommended to increase Tresiba 22 units HS, Lyumjev 8 units TIDWM, and low dose sliding scale  Most recently on 4/16, Endocrinology saw patient in the hospital and at that time patient self reported he decreased his Tresiba to 7 units due to "controlled blood glucose," Patient left AMA on that admission.    -DKA has resolved at this time, will plan to transition patient to MDI as previously dosed on recent admission about 1 week ago  Start Lantus 15 units for dinner time  Prandial insulin 3 units TIDWM  LDSSI  -Will start patient on a carb consistent diet at 5 pm, patient is to receive lantus and prandial insulin, please allow insulin gtt to continue to run for 2 hours after lantus is administered and ok to discontinue after that.    "

## 2025-04-23 NOTE — EICU
Virtual ICU Quality Rounds    Admit Date: 4/22/2025  Hospital Day: 1    ICU Day: 9h    24H Vital Sign Range:  Temp:  [98.5 °F (36.9 °C)-98.9 °F (37.2 °C)]   Pulse:  []   Resp:  [10-33]   BP: (122-171)/(57-96)   SpO2:  [92 %-100 %]     VICU Surveillance Screening  LDA reconciliation : Yes    Nursing orders placed : IP FORTUNATO Peripheral IV Access

## 2025-04-23 NOTE — H&P
"[facility] - Medical ICU  Critical Care Medicine  History & Physical    Patient Name: Andrzej Sinclair  MRN: [id number]  Admission Date: 4/22/2025  Hospital Length of Stay: 1 days  Code Status: Full Code  Attending Physician: Nevaeh Acuña MD   Primary Care Provider: [facility] Primary [facility]   Principal Problem: DKA (diabetic ketoacidosis)    Subjective:     HPI:  Mr. Sinclair is a 39yoM with PMHx significant for DM1, cyclical vomiting, and HTN who presented to the ED with EMS from home with c/o generalized weakness, fatigue, abd pain, and hyperglycemia. Recently seen and admitted for similar complaints; discharged last Sunday. States he went home and drank some alcohol and began vomiting again.  has not been compliant with diabetic medications and has only been able to tolerate water since having ongoing abd pain. The ED spoke with the wife who stated that he has not been eating, drinking, or taking insulin "for days".    While in the ED, labs significant for BHB 7.0, WBC 12.38, H/H 12.7/37.7, plts 228, UA with 4+ glucose and 3+ ketones, CPK 97, Na 143, K 6.3 (shifted with calcium gluconate/insulin/bicarb/albuterol), Cl 104, CO2 11, glucose 761, BUN 43, creat 2.0, , AST/ALT 77/54, anion gap 28, alcohol <10, Osmolality 375, POCT lactic 7.5, VBG 7.262/26.8/60.3/14.0. Chest xray without acute abnormalities.     [facility] consulted for DKA/HHS. Will admit to the MICU for further workup and management.          Hospital/ICU Course:  No notes on file     Past Medical History:   Diagnosis Date    Diabetes mellitus     Diabetes mellitus type 1     Diagnosed at age 20       Past Surgical History:   Procedure Laterality Date    ESOPHAGOGASTRODUODENOSCOPY N/A 11/5/2024    Procedure: EGD (ESOPHAGOGASTRODUODENOSCOPY);  Surgeon: Manny Troy MD;  Location: [address];  Service: Endoscopy;  Laterality: N/A;       Review of patient's allergies indicates:   Allergen Reactions    Lactose Other (See Comments)     Gas and " moderate to sever abdominal pain       Family History       Problem Relation (Age of Onset)    Diabetes Mother    Other Mother, Father          Tobacco Use    Smoking status: Former    Smokeless tobacco: Never   Substance and Sexual Activity    Alcohol use: Not Currently     Comment: socially    Drug use: No    Sexual activity: Yes     Partners: Female      Review of Systems   Unable to perform ROS: Acuity of condition     Objective:     Vital Signs (Most Recent):  Temp: 98.6 °F (37 °C) (04/22/25 2105)  Pulse: (!) 127 (04/22/25 2115)  Resp: 20 (04/22/25 2115)  BP: 123/76 (04/22/25 2115)  SpO2: 98 % (04/22/25 2115) Vital Signs (24h Range):  Temp:  [98.6 °F (37 °C)-98.9 °F (37.2 °C)] 98.6 °F (37 °C)  Pulse:  [100-140] 127  Resp:  [14-20] 20  SpO2:  [92 %-100 %] 98 %  BP: (123-155)/(57-88) 123/76   Weight: 89 kg (196 lb 3.4 oz)  Body mass index is 25.19 kg/m².      Intake/Output Summary (Last 24 hours) at 4/22/2025 2148  Last data filed at 4/22/2025 1810  Gross per 24 hour   Intake 700 ml   Output 800 ml   Net -100 ml          Physical Exam  Vitals and nursing note reviewed.   Constitutional:       General: He is in acute distress.      Appearance: He is ill-appearing.      Interventions: He is sedated.      Comments: Received benadryl/haldol while in the ED   HENT:      Mouth/Throat:      Mouth: Mucous membranes are dry.      Pharynx: Oropharynx is clear.   Eyes:      Extraocular Movements: Extraocular movements intact.      Pupils: Pupils are equal, round, and reactive to light.   Cardiovascular:      Rate and Rhythm: Regular rhythm. Tachycardia present.      Pulses: Normal pulses.      Heart sounds: Normal heart sounds.      Comments: ST  Pulmonary:      Effort: Pulmonary effort is normal. Tachypnea present. No respiratory distress.      Breath sounds: Normal breath sounds. No wheezing.      Comments: Room Air  Abdominal:      General: There is no distension.      Palpations: Abdomen is soft.      Tenderness: There  is generalized abdominal tenderness.   Musculoskeletal:         General: Normal range of motion.      Right lower leg: No edema.      Left lower leg: No edema.   Skin:     General: Skin is warm and dry.      Capillary Refill: Capillary refill takes less than 2 seconds.   Neurological:      General: No focal deficit present.      Mental Status: He is easily aroused. He is lethargic.      GCS: GCS eye subscore is 3. GCS verbal subscore is 5. GCS motor subscore is 6.   Psychiatric:         Attention and Perception: He is inattentive.         Speech: Speech is delayed.          Vents:     Lines/Drains/Airways       Peripheral Intravenous Line  Duration                  Peripheral IV - Single Lumen 04/22/25 1808 20 G Anterior;Distal;Left Forearm <1 day         Peripheral IV - Single Lumen 04/22/25 2129 20 G Left;Posterior Forearm <1 day                  Significant Labs:    CBC/Anemia Profile:  Recent Labs   Lab 04/22/25 1655 04/22/25 2057   WBC 12.38  --    HGB 12.7*  --    HCT 37.7* 37.7     --    MCV 92  --    RDW 11.8  --         Chemistries:  Recent Labs   Lab 04/22/25 1655 04/22/25 2005    148*   K 6.3* 4.7    109   CO2 11* 9*   BUN 43* 40*   CREATININE 2.0* 2.0*   CALCIUM 9.1 9.2   ALBUMIN 3.0*  --    BILITOT 0.5  --    ALKPHOS 144  --    ALT 54*  --    AST 77*  --    GLUCOSE 761* 712*   PHOS 5.2*  --        All pertinent labs within the past 24 hours have been reviewed.    Significant Imaging: I have reviewed all pertinent imaging results/findings within the past 24 hours.  Assessment/Plan:     Neuro  Encephalopathy, metabolic  Suspect likely s/t DKA. Appears lethargic during exam but able to answer questions correctly without unilateral weakness or facial droop appreciated.    - Neuro checks   - DKA protocol  - May benefit from CTH if mentation/lethargy does not improve    Psychiatric  Cannabinoid hyperemesis syndrome  History of. Not currently complaining of nausea/vomiting. QTC on EKG  "433.    - Zofran PRN  - PRN EKG for QTC monitoring if requiring multiple prolonging medications  - Follow up urine tox    Cardiac/Vascular  Primary hypertension  Takes Lisinopril at home.     - Holding in the setting of BETH; resume as appropriate  - Inpatient goal SBP <180    Renal/  Hyperkalemia  K 6.3 on admit. Shifted with calcium gluconate, insulin, albuterol, bicarb while in the ED. Repeat BMP down-trending.    - BMP q2h  - Shift as needed  - Continuous telemetry    BETH (acute kidney injury)  Creat on admit 2.0. Baseline ~1.4. Likely in the setting of poor PO intake x multiple days. Received 3L crystalloid while in the ED.    - Additional 1L LR given (total of 4L)  - Daily CMP  - Avoid nephrotoxic agents  - Renally dose medications  - Strict I/Os    Endocrine  * DKA (diabetic ketoacidosis)  39yoM with PMHx significant for DM1, cyclical vomiting, and HTN who presented to the ED via EMS and c/o abd pain, hyperglycemia, and generalized fatigue x ~2 days. Was recently admitted and discharged for the same. Non-compliant with insulin at home. BHB 7.0, glucose 761, CO2 11, K 6.3, anion gap 28, +ketones in urine, PH 7.262/26.8/60.3/14.0/-13.4, lactic acid 7.5->4.6, osmolality 375. Received a total of 4L crystalloid.    CCM consulted for DKA. Admitted to the MICU for further workup and management.     - DKA protocol with insulin gtt  - BMP q2h  - POCT glucose checks q1h  - 22u glargine for long-acting coverage  - NPO  - Consider endocrine consult    Type 1 diabetes mellitus with hyperglycemia  Takes lispro-aabc 8u TID along with degludec 22u daily. Last A1c 13.8 (4/2025).    - Degludec transitioned to glargine while inpatient  - Insulin gtt for DKA    GI  Cyclical vomiting syndrome  See "Cannabinoid hyperemesis syndrome"        Critical Care Daily Checklist:    A: Awake: RASS Goal/Actual Goal:    Actual:     B: Spontaneous Breathing Trial Performed?     C: SAT & SBT Coordinated?  N/A                      D: Delirium: " CAM-ICU     E: Early Mobility Performed? No   F: Feeding Goal:    Status:     Current Diet Order   Procedures    Diet NPO      AS: Analgesia/Sedation PRNs   T: Thromboembolic Prophylaxis Heparin SQ   H: HOB > 300 Yes   U: Stress Ulcer Prophylaxis (if needed) N/A   G: Glucose Control Insulin gtt for DKA   B: Bowel Function     I: Indwelling Catheter (Lines & Concepcion) Necessity PIVs   D: De-escalation of Antimicrobials/Pharmacotherapies N/A    Plan for the day/ETD Admit to the MICU for DKA management    Code Status:  Family/Goals of Care: Full Code       Critical Care Time: 70 minutes  Critical secondary to DKA, encephalopathy, BETH.    Discussed with CCM fellow. Full attestation from CCM attending to follow.     Critical care was time spent personally by me on the following activities: development of treatment plan with patient or surrogate and bedside caregivers, discussions with consultants, evaluation of patient's response to treatment, examination of patient, ordering and performing treatments and interventions, ordering and review of laboratory studies, ordering and review of radiographic studies, pulse oximetry, re-evaluation of patient's condition. This critical care time did not overlap with that of any other provider or involve time for any procedures.     Barbara Kwong, NIKIA-BC  Critical Care Medicine  [facility] - Medical ICU

## 2025-04-23 NOTE — ASSESSMENT & PLAN NOTE
Takes lispro-aabc 8u TID along with degludec 22u daily. Last A1c 13.8 (4/2025).    - Degludec transitioned to glargine while inpatient  - Insulin gtt for DKA

## 2025-04-23 NOTE — ASSESSMENT & PLAN NOTE
Suspect likely s/t DKA. Appears lethargic during exam but able to answer questions correctly without unilateral weakness or facial droop appreciated.    - Neuro checks   - DKA protocol  - May benefit from CTH if mentation/lethargy does not improve

## 2025-04-23 NOTE — HPI
"Mr. Sinclair is a 39yoM with PMHx significant for DM1, cyclical vomiting, and HTN who presented to the ED with EMS from home with c/o generalized weakness, fatigue, abd pain, and hyperglycemia. Recently seen and admitted for similar complaints; discharged last Sunday. States he went home and drank some alcohol and began vomiting again.  has not been compliant with diabetic medications and has only been able to tolerate water since having ongoing abd pain. The ED spoke with the wife who stated that he has not been eating, drinking, or taking insulin "for days".    While in the ED, labs significant for BHB 7.0, WBC 12.38, H/H 12.7/37.7, plts 228, UA with 4+ glucose and 3+ ketones, CPK 97, Na 143, K 6.3 (shifted with calcium gluconate/insulin/bicarb/albuterol), Cl 104, CO2 11, glucose 761, BUN 43, creat 2.0, , AST/ALT 77/54, anion gap 28, alcohol <10, Osmolality 375, POCT lactic 7.5, VBG 7.262/26.8/60.3/14.0. Chest xray without acute abnormalities.     [facility] consulted for DKA/HHS. Will admit to the MICU for further workup and management.        "

## 2025-04-23 NOTE — ASSESSMENT & PLAN NOTE
39yoM with PMHx significant for DM1, cyclical vomiting, and HTN who presented to the ED via EMS and c/o abd pain, hyperglycemia, and generalized fatigue x ~2 days. Was recently admitted and discharged for the same. Non-compliant with insulin at home. BHB 7.0, glucose 761, CO2 11, K 6.3, anion gap 28, +ketones in urine, PH 7.262/26.8/60.3/14.0/-13.4, lactic acid 7.5->4.6, osmolality 375. Received a total of 4L crystalloid.    [facility] consulted for DKA. Admitted to the MICU for further workup and management.     - DKA protocol with insulin gtt  - BMP q2h  - POCT glucose checks q1h  - 22u glargine for long-acting coverage  - NPO  - Consider endocrine consult

## 2025-04-23 NOTE — CONSULTS
"[facility] - Medical ICU  Endocrinology  Consult Note    Consult Requested by: Nevaeh Acuña MD   Reason for admit: Diabetic ketoacidosis without coma associated with type 1 diabetes mellitus    HISTORY OF PRESENT ILLNESS:  Mr. Sinclair is a 40 yo gentleman who presents to [facility] with chief complaint of polyuria, abdominal pain, and generalized weakness x1 day. Patient has a known medical history of T1DM with home insulin regimen of lyumjev 7 units TIDWM, Tresiba 7 units PM, and low dose sliding scale. Patient this morning on exam was somnolent and only answered questions with a thumbs up to me, no family was at bedside to obtain history. Patient provided a thumbs up when asked if he is feeling well with no abdominal pain, otherwise patient was unable to answer any other questions including his insulin regimen at home and whether he has been taking the medication. Review of other care providers reveals that patient had reported "he went home and drank some alcohol and began vomiting again. States has not been compliant with diabetic medications and has only been able to tolerate water since having ongoing abd pain. The ED spoke with the wife who stated that he has not been eating, drinking, or taking insulin "for days."    On initial presentation to the ED patient was found to have DKA on labs including blood glucose of 761 mg/dL, bircarb of 11, anion gap of 28, and beta hydroxybutarate of 7. Patient was administered a one time dose of lantus 22 units at 11:40 pm and placed on DKA protocol. This morning patient's labs indicate he is out of DKA although unable to assess whether patient will be able to tolerate diet at this time.     Medications and/or Treatments Impacting Glycemic Control:  Immunotherapy:    Immunosuppressants       None          Steroids:   Hormones (From admission, onward)      None          Pressors:    Autonomic Drugs (From admission, onward)      None            Prescriptions Prior to " "Admission[1]    Current Medications[2]    PMH, PSH, FH, SH updated and reviewed     ROS:  Review of Systems   Reason unable to perform ROS: Somnolent on exam.       Labs Reviewed and Include:  Recent Labs   Lab 04/23/25  0334 04/23/25  0826   CALCIUM 9.5 9.0   ALBUMIN 3.2*  --    * 156*   K 3.8 3.7   CO2 25 26   * 120*   BUN 34* 30*   CREATININE 1.6* 1.4   ALKPHOS 118  --    ALT 45*  --    AST 31  --    BILITOT 0.4  --      Lab Results   Component Value Date    HGBA1C 13.8 (H) 04/10/2025       Nutritional status:   Body mass index is 21.31 kg/m².  Lab Results   Component Value Date    ALBUMIN 3.2 (L) 04/23/2025    ALBUMIN 3.0 (L) 04/22/2025    ALBUMIN 3.0 (L) 04/18/2025     No results found for: "PREALBUMIN"    Estimated Creatinine Clearance: 76.2 mL/min (based on SCr of 1.4 mg/dL).    POCT Glucose results:    Current Insulin Regimen:       PHYSICAL EXAMINATION:  Vitals:    04/23/25 0900   BP: (!) 164/93   Pulse: 100   Resp: 13   Temp:      Body mass index is 21.31 kg/m².     Physical Exam  Constitutional:       Comments: Somnolent, unable to keep eyes open or verbalize any answers to questions  Patient in two point restraint   HENT:      Head: Normocephalic.   Pulmonary:      Effort: Pulmonary effort is normal.   Musculoskeletal:      Cervical back: Neck supple.   Skin:     General: Skin is dry.        .     ASSESSMENT and PLAN:    Neuro  Encephalopathy, metabolic  Patient was somnolent on my exam, on two point restraints at this time  Unable to assess abdominal pain, N/V symptoms on my exam to assess possibly starting a diet at this time.      Endocrine  * Diabetic ketoacidosis without coma associated with type 1 diabetes mellitus  Initial presentation to ED included polyuria and generalized weakness, labs indicative of DKA with beta hydroxybutarate level of 7 and anion gap of 28 with CO2 of 11 and  mg/dL  Patient has history of multiple DKA admissions in the past, most recently earlier this " "month  Unsure of etiology however suspect either poor compliance or under dosing of insulin specifically basal dose, it appears patient was seen by Dr. Cazares on 1/2025 and was recommended to increase Tresiba 22 units HS, Lyumjev 8 units TIDWM, and low dose sliding scale  Most recently on 4/16, Endocrinology saw patient in the hospital and at that time patient self reported he decreased his Tresiba to 7 units due to "controlled blood glucose," Patient left AMA on that admission.    -DKA has resolved at this time, will plan to transition patient to MDI as previously dosed on recent admission about 1 week ago  Start Lantus 15 units for dinner time  Prandial insulin 3 units TIDWM  LDSSI  -Will start patient on a carb consistent diet at 5 pm, patient is to receive lantus and prandial insulin, please allow insulin gtt to continue to run for 2 hours after lantus is administered and ok to discontinue after that.      Type 1 diabetes mellitus with hyperglycemia  Known history of T1DM, sees Dr. Cazares outpatient, last clinic visit on 1/2025, recommeded regimen at that time included Tresiba 22 units HS, Lyumjev 8 units TIDWM, and low dose sliding scale  CGM reviewed from available dates of Mar 30-Apr 12, patient's average blood glucose is 352 mg/dL and patient is in range only 5% with 95% over range and a SD above goal at 72 mg/dL  Patient was prescribed OmniPod 5 in 01/2023.  Patient reported his insurance does not cover for education for insulin pump at that time.     -Will assess insulin needs during this hospitalization for discharge recommendations  -Patient would benefit from insulin pump for best overall control of blood glucose.          DISCHARGE NEEDS: will assess daily    Josh Villanueva MD  Endocrinology  [facility] - Medical ICU          [1]   Medications Prior to Admission   Medication Sig Dispense Refill Last Dose/Taking    aluminum-magnesium hydroxide-simethicone (MAALOX) 200-200-20 mg/5 mL Susp Take 30 mLs by mouth 4 " "(four) times daily as needed (heartburn). (Patient taking differently: Take 30 mLs by mouth as needed (heartburn).) 769 mL 0     blood sugar diagnostic Strp 3-4 times a day 200 strip 4     blood-glucose meter kit Use as instructed 1 each 0     blood-glucose sensor (DEXCOM G7 SENSOR) Martha Apply one sensor to the skin every 10 days (Patient taking differently: Apply one sensor to the skin every 7 days) 3 each 11     glucagon (GLUCAGON EMERGENCY KIT, HUMAN,) 1 mg SolR Inject 1 mg into the muscle as needed (Hypoglycemia). 1 each 1     insulin degludec (TRESIBA FLEXTOUCH U-100) 100 unit/mL (3 mL) insulin pen Inject 22 Units into the skin once daily. (Patient taking differently: Inject 7 Units into the skin once daily.) 21 mL 3     insulin lispro-aabc (LYUMJEV KWIKPEN U-100 INSULIN) 100 unit/mL pen Inject 8 Units into the skin 3 (three) times daily with meals. Take additional sliding scale based on the blood sugars.  Maximum daily dose of 36 units. (Patient taking differently: Inject 7 Units into the skin 3 (three) times daily with meals. Take additional sliding scale based on the blood sugars.  Maximum daily dose of 36 units.) 8 pen 3     insulin syringe-needle U-100 (TRUEPLUS INSULIN) 0.5 mL 30 gauge x 5/16" Syrg USE FOUR TIMES DAILY AS DIRECTED 500 each 3     lancets 31 gauge Misc 1 lancet  by Misc.(Non-Drug; Combo Route) route 2 (two) times daily. 200 each 4     lisinopriL 10 MG tablet Take 1 tablet (10 mg total) by mouth once daily. 90 tablet 3     ondansetron (ZOFRAN-ODT) 4 MG TbDL Take 1 tablet (4 mg total) by mouth every 6 (six) hours as needed (nausea). 10 tablet 0     pantoprazole (PROTONIX) 40 MG tablet Take 1 tablet (40 mg total) by mouth once daily. 90 tablet 0     pen needle, diabetic 29 gauge x 1/2" Ndle Use to inject insulin into the skin. 100 each 0    [2]   Current Facility-Administered Medications   Medication Dose Route Frequency Provider Last Rate Last Admin    D5W infusion   Intravenous Continuous " Barbara Kwong AGACNP- mL/hr at 04/23/25 1454 Rate Verify at 04/23/25 1454    dextrose 50% injection 12.5 g  12.5 g Intravenous PRN Barbara Kwong AGACNP-BC        dextrose 50% injection 12.5 g  12.5 g Intravenous PRN Josh Villanueva MD        dextrose 50% injection 25 g  25 g Intravenous PRN Barbara Kwong AGACNP-BC        dextrose 50% injection 25 g  25 g Intravenous PRN Josh Villanueva MD        glucagon (human recombinant) injection 1 mg  1 mg Intramuscular PRN Josh Villanueva MD        glucose chewable tablet 16 g  16 g Oral PRN Josh Villanueva MD        glucose chewable tablet 24 g  24 g Oral PRN Josh Villanueva MD        heparin (porcine) injection 5,000 Units  5,000 Units Subcutaneous Q8H Barbara Kwong AGACNP-BC   5,000 Units at 04/23/25 0555    hydrALAZINE injection 10 mg  10 mg Intravenous Q6H PRN Barbara Kwong AGACNP-BC        insulin aspart U-100 pen 0-5 Units  0-5 Units Subcutaneous AC + HS + 0200 PRN Josh Villanueva MD        insulin aspart U-100 pen 3 Units  3 Units Subcutaneous TIDWM Josh Villanueva MD        insulin glargine U-100 (Lantus) pen 15 Units  15 Units Subcutaneous QHS Josh Villanueva MD        insulin regular in 0.9 % NaCl 100 unit/100 mL (1 unit/mL) infusion  0-0.2 Units/kg/hr Intravenous Continuous Josh Villanueva MD 7.6 mL/hr at 04/23/25 1054 0.1 Units/kg/hr at 04/23/25 1054    OLANZapine injection 2.5 mg  2.5 mg Intramuscular Once PRN Stormy Rowley PA-C        ondansetron injection 4 mg  4 mg Intravenous Q6H PRN Barbara Kwong AGACNP-BC        sodium chloride 0.9% flush 10 mL  10 mL Intravenous PRN Barbara Kwong AGACNP-BC        sodium chloride 0.9% flush 10 mL  10 mL Intravenous PRN Barbara Kwong AGACNP-BC

## 2025-04-23 NOTE — SUBJECTIVE & OBJECTIVE
Past Medical History:   Diagnosis Date    Diabetes mellitus     Diabetes mellitus type 1     Diagnosed at age 20       Past Surgical History:   Procedure Laterality Date    ESOPHAGOGASTRODUODENOSCOPY N/A 11/5/2024    Procedure: EGD (ESOPHAGOGASTRODUODENOSCOPY);  Surgeon: Manny Troy MD;  Location: [address]);  Service: Endoscopy;  Laterality: N/A;       Review of patient's allergies indicates:   Allergen Reactions    Lactose Other (See Comments)     Gas and moderate to sever abdominal pain       Family History       Problem Relation (Age of Onset)    Diabetes Mother    Other Mother, Father          Tobacco Use    Smoking status: Former    Smokeless tobacco: Never   Substance and Sexual Activity    Alcohol use: Not Currently     Comment: socially    Drug use: No    Sexual activity: Yes     Partners: Female      Review of Systems   Unable to perform ROS: Acuity of condition     Objective:     Vital Signs (Most Recent):  Temp: 98.6 °F (37 °C) (04/22/25 2105)  Pulse: (!) 127 (04/22/25 2115)  Resp: 20 (04/22/25 2115)  BP: 123/76 (04/22/25 2115)  SpO2: 98 % (04/22/25 2115) Vital Signs (24h Range):  Temp:  [98.6 °F (37 °C)-98.9 °F (37.2 °C)] 98.6 °F (37 °C)  Pulse:  [100-140] 127  Resp:  [14-20] 20  SpO2:  [92 %-100 %] 98 %  BP: (123-155)/(57-88) 123/76   Weight: 89 kg (196 lb 3.4 oz)  Body mass index is 25.19 kg/m².      Intake/Output Summary (Last 24 hours) at 4/22/2025 2148  Last data filed at 4/22/2025 1810  Gross per 24 hour   Intake 700 ml   Output 800 ml   Net -100 ml          Physical Exam  Vitals and nursing note reviewed.   Constitutional:       General: He is in acute distress.      Appearance: He is ill-appearing.      Interventions: He is sedated.      Comments: Received benadryl/haldol while in the ED   HENT:      Mouth/Throat:      Mouth: Mucous membranes are dry.      Pharynx: Oropharynx is clear.   Eyes:      Extraocular Movements: Extraocular movements intact.      Pupils: Pupils are equal,  round, and reactive to light.   Cardiovascular:      Rate and Rhythm: Regular rhythm. Tachycardia present.      Pulses: Normal pulses.      Heart sounds: Normal heart sounds.      Comments: ST  Pulmonary:      Effort: Pulmonary effort is normal. Tachypnea present. No respiratory distress.      Breath sounds: Normal breath sounds. No wheezing.      Comments: Room Air  Abdominal:      General: There is no distension.      Palpations: Abdomen is soft.      Tenderness: There is generalized abdominal tenderness.   Musculoskeletal:         General: Normal range of motion.      Right lower leg: No edema.      Left lower leg: No edema.   Skin:     General: Skin is warm and dry.      Capillary Refill: Capillary refill takes less than 2 seconds.   Neurological:      General: No focal deficit present.      Mental Status: He is easily aroused. He is lethargic.      GCS: GCS eye subscore is 3. GCS verbal subscore is 5. GCS motor subscore is 6.   Psychiatric:         Attention and Perception: He is inattentive.         Speech: Speech is delayed.          Vents:     Lines/Drains/Airways       Peripheral Intravenous Line  Duration                  Peripheral IV - Single Lumen 04/22/25 1808 20 G Anterior;Distal;Left Forearm <1 day         Peripheral IV - Single Lumen 04/22/25 2129 20 G Left;Posterior Forearm <1 day                  Significant Labs:    CBC/Anemia Profile:  Recent Labs   Lab 04/22/25 1655 04/22/25 2057   WBC 12.38  --    HGB 12.7*  --    HCT 37.7* 37.7     --    MCV 92  --    RDW 11.8  --         Chemistries:  Recent Labs   Lab 04/22/25 1655 04/22/25 2005    148*   K 6.3* 4.7    109   CO2 11* 9*   BUN 43* 40*   CREATININE 2.0* 2.0*   CALCIUM 9.1 9.2   ALBUMIN 3.0*  --    BILITOT 0.5  --    ALKPHOS 144  --    ALT 54*  --    AST 77*  --    GLUCOSE 761* 712*   PHOS 5.2*  --        All pertinent labs within the past 24 hours have been reviewed.    Significant Imaging: I have reviewed all pertinent  imaging results/findings within the past 24 hours.

## 2025-04-23 NOTE — SUBJECTIVE & OBJECTIVE
"PMH, PSH, FH, SH updated and reviewed     ROS:  Review of Systems   Reason unable to perform ROS: Somnolent on exam.       Labs Reviewed and Include:  Recent Labs   Lab 04/23/25  0334 04/23/25  0826   CALCIUM 9.5 9.0   ALBUMIN 3.2*  --    * 156*   K 3.8 3.7   CO2 25 26   * 120*   BUN 34* 30*   CREATININE 1.6* 1.4   ALKPHOS 118  --    ALT 45*  --    AST 31  --    BILITOT 0.4  --      Lab Results   Component Value Date    HGBA1C 13.8 (H) 04/10/2025       Nutritional status:   Body mass index is 21.31 kg/m².  Lab Results   Component Value Date    ALBUMIN 3.2 (L) 04/23/2025    ALBUMIN 3.0 (L) 04/22/2025    ALBUMIN 3.0 (L) 04/18/2025     No results found for: "PREALBUMIN"    Estimated Creatinine Clearance: 76.2 mL/min (based on SCr of 1.4 mg/dL).    POCT Glucose results:    Current Insulin Regimen:       PHYSICAL EXAMINATION:  Vitals:    04/23/25 0900   BP: (!) 164/93   Pulse: 100   Resp: 13   Temp:      Body mass index is 21.31 kg/m².     Physical Exam  Constitutional:       Comments: Somnolent, unable to keep eyes open or verbalize any answers to questions  Patient in two point restraint   HENT:      Head: Normocephalic.   Pulmonary:      Effort: Pulmonary effort is normal.   Musculoskeletal:      Cervical back: Neck supple.   Skin:     General: Skin is dry.          "

## 2025-04-23 NOTE — ASSESSMENT & PLAN NOTE
Patient was somnolent on my exam, on two point restraints at this time  Unable to assess abdominal pain, N/V symptoms on my exam to assess possibly starting a diet at this time.

## 2025-04-23 NOTE — NURSING
MICU DAILY GOALS     Family/Goals of care/Code Status   Code Status: Full Code    24H Vital Sign Range  Temp:  [98.5 °F (36.9 °C)-98.9 °F (37.2 °C)]   Pulse:  [100-147]   Resp:  [10-24]   BP: (122-171)/(57-96)   SpO2:  [92 %-100 %]      Shift Events (include procedures and significant events)   Admitted to MICU from ED for DKA management requiring cont insulin gtt. Insulin currently infusing at .05 units/kg/hr with blood glucose levels remaining <200 at this time. Anion gap closed on AM labs. K/Phos to be replaced.   Pt AAOx1-2 throughout shift, frequently attempting to climb out of bed and pull at lines; difficult to redirect; reorientation provided frequently..  Gtts: regular insulin, D5 .45% NS    AWAKE RASS: Goal -    Actual - RASS (David Agitation-Sedation Scale): drowsy    Restraint necessity: Removing medical devices / climbing out of bed / pulling lines   BREATHE SBT: Not intubated    Coordinate A & B, analgesics/sedatives Pain: managed   SAT: Not intubated   Delirium CAM-ICU:     Early(intubated/ Progressive (non-intubated) Mobility MOVE Screen (INTUBATED ONLY): Not intubated    Activity: Activity Management: Rolling - L1, Arm raise - L1, Sitting at edge of bed - L2   Feeding/Nutrition Diet order: Diet/Nutrition Received: NPO,     Thrombus DVT prophylaxis: VTE Core Measure: Pharmacological prophylaxis initiated/maintained   HOB Elevation Head of Bed (HOB) Positioning: HOB at 20-30 degrees   Ulcer Prophylaxis GI: yes   Glucose control managed Glycemic Management: insulin infusion initiated, blood glucose monitored, supplemental insulin given   Skin Skin assessment:     Sacrum intact/not altered? Yes  Heels intact/not altered? Yes  Surgical wound? No    CHECK ONE!   (no altered skin or altered skin) and sub boxes:  [x] No Altered Skin Integrity Present    [x]Prevention Measures Documented    [] Altered Skin Integrity Present or Discovered   [] LDA already present in EPIC, daily doc completed               [] LDA added if not already in EPIC (describe/stage wound).               [] Wound Image Taken (required on admit,                   transfer/discharge and every Tuesday)    Wound Care Consulted? No   Bowel Function no issues    Indwelling Catheter Necessity          De-escalation Antibiotics         VS and assessment per flow sheet, patient progressing towards goals as tolerated, plan of care reviewed with [unfilled] and family, all concerns addressed at this time    Ruben RODRIGUEZ

## 2025-04-24 VITALS
SYSTOLIC BLOOD PRESSURE: 149 MMHG | RESPIRATION RATE: 36 BRPM | HEIGHT: 74 IN | TEMPERATURE: 99 F | HEART RATE: 90 BPM | OXYGEN SATURATION: 99 % | BODY MASS INDEX: 21.3 KG/M2 | WEIGHT: 166 LBS | DIASTOLIC BLOOD PRESSURE: 76 MMHG

## 2025-04-24 LAB — POCT GLUCOSE: 161 MG/DL (ref 70–110)

## 2025-04-24 PROCEDURE — 99291 CRITICAL CARE FIRST HOUR: CPT | Mod: 25,,,

## 2025-04-24 PROCEDURE — 99239 HOSP IP/OBS DSCHRG MGMT >30: CPT | Mod: ,,, | Performed by: INTERNAL MEDICINE

## 2025-04-24 PROCEDURE — 63600175 PHARM REV CODE 636 W HCPCS

## 2025-04-24 RX ORDER — INSULIN DEGLUDEC 100 U/ML
15 INJECTION, SOLUTION SUBCUTANEOUS DAILY
Qty: 21 ML | Refills: 0 | Status: SHIPPED | OUTPATIENT
Start: 2025-04-24

## 2025-04-24 RX ORDER — DEXTROSE MONOHYDRATE 50 MG/ML
INJECTION, SOLUTION INTRAVENOUS CONTINUOUS
Status: DISCONTINUED | OUTPATIENT
Start: 2025-04-24 | End: 2025-04-24 | Stop reason: HOSPADM

## 2025-04-24 RX ORDER — INSULIN LISPRO-AABC 100 [IU]/ML
3 INJECTION, SOLUTION SUBCUTANEOUS
Qty: 3 PEN | Refills: 0 | Status: SHIPPED | OUTPATIENT
Start: 2025-04-24

## 2025-04-24 RX ADMIN — HEPARIN SODIUM 5000 UNITS: 5000 INJECTION INTRAVENOUS; SUBCUTANEOUS at 05:04

## 2025-04-24 NOTE — HOSPITAL COURSE
4/24: Patient has been weaned off insulin infusion.  Endocrinology consulted--appreciate input.  Patient has been very difficult to care for overnight.  Patient requesting to be discharged home.

## 2025-04-24 NOTE — ASSESSMENT & PLAN NOTE
Resolved    Suspect likely s/t DKA. Appears lethargic during exam but able to answer questions correctly without unilateral weakness or facial droop appreciated.    - Neuro checks   - DKA protocol  - May benefit from CTH if mentation/lethargy does not improve

## 2025-04-24 NOTE — ASSESSMENT & PLAN NOTE
Takes Lisinopril at home.     - Holding in the setting of BETH; resume as appropriate  - Inpatient goal SBP <180

## 2025-04-24 NOTE — ASSESSMENT & PLAN NOTE
Resolved    Creat on admit 2.0. Baseline ~1.4. Likely in the setting of poor PO intake x multiple days. Received 3L crystalloid while in the ED.    - Additional 1L LR given (total of 4L)  - Daily CMP  - Avoid nephrotoxic agents  - Renally dose medications  - Strict I/Os

## 2025-04-24 NOTE — EICU
Intervention Initiated From:  COR / LUCINDAU    Janene intervened regarding:  Rounding (Video assessment)  VICU Night Rounds Checklist  24H Vital Sign Range:  Temp:  [98.1 °F (36.7 °C)-98.8 °F (37.1 °C)]   Pulse:  []   Resp:  [10-33]   BP: (122-171)/(58-96)   SpO2:  [95 %-100 %]

## 2025-04-24 NOTE — NURSING
Patient is uncooperative.Getting OOB without calling for asst., keeps taking pulse Ox off and refuses to let us place IV cath.

## 2025-04-24 NOTE — ASSESSMENT & PLAN NOTE
Takes lispro-aabc 8u TID along with degludec 22u daily. Last A1c 13.8 (4/2025).    - Degludec transitioned to glargine while inpatient

## 2025-04-24 NOTE — PLAN OF CARE
Spoke to  Neal at [facility] in [place] to schedule patient follow-up appointment.  Patient follow-up appointment was scheduled for 4/29/25 at 10:30 am.      Curt THORNE, RSW  Case Management

## 2025-04-24 NOTE — CARE UPDATE
Pt. Agitated and unwilling to comply with treatment. Has intentionally(?) removed >5 PIVs since admit. Does not have capacity to make decisions for self (oriented x1 [place]). Upon assessment, remains agitated, not following commands, being aggressive, and yelling out into the hallway.     Received multiple anti-psychotics throughout the day with minimal relief. Bilateral upper extremity soft restraints placed. Additional droperidol ordered and administered. Will work on obtaining additional PIV access.     Spoke with wife via telephone and inquired on any recent alcohol consumption given intermittent tremors and continued encephalopathy concerning for possible withdrawal. Per wife, pt. Has had one alcoholic drink between admissions. Therefore, alcohol withdrawal differential unlikely. Updated on current plan of care and all concerns addressed. Instructed to please call back with any additional questions.     Will continue current plan of care.      Critical Care Time: 40 minutes.    NIKIA Montano-BC  Pulmonary Critical Care  04/23/2025

## 2025-04-24 NOTE — ASSESSMENT & PLAN NOTE
Resolved    39yoM with PMHx significant for DM1, cyclical vomiting, and HTN who presented to the ED via EMS and c/o abd pain, hyperglycemia, and generalized fatigue x ~2 days. Was recently admitted and discharged for the same. Non-compliant with insulin at home. BHB 7.0, glucose 761, CO2 11, K 6.3, anion gap 28, +ketones in urine, PH 7.262/26.8/60.3/14.0/-13.4, lactic acid 7.5->4.6, osmolality 375. Received a total of 4L crystalloid.    [facility] consulted for DKA. Admitted to the MICU for further workup and management.     - DKA protocol with insulin gtt  - BMP q2h  - POCT glucose checks q1h  - 22u glargine for long-acting coverage  - NPO  - Endocrinology consulted--appreciate input  - Weaned off insulin infusion

## 2025-04-24 NOTE — SIGNIFICANT EVENT
"Mr. Sinclair has been extremely noncompliant throughout the shift. Unwilling to allow staff to obtain labs or administer medications per orders despite multiple attempts at redirection and education on importance of treatments.     Called to the bedside around 0430 s/t refusal of morning lab draw/obtaining new PIV after previous ones removed by patient.     Upon assessment, pt. Laying on right side in hospital bed, easily awoken to verbal stimuli, oriented x4, following all commands. Refusing to have cardiac monitoring applied, refusing to have new IV established, and refusing morning lab collection. Attempted to explain the importance of treatments while he is inpatient but pt. adamantly refusing all management. Requesting AMA paperwork and demanding to leave. When questioned, pt. States "all y'all want to do is stick me" and "you're not listening to me because you don't understand". This NP personally explained that we as a team are collectively listening and only want him to get better to be able to go home. Pt. Remains noncompliant and continues to refuse any type of intervention.     Upon re-assessment, pt. Laying on the couch in the room with personal clothes on. States "feeling dizzy". Reiterated that I believe him leaving would be a very bad idea s/t generalized weakness and requiring additional care for his previous DKA. Mr. Sinclair then requested to be put back into bed but continued to refuse vitals or lab draws. Will attempt again during dayshift.      Critical Care Time: 30 minutes    MIGUEL MontanoCNP-BC  Pulmonary Critical Care  04/24/2025    "

## 2025-04-24 NOTE — PROGRESS NOTES
"[facility] - Medical ICU  Critical Care Medicine  Progress Note    Patient Name: Andrzej Sinclair  MRN: [id number]  Admission Date: 4/22/2025  Hospital Length of Stay: 2 days  Code Status: Full Code  Attending Provider: Nevaeh Acuña MD  Primary Care Provider: Aubrie, Primary Doctor   Principal Problem: Diabetic ketoacidosis without coma associated with type 1 diabetes mellitus    Subjective:     HPI:  Mr. Sinclair is a 39yoM with PMHx significant for DM1, cyclical vomiting, and HTN who presented to the ED with EMS from home with c/o generalized weakness, fatigue, abd pain, and hyperglycemia. Recently seen and admitted for similar complaints; discharged last Sunday. States he went home and drank some alcohol and began vomiting again. States has not been compliant with diabetic medications and has only been able to tolerate water since having ongoing abd pain. The ED spoke with the wife who stated that he has not been eating, drinking, or taking insulin "for days".    While in the ED, labs significant for BHB 7.0, WBC 12.38, H/H 12.7/37.7, plts 228, UA with 4+ glucose and 3+ ketones, CPK 97, Na 143, K 6.3 (shifted with calcium gluconate/insulin/bicarb/albuterol), Cl 104, CO2 11, glucose 761, BUN 43, creat 2.0, , AST/ALT 77/54, anion gap 28, alcohol <10, Osmolality 375, POCT lactic 7.5, VBG 7.262/26.8/60.3/14.0. Chest xray without acute abnormalities.     [facility] consulted for DKA/HHS. Will admit to the MICU for further workup and management.          Hospital/ICU Course:  4/24: Patient has been weaned off insulin infusion.  Endocrinology consulted--appreciate input.  Patient has been very difficult to care for overnight.  Patient requesting to be discharged home.    Interval History/Significant Events: Patient has been refusing treatment overnight, including re-establishing IVs. Weaned off insulin infusion and labwork stable.  Patient requesting to be discharged home.    Review of Systems   Respiratory:  Negative for shortness " of breath.    Cardiovascular:  Negative for chest pain.   All other systems reviewed and are negative.    Objective:     Vital Signs (Most Recent):  Temp: 99 °F (37.2 °C) (04/24/25 0301)  Pulse: 95 (04/24/25 0700)  Resp: (!) 26 (04/24/25 0700)  BP: (!) 157/82 (04/24/25 0700)  SpO2: 100 % (04/24/25 0001) Vital Signs (24h Range):  Temp:  [98.1 °F (36.7 °C)-99 °F (37.2 °C)] 99 °F (37.2 °C)  Pulse:  [] 95  Resp:  [10-35] 26  SpO2:  [95 %-100 %] 100 %  BP: (141-164)/(71-93) 157/82   Weight: 75.3 kg (166 lb)  Body mass index is 21.31 kg/m².      Intake/Output Summary (Last 24 hours) at 4/24/2025 0812  Last data filed at 4/24/2025 0601  Gross per 24 hour   Intake 1472.97 ml   Output 700 ml   Net 772.97 ml          Physical Exam  Vitals and nursing note reviewed.   Constitutional:       General: He is not in acute distress.     Appearance: Normal appearance. He is normal weight. He is not ill-appearing, toxic-appearing or diaphoretic.   HENT:      Head: Normocephalic and atraumatic.      Right Ear: External ear normal.      Left Ear: External ear normal.      Nose: Nose normal.   Cardiovascular:      Rate and Rhythm: Normal rate and regular rhythm.      Pulses: Normal pulses.      Heart sounds: Normal heart sounds. No murmur heard.     No friction rub. No gallop.      Comments: No JVD or peripheral edema  Pulmonary:      Effort: Pulmonary effort is normal. No respiratory distress.      Breath sounds: Normal breath sounds. No wheezing, rhonchi or rales.   Abdominal:      General: Abdomen is flat. Bowel sounds are normal. There is no distension.      Palpations: Abdomen is soft.      Tenderness: There is no abdominal tenderness. There is no guarding or rebound.   Musculoskeletal:         General: No swelling or tenderness. Normal range of motion.   Skin:     General: Skin is warm and dry.      Coloration: Skin is not jaundiced or pale.   Neurological:      General: No focal deficit present.      Mental Status: He is  alert and oriented to person, place, and time. Mental status is at baseline.   Psychiatric:         Mood and Affect: Mood normal.         Behavior: Behavior normal.         Thought Content: Thought content normal.         Judgment: Judgment normal.            Vents:     Lines/Drains/Airways       None                 Significant Labs:    CBC/Anemia Profile:  Recent Labs   Lab 04/22/25 1655 04/22/25 2057 04/23/25 0334   WBC 12.38  --  12.74*   HGB 12.7*  --  11.5*   HCT 37.7* 37.7 34.3*     --  219   MCV 92  --  87   RDW 11.8  --  11.5        Chemistries:  Recent Labs   Lab 04/22/25 1655 04/22/25 2005 04/23/25  0334 04/23/25  0826 04/23/25  1054 04/23/25  1348 04/23/25 2049      < > 159*   < > 156* 158* 159*   K 6.3*   < > 3.8   < > 4.7 4.0 3.7      < > 122*   < > 120* 122* 122*   CO2 11*   < > 25   < > 25 28 29   BUN 43*   < > 34*   < > 28* 26* 22*   CREATININE 2.0*   < > 1.6*   < > 1.3 1.3 1.3   CALCIUM 9.1   < > 9.5   < > 9.1 9.2 9.1   ALBUMIN 3.0*  --  3.2*  --   --   --   --    BILITOT 0.5  --  0.4  --   --   --   --    ALKPHOS 144  --  118  --   --   --   --    ALT 54*  --  45*  --   --   --   --    AST 77*  --  31  --   --   --   --    GLUCOSE 761*   < > 239*   < > 231* 110 139*   MG  --   --  2.3  --   --   --   --    PHOS 5.2*  --  1.3*  --   --   --   --     < > = values in this interval not displayed.       All pertinent labs within the past 24 hours have been reviewed.    Significant Imaging:  I have reviewed all pertinent imaging results/findings within the past 24 hours.    ABG  Recent Labs   Lab 04/22/25 2057   PH 7.262   PO2 60.3   PCO2 26.8   HCO3 14.0     Assessment/Plan:     Neuro  Encephalopathy, metabolic  Resolved    Suspect likely s/t DKA. Appears lethargic during exam but able to answer questions correctly without unilateral weakness or facial droop appreciated.    - Neuro checks   - DKA protocol  - May benefit from CTH if mentation/lethargy does not  improve    Psychiatric  Cannabinoid hyperemesis syndrome  History of. Not currently complaining of nausea/vomiting. QTC on .    - Zofran PRN  - PRN EKG for QTC monitoring if requiring multiple prolonging medications  - Follow up urine tox    Cardiac/Vascular  Primary hypertension  Takes Lisinopril at home.     - Holding in the setting of BETH; resume as appropriate  - Inpatient goal SBP <180    Renal/  Hyperkalemia  K 6.3 on admit. Shifted with calcium gluconate, insulin, albuterol, bicarb while in the ED. Repeat BMP down-trending.    - BMP q2h  - Shift as needed  - Continuous telemetry    BETH (acute kidney injury)  Resolved    Creat on admit 2.0. Baseline ~1.4. Likely in the setting of poor PO intake x multiple days. Received 3L crystalloid while in the ED.    - Additional 1L LR given (total of 4L)  - Daily CMP  - Avoid nephrotoxic agents  - Renally dose medications  - Strict I/Os    Endocrine  * Diabetic ketoacidosis without coma associated with type 1 diabetes mellitus  Resolved    39yoM with PMHx significant for DM1, cyclical vomiting, and HTN who presented to the ED via EMS and c/o abd pain, hyperglycemia, and generalized fatigue x ~2 days. Was recently admitted and discharged for the same. Non-compliant with insulin at home. BHB 7.0, glucose 761, CO2 11, K 6.3, anion gap 28, +ketones in urine, PH 7.262/26.8/60.3/14.0/-13.4, lactic acid 7.5->4.6, osmolality 375. Received a total of 4L crystalloid.    [facility] consulted for DKA. Admitted to the MICU for further workup and management.     - DKA protocol with insulin gtt  - BMP q2h  - POCT glucose checks q1h  - 22u glargine for long-acting coverage  - NPO  - Endocrinology consulted--appreciate input  - Weaned off insulin infusion    Type 1 diabetes mellitus with hyperglycemia  Takes lispro-aabc 8u TID along with degludec 22u daily. Last A1c 13.8 (4/2025).    - Degludec transitioned to glargine while inpatient      GI  Cyclical vomiting syndrome  See  ""Cannabinoid hyperemesis syndrome"            Nevaeh Acuña M.D.  Rapid Response/Critical Care  Department of Pulmonary Medicine  [facility]        N.B.: Portions of this note was dictated using M*Modal Fluency Direct--there may be voice recognition errors occasionally missed on review.  "

## 2025-04-24 NOTE — SUBJECTIVE & OBJECTIVE
Interval History/Significant Events: Patient has been refusing treatment overnight, including re-establishing IVs. Weaned off insulin infusion and labwork stable.  Patient requesting to be discharged home.    Review of Systems   Respiratory:  Negative for shortness of breath.    Cardiovascular:  Negative for chest pain.   All other systems reviewed and are negative.    Objective:     Vital Signs (Most Recent):  Temp: 99 °F (37.2 °C) (04/24/25 0301)  Pulse: 95 (04/24/25 0700)  Resp: (!) 26 (04/24/25 0700)  BP: (!) 157/82 (04/24/25 0700)  SpO2: 100 % (04/24/25 0001) Vital Signs (24h Range):  Temp:  [98.1 °F (36.7 °C)-99 °F (37.2 °C)] 99 °F (37.2 °C)  Pulse:  [] 95  Resp:  [10-35] 26  SpO2:  [95 %-100 %] 100 %  BP: (141-164)/(71-93) 157/82   Weight: 75.3 kg (166 lb)  Body mass index is 21.31 kg/m².      Intake/Output Summary (Last 24 hours) at 4/24/2025 0812  Last data filed at 4/24/2025 0601  Gross per 24 hour   Intake 1472.97 ml   Output 700 ml   Net 772.97 ml          Physical Exam  Vitals and nursing note reviewed.   Constitutional:       General: He is not in acute distress.     Appearance: Normal appearance. He is normal weight. He is not ill-appearing, toxic-appearing or diaphoretic.   HENT:      Head: Normocephalic and atraumatic.      Right Ear: External ear normal.      Left Ear: External ear normal.      Nose: Nose normal.   Cardiovascular:      Rate and Rhythm: Normal rate and regular rhythm.      Pulses: Normal pulses.      Heart sounds: Normal heart sounds. No murmur heard.     No friction rub. No gallop.      Comments: No JVD or peripheral edema  Pulmonary:      Effort: Pulmonary effort is normal. No respiratory distress.      Breath sounds: Normal breath sounds. No wheezing, rhonchi or rales.   Abdominal:      General: Abdomen is flat. Bowel sounds are normal. There is no distension.      Palpations: Abdomen is soft.      Tenderness: There is no abdominal tenderness. There is no guarding or  rebound.   Musculoskeletal:         General: No swelling or tenderness. Normal range of motion.   Skin:     General: Skin is warm and dry.      Coloration: Skin is not jaundiced or pale.   Neurological:      General: No focal deficit present.      Mental Status: He is alert and oriented to person, place, and time. Mental status is at baseline.   Psychiatric:         Mood and Affect: Mood normal.         Behavior: Behavior normal.         Thought Content: Thought content normal.         Judgment: Judgment normal.            Vents:     Lines/Drains/Airways       None                 Significant Labs:    CBC/Anemia Profile:  Recent Labs   Lab 04/22/25 1655 04/22/25 2057 04/23/25  0334   WBC 12.38  --  12.74*   HGB 12.7*  --  11.5*   HCT 37.7* 37.7 34.3*     --  219   MCV 92  --  87   RDW 11.8  --  11.5        Chemistries:  Recent Labs   Lab 04/22/25 1655 04/22/25 2005 04/23/25  0334 04/23/25  0826 04/23/25  1054 04/23/25  1348 04/23/25  2049      < > 159*   < > 156* 158* 159*   K 6.3*   < > 3.8   < > 4.7 4.0 3.7      < > 122*   < > 120* 122* 122*   CO2 11*   < > 25   < > 25 28 29   BUN 43*   < > 34*   < > 28* 26* 22*   CREATININE 2.0*   < > 1.6*   < > 1.3 1.3 1.3   CALCIUM 9.1   < > 9.5   < > 9.1 9.2 9.1   ALBUMIN 3.0*  --  3.2*  --   --   --   --    BILITOT 0.5  --  0.4  --   --   --   --    ALKPHOS 144  --  118  --   --   --   --    ALT 54*  --  45*  --   --   --   --    AST 77*  --  31  --   --   --   --    GLUCOSE 761*   < > 239*   < > 231* 110 139*   MG  --   --  2.3  --   --   --   --    PHOS 5.2*  --  1.3*  --   --   --   --     < > = values in this interval not displayed.       All pertinent labs within the past 24 hours have been reviewed.    Significant Imaging:  I have reviewed all pertinent imaging results/findings within the past 24 hours.

## 2025-04-24 NOTE — PLAN OF CARE
MICU DAILY GOALS     Family/Goals of care/Code Status   Code Status: Full Code    24H Vital Sign Range  Temp:  [98.1 °F (36.7 °C)-99 °F (37.2 °C)]   Pulse:  []   Resp:  [10-35]   BP: (141-164)/(71-93)   SpO2:  [95 %-100 %]      Shift Events (include procedures and significant events)   See patient overnight notes.    AWAKE RASS: Goal -    Actual - RASS (David Agitation-Sedation Scale): restless    Restraint necessity: Not necessary   BREATHE SBT: Not intubated    Coordinate A & B, analgesics/sedatives Pain: managed   SAT: Not intubated   Delirium CAM-ICU:     Early(intubated/ Progressive (non-intubated) Mobility MOVE Screen (INTUBATED ONLY): Not intubated    Activity: Activity Management: Rolling - L1   Feeding/Nutrition Diet order: Diet/Nutrition Received: consistent carb/diabetic diet,     Thrombus DVT prophylaxis: VTE Core Measure: Pharmacological prophylaxis initiated/maintained   HOB Elevation Head of Bed (HOB) Positioning: HOB at 30-45 degrees   Ulcer Prophylaxis GI: no   Glucose control managed Glycemic Management: blood glucose monitored   Skin Skin assessment:     Sacrum intact/not altered? Yes  Heels intact/not altered? Yes  Surgical wound? No    CHECK ONE!   (no altered skin or altered skin) and sub boxes:  [x] No Altered Skin Integrity Present    [x]Prevention Measures Documented    [] Altered Skin Integrity Present or Discovered   [] LDA already present in EPIC, daily doc completed              [] LDA added if not already in EPIC (describe/stage wound).               [] Wound Image Taken (required on admit,                   transfer/discharge and every Tuesday)    Wound Care Consulted? No   Bowel Function no issues    Indwelling Catheter Necessity            De-escalation Antibiotics No        Problem: Adult Inpatient Plan of Care  Goal: Plan of Care Review  Outcome: Progressing  Goal: Absence of Hospital-Acquired Illness or Injury  Outcome: Progressing  Goal: Optimal Comfort and  Wellbeing  Outcome: Progressing     Problem: Fall Injury Risk  Goal: Absence of Fall and Fall-Related Injury  Outcome: Progressing

## 2025-04-24 NOTE — PLAN OF CARE
"ENDOCRINOLOGY PLAN OF CARE    Admit Date: 4/22/2025      Mr. Sinclair is a 40 yo gentleman who presents to [facility] with chief complaint of polyuria, abdominal pain, and generalized weakness x1 day. Patient has a known medical history of T1DM with home insulin regimen of lyumjev 7 units TIDWM, Tresiba 7 units PM, and low dose sliding scale. Patient this morning on exam was somnolent and only answered questions with a thumbs up to me, no family was at bedside to obtain history. Patient provided a thumbs up when asked if he is feeling well with no abdominal pain, otherwise patient was unable to answer any other questions including his insulin regimen at home and whether he has been taking the medication. Review of other care providers reveals that patient had reported "he went home and drank some alcohol and began vomiting again. States has not been compliant with diabetic medications and has only been able to tolerate water since having ongoing abd pain. The ED spoke with the wife who stated that he has not been eating, drinking, or taking insulin "for days."    On initial presentation to the ED patient was found to have DKA on labs including blood glucose of 761 mg/dL, bircarb of 11, anion gap of 28, and beta hydroxybutarate of 7. Patient was administered a one time dose of lantus 22 units at 11:40 pm and placed on DKA protocol. This morning patient's labs indicate he is out of DKA although unable to assess whether patient will be able to tolerate diet at this time.       Interval HPI:  No events overnight from endocrinology standpoint. Patient left this morning from ICU, reports indicate there was behavioral problems this morning and patient appears to have left AMA. Was transitioned yesterday to MDI after DKA resolved.    BP (!) 149/76   Pulse 90   Temp 98.7 °F (37.1 °C) (Oral)   Resp (!) 36   Ht 6' 2" (1.88 m)   Wt 75.3 kg (166 lb)   SpO2 99%   BMI 21.31 kg/m²     Lab Results   Component Value Date/Time     " "(H) 04/23/2025 08:49 PM     12/20/2024 04:57 PM    K 3.7 04/23/2025 08:49 PM    K 4.2 12/20/2024 04:57 PM    CALCIUM 9.1 04/23/2025 08:49 PM    CALCIUM 9.2 12/20/2024 04:57 PM    ALBUMIN 3.2 (L) 04/23/2025 03:34 AM    ALBUMIN 4.0 12/20/2024 04:43 AM    CO2 29 04/23/2025 08:49 PM    CO2 31 (H) 12/20/2024 04:57 PM    ANIONGAP 8 04/23/2025 08:49 PM    TSH 1.043 09/13/2023 09:39 AM    LABOSMO 375 (HH) 04/22/2025 08:05 PM    LABOSMO 335 (HH) 03/31/2024 07:06 PM    OSMOLALITYUR 624 04/01/2024 01:59 AM    SODIUMURR 61 04/10/2025 03:06 PM    SODIUMURR 62 11/14/2023 07:35 PM     ASSESSMENT and PLAN:        Endocrine  * Diabetic ketoacidosis without coma associated with type 1 diabetes mellitus  Initial presentation to ED included polyuria and generalized weakness, labs indicative of DKA with beta hydroxybutarate level of 7 and anion gap of 28 with CO2 of 11 and  mg/dL  Patient has history of multiple DKA admissions in the past, most recently earlier this month  Unsure of etiology however suspect either poor compliance or under dosing of insulin specifically basal dose, it appears patient was seen by Dr. Cazares on 1/2025 and was recommended to increase Tresiba 22 units HS, Lyumjev 8 units TIDWM, and low dose sliding scale  Most recently on 4/16, Endocrinology saw patient in the hospital and at that time patient self reported he decreased his Tresiba to 7 units due to "controlled blood glucose," Patient left AMA on that admission.     -DKA resolved and patient was transitioned on 4/23/25.        Type 1 diabetes mellitus with hyperglycemia  Known history of T1DM, sees Dr. Cazares outpatient, last clinic visit on 1/2025, recommeded regimen at that time included Tresiba 22 units HS, Lyumjev 8 units TIDWM, and low dose sliding scale  CGM reviewed from available dates of Mar 30-Apr 12, patient's average blood glucose is 352 mg/dL and patient is in range only 5% with 95% over range and a SD above goal at 72 mg/dL  Patient was " prescribed OmniPod 5 in 01/2023.  Patient reported his insurance does not cover for education for insulin pump at that time.      -Unable to assess insulin needs due to patient leaving this morning without adequate data. Will reach out to patient's Endocrinologist to notify them to set up an appointment in the near future.             Josh Villanueva MD  Endocrinology  [facility] - Medical ICU

## 2025-04-24 NOTE — NURSING
"Patient has been noncompliant and agitated all night, was able to successfully place an IV, 5 min later patient pulled IV out. Refused to be stuck again, patient verbally stated "he doesn't like being stuck and that it hurts",  patient has been educated multiple times during the night on the importance of being treated properly due to his sickness and that labs was needed to help with treatment but patient continuously refuses us to place another IV. Patient wants to go AMA but unable to reach anyone from home. He is too weak and slight confused in regards to what is going on at the moment, he refuses to wear BP cuff for monitoring, repetitively taking his telemetry leads off, haven't been able to adequately monitor vital signs during the shift, providers notified and went to bedside to discuss plan of care and educate patient, patient continue to refuse IV placement, both providers ordered to cease lab draws at this time until further notice.   "

## 2025-04-24 NOTE — DISCHARGE SUMMARY
"[facility] - Medical ICU  Critical Care Medicine  Discharge Summary      Patient Name: Andrzej Sinclair  MRN: [id number]  Admission Date: 4/22/2025  Hospital Length of Stay: 2 days  Discharge Date and Time: 04/24/2025   Attending Physician: Nevaeh Acuña MD   Discharging Provider: Nevaeh Acuña MD  Primary Care Provider: Aubrie Primary Doctor  Reason for Admission:  Diabetic ketoacidosis    HPI:   Mr. Sinclair is a 39yoM with PMHx significant for DM1, cyclical vomiting, and HTN who presented to the ED with EMS from home with c/o generalized weakness, fatigue, abd pain, and hyperglycemia. Recently seen and admitted for similar complaints; discharged last Sunday. States he went home and drank some alcohol and began vomiting again.  has not been compliant with diabetic medications and has only been able to tolerate water since having ongoing abd pain. The ED spoke with the wife who stated that he has not been eating, drinking, or taking insulin "for days".    While in the ED, labs significant for BHB 7.0, WBC 12.38, H/H 12.7/37.7, plts 228, UA with 4+ glucose and 3+ ketones, CPK 97, Na 143, K 6.3 (shifted with calcium gluconate/insulin/bicarb/albuterol), Cl 104, CO2 11, glucose 761, BUN 43, creat 2.0, , AST/ALT 77/54, anion gap 28, alcohol <10, Osmolality 375, POCT lactic 7.5, VBG 7.262/26.8/60.3/14.0. Chest xray without acute abnormalities.     [facility] consulted for DKA/HHS. Will admit to the MICU for further workup and management.        The patient was started on an insulin infusion with careful monitoring of his BMPs.  He was severely hyponatremic and was treated with IV fluids.  His DKA did resolve but we did consult Endocrinology for further recommendations.  During his last admission last week he had to be restarted on the insulin infusion to separate times.  Overnight the insulin infusion was able to be discontinued and he became less cooperative with the care offered to him.  He was removing his IVs, a total of " seven of them, and was just generally being difficult to care for.  He requested discharge from the hospital.  I recommended that he follow up with his PCP in the next couple of days to monitor his hypernatremia which I think will improve with better enteral intake.  If stable he may return to work on Monday.      Indwelling Lines/Drains at Time of Discharge:   Lines/Drains/Airways       None                 Hospital Course:   4/24: Patient has been weaned off insulin infusion.  Endocrinology consulted--appreciate input.  Patient has been very difficult to care for overnight.  Patient requesting to be discharged home.    Consults (From admission, onward)          Status Ordering Provider     Inpatient consult to Endocrinology  Once        Provider:  (Not yet assigned)    Completed ES DALE     Inpatient consult to Critical Care Medicine  Once        Provider:  (Not yet assigned)    Completed CINTHYA LAYTON          Significant Labs:  All pertinent labs within the past 24 hours have been reviewed.    Significant Imaging:  I have reviewed all pertinent imaging results/findings within the past 24 hours.    Pending Diagnostic Studies:       Procedure Component Value Units Date/Time    Basic metabolic panel [[id number]] Collected: 04/23/25 1348    Order Status: Sent Lab Status: No result Updated: 04/23/25 1348    Specimen: Blood     CBC auto differential [[id number]]     Order Status: Sent Lab Status: No result     Specimen: Blood     Narrative:      The following orders were created for panel order CBC auto differential.  Procedure                               Abnormality         Status                     ---------                               -----------         ------                     CBC with Differential[[id number]]                                                        Please view results for these tests on the individual orders.    CBC with Differential [[id number]]     Order Status: Sent Lab  Status: No result     Specimen: Blood     Comprehensive metabolic panel [[id number]]     Order Status: Sent Lab Status: No result     Specimen: Blood     HCV Virus Hold Specimen [[id number]] Collected: 04/22/25 1655    Order Status: Sent Lab Status: In process Updated: 04/22/25 1704    Specimen: Blood     Magnesium [[id number]]     Order Status: Sent Lab Status: No result     Specimen: Blood     Phosphorus [[id number]]     Order Status: Sent Lab Status: No result     Specimen: Blood           Final Active Diagnoses:    Diagnosis Date Noted POA    PRINCIPAL PROBLEM:  Diabetic ketoacidosis without coma associated with type 1 diabetes mellitus [E10.10] 02/28/2021 Yes    BETH (acute kidney injury) [N17.9] 11/10/2023 Yes    Hyperkalemia [E87.5] 11/10/2023 Yes    Encephalopathy, metabolic [G93.41] 09/15/2023 Yes    Type 1 diabetes mellitus with hyperglycemia [E10.65] 09/07/2023 Yes     Chronic    Cannabinoid hyperemesis syndrome [R11.2, F12.90] 04/22/2021 Yes     Chronic    Primary hypertension [I10] 03/04/2021 Yes     Chronic    Cyclical vomiting syndrome [R11.15] 07/02/2018 Yes     Chronic      Problems Resolved During this Admission:     No new Assessment & Plan notes have been filed under this hospital service since the last note was generated.  Service: Critical Care Medicine    Discharged Condition: fair    Disposition: Home or Self Care     Follow-up Information       No, Primary Doctor Follow up in 3 day(s).                           Patient Instructions:      Diet diabetic     Activity as tolerated     Medications:  Reconciled Home Medications:      Medication List        CHANGE how you take these medications      insulin degludec 100 unit/mL (3 mL) insulin pen  Commonly known as: TRESIBA FLEXTOUCH U-100  Inject 15 Units into the skin once daily.  What changed: how much to take     LYUMJEV KWIKPEN U-100 INSULIN 100 unit/mL pen  Generic drug: insulin lispro-aabc  Inject 3 Units into the skin 3 (three) times daily  "with meals. Take additional sliding scale based on the blood sugars.  Maximum daily dose of 36 units.  What changed: how much to take            CONTINUE taking these medications      BD ULTRA-FINE ORIG PEN NEEDLE 29 gauge x 1/2" Ndle  Generic drug: pen needle, diabetic  Use to inject insulin into the skin.     blood sugar diagnostic Strp  3-4 times a day     blood-glucose meter kit  Use as instructed     DEXCOM G7 SENSOR Martha  Generic drug: blood-glucose sensor  Apply one sensor to the skin every 10 days     GLUCAGON EMERGENCY KIT (HUMAN) 1 mg injection  Generic drug: glucagon  Inject 1 mg into the muscle as needed (Hypoglycemia).     insulin syringe-needle U-100 0.5 mL 30 gauge x 5/16" Syrg  Commonly known as: TRUEPLUS INSULIN  USE FOUR TIMES DAILY AS DIRECTED     lancets 31 gauge Misc  1 lancet  by Misc.(Non-Drug; Combo Route) route 2 (two) times daily.     lisinopriL 10 MG tablet  Take 1 tablet (10 mg total) by mouth once daily.     ondansetron 4 MG Tbdl  Commonly known as: ZOFRAN-ODT  Take 1 tablet (4 mg total) by mouth every 6 (six) hours as needed (nausea).     pantoprazole 40 MG tablet  Commonly known as: PROTONIX  Take 1 tablet (40 mg total) by mouth once daily.            STOP taking these medications      aluminum-magnesium hydroxide-simethicone 200-200-20 mg/5 mL Susp  Commonly known as: MAALOX              Time spent on discharge:  35 minutes        Nevaeh Acuña M.D.  Rapid Response/Critical Care  Department of Pulmonary Medicine  [facility]        N.B.: Portions of this note was dictated using M*Modal Fluency Direct--there may be voice recognition errors occasionally missed on review.  "

## 2025-04-25 ENCOUNTER — TELEPHONE (OUTPATIENT)
Dept: ENDOCRINOLOGY | Facility: CLINIC | Age: 40
End: 2025-04-25
Payer: MEDICARE

## 2025-04-25 NOTE — TELEPHONE ENCOUNTER
Called patient no answer, left message to contact us to schedule follow up.    ----- Message from Ayah Cazares MD sent at 4/25/2025  2:29 PM CDT -----  Regarding: RE: Recent hospitalization and follow up  If patient can do a virtual visit, I can scheduled him on 5/13 at 11:15 am.  ----- Message -----  From: Kim Anthony MA  Sent: 4/25/2025  10:55 AM CDT  To: Ayah SAUNDERS Rai, MD  Subject: FW: Recent hospitalization and follow up           ----- Message -----  From: Josh Villanueva MD  Sent: 4/25/2025  10:04 AM CDT  To: Sage SAUNDERS Staff  Subject: Recent hospitalization and follow up             Good morning, I hope all is well, I am a fellow working on the main campus and we recently saw a gentleman (Mr. Sinclair mrn [id number])  who Dr. Cazares sees outpatient for diabetes. He presented with DKA and it resolved however he left AMA without any further recommendations from our end regarding insulin regimen. I wanted to reach out to see if the team can contact him for an appointment that can open up soon to ensure he receives proper diabetes education on his insulin regimen and follow up. Thank you and if there is anything from my end to assist please let me know.Josh Villanueva MDEndocrinology

## 2025-04-25 NOTE — PROGRESS NOTES
[facility] - Medical ICU  Discharge Final Note    Primary Care Provider: No, Primary Doctor    Expected Discharge Date: 4/24/2025    Final Discharge Note (most recent)       Final Note - 04/25/25 0913          Final Note    Assessment Type Final Discharge Note     Anticipated Discharge Disposition Home or Self Care     What phone number can be called within the next 1-3 days to see how you are doing after discharge? [phone]                     Important Message from Medicare             Contact Info       No, Primary Doctor   Relationship: PCP - General        Next Steps: Follow up in 3 day(s)    No, Primary Doctor   Relationship: PCP - General        Next Steps: Follow up    [facility]    [address]  [place], [address]  [phone]       Next Steps: Follow up on 4/29/2025    Instructions: Follow-up appointment at 10:30 am.  Patient must bring updated I.D., insurance card, and list of medications.

## 2025-04-29 ENCOUNTER — HOSPITAL ENCOUNTER (EMERGENCY)
Facility: HOSPITAL | Age: 40
Discharge: HOME | End: 2025-04-30
Attending: EMERGENCY MEDICINE
Payer: MEDICARE

## 2025-04-29 DIAGNOSIS — R73.9 HYPERGLYCEMIA: ICD-10-CM

## 2025-04-29 DIAGNOSIS — R11.2 NAUSEA & VOMITING: ICD-10-CM

## 2025-04-29 DIAGNOSIS — R11.2 NAUSEA AND VOMITING, UNSPECIFIED VOMITING TYPE: Primary | ICD-10-CM

## 2025-04-29 LAB
ABSOLUTE EOSINOPHIL (OHS): 0 K/UL
ABSOLUTE MONOCYTE (OHS): 0.28 K/UL (ref 0.3–1)
ABSOLUTE NEUTROPHIL COUNT (OHS): 3.22 K/UL (ref 1.8–7.7)
ALBUMIN SERPL BCP-MCNC: 3.5 G/DL (ref 3.5–5.2)
ALP SERPL-CCNC: 137 UNIT/L (ref 40–150)
ALT SERPL W/O P-5'-P-CCNC: 79 UNIT/L (ref 10–44)
ANION GAP (OHS): 9 MMOL/L (ref 8–16)
AST SERPL-CCNC: 26 UNIT/L (ref 11–45)
B-OH-BUTYR BLD STRIP-SCNC: 1.1 MMOL/L
BACTERIA #/AREA URNS AUTO: NORMAL /HPF
BASOPHILS # BLD AUTO: 0 K/UL
BASOPHILS NFR BLD AUTO: 0 %
BILIRUB SERPL-MCNC: 0.7 MG/DL (ref 0.1–1)
BILIRUB UR QL STRIP.AUTO: NEGATIVE
BIPAP: 0
BUN SERPL-MCNC: 11 MG/DL (ref 6–20)
CALCIUM SERPL-MCNC: 9.1 MG/DL (ref 8.7–10.5)
CHLORIDE SERPL-SCNC: 102 MMOL/L (ref 95–110)
CLARITY UR: CLEAR
CO2 SERPL-SCNC: 28 MMOL/L (ref 23–29)
COLOR UR AUTO: YELLOW
CORRECTED TEMPERATURE (PCO2): 50.9 MMHG
CORRECTED TEMPERATURE (PH): 7.4
CORRECTED TEMPERATURE (PO2): 24.8 MMHG
CREAT SERPL-MCNC: 1 MG/DL (ref 0.5–1.4)
ERYTHROCYTE [DISTWIDTH] IN BLOOD BY AUTOMATED COUNT: 11.4 % (ref 11.5–14.5)
FIO2: 21 %
GFR SERPLBLD CREATININE-BSD FMLA CKD-EPI: >60 ML/MIN/1.73/M2
GLUCOSE SERPL-MCNC: 325 MG/DL (ref 70–110)
GLUCOSE UR QL STRIP: ABNORMAL
HCT VFR BLD AUTO: 34.8 % (ref 40–54)
HCT VFR BLD CALC: 39.4 %
HGB BLD-MCNC: 11.6 GM/DL (ref 14–18)
HGB UR QL STRIP: NEGATIVE
IMM GRANULOCYTES # BLD AUTO: 0.01 K/UL (ref 0–0.04)
IMM GRANULOCYTES NFR BLD AUTO: 0.3 % (ref 0–0.5)
KETONES UR QL STRIP: ABNORMAL
LDH SERPL L TO P-CCNC: 1.5 MMOL/L (ref 0.5–2.2)
LEUKOCYTE ESTERASE UR QL STRIP: NEGATIVE
LYMPHOCYTES # BLD AUTO: 0.4 K/UL (ref 1–4.8)
MAGNESIUM SERPL-MCNC: 1.7 MG/DL (ref 1.6–2.6)
MCH RBC QN AUTO: 29.4 PG (ref 27–31)
MCHC RBC AUTO-ENTMCNC: 33.3 G/DL (ref 32–36)
MCV RBC AUTO: 88 FL (ref 82–98)
MICROSCOPIC COMMENT: NORMAL
NITRITE UR QL STRIP: NEGATIVE
NUCLEATED RBC (/100WBC) (OHS): 0 /100 WBC
OSMOLALITY SERPL: 309 MOSM/KG (ref 280–300)
PCO2 BLDA: 50.9 MMHG
PH SMN: 7.4 [PH]
PH UR STRIP: 8 [PH]
PLATELET # BLD AUTO: 170 K/UL (ref 150–450)
PMV BLD AUTO: 11.4 FL (ref 9.2–12.9)
PO2 BLDA: 24.8 MMHG
POC BASE DEFICIT: 5.6 MMOL/L
POC HCO3: 28.2 MMOL/L
POC IONIZED CALCIUM: 1.14 MMOL/L (ref 1.06–1.42)
POC PERFORMED BY: NORMAL
POC TEMPERATURE: 37 C
POTASSIUM BLD-SCNC: 4.9 MMOL/L (ref 3.5–5.1)
POTASSIUM SERPL-SCNC: 4.9 MMOL/L (ref 3.5–5.1)
PROT SERPL-MCNC: 6.9 GM/DL (ref 6–8.4)
PROT UR QL STRIP: ABNORMAL
RBC # BLD AUTO: 3.94 M/UL (ref 4.6–6.2)
RBC #/AREA URNS AUTO: 3 /HPF (ref 0–4)
RELATIVE EOSINOPHIL (OHS): 0 %
RELATIVE LYMPHOCYTE (OHS): 10.2 % (ref 18–48)
RELATIVE MONOCYTE (OHS): 7.2 % (ref 4–15)
RELATIVE NEUTROPHIL (OHS): 82.3 % (ref 38–73)
SODIUM BLD-SCNC: 142 MMOL/L (ref 136–145)
SODIUM SERPL-SCNC: 139 MMOL/L (ref 136–145)
SP GR UR STRIP: 1.01
SPECIMEN SOURCE: NORMAL
UROBILINOGEN UR STRIP-ACNC: NEGATIVE EU/DL
WBC # BLD AUTO: 3.91 K/UL (ref 3.9–12.7)
WBC #/AREA URNS AUTO: 1 /HPF (ref 0–5)
YEAST UR QL AUTO: NORMAL /HPF

## 2025-04-29 PROCEDURE — 99900035 HC TECH TIME PER 15 MIN (STAT)

## 2025-04-29 PROCEDURE — 82010 KETONE BODYS QUAN: CPT | Performed by: EMERGENCY MEDICINE

## 2025-04-29 PROCEDURE — 96372 THER/PROPH/DIAG INJ SC/IM: CPT

## 2025-04-29 PROCEDURE — 82962 GLUCOSE BLOOD TEST: CPT

## 2025-04-29 PROCEDURE — 93010 ELECTROCARDIOGRAM REPORT: CPT | Mod: ,,, | Performed by: INTERNAL MEDICINE

## 2025-04-29 PROCEDURE — 83930 ASSAY OF BLOOD OSMOLALITY: CPT | Performed by: EMERGENCY MEDICINE

## 2025-04-29 PROCEDURE — 82330 ASSAY OF CALCIUM: CPT

## 2025-04-29 PROCEDURE — 80053 COMPREHEN METABOLIC PANEL: CPT | Performed by: EMERGENCY MEDICINE

## 2025-04-29 PROCEDURE — 81001 URINALYSIS AUTO W/SCOPE: CPT | Performed by: EMERGENCY MEDICINE

## 2025-04-29 PROCEDURE — 93005 ELECTROCARDIOGRAM TRACING: CPT

## 2025-04-29 PROCEDURE — 82803 BLOOD GASES ANY COMBINATION: CPT

## 2025-04-29 PROCEDURE — 84132 ASSAY OF SERUM POTASSIUM: CPT

## 2025-04-29 PROCEDURE — 99285 EMERGENCY DEPT VISIT HI MDM: CPT | Mod: 25

## 2025-04-29 PROCEDURE — 96374 THER/PROPH/DIAG INJ IV PUSH: CPT

## 2025-04-29 PROCEDURE — 63600175 PHARM REV CODE 636 W HCPCS: Performed by: EMERGENCY MEDICINE

## 2025-04-29 PROCEDURE — 84295 ASSAY OF SERUM SODIUM: CPT

## 2025-04-29 PROCEDURE — 83735 ASSAY OF MAGNESIUM: CPT | Performed by: EMERGENCY MEDICINE

## 2025-04-29 PROCEDURE — 82800 BLOOD PH: CPT

## 2025-04-29 PROCEDURE — 85025 COMPLETE CBC W/AUTO DIFF WBC: CPT | Performed by: EMERGENCY MEDICINE

## 2025-04-29 PROCEDURE — 63600175 PHARM REV CODE 636 W HCPCS

## 2025-04-29 PROCEDURE — 96375 TX/PRO/DX INJ NEW DRUG ADDON: CPT

## 2025-04-29 PROCEDURE — 96361 HYDRATE IV INFUSION ADD-ON: CPT

## 2025-04-29 PROCEDURE — 25000003 PHARM REV CODE 250: Performed by: EMERGENCY MEDICINE

## 2025-04-29 RX ORDER — MORPHINE SULFATE 2 MG/ML
2 INJECTION, SOLUTION INTRAMUSCULAR; INTRAVENOUS
Refills: 0 | Status: COMPLETED | OUTPATIENT
Start: 2025-04-29 | End: 2025-04-29

## 2025-04-29 RX ORDER — DROPERIDOL 2.5 MG/ML
1.25 INJECTION, SOLUTION INTRAMUSCULAR; INTRAVENOUS
Status: COMPLETED | OUTPATIENT
Start: 2025-04-29 | End: 2025-04-29

## 2025-04-29 RX ORDER — ONDANSETRON HYDROCHLORIDE 2 MG/ML
4 INJECTION, SOLUTION INTRAVENOUS
Status: COMPLETED | OUTPATIENT
Start: 2025-04-29 | End: 2025-04-29

## 2025-04-29 RX ORDER — INSULIN ASPART 100 [IU]/ML
4 INJECTION, SOLUTION INTRAVENOUS; SUBCUTANEOUS
Status: COMPLETED | OUTPATIENT
Start: 2025-04-29 | End: 2025-04-29

## 2025-04-29 RX ADMIN — SODIUM CHLORIDE, POTASSIUM CHLORIDE, SODIUM LACTATE AND CALCIUM CHLORIDE 1000 ML: 600; 310; 30; 20 INJECTION, SOLUTION INTRAVENOUS at 11:04

## 2025-04-29 RX ADMIN — ONDANSETRON 4 MG: 2 INJECTION INTRAMUSCULAR; INTRAVENOUS at 09:04

## 2025-04-29 RX ADMIN — MORPHINE SULFATE 2 MG: 2 INJECTION, SOLUTION INTRAMUSCULAR; INTRAVENOUS at 09:04

## 2025-04-29 RX ADMIN — INSULIN ASPART 4 UNITS: 100 INJECTION, SOLUTION INTRAVENOUS; SUBCUTANEOUS at 10:04

## 2025-04-29 RX ADMIN — SODIUM CHLORIDE 1000 ML: 9 INJECTION, SOLUTION INTRAVENOUS at 09:04

## 2025-04-29 RX ADMIN — DROPERIDOL 1.25 MG: 2.5 INJECTION, SOLUTION INTRAMUSCULAR; INTRAVENOUS at 10:04

## 2025-04-30 VITALS
WEIGHT: 166 LBS | RESPIRATION RATE: 13 BRPM | HEIGHT: 74 IN | TEMPERATURE: 98 F | BODY MASS INDEX: 21.3 KG/M2 | SYSTOLIC BLOOD PRESSURE: 111 MMHG | HEART RATE: 134 BPM | OXYGEN SATURATION: 99 % | DIASTOLIC BLOOD PRESSURE: 56 MMHG

## 2025-04-30 LAB
HOLD SPECIMEN: NORMAL
OHS QRS DURATION: 96 MS
OHS QTC CALCULATION: 421 MS
POCT GLUCOSE: 286 MG/DL (ref 70–110)

## 2025-04-30 RX ORDER — ONDANSETRON 4 MG/1
4 TABLET, ORALLY DISINTEGRATING ORAL EVERY 8 HOURS PRN
Qty: 12 TABLET | Refills: 0 | Status: SHIPPED | OUTPATIENT
Start: 2025-04-30

## 2025-04-30 NOTE — ED PROVIDER NOTES
Encounter Date: 4/29/2025       History     Chief Complaint   Patient presents with    Emesis     Pt called ems for nausea and vomiting and high BG. CBG w/ . Recently DC for same problem.     Hyperglycemia     38 yo M w/ hx of DM1, cyclical vomiting, HTN presents to the ED with nausea, vomiting, generalized weakness. Patient was recently hospitalized and in the ICU for DKA/HHS. Patient left AMA from the hospital and per chart review was intermittently refusing treatment such as blood draws and additional IVs. Patient complains of intractable nausea and vomiting since his discharge. The emesis has become darker as his illness had progressed but pt denies rick blood or coffee ground.  for EMS. Has additional complaint of abdominal pain and chest pain exacerbated with movement. When asked, the patient states this episode He has been compliant with his insulin regimen with the assistance of his wife.  Denies dysuria, SOB, fever.         Review of patient's allergies indicates:   Allergen Reactions    Lactose Other (See Comments)     Gas and moderate to sever abdominal pain     Past Medical History:   Diagnosis Date    Diabetes mellitus     Diabetes mellitus type 1     Diagnosed at age 20     Past Surgical History:   Procedure Laterality Date    ESOPHAGOGASTRODUODENOSCOPY N/A 11/5/2024    Procedure: EGD (ESOPHAGOGASTRODUODENOSCOPY);  Surgeon: Manny Troy MD;  Location: [address]);  Service: Endoscopy;  Laterality: N/A;     Family History   Problem Relation Name Age of Onset    Other Mother prediabetes         prediabetes    Diabetes Mother prediabetes     Other Father          patient does not know his fathers history     Social History[1]  Review of Systems   Constitutional:  Positive for chills and diaphoresis. Negative for fever.   Respiratory:  Negative for cough and shortness of breath.    Cardiovascular:  Negative for chest pain.   Gastrointestinal:  Positive for abdominal pain, nausea  and vomiting. Negative for abdominal distention and constipation.   Genitourinary:  Negative for difficulty urinating and dysuria.       Physical Exam     Initial Vitals [04/29/25 2050]   BP Pulse Resp Temp SpO2   (!) 170/78 99 20 98.4 °F (36.9 °C) 100 %      MAP       --         Physical Exam    Vitals reviewed.  Constitutional: He appears distressed.   HENT:   Head: Normocephalic.   Dry mucous membranes   Eyes: EOM are normal. Pupils are equal, round, and reactive to light.   Cardiovascular:  Normal rate and regular rhythm.           No murmur heard.  Pulmonary/Chest: Breath sounds normal. No stridor. No respiratory distress. He has no wheezes. He has no rales.   Abdominal: Abdomen is soft. He exhibits no distension and no mass. There is abdominal tenderness (generalized). There is no rebound and no guarding.   Musculoskeletal:         General: No edema. Normal range of motion.     Neurological: He is alert. He has normal reflexes. No cranial nerve deficit.   Skin: Skin is warm and dry. Capillary refill takes less than 2 seconds.   Psychiatric: He has a normal mood and affect. Thought content normal.         ED Course   Procedures  Labs Reviewed   COMPREHENSIVE METABOLIC PANEL - Abnormal       Result Value    Sodium 139      Potassium 4.9      Chloride 102      CO2 28      Glucose 325 (*)     BUN 11      Creatinine 1.0      Calcium 9.1      Protein Total 6.9      Albumin 3.5      Bilirubin Total 0.7            AST 26      ALT 79 (*)     Anion Gap 9      eGFR >60     URINALYSIS, REFLEX TO URINE CULTURE - Abnormal    Color, UA Yellow      Appearance, UA Clear      pH, UA 8.0      Spec Grav UA 1.015      Protein, UA Trace (*)     Glucose, UA 4+ (*)     Ketones, UA 1+ (*)     Bilirubin, UA Negative      Blood, UA Negative      Nitrites, UA Negative      Urobilinogen, UA Negative      Leukocyte Esterase, UA Negative     BETA - HYDROXYBUTYRATE, SERUM - Abnormal    Beta-Hydroxybutyrate 1.1 (*)    OSMOLALITY, SERUM  - Abnormal    Osmolality 309 (*)    CBC WITH DIFFERENTIAL - Abnormal    WBC 3.91      RBC 3.94 (*)     HGB 11.6 (*)     HCT 34.8 (*)     MCV 88      MCH 29.4      MCHC 33.3      RDW 11.4 (*)     Platelet Count 170      MPV 11.4      Nucleated RBC 0      Neut % 82.3 (*)     Lymph % 10.2 (*)     Mono % 7.2      Eos % 0.0      Basophil % 0.0      Imm Grans % 0.3      Neut # 3.22      Lymph # 0.40 (*)     Mono # 0.28 (*)     Eos # 0.00      Baso # 0.00      Imm Grans # 0.01     POCT GLUCOSE - Abnormal    POCT Glucose 286 (*)    MAGNESIUM - Normal    Magnesium  1.7     CBC W/ AUTO DIFFERENTIAL    Narrative:     The following orders were created for panel order CBC auto differential.  Procedure                               Abnormality         Status                     ---------                               -----------         ------                     CBC with Differential[[id number]]       Abnormal            Final result                 Please view results for these tests on the individual orders.   GREY TOP URINE HOLD    Extra Tube Hold for add-ons.     URINALYSIS MICROSCOPIC    RBC, UA 3      WBC, UA 1      Bacteria, UA Rare      Yeast, UA None      Microscopic Comment         EKG Readings: (Independently Interpreted)   Initial Reading: No STEMI. Rhythm: Normal Sinus Rhythm. Heart Rate: 96. Conduction: RBBB (Incomplete). ST Segments: Normal ST Segments. T Waves Elevated: V4. T Waves Flipped: AVL. Axis: Normal.     ECG Results              EKG 12-lead (Final result)        Collection Time Result Time QRS Duration OHS QTC Calculation    04/29/25 21:06:17 04/30/25 08:28:17 96 421                     Final result by Interface, Lab In [facility] (04/30/25 08:28:19)                   Narrative:    Test Reason : R73.9,    Vent. Rate :  96 BPM     Atrial Rate :  96 BPM     P-R Int : 124 ms          QRS Dur :  96 ms      QT Int : 334 ms       P-R-T Axes :  81  72  74 degrees    QTcB Int : 421 ms    Normal sinus  rhythm  Incomplete right bundle branch block  Minimal voltage criteria for LVH, may be normal variant ( Sokolow-Crook )  Borderline Abnormal ECG  When compared with ECG of 22-Apr-2025 16:59,  No significant change was found  Confirmed by Freddy Donato ([id number]) on 4/30/2025 8:28:11 AM    Referred By: AAAREFERRAL SELF           Confirmed By: Freddy Gibson                                  Imaging Results              X-Ray Chest AP Portable (Final result)  Result time 04/30/25 08:10:17      Final result by Wally Bird MD (04/30/25 08:10:17)                   Impression:      See above      Electronically signed by: Wally Bird MD  Date:    04/30/2025  Time:    08:10               Narrative:    EXAMINATION:  XR CHEST AP PORTABLE    CLINICAL HISTORY:  Nausea with vomiting, unspecified    TECHNIQUE:  Single frontal view of the chest was performed.    COMPARISON:  No 04/22/2025 ne    FINDINGS:  Heart size normal.  The lungs are clear.  No pleural effusion                                       Medications   sodium chloride 0.9% bolus 1,000 mL 1,000 mL (0 mLs Intravenous Stopped 4/29/25 2256)   ondansetron injection 4 mg (4 mg Intravenous Given 4/29/25 2140)   morphine injection 2 mg (2 mg Intravenous Given 4/29/25 2147)   droPERidol injection 1.25 mg (1.25 mg Intravenous Given 4/29/25 2255)   lactated ringers bolus 1,000 mL (0 mLs Intravenous Stopped 4/30/25 0000)   insulin aspart U-100 pen 4 Units (4 Units Subcutaneous Given 4/29/25 2255)     Medical Decision Making  40 yo w/ DM1, cyclic vomiting presents with intractable nausea vomiting, abdominal pain. Recently discharged AMA from ICU treated for DKA/HHA. Afebrile, satting well on RA, hr, rr wnl. Hypertensive. Ill-appearing, abdomen tender. CBC without leukocytosis or significant anemia. Na, K, AG wnl on CMP. Initial glucose 325. UA significant for glucosuria and ketonuria. Symptoms improved with ondansetron, morphine, droperidol, and 1 L of saline. Re-examination  of abdomen improved. Dispo is home with return precautions. Patient called wife for ride.     Differentials considered include cyclic vomiting syndrome, DKA, gastroparesis, SBO, intracranial process, gastroenteritis, and metabolic derangements in evaluation of intractable nausea and vomiting.      Amount and/or Complexity of Data Reviewed  Labs:  Decision-making details documented in ED Course.  Radiology: ordered.    Risk  Prescription drug management.              Attending Attestation:   Physician Attestation Statement for Resident:  As the supervising MD   Physician Attestation Statement: I have personally seen and examined this patient.   I agree with the above history.  -: 39-year-old male with a history of IDDM 1, cyclic vomiting, question cannabinoid hyperemesis syndrome, recently left AMA from ICU for DKA presents with a chief complaint of abdominal pain.  Patient notes generalized abdominal pain with vomiting.  On residents initial presentation, patient with diffuse tenderness and distressed.  He received 2 of morphine and 4 Zofran and on repeat evaluation with me, he notes nausea resolved and pain has mostly improved.  He had mild epigastric tenderness on palpation but no guarding or rebound.  He notes that symptoms are consistent with previous cyclic vomiting.  Given only mild tenderness and a nonsurgical exam, along with the multiple CTs in the past, and presentations similar to this, do not feel there is any acute surgical intra-abdominal process that would require a CT at this time.  Additional fluids and droperidol were ordered. Labs revealed hyperglycemia of 325.  Anion gap was closed.  Beta hydroxybutyrate was minimally elevated at 1.1.  Not significantly acidotic on VBG.  We ordered 4 units of insulin due to hyperglycemia.  However, this is inconsistent with DKA or HHS.  We did consider cyclic vomiting or cannabinoid hyperemesis syndrome as an etiology.  At 11:00 p.m., my shift came to a close and  the patient was signed out to incoming attending.  On chart review, it appears that patient experienced improvement in symptoms and was discharged.     As the supervising MD I agree with the above PE.     As the supervising MD I agree with the above treatment, course, plan, and disposition.                    ED Course as of 05/01/25 0727   Tue Apr 29, 2025 2127 Beta-Hydroxybutyrate(!): 1.1 [MR]   2127 POC PH: 7.401 [MR]   2137 WBC: 3.91 [MR]   2147 Glucose(!): 325 [MR]   2147 Anion Gap: 9 [MR]   2202 Hemoglobin(!): 11.6 [MR]   2251 Ketones, UA(!): 1+ [MR]   2342 Osmolality(!): 309 [MR]   Wed Apr 30, 2025 0114 POC PH: 7.401 [MR]      ED Course User Index  [MR] Mihir Pittman MD                           Clinical Impression:  Final diagnoses:  [R73.9] Hyperglycemia  [R11.2] Nausea & vomiting  [R11.2] Nausea and vomiting, unspecified vomiting type (Primary)          ED Disposition Condition    Discharge Stable          ED Prescriptions       Medication Sig Dispense Start Date End Date Auth. Provider    ondansetron (ZOFRAN-ODT) 4 MG TbDL Take 1 tablet (4 mg total) by mouth every 8 (eight) hours as needed (nausea, vomiting). 12 tablet 4/30/2025 -- Patricia Wesley MD          Follow-up Information       Follow up With Specialties Details Why Contact Info    Your PCP                   Mihir Pittman MD  Resident  04/30/25 [id number]         [1]   Social History  Tobacco Use    Smoking status: Former    Smokeless tobacco: Never   Substance Use Topics    Alcohol use: Not Currently     Comment: socially    Drug use: No        Peter Zuniga MD  05/01/25 [id number]

## 2025-04-30 NOTE — ED TRIAGE NOTES
Patient comes into the emergency department by EMS with complaints of emesis and hyperglycemia. Patient states that he was here recently for DKA. Pt states that he has been vomiting and having diarrhea since he was discharged.  Patient states he also has been having chest pain.

## 2025-04-30 NOTE — ED NOTES
Report received from JENNIFER brunson. Assume care of pt. Pt resting comfortably and independently repositioned in stretcher with bed locked in lowest position for safety. NAD noted at this time. Respirations even and unlabored and visible chest rise noted.  Patient offered bathroom assistance and denies need at this time. Pt instructed to call if assistance is needed. Pt on continuous cardiac, BP, and O2 monitoring. Call light within reach. No needs at this time. Will continue to monitor.

## 2025-04-30 NOTE — FIRST PROVIDER EVALUATION
"Medical screening examination initiated.  I have conducted a focused provider triage encounter, findings are as follows:    Brief history of present illness:  39-year-old male with a history of type 1 diabetes, cyclic vomiting presenting with nausea, vomiting and hyperglycemia.  He presents via EMS from home with generalized weakness, fatigue, abdominal pain and hyperglycemia.  He was recently seen and discharged on 424 for HHS/DKA picture and admitted to the ICU.  His symptoms are similar.  Denies any chest pain, falls or trauma.    Vitals:    04/29/25 2050   BP: (!) 170/78   Pulse: 99   Resp: 20   Temp: 98.4 °F (36.9 °C)   TempSrc: Oral   SpO2: 100%   Weight: 75.3 kg (166 lb)   Height: 6' 2" (1.88 m)       Pertinent physical exam:  Lethargic, generalized fatigue, no focal deficits    Brief workup plan:  DKA/HHS workup, ECG, labs, UA, VBG    Preliminary workup initiated; this workup will be continued and followed by the physician or advanced practice provider that is assigned to the patient when roomed.    Himanshu Garnett DO, ALEX  Emergency Staff Physician   Dept of Emergency Medicine   [facility]  Spectralink: [id number]        Disclaimer: This note has been generated using voice-recognition software. There may be typographical errors that have been missed during proof-reading.    "

## 2025-07-28 ENCOUNTER — E-VISIT (OUTPATIENT)
Dept: FAMILY MEDICINE | Facility: CLINIC | Age: 40
End: 2025-07-28
Payer: MEDICAID

## 2025-07-28 DIAGNOSIS — E10.29 TYPE 1 DIABETES MELLITUS WITH MICROALBUMINURIA: ICD-10-CM

## 2025-07-28 DIAGNOSIS — E10.65 TYPE 1 DIABETES MELLITUS WITH HYPERGLYCEMIA: Primary | ICD-10-CM

## 2025-07-28 DIAGNOSIS — R80.9 TYPE 1 DIABETES MELLITUS WITH MICROALBUMINURIA: ICD-10-CM

## 2025-07-28 DIAGNOSIS — E10.36 TYPE 1 DIABETES MELLITUS WITH DIABETIC CATARACT: ICD-10-CM

## 2025-07-28 DIAGNOSIS — M25.519 SHOULDER PAIN, UNSPECIFIED CHRONICITY, UNSPECIFIED LATERALITY: ICD-10-CM

## 2025-07-28 RX ORDER — PANTOPRAZOLE SODIUM 40 MG/1
40 TABLET, DELAYED RELEASE ORAL DAILY
Qty: 90 TABLET | Refills: 0 | Status: SHIPPED | OUTPATIENT
Start: 2025-07-28 | End: 2025-10-26

## 2025-07-28 RX ORDER — NABUMENTONE 750 MG/1
750 TABLET ORAL 2 TIMES DAILY PRN
Qty: 40 TABLET | Refills: 0 | Status: SHIPPED | OUTPATIENT
Start: 2025-07-28 | End: 2025-08-17

## 2025-07-28 RX ORDER — LISINOPRIL 10 MG/1
10 TABLET ORAL DAILY
Qty: 90 TABLET | Refills: 0 | Status: SHIPPED | OUTPATIENT
Start: 2025-07-28 | End: 2026-07-28

## 2025-07-28 RX ORDER — INSULIN LISPRO-AABC 100 [IU]/ML
3 INJECTION, SOLUTION SUBCUTANEOUS
Qty: 3 PEN | Refills: 0 | Status: SHIPPED | OUTPATIENT
Start: 2025-07-28

## 2025-07-28 RX ORDER — INSULIN DEGLUDEC 100 U/ML
15 INJECTION, SOLUTION SUBCUTANEOUS DAILY
Qty: 21 ML | Refills: 0 | Status: SHIPPED | OUTPATIENT
Start: 2025-07-28

## 2025-07-28 NOTE — PROGRESS NOTES
Patient ID: Andrzej Sinclair is a 39 y.o. male.    Chief Complaint: General Illness (Entered automatically based on patient selection in [employer].)          274}  The patient initiated a request through [employer] on 7/28/2025 for evaluation and management with a chief complaint of General Illness (Entered automatically based on patient selection in [employer].)     I evaluated the questionnaire submission on 07/28/2025 .    Total Time (in minutes): 13     [facility] Evisit [facility]-Medication    7/28/2025  1:49 PM CDT - Filed by Patient   What do you need help with? Medication Request   Do you agree to participate in an E-Visit? Yes   If you have any of the following symptoms, please present to your local emergency room or call 911:  I acknowledge   Medication requests for narcotics will not be addressed via an E-Visit.  Please schedule an appointment. I acknowledge   Do you want to address a new or existing medication? (Start a new medication, Address a current medication) Address a current medication   What is the main issue you would like addressed today? Refill   Would you like to change or continue your medication? (Change medication, Continue medication) Continue medication   What is the name of the medication you would like to continue? All meds   Are you taking your medication as prescribed? Yes   Which option below best describes the reason for your request? (Renew refills, Prior authorization is required) Renew refills    What medical condition is the  medication intended to treat? Type 1 diabetes   Has the medication helped your condition? (Yes, No, Not sure) Yes   Have you experienced any side effects from the medication? No   Provide any information you feel is important to your history not asked above    Please attach any relevant images or files    Are you able to take your vitals? Yes   Systolic Blood Pressure 142   Diastolic Blood Pressure 78   Weight: 211   Height:    Pluse: 88   Temp 97.9   Resp: 17   SpO2  99          Active Problem List with Overview Notes    Diagnosis Date Noted    Pyuria 04/16/2025    Marijuana use 04/16/2025    Low back pain 04/15/2025    Thrombocytopenia 04/13/2025    Anemia 04/13/2025    Hyperemesis 12/19/2024    Hyperosmolar hyperglycemic state (HHS) 03/31/2024    Mixed hyperlipidemia 11/20/2023    BETH (acute kidney injury) 11/10/2023    Hyperkalemia 11/10/2023    Encephalopathy, metabolic 09/15/2023    Elevated LFTs 09/12/2023    Hypernatremia 09/07/2023    Type 1 diabetes mellitus with hyperglycemia 09/07/2023    Adjustment disorder with anxiety 11/16/2021     pt to be seen by mental health today.  placed on suicide watch in last 24-48 hours.      Costochondritis 11/16/2021    Moderate malnutrition 11/16/2021    Cannabinoid hyperemesis syndrome 04/22/2021    Type 1 diabetes mellitus with complications 03/10/2021    Enlarged prostate 03/05/2021    Primary hypertension 03/04/2021    Hepatic steatosis 03/04/2021    Diabetic ketoacidosis without coma associated with type 1 diabetes mellitus 02/28/2021    Polyneuropathy due to type 1 diabetes mellitus 07/31/2018    Cyclical vomiting syndrome 07/02/2018    Type 1 diabetes mellitus with hyperosmolarity, uncontrolled 04/02/2018      Recent Labs Obtained:  Lab Results   Component Value Date    WBC 3.91 04/29/2025    HGB 11.6 (L) 04/29/2025    HCT 39.4 04/29/2025    MCV 88 04/29/2025     04/29/2025     04/29/2025    K 4.9 04/29/2025     (H) 04/29/2025    CREATININE 1.0 04/29/2025    EGFRNORACEVR >60 04/29/2025    HGBA1C 13.8 (H) 04/10/2025    TSH 1.043 09/13/2023      Review of patient's allergies indicates:   Allergen Reactions    Lactose Other (See Comments)     Gas and moderate to sever abdominal pain       Encounter Diagnoses   Name Primary?    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     Medications Ordered This Encounter   Medications    insulin degludec (TRESIBA FLEXTOUCH U-100) 100 unit/mL (3 mL) insulin pen     Sig: Inject 15 Units into the skin once daily.     Dispense:  21 mL     Refill:  0    insulin lispro-aabc (LYUMJEV KWIKPEN U-100 INSULIN) 100 unit/mL pen     Sig: Inject 3 Units into the skin 3 (three) times daily with meals. Take additional sliding scale based on the blood sugars.  Maximum daily dose of 36 units.     Dispense:  3 pen      Refill:  0    lisinopriL 10 MG tablet     Sig: Take 1 tablet (10 mg total) by mouth once daily.     Dispense:  90 tablet     Refill:  0     .    pantoprazole (PROTONIX) 40 MG tablet     Sig: Take 1 tablet (40 mg total) by mouth once daily.     Dispense:  90 tablet     Refill:  0        E-Visit Time Tracking:    Day 1 Time (in minutes): 13    Total Time (in minutes): 13      274}

## 2025-07-29 ENCOUNTER — TELEPHONE (OUTPATIENT)
Dept: ENDOCRINOLOGY | Facility: CLINIC | Age: 40
End: 2025-07-29
Payer: MEDICAID

## 2025-07-29 NOTE — TELEPHONE ENCOUNTER
Returned patient's call back about wanting to see Viktoriya.Patient is already scheduled with a provider. Patient will keep the appointment he is scheduled.

## 2025-08-05 ENCOUNTER — PATIENT MESSAGE (OUTPATIENT)
Dept: ENDOCRINOLOGY | Facility: CLINIC | Age: 40
End: 2025-08-05
Payer: MEDICAID

## 2025-08-06 ENCOUNTER — PATIENT MESSAGE (OUTPATIENT)
Dept: ENDOCRINOLOGY | Facility: CLINIC | Age: 40
End: 2025-08-06
Payer: MEDICAID

## 2025-08-06 DIAGNOSIS — E10.29 TYPE 1 DIABETES MELLITUS WITH MICROALBUMINURIA: ICD-10-CM

## 2025-08-06 DIAGNOSIS — E10.65 TYPE 1 DIABETES MELLITUS WITH HYPERGLYCEMIA: ICD-10-CM

## 2025-08-06 DIAGNOSIS — R80.9 TYPE 1 DIABETES MELLITUS WITH MICROALBUMINURIA: ICD-10-CM

## 2025-08-06 RX ORDER — INSULIN DEGLUDEC 100 U/ML
15 INJECTION, SOLUTION SUBCUTANEOUS DAILY
Qty: 21 ML | Refills: 0 | Status: SHIPPED | OUTPATIENT
Start: 2025-08-06

## 2025-08-06 RX ORDER — INSULIN LISPRO-AABC 100 [IU]/ML
3 INJECTION, SOLUTION SUBCUTANEOUS
Qty: 3 PEN | Refills: 0 | Status: SHIPPED | OUTPATIENT
Start: 2025-08-06

## 2025-08-25 ENCOUNTER — HOSPITAL ENCOUNTER (INPATIENT)
Facility: HOSPITAL | Age: 40
LOS: 7 days | Discharge: HOME | DRG: 637 | End: 2025-09-01
Attending: EMERGENCY MEDICINE | Admitting: INTERNAL MEDICINE
Payer: MEDICAID

## 2025-08-25 DIAGNOSIS — R80.9 TYPE 1 DIABETES MELLITUS WITH MICROALBUMINURIA: ICD-10-CM

## 2025-08-25 DIAGNOSIS — E87.29 HIGH ANION GAP METABOLIC ACIDOSIS: ICD-10-CM

## 2025-08-25 DIAGNOSIS — E87.0 HYPERNATREMIA: ICD-10-CM

## 2025-08-25 DIAGNOSIS — E87.5 HYPERKALEMIA: ICD-10-CM

## 2025-08-25 DIAGNOSIS — R73.9 HYPERGLYCEMIA: ICD-10-CM

## 2025-08-25 DIAGNOSIS — E10.65 TYPE 1 DIABETES MELLITUS WITH HYPERGLYCEMIA: ICD-10-CM

## 2025-08-25 DIAGNOSIS — E10.29 TYPE 1 DIABETES MELLITUS WITH MICROALBUMINURIA: ICD-10-CM

## 2025-08-25 DIAGNOSIS — E86.0 DEHYDRATION: ICD-10-CM

## 2025-08-25 DIAGNOSIS — E10.11 DIABETIC KETOACIDOSIS WITH COMA ASSOCIATED WITH TYPE 1 DIABETES MELLITUS: Primary | ICD-10-CM

## 2025-08-25 DIAGNOSIS — G93.40 ACUTE ENCEPHALOPATHY: ICD-10-CM

## 2025-08-25 PROBLEM — F10.11 HISTORY OF ETOH ABUSE: Status: ACTIVE | Noted: 2025-08-25

## 2025-08-25 LAB
ABSOLUTE EOSINOPHIL (OHS): 0 K/UL
ABSOLUTE MONOCYTE (OHS): 1.92 K/UL (ref 0.3–1)
ABSOLUTE NEUTROPHIL COUNT (OHS): 13.18 K/UL (ref 1.8–7.7)
ALBUMIN SERPL BCP-MCNC: 4.4 G/DL (ref 3.5–5.2)
ALP SERPL-CCNC: 104 UNIT/L (ref 40–150)
ALT SERPL W/O P-5'-P-CCNC: 23 UNIT/L (ref 0–55)
AMPHET UR QL SCN: NEGATIVE
ANION GAP (OHS): 34 MMOL/L (ref 8–16)
ANION GAP (OHS): 38 MMOL/L (ref 8–16)
ANION GAP (OHS): ABNORMAL
AST SERPL-CCNC: 25 UNIT/L (ref 0–50)
B-OH-BUTYR BLD STRIP-SCNC: 7.3 MMOL/L
BACTERIA #/AREA URNS AUTO: ABNORMAL /HPF
BARBITURATE SCN PRESENT UR: NEGATIVE
BASOPHILS # BLD AUTO: 0.06 K/UL
BASOPHILS NFR BLD AUTO: 0.4 %
BENZODIAZ UR QL SCN: NEGATIVE
BILIRUB SERPL-MCNC: 0.4 MG/DL (ref 0.1–1)
BILIRUB UR QL STRIP.AUTO: NEGATIVE
BIPAP: 0
BUN SERPL-MCNC: 75 MG/DL (ref 6–20)
BUN SERPL-MCNC: 76 MG/DL (ref 6–20)
BUN SERPL-MCNC: 76 MG/DL (ref 6–20)
CA-I BLD-SCNC: 1.19 MMOL/L (ref 1.06–1.42)
CALCIUM SERPL-MCNC: 10.1 MG/DL (ref 8.7–10.5)
CALCIUM SERPL-MCNC: 9.6 MG/DL (ref 8.7–10.5)
CALCIUM SERPL-MCNC: 9.7 MG/DL (ref 8.7–10.5)
CANNABINOIDS UR QL SCN: NEGATIVE
CHLORIDE SERPL-SCNC: 105 MMOL/L (ref 95–110)
CHLORIDE SERPL-SCNC: 108 MMOL/L (ref 95–110)
CHLORIDE SERPL-SCNC: 110 MMOL/L (ref 95–110)
CLARITY UR: CLEAR
CO2 SERPL-SCNC: 5 MMOL/L (ref 23–29)
CO2 SERPL-SCNC: 7 MMOL/L (ref 23–29)
CO2 SERPL-SCNC: <5 MMOL/L (ref 23–29)
COCAINE UR QL SCN: NEGATIVE
COLOR UR AUTO: YELLOW
CORRECTED TEMPERATURE (PCO2): 20.9 MMHG
CORRECTED TEMPERATURE (PH): 7.1
CORRECTED TEMPERATURE (PO2): 55.1 MMHG
CREAT SERPL-MCNC: 4.4 MG/DL (ref 0.5–1.4)
CREAT SERPL-MCNC: 4.5 MG/DL (ref 0.5–1.4)
CREAT SERPL-MCNC: 4.7 MG/DL (ref 0.5–1.4)
CREAT UR-MCNC: 71 MG/DL (ref 23–375)
EAG (OHS): 306 MG/DL (ref 68–131)
ERYTHROCYTE [DISTWIDTH] IN BLOOD BY AUTOMATED COUNT: 12.8 % (ref 11.5–14.5)
ETHANOL SERPL-MCNC: <10 MG/DL
ETHANOL UR-MCNC: <10 MG/DL
FIO2: 21 %
GFR SERPLBLD CREATININE-BSD FMLA CKD-EPI: 15 ML/MIN/1.73/M2
GFR SERPLBLD CREATININE-BSD FMLA CKD-EPI: 16 ML/MIN/1.73/M2
GFR SERPLBLD CREATININE-BSD FMLA CKD-EPI: 17 ML/MIN/1.73/M2
GLUCOSE SERPL-MCNC: 1071 MG/DL (ref 70–110)
GLUCOSE SERPL-MCNC: 1133 MG/DL (ref 70–110)
GLUCOSE SERPL-MCNC: 1223 MG/DL (ref 70–110)
GLUCOSE SERPL-MCNC: 955 MG/DL (ref 70–110)
GLUCOSE UR QL STRIP: ABNORMAL
HBA1C MFR BLD: 12.3 % (ref 4–5.6)
HCT VFR BLD AUTO: 45.8 % (ref 40–54)
HCT VFR BLD CALC: 47.5 % (ref 36–54)
HGB BLD-MCNC: 14.5 GM/DL (ref 14–18)
HGB UR QL STRIP: ABNORMAL
HYALINE CASTS UR QL AUTO: 5 /LPF (ref 0–1)
IMM GRANULOCYTES # BLD AUTO: 0.35 K/UL (ref 0–0.04)
IMM GRANULOCYTES NFR BLD AUTO: 2.2 % (ref 0–0.5)
INR PPP: 1 (ref 0.8–1.2)
KETONES UR QL STRIP: ABNORMAL
LACTATE SERPL-SCNC: 3.4 MMOL/L (ref 0.5–2.2)
LDH SERPL L TO P-CCNC: 6.7 MMOL/L (ref 0.5–2.2)
LEUKOCYTE ESTERASE UR QL STRIP: NEGATIVE
LIPASE SERPL-CCNC: 190 U/L (ref 4–60)
LYMPHOCYTES # BLD AUTO: 0.7 K/UL (ref 1–4.8)
MCH RBC QN AUTO: 29.7 PG (ref 27–31)
MCHC RBC AUTO-ENTMCNC: 31.7 G/DL (ref 32–36)
MCV RBC AUTO: 94 FL (ref 82–98)
METHADONE UR QL SCN: NEGATIVE
MICROSCOPIC COMMENT: ABNORMAL
NITRITE UR QL STRIP: NEGATIVE
NON-SQ EPI CELLS #/AREA URNS AUTO: 0 /HPF
NUCLEATED RBC (/100WBC) (OHS): 0 /100 WBC
OPIATES UR QL SCN: NEGATIVE
OSMOLALITY SERPL: 448 MOSM/KG (ref 280–300)
PCO2 BLDA: 20.9 MMHG (ref 35–45)
PCP UR QL: NEGATIVE
PH SMN: 7.1 [PH] (ref 7.35–7.45)
PH UR STRIP: 5 [PH]
PHOSPHATE SERPL-MCNC: 7.2 MG/DL (ref 2.7–4.5)
PLATELET # BLD AUTO: 276 K/UL (ref 150–450)
PMV BLD AUTO: 11.6 FL (ref 9.2–12.9)
PO2 BLDA: 55.1 MMHG (ref 40–60)
POC BASE DEFICIT: -21.3 MMOL/L
POC HCO3: 9.7 MMOL/L (ref 24–28)
POC IONIZED CALCIUM: 1.15 MMOL/L (ref 1.06–1.42)
POC PERFORMED BY: ABNORMAL
POC TEMPERATURE: 37 C
POCT GLUCOSE: >500 MG/DL (ref 70–110)
POTASSIUM BLD-SCNC: 6.6 MMOL/L (ref 3.5–5.1)
POTASSIUM SERPL-SCNC: 4.1 MMOL/L (ref 3.5–5.1)
POTASSIUM SERPL-SCNC: 5.6 MMOL/L (ref 3.5–5.1)
POTASSIUM SERPL-SCNC: 6.7 MMOL/L (ref 3.5–5.1)
PROT SERPL-MCNC: 8.1 GM/DL (ref 6–8.4)
PROT UR QL STRIP: ABNORMAL
PROTHROMBIN TIME: 11.4 SECONDS (ref 9–12.5)
RBC # BLD AUTO: 4.89 M/UL (ref 4.6–6.2)
RBC #/AREA URNS AUTO: 1 /HPF (ref 0–4)
RELATIVE EOSINOPHIL (OHS): 0 %
RELATIVE LYMPHOCYTE (OHS): 4.3 % (ref 18–48)
RELATIVE MONOCYTE (OHS): 11.8 % (ref 4–15)
RELATIVE NEUTROPHIL (OHS): 81.3 % (ref 38–73)
SODIUM BLD-SCNC: 151 MMOL/L (ref 136–145)
SODIUM SERPL-SCNC: 148 MMOL/L (ref 136–145)
SODIUM SERPL-SCNC: 148 MMOL/L (ref 136–145)
SODIUM SERPL-SCNC: 151 MMOL/L (ref 136–145)
SP GR UR STRIP: 1.02
SPECIMEN SOURCE: ABNORMAL
SQUAMOUS #/AREA URNS AUTO: 1 /HPF
TROPONIN I SERPL HS-MCNC: 18 NG/L
UROBILINOGEN UR STRIP-ACNC: NEGATIVE EU/DL
WBC # BLD AUTO: 16.21 K/UL (ref 3.9–12.7)
WBC #/AREA URNS AUTO: 1 /HPF (ref 0–5)
WBC CLUMPS UR QL AUTO: ABNORMAL
YEAST UR QL AUTO: ABNORMAL /HPF

## 2025-08-25 PROCEDURE — 83690 ASSAY OF LIPASE: CPT

## 2025-08-25 PROCEDURE — 63600175 PHARM REV CODE 636 W HCPCS

## 2025-08-25 PROCEDURE — 85014 HEMATOCRIT: CPT

## 2025-08-25 PROCEDURE — 84295 ASSAY OF SERUM SODIUM: CPT

## 2025-08-25 PROCEDURE — 84132 ASSAY OF SERUM POTASSIUM: CPT

## 2025-08-25 PROCEDURE — 80307 DRUG TEST PRSMV CHEM ANLYZR: CPT

## 2025-08-25 PROCEDURE — 85610 PROTHROMBIN TIME: CPT

## 2025-08-25 PROCEDURE — 25000003 PHARM REV CODE 250: Performed by: INTERNAL MEDICINE

## 2025-08-25 PROCEDURE — 82330 ASSAY OF CALCIUM: CPT

## 2025-08-25 PROCEDURE — 83605 ASSAY OF LACTIC ACID: CPT

## 2025-08-25 PROCEDURE — 81001 URINALYSIS AUTO W/SCOPE: CPT

## 2025-08-25 PROCEDURE — 99291 CRITICAL CARE FIRST HOUR: CPT

## 2025-08-25 PROCEDURE — 93010 ELECTROCARDIOGRAM REPORT: CPT | Mod: ,,, | Performed by: INTERNAL MEDICINE

## 2025-08-25 PROCEDURE — 82077 ASSAY SPEC XCP UR&BREATH IA: CPT

## 2025-08-25 PROCEDURE — 25000003 PHARM REV CODE 250

## 2025-08-25 PROCEDURE — 82962 GLUCOSE BLOOD TEST: CPT

## 2025-08-25 PROCEDURE — 84300 ASSAY OF URINE SODIUM: CPT

## 2025-08-25 PROCEDURE — 82803 BLOOD GASES ANY COMBINATION: CPT

## 2025-08-25 PROCEDURE — 84540 ASSAY OF URINE/UREA-N: CPT

## 2025-08-25 PROCEDURE — 84100 ASSAY OF PHOSPHORUS: CPT

## 2025-08-25 PROCEDURE — 87040 BLOOD CULTURE FOR BACTERIA: CPT

## 2025-08-25 PROCEDURE — 96361 HYDRATE IV INFUSION ADD-ON: CPT

## 2025-08-25 PROCEDURE — 80053 COMPREHEN METABOLIC PANEL: CPT

## 2025-08-25 PROCEDURE — 82947 ASSAY GLUCOSE BLOOD QUANT: CPT | Performed by: INTERNAL MEDICINE

## 2025-08-25 PROCEDURE — 99900035 HC TECH TIME PER 15 MIN (STAT)

## 2025-08-25 PROCEDURE — 93005 ELECTROCARDIOGRAM TRACING: CPT

## 2025-08-25 PROCEDURE — 84484 ASSAY OF TROPONIN QUANT: CPT

## 2025-08-25 PROCEDURE — 82010 KETONE BODYS QUAN: CPT | Performed by: EMERGENCY MEDICINE

## 2025-08-25 PROCEDURE — 99291 CRITICAL CARE FIRST HOUR: CPT | Mod: ,,, | Performed by: INTERNAL MEDICINE

## 2025-08-25 PROCEDURE — 85025 COMPLETE CBC W/AUTO DIFF WBC: CPT

## 2025-08-25 PROCEDURE — 20000000 HC ICU ROOM

## 2025-08-25 PROCEDURE — 96365 THER/PROPH/DIAG IV INF INIT: CPT

## 2025-08-25 PROCEDURE — 83036 HEMOGLOBIN GLYCOSYLATED A1C: CPT

## 2025-08-25 PROCEDURE — 96366 THER/PROPH/DIAG IV INF ADDON: CPT

## 2025-08-25 PROCEDURE — 83930 ASSAY OF BLOOD OSMOLALITY: CPT | Performed by: EMERGENCY MEDICINE

## 2025-08-25 RX ORDER — PANTOPRAZOLE SODIUM 40 MG/1
40 TABLET, DELAYED RELEASE ORAL DAILY
Status: DISCONTINUED | OUTPATIENT
Start: 2025-08-26 | End: 2025-09-01 | Stop reason: HOSPADM

## 2025-08-25 RX ORDER — SODIUM CHLORIDE 0.9 % (FLUSH) 0.9 %
10 SYRINGE (ML) INJECTION
Status: DISCONTINUED | OUTPATIENT
Start: 2025-08-25 | End: 2025-08-26

## 2025-08-25 RX ORDER — ONDANSETRON HYDROCHLORIDE 2 MG/ML
4 INJECTION, SOLUTION INTRAVENOUS EVERY 8 HOURS PRN
Status: DISCONTINUED | OUTPATIENT
Start: 2025-08-25 | End: 2025-09-01 | Stop reason: HOSPADM

## 2025-08-25 RX ORDER — HEPARIN SODIUM 5000 [USP'U]/ML
5000 INJECTION, SOLUTION INTRAVENOUS; SUBCUTANEOUS EVERY 8 HOURS
Status: DISCONTINUED | OUTPATIENT
Start: 2025-08-26 | End: 2025-08-29

## 2025-08-25 RX ORDER — DEXTROSE MONOHYDRATE AND SODIUM CHLORIDE 5; .45 G/100ML; G/100ML
INJECTION, SOLUTION INTRAVENOUS CONTINUOUS PRN
Status: DISCONTINUED | OUTPATIENT
Start: 2025-08-25 | End: 2025-08-25

## 2025-08-25 RX ORDER — SODIUM CHLORIDE 450 MG/100ML
INJECTION, SOLUTION INTRAVENOUS CONTINUOUS
Status: DISCONTINUED | OUTPATIENT
Start: 2025-08-25 | End: 2025-08-26

## 2025-08-25 RX ORDER — SODIUM CHLORIDE 0.9 % (FLUSH) 0.9 %
10 SYRINGE (ML) INJECTION
Status: DISCONTINUED | OUTPATIENT
Start: 2025-08-25 | End: 2025-08-28

## 2025-08-25 RX ORDER — SODIUM CHLORIDE 9 MG/ML
1000 INJECTION, SOLUTION INTRAVENOUS CONTINUOUS
Status: DISCONTINUED | OUTPATIENT
Start: 2025-08-25 | End: 2025-08-25

## 2025-08-25 RX ORDER — DEXTROSE MONOHYDRATE 50 MG/ML
INJECTION, SOLUTION INTRAVENOUS CONTINUOUS PRN
Status: DISCONTINUED | OUTPATIENT
Start: 2025-08-25 | End: 2025-08-26

## 2025-08-25 RX ADMIN — SODIUM CHLORIDE 1000 ML: 9 INJECTION, SOLUTION INTRAVENOUS at 06:08

## 2025-08-25 RX ADMIN — PIPERACILLIN SODIUM AND TAZOBACTAM SODIUM 4.5 G: 4; .5 INJECTION, POWDER, LYOPHILIZED, FOR SOLUTION INTRAVENOUS at 07:08

## 2025-08-25 RX ADMIN — SODIUM CHLORIDE 1000 ML: 9 INJECTION, SOLUTION INTRAVENOUS at 07:08

## 2025-08-25 RX ADMIN — SODIUM CHLORIDE: 4.5 INJECTION, SOLUTION INTRAVENOUS at 10:08

## 2025-08-25 RX ADMIN — VANCOMYCIN HYDROCHLORIDE 2000 MG: 10 INJECTION, POWDER, LYOPHILIZED, FOR SOLUTION INTRAVENOUS at 08:08

## 2025-08-25 RX ADMIN — INSULIN HUMAN 0.1 UNITS/KG/HR: 1 INJECTION, SOLUTION INTRAVENOUS at 06:08

## 2025-08-25 RX ADMIN — SODIUM CHLORIDE 1000 ML: 9 INJECTION, SOLUTION INTRAVENOUS at 08:08

## 2025-08-25 RX ADMIN — ONDANSETRON 4 MG: 2 INJECTION INTRAMUSCULAR; INTRAVENOUS at 11:08

## 2025-08-25 RX ADMIN — SODIUM CHLORIDE 1000 ML: 9 INJECTION, SOLUTION INTRAVENOUS at 09:08

## 2025-08-26 PROBLEM — F10.939 ALCOHOL WITHDRAWAL: Status: ACTIVE | Noted: 2025-08-26

## 2025-08-26 PROBLEM — E87.3 METABOLIC ALKALOSIS: Status: ACTIVE | Noted: 2025-08-26

## 2025-08-26 LAB
ABSOLUTE EOSINOPHIL (OHS): 0 K/UL
ABSOLUTE MONOCYTE (OHS): 1.64 K/UL (ref 0.3–1)
ABSOLUTE NEUTROPHIL COUNT (OHS): 10.64 K/UL (ref 1.8–7.7)
ALBUMIN SERPL BCP-MCNC: 4.2 G/DL (ref 3.5–5.2)
ALLENS TEST: ABNORMAL
ALP SERPL-CCNC: 96 UNIT/L (ref 40–150)
ALT SERPL W/O P-5'-P-CCNC: 28 UNIT/L (ref 0–55)
ANION GAP (OHS): 13 MMOL/L (ref 8–16)
ANION GAP (OHS): 14 MMOL/L (ref 8–16)
ANION GAP (OHS): 15 MMOL/L (ref 8–16)
ANION GAP (OHS): 17 MMOL/L (ref 8–16)
ANION GAP (OHS): 19 MMOL/L (ref 8–16)
ANION GAP (OHS): 24 MMOL/L (ref 8–16)
AST SERPL-CCNC: 48 UNIT/L (ref 0–50)
BASOPHILS # BLD AUTO: 0.03 K/UL
BASOPHILS NFR BLD AUTO: 0.2 %
BILIRUB SERPL-MCNC: 0.4 MG/DL (ref 0.1–1)
BUN SERPL-MCNC: 64 MG/DL (ref 6–20)
BUN SERPL-MCNC: 67 MG/DL (ref 6–20)
BUN SERPL-MCNC: 67 MG/DL (ref 6–20)
BUN SERPL-MCNC: 68 MG/DL (ref 6–20)
BUN SERPL-MCNC: 71 MG/DL (ref 6–20)
BUN SERPL-MCNC: 71 MG/DL (ref 6–20)
BUN SERPL-MCNC: 74 MG/DL (ref 6–30)
BUN SERPL-MCNC: 76 MG/DL (ref 6–30)
CALCIUM SERPL-MCNC: 10.1 MG/DL (ref 8.7–10.5)
CALCIUM SERPL-MCNC: 10.3 MG/DL (ref 8.7–10.5)
CALCIUM SERPL-MCNC: 10.4 MG/DL (ref 8.7–10.5)
CALCIUM SERPL-MCNC: 9.5 MG/DL (ref 8.7–10.5)
CALCIUM SERPL-MCNC: 9.7 MG/DL (ref 8.7–10.5)
CALCIUM SERPL-MCNC: 9.9 MG/DL (ref 8.7–10.5)
CHLORIDE SERPL-SCNC: 114 MMOL/L (ref 95–110)
CHLORIDE SERPL-SCNC: 116 MMOL/L (ref 95–110)
CHLORIDE SERPL-SCNC: 120 MMOL/L (ref 95–110)
CHLORIDE SERPL-SCNC: 122 MMOL/L (ref 95–110)
CHLORIDE SERPL-SCNC: 126 MMOL/L (ref 95–110)
CHLORIDE SERPL-SCNC: 127 MMOL/L (ref 95–110)
CHLORIDE SERPL-SCNC: 128 MMOL/L (ref 95–110)
CHLORIDE SERPL-SCNC: 130 MMOL/L (ref 95–110)
CO2 SERPL-SCNC: 14 MMOL/L (ref 23–29)
CO2 SERPL-SCNC: 17 MMOL/L (ref 23–29)
CO2 SERPL-SCNC: 20 MMOL/L (ref 23–29)
CO2 SERPL-SCNC: 25 MMOL/L (ref 23–29)
CREAT SERPL-MCNC: 3.2 MG/DL (ref 0.5–1.4)
CREAT SERPL-MCNC: 3.3 MG/DL (ref 0.5–1.4)
CREAT SERPL-MCNC: 3.3 MG/DL (ref 0.5–1.4)
CREAT SERPL-MCNC: 3.4 MG/DL (ref 0.5–1.4)
CREAT SERPL-MCNC: 3.4 MG/DL (ref 0.5–1.4)
CREAT SERPL-MCNC: 3.6 MG/DL (ref 0.5–1.4)
CREAT SERPL-MCNC: 4.1 MG/DL (ref 0.5–1.4)
CREAT SERPL-MCNC: 4.3 MG/DL (ref 0.5–1.4)
DELSYS: ABNORMAL
ERYTHROCYTE [DISTWIDTH] IN BLOOD BY AUTOMATED COUNT: 11.9 % (ref 11.5–14.5)
GFR SERPLBLD CREATININE-BSD FMLA CKD-EPI: 21 ML/MIN/1.73/M2
GFR SERPLBLD CREATININE-BSD FMLA CKD-EPI: 23 ML/MIN/1.73/M2
GFR SERPLBLD CREATININE-BSD FMLA CKD-EPI: 23 ML/MIN/1.73/M2
GFR SERPLBLD CREATININE-BSD FMLA CKD-EPI: 24 ML/MIN/1.73/M2
GLUCOSE SERPL-MCNC: 121 MG/DL (ref 70–110)
GLUCOSE SERPL-MCNC: 145 MG/DL (ref 70–110)
GLUCOSE SERPL-MCNC: 184 MG/DL (ref 70–110)
GLUCOSE SERPL-MCNC: 298 MG/DL (ref 70–110)
GLUCOSE SERPL-MCNC: 599 MG/DL (ref 70–110)
GLUCOSE SERPL-MCNC: 786 MG/DL (ref 70–110)
GLUCOSE SERPL-MCNC: >700 MG/DL (ref 70–110)
GLUCOSE SERPL-MCNC: >700 MG/DL (ref 70–110)
HCO3 UR-SCNC: 33.7 MMOL/L (ref 24–28)
HCT VFR BLD AUTO: 42.9 % (ref 40–54)
HCT VFR BLD CALC: 44 %PCV (ref 36–54)
HCT VFR BLD CALC: 46 %PCV (ref 36–54)
HCT VFR BLD CALC: 47 %PCV (ref 36–54)
HGB BLD-MCNC: 15.1 GM/DL (ref 14–18)
HOLD SPECIMEN: NORMAL
IMM GRANULOCYTES # BLD AUTO: 0.2 K/UL (ref 0–0.04)
IMM GRANULOCYTES NFR BLD AUTO: 1.5 % (ref 0–0.5)
LACTATE SERPL-SCNC: 1.6 MMOL/L (ref 0.5–2.2)
LACTATE SERPL-SCNC: 1.9 MMOL/L (ref 0.5–2.2)
LACTATE SERPL-SCNC: 2.1 MMOL/L (ref 0.5–2.2)
LYMPHOCYTES # BLD AUTO: 0.53 K/UL (ref 1–4.8)
MAGNESIUM SERPL-MCNC: 3.6 MG/DL (ref 1.6–2.6)
MCH RBC QN AUTO: 30.5 PG (ref 27–31)
MCHC RBC AUTO-ENTMCNC: 35.2 G/DL (ref 32–36)
MCV RBC AUTO: 87 FL (ref 82–98)
MODE: ABNORMAL
MRSA PCR SCRN (OHS): NOT DETECTED
NUCLEATED RBC (/100WBC) (OHS): 0 /100 WBC
OHS QRS DURATION: 100 MS
OHS QTC CALCULATION: 461 MS
PCO2 BLDA: 62.9 MMHG (ref 35–45)
PH SMN: 7.34 [PH] (ref 7.35–7.45)
PHOSPHATE SERPL-MCNC: 2.6 MG/DL (ref 2.7–4.5)
PLATELET # BLD AUTO: 216 K/UL (ref 150–450)
PMV BLD AUTO: 11.3 FL (ref 9.2–12.9)
PO2 BLDA: 23 MMHG (ref 40–60)
POC BE: 8 MMOL/L (ref -2–2)
POC IONIZED CALCIUM: 1.14 MMOL/L (ref 1.06–1.42)
POC IONIZED CALCIUM: 1.25 MMOL/L (ref 1.06–1.42)
POC IONIZED CALCIUM: 1.28 MMOL/L (ref 1.06–1.42)
POC SATURATED O2: 34 % (ref 95–100)
POC TCO2 (MEASURED): 10 MMOL/L (ref 23–29)
POC TCO2 (MEASURED): 8 MMOL/L (ref 23–29)
POC TCO2: 36 MMOL/L (ref 24–29)
POCT GLUCOSE: 238 MG/DL (ref 70–110)
POCT GLUCOSE: 242 MG/DL (ref 70–110)
POCT GLUCOSE: 254 MG/DL (ref 70–110)
POCT GLUCOSE: 291 MG/DL (ref 70–110)
POCT GLUCOSE: 366 MG/DL (ref 70–110)
POCT GLUCOSE: 397 MG/DL (ref 70–110)
POCT GLUCOSE: 438 MG/DL (ref 70–110)
POCT GLUCOSE: >500 MG/DL (ref 70–110)
POTASSIUM BLD-SCNC: 5.2 MMOL/L (ref 3.5–5.1)
POTASSIUM BLD-SCNC: 5.5 MMOL/L (ref 3.5–5.1)
POTASSIUM BLD-SCNC: 6.5 MMOL/L (ref 3.5–5.1)
POTASSIUM SERPL-SCNC: 4.2 MMOL/L (ref 3.5–5.1)
POTASSIUM SERPL-SCNC: 4.3 MMOL/L (ref 3.5–5.1)
POTASSIUM SERPL-SCNC: 4.3 MMOL/L (ref 3.5–5.1)
POTASSIUM SERPL-SCNC: 4.4 MMOL/L (ref 3.5–5.1)
POTASSIUM SERPL-SCNC: 5.2 MMOL/L (ref 3.5–5.1)
POTASSIUM SERPL-SCNC: 5.3 MMOL/L (ref 3.5–5.1)
PROT SERPL-MCNC: 8.2 GM/DL (ref 6–8.4)
RBC # BLD AUTO: 4.95 M/UL (ref 4.6–6.2)
RELATIVE EOSINOPHIL (OHS): 0 %
RELATIVE LYMPHOCYTE (OHS): 4.1 % (ref 18–48)
RELATIVE MONOCYTE (OHS): 12.6 % (ref 4–15)
RELATIVE NEUTROPHIL (OHS): 81.6 % (ref 38–73)
SAMPLE: ABNORMAL
SITE: ABNORMAL
SODIUM BLD-SCNC: 143 MMOL/L (ref 136–145)
SODIUM BLD-SCNC: 146 MMOL/L (ref 136–145)
SODIUM BLD-SCNC: 164 MMOL/L (ref 136–145)
SODIUM SERPL-SCNC: 158 MMOL/L (ref 136–145)
SODIUM SERPL-SCNC: 158 MMOL/L (ref 136–145)
SODIUM SERPL-SCNC: 165 MMOL/L (ref 136–145)
SODIUM SERPL-SCNC: 166 MMOL/L (ref 136–145)
SODIUM SERPL-SCNC: 167 MMOL/L (ref 136–145)
SODIUM SERPL-SCNC: 167 MMOL/L (ref 136–145)
SODIUM UR-SCNC: 11 MMOL/L (ref 20–250)
UUN UR-MCNC: 302 MG/DL (ref 140–1050)
VANCOMYCIN SERPL-MCNC: 18.8 UG/ML
WBC # BLD AUTO: 13.04 K/UL (ref 3.9–12.7)

## 2025-08-26 PROCEDURE — 84100 ASSAY OF PHOSPHORUS: CPT

## 2025-08-26 PROCEDURE — 84295 ASSAY OF SERUM SODIUM: CPT

## 2025-08-26 PROCEDURE — S5010 5% DEXTROSE AND 0.45% SALINE: HCPCS

## 2025-08-26 PROCEDURE — 82803 BLOOD GASES ANY COMBINATION: CPT

## 2025-08-26 PROCEDURE — 87641 MR-STAPH DNA AMP PROBE: CPT | Performed by: INTERNAL MEDICINE

## 2025-08-26 PROCEDURE — 83605 ASSAY OF LACTIC ACID: CPT

## 2025-08-26 PROCEDURE — 82330 ASSAY OF CALCIUM: CPT

## 2025-08-26 PROCEDURE — 85025 COMPLETE CBC W/AUTO DIFF WBC: CPT

## 2025-08-26 PROCEDURE — 63600175 PHARM REV CODE 636 W HCPCS

## 2025-08-26 PROCEDURE — 99291 CRITICAL CARE FIRST HOUR: CPT | Mod: ,,, | Performed by: INTERNAL MEDICINE

## 2025-08-26 PROCEDURE — 51798 US URINE CAPACITY MEASURE: CPT

## 2025-08-26 PROCEDURE — 85014 HEMATOCRIT: CPT

## 2025-08-26 PROCEDURE — 25000003 PHARM REV CODE 250

## 2025-08-26 PROCEDURE — 80053 COMPREHEN METABOLIC PANEL: CPT

## 2025-08-26 PROCEDURE — 51701 INSERT BLADDER CATHETER: CPT

## 2025-08-26 PROCEDURE — 94761 N-INVAS EAR/PLS OXIMETRY MLT: CPT

## 2025-08-26 PROCEDURE — 20000000 HC ICU ROOM

## 2025-08-26 PROCEDURE — 99900035 HC TECH TIME PER 15 MIN (STAT)

## 2025-08-26 PROCEDURE — 83735 ASSAY OF MAGNESIUM: CPT

## 2025-08-26 PROCEDURE — 84132 ASSAY OF SERUM POTASSIUM: CPT

## 2025-08-26 PROCEDURE — 80202 ASSAY OF VANCOMYCIN: CPT | Performed by: EMERGENCY MEDICINE

## 2025-08-26 PROCEDURE — 25000003 PHARM REV CODE 250: Performed by: INTERNAL MEDICINE

## 2025-08-26 RX ORDER — FOLIC ACID 1 MG/1
1 TABLET ORAL DAILY
Status: DISCONTINUED | OUTPATIENT
Start: 2025-08-26 | End: 2025-09-01 | Stop reason: HOSPADM

## 2025-08-26 RX ORDER — INSULIN ASPART 100 [IU]/ML
0-5 INJECTION, SOLUTION INTRAVENOUS; SUBCUTANEOUS
Status: DISCONTINUED | OUTPATIENT
Start: 2025-08-26 | End: 2025-08-27

## 2025-08-26 RX ORDER — THIAMINE HCL 100 MG
100 TABLET ORAL DAILY
Status: DISCONTINUED | OUTPATIENT
Start: 2025-08-26 | End: 2025-09-01 | Stop reason: HOSPADM

## 2025-08-26 RX ORDER — DEXTROSE MONOHYDRATE AND SODIUM CHLORIDE 5; .45 G/100ML; G/100ML
INJECTION, SOLUTION INTRAVENOUS CONTINUOUS
Status: DISCONTINUED | OUTPATIENT
Start: 2025-08-26 | End: 2025-08-27

## 2025-08-26 RX ORDER — SODIUM CHLORIDE 450 MG/100ML
INJECTION, SOLUTION INTRAVENOUS CONTINUOUS
Status: DISCONTINUED | OUTPATIENT
Start: 2025-08-26 | End: 2025-08-26

## 2025-08-26 RX ORDER — SODIUM CHLORIDE AND POTASSIUM CHLORIDE 150; 450 MG/100ML; MG/100ML
INJECTION, SOLUTION INTRAVENOUS CONTINUOUS
Status: DISCONTINUED | OUTPATIENT
Start: 2025-08-26 | End: 2025-08-26

## 2025-08-26 RX ORDER — GLUCAGON 1 MG
1 KIT INJECTION
Status: DISCONTINUED | OUTPATIENT
Start: 2025-08-26 | End: 2025-08-27

## 2025-08-26 RX ORDER — IBUPROFEN 200 MG
24 TABLET ORAL
Status: DISCONTINUED | OUTPATIENT
Start: 2025-08-26 | End: 2025-08-27

## 2025-08-26 RX ORDER — DEXTROSE MONOHYDRATE AND SODIUM CHLORIDE 5; .45 G/100ML; G/100ML
INJECTION, SOLUTION INTRAVENOUS CONTINUOUS
Status: DISCONTINUED | OUTPATIENT
Start: 2025-08-26 | End: 2025-08-26

## 2025-08-26 RX ORDER — INSULIN ASPART 100 [IU]/ML
3 INJECTION, SOLUTION INTRAVENOUS; SUBCUTANEOUS
Status: DISCONTINUED | OUTPATIENT
Start: 2025-08-27 | End: 2025-08-27

## 2025-08-26 RX ORDER — INSULIN GLARGINE 100 [IU]/ML
15 INJECTION, SOLUTION SUBCUTANEOUS NIGHTLY
Status: DISCONTINUED | OUTPATIENT
Start: 2025-08-26 | End: 2025-08-28

## 2025-08-26 RX ORDER — DROPERIDOL 2.5 MG/ML
1.25 INJECTION, SOLUTION INTRAMUSCULAR; INTRAVENOUS ONCE
Status: COMPLETED | OUTPATIENT
Start: 2025-08-26 | End: 2025-08-26

## 2025-08-26 RX ORDER — DEXMEDETOMIDINE HYDROCHLORIDE 4 UG/ML
0-1.4 INJECTION, SOLUTION INTRAVENOUS CONTINUOUS
Status: DISCONTINUED | OUTPATIENT
Start: 2025-08-26 | End: 2025-08-29

## 2025-08-26 RX ORDER — IBUPROFEN 200 MG
16 TABLET ORAL
Status: DISCONTINUED | OUTPATIENT
Start: 2025-08-26 | End: 2025-08-27

## 2025-08-26 RX ORDER — INSULIN ASPART 100 [IU]/ML
3 INJECTION, SOLUTION INTRAVENOUS; SUBCUTANEOUS
Status: DISCONTINUED | OUTPATIENT
Start: 2025-08-26 | End: 2025-08-26

## 2025-08-26 RX ADMIN — HEPARIN SODIUM 5000 UNITS: 5000 INJECTION INTRAVENOUS; SUBCUTANEOUS at 03:08

## 2025-08-26 RX ADMIN — SODIUM CHLORIDE: 4.5 INJECTION, SOLUTION INTRAVENOUS at 05:08

## 2025-08-26 RX ADMIN — HEPARIN SODIUM 5000 UNITS: 5000 INJECTION INTRAVENOUS; SUBCUTANEOUS at 05:08

## 2025-08-26 RX ADMIN — DEXTROSE MONOHYDRATE: 50 INJECTION, SOLUTION INTRAVENOUS at 04:08

## 2025-08-26 RX ADMIN — DEXTROSE AND SODIUM CHLORIDE: 5; 450 INJECTION, SOLUTION INTRAVENOUS at 05:08

## 2025-08-26 RX ADMIN — PIPERACILLIN SODIUM AND TAZOBACTAM SODIUM 4.5 G: 4; .5 INJECTION, POWDER, LYOPHILIZED, FOR SOLUTION INTRAVENOUS at 01:08

## 2025-08-26 RX ADMIN — DEXTROSE AND SODIUM CHLORIDE: 5; 450 INJECTION, SOLUTION INTRAVENOUS at 04:08

## 2025-08-26 RX ADMIN — DROPERIDOL 1.25 MG: 2.5 INJECTION, SOLUTION INTRAMUSCULAR; INTRAVENOUS at 12:08

## 2025-08-26 RX ADMIN — DEXMEDETOMIDINE HYDROCHLORIDE 0.8 MCG/KG/HR: 4 INJECTION INTRAVENOUS at 11:08

## 2025-08-26 RX ADMIN — INSULIN HUMAN 0.1 UNITS/KG/HR: 1 INJECTION, SOLUTION INTRAVENOUS at 04:08

## 2025-08-26 RX ADMIN — DEXMEDETOMIDINE HYDROCHLORIDE 0.8 MCG/KG/HR: 4 INJECTION INTRAVENOUS at 10:08

## 2025-08-26 RX ADMIN — INSULIN HUMAN 0.02 UNITS/KG/HR: 1 INJECTION, SOLUTION INTRAVENOUS at 03:08

## 2025-08-26 RX ADMIN — DEXTROSE AND SODIUM CHLORIDE: 5; 450 INJECTION, SOLUTION INTRAVENOUS at 10:08

## 2025-08-26 RX ADMIN — HEPARIN SODIUM 5000 UNITS: 5000 INJECTION INTRAVENOUS; SUBCUTANEOUS at 10:08

## 2025-08-26 RX ADMIN — PIPERACILLIN SODIUM AND TAZOBACTAM SODIUM 4.5 G: 4; .5 INJECTION, POWDER, LYOPHILIZED, FOR SOLUTION INTRAVENOUS at 05:08

## 2025-08-26 RX ADMIN — INSULIN HUMAN 0.1 UNITS/KG/HR: 1 INJECTION, SOLUTION INTRAVENOUS at 10:08

## 2025-08-26 RX ADMIN — PIPERACILLIN SODIUM AND TAZOBACTAM SODIUM 4.5 G: 4; .5 INJECTION, POWDER, LYOPHILIZED, FOR SOLUTION INTRAVENOUS at 10:08

## 2025-08-26 RX ADMIN — INSULIN GLARGINE 15 UNITS: 100 INJECTION, SOLUTION SUBCUTANEOUS at 05:08

## 2025-08-26 RX ADMIN — SODIUM CHLORIDE: 4.5 INJECTION, SOLUTION INTRAVENOUS at 11:08

## 2025-08-26 RX ADMIN — DEXMEDETOMIDINE HYDROCHLORIDE 0.2 MCG/KG/HR: 4 INJECTION INTRAVENOUS at 01:08

## 2025-08-27 PROBLEM — E87.3 METABOLIC ALKALOSIS: Status: RESOLVED | Noted: 2025-08-26 | Resolved: 2025-08-27

## 2025-08-27 PROBLEM — G93.40 ACUTE ENCEPHALOPATHY: Status: ACTIVE | Noted: 2025-08-27

## 2025-08-27 PROBLEM — E10.11 DIABETIC KETOACIDOSIS WITH COMA ASSOCIATED WITH TYPE 1 DIABETES MELLITUS: Status: ACTIVE | Noted: 2025-08-27

## 2025-08-27 PROBLEM — E86.0 DEHYDRATION: Status: ACTIVE | Noted: 2025-08-27

## 2025-08-27 LAB
ABSOLUTE EOSINOPHIL (OHS): 0 K/UL
ABSOLUTE MONOCYTE (OHS): 0.8 K/UL (ref 0.3–1)
ABSOLUTE NEUTROPHIL COUNT (OHS): 9.82 K/UL (ref 1.8–7.7)
ALBUMIN SERPL BCP-MCNC: 4.1 G/DL (ref 3.5–5.2)
ALLENS TEST: ABNORMAL
ALP SERPL-CCNC: 89 UNIT/L (ref 40–150)
ALT SERPL W/O P-5'-P-CCNC: 45 UNIT/L (ref 0–55)
ANION GAP (OHS): 10 MMOL/L (ref 8–16)
ANION GAP (OHS): 10 MMOL/L (ref 8–16)
ANION GAP (OHS): 11 MMOL/L (ref 8–16)
ANION GAP (OHS): 15 MMOL/L (ref 8–16)
ANION GAP (OHS): ABNORMAL
AST SERPL-CCNC: 91 UNIT/L (ref 0–50)
B-OH-BUTYR BLD STRIP-SCNC: 0.1 MMOL/L
BASOPHILS # BLD AUTO: 0.01 K/UL
BASOPHILS NFR BLD AUTO: 0.1 %
BILIRUB SERPL-MCNC: 0.7 MG/DL (ref 0.1–1)
BUN SERPL-MCNC: 75 MG/DL (ref 6–20)
BUN SERPL-MCNC: 75 MG/DL (ref 6–20)
BUN SERPL-MCNC: 79 MG/DL (ref 6–20)
BUN SERPL-MCNC: 79 MG/DL (ref 6–20)
BUN SERPL-MCNC: 81 MG/DL (ref 6–20)
CALCIUM SERPL-MCNC: 10 MG/DL (ref 8.7–10.5)
CALCIUM SERPL-MCNC: 10.3 MG/DL (ref 8.7–10.5)
CALCIUM SERPL-MCNC: 9.3 MG/DL (ref 8.7–10.5)
CALCIUM SERPL-MCNC: 9.7 MG/DL (ref 8.7–10.5)
CALCIUM SERPL-MCNC: 9.8 MG/DL (ref 8.7–10.5)
CHLORIDE SERPL-SCNC: 129 MMOL/L (ref 95–110)
CHLORIDE SERPL-SCNC: 130 MMOL/L (ref 95–110)
CHLORIDE SERPL-SCNC: >130 MMOL/L (ref 95–110)
CO2 SERPL-SCNC: 18 MMOL/L (ref 23–29)
CO2 SERPL-SCNC: 19 MMOL/L (ref 23–29)
CO2 SERPL-SCNC: 23 MMOL/L (ref 23–29)
CREAT SERPL-MCNC: 3.3 MG/DL (ref 0.5–1.4)
CREAT SERPL-MCNC: 3.5 MG/DL (ref 0.5–1.4)
CREAT SERPL-MCNC: 3.6 MG/DL (ref 0.5–1.4)
CREAT SERPL-MCNC: 3.8 MG/DL (ref 0.5–1.4)
CREAT SERPL-MCNC: 3.8 MG/DL (ref 0.5–1.4)
ERYTHROCYTE [DISTWIDTH] IN BLOOD BY AUTOMATED COUNT: 12.7 % (ref 11.5–14.5)
GFR SERPLBLD CREATININE-BSD FMLA CKD-EPI: 20 ML/MIN/1.73/M2
GFR SERPLBLD CREATININE-BSD FMLA CKD-EPI: 20 ML/MIN/1.73/M2
GFR SERPLBLD CREATININE-BSD FMLA CKD-EPI: 21 ML/MIN/1.73/M2
GFR SERPLBLD CREATININE-BSD FMLA CKD-EPI: 22 ML/MIN/1.73/M2
GFR SERPLBLD CREATININE-BSD FMLA CKD-EPI: 24 ML/MIN/1.73/M2
GLUCOSE SERPL-MCNC: 133 MG/DL (ref 70–110)
GLUCOSE SERPL-MCNC: 136 MG/DL (ref 70–110)
GLUCOSE SERPL-MCNC: 187 MG/DL (ref 70–110)
GLUCOSE SERPL-MCNC: 350 MG/DL (ref 70–110)
GLUCOSE SERPL-MCNC: 365 MG/DL (ref 70–110)
HCO3 UR-SCNC: 25.3 MMOL/L (ref 24–28)
HCT VFR BLD AUTO: 46 % (ref 40–54)
HGB BLD-MCNC: 15.7 GM/DL (ref 14–18)
IMM GRANULOCYTES # BLD AUTO: 0.05 K/UL (ref 0–0.04)
IMM GRANULOCYTES NFR BLD AUTO: 0.4 % (ref 0–0.5)
LYMPHOCYTES # BLD AUTO: 0.63 K/UL (ref 1–4.8)
MAGNESIUM SERPL-MCNC: 3.4 MG/DL (ref 1.6–2.6)
MCH RBC QN AUTO: 29.6 PG (ref 27–31)
MCHC RBC AUTO-ENTMCNC: 34.1 G/DL (ref 32–36)
MCV RBC AUTO: 87 FL (ref 82–98)
NUCLEATED RBC (/100WBC) (OHS): 0 /100 WBC
PCO2 BLDA: 48 MMHG (ref 35–45)
PH SMN: 7.33 [PH] (ref 7.35–7.45)
PHOSPHATE SERPL-MCNC: 3.3 MG/DL (ref 2.7–4.5)
PLATELET # BLD AUTO: 167 K/UL (ref 150–450)
PMV BLD AUTO: 11.2 FL (ref 9.2–12.9)
PO2 BLDA: 24 MMHG (ref 40–60)
POC BE: -1 MMOL/L (ref -2–2)
POC SATURATED O2: 38 % (ref 95–100)
POC TCO2: 27 MMOL/L (ref 24–29)
POCT GLUCOSE: 108 MG/DL (ref 70–110)
POCT GLUCOSE: 115 MG/DL (ref 70–110)
POCT GLUCOSE: 118 MG/DL (ref 70–110)
POCT GLUCOSE: 118 MG/DL (ref 70–110)
POCT GLUCOSE: 119 MG/DL (ref 70–110)
POCT GLUCOSE: 121 MG/DL (ref 70–110)
POCT GLUCOSE: 123 MG/DL (ref 70–110)
POCT GLUCOSE: 128 MG/DL (ref 70–110)
POCT GLUCOSE: 133 MG/DL (ref 70–110)
POCT GLUCOSE: 135 MG/DL (ref 70–110)
POCT GLUCOSE: 135 MG/DL (ref 70–110)
POCT GLUCOSE: 142 MG/DL (ref 70–110)
POCT GLUCOSE: 155 MG/DL (ref 70–110)
POCT GLUCOSE: 181 MG/DL (ref 70–110)
POCT GLUCOSE: 204 MG/DL (ref 70–110)
POCT GLUCOSE: 243 MG/DL (ref 70–110)
POCT GLUCOSE: 250 MG/DL (ref 70–110)
POCT GLUCOSE: 262 MG/DL (ref 70–110)
POCT GLUCOSE: 270 MG/DL (ref 70–110)
POCT GLUCOSE: 292 MG/DL (ref 70–110)
POCT GLUCOSE: 301 MG/DL (ref 70–110)
POCT GLUCOSE: 315 MG/DL (ref 70–110)
POCT GLUCOSE: 348 MG/DL (ref 70–110)
POCT GLUCOSE: 356 MG/DL (ref 70–110)
POCT GLUCOSE: 358 MG/DL (ref 70–110)
POCT GLUCOSE: 375 MG/DL (ref 70–110)
POCT GLUCOSE: 94 MG/DL (ref 70–110)
POTASSIUM SERPL-SCNC: 3.8 MMOL/L (ref 3.5–5.1)
POTASSIUM SERPL-SCNC: 4.1 MMOL/L (ref 3.5–5.1)
POTASSIUM SERPL-SCNC: 4.2 MMOL/L (ref 3.5–5.1)
POTASSIUM SERPL-SCNC: 4.3 MMOL/L (ref 3.5–5.1)
POTASSIUM SERPL-SCNC: 4.6 MMOL/L (ref 3.5–5.1)
PROT SERPL-MCNC: 7.8 GM/DL (ref 6–8.4)
RBC # BLD AUTO: 5.3 M/UL (ref 4.6–6.2)
RELATIVE EOSINOPHIL (OHS): 0 %
RELATIVE LYMPHOCYTE (OHS): 5.6 % (ref 18–48)
RELATIVE MONOCYTE (OHS): 7.1 % (ref 4–15)
RELATIVE NEUTROPHIL (OHS): 86.8 % (ref 38–73)
SAMPLE: ABNORMAL
SITE: ABNORMAL
SODIUM SERPL-SCNC: 162 MMOL/L (ref 136–145)
SODIUM SERPL-SCNC: 163 MMOL/L (ref 136–145)
SODIUM SERPL-SCNC: 164 MMOL/L (ref 136–145)
WBC # BLD AUTO: 11.31 K/UL (ref 3.9–12.7)

## 2025-08-27 PROCEDURE — 83735 ASSAY OF MAGNESIUM: CPT

## 2025-08-27 PROCEDURE — 51798 US URINE CAPACITY MEASURE: CPT

## 2025-08-27 PROCEDURE — 02HV33Z INSERTION OF INFUSION DEVICE INTO SUPERIOR VENA CAVA, PERCUTANEOUS APPROACH: ICD-10-PCS | Performed by: INTERNAL MEDICINE

## 2025-08-27 PROCEDURE — 80053 COMPREHEN METABOLIC PANEL: CPT

## 2025-08-27 PROCEDURE — 51702 INSERT TEMP BLADDER CATH: CPT

## 2025-08-27 PROCEDURE — 25000003 PHARM REV CODE 250: Performed by: INTERNAL MEDICINE

## 2025-08-27 PROCEDURE — 63600175 PHARM REV CODE 636 W HCPCS

## 2025-08-27 PROCEDURE — 25000003 PHARM REV CODE 250

## 2025-08-27 PROCEDURE — 84100 ASSAY OF PHOSPHORUS: CPT

## 2025-08-27 PROCEDURE — 63600175 PHARM REV CODE 636 W HCPCS: Performed by: INTERNAL MEDICINE

## 2025-08-27 PROCEDURE — 99900035 HC TECH TIME PER 15 MIN (STAT)

## 2025-08-27 PROCEDURE — 94761 N-INVAS EAR/PLS OXIMETRY MLT: CPT | Mod: XB

## 2025-08-27 PROCEDURE — 82010 KETONE BODYS QUAN: CPT

## 2025-08-27 PROCEDURE — 85025 COMPLETE CBC W/AUTO DIFF WBC: CPT

## 2025-08-27 PROCEDURE — 82310 ASSAY OF CALCIUM: CPT | Performed by: INTERNAL MEDICINE

## 2025-08-27 PROCEDURE — 36556 INSERT NON-TUNNEL CV CATH: CPT | Mod: ,,, | Performed by: INTERNAL MEDICINE

## 2025-08-27 PROCEDURE — 99291 CRITICAL CARE FIRST HOUR: CPT | Mod: 25,,, | Performed by: INTERNAL MEDICINE

## 2025-08-27 PROCEDURE — 82803 BLOOD GASES ANY COMBINATION: CPT

## 2025-08-27 PROCEDURE — 20000000 HC ICU ROOM

## 2025-08-27 PROCEDURE — C1751 CATH, INF, PER/CENT/MIDLINE: HCPCS

## 2025-08-27 RX ORDER — SODIUM CHLORIDE, SODIUM LACTATE, POTASSIUM CHLORIDE, CALCIUM CHLORIDE 600; 310; 30; 20 MG/100ML; MG/100ML; MG/100ML; MG/100ML
INJECTION, SOLUTION INTRAVENOUS CONTINUOUS
Status: DISCONTINUED | OUTPATIENT
Start: 2025-08-27 | End: 2025-08-27

## 2025-08-27 RX ORDER — DEXTROSE MONOHYDRATE 50 MG/ML
INJECTION, SOLUTION INTRAVENOUS CONTINUOUS
Status: DISCONTINUED | OUTPATIENT
Start: 2025-08-27 | End: 2025-08-28

## 2025-08-27 RX ORDER — DEXTROSE MONOHYDRATE 50 MG/ML
INJECTION, SOLUTION INTRAVENOUS CONTINUOUS
Status: DISCONTINUED | OUTPATIENT
Start: 2025-08-27 | End: 2025-08-27

## 2025-08-27 RX ORDER — DEXTROSE MONOHYDRATE AND SODIUM CHLORIDE 5; .225 G/100ML; G/100ML
175 INJECTION, SOLUTION INTRAVENOUS CONTINUOUS
Status: DISCONTINUED | OUTPATIENT
Start: 2025-08-27 | End: 2025-08-27

## 2025-08-27 RX ORDER — INSULIN ASPART 100 [IU]/ML
8 INJECTION, SOLUTION INTRAVENOUS; SUBCUTANEOUS ONCE
Status: DISCONTINUED | OUTPATIENT
Start: 2025-08-27 | End: 2025-08-27

## 2025-08-27 RX ORDER — OLANZAPINE 10 MG/2ML
5 INJECTION, POWDER, FOR SOLUTION INTRAMUSCULAR EVERY 6 HOURS PRN
Status: DISPENSED | OUTPATIENT
Start: 2025-08-27 | End: 2025-08-28

## 2025-08-27 RX ORDER — INSULIN ASPART 100 [IU]/ML
6 INJECTION, SOLUTION INTRAVENOUS; SUBCUTANEOUS ONCE
Status: COMPLETED | OUTPATIENT
Start: 2025-08-27 | End: 2025-08-27

## 2025-08-27 RX ORDER — SODIUM CHLORIDE 0.9 % (FLUSH) 0.9 %
10 SYRINGE (ML) INJECTION EVERY 12 HOURS PRN
Status: DISCONTINUED | OUTPATIENT
Start: 2025-08-27 | End: 2025-09-01 | Stop reason: HOSPADM

## 2025-08-27 RX ADMIN — DEXMEDETOMIDINE HYDROCHLORIDE 0.8 MCG/KG/HR: 4 INJECTION INTRAVENOUS at 01:08

## 2025-08-27 RX ADMIN — INSULIN HUMAN 0.1 UNITS/KG/HR: 1 INJECTION, SOLUTION INTRAVENOUS at 02:08

## 2025-08-27 RX ADMIN — INSULIN HUMAN 0.1 UNITS/KG/HR: 1 INJECTION, SOLUTION INTRAVENOUS at 07:08

## 2025-08-27 RX ADMIN — DEXTROSE MONOHYDRATE: 50 INJECTION, SOLUTION INTRAVENOUS at 11:08

## 2025-08-27 RX ADMIN — HEPARIN SODIUM 5000 UNITS: 5000 INJECTION INTRAVENOUS; SUBCUTANEOUS at 09:08

## 2025-08-27 RX ADMIN — HEPARIN SODIUM 5000 UNITS: 5000 INJECTION INTRAVENOUS; SUBCUTANEOUS at 01:08

## 2025-08-27 RX ADMIN — DEXTROSE AND SODIUM CHLORIDE 175 ML/HR: 5; .2 INJECTION, SOLUTION INTRAVENOUS at 08:08

## 2025-08-27 RX ADMIN — DEXTROSE MONOHYDRATE: 50 INJECTION, SOLUTION INTRAVENOUS at 07:08

## 2025-08-27 RX ADMIN — SODIUM CHLORIDE, POTASSIUM CHLORIDE, SODIUM LACTATE AND CALCIUM CHLORIDE: 600; 310; 30; 20 INJECTION, SOLUTION INTRAVENOUS at 04:08

## 2025-08-27 RX ADMIN — DEXTROSE AND SODIUM CHLORIDE 175 ML/HR: 5; .2 INJECTION, SOLUTION INTRAVENOUS at 02:08

## 2025-08-27 RX ADMIN — OLANZAPINE 5 MG: 10 INJECTION, POWDER, FOR SOLUTION INTRAMUSCULAR at 06:08

## 2025-08-27 RX ADMIN — DEXTROSE AND SODIUM CHLORIDE 100 ML/HR: 5; .2 INJECTION, SOLUTION INTRAVENOUS at 08:08

## 2025-08-27 RX ADMIN — INSULIN GLARGINE 15 UNITS: 100 INJECTION, SOLUTION SUBCUTANEOUS at 09:08

## 2025-08-27 RX ADMIN — DEXMEDETOMIDINE HYDROCHLORIDE 1.2 MCG/KG/HR: 4 INJECTION INTRAVENOUS at 04:08

## 2025-08-27 RX ADMIN — DEXTROSE AND SODIUM CHLORIDE 175 ML/HR: 5; .2 INJECTION, SOLUTION INTRAVENOUS at 12:08

## 2025-08-27 RX ADMIN — INSULIN ASPART 4 UNITS: 100 INJECTION, SOLUTION INTRAVENOUS; SUBCUTANEOUS at 08:08

## 2025-08-27 RX ADMIN — PIPERACILLIN SODIUM AND TAZOBACTAM SODIUM 4.5 G: 4; .5 INJECTION, POWDER, LYOPHILIZED, FOR SOLUTION INTRAVENOUS at 12:08

## 2025-08-27 RX ADMIN — INSULIN ASPART 3 UNITS: 100 INJECTION, SOLUTION INTRAVENOUS; SUBCUTANEOUS at 08:08

## 2025-08-27 RX ADMIN — HEPARIN SODIUM 5000 UNITS: 5000 INJECTION INTRAVENOUS; SUBCUTANEOUS at 06:08

## 2025-08-27 RX ADMIN — DEXTROSE AND SODIUM CHLORIDE 175 ML/HR: 5; .2 INJECTION, SOLUTION INTRAVENOUS at 05:08

## 2025-08-27 RX ADMIN — INSULIN ASPART 6 UNITS: 100 INJECTION, SOLUTION INTRAVENOUS; SUBCUTANEOUS at 06:08

## 2025-08-27 RX ADMIN — PIPERACILLIN SODIUM AND TAZOBACTAM SODIUM 4.5 G: 4; .5 INJECTION, POWDER, LYOPHILIZED, FOR SOLUTION INTRAVENOUS at 09:08

## 2025-08-27 RX ADMIN — DEXMEDETOMIDINE HYDROCHLORIDE 1.4 MCG/KG/HR: 4 INJECTION INTRAVENOUS at 08:08

## 2025-08-28 PROBLEM — R65.10 SIRS (SYSTEMIC INFLAMMATORY RESPONSE SYNDROME): Status: RESOLVED | Noted: 2023-09-12 | Resolved: 2025-08-28

## 2025-08-28 LAB
ABSOLUTE EOSINOPHIL (OHS): 0.02 K/UL
ABSOLUTE EOSINOPHIL (OHS): 0.02 K/UL
ABSOLUTE MONOCYTE (OHS): 0.51 K/UL (ref 0.3–1)
ABSOLUTE MONOCYTE (OHS): 0.66 K/UL (ref 0.3–1)
ABSOLUTE NEUTROPHIL COUNT (OHS): 4.8 K/UL (ref 1.8–7.7)
ABSOLUTE NEUTROPHIL COUNT (OHS): 5.52 K/UL (ref 1.8–7.7)
ALBUMIN SERPL BCP-MCNC: 3.2 G/DL (ref 3.5–5.2)
ALP SERPL-CCNC: 73 UNIT/L (ref 40–150)
ALT SERPL W/O P-5'-P-CCNC: 102 UNIT/L (ref 0–55)
ANION GAP (OHS): 10 MMOL/L (ref 8–16)
ANION GAP (OHS): 12 MMOL/L (ref 8–16)
ANION GAP (OHS): 14 MMOL/L (ref 8–16)
AST SERPL-CCNC: 237 UNIT/L (ref 0–50)
BASOPHILS # BLD AUTO: 0.01 K/UL
BASOPHILS # BLD AUTO: 0.02 K/UL
BASOPHILS NFR BLD AUTO: 0.1 %
BASOPHILS NFR BLD AUTO: 0.3 %
BILIRUB SERPL-MCNC: 1 MG/DL (ref 0.1–1)
BUN SERPL-MCNC: 60 MG/DL (ref 6–20)
BUN SERPL-MCNC: 65 MG/DL (ref 6–20)
BUN SERPL-MCNC: 69 MG/DL (ref 6–20)
CALCIUM SERPL-MCNC: 8.7 MG/DL (ref 8.7–10.5)
CALCIUM SERPL-MCNC: 8.9 MG/DL (ref 8.7–10.5)
CALCIUM SERPL-MCNC: 9.3 MG/DL (ref 8.7–10.5)
CHLORIDE SERPL-SCNC: 125 MMOL/L (ref 95–110)
CHLORIDE SERPL-SCNC: 125 MMOL/L (ref 95–110)
CHLORIDE SERPL-SCNC: 130 MMOL/L (ref 95–110)
CO2 SERPL-SCNC: 19 MMOL/L (ref 23–29)
CO2 SERPL-SCNC: 19 MMOL/L (ref 23–29)
CO2 SERPL-SCNC: 20 MMOL/L (ref 23–29)
CREAT SERPL-MCNC: 2.4 MG/DL (ref 0.5–1.4)
CREAT SERPL-MCNC: 3 MG/DL (ref 0.5–1.4)
CREAT SERPL-MCNC: 3.3 MG/DL (ref 0.5–1.4)
ERYTHROCYTE [DISTWIDTH] IN BLOOD BY AUTOMATED COUNT: 13.4 % (ref 11.5–14.5)
ERYTHROCYTE [DISTWIDTH] IN BLOOD BY AUTOMATED COUNT: 13.7 % (ref 11.5–14.5)
GFR SERPLBLD CREATININE-BSD FMLA CKD-EPI: 24 ML/MIN/1.73/M2
GFR SERPLBLD CREATININE-BSD FMLA CKD-EPI: 26 ML/MIN/1.73/M2
GFR SERPLBLD CREATININE-BSD FMLA CKD-EPI: 35 ML/MIN/1.73/M2
GLUCOSE SERPL-MCNC: 187 MG/DL (ref 70–110)
GLUCOSE SERPL-MCNC: 229 MG/DL (ref 70–110)
GLUCOSE SERPL-MCNC: 291 MG/DL (ref 70–110)
HCT VFR BLD AUTO: 40 % (ref 40–54)
HCT VFR BLD AUTO: 40.1 % (ref 40–54)
HGB BLD-MCNC: 13.4 GM/DL (ref 14–18)
HGB BLD-MCNC: 13.5 GM/DL (ref 14–18)
IMM GRANULOCYTES # BLD AUTO: 0.03 K/UL (ref 0–0.04)
IMM GRANULOCYTES # BLD AUTO: 0.03 K/UL (ref 0–0.04)
IMM GRANULOCYTES NFR BLD AUTO: 0.4 % (ref 0–0.5)
IMM GRANULOCYTES NFR BLD AUTO: 0.5 % (ref 0–0.5)
LYMPHOCYTES # BLD AUTO: 0.9 K/UL (ref 1–4.8)
LYMPHOCYTES # BLD AUTO: 1.19 K/UL (ref 1–4.8)
MAGNESIUM SERPL-MCNC: 2.7 MG/DL (ref 1.6–2.6)
MCH RBC QN AUTO: 29.6 PG (ref 27–31)
MCH RBC QN AUTO: 30.5 PG (ref 27–31)
MCHC RBC AUTO-ENTMCNC: 33.5 G/DL (ref 32–36)
MCHC RBC AUTO-ENTMCNC: 33.7 G/DL (ref 32–36)
MCV RBC AUTO: 88 FL (ref 82–98)
MCV RBC AUTO: 91 FL (ref 82–98)
NUCLEATED RBC (/100WBC) (OHS): 0 /100 WBC
NUCLEATED RBC (/100WBC) (OHS): 0 /100 WBC
PHOSPHATE SERPL-MCNC: 2.3 MG/DL (ref 2.7–4.5)
PLATELET # BLD AUTO: 103 K/UL (ref 150–450)
PLATELET # BLD AUTO: ABNORMAL 10*3/UL
PLATELET BLD QL SMEAR: ABNORMAL
PLATELET BLD QL SMEAR: ABNORMAL
PMV BLD AUTO: 10.2 FL (ref 9.2–12.9)
PMV BLD AUTO: ABNORMAL FL
POCT GLUCOSE: 114 MG/DL (ref 70–110)
POCT GLUCOSE: 114 MG/DL (ref 70–110)
POCT GLUCOSE: 133 MG/DL (ref 70–110)
POCT GLUCOSE: 134 MG/DL (ref 70–110)
POCT GLUCOSE: 135 MG/DL (ref 70–110)
POCT GLUCOSE: 147 MG/DL (ref 70–110)
POCT GLUCOSE: 153 MG/DL (ref 70–110)
POCT GLUCOSE: 153 MG/DL (ref 70–110)
POCT GLUCOSE: 159 MG/DL (ref 70–110)
POCT GLUCOSE: 174 MG/DL (ref 70–110)
POCT GLUCOSE: 180 MG/DL (ref 70–110)
POCT GLUCOSE: 209 MG/DL (ref 70–110)
POCT GLUCOSE: 211 MG/DL (ref 70–110)
POCT GLUCOSE: 245 MG/DL (ref 70–110)
POCT GLUCOSE: 262 MG/DL (ref 70–110)
POCT GLUCOSE: 311 MG/DL (ref 70–110)
POCT GLUCOSE: 315 MG/DL (ref 70–110)
POCT GLUCOSE: 315 MG/DL (ref 70–110)
POCT GLUCOSE: 69 MG/DL (ref 70–110)
POCT GLUCOSE: 80 MG/DL (ref 70–110)
POCT GLUCOSE: >500 MG/DL (ref 70–110)
POTASSIUM SERPL-SCNC: 3.6 MMOL/L (ref 3.5–5.1)
POTASSIUM SERPL-SCNC: 3.7 MMOL/L (ref 3.5–5.1)
POTASSIUM SERPL-SCNC: 3.9 MMOL/L (ref 3.5–5.1)
PROT SERPL-MCNC: 6.2 GM/DL (ref 6–8.4)
RBC # BLD AUTO: 4.4 M/UL (ref 4.6–6.2)
RBC # BLD AUTO: 4.56 M/UL (ref 4.6–6.2)
RELATIVE EOSINOPHIL (OHS): 0.3 %
RELATIVE EOSINOPHIL (OHS): 0.3 %
RELATIVE LYMPHOCYTE (OHS): 14.3 % (ref 18–48)
RELATIVE LYMPHOCYTE (OHS): 16 % (ref 18–48)
RELATIVE MONOCYTE (OHS): 8.1 % (ref 4–15)
RELATIVE MONOCYTE (OHS): 8.9 % (ref 4–15)
RELATIVE NEUTROPHIL (OHS): 74.3 % (ref 38–73)
RELATIVE NEUTROPHIL (OHS): 76.5 % (ref 38–73)
SODIUM SERPL-SCNC: 156 MMOL/L (ref 136–145)
SODIUM SERPL-SCNC: 159 MMOL/L (ref 136–145)
SODIUM SERPL-SCNC: 159 MMOL/L (ref 136–145)
WBC # BLD AUTO: 6.28 K/UL (ref 3.9–12.7)
WBC # BLD AUTO: 7.43 K/UL (ref 3.9–12.7)

## 2025-08-28 PROCEDURE — 94761 N-INVAS EAR/PLS OXIMETRY MLT: CPT

## 2025-08-28 PROCEDURE — 80053 COMPREHEN METABOLIC PANEL: CPT

## 2025-08-28 PROCEDURE — 85025 COMPLETE CBC W/AUTO DIFF WBC: CPT

## 2025-08-28 PROCEDURE — 25000003 PHARM REV CODE 250

## 2025-08-28 PROCEDURE — 63600175 PHARM REV CODE 636 W HCPCS

## 2025-08-28 PROCEDURE — 84100 ASSAY OF PHOSPHORUS: CPT

## 2025-08-28 PROCEDURE — 83735 ASSAY OF MAGNESIUM: CPT

## 2025-08-28 PROCEDURE — 63600175 PHARM REV CODE 636 W HCPCS: Mod: JZ,TB

## 2025-08-28 PROCEDURE — 20000000 HC ICU ROOM

## 2025-08-28 PROCEDURE — 86022 PLATELET ANTIBODIES: CPT

## 2025-08-28 PROCEDURE — 99233 SBSQ HOSP IP/OBS HIGH 50: CPT | Mod: ,,, | Performed by: INTERNAL MEDICINE

## 2025-08-28 PROCEDURE — 63600175 PHARM REV CODE 636 W HCPCS: Performed by: INTERNAL MEDICINE

## 2025-08-28 RX ORDER — POLYETHYLENE GLYCOL 3350 17 G/17G
17 POWDER, FOR SOLUTION ORAL 2 TIMES DAILY
Status: DISCONTINUED | OUTPATIENT
Start: 2025-08-28 | End: 2025-09-01 | Stop reason: HOSPADM

## 2025-08-28 RX ORDER — IBUPROFEN 200 MG
16 TABLET ORAL
Status: DISCONTINUED | OUTPATIENT
Start: 2025-08-28 | End: 2025-09-01 | Stop reason: HOSPADM

## 2025-08-28 RX ORDER — INSULIN GLARGINE 100 [IU]/ML
27 INJECTION, SOLUTION SUBCUTANEOUS NIGHTLY
Status: DISCONTINUED | OUTPATIENT
Start: 2025-08-29 | End: 2025-08-30

## 2025-08-28 RX ORDER — INSULIN ASPART 100 [IU]/ML
0-10 INJECTION, SOLUTION INTRAVENOUS; SUBCUTANEOUS
Status: DISCONTINUED | OUTPATIENT
Start: 2025-08-28 | End: 2025-08-28

## 2025-08-28 RX ORDER — GLUCAGON 1 MG
1 KIT INJECTION
Status: DISCONTINUED | OUTPATIENT
Start: 2025-08-28 | End: 2025-08-28

## 2025-08-28 RX ORDER — AMOXICILLIN 250 MG
2 CAPSULE ORAL 2 TIMES DAILY
Status: DISCONTINUED | OUTPATIENT
Start: 2025-08-28 | End: 2025-08-28

## 2025-08-28 RX ORDER — INSULIN GLARGINE 100 [IU]/ML
20 INJECTION, SOLUTION SUBCUTANEOUS NIGHTLY
Status: DISCONTINUED | OUTPATIENT
Start: 2025-08-28 | End: 2025-08-28

## 2025-08-28 RX ORDER — INSULIN ASPART 100 [IU]/ML
0-10 INJECTION, SOLUTION INTRAVENOUS; SUBCUTANEOUS EVERY 6 HOURS PRN
Status: DISCONTINUED | OUTPATIENT
Start: 2025-08-28 | End: 2025-08-28

## 2025-08-28 RX ORDER — GLUCAGON 1 MG
1 KIT INJECTION
Status: DISCONTINUED | OUTPATIENT
Start: 2025-08-28 | End: 2025-09-01 | Stop reason: HOSPADM

## 2025-08-28 RX ORDER — INSULIN GLARGINE 100 [IU]/ML
5 INJECTION, SOLUTION SUBCUTANEOUS ONCE
Status: COMPLETED | OUTPATIENT
Start: 2025-08-28 | End: 2025-08-28

## 2025-08-28 RX ORDER — INSULIN ASPART 100 [IU]/ML
6 INJECTION, SOLUTION INTRAVENOUS; SUBCUTANEOUS
Status: DISCONTINUED | OUTPATIENT
Start: 2025-08-28 | End: 2025-08-28

## 2025-08-28 RX ORDER — INSULIN ASPART 100 [IU]/ML
10 INJECTION, SOLUTION INTRAVENOUS; SUBCUTANEOUS
Status: DISCONTINUED | OUTPATIENT
Start: 2025-08-29 | End: 2025-08-29

## 2025-08-28 RX ORDER — IBUPROFEN 200 MG
24 TABLET ORAL
Status: DISCONTINUED | OUTPATIENT
Start: 2025-08-28 | End: 2025-09-01 | Stop reason: HOSPADM

## 2025-08-28 RX ORDER — INSULIN ASPART 100 [IU]/ML
6 INJECTION, SOLUTION INTRAVENOUS; SUBCUTANEOUS 3 TIMES DAILY
Status: DISCONTINUED | OUTPATIENT
Start: 2025-08-28 | End: 2025-08-28

## 2025-08-28 RX ORDER — LIDOCAINE 50 MG/G
1 PATCH TOPICAL
Status: DISCONTINUED | OUTPATIENT
Start: 2025-08-28 | End: 2025-09-01 | Stop reason: HOSPADM

## 2025-08-28 RX ORDER — IBUPROFEN 200 MG
24 TABLET ORAL
Status: DISCONTINUED | OUTPATIENT
Start: 2025-08-28 | End: 2025-08-28

## 2025-08-28 RX ORDER — INSULIN ASPART 100 [IU]/ML
0-10 INJECTION, SOLUTION INTRAVENOUS; SUBCUTANEOUS
Status: DISCONTINUED | OUTPATIENT
Start: 2025-08-28 | End: 2025-09-01 | Stop reason: HOSPADM

## 2025-08-28 RX ORDER — IBUPROFEN 200 MG
16 TABLET ORAL
Status: DISCONTINUED | OUTPATIENT
Start: 2025-08-28 | End: 2025-08-28

## 2025-08-28 RX ORDER — AMOXICILLIN 250 MG
1 CAPSULE ORAL 2 TIMES DAILY
Status: DISCONTINUED | OUTPATIENT
Start: 2025-08-28 | End: 2025-09-01 | Stop reason: HOSPADM

## 2025-08-28 RX ADMIN — INSULIN ASPART 8 UNITS: 100 INJECTION, SOLUTION INTRAVENOUS; SUBCUTANEOUS at 05:08

## 2025-08-28 RX ADMIN — PIPERACILLIN SODIUM AND TAZOBACTAM SODIUM 4.5 G: 4; .5 INJECTION, POWDER, LYOPHILIZED, FOR SOLUTION INTRAVENOUS at 05:08

## 2025-08-28 RX ADMIN — INSULIN ASPART 4 UNITS: 100 INJECTION, SOLUTION INTRAVENOUS; SUBCUTANEOUS at 09:08

## 2025-08-28 RX ADMIN — POLYETHYLENE GLYCOL 3350 17 G: 17 POWDER, FOR SOLUTION ORAL at 08:08

## 2025-08-28 RX ADMIN — DEXTROSE MONOHYDRATE: 50 INJECTION, SOLUTION INTRAVENOUS at 02:08

## 2025-08-28 RX ADMIN — INSULIN ASPART 6 UNITS: 100 INJECTION, SOLUTION INTRAVENOUS; SUBCUTANEOUS at 08:08

## 2025-08-28 RX ADMIN — INSULIN ASPART 6 UNITS: 100 INJECTION, SOLUTION INTRAVENOUS; SUBCUTANEOUS at 05:08

## 2025-08-28 RX ADMIN — THERA TABS 1 TABLET: TAB at 08:08

## 2025-08-28 RX ADMIN — INSULIN ASPART 4 UNITS: 100 INJECTION, SOLUTION INTRAVENOUS; SUBCUTANEOUS at 11:08

## 2025-08-28 RX ADMIN — ALTEPLASE 2 MG: 2.2 INJECTION, POWDER, LYOPHILIZED, FOR SOLUTION INTRAVENOUS at 02:08

## 2025-08-28 RX ADMIN — DEXMEDETOMIDINE HYDROCHLORIDE 1.1 MCG/KG/HR: 4 INJECTION INTRAVENOUS at 02:08

## 2025-08-28 RX ADMIN — DEXTROSE MONOHYDRATE 12.5 G: 25 INJECTION, SOLUTION INTRAVENOUS at 03:08

## 2025-08-28 RX ADMIN — SENNOSIDES AND DOCUSATE SODIUM 1 TABLET: 50; 8.6 TABLET ORAL at 08:08

## 2025-08-28 RX ADMIN — DEXTROSE MONOHYDRATE: 50 INJECTION, SOLUTION INTRAVENOUS at 06:08

## 2025-08-28 RX ADMIN — INSULIN GLARGINE 20 UNITS: 100 INJECTION, SOLUTION SUBCUTANEOUS at 08:08

## 2025-08-28 RX ADMIN — HEPARIN SODIUM 5000 UNITS: 5000 INJECTION INTRAVENOUS; SUBCUTANEOUS at 02:08

## 2025-08-28 RX ADMIN — DEXMEDETOMIDINE HYDROCHLORIDE 1 MCG/KG/HR: 4 INJECTION INTRAVENOUS at 06:08

## 2025-08-28 RX ADMIN — PANTOPRAZOLE SODIUM 40 MG: 40 TABLET, DELAYED RELEASE ORAL at 08:08

## 2025-08-28 RX ADMIN — Medication 100 MG: at 08:08

## 2025-08-28 RX ADMIN — FOLIC ACID 1 MG: 1 TABLET ORAL at 08:08

## 2025-08-28 RX ADMIN — INSULIN GLARGINE 5 UNITS: 100 INJECTION, SOLUTION SUBCUTANEOUS at 08:08

## 2025-08-28 RX ADMIN — HEPARIN SODIUM 5000 UNITS: 5000 INJECTION INTRAVENOUS; SUBCUTANEOUS at 09:08

## 2025-08-28 RX ADMIN — HEPARIN SODIUM 5000 UNITS: 5000 INJECTION INTRAVENOUS; SUBCUTANEOUS at 05:08

## 2025-08-29 LAB
ABSOLUTE EOSINOPHIL (OHS): 0.03 K/UL
ABSOLUTE MONOCYTE (OHS): 0.47 K/UL (ref 0.3–1)
ABSOLUTE NEUTROPHIL COUNT (OHS): 3.55 K/UL (ref 1.8–7.7)
ALBUMIN SERPL BCP-MCNC: 3.4 G/DL (ref 3.5–5.2)
ALP SERPL-CCNC: 75 UNIT/L (ref 40–150)
ALT SERPL W/O P-5'-P-CCNC: 113 UNIT/L (ref 0–55)
ANION GAP (OHS): 11 MMOL/L (ref 8–16)
ANION GAP (OHS): 12 MMOL/L (ref 8–16)
ANION GAP (OHS): 12 MMOL/L (ref 8–16)
ANION GAP (OHS): 9 MMOL/L (ref 8–16)
AST SERPL-CCNC: 161 UNIT/L (ref 0–50)
BASOPHILS # BLD AUTO: 0.02 K/UL
BASOPHILS NFR BLD AUTO: 0.4 %
BILIRUB SERPL-MCNC: 1 MG/DL (ref 0.1–1)
BUN SERPL-MCNC: 34 MG/DL (ref 6–20)
BUN SERPL-MCNC: 42 MG/DL (ref 6–20)
BUN SERPL-MCNC: 42 MG/DL (ref 6–20)
BUN SERPL-MCNC: 46 MG/DL (ref 6–20)
CALCIUM SERPL-MCNC: 8.5 MG/DL (ref 8.7–10.5)
CALCIUM SERPL-MCNC: 9.2 MG/DL (ref 8.7–10.5)
CALCIUM SERPL-MCNC: 9.3 MG/DL (ref 8.7–10.5)
CALCIUM SERPL-MCNC: 9.4 MG/DL (ref 8.7–10.5)
CHLORIDE SERPL-SCNC: 124 MMOL/L (ref 95–110)
CHLORIDE SERPL-SCNC: 126 MMOL/L (ref 95–110)
CHLORIDE SERPL-SCNC: 127 MMOL/L (ref 95–110)
CHLORIDE SERPL-SCNC: 129 MMOL/L (ref 95–110)
CO2 SERPL-SCNC: 20 MMOL/L (ref 23–29)
CO2 SERPL-SCNC: 22 MMOL/L (ref 23–29)
CREAT SERPL-MCNC: 1.5 MG/DL (ref 0.5–1.4)
CREAT SERPL-MCNC: 1.6 MG/DL (ref 0.5–1.4)
CREAT SERPL-MCNC: 1.9 MG/DL (ref 0.5–1.4)
CREAT SERPL-MCNC: 2 MG/DL (ref 0.5–1.4)
ERYTHROCYTE [DISTWIDTH] IN BLOOD BY AUTOMATED COUNT: 13.7 % (ref 11.5–14.5)
GFR SERPLBLD CREATININE-BSD FMLA CKD-EPI: 43 ML/MIN/1.73/M2
GFR SERPLBLD CREATININE-BSD FMLA CKD-EPI: 46 ML/MIN/1.73/M2
GFR SERPLBLD CREATININE-BSD FMLA CKD-EPI: 56 ML/MIN/1.73/M2
GFR SERPLBLD CREATININE-BSD FMLA CKD-EPI: >60 ML/MIN/1.73/M2
GLUCOSE SERPL-MCNC: 255 MG/DL (ref 70–110)
GLUCOSE SERPL-MCNC: 277 MG/DL (ref 70–110)
GLUCOSE SERPL-MCNC: 322 MG/DL (ref 70–110)
GLUCOSE SERPL-MCNC: 95 MG/DL (ref 70–110)
HCT VFR BLD AUTO: 42.2 % (ref 40–54)
HGB BLD-MCNC: 14 GM/DL (ref 14–18)
IMM GRANULOCYTES # BLD AUTO: 0.01 K/UL (ref 0–0.04)
IMM GRANULOCYTES NFR BLD AUTO: 0.2 % (ref 0–0.5)
LYMPHOCYTES # BLD AUTO: 1.18 K/UL (ref 1–4.8)
MAGNESIUM SERPL-MCNC: 2.4 MG/DL (ref 1.6–2.6)
MCH RBC QN AUTO: 29.7 PG (ref 27–31)
MCHC RBC AUTO-ENTMCNC: 33.2 G/DL (ref 32–36)
MCV RBC AUTO: 90 FL (ref 82–98)
NUCLEATED RBC (/100WBC) (OHS): 0 /100 WBC
PHOSPHATE SERPL-MCNC: 3.1 MG/DL (ref 2.7–4.5)
PLATELET # BLD AUTO: 101 K/UL (ref 150–450)
PMV BLD AUTO: 11.2 FL (ref 9.2–12.9)
POCT GLUCOSE: 167 MG/DL (ref 70–110)
POCT GLUCOSE: 225 MG/DL (ref 70–110)
POCT GLUCOSE: 252 MG/DL (ref 70–110)
POCT GLUCOSE: 280 MG/DL (ref 70–110)
POCT GLUCOSE: 96 MG/DL (ref 70–110)
POTASSIUM SERPL-SCNC: 4 MMOL/L (ref 3.5–5.1)
POTASSIUM SERPL-SCNC: 4.2 MMOL/L (ref 3.5–5.1)
PROT SERPL-MCNC: 6.4 GM/DL (ref 6–8.4)
RBC # BLD AUTO: 4.71 M/UL (ref 4.6–6.2)
RELATIVE EOSINOPHIL (OHS): 0.6 %
RELATIVE LYMPHOCYTE (OHS): 22.4 % (ref 18–48)
RELATIVE MONOCYTE (OHS): 8.9 % (ref 4–15)
RELATIVE NEUTROPHIL (OHS): 67.5 % (ref 38–73)
SODIUM SERPL-SCNC: 157 MMOL/L (ref 136–145)
SODIUM SERPL-SCNC: 158 MMOL/L (ref 136–145)
SODIUM SERPL-SCNC: 158 MMOL/L (ref 136–145)
SODIUM SERPL-SCNC: 159 MMOL/L (ref 136–145)
WBC # BLD AUTO: 5.26 K/UL (ref 3.9–12.7)

## 2025-08-29 PROCEDURE — 36415 COLL VENOUS BLD VENIPUNCTURE: CPT

## 2025-08-29 PROCEDURE — 63600175 PHARM REV CODE 636 W HCPCS

## 2025-08-29 PROCEDURE — 84100 ASSAY OF PHOSPHORUS: CPT

## 2025-08-29 PROCEDURE — 63600175 PHARM REV CODE 636 W HCPCS: Performed by: NURSE PRACTITIONER

## 2025-08-29 PROCEDURE — 80053 COMPREHEN METABOLIC PANEL: CPT

## 2025-08-29 PROCEDURE — 94761 N-INVAS EAR/PLS OXIMETRY MLT: CPT

## 2025-08-29 PROCEDURE — 20600001 HC STEP DOWN PRIVATE ROOM

## 2025-08-29 PROCEDURE — 83735 ASSAY OF MAGNESIUM: CPT

## 2025-08-29 PROCEDURE — 25000003 PHARM REV CODE 250

## 2025-08-29 PROCEDURE — 82310 ASSAY OF CALCIUM: CPT

## 2025-08-29 PROCEDURE — 99233 SBSQ HOSP IP/OBS HIGH 50: CPT | Mod: ,,, | Performed by: INTERNAL MEDICINE

## 2025-08-29 PROCEDURE — 85025 COMPLETE CBC W/AUTO DIFF WBC: CPT

## 2025-08-29 RX ORDER — INSULIN ASPART 100 [IU]/ML
4 INJECTION, SOLUTION INTRAVENOUS; SUBCUTANEOUS ONCE
Status: DISCONTINUED | OUTPATIENT
Start: 2025-08-29 | End: 2025-08-29

## 2025-08-29 RX ORDER — INSULIN ASPART 100 [IU]/ML
8 INJECTION, SOLUTION INTRAVENOUS; SUBCUTANEOUS
Status: DISCONTINUED | OUTPATIENT
Start: 2025-08-29 | End: 2025-08-30

## 2025-08-29 RX ORDER — ENOXAPARIN SODIUM 100 MG/ML
40 INJECTION SUBCUTANEOUS EVERY 24 HOURS
Status: DISCONTINUED | OUTPATIENT
Start: 2025-08-29 | End: 2025-09-01 | Stop reason: HOSPADM

## 2025-08-29 RX ORDER — INSULIN ASPART 100 [IU]/ML
3 INJECTION, SOLUTION INTRAVENOUS; SUBCUTANEOUS ONCE
Status: COMPLETED | OUTPATIENT
Start: 2025-08-29 | End: 2025-08-29

## 2025-08-29 RX ORDER — SODIUM CHLORIDE 450 MG/100ML
INJECTION, SOLUTION INTRAVENOUS CONTINUOUS
Status: DISCONTINUED | OUTPATIENT
Start: 2025-08-29 | End: 2025-09-01 | Stop reason: HOSPADM

## 2025-08-29 RX ADMIN — INSULIN ASPART 8 UNITS: 100 INJECTION, SOLUTION INTRAVENOUS; SUBCUTANEOUS at 11:08

## 2025-08-29 RX ADMIN — THERA TABS 1 TABLET: TAB at 08:08

## 2025-08-29 RX ADMIN — HEPARIN SODIUM 5000 UNITS: 5000 INJECTION INTRAVENOUS; SUBCUTANEOUS at 06:08

## 2025-08-29 RX ADMIN — SENNOSIDES AND DOCUSATE SODIUM 1 TABLET: 50; 8.6 TABLET ORAL at 08:08

## 2025-08-29 RX ADMIN — INSULIN ASPART 3 UNITS: 100 INJECTION, SOLUTION INTRAVENOUS; SUBCUTANEOUS at 01:08

## 2025-08-29 RX ADMIN — FOLIC ACID 1 MG: 1 TABLET ORAL at 08:08

## 2025-08-29 RX ADMIN — LIDOCAINE 1 PATCH: 50 PATCH CUTANEOUS at 06:08

## 2025-08-29 RX ADMIN — INSULIN ASPART 6 UNITS: 100 INJECTION, SOLUTION INTRAVENOUS; SUBCUTANEOUS at 08:08

## 2025-08-29 RX ADMIN — SODIUM CHLORIDE: 4.5 INJECTION, SOLUTION INTRAVENOUS at 01:08

## 2025-08-29 RX ADMIN — POLYETHYLENE GLYCOL 3350 17 G: 17 POWDER, FOR SOLUTION ORAL at 08:08

## 2025-08-29 RX ADMIN — ENOXAPARIN SODIUM 40 MG: 40 INJECTION SUBCUTANEOUS at 06:08

## 2025-08-29 RX ADMIN — INSULIN GLARGINE 27 UNITS: 100 INJECTION, SOLUTION SUBCUTANEOUS at 11:08

## 2025-08-29 RX ADMIN — INSULIN ASPART 10 UNITS: 100 INJECTION, SOLUTION INTRAVENOUS; SUBCUTANEOUS at 08:08

## 2025-08-29 RX ADMIN — Medication 100 MG: at 08:08

## 2025-08-29 RX ADMIN — HEPARIN SODIUM 5000 UNITS: 5000 INJECTION INTRAVENOUS; SUBCUTANEOUS at 01:08

## 2025-08-29 RX ADMIN — INSULIN ASPART 4 UNITS: 100 INJECTION, SOLUTION INTRAVENOUS; SUBCUTANEOUS at 11:08

## 2025-08-29 RX ADMIN — PANTOPRAZOLE SODIUM 40 MG: 40 TABLET, DELAYED RELEASE ORAL at 08:08

## 2025-08-30 LAB
ANION GAP (OHS): 12 MMOL/L (ref 8–16)
ANION GAP (OHS): 13 MMOL/L (ref 8–16)
ANION GAP (OHS): 7 MMOL/L (ref 8–16)
BUN SERPL-MCNC: 26 MG/DL (ref 6–20)
BUN SERPL-MCNC: 31 MG/DL (ref 6–20)
BUN SERPL-MCNC: 31 MG/DL (ref 6–20)
CALCIUM SERPL-MCNC: 8.8 MG/DL (ref 8.7–10.5)
CALCIUM SERPL-MCNC: 8.9 MG/DL (ref 8.7–10.5)
CALCIUM SERPL-MCNC: 9.2 MG/DL (ref 8.7–10.5)
CHLORIDE SERPL-SCNC: 113 MMOL/L (ref 95–110)
CHLORIDE SERPL-SCNC: 117 MMOL/L (ref 95–110)
CHLORIDE SERPL-SCNC: 122 MMOL/L (ref 95–110)
CO2 SERPL-SCNC: 18 MMOL/L (ref 23–29)
CO2 SERPL-SCNC: 21 MMOL/L (ref 23–29)
CO2 SERPL-SCNC: 25 MMOL/L (ref 23–29)
CREAT SERPL-MCNC: 1.4 MG/DL (ref 0.5–1.4)
CREAT SERPL-MCNC: 1.5 MG/DL (ref 0.5–1.4)
CREAT SERPL-MCNC: 1.6 MG/DL (ref 0.5–1.4)
GFR SERPLBLD CREATININE-BSD FMLA CKD-EPI: 56 ML/MIN/1.73/M2
GFR SERPLBLD CREATININE-BSD FMLA CKD-EPI: >60 ML/MIN/1.73/M2
GFR SERPLBLD CREATININE-BSD FMLA CKD-EPI: >60 ML/MIN/1.73/M2
GLUCOSE SERPL-MCNC: 221 MG/DL (ref 70–110)
GLUCOSE SERPL-MCNC: 263 MG/DL (ref 70–110)
GLUCOSE SERPL-MCNC: 434 MG/DL (ref 70–110)
POCT GLUCOSE: 160 MG/DL (ref 70–110)
POCT GLUCOSE: 391 MG/DL (ref 70–110)
POCT GLUCOSE: 412 MG/DL (ref 70–110)
POCT GLUCOSE: 424 MG/DL (ref 70–110)
POCT GLUCOSE: 65 MG/DL (ref 70–110)
POCT GLUCOSE: 66 MG/DL (ref 70–110)
POCT GLUCOSE: 85 MG/DL (ref 70–110)
POTASSIUM SERPL-SCNC: 3.9 MMOL/L (ref 3.5–5.1)
POTASSIUM SERPL-SCNC: 4.7 MMOL/L (ref 3.5–5.1)
POTASSIUM SERPL-SCNC: 4.8 MMOL/L (ref 3.5–5.1)
SODIUM SERPL-SCNC: 146 MMOL/L (ref 136–145)
SODIUM SERPL-SCNC: 149 MMOL/L (ref 136–145)
SODIUM SERPL-SCNC: 153 MMOL/L (ref 136–145)

## 2025-08-30 PROCEDURE — 36415 COLL VENOUS BLD VENIPUNCTURE: CPT

## 2025-08-30 PROCEDURE — 84132 ASSAY OF SERUM POTASSIUM: CPT

## 2025-08-30 PROCEDURE — 63600175 PHARM REV CODE 636 W HCPCS: Performed by: INTERNAL MEDICINE

## 2025-08-30 PROCEDURE — 25000003 PHARM REV CODE 250

## 2025-08-30 PROCEDURE — 20600001 HC STEP DOWN PRIVATE ROOM

## 2025-08-30 PROCEDURE — 63600175 PHARM REV CODE 636 W HCPCS

## 2025-08-30 PROCEDURE — 80048 BASIC METABOLIC PNL TOTAL CA: CPT

## 2025-08-30 PROCEDURE — 25000003 PHARM REV CODE 250: Performed by: INTERNAL MEDICINE

## 2025-08-30 PROCEDURE — 99222 1ST HOSP IP/OBS MODERATE 55: CPT | Mod: ,,, | Performed by: NURSE PRACTITIONER

## 2025-08-30 RX ORDER — INSULIN ASPART 100 [IU]/ML
12 INJECTION, SOLUTION INTRAVENOUS; SUBCUTANEOUS
Status: DISCONTINUED | OUTPATIENT
Start: 2025-08-30 | End: 2025-08-31

## 2025-08-30 RX ORDER — INSULIN GLARGINE 100 [IU]/ML
32 INJECTION, SOLUTION SUBCUTANEOUS NIGHTLY
Status: DISCONTINUED | OUTPATIENT
Start: 2025-08-30 | End: 2025-08-31

## 2025-08-30 RX ORDER — INSULIN GLARGINE 100 [IU]/ML
10 INJECTION, SOLUTION SUBCUTANEOUS ONCE
Status: COMPLETED | OUTPATIENT
Start: 2025-08-30 | End: 2025-08-30

## 2025-08-30 RX ADMIN — Medication 16 G: at 08:08

## 2025-08-30 RX ADMIN — SODIUM CHLORIDE: 4.5 INJECTION, SOLUTION INTRAVENOUS at 06:08

## 2025-08-30 RX ADMIN — PANTOPRAZOLE SODIUM 40 MG: 40 TABLET, DELAYED RELEASE ORAL at 07:08

## 2025-08-30 RX ADMIN — FOLIC ACID 1 MG: 1 TABLET ORAL at 07:08

## 2025-08-30 RX ADMIN — ENOXAPARIN SODIUM 40 MG: 40 INJECTION SUBCUTANEOUS at 05:08

## 2025-08-30 RX ADMIN — Medication 100 MG: at 07:08

## 2025-08-30 RX ADMIN — INSULIN ASPART 10 UNITS: 100 INJECTION, SOLUTION INTRAVENOUS; SUBCUTANEOUS at 08:08

## 2025-08-30 RX ADMIN — INSULIN ASPART 10 UNITS: 100 INJECTION, SOLUTION INTRAVENOUS; SUBCUTANEOUS at 12:08

## 2025-08-30 RX ADMIN — INSULIN ASPART 12 UNITS: 100 INJECTION, SOLUTION INTRAVENOUS; SUBCUTANEOUS at 05:08

## 2025-08-30 RX ADMIN — INSULIN GLARGINE 10 UNITS: 100 INJECTION, SOLUTION SUBCUTANEOUS at 10:08

## 2025-08-30 RX ADMIN — LIDOCAINE 1 PATCH: 50 PATCH CUTANEOUS at 05:08

## 2025-08-30 RX ADMIN — INSULIN ASPART 12 UNITS: 100 INJECTION, SOLUTION INTRAVENOUS; SUBCUTANEOUS at 12:08

## 2025-08-30 RX ADMIN — THERA TABS 1 TABLET: TAB at 07:08

## 2025-08-30 RX ADMIN — INSULIN ASPART 8 UNITS: 100 INJECTION, SOLUTION INTRAVENOUS; SUBCUTANEOUS at 08:08

## 2025-08-31 LAB
ABSOLUTE EOSINOPHIL (OHS): 0.1 K/UL
ABSOLUTE MONOCYTE (OHS): 0.5 K/UL (ref 0.3–1)
ABSOLUTE NEUTROPHIL COUNT (OHS): 2.05 K/UL (ref 1.8–7.7)
ALBUMIN SERPL BCP-MCNC: 3 G/DL (ref 3.5–5.2)
ALP SERPL-CCNC: 64 UNIT/L (ref 40–150)
ALT SERPL W/O P-5'-P-CCNC: 61 UNIT/L (ref 0–55)
ANION GAP (OHS): 9 MMOL/L (ref 8–16)
AST SERPL-CCNC: 60 UNIT/L (ref 0–50)
BACTERIA BLD CULT: NORMAL
BACTERIA BLD CULT: NORMAL
BASOPHILS # BLD AUTO: 0 K/UL
BASOPHILS NFR BLD AUTO: 0 %
BILIRUB SERPL-MCNC: 0.6 MG/DL (ref 0.1–1)
BUN SERPL-MCNC: 19 MG/DL (ref 6–20)
CALCIUM SERPL-MCNC: 8.5 MG/DL (ref 8.7–10.5)
CHLORIDE SERPL-SCNC: 115 MMOL/L (ref 95–110)
CO2 SERPL-SCNC: 21 MMOL/L (ref 23–29)
CREAT SERPL-MCNC: 1.2 MG/DL (ref 0.5–1.4)
ERYTHROCYTE [DISTWIDTH] IN BLOOD BY AUTOMATED COUNT: 12.6 % (ref 11.5–14.5)
GFR SERPLBLD CREATININE-BSD FMLA CKD-EPI: >60 ML/MIN/1.73/M2
GLUCOSE SERPL-MCNC: 209 MG/DL (ref 70–110)
HCT VFR BLD AUTO: 31.9 % (ref 40–54)
HGB BLD-MCNC: 10.5 GM/DL (ref 14–18)
IMM GRANULOCYTES # BLD AUTO: 0.01 K/UL (ref 0–0.04)
IMM GRANULOCYTES NFR BLD AUTO: 0.3 % (ref 0–0.5)
LYMPHOCYTES # BLD AUTO: 1.2 K/UL (ref 1–4.8)
MAGNESIUM SERPL-MCNC: 1.7 MG/DL (ref 1.6–2.6)
MCH RBC QN AUTO: 29.4 PG (ref 27–31)
MCHC RBC AUTO-ENTMCNC: 32.9 G/DL (ref 32–36)
MCV RBC AUTO: 89 FL (ref 82–98)
NUCLEATED RBC (/100WBC) (OHS): 0 /100 WBC
PF4 HEPARIN CMPLX IGG SER-IMP: NORMAL
PF4 HEPARIN CMPLX IGG SERPL IA: 0.07 OD
PF4 HEPARIN CMPLX IGG SERPL QL IA: NEGATIVE
PHOSPHATE SERPL-MCNC: 2.3 MG/DL (ref 2.7–4.5)
PLATELET # BLD AUTO: 100 K/UL (ref 150–450)
PMV BLD AUTO: 11.6 FL (ref 9.2–12.9)
POCT GLUCOSE: 143 MG/DL (ref 70–110)
POCT GLUCOSE: 214 MG/DL (ref 70–110)
POCT GLUCOSE: 272 MG/DL (ref 70–110)
POCT GLUCOSE: 353 MG/DL (ref 70–110)
POCT GLUCOSE: 360 MG/DL (ref 70–110)
POTASSIUM SERPL-SCNC: 3.5 MMOL/L (ref 3.5–5.1)
PROT SERPL-MCNC: 5.5 GM/DL (ref 6–8.4)
RBC # BLD AUTO: 3.57 M/UL (ref 4.6–6.2)
RELATIVE EOSINOPHIL (OHS): 2.6 %
RELATIVE LYMPHOCYTE (OHS): 31.1 % (ref 18–48)
RELATIVE MONOCYTE (OHS): 13 % (ref 4–15)
RELATIVE NEUTROPHIL (OHS): 53 % (ref 38–73)
SODIUM SERPL-SCNC: 145 MMOL/L (ref 136–145)
WBC # BLD AUTO: 3.86 K/UL (ref 3.9–12.7)

## 2025-08-31 PROCEDURE — 25000003 PHARM REV CODE 250

## 2025-08-31 PROCEDURE — 83735 ASSAY OF MAGNESIUM: CPT

## 2025-08-31 PROCEDURE — 99232 SBSQ HOSP IP/OBS MODERATE 35: CPT | Mod: ,,,

## 2025-08-31 PROCEDURE — 36415 COLL VENOUS BLD VENIPUNCTURE: CPT

## 2025-08-31 PROCEDURE — 94761 N-INVAS EAR/PLS OXIMETRY MLT: CPT

## 2025-08-31 PROCEDURE — 84100 ASSAY OF PHOSPHORUS: CPT

## 2025-08-31 PROCEDURE — 85025 COMPLETE CBC W/AUTO DIFF WBC: CPT

## 2025-08-31 PROCEDURE — 63600175 PHARM REV CODE 636 W HCPCS

## 2025-08-31 PROCEDURE — 80053 COMPREHEN METABOLIC PANEL: CPT

## 2025-08-31 PROCEDURE — 20600001 HC STEP DOWN PRIVATE ROOM

## 2025-08-31 PROCEDURE — 25000003 PHARM REV CODE 250: Performed by: INTERNAL MEDICINE

## 2025-08-31 RX ORDER — ALUMINUM HYDROXIDE, MAGNESIUM HYDROXIDE, AND SIMETHICONE 2400; 240; 2400 MG/30ML; MG/30ML; MG/30ML
30 SUSPENSION ORAL EVERY 6 HOURS PRN
Status: DISCONTINUED | OUTPATIENT
Start: 2025-08-31 | End: 2025-09-01 | Stop reason: HOSPADM

## 2025-08-31 RX ORDER — INSULIN GLARGINE 100 [IU]/ML
16 INJECTION, SOLUTION SUBCUTANEOUS ONCE
Status: COMPLETED | OUTPATIENT
Start: 2025-08-31 | End: 2025-08-31

## 2025-08-31 RX ORDER — INSULIN GLARGINE 100 [IU]/ML
26 INJECTION, SOLUTION SUBCUTANEOUS NIGHTLY
Status: DISCONTINUED | OUTPATIENT
Start: 2025-08-31 | End: 2025-08-31

## 2025-08-31 RX ORDER — INSULIN GLARGINE 100 [IU]/ML
26 INJECTION, SOLUTION SUBCUTANEOUS DAILY
Status: DISCONTINUED | OUTPATIENT
Start: 2025-09-01 | End: 2025-09-01

## 2025-08-31 RX ORDER — LANOLIN ALCOHOL/MO/W.PET/CERES
400 CREAM (GRAM) TOPICAL ONCE
Status: DISCONTINUED | OUTPATIENT
Start: 2025-08-31 | End: 2025-09-01 | Stop reason: HOSPADM

## 2025-08-31 RX ORDER — LORAZEPAM 2 MG/ML
2 INJECTION INTRAMUSCULAR ONCE
Status: COMPLETED | OUTPATIENT
Start: 2025-08-31 | End: 2025-08-31

## 2025-08-31 RX ORDER — INSULIN ASPART 100 [IU]/ML
10 INJECTION, SOLUTION INTRAVENOUS; SUBCUTANEOUS
Status: DISCONTINUED | OUTPATIENT
Start: 2025-08-31 | End: 2025-09-01 | Stop reason: HOSPADM

## 2025-08-31 RX ORDER — METOCLOPRAMIDE 5 MG/1
5 TABLET ORAL
Status: DISCONTINUED | OUTPATIENT
Start: 2025-08-31 | End: 2025-09-01 | Stop reason: HOSPADM

## 2025-08-31 RX ADMIN — INSULIN ASPART 4 UNITS: 100 INJECTION, SOLUTION INTRAVENOUS; SUBCUTANEOUS at 04:08

## 2025-08-31 RX ADMIN — INSULIN GLARGINE 16 UNITS: 100 INJECTION, SOLUTION SUBCUTANEOUS at 09:08

## 2025-08-31 RX ADMIN — ENOXAPARIN SODIUM 40 MG: 40 INJECTION SUBCUTANEOUS at 05:08

## 2025-08-31 RX ADMIN — SODIUM CHLORIDE: 4.5 INJECTION, SOLUTION INTRAVENOUS at 11:08

## 2025-08-31 RX ADMIN — LIDOCAINE 1 PATCH: 50 PATCH CUTANEOUS at 05:08

## 2025-08-31 RX ADMIN — SODIUM CHLORIDE: 4.5 INJECTION, SOLUTION INTRAVENOUS at 03:08

## 2025-08-31 RX ADMIN — INSULIN ASPART 10 UNITS: 100 INJECTION, SOLUTION INTRAVENOUS; SUBCUTANEOUS at 12:08

## 2025-08-31 RX ADMIN — INSULIN ASPART 10 UNITS: 100 INJECTION, SOLUTION INTRAVENOUS; SUBCUTANEOUS at 09:08

## 2025-08-31 RX ADMIN — LORAZEPAM 2 MG: 2 INJECTION INTRAMUSCULAR; INTRAVENOUS at 09:08

## 2025-08-31 RX ADMIN — ONDANSETRON 4 MG: 2 INJECTION INTRAMUSCULAR; INTRAVENOUS at 07:08

## 2025-08-31 RX ADMIN — ALUMINUM HYDROXIDE, MAGNESIUM HYDROXIDE, AND DIMETHICONE 30 ML: 400; 400; 40 SUSPENSION ORAL at 05:08

## 2025-09-01 VITALS
BODY MASS INDEX: 26.31 KG/M2 | HEART RATE: 80 BPM | HEIGHT: 74 IN | SYSTOLIC BLOOD PRESSURE: 130 MMHG | WEIGHT: 205 LBS | TEMPERATURE: 98 F | OXYGEN SATURATION: 100 % | DIASTOLIC BLOOD PRESSURE: 76 MMHG | RESPIRATION RATE: 18 BRPM

## 2025-09-01 LAB
ABSOLUTE EOSINOPHIL (OHS): 0.05 K/UL
ABSOLUTE MONOCYTE (OHS): 0.53 K/UL (ref 0.3–1)
ABSOLUTE NEUTROPHIL COUNT (OHS): 2.14 K/UL (ref 1.8–7.7)
ALBUMIN SERPL BCP-MCNC: 2.9 G/DL (ref 3.5–5.2)
ALP SERPL-CCNC: 57 UNIT/L (ref 40–150)
ALT SERPL W/O P-5'-P-CCNC: 46 UNIT/L (ref 0–55)
ANION GAP (OHS): 6 MMOL/L (ref 8–16)
AST SERPL-CCNC: 39 UNIT/L (ref 0–50)
BASOPHILS # BLD AUTO: 0.01 K/UL
BASOPHILS NFR BLD AUTO: 0.3 %
BILIRUB SERPL-MCNC: 0.6 MG/DL (ref 0.1–1)
BUN SERPL-MCNC: 13 MG/DL (ref 6–20)
CALCIUM SERPL-MCNC: 8.5 MG/DL (ref 8.7–10.5)
CHLORIDE SERPL-SCNC: 116 MMOL/L (ref 95–110)
CO2 SERPL-SCNC: 26 MMOL/L (ref 23–29)
CREAT SERPL-MCNC: 1.1 MG/DL (ref 0.5–1.4)
ERYTHROCYTE [DISTWIDTH] IN BLOOD BY AUTOMATED COUNT: 12.2 % (ref 11.5–14.5)
GFR SERPLBLD CREATININE-BSD FMLA CKD-EPI: >60 ML/MIN/1.73/M2
GLUCOSE SERPL-MCNC: 77 MG/DL (ref 70–110)
HCT VFR BLD AUTO: 31.4 % (ref 40–54)
HGB BLD-MCNC: 10.4 GM/DL (ref 14–18)
IMM GRANULOCYTES # BLD AUTO: 0.02 K/UL (ref 0–0.04)
IMM GRANULOCYTES NFR BLD AUTO: 0.5 % (ref 0–0.5)
LYMPHOCYTES # BLD AUTO: 1 K/UL (ref 1–4.8)
MAGNESIUM SERPL-MCNC: 1.9 MG/DL (ref 1.6–2.6)
MCH RBC QN AUTO: 29.5 PG (ref 27–31)
MCHC RBC AUTO-ENTMCNC: 33.1 G/DL (ref 32–36)
MCV RBC AUTO: 89 FL (ref 82–98)
NUCLEATED RBC (/100WBC) (OHS): 0 /100 WBC
PHOSPHATE SERPL-MCNC: 2.5 MG/DL (ref 2.7–4.5)
PLATELET # BLD AUTO: 107 K/UL (ref 150–450)
PMV BLD AUTO: 11.9 FL (ref 9.2–12.9)
POCT GLUCOSE: 112 MG/DL (ref 70–110)
POCT GLUCOSE: 114 MG/DL (ref 70–110)
POTASSIUM SERPL-SCNC: 3.5 MMOL/L (ref 3.5–5.1)
PROT SERPL-MCNC: 5.3 GM/DL (ref 6–8.4)
RBC # BLD AUTO: 3.53 M/UL (ref 4.6–6.2)
RELATIVE EOSINOPHIL (OHS): 1.3 %
RELATIVE LYMPHOCYTE (OHS): 26.7 % (ref 18–48)
RELATIVE MONOCYTE (OHS): 14.1 % (ref 4–15)
RELATIVE NEUTROPHIL (OHS): 57.1 % (ref 38–73)
SODIUM SERPL-SCNC: 148 MMOL/L (ref 136–145)
WBC # BLD AUTO: 3.75 K/UL (ref 3.9–12.7)

## 2025-09-01 PROCEDURE — 99232 SBSQ HOSP IP/OBS MODERATE 35: CPT | Mod: ,,, | Performed by: NURSE PRACTITIONER

## 2025-09-01 PROCEDURE — 85025 COMPLETE CBC W/AUTO DIFF WBC: CPT

## 2025-09-01 PROCEDURE — 25000003 PHARM REV CODE 250

## 2025-09-01 PROCEDURE — 84100 ASSAY OF PHOSPHORUS: CPT

## 2025-09-01 PROCEDURE — 36415 COLL VENOUS BLD VENIPUNCTURE: CPT

## 2025-09-01 PROCEDURE — 83735 ASSAY OF MAGNESIUM: CPT

## 2025-09-01 PROCEDURE — 80053 COMPREHEN METABOLIC PANEL: CPT

## 2025-09-01 RX ORDER — INSULIN DEGLUDEC 100 U/ML
28 INJECTION, SOLUTION SUBCUTANEOUS DAILY
Qty: 21 ML | Refills: 0 | Status: SHIPPED | OUTPATIENT
Start: 2025-09-01

## 2025-09-01 RX ORDER — INSULIN LISPRO-AABC 100 [IU]/ML
10 INJECTION, SOLUTION SUBCUTANEOUS
Qty: 9 PEN | Refills: 0 | Status: SHIPPED | OUTPATIENT
Start: 2025-09-01

## 2025-09-01 RX ORDER — INSULIN GLARGINE 100 [IU]/ML
24 INJECTION, SOLUTION SUBCUTANEOUS DAILY
Status: DISCONTINUED | OUTPATIENT
Start: 2025-09-01 | End: 2025-09-01 | Stop reason: HOSPADM

## 2025-09-01 RX ADMIN — PANTOPRAZOLE SODIUM 40 MG: 40 TABLET, DELAYED RELEASE ORAL at 08:09

## 2025-09-01 RX ADMIN — Medication 100 MG: at 08:09

## 2025-09-01 RX ADMIN — THERA TABS 1 TABLET: TAB at 08:09

## 2025-09-01 RX ADMIN — FOLIC ACID 1 MG: 1 TABLET ORAL at 08:09

## 2025-09-01 RX ADMIN — INSULIN ASPART 10 UNITS: 100 INJECTION, SOLUTION INTRAVENOUS; SUBCUTANEOUS at 08:09

## 2025-09-01 RX ADMIN — SENNOSIDES AND DOCUSATE SODIUM 1 TABLET: 50; 8.6 TABLET ORAL at 08:09

## 2025-09-01 RX ADMIN — INSULIN GLARGINE 24 UNITS: 100 INJECTION, SOLUTION SUBCUTANEOUS at 08:09

## 2025-09-02 LAB — POCT GLUCOSE: 238 MG/DL (ref 70–110)
